# Patient Record
Sex: FEMALE | Race: WHITE | NOT HISPANIC OR LATINO | Employment: OTHER | URBAN - METROPOLITAN AREA
[De-identification: names, ages, dates, MRNs, and addresses within clinical notes are randomized per-mention and may not be internally consistent; named-entity substitution may affect disease eponyms.]

---

## 2017-04-06 ENCOUNTER — APPOINTMENT (OUTPATIENT)
Dept: LAB | Age: 72
End: 2017-04-06
Payer: MEDICARE

## 2017-04-06 ENCOUNTER — TRANSCRIBE ORDERS (OUTPATIENT)
Dept: ADMINISTRATIVE | Age: 72
End: 2017-04-06

## 2017-04-06 DIAGNOSIS — E78.5 HYPERLIPIDEMIA: ICD-10-CM

## 2017-04-06 DIAGNOSIS — M81.0 AGE-RELATED OSTEOPOROSIS WITHOUT CURRENT PATHOLOGICAL FRACTURE: ICD-10-CM

## 2017-04-06 LAB
25(OH)D3 SERPL-MCNC: 31.7 NG/ML (ref 30–100)
CHOLEST SERPL-MCNC: 249 MG/DL (ref 50–200)
HDLC SERPL-MCNC: 82 MG/DL (ref 40–60)
LDLC SERPL CALC-MCNC: 142 MG/DL (ref 0–100)
TRIGL SERPL-MCNC: 126 MG/DL

## 2017-04-06 PROCEDURE — 36415 COLL VENOUS BLD VENIPUNCTURE: CPT

## 2017-04-06 PROCEDURE — 80061 LIPID PANEL: CPT

## 2017-04-06 PROCEDURE — 82306 VITAMIN D 25 HYDROXY: CPT

## 2017-04-14 ENCOUNTER — ALLSCRIPTS OFFICE VISIT (OUTPATIENT)
Dept: OTHER | Facility: OTHER | Age: 72
End: 2017-04-14

## 2017-04-14 DIAGNOSIS — M81.0 AGE-RELATED OSTEOPOROSIS WITHOUT CURRENT PATHOLOGICAL FRACTURE: ICD-10-CM

## 2017-04-14 DIAGNOSIS — E78.5 HYPERLIPIDEMIA: ICD-10-CM

## 2017-04-14 DIAGNOSIS — Z00.00 ENCOUNTER FOR GENERAL ADULT MEDICAL EXAMINATION WITHOUT ABNORMAL FINDINGS: ICD-10-CM

## 2017-06-05 ENCOUNTER — ALLSCRIPTS OFFICE VISIT (OUTPATIENT)
Dept: OTHER | Facility: OTHER | Age: 72
End: 2017-06-05

## 2017-06-05 DIAGNOSIS — J44.9 CHRONIC OBSTRUCTIVE PULMONARY DISEASE (HCC): ICD-10-CM

## 2017-06-06 ENCOUNTER — HOSPITAL ENCOUNTER (OUTPATIENT)
Dept: RADIOLOGY | Age: 72
Discharge: HOME | End: 2017-06-06
Payer: MEDICARE

## 2017-06-06 ENCOUNTER — TRANSCRIBE ORDERS (OUTPATIENT)
Dept: ADMINISTRATIVE | Age: 72
End: 2017-06-06

## 2017-06-06 DIAGNOSIS — J44.9 CHRONIC OBSTRUCTIVE PULMONARY DISEASE (HCC): ICD-10-CM

## 2017-06-06 PROCEDURE — 71020 HB CHEST X-RAY 2VW FRONTAL&LATL: CPT

## 2017-06-12 ENCOUNTER — ALLSCRIPTS OFFICE VISIT (OUTPATIENT)
Dept: OTHER | Facility: OTHER | Age: 72
End: 2017-06-12

## 2017-06-30 ENCOUNTER — ALLSCRIPTS OFFICE VISIT (OUTPATIENT)
Dept: OTHER | Facility: OTHER | Age: 72
End: 2017-06-30

## 2017-07-18 ENCOUNTER — ALLSCRIPTS OFFICE VISIT (OUTPATIENT)
Dept: OTHER | Facility: OTHER | Age: 72
End: 2017-07-18

## 2017-09-11 ENCOUNTER — ALLSCRIPTS OFFICE VISIT (OUTPATIENT)
Dept: OTHER | Facility: OTHER | Age: 72
End: 2017-09-11

## 2017-09-11 ENCOUNTER — GENERIC CONVERSION - ENCOUNTER (OUTPATIENT)
Dept: OTHER | Facility: OTHER | Age: 72
End: 2017-09-11

## 2017-09-11 DIAGNOSIS — J44.9 CHRONIC OBSTRUCTIVE PULMONARY DISEASE (HCC): ICD-10-CM

## 2017-09-26 ENCOUNTER — APPOINTMENT (OUTPATIENT)
Dept: LAB | Age: 72
End: 2017-09-26
Payer: MEDICARE

## 2017-09-26 ENCOUNTER — TRANSCRIBE ORDERS (OUTPATIENT)
Dept: ADMINISTRATIVE | Age: 72
End: 2017-09-26

## 2017-09-26 ENCOUNTER — HOSPITAL ENCOUNTER (OUTPATIENT)
Dept: RADIOLOGY | Age: 72
Discharge: HOME | End: 2017-09-26
Payer: MEDICARE

## 2017-09-26 DIAGNOSIS — E78.5 HYPERLIPIDEMIA: ICD-10-CM

## 2017-09-26 DIAGNOSIS — J44.9 CHRONIC OBSTRUCTIVE PULMONARY DISEASE (HCC): ICD-10-CM

## 2017-09-26 DIAGNOSIS — M81.0 AGE-RELATED OSTEOPOROSIS WITHOUT CURRENT PATHOLOGICAL FRACTURE: ICD-10-CM

## 2017-09-26 LAB
25(OH)D3 SERPL-MCNC: 32.9 NG/ML (ref 30–100)
ALBUMIN SERPL BCP-MCNC: 3.7 G/DL (ref 3.5–5)
ALP SERPL-CCNC: 103 U/L (ref 46–116)
ALT SERPL W P-5'-P-CCNC: 16 U/L (ref 12–78)
ANION GAP SERPL CALCULATED.3IONS-SCNC: 6 MMOL/L (ref 4–13)
AST SERPL W P-5'-P-CCNC: 18 U/L (ref 5–45)
BACTERIA UR QL AUTO: NORMAL /HPF
BASOPHILS # BLD AUTO: 0.04 THOUSANDS/ΜL (ref 0–0.1)
BASOPHILS NFR BLD AUTO: 0 % (ref 0–1)
BILIRUB SERPL-MCNC: 0.57 MG/DL (ref 0.2–1)
BILIRUB UR QL STRIP: NEGATIVE
BUN SERPL-MCNC: 14 MG/DL (ref 5–25)
CALCIUM SERPL-MCNC: 9.2 MG/DL (ref 8.3–10.1)
CHLORIDE SERPL-SCNC: 97 MMOL/L (ref 100–108)
CHOLEST SERPL-MCNC: 231 MG/DL (ref 50–200)
CLARITY UR: CLEAR
CO2 SERPL-SCNC: 32 MMOL/L (ref 21–32)
COLOR UR: YELLOW
CREAT SERPL-MCNC: 0.65 MG/DL (ref 0.6–1.3)
EOSINOPHIL # BLD AUTO: 0.02 THOUSAND/ΜL (ref 0–0.61)
EOSINOPHIL NFR BLD AUTO: 0 % (ref 0–6)
ERYTHROCYTE [DISTWIDTH] IN BLOOD BY AUTOMATED COUNT: 13.8 % (ref 11.6–15.1)
GFR SERPL CREATININE-BSD FRML MDRD: 90 ML/MIN/1.73SQ M
GLUCOSE P FAST SERPL-MCNC: 86 MG/DL (ref 65–99)
GLUCOSE UR STRIP-MCNC: NEGATIVE MG/DL
HCT VFR BLD AUTO: 46.8 % (ref 34.8–46.1)
HDLC SERPL-MCNC: 77 MG/DL (ref 40–60)
HGB BLD-MCNC: 15.5 G/DL (ref 11.5–15.4)
HGB UR QL STRIP.AUTO: NEGATIVE
KETONES UR STRIP-MCNC: NEGATIVE MG/DL
LDLC SERPL CALC-MCNC: 135 MG/DL (ref 0–100)
LEUKOCYTE ESTERASE UR QL STRIP: NEGATIVE
LYMPHOCYTES # BLD AUTO: 2.24 THOUSANDS/ΜL (ref 0.6–4.47)
LYMPHOCYTES NFR BLD AUTO: 24 % (ref 14–44)
MCH RBC QN AUTO: 31.1 PG (ref 26.8–34.3)
MCHC RBC AUTO-ENTMCNC: 33.1 G/DL (ref 31.4–37.4)
MCV RBC AUTO: 94 FL (ref 82–98)
MONOCYTES # BLD AUTO: 0.87 THOUSAND/ΜL (ref 0.17–1.22)
MONOCYTES NFR BLD AUTO: 9 % (ref 4–12)
NEUTROPHILS # BLD AUTO: 6.09 THOUSANDS/ΜL (ref 1.85–7.62)
NEUTS SEG NFR BLD AUTO: 67 % (ref 43–75)
NITRITE UR QL STRIP: NEGATIVE
NON-SQ EPI CELLS URNS QL MICRO: NORMAL /HPF
NRBC BLD AUTO-RTO: 0 /100 WBCS
PH UR STRIP.AUTO: 7.5 [PH] (ref 4.5–8)
PLATELET # BLD AUTO: 354 THOUSANDS/UL (ref 149–390)
PMV BLD AUTO: 10.1 FL (ref 8.9–12.7)
POTASSIUM SERPL-SCNC: 4 MMOL/L (ref 3.5–5.3)
PROT SERPL-MCNC: 7.8 G/DL (ref 6.4–8.2)
PROT UR STRIP-MCNC: ABNORMAL MG/DL
RBC # BLD AUTO: 4.98 MILLION/UL (ref 3.81–5.12)
RBC #/AREA URNS AUTO: NORMAL /HPF
SODIUM SERPL-SCNC: 135 MMOL/L (ref 136–145)
SP GR UR STRIP.AUTO: 1.02 (ref 1–1.03)
TRIGL SERPL-MCNC: 97 MG/DL
TSH SERPL DL<=0.05 MIU/L-ACNC: 1.05 UIU/ML (ref 0.36–3.74)
UROBILINOGEN UR QL STRIP.AUTO: 0.2 E.U./DL
WBC # BLD AUTO: 9.28 THOUSAND/UL (ref 4.31–10.16)
WBC #/AREA URNS AUTO: NORMAL /HPF

## 2017-09-26 PROCEDURE — 84443 ASSAY THYROID STIM HORMONE: CPT

## 2017-09-26 PROCEDURE — 80053 COMPREHEN METABOLIC PANEL: CPT

## 2017-09-26 PROCEDURE — 82306 VITAMIN D 25 HYDROXY: CPT

## 2017-09-26 PROCEDURE — 81001 URINALYSIS AUTO W/SCOPE: CPT

## 2017-09-26 PROCEDURE — 36415 COLL VENOUS BLD VENIPUNCTURE: CPT

## 2017-09-26 PROCEDURE — 80061 LIPID PANEL: CPT

## 2017-09-26 PROCEDURE — 85025 COMPLETE CBC W/AUTO DIFF WBC: CPT

## 2017-10-05 ENCOUNTER — GENERIC CONVERSION - ENCOUNTER (OUTPATIENT)
Dept: OTHER | Facility: OTHER | Age: 72
End: 2017-10-05

## 2017-10-17 ENCOUNTER — HOSPITAL ENCOUNTER (OUTPATIENT)
Facility: HOSPITAL | Age: 72
Setting detail: OUTPATIENT SURGERY
Discharge: HOME | End: 2017-10-17
Attending: INTERNAL MEDICINE | Admitting: INTERNAL MEDICINE
Payer: MEDICARE

## 2017-10-17 VITALS
BODY MASS INDEX: 14.16 KG/M2 | DIASTOLIC BLOOD PRESSURE: 68 MMHG | HEART RATE: 98 BPM | SYSTOLIC BLOOD PRESSURE: 118 MMHG | HEIGHT: 65 IN | OXYGEN SATURATION: 98 % | RESPIRATION RATE: 20 BRPM | TEMPERATURE: 97.7 F | WEIGHT: 85 LBS

## 2017-10-17 DIAGNOSIS — C34.91 MALIGNANT NEOPLASM OF UNSPECIFIED PART OF RIGHT BRONCHUS OR LUNG (HCC): ICD-10-CM

## 2017-10-17 DIAGNOSIS — R91.8 LUNG MASS: ICD-10-CM

## 2017-10-17 DIAGNOSIS — R91.8 OTHER NONSPECIFIC ABNORMAL FINDING OF LUNG FIELD (CODE): ICD-10-CM

## 2017-10-17 PROCEDURE — 88341 IMHCHEM/IMCYTCHM EA ADD ANTB: CPT | Performed by: INTERNAL MEDICINE

## 2017-10-17 PROCEDURE — 88305 TISSUE EXAM BY PATHOLOGIST: CPT | Performed by: INTERNAL MEDICINE

## 2017-10-17 PROCEDURE — 94640 AIRWAY INHALATION TREATMENT: CPT

## 2017-10-17 PROCEDURE — 88112 CYTOPATH CELL ENHANCE TECH: CPT | Performed by: INTERNAL MEDICINE

## 2017-10-17 PROCEDURE — 88342 IMHCHEM/IMCYTCHM 1ST ANTB: CPT | Performed by: INTERNAL MEDICINE

## 2017-10-17 PROCEDURE — 94760 N-INVAS EAR/PLS OXIMETRY 1: CPT

## 2017-10-17 RX ORDER — MIDAZOLAM HYDROCHLORIDE 1 MG/ML
5 INJECTION INTRAMUSCULAR; INTRAVENOUS ONCE
Status: COMPLETED | OUTPATIENT
Start: 2017-10-17 | End: 2017-10-17

## 2017-10-17 RX ORDER — SODIUM CHLORIDE 9 MG/ML
50 INJECTION, SOLUTION INTRAVENOUS CONTINUOUS
Status: DISCONTINUED | OUTPATIENT
Start: 2017-10-17 | End: 2017-10-17 | Stop reason: HOSPADM

## 2017-10-17 RX ORDER — LEVALBUTEROL 1.25 MG/.5ML
1.25 SOLUTION, CONCENTRATE RESPIRATORY (INHALATION) EVERY 8 HOURS PRN
Status: DISCONTINUED | OUTPATIENT
Start: 2017-10-17 | End: 2017-10-17 | Stop reason: HOSPADM

## 2017-10-17 RX ORDER — BUDESONIDE 0.25 MG/2ML
0.25 INHALANT ORAL 2 TIMES DAILY
COMMUNITY
End: 2018-10-03 | Stop reason: SDUPTHER

## 2017-10-17 RX ORDER — FENTANYL CITRATE/PF 50 MCG/ML
50 SYRINGE (ML) INJECTION ONCE
Status: COMPLETED | OUTPATIENT
Start: 2017-10-17 | End: 2017-10-17

## 2017-10-17 RX ADMIN — SODIUM CHLORIDE 50 ML/HR: 0.9 INJECTION, SOLUTION INTRAVENOUS at 12:49

## 2017-10-17 RX ADMIN — SODIUM CHLORIDE 50 ML/HR: 0.9 INJECTION, SOLUTION INTRAVENOUS at 10:29

## 2017-10-17 RX ADMIN — MIDAZOLAM 3 MG: 1 INJECTION INTRAMUSCULAR; INTRAVENOUS at 11:24

## 2017-10-17 RX ADMIN — LEVALBUTEROL HYDROCHLORIDE 1.25 MG: 1.25 SOLUTION, CONCENTRATE RESPIRATORY (INHALATION) at 13:19

## 2017-10-17 RX ADMIN — FENTANYL CITRATE 50 MCG: 50 INJECTION INTRAMUSCULAR; INTRAVENOUS at 11:24

## 2017-10-17 NOTE — OP NOTE
**** PULMONARY REPORT ****     PATIENT NAME: Darlene Packer ------- VISIT ID :  Patient ID:   [id number] YOB: 1945     INTRODUCTION: Bronchoscopy - A 81 female patient presents for outpatient   Bronchoscopy at [facility] (formerly [facility])  INDICATIONS:  Lung mass  CONSENT: The benefits, risks, and alternatives to the procedure were   discussed and informed consent was obtained from the patient  PREPARATION:  ASA Classification: Class 2 - Patient has mild to moderate   systemic disturbance that may or may not be related to the disorder   requiring surgery  ASA Classification: Class 2 - Patient has mild to   moderate systemic disturbance that may or may not be related to the   disorder requiring surgery  MEDICATIONS: See anesthesiologist's report  Fentanyl 50 mcg IV during the   procedure  Versed 3 mg IV during the procedure  PROCEDURE:  The bronchoscope was passed via the right naris and advanced   into the tracheobronchial tree  The vocal cords are symmetric and move   normally with respiration  The trachea is of normal caliber  The main   debby is full in appearance  The right main stem bronchus is compramized   by an endobronchial tumor  The RUL bronchus and segmental bronchi are   open  The brochus intermedius is collapsed and impacted with mucus  Bronchial washing was done  I couldn't see beyond the bronchus   intermedius  The left bronchial tree was inspected  No endobronchial   lesion or secretions  FNA X5 was done from the RT main stem tumor  Also   cytology bruching and forcept biopsies  Bronchial washing also done  There   was no significant bleeding  The scope was removed without difficulty  The   patient has tolerated the procedure well  FINDINGS:     ESTIMATED BLOOD LOSS:  None  COMPLICATIONS:  There were no unplanned events       IMPRESSIONS: Rt main stem endobronchial lesion and collapse of the   bronchial intermedius consistent with lung cancer  RECOMMENDATIONS: Follow up on the biospy result  Referred to medical   oncology and ordered a PET/CT scan  PROCEDURE CODES:     ICD-9 Codes: 343  6 Swelling, mass, or lump in chest     ICD-10 Codes: V92 6 Neoplasm of uncertain behavior of trachea, bronchus   and lung     PERFORMED BY:KUNAL Chery  on 10/17/2017  Assisted By:  Version 1, electronically signed by KUNAL Singh  on 10/17/2017   at 14:09

## 2017-11-02 ENCOUNTER — HOSPITAL ENCOUNTER (OUTPATIENT)
Dept: RADIOLOGY | Age: 72
Discharge: HOME | End: 2017-11-02
Payer: MEDICARE

## 2017-11-02 VITALS — WEIGHT: 82.6 LBS | BODY MASS INDEX: 13.75 KG/M2

## 2017-11-02 DIAGNOSIS — C34.91 MALIGNANT NEOPLASM OF UNSPECIFIED PART OF RIGHT BRONCHUS OR LUNG (HCC): ICD-10-CM

## 2017-11-02 LAB — GLUCOSE SERPL-MCNC: 83 MG/DL (ref 65–140)

## 2017-11-02 PROCEDURE — 82948 REAGENT STRIP/BLOOD GLUCOSE: CPT

## 2017-11-02 PROCEDURE — A9552 F18 FDG: HCPCS

## 2017-11-02 PROCEDURE — 78815 PET IMAGE W/CT SKULL-THIGH: CPT

## 2017-11-02 RX ADMIN — IOHEXOL 5 ML: 240 INJECTION, SOLUTION INTRATHECAL; INTRAVASCULAR; INTRAVENOUS; ORAL at 09:45

## 2017-11-06 ENCOUNTER — ALLSCRIPTS OFFICE VISIT (OUTPATIENT)
Dept: OTHER | Facility: OTHER | Age: 72
End: 2017-11-06

## 2017-11-07 NOTE — CONSULTS
Assessment  1  Anorexia (783 0) (R63 0)   2  Breast CA (174 9) (C50 919)   3  Adenocarcinoma of right lung, stage 3 (162 9) (C34 91)    Plan  Abnormal weight loss    · Megestrol Acetate 400 MG/10ML Oral Suspension; 10ml daily   Rx By: Aminah Velasquez); Dispense: 0 Days ; #:240 ML; Refill: 6;For: Abnormal weight loss; JACOB = N; Verified Transmission to [id number] Last Updated By: System, SureScripts; 11/6/2017 4:39:55 PM  Adenocarcinoma of right lung, stage 3    · Prochlorperazine Maleate 10 MG Oral Tablet; TAKE 1 TABLET EVERY 6 HOURS AS  NEEDED   Rx By: Aminah Velasquez); Dispense: 15 Days ; #:60 Tablet; Refill: 2;For: Adenocarcinoma of right lung, stage 3; JACBO = N; Verified Transmission to [facility] #[id number] Last Updated By: System, SureScripts; 11/6/2017 4:01:38 PM   · (1) CBC/PLT/DIFF; Status:Active; Requested LVE:[id number];    Perform:[facility]; PXL:[id number]; Ordered; For:Adenocarcinoma of right lung, stage 3; Ordered By:Jose Luis Singh);   · (1) COMPREHENSIVE METABOLIC PANEL; Status:Active; Requested GGY:[id number];    Perform:[facility]; BMR:33BCS3268; Ordered; For:Adenocarcinoma of right lung, stage 3; Ordered By:Jose Luis Singh);   · 2 Maegan Arzola MD, [facility]  Radiation Oncology Co-Management  Stage IIIA I  adenocarcinoma of the lung need for radiation evaluation  Status: Active  Requested for:  68BNT4166   Ordered; For: Adenocarcinoma of right lung, stage 3; Ordered By: Aminah Velasquez) Performed:  Due: 76MMC5061  Care Summary provided  : Yes   · Follow-up visit in 3 weeks Evaluation and Treatment  Follow-up  Status: Hold For -  Scheduling  Requested for: 67QDV4493   Ordered; For: Adenocarcinoma of right lung, stage 3; Ordered By: Aminah Velasquez) Performed:  Due: 86YYT1358    Discussion/Summary    At least stage IIIA adenocarcinoma of the right upper lobe of the lung with right hilum, mediastinal involvement by CT scan/ PET scan, biopsy of the right main stem Cell bronchus showed adenocarcinoma, positive for TTF 1, negative for P 40, there was no evidence of distant metastasis involving the adrenals, liver, bony area, there might be an 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I had long discussion with the patient, her sister and her cousin about the staging and treatmentI believe the patient definitely will benefit from concurrent radiation therapy with weekly Taxol at 40 milligram/meter subcutaneously, carboplatin AUC 2 with CBC, BMP on weekly basisdepends on the response later on she might benefit from adjuvant Durvalumab if it approved by the FDAWe will make Radiation Oncology referralFollow-up every other week with CBC, CMP an office visitMegace 6 25 mg 1 tsp every day  History of Present Illness  81-year-old  female with chronic COPD secondary to active smoking, dyspnea on exertion, progressive, CT scan showed mediastinal enlargement, possible right hilum enlargement, she had right mainstem bronchus biopsy on October 2017 showed moderately differentiated adenocarcinoma of the lung positive for TTF 1 and napsin and negative for P 40PET scan showed confluent of hypermetabolic right hilar and mediastinal, precarinal, subcarinal lymphadenopathy compatible with malignancy, or regular right upper lung density along the fissure measuring 1 6 x 1 3 cm however SUV 1 1 might be inflammation, hypermetabolic nodule in the right parotid gland might represent a primary parotid gland neoplasm used to smoke 2 packs of cigarettes daily for many many years, she does not drink alcoholboth parents still livinghas 1 sister in [place]      Review of Systems    Constitutional: recent 8 lb weight loss, but-- no fever-- and-- no chills  Eyes: No complaints of eye pain, no red eyes, no eyesight problems, no discharge, no dry eyes, no itching of eyes     ENT: no complaints of earache, no loss of hearing, no nose bleeds, no nasal discharge, no sore throat, no hoarseness  Cardiovascular: No complaints of slow heart rate, no fast heart rate, no chest pain, no palpitations, no leg claudication, no lower extremity edema  Respiratory: shortness of breath,-- cough-- and-- shortness of breath during exertion, but-- no wheezing  Gastrointestinal: No complaints of abdominal pain, no constipation, no nausea or vomiting, no diarrhea, no bloody stools  Genitourinary: No complaints of dysuria, no incontinence, no pelvic pain, no dysmenorrhea, no vaginal discharge or bleeding  Musculoskeletal: No complaints of arthralgias, no myalgias, no joint swelling or stiffness, no limb pain or swelling  Integumentary: No complaints of skin rash or lesions, no itching, no skin wounds, no breast pain or lump  Neurological: No complaints of headache, no confusion, no convulsions, no numbness, no dizziness or fainting, no tingling, no limb weakness, no difficulty walking  Psychiatric: Not suicidal, no sleep disturbance, no anxiety or depression, no change in personality, no emotional problems  Endocrine: No complaints of proptosis, no hot flashes, no muscle weakness, no deepening of the voice, no feelings of weakness  Hematologic/Lymphatic: no swollen glands  ROS reviewed  Active Problems  1  Abnormal weight loss (783 21) (R63 4)   2  Anorexia (783 0) (R63 0)   3  Breast CA (174 9) (C50 919)   4  Bronchitis, chronic obstructive, with exacerbation (491 21) (J44 1)   5  Chronic obstructive pulmonary disease (496) (J44 9)   6  Encounter for preventive health examination (V70 0) (Z00 00)   7  Fatigue (780 79) (R53 83)   8  Hyperlipidemia (272 4) (E78 5)   9  Lung mass (786 6) (R91 8)   10  Need for pneumococcal vaccine (V03 82) (Z23)   11  Need for prophylactic vaccination and inoculation against influenza (V04 81) (Z23)   12  Need for Tdap vaccination (V06 1) (Z23)   13  Nicotine dependence (305 1) (F17 200)   14   Non compliance with medical treatment (V15 81) (Z91 19)   15  Osteoporosis (733 00) (M81 0)    Past Medical History  1  History of malignant neoplasm of female breast (V10 3) (Z85 3)   2  Need for prophylactic vaccination and inoculation against influenza (V04 81) (Z23)    The active problems and past medical history were reviewed and updated today  Surgical History  1  History of Breast Surgery Mastectomy    The surgical history was reviewed and updated today  Family History  Mother    1  Family history of cardiac disorder (V17 49) (Z82 49)    The family history was reviewed and updated today  Social History   · Caffeine use (V49 89) (F15 90)   · Current every day smoker (305 1) (F17 200)   · Quit smoking (V15 82) (T82 827)  The social history was reviewed and updated today  Current Meds   1  Brovana 15 MCG/2ML Inhalation Nebulization Solution; INHALE 1 VIAL Every twelve   hours; Therapy: 86Isl1315 to (Evaluate:10Mar2018)  Requested for: 16Kwn6668; Last   Rx:76Jwq8208 Ordered   2  Budesonide 0 5 MG/2ML Inhalation Suspension; USE 1 UNIT DOSE VIA NEBULIZER   TWO TIMES A DAY; Therapy: 81Kry5870 to (Evaluate:10Mar2018)  Requested for: 93Bjg0845; Last   Rx:56Eyj0184 Ordered   3  Calcium 1000 + D 1000-800 MG-UNIT Oral Tablet; Therapy: (Recorded:08Mar2016) to Recorded   4  [employer] Vitamin D 1000 UNIT CAPS; Therapy: (Recorded:08Mar2016) to Recorded   5  Ipratropium Bromide 0 02 % Inhalation Solution; USE 1 UNIT DOSE IN NEBULIZER 4   TIMES DAILY; Therapy: 75Xqf9646 to (Evaluate:10Mar2018)  Requested for: 14Xxj2764; Last   Rx:46Rvl7581 Ordered   6  Ventolin  (90 Base) MCG/ACT Inhalation Aerosol Solution; Inhale 2 puff every six   hours as needed; Therapy: 35Fkz8870 to (Evaluate:10Mar2018)  Requested for: 63Mil9777; Last   Rx:75Wpi5995 Ordered    The medication list was reviewed and updated today  Allergies  1   No Known Drug Allergies    Vitals  Vital Signs    Recorded: 97MGL6574 03:09PM   Temperature 99 F Heart Rate 114   Respiration 16   Systolic 510   Diastolic 80   Height 5 ft 4 5 in   Weight 83 lb 5 0 oz   BMI Calculated 14 08   BSA Calculated 1 36   O2 Saturation 93     Physical Exam    Constitutional   General appearance: No acute distress, well appearing and well nourished  Eyes   Conjunctiva and lids: No swelling, erythema or discharge  Pupils and irises: Equal, round and reactive to light  Ears, Nose, Mouth, and Throat   External inspection of ears and nose: Normal     Oropharynx: Normal with no erythema, edema, exudate or lesions  Pulmonary   Auscultation of lungs: Abnormal   Auscultation of the lungs revealed decreased breath sounds diffusely  Cardiovascular   Auscultation of heart: Abnormal   A grade 2 systolic murmur was heard at the RUSB  Examination of extremities for edema and/or varicosities: Normal     Carotid pulses: Normal     Abdomen   Abdomen: Non-tender, no masses  Liver and spleen: No hepatomegaly or splenomegaly  Lymphatic   Palpation of lymph nodes in neck: No lymphadenopathy  Musculoskeletal   Gait and station: Normal     Digits and nails: Normal without clubbing or cyanosis  Inspection/palpation of joints, bones, and muscles: Normal     Skin   Skin and subcutaneous tissue: Normal without rashes or lesions  Neurologic   Cranial nerves: Cranial nerves 2-12 intact  Psychiatric   Orientation to person, place, and time: Normal     Mood and affect: Normal         ECOG 0       Results/Data  * NM PET CT SKULL BASE TO MID THIGH [id number] 09:10AM Malachi OREILLY Order Number: [id number]    - Patient Instructions: To schedule this appointment, please contact Central Scheduling at [phone]  Test Name Result Flag Reference   NM PET CT SKULL BASE TO MID THIGH (Report)     PET/CT SCAN     INDICATION: C34 91: Malignant neoplasm of unspecified part of right bronchus or lung  History taken directly from the electronic ordering system   Newly diagnosed lung cancer, initial staging, history of right breast cancer, status post right mastectomy     MODIFIER: PI        COMPARISON: CT chest 9/26/2017     CELL TYPE: Adenocarcinoma, right mainstem bronchus biopsy 10/17/2017     TECHNIQUE:  8 4 mCi F-18-FDG administered IV  Multiplanar attenuation corrected and non attenuation corrected PET images are available for interpretation, and contiguous, low dose, axial CT sections were obtained from the skull base through the femurs    following the administration of oral contrast material (7 5 cc Omnipaque-240 in 300 cc water)  Intravenous contrast material was not utilized  This examination, like all CT scans performed in the [facility], was performed utilizing    techniques to minimize radiation dose exposure, including the use of iterative reconstruction and automated exposure control  Fasting serum glucose: 83 mg/dl     FINDINGS:      VISUALIZED BRAIN:     No acute abnormalities are seen  HEAD/NECK:     There is an ill-defined hypermetabolic nodule in the right parotid gland region, measuring approximately 2 2 x 1 7 cm on series 3 image 34  SUV measures 11 3  This may represent a metastasis versus primary parotid gland lesions such as a Warthin's    tumor or amorphic adenoma  There is otherwise a physiologic distribution of FDG  CT images: Nodular right thyroid with calcifications  CHEST:     There is confluent hypermetabolic right hilar and mediastinal, precarinal, subcarinal adenopathy, most compatible with malignancy  SUV measures 17 4  On series 3 image 91, confluent right hilar and precarinal adenopathy measures approximately 4 2 x 2 2    cm  Subcarinal adenopathy measures approximately 2 5 x 1 5 cm on image 92  Right hilar devan mass image 100 measures 2 7 x 1 47 m  Tumor extends into the right mainstem bronchus, bronchus intermedius, right middle lobe and right lower lobe bronchi, as   seen previously   There is also a small right thoracic paraspinal node series 3 image 128 with mild FDG activity, measuring 1 3 x 0 4 cm, SUV 2 5  On series 3 image 78, there is an irregular right upper lung density along the major fissure measuring 1 6 x 1 3 cm, with mild FDG activity, SUV 1 1  This may be inflammatory, versus malignancy  Biapical pleural parenchymal scarring without significant hypermetabolism  4 mm left lower lung nodular density series 3 image 119 is too small for accurate PET evaluation  Small foci of activity in the proximal left upper extremity may be due to left upper extremity injection of the radiotracer  CT images: Persistent atelectasis right middle lobe  Severe emphysema  Coronary artery calcifications  ABDOMEN:     Questionable subtle hypermetabolic focus anterior to the right diaphragmatic larisa, SUV 2 6  This may be reassessed on follow-up exam      Otherwise no FDG avid soft tissue lesions are seen  CT images: Unremarkable  PELVIS:    No FDG avid soft tissue lesions are seen  CT images: Unremarkable  OSSEOUS STRUCTURES:   No FDG avid lesions are seen  CT images: No significant findings  Minimal anterolisthesis L5 on S1, and slight retrolisthesis L1 on L2, likely degenerative  IMPRESSION:   1  There is confluent hypermetabolic right hilar and mediastinal, precarinal, subcarinal adenopathy, most compatible with malignancy  2  Irregular right upper lung density along the major fissure measuring 1 6 x 1 3 cm, with mild FDG activity, SUV 1 1  This may be inflammatory in etiology versus malignancy  3  Hypermetabolic nodule right parotid gland region may represent a metastatic node or primary parotid gland neoplasm such as a Warthin's tumor or pleomorphic adenoma  Contrast CT neck evaluation may be considered to better delineate the borders  4  Questionable subtle hypermetabolic focus anterior to the right diaphragmatic larisa, SUV 2 6   This may be reassessed on follow-up exam    5  Continued CT follow-up recommended for left lower lung 4 mm nodule  Workstation performed: [id number]     Signed by:   Solomon Pruitt MD   11/2/17     (1) TISSUE EXAM [id number] 12:08PM Idalia Sanabria     Test Name Result Flag Reference   LAB AP CASE REPORT (Report)     Surgical Pathology Report             Case: [id number]                   Authorizing Provider: Tj Morales MD     Collected:      10/17/2017 1208        Ordering Location:   [address] [place] [address]   Received:      10/17/2017 [facility] Endoscopy                               Pathologist:      Valerie Sin MD                             Specimen:  Lung, Right Mainstem, Lung, right mainstem, mass bx   LAB AP FINAL DIAGNOSIS (Report)     A  Lung, right mainstem bronchus, biopsy:  - Moderately differentiated adenocarcinoma of lung origin  - Immunohistochemical stains performed with appropriate controls show the   tumor cells to be positive for TTF-1 and Napsin and negative for p40 and   MAURY-3, supporting the diagnosis  Electronically signed by Valerie Sin MD on 10/20/2017 at 8:50 AM   LAB AP SURGICAL ADDITIONAL INFORMATION (Report)     All controls performed with the immunohistochemical stains reported above   reacted appropriately  These tests were developed and their performance   characteristics determined by [facility] or   [employer]  They may not be cleared or approved by the [place]  Food and Drug Administration  The FDA has determined that such clearance   or approval is not necessary  These tests are used for clinical purposes  They should not be regarded as investigational or for research  This   laboratory has been approved by CLIA 88, designated as a high-complexity   laboratory and is qualified to perform these tests  LAB AP GROSS DESCRIPTION (Report)     A   The specimen is received in formalin, labeled with the patient's name   and hospital number, and is designated Right lung mainstem mass biopsy  The specimen consists of multiple tan red soft tissue and hemorrhagic   fragments measuring in aggregate 0 5 x 0 5 x 0 1 cm  Entirely submitted  One cassette  Note: The estimated total formalin fixation time based upon information   provided by the submitting clinician and the standard processing schedule   is 16 25 hours  [facility] [place]     Future Appointments    Date/Time Provider Specialty Site   11/15/2017 03:20 PM Ross Smith MD Pulmonary Medicine [place] [address]     Signatures   Electronically signed by :  KUNAL Sarah ; Nov 6 2017  5:32PM EST                       (Author)

## 2017-11-14 ENCOUNTER — GENERIC CONVERSION - ENCOUNTER (OUTPATIENT)
Dept: OTHER | Facility: OTHER | Age: 72
End: 2017-11-14

## 2017-11-15 ENCOUNTER — ALLSCRIPTS OFFICE VISIT (OUTPATIENT)
Dept: OTHER | Facility: OTHER | Age: 72
End: 2017-11-15

## 2017-11-16 ENCOUNTER — APPOINTMENT (OUTPATIENT)
Dept: RADIATION ONCOLOGY | Facility: HOSPITAL | Age: 72
End: 2017-11-16
Payer: MEDICARE

## 2017-11-16 ENCOUNTER — TRANSCRIBE ORDERS (OUTPATIENT)
Dept: LAB | Facility: HOSPITAL | Age: 72
End: 2017-11-16

## 2017-11-16 ENCOUNTER — APPOINTMENT (OUTPATIENT)
Dept: LAB | Facility: HOSPITAL | Age: 72
End: 2017-11-16
Attending: INTERNAL MEDICINE
Payer: MEDICARE

## 2017-11-16 ENCOUNTER — GENERIC CONVERSION - ENCOUNTER (OUTPATIENT)
Dept: OTHER | Facility: OTHER | Age: 72
End: 2017-11-16

## 2017-11-16 DIAGNOSIS — C34.91 PRIMARY LUNG CANCER WITH METASTASIS FROM LUNG TO OTHER SITE, RIGHT (HCC): Primary | ICD-10-CM

## 2017-11-16 DIAGNOSIS — C34.91 MALIGNANT NEOPLASM OF UNSPECIFIED PART OF RIGHT BRONCHUS OR LUNG (HCC): ICD-10-CM

## 2017-11-16 LAB
ALBUMIN SERPL BCP-MCNC: 3.7 G/DL (ref 3.5–5)
ALP SERPL-CCNC: 86 U/L (ref 46–116)
ALT SERPL W P-5'-P-CCNC: 18 U/L (ref 12–78)
ANION GAP SERPL CALCULATED.3IONS-SCNC: 5 MMOL/L (ref 4–13)
AST SERPL W P-5'-P-CCNC: 16 U/L (ref 5–45)
BASOPHILS # BLD AUTO: 0.04 THOUSANDS/ΜL (ref 0–0.1)
BASOPHILS NFR BLD AUTO: 0 % (ref 0–1)
BILIRUB SERPL-MCNC: 0.39 MG/DL (ref 0.2–1)
BUN SERPL-MCNC: 19 MG/DL (ref 5–25)
CALCIUM SERPL-MCNC: 8.8 MG/DL (ref 8.3–10.1)
CHLORIDE SERPL-SCNC: 98 MMOL/L (ref 100–108)
CO2 SERPL-SCNC: 32 MMOL/L (ref 21–32)
CREAT SERPL-MCNC: 0.61 MG/DL (ref 0.6–1.3)
EOSINOPHIL # BLD AUTO: 0.03 THOUSAND/ΜL (ref 0–0.61)
EOSINOPHIL NFR BLD AUTO: 0 % (ref 0–6)
ERYTHROCYTE [DISTWIDTH] IN BLOOD BY AUTOMATED COUNT: 13.9 % (ref 11.6–15.1)
GFR SERPL CREATININE-BSD FRML MDRD: 92 ML/MIN/1.73SQ M
GLUCOSE SERPL-MCNC: 131 MG/DL (ref 65–140)
HCT VFR BLD AUTO: 42.3 % (ref 34.8–46.1)
HGB BLD-MCNC: 13.9 G/DL (ref 11.5–15.4)
LYMPHOCYTES # BLD AUTO: 1.89 THOUSANDS/ΜL (ref 0.6–4.47)
LYMPHOCYTES NFR BLD AUTO: 16 % (ref 14–44)
MCH RBC QN AUTO: 31 PG (ref 26.8–34.3)
MCHC RBC AUTO-ENTMCNC: 32.9 G/DL (ref 31.4–37.4)
MCV RBC AUTO: 94 FL (ref 82–98)
MONOCYTES # BLD AUTO: 0.91 THOUSAND/ΜL (ref 0.17–1.22)
MONOCYTES NFR BLD AUTO: 8 % (ref 4–12)
NEUTROPHILS # BLD AUTO: 8.61 THOUSANDS/ΜL (ref 1.85–7.62)
NEUTS SEG NFR BLD AUTO: 76 % (ref 43–75)
NRBC BLD AUTO-RTO: 0 /100 WBCS
PLATELET # BLD AUTO: 365 THOUSANDS/UL (ref 149–390)
PMV BLD AUTO: 9.2 FL (ref 8.9–12.7)
POTASSIUM SERPL-SCNC: 4.1 MMOL/L (ref 3.5–5.3)
PROT SERPL-MCNC: 7.5 G/DL (ref 6.4–8.2)
RBC # BLD AUTO: 4.49 MILLION/UL (ref 3.81–5.12)
SODIUM SERPL-SCNC: 135 MMOL/L (ref 136–145)
WBC # BLD AUTO: 11.52 THOUSAND/UL (ref 4.31–10.16)

## 2017-11-16 PROCEDURE — 85025 COMPLETE CBC W/AUTO DIFF WBC: CPT

## 2017-11-16 PROCEDURE — 80053 COMPREHEN METABOLIC PANEL: CPT

## 2017-11-16 PROCEDURE — 99215 OFFICE O/P EST HI 40 MIN: CPT | Performed by: RADIOLOGY

## 2017-11-16 PROCEDURE — 36415 COLL VENOUS BLD VENIPUNCTURE: CPT

## 2017-11-16 RX ORDER — SODIUM CHLORIDE 9 MG/ML
20 INJECTION, SOLUTION INTRAVENOUS CONTINUOUS
Status: DISCONTINUED | OUTPATIENT
Start: 2017-11-17 | End: 2017-11-20 | Stop reason: HOSPADM

## 2017-11-16 NOTE — PROGRESS NOTES
Assessment    1  Chronic obstructive pulmonary disease (496) (J44 9)   2  Chronic hypoxemic respiratory failure (518 83,799 02) (J96 11)   3  Adenocarcinoma of right lung, stage 3 (162 9) (C34 91)    Plan  Adenocarcinoma of right lung, stage 3    · Follow-up visit in 4 Months Evaluation and Treatment  Follow-up  Status: Hold For -Scheduling  Requested for: 56GZN8815   Ordered; Adenocarcinoma of right lung, stage 3; Ordered By: Bianca Snider Performed:  Due: 14FXT0718    Results/Data  Results Free Text Form Uyen Iba:   Results  Other Overnight pulse oximetry on room air showed that the patie  CT Scan CT scan of the chest Is reviewed and it showedshowed right hilar lung mass  PET Scan the PET-CT also reviewed and showed the right hilar mass is PET active  Discussion/Summary  Discussion Summary:   The patient's COPD is better  Given the degree of shortness of breath on exertion she would benefit from pulmonary rehab  However, this would be probably better done after she is done with the chemotherapy and radiation therapy for her lung cancer  I will call the [facility] pharmacy and find out why she was not given the [address] which was prescribed  She will continue with the [place] and budesonide nebulized twice a day and ipratropium 4 times a day  She will also use the oxygen 24 hours a day  I have encouraged her to start to become more active to maintain her stamina  She already received her influenza vaccine  I will see her in 4 months in a follow-up visit  Goals and Barriers: The patient has the current Goals: Get through the cancer treatment without complications  Improved breathing on exertion  The patent has the current Barriers: Lung cancer  Patient's Capacity to Self-Care: Patient is able to Self-Care  Patient agrees and allows to involve family/caregiver in development of care plan:   Patient Education: Educational resources provided:   Medication SE Review and Pt Understands Tx:  The treatment plan was reviewed with the patient/guardian  The patient/guardian understands and agrees with the treatment plan      Active Problems    1  Abnormal weight loss (783 21) (R63 4)   2  Adenocarcinoma of right lung, stage 3 (162 9) (C34 91)   3  Adjustment disorder with depressed mood (309 0) (F43 21)   4  Anorexia (783 0) (R63 0)   5  Breast CA (174 9) (C50 919)   6  Bronchitis, chronic obstructive, with exacerbation (491 21) (J44 1)   7  Chronic obstructive pulmonary disease (496) (J44 9)   8  Depressive disorder (311) (F32 9)   9  Encounter for preventive health examination (V70 0) (Z00 00)   10  Fatigue (780 79) (R53 83)   11  Hyperlipidemia (272 4) (E78 5)   12  Lung mass (786 6) (R91 8)   13  Need for pneumococcal vaccine (V03 82) (Z23)   14  Need for prophylactic vaccination and inoculation against influenza (V04 81) (Z23)   15  Need for Tdap vaccination (V06 1) (Z23)   16  Nicotine dependence (305 1) (F17 200)   17  Non compliance with medical treatment (V15 81) (Z91 19)   18  Osteoporosis (733 00) (M81 0)    History of Present Illness  HPI: The patient is here for a follow-up visit  She went to [place] to visit her Granddaughter and went back home  before that and after the last visit her low-dose CT scan of the chest showed a lung mass  I did a bronchoscopy and biopsy and that showed adenocarcinoma of the lung  She was scheduled for a PET-CT And was referred to Medical Oncology  Her breathing has improved with the use of oxygen, budesonide and ipratropium nebulized medications  She has no significant cough  Denies any sputum production  Denies any chest pain or palpitations  His appetite is better  She has no nocturnal symptoms  She is using oxygen 24 hours a day  Review of Systems  Complete-Female - Pulm:  Constitutional: No fever, no chills, feels well, no tiredness, no recent weight gain or weight loss  Eyes: no complaints of vision problems  ENT: no rhinitis, no PND, no epistaxis    Cardiovascular: no palpitations, no chest pain  Respiratory: as noted in HPI  Gastrointestinal: no complaints of esophageal reflux, no abdominal pain  Genitourinary: no dysuria  Musculoskeletal: no arthralgias, no joint swelling, no myalgias  Integumentary: no rash, no lesions  Neurological: no headache, no fainting, no weakness  Psychiatric: no anxiety, no depression  Hematologic/Lymphatic: â no complaints of swollen glands  Past Medical History  1  History of malignant neoplasm of female breast (V10 3) (Z85 3)   2  Need for prophylactic vaccination and inoculation against influenza (V04 81) (Z23)    Surgical History  1  History of Breast Surgery Mastectomy   2  History of Colonoscopy  Surgical History Reviewed: The surgical history was reviewed and updated today  Family History  Mother    1  Family history of cardiac disorder (V17 49) (Z82 49)  Family History Reviewed: The family history was reviewed and updated today  Social History     · Caffeine use (V49 89) (F15 90)   · 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   · Current every day smoker (305 1) (F17 200)   · Lives alone without help available (V60 4) (Z60 2)   · Quit smoking (V15 82) (Z87 891)   · 20 cigg a day for 55yrs and stop 2 mth ago  Social History Reviewed: The social history was reviewed and updated today  Current Meds   1  Brovana 15 MCG/2ML Inhalation Nebulization Solution; INHALE 1 VIAL Every twelve hours; Therapy: 11Sep2017 to (Evaluate:10Mar2018)  Requested for: 11Sep2017; Last Rx:11Sep2017 Ordered   2  Budesonide 0 5 MG/2ML Inhalation Suspension; USE 1 UNIT DOSE VIA NEBULIZER TWO TIMES A DAY; Therapy: 37Uhr9936 to (Evaluate:10Mar2018)  Requested for: 11Sep2017; Last Rx:11Sep2017 Ordered   3  Calcium 1000 + D 1000-800 MG-UNIT Oral Tablet; Therapy: (Recorded:08Mar2016) to Recorded   4  [employer] Vitamin D 1000 UNIT CAPS; Therapy: (Recorded:08Mar2016) to Recorded   5   Ipratropium Bromide 0 02 % Inhalation Solution; USE 1 UNIT DOSE IN NEBULIZER 4 TIMES DAILY; Therapy: 25Gpz4957 to (Evaluate:10Mar2018)  Requested for: 42Olx5927; Last Rx:12Dlw6921 Ordered   6  Megestrol Acetate 400 MG/10ML Oral Suspension; 10ml daily; Therapy: 55ZAU6089 to (Last BV:44NYA3612)  Requested for: 12ZKR5357 Ordered   7  PARoxetine HCl - 10 MG Oral Tablet; TAKE 1/2 TABLET DAILY FOR 10 DAYS THEN START ONE TABLET DAILY; Therapy: 30EHE2635 to (Last Rx:14Nov2017)  Requested for: 07PNM1785 Ordered   8  Prochlorperazine Maleate 10 MG Oral Tablet; TAKE 1 TABLET EVERY 6 HOURS AS NEEDED; Therapy: 65BSG6181 to (Evaluate:96Eyg0626)  Requested for: 82IKW3114; Last Rx:06Nov2017 Ordered   9  Ventolin  (90 Base) MCG/ACT Inhalation Aerosol Solution; Inhale 2 puff every six hours as needed; Therapy: 22Glb6191 to (Evaluate:10Mar2018)  Requested for: 37Pwk7853; Last Rx:02Osv8373 Ordered  Medication List Reviewed: The medication list was reviewed and updated today  Allergies  1  No Known Drug Allergies    Vitals  Vital Signs    Recorded: 35YHK8124 03:10PM   Temperature 99 2 F   Heart Rate 106   Respiration 16   Systolic 299   Diastolic 64   Height 5 ft 4 5 in   Weight 85 lb    BMI Calculated 14 36   BSA Calculated 1 37   O2 Saturation 94, Nasal Cannula   FiO2 2L/min, Nasal Cannula       Physical Exam   Constitutional  General appearance: No acute distress, well appearing and well nourished  Ears, Nose, Mouth, and Throat  Nasal mucosa, septum, and turbinates: Normal without edema or erythema  Lips, teeth, and gums: Normal, good dentition  Oropharynx: Normal with no erythema, edema, exudate or lesions  Neck  Neck: Supple, symmetric, trachea midline, no masses  Jugular veins: Normal    Pulmonary  Auscultation of lungs: Abnormal  -- decreased breath sounds on the left base  No wheezing or rhonchi  Cardiovascular  Auscultation of heart: Normal rate and rhythm, normal S1 and S2, no murmurs     Examination of extremities for edema and/or varicosities: Normal    Abdomen  Abdomen: Soft, non-tender  Lymphatic  Palpation of lymph nodes in neck: No lymphadenopathy  Musculoskeletal  Gait and station: Normal    Digits and nails: Normal without clubbing or cyanosis  Neurologic  Mental Status: Normal  Not confused, no evidence of dementia, good comprehension, good concentration  Skin  Skin and subcutaneous tissue: Limited exam shows no rash  Psychiatric  Orientation to person, place and time: Normal    Mood and affect: Normal        Thank you very much for allowing me to participate in the care of this patient  If you have any questions, please do not hesitate to contact me  Future Appointments    Date/Time Provider Specialty Site   12/06/2017 10:00 AM KUNAL Marc  Hematology Oncology CANCER CARE [facility] MEDICAL ONCOLOGY   01/03/2018 10:00 AM KUNAL Marc  Hematology Oncology CANCER CARE [facility] MEDICAL ONCOLOGY   11/21/2017 10:30 AM Shant Amaral [facility] Hematology Oncology CANCER CARE [facility] MEDICAL ONCOLOGY   12/20/2017 10:30 AM Shant Amaral [facility] Hematology Oncology CANCER CARE [facility] MEDICAL ONCOLOGY       Signatures   Electronically signed by :  Emerald Worrell MD; Nov 15 2017  3:40PM EST                       (Author)

## 2017-11-17 ENCOUNTER — HOSPITAL ENCOUNTER (OUTPATIENT)
Dept: INFUSION CENTER | Facility: CLINIC | Age: 72
Discharge: HOME | End: 2017-11-17
Payer: MEDICARE

## 2017-11-17 VITALS
WEIGHT: 85.76 LBS | HEART RATE: 95 BPM | TEMPERATURE: 98.1 F | HEIGHT: 65 IN | SYSTOLIC BLOOD PRESSURE: 121 MMHG | RESPIRATION RATE: 18 BRPM | DIASTOLIC BLOOD PRESSURE: 73 MMHG | BODY MASS INDEX: 14.29 KG/M2

## 2017-11-17 PROCEDURE — 96367 TX/PROPH/DG ADDL SEQ IV INF: CPT

## 2017-11-17 PROCEDURE — 96417 CHEMO IV INFUS EACH ADDL SEQ: CPT

## 2017-11-17 PROCEDURE — 96413 CHEMO IV INFUSION 1 HR: CPT

## 2017-11-17 RX ORDER — PAROXETINE 10 MG/1
TABLET, FILM COATED ORAL
COMMUNITY
Start: 2017-11-14 | End: 2018-06-10 | Stop reason: SDUPTHER

## 2017-11-17 RX ORDER — PROCHLORPERAZINE MALEATE 10 MG
TABLET ORAL
COMMUNITY
Start: 2017-11-06 | End: 2018-03-13 | Stop reason: ALTCHOICE

## 2017-11-17 RX ADMIN — FAMOTIDINE 20 MG: 10 INJECTION, SOLUTION INTRAVENOUS at 11:27

## 2017-11-17 RX ADMIN — PACLITAXEL 54 MG: 6 INJECTION, SOLUTION, CONCENTRATE INTRAVENOUS at 12:04

## 2017-11-17 RX ADMIN — DIPHENHYDRAMINE HYDROCHLORIDE 25 MG: 50 INJECTION, SOLUTION INTRAMUSCULAR; INTRAVENOUS at 10:49

## 2017-11-17 RX ADMIN — SODIUM CHLORIDE 20 ML/HR: 0.9 INJECTION, SOLUTION INTRAVENOUS at 10:40

## 2017-11-17 RX ADMIN — CARBOPLATIN 139 MG: 10 INJECTION, SOLUTION INTRAVENOUS at 13:18

## 2017-11-17 RX ADMIN — DEXAMETHASONE SODIUM PHOSPHATE: 10 INJECTION, SOLUTION INTRAMUSCULAR; INTRAVENOUS at 11:07

## 2017-11-17 NOTE — PLAN OF CARE
Problem: Potential for Falls  Goal: Patient will remain free of falls  INTERVENTIONS:  - Assess patient frequently for physical needs  -  Identify cognitive and physical deficits and behaviors that affect risk of falls    -  [place] fall precautions as indicated by assessment   - Educate patient/family on patient safety including physical limitations  - Instruct patient to call for assistance with activity based on assessment  - Modify environment to reduce risk of injury  - Consider OT/PT consult to assist with strengthening/mobility   Outcome: Progressing

## 2017-11-20 ENCOUNTER — APPOINTMENT (OUTPATIENT)
Dept: RADIATION ONCOLOGY | Facility: HOSPITAL | Age: 72
End: 2017-11-20
Attending: RADIOLOGY
Payer: MEDICARE

## 2017-11-20 ENCOUNTER — GENERIC CONVERSION - ENCOUNTER (OUTPATIENT)
Dept: OTHER | Facility: OTHER | Age: 72
End: 2017-11-20

## 2017-11-20 PROCEDURE — 77334 RADIATION TREATMENT AID(S): CPT | Performed by: RADIOLOGY

## 2017-11-20 PROCEDURE — 77470 SPECIAL RADIATION TREATMENT: CPT | Performed by: RADIOLOGY

## 2017-11-21 ENCOUNTER — APPOINTMENT (OUTPATIENT)
Dept: LAB | Facility: HOSPITAL | Age: 72
End: 2017-11-21
Attending: INTERNAL MEDICINE
Payer: MEDICARE

## 2017-11-21 ENCOUNTER — ALLSCRIPTS OFFICE VISIT (OUTPATIENT)
Dept: OTHER | Facility: OTHER | Age: 72
End: 2017-11-21

## 2017-11-21 DIAGNOSIS — C34.91 PRIMARY LUNG CANCER WITH METASTASIS FROM LUNG TO OTHER SITE, RIGHT (HCC): ICD-10-CM

## 2017-11-21 LAB
ALBUMIN SERPL BCP-MCNC: 3.7 G/DL (ref 3.5–5)
ALP SERPL-CCNC: 81 U/L (ref 46–116)
ALT SERPL W P-5'-P-CCNC: 16 U/L (ref 12–78)
ANION GAP SERPL CALCULATED.3IONS-SCNC: 5 MMOL/L (ref 4–13)
AST SERPL W P-5'-P-CCNC: 14 U/L (ref 5–45)
BASOPHILS # BLD AUTO: 0.04 THOUSANDS/ΜL (ref 0–0.1)
BASOPHILS NFR BLD AUTO: 0 % (ref 0–1)
BILIRUB SERPL-MCNC: 0.62 MG/DL (ref 0.2–1)
BUN SERPL-MCNC: 17 MG/DL (ref 5–25)
CALCIUM SERPL-MCNC: 9 MG/DL (ref 8.3–10.1)
CHLORIDE SERPL-SCNC: 99 MMOL/L (ref 100–108)
CO2 SERPL-SCNC: 32 MMOL/L (ref 21–32)
CREAT SERPL-MCNC: 0.59 MG/DL (ref 0.6–1.3)
EOSINOPHIL # BLD AUTO: 0.01 THOUSAND/ΜL (ref 0–0.61)
EOSINOPHIL NFR BLD AUTO: 0 % (ref 0–6)
ERYTHROCYTE [DISTWIDTH] IN BLOOD BY AUTOMATED COUNT: 13.5 % (ref 11.6–15.1)
GFR SERPL CREATININE-BSD FRML MDRD: 93 ML/MIN/1.73SQ M
GLUCOSE SERPL-MCNC: 80 MG/DL (ref 65–140)
HCT VFR BLD AUTO: 41.5 % (ref 34.8–46.1)
HGB BLD-MCNC: 13.9 G/DL (ref 11.5–15.4)
LYMPHOCYTES # BLD AUTO: 2.15 THOUSANDS/ΜL (ref 0.6–4.47)
LYMPHOCYTES NFR BLD AUTO: 18 % (ref 14–44)
MCH RBC QN AUTO: 31.2 PG (ref 26.8–34.3)
MCHC RBC AUTO-ENTMCNC: 33.5 G/DL (ref 31.4–37.4)
MCV RBC AUTO: 93 FL (ref 82–98)
MONOCYTES # BLD AUTO: 0.7 THOUSAND/ΜL (ref 0.17–1.22)
MONOCYTES NFR BLD AUTO: 6 % (ref 4–12)
NEUTROPHILS # BLD AUTO: 9.35 THOUSANDS/ΜL (ref 1.85–7.62)
NEUTS SEG NFR BLD AUTO: 76 % (ref 43–75)
NRBC BLD AUTO-RTO: 0 /100 WBCS
PLATELET # BLD AUTO: 360 THOUSANDS/UL (ref 149–390)
PMV BLD AUTO: 9.1 FL (ref 8.9–12.7)
POTASSIUM SERPL-SCNC: 4.3 MMOL/L (ref 3.5–5.3)
PROT SERPL-MCNC: 7.4 G/DL (ref 6.4–8.2)
RBC # BLD AUTO: 4.46 MILLION/UL (ref 3.81–5.12)
SODIUM SERPL-SCNC: 136 MMOL/L (ref 136–145)
WBC # BLD AUTO: 12.28 THOUSAND/UL (ref 4.31–10.16)

## 2017-11-21 PROCEDURE — 85025 COMPLETE CBC W/AUTO DIFF WBC: CPT

## 2017-11-21 PROCEDURE — 36415 COLL VENOUS BLD VENIPUNCTURE: CPT

## 2017-11-21 PROCEDURE — 80053 COMPREHEN METABOLIC PANEL: CPT

## 2017-11-22 RX ORDER — SODIUM CHLORIDE 9 MG/ML
20 INJECTION, SOLUTION INTRAVENOUS CONTINUOUS
Status: DISCONTINUED | OUTPATIENT
Start: 2017-11-24 | End: 2017-11-27 | Stop reason: HOSPADM

## 2017-11-23 NOTE — PROGRESS NOTES
Assessment    1  Adenocarcinoma of right lung, stage 3 (162 9) (C34 91)   2  Anorexia (783 0) (R63 0)    Plan  Anorexia    · Follow-up visit in 2 weeks Evaluation and Treatment  Follow-up  Status: Hold For -Scheduling  Requested for: 37MAJ5686 10:00AM   Ordered; Anorexia; Ordered By: Oanh Wade Performed:  Due: 20ZQS2763; Last Updated By: Naun Xie; 11/21/2017 11:37:32 AM    Discussion/Summary  Discussion Summary:   Cindy Navarrete is a 72-year-old female seen for initial consultation 11/6/2017 for at least stage IIIA adenocarcinoma of the right upper lobe of the lung with right hilum, mediastinal involvement by CT scan/ PET scan  There was no evidence of distant metastasis involving the adrenals, liver, bony area, there might be an 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 Biopsy of the right main stem bronchus 10/17/2017 showed adenocarcinoma, positive for TTF 1, negative for P 40  discussion with the patient, her sister, Summer Flores, who will be returning to [place] and her cousin, Tierra Hess, lives locally, regarding staging and treatment, concurrent radiation therapy with weekly Taxol at 40 milligram/meter subcutaneously, carboplatin AUC 2 with CBC, BMP on weekly basis recommended and patient agreed  cycle 1 day 1 November 17th, 2017  Durvalumab after Chemoradiotherapy in Stage III NonâSmall-Cell Lung Cancer is being investigated for patients who did not have disease progression after two or more cycles of platinum-based chemoradiotherapy  If this is approved by the FDA, she may be eligible  We re-reviewed her pet/ct  Copy given  In regards to her parotid tumor, she is she has been aware of increased activity over the right parotid region for many years  She would like to defer biopsy at this time  Follow-up every other week with CBC, CMP an office visitMegace 6 25 mg 1 tsp every day  she is finding benefit with this but dislikes the taste   If this were to be continued, tablets could be prescribed  due to the weekly use of steroids with chemotherapy and her use of nasal O2 contributing to mouth dryness, she is advised to monitor for any signs of thrush and to call our office if this occurs  Any issues or concerns prior to her next office visit, she is asked to call our office  History of Present Illness  HPI: 53-year-old  female seen initially 11/6/2017 regarding moderately differentiated adenocarcinoma of the lung positive for TTF 1 and napsin and negative for P 40 diagnosed via right mainstem bronchus biopsy on October 17, 2017 by Dr Joya De Luna   scan chest 9/26/2017 ordered by Dr Hinson Been performed as lung cancer screening study showed subcarinal adenopathy measuring 1 7 cm  Precarinal adenopathy measures 1 9 cm  The right lower lobe endobronchial soft tissue appears mass like  Lung-RADS 4A, suspicious  PET scan 11/2/2017 showed confluent of hypermetabolic right hilar and mediastinal, precarinal, subcarinal lymphadenopathy compatible with malignancy, SUV measures 17  4 right hilar and precarinal adenopathy measures approximately 4 2 x 2 2 cm  Subcarinal adenopathy measures approximately 2 5 x 1 5 cm  Right hilar devan mass image 100 measures 2 7 x 1 47 m  Tumor extends into the right mainstem bronchus, bronchus intermedius, right middle lobe and right lower lobe bronchi, as  seen previously  There is also a small right thoracic paraspinal node with mild FDG activity, measuring 1 3 x 0 4 cm, SUV 2 5 is an irregular right upper lung density along the major fissure measuring 1 6 x 1 3 cm, with mild FDG activity, SUV 1 1  This may be inflammatory, versus malignancy  is an ill-defined hypermetabolic nodule in the right parotid gland region, measuring approximately 2 2 x 1 7 cm SUV measures 11 3  This may represent a metastasis versus primary parotid gland lesions such as a Warthin's tumor or amorphic adenoma  mm left lower lung nodular density too small for PET characterization  subtle hypermetabolic focus anterior to the right diaphragmatic larisa, SUV 2 6   has chronic COPD secondary to active smoking, progressive dyspnea on exertion  She has had a chronic cough for many years  used to smoke 2 packs of cigarettes daily for many many years, she does not drink alcohol  both parents still livinghas 1 sister in [place]   Current Therapy: Taxol 40 milligrams/meter squared with carboplatin AUC of 2  week 1  11/17/2017RT anticipated to start 12/1/2017   Interval History: met with Dr Maryanne Rodríguez  Discussion between Dr Maryanne Rodríguez and Dr Olga Medrano regarding biopsy of the parotid mass and referral to IR for biopsy discussed  Ki Del Rosario states that she she has been aware of a right glandular prominence for many years, unchanged  she has no tenderness  No difficulty eating on this side  she would like to defer parotid mass biopsy  has been on supplemental O2 since September 11th, 2017  Clay Early was prescribed by Dr Kay Howard in September however patient was unaware that this was at the pharmacy  she started this 2 days ago and already has noticed improvement in her breathing  Helps care for her elderly parents that live adjacent  she started Megace liquid and this has helped with her appetite though she does not like the taste  she did not have any nausea after her 1st week of chemotherapy  Overall this was well tolerated  Review of Systems  Complete-Female:  Constitutional: recent 8 lb weight loss, but-- no fever-- and-- no chills  Eyes: No complaints of eye pain, no red eyes, no eyesight problems, no discharge, no dry eyes, no itching of eyes  ENT: no complaints of earache, no loss of hearing, no nose bleeds, no nasal discharge, no sore throat, no hoarseness  Cardiovascular: No complaints of slow heart rate, no fast heart rate, no chest pain, no palpitations, no leg claudication, no lower extremity edema    Respiratory: shortness of breath,-- cough-- and-- shortness of breath during exertion, but-- no wheezing  Gastrointestinal: No complaints of abdominal pain, no constipation, no nausea or vomiting, no diarrhea, no bloody stools  Genitourinary: No complaints of dysuria, no incontinence, no pelvic pain, no dysmenorrhea, no vaginal discharge or bleeding  Musculoskeletal: arthralgias,-- joint swelling-- and-- joint stiffness  Integumentary: No complaints of skin rash or lesions, no itching, no skin wounds, no breast pain or lump  Neurological: No complaints of headache, no confusion, no convulsions, no numbness, no dizziness or fainting, no tingling, no limb weakness, no difficulty walking  Psychiatric: Not suicidal, no sleep disturbance, no anxiety or depression, no change in personality, no emotional problems  Endocrine: No complaints of proptosis, no hot flashes, no muscle weakness, no deepening of the voice, no feelings of weakness  Hematologic/Lymphatic: no swollen glands  ROS Reviewed:   ROS reviewed  Active Problems  1  Abnormal weight loss (783 21) (R63 4)   2  Adenocarcinoma of right lung, stage 3 (162 9) (C34 91)   3  Adjustment disorder with depressed mood (309 0) (F43 21)   4  Anorexia (783 0) (R63 0)   5  Breast CA (174 9) (C50 919)   6  Bronchitis, chronic obstructive, with exacerbation (491 21) (J44 1)   7  Chronic hypoxemic respiratory failure (518 83,799 02) (J96 11)   8  Chronic obstructive pulmonary disease (496) (J44 9)   9  Depressive disorder (311) (F32 9)   10  Encounter for preventive health examination (V70 0) (Z00 00)   11  Fatigue (780 79) (R53 83)   12  Hyperlipidemia (272 4) (E78 5)   13  Lung mass (786 6) (R91 8)   14  Need for pneumococcal vaccine (V03 82) (Z23)   15  Need for prophylactic vaccination and inoculation against influenza (V04 81) (Z23)   16  Need for Tdap vaccination (V06 1) (Z23)   17  Nicotine dependence (305 1) (F17 200)   18  Non compliance with medical treatment (V15 81) (Z91 19)   19   Osteoporosis (733 00) (M81 0)    Past Medical History  1  History of malignant neoplasm of female breast (V10 3) (Z85 3)   2  Need for prophylactic vaccination and inoculation against influenza (V04 81) (Z23)  Active Problems And Past Medical History Reviewed: The active problems and past medical history were reviewed and updated today  Surgical History  1  History of Appendectomy   2  History of Breast Surgery Mastectomy   3  History of Colonoscopy   4  History of Tonsillectomy  Surgical History Reviewed: The surgical history was reviewed and updated today  Family History  Mother    1  Family history of cardiac disorder (V17 49) (Z82 49)  Family History Reviewed: The family history was reviewed and updated today  Social History     · Caffeine use (V49 89) (F15 90)   · Caregiver   ·    · Denied: History of Lives alone without help available   · Quit smoking (V15 82) (Z87 891)   · Retired  Social History Reviewed: The social history was reviewed and updated today  Current Meds   1  Brovana 15 MCG/2ML Inhalation Nebulization Solution; INHALE 1 VIAL Every twelve hours; Therapy: 21Tdg6912 to (Evaluate:55Qni4203)  Requested for: 20Nov2017; Last Rx:20Nov2017 Ordered   2  Budesonide 0 5 MG/2ML Inhalation Suspension; USE 1 UNIT DOSE VIA NEBULIZER TWO TIMES A DAY; Therapy: 52Ytr8040 to (Evaluate:10Mar2018)  Requested for: 50Ejf7410; Last Rx:11Sep2017 Ordered   3  Calcium 1000 + D 1000-800 MG-UNIT Oral Tablet; Therapy: (Recorded:08Mar2016) to Recorded   4  [employer] Vitamin D 1000 UNIT CAPS; Therapy: (Recorded:08Mar2016) to Recorded   5  Ipratropium Bromide 0 02 % Inhalation Solution; USE 1 UNIT DOSE IN NEBULIZER 4 TIMES DAILY; Therapy: 97Gnd3744 to (Evaluate:10Mar2018)  Requested for: 10Nfv6240; Last Rx:11Sep2017 Ordered   6  Megestrol Acetate 400 MG/10ML Oral Suspension; 10ml daily; Therapy: 57WXK6090 to (Last YR:12FOY7184)  Requested for: 75MOS0001 Ordered   7   PARoxetine HCl - 10 MG Oral Tablet; TAKE 1/2 TABLET DAILY FOR 10 DAYS THEN START ONE TABLET DAILY; Therapy: 68ZQT0062 to (Last Rx:14Nov2017)  Requested for: 88SMX2705 Ordered   8  Prochlorperazine Maleate 10 MG Oral Tablet; TAKE 1 TABLET EVERY 6 HOURS AS NEEDED; Therapy: 41VQR1661 to (Evaluate:02Ioe9486)  Requested for: 99JYO7503; Last Rx:06Nov2017 Ordered   9  Ventolin  (90 Base) MCG/ACT Inhalation Aerosol Solution; Inhale 2 puff every six hours as needed; Therapy: 38Bcc8164 to (Evaluate:10Mar2018)  Requested for: 78Uqt3552; Last Rx:53Yiv1702 Ordered  Medication List Reviewed: The medication list was reviewed and updated today  Allergies  1  No Known Drug Allergies    Vitals  Vital Signs    Recorded: 21Nov2017 10:33AM   Temperature 97 8 F   Heart Rate 103   Respiration 16   Systolic 868   Diastolic 70   Height 5 ft 4 in   Weight 84 lb    BMI Calculated 14 42   BSA Calculated 1 35   O2 Saturation 96   Pain Scale 0       Physical Exam   Constitutional  General appearance: Abnormal   chronically ill-- and-- underweight  Eyes  Conjunctiva and lids: No swelling, erythema or discharge  Pupils and irises: Equal, round and reactive to light  Ears, Nose, Mouth, and Throat  External inspection of ears and nose: Normal    Oropharynx: Normal with no erythema, edema, exudate or lesions  Pulmonary on nasal O2  Auscultation of lungs: Abnormal   Auscultation of the lungs revealed decreased breath sounds diffusely  Cardiovascular  Auscultation of heart: Abnormal   A grade 2 systolic murmur was heard at the RUSB  Examination of extremities for edema and/or varicosities: Normal    Abdomen  Abdomen: Non-tender, no masses  Lymphatic  Palpation of lymph nodes in neck: No lymphadenopathy  Musculoskeletal  Gait and station: Normal  -- arthritic changes  Inspection/palpation of joints, bones, and muscles: Normal    Skin  Skin and subcutaneous tissue: Normal without rashes or lesions  Neurologic  Cranial nerves: Cranial nerves 2-12 intact     Psychiatric  Orientation to person, place, and time: Normal    Mood and affect: Normal         ECOG 0       Future Appointments    Date/Time Provider Specialty Site   12/06/2017 10:00 AM KUNAL Gallagher  Hematology Oncology CANCER CARE [facility] MEDICAL ONCOLOGY   01/03/2018 10:00 AM KUNAL Gallagher  Hematology Oncology CANCER CARE [facility] MEDICAL ONCOLOGY   12/20/2017 10:30 AM [facility] Hematology Oncology CANC[facility]ARE [facility] MEDICAL ONCOLOGY   03/19/2018 10:00 AM Trena Blanco MD Pulmonary Medicine [address] PULMONARY [facility]       Signatures   Electronically signed by : [facility]; Nov 21 2017 12:32PM EST                       (Author)    Electronically signed by :  KUNAL Rolle ; Nov 22 2017  5:08PM EST                       (Author)

## 2017-11-24 ENCOUNTER — HOSPITAL ENCOUNTER (OUTPATIENT)
Dept: INFUSION CENTER | Facility: CLINIC | Age: 72
Discharge: HOME | End: 2017-11-24
Payer: MEDICARE

## 2017-11-24 VITALS
RESPIRATION RATE: 18 BRPM | DIASTOLIC BLOOD PRESSURE: 72 MMHG | HEART RATE: 96 BPM | SYSTOLIC BLOOD PRESSURE: 122 MMHG | BODY MASS INDEX: 14.51 KG/M2 | TEMPERATURE: 97.8 F | HEIGHT: 65 IN | WEIGHT: 87.08 LBS

## 2017-11-24 DIAGNOSIS — C34.91 MALIGNANT NEOPLASM OF UNSPECIFIED PART OF RIGHT BRONCHUS OR LUNG (HCC): ICD-10-CM

## 2017-11-24 PROCEDURE — 96417 CHEMO IV INFUS EACH ADDL SEQ: CPT

## 2017-11-24 PROCEDURE — 96375 TX/PRO/DX INJ NEW DRUG ADDON: CPT

## 2017-11-24 PROCEDURE — 96413 CHEMO IV INFUSION 1 HR: CPT

## 2017-11-24 RX ORDER — ARFORMOTEROL TARTRATE 15 UG/2ML
15 SOLUTION RESPIRATORY (INHALATION)
COMMUNITY
End: 2018-05-17 | Stop reason: SDUPTHER

## 2017-11-24 RX ADMIN — FAMOTIDINE 20 MG: 10 INJECTION, SOLUTION INTRAVENOUS at 10:46

## 2017-11-24 RX ADMIN — DEXAMETHASONE SODIUM PHOSPHATE: 10 INJECTION, SOLUTION INTRAMUSCULAR; INTRAVENOUS at 10:32

## 2017-11-24 RX ADMIN — SODIUM CHLORIDE 20 ML/HR: 0.9 INJECTION, SOLUTION INTRAVENOUS at 10:34

## 2017-11-24 RX ADMIN — PACLITAXEL 54 MG: 6 INJECTION, SOLUTION, CONCENTRATE INTRAVENOUS at 11:40

## 2017-11-24 RX ADMIN — CARBOPLATIN 128 MG: 10 INJECTION, SOLUTION INTRAVENOUS at 12:54

## 2017-11-24 RX ADMIN — DIPHENHYDRAMINE HYDROCHLORIDE 25 MG: 50 INJECTION, SOLUTION INTRAMUSCULAR; INTRAVENOUS at 11:05

## 2017-11-24 NOTE — PLAN OF CARE
Problem: Potential for Falls  Goal: Patient will remain free of falls  INTERVENTIONS:  - Assess patient frequently for physical needs  -  Identify cognitive and physical deficits and behaviors that affect risk of falls    -  [place] fall precautions as indicated by assessment   - Educate patient/family on patient safety including physical limitations  - Instruct patient to call for assistance with activity based on assessment  - Modify environment to reduce risk of injury  - Consider OT/PT consult to assist with strengthening/mobility   Outcome: Progressing

## 2017-11-29 ENCOUNTER — GENERIC CONVERSION - ENCOUNTER (OUTPATIENT)
Dept: OTHER | Facility: OTHER | Age: 72
End: 2017-11-29

## 2017-11-29 ENCOUNTER — APPOINTMENT (OUTPATIENT)
Dept: RADIATION ONCOLOGY | Facility: HOSPITAL | Age: 72
End: 2017-11-29
Payer: MEDICARE

## 2017-11-29 PROCEDURE — 77338 DESIGN MLC DEVICE FOR IMRT: CPT | Performed by: RADIOLOGY

## 2017-11-29 PROCEDURE — 77301 RADIOTHERAPY DOSE PLAN IMRT: CPT | Performed by: RADIOLOGY

## 2017-11-29 PROCEDURE — 77300 RADIATION THERAPY DOSE PLAN: CPT | Performed by: RADIOLOGY

## 2017-11-30 ENCOUNTER — APPOINTMENT (OUTPATIENT)
Dept: LAB | Facility: HOSPITAL | Age: 72
End: 2017-11-30
Attending: INTERNAL MEDICINE
Payer: MEDICARE

## 2017-11-30 DIAGNOSIS — C34.91 PRIMARY LUNG CANCER WITH METASTASIS FROM LUNG TO OTHER SITE, RIGHT (HCC): ICD-10-CM

## 2017-11-30 LAB
ALBUMIN SERPL BCP-MCNC: 3.6 G/DL (ref 3.5–5)
ALP SERPL-CCNC: 86 U/L (ref 46–116)
ALT SERPL W P-5'-P-CCNC: 18 U/L (ref 12–78)
ANION GAP SERPL CALCULATED.3IONS-SCNC: 6 MMOL/L (ref 4–13)
AST SERPL W P-5'-P-CCNC: 17 U/L (ref 5–45)
BASOPHILS # BLD AUTO: 0.04 THOUSANDS/ΜL (ref 0–0.1)
BASOPHILS NFR BLD AUTO: 1 % (ref 0–1)
BILIRUB SERPL-MCNC: 0.59 MG/DL (ref 0.2–1)
BUN SERPL-MCNC: 16 MG/DL (ref 5–25)
CALCIUM SERPL-MCNC: 9 MG/DL (ref 8.3–10.1)
CHLORIDE SERPL-SCNC: 99 MMOL/L (ref 100–108)
CO2 SERPL-SCNC: 32 MMOL/L (ref 21–32)
CREAT SERPL-MCNC: 0.53 MG/DL (ref 0.6–1.3)
EOSINOPHIL # BLD AUTO: 0.01 THOUSAND/ΜL (ref 0–0.61)
EOSINOPHIL NFR BLD AUTO: 0 % (ref 0–6)
ERYTHROCYTE [DISTWIDTH] IN BLOOD BY AUTOMATED COUNT: 13.7 % (ref 11.6–15.1)
GFR SERPL CREATININE-BSD FRML MDRD: 97 ML/MIN/1.73SQ M
GLUCOSE SERPL-MCNC: 98 MG/DL (ref 65–140)
HCT VFR BLD AUTO: 38.6 % (ref 34.8–46.1)
HGB BLD-MCNC: 12.8 G/DL (ref 11.5–15.4)
LYMPHOCYTES # BLD AUTO: 1.71 THOUSANDS/ΜL (ref 0.6–4.47)
LYMPHOCYTES NFR BLD AUTO: 20 % (ref 14–44)
MCH RBC QN AUTO: 31.3 PG (ref 26.8–34.3)
MCHC RBC AUTO-ENTMCNC: 33.2 G/DL (ref 31.4–37.4)
MCV RBC AUTO: 94 FL (ref 82–98)
MONOCYTES # BLD AUTO: 0.61 THOUSAND/ΜL (ref 0.17–1.22)
MONOCYTES NFR BLD AUTO: 7 % (ref 4–12)
NEUTROPHILS # BLD AUTO: 5.97 THOUSANDS/ΜL (ref 1.85–7.62)
NEUTS SEG NFR BLD AUTO: 72 % (ref 43–75)
NRBC BLD AUTO-RTO: 0 /100 WBCS
PLATELET # BLD AUTO: 369 THOUSANDS/UL (ref 149–390)
PMV BLD AUTO: 9.5 FL (ref 8.9–12.7)
POTASSIUM SERPL-SCNC: 4.1 MMOL/L (ref 3.5–5.3)
PROT SERPL-MCNC: 7.6 G/DL (ref 6.4–8.2)
RBC # BLD AUTO: 4.09 MILLION/UL (ref 3.81–5.12)
SODIUM SERPL-SCNC: 137 MMOL/L (ref 136–145)
WBC # BLD AUTO: 8.4 THOUSAND/UL (ref 4.31–10.16)

## 2017-11-30 PROCEDURE — 77417 THER RADIOLOGY PORT IMAGE(S): CPT | Performed by: RADIOLOGY

## 2017-11-30 PROCEDURE — 85025 COMPLETE CBC W/AUTO DIFF WBC: CPT

## 2017-11-30 PROCEDURE — 36415 COLL VENOUS BLD VENIPUNCTURE: CPT

## 2017-11-30 PROCEDURE — 80053 COMPREHEN METABOLIC PANEL: CPT

## 2017-11-30 RX ORDER — SODIUM CHLORIDE 9 MG/ML
20 INJECTION, SOLUTION INTRAVENOUS ONCE
Status: COMPLETED | OUTPATIENT
Start: 2017-12-01 | End: 2017-12-01

## 2017-12-01 ENCOUNTER — HOSPITAL ENCOUNTER (OUTPATIENT)
Dept: INFUSION CENTER | Facility: HOSPITAL | Age: 72
Discharge: HOME | End: 2017-12-01
Payer: MEDICARE

## 2017-12-01 ENCOUNTER — APPOINTMENT (OUTPATIENT)
Dept: RADIATION ONCOLOGY | Facility: HOSPITAL | Age: 72
End: 2017-12-01
Attending: RADIOLOGY
Payer: MEDICARE

## 2017-12-01 VITALS
HEART RATE: 92 BPM | DIASTOLIC BLOOD PRESSURE: 67 MMHG | BODY MASS INDEX: 15.1 KG/M2 | SYSTOLIC BLOOD PRESSURE: 143 MMHG | WEIGHT: 90.61 LBS | HEIGHT: 65 IN | RESPIRATION RATE: 18 BRPM | TEMPERATURE: 97.9 F

## 2017-12-01 PROCEDURE — 96367 TX/PROPH/DG ADDL SEQ IV INF: CPT

## 2017-12-01 PROCEDURE — 96413 CHEMO IV INFUSION 1 HR: CPT

## 2017-12-01 PROCEDURE — 77386 HB NTSTY MODUL RAD TX DLVR CPLX: CPT | Performed by: RADIOLOGY

## 2017-12-01 PROCEDURE — 96417 CHEMO IV INFUS EACH ADDL SEQ: CPT

## 2017-12-01 RX ADMIN — PACLITAXEL 54 MG: 6 INJECTION, SOLUTION, CONCENTRATE INTRAVENOUS at 11:49

## 2017-12-01 RX ADMIN — FAMOTIDINE: 10 INJECTION INTRAVENOUS at 11:23

## 2017-12-01 RX ADMIN — CARBOPLATIN 139 MG: 10 INJECTION, SOLUTION INTRAVENOUS at 12:57

## 2017-12-01 RX ADMIN — DEXAMETHASONE SODIUM PHOSPHATE: 10 INJECTION, SOLUTION INTRAMUSCULAR; INTRAVENOUS at 11:03

## 2017-12-01 RX ADMIN — SODIUM CHLORIDE 20 ML/HR: 900 INJECTION, SOLUTION INTRAVENOUS at 11:02

## 2017-12-01 NOTE — PLAN OF CARE
Problem: Potential for Falls  Goal: Patient will remain free of falls  INTERVENTIONS:  - Assess patient frequently for physical needs  -  Identify cognitive and physical deficits and behaviors that affect risk of falls    -  [place] fall precautions as indicated by assessment   - Educate patient/family on patient safety including physical limitations  - Instruct patient to call for assistance with activity based on assessment  - Modify environment to reduce risk of injury  - Consider OT/PT consult to assist with strengthening/mobility   Outcome: Progressing

## 2017-12-04 PROCEDURE — 77386 HB NTSTY MODUL RAD TX DLVR CPLX: CPT | Performed by: RADIOLOGY

## 2017-12-05 PROCEDURE — 77386 HB NTSTY MODUL RAD TX DLVR CPLX: CPT | Performed by: RADIOLOGY

## 2017-12-06 ENCOUNTER — GENERIC CONVERSION - ENCOUNTER (OUTPATIENT)
Dept: OTHER | Facility: OTHER | Age: 72
End: 2017-12-06

## 2017-12-06 ENCOUNTER — APPOINTMENT (OUTPATIENT)
Dept: LAB | Facility: HOSPITAL | Age: 72
End: 2017-12-06
Attending: INTERNAL MEDICINE
Payer: MEDICARE

## 2017-12-06 DIAGNOSIS — C34.91 PRIMARY LUNG CANCER WITH METASTASIS FROM LUNG TO OTHER SITE, RIGHT (HCC): ICD-10-CM

## 2017-12-06 LAB
ALBUMIN SERPL BCP-MCNC: 3.5 G/DL (ref 3.5–5)
ALP SERPL-CCNC: 78 U/L (ref 46–116)
ALT SERPL W P-5'-P-CCNC: 19 U/L (ref 12–78)
ANION GAP SERPL CALCULATED.3IONS-SCNC: 7 MMOL/L (ref 4–13)
AST SERPL W P-5'-P-CCNC: 17 U/L (ref 5–45)
BASOPHILS # BLD AUTO: 0.02 THOUSANDS/ΜL (ref 0–0.1)
BASOPHILS NFR BLD AUTO: 0 % (ref 0–1)
BILIRUB SERPL-MCNC: 0.65 MG/DL (ref 0.2–1)
BUN SERPL-MCNC: 13 MG/DL (ref 5–25)
CALCIUM SERPL-MCNC: 9 MG/DL (ref 8.3–10.1)
CHLORIDE SERPL-SCNC: 99 MMOL/L (ref 100–108)
CO2 SERPL-SCNC: 31 MMOL/L (ref 21–32)
CREAT SERPL-MCNC: 0.59 MG/DL (ref 0.6–1.3)
EOSINOPHIL # BLD AUTO: 0.01 THOUSAND/ΜL (ref 0–0.61)
EOSINOPHIL NFR BLD AUTO: 0 % (ref 0–6)
ERYTHROCYTE [DISTWIDTH] IN BLOOD BY AUTOMATED COUNT: 13.7 % (ref 11.6–15.1)
GFR SERPL CREATININE-BSD FRML MDRD: 93 ML/MIN/1.73SQ M
GLUCOSE SERPL-MCNC: 104 MG/DL (ref 65–140)
HCT VFR BLD AUTO: 35.6 % (ref 34.8–46.1)
HGB BLD-MCNC: 11.9 G/DL (ref 11.5–15.4)
LYMPHOCYTES # BLD AUTO: 1.29 THOUSANDS/ΜL (ref 0.6–4.47)
LYMPHOCYTES NFR BLD AUTO: 21 % (ref 14–44)
MCH RBC QN AUTO: 31.4 PG (ref 26.8–34.3)
MCHC RBC AUTO-ENTMCNC: 33.4 G/DL (ref 31.4–37.4)
MCV RBC AUTO: 94 FL (ref 82–98)
MONOCYTES # BLD AUTO: 0.5 THOUSAND/ΜL (ref 0.17–1.22)
MONOCYTES NFR BLD AUTO: 8 % (ref 4–12)
NEUTROPHILS # BLD AUTO: 4.26 THOUSANDS/ΜL (ref 1.85–7.62)
NEUTS SEG NFR BLD AUTO: 71 % (ref 43–75)
NRBC BLD AUTO-RTO: 0 /100 WBCS
PLATELET # BLD AUTO: 353 THOUSANDS/UL (ref 149–390)
PMV BLD AUTO: 9.2 FL (ref 8.9–12.7)
POTASSIUM SERPL-SCNC: 4.1 MMOL/L (ref 3.5–5.3)
PROT SERPL-MCNC: 7.1 G/DL (ref 6.4–8.2)
RBC # BLD AUTO: 3.79 MILLION/UL (ref 3.81–5.12)
SODIUM SERPL-SCNC: 137 MMOL/L (ref 136–145)
WBC # BLD AUTO: 6.11 THOUSAND/UL (ref 4.31–10.16)

## 2017-12-06 PROCEDURE — 80053 COMPREHEN METABOLIC PANEL: CPT

## 2017-12-06 PROCEDURE — 36415 COLL VENOUS BLD VENIPUNCTURE: CPT

## 2017-12-06 PROCEDURE — 85025 COMPLETE CBC W/AUTO DIFF WBC: CPT

## 2017-12-06 PROCEDURE — 77386 HB NTSTY MODUL RAD TX DLVR CPLX: CPT | Performed by: RADIOLOGY

## 2017-12-07 ENCOUNTER — APPOINTMENT (OUTPATIENT)
Dept: RADIOLOGY | Facility: HOSPITAL | Age: 72
End: 2017-12-07
Attending: RADIOLOGY
Payer: MEDICARE

## 2017-12-07 ENCOUNTER — HOSPITAL ENCOUNTER (OUTPATIENT)
Dept: RADIOLOGY | Facility: HOSPITAL | Age: 72
Discharge: HOME | End: 2017-12-07
Attending: RADIOLOGY | Admitting: RADIOLOGY
Payer: MEDICARE

## 2017-12-07 VITALS
DIASTOLIC BLOOD PRESSURE: 64 MMHG | BODY MASS INDEX: 13.99 KG/M2 | RESPIRATION RATE: 18 BRPM | HEIGHT: 65 IN | WEIGHT: 84 LBS | TEMPERATURE: 97.9 F | SYSTOLIC BLOOD PRESSURE: 134 MMHG | HEART RATE: 97 BPM | OXYGEN SATURATION: 100 %

## 2017-12-07 DIAGNOSIS — C34.90 MALIGNANT NEOPLASM OF UNSPECIFIED PART OF UNSPECIFIED BRONCHUS OR LUNG (HCC): ICD-10-CM

## 2017-12-07 DIAGNOSIS — C34.91 MALIGNANT NEOPLASM OF UNSPECIFIED PART OF RIGHT BRONCHUS OR LUNG (HCC): ICD-10-CM

## 2017-12-07 LAB
INR PPP: 1 (ref 0.86–1.16)
PROTHROMBIN TIME: 13.2 SECONDS (ref 12.1–14.4)

## 2017-12-07 PROCEDURE — 77336 RADIATION PHYSICS CONSULT: CPT | Performed by: RADIOLOGY

## 2017-12-07 PROCEDURE — 85610 PROTHROMBIN TIME: CPT | Performed by: RADIOLOGY

## 2017-12-07 PROCEDURE — 88341 IMHCHEM/IMCYTCHM EA ADD ANTB: CPT | Performed by: RADIOLOGY

## 2017-12-07 PROCEDURE — 88173 CYTOPATH EVAL FNA REPORT: CPT | Performed by: RADIOLOGY

## 2017-12-07 PROCEDURE — 88172 CYTP DX EVAL FNA 1ST EA SITE: CPT | Performed by: RADIOLOGY

## 2017-12-07 PROCEDURE — 76942 ECHO GUIDE FOR BIOPSY: CPT

## 2017-12-07 PROCEDURE — 88305 TISSUE EXAM BY PATHOLOGIST: CPT | Performed by: RADIOLOGY

## 2017-12-07 PROCEDURE — 88342 IMHCHEM/IMCYTCHM 1ST ANTB: CPT | Performed by: RADIOLOGY

## 2017-12-07 PROCEDURE — 10022 HB FNA W/IMAGE: CPT

## 2017-12-07 PROCEDURE — 77386 HB NTSTY MODUL RAD TX DLVR CPLX: CPT | Performed by: RADIOLOGY

## 2017-12-07 RX ORDER — MEGESTROL ACETATE 20 MG/1
20 TABLET ORAL DAILY
COMMUNITY
End: 2018-03-13 | Stop reason: ALTCHOICE

## 2017-12-07 RX ORDER — SODIUM CHLORIDE 9 MG/ML
20 INJECTION, SOLUTION INTRAVENOUS ONCE
Status: COMPLETED | OUTPATIENT
Start: 2017-12-08 | End: 2017-12-08

## 2017-12-07 RX ORDER — SODIUM CHLORIDE 9 MG/ML
75 INJECTION, SOLUTION INTRAVENOUS CONTINUOUS
Status: DISCONTINUED | OUTPATIENT
Start: 2017-12-07 | End: 2017-12-07 | Stop reason: HOSPADM

## 2017-12-07 RX ORDER — FENTANYL CITRATE 50 UG/ML
INJECTION, SOLUTION INTRAMUSCULAR; INTRAVENOUS CODE/TRAUMA/SEDATION MEDICATION
Status: COMPLETED | OUTPATIENT
Start: 2017-12-07 | End: 2017-12-07

## 2017-12-07 RX ADMIN — SODIUM CHLORIDE 75 ML/HR: 0.9 INJECTION, SOLUTION INTRAVENOUS at 07:19

## 2017-12-07 RX ADMIN — FENTANYL CITRATE 50 MCG: 50 INJECTION, SOLUTION INTRAMUSCULAR; INTRAVENOUS at 10:05

## 2017-12-07 NOTE — SEDATION DOCUMENTATION
Ultra sound guided fine needle aspiration of right neck, specimens given to pathology, no complications, VSS, [place] [facility]

## 2017-12-07 NOTE — BRIEF OP NOTE (RAD/CATH)
IR IMG GUID BX SED  Procedure Note    PATIENT NAME: Hu Keith  : 1945  MRN: [id number]     Pre-op Diagnosis:   1  Malignant neoplasm of unspecified part of right bronchus or lung (HCC)      Post-op Diagnosis:   1  Malignant neoplasm of unspecified part of right bronchus or lung [facility])        Surgeon:   Tye Haynes MD  Assistants:     No qualified resident was available, Resident is only observing    Estimated Blood Loss:  1 mL  Findings:  Hypoechoic mixed echogenicity lobulated lesion in the region of interest of the right upper neck  Multiple fine-needle biopsy passes performed with 25 gauge and 20 gauge needles under ultrasound  Preliminary pathology positive for non-small cell carcinoma  Await final report      Specimens:    25 gauge x 2 fine-needle biopsy  20 gauge x 4 fine-needle biopsy    Complications:  Nothing immediate    Anesthesia: Marly Boateng MD     Date: 2017  Time: 10:19 AM

## 2017-12-07 NOTE — DISCHARGE INSTRUCTIONS
POST BIOPSY    Care after your procedure:    1  Limit your activities for 24 hours after your biopsy  2  No driving day of biopsy  3  Return to your normal diet  Small sips of flat soda will help with mild nausea  4  Remove band-aid or dressing 24 hours after procedure  Contact Interventional Radiology at [phone] [place] PATIENTS: Contact Interventional Radiology at [phone]) Gustavo Lucero PATIENTS: Contact Interventional Radiology at [phone]) if:    1  Difficulty breathing, nausea or vomiting  2  Chills or fever above 101 degrees F      3  Pain at biopsy site not relieved by medication  4  Develop any redness, swelling, heat, unusual drainage, heavy bruising or bleeding from biopsy site

## 2017-12-07 NOTE — PROGRESS NOTES
Vascular and Interventional Radiology Pre Procedure Note    Diagnosis:  Right neck lesion    Procedure:  Image guided biopsy    HPI:  79-year-old female with a recent diagnosis of lung cancer, noted to have increased FDG activity within right neck, thought to be related to the right parotid gland  Patient reports long history of lump within the right gland, on the order of years  Patient reports the gland on that side has been troublesome her entire life, often getting her out of school when she was a child  Patient denies any associated pain  Denies chest pain, shortness of breath, fever, chills, nausea and vomiting  Given the findings on the PET scan as well as the recent diagnosis of lung cancer, patient has elected to have the lesion biopsied  Exam:  General:  Awake, alert, oriented x3, no acute distress  Neck:  Soft, supple, possible nodular lesion along the right neck, below the mandibular angle, nontender  Chest:  No deformity, nontender  Abdomen:  Soft, nontender, nondistended, no guarding, no rebound  Cardiac:  S1-S2, regular rate and rhythm  Lungs:  Clear to auscultation, mild wheezes      Labs:  Hematology:  WBC 6 11, hemoglobin 11 9, hematocrit 35 6, platelets 760  Chemistry:  Sodium 137, potassium 4 1, chloride 99, carbon dioxide 31, BUN 13, creatinine 0 59, glucose 104  Coagulation:  INR 1, prothrombin time 13 2    Imaging:  PET-CT demonstrates a soft tissue density with markedly FDG activity within the right neck, near the mandible    Plan:  Proceed with image guided fine-needle biopsy with possible core    I discussed the procedure, its details, risks, benefits and alternatives with the patient  Included in the discussion of risks is facial paralysis or facial nerve injury leading to temporary or long-term paralysis or temporary or long-term sensory loss  Included in the discussion of alternatives was excisional biopsy or not proceeding with the biopsy at all    All questions were answered  Patient elects to proceed with the procedure  H&P was reviewed

## 2017-12-08 ENCOUNTER — HOSPITAL ENCOUNTER (OUTPATIENT)
Dept: INFUSION CENTER | Facility: HOSPITAL | Age: 72
Discharge: HOME | End: 2017-12-08
Payer: MEDICARE

## 2017-12-08 VITALS
WEIGHT: 85 LBS | RESPIRATION RATE: 16 BRPM | DIASTOLIC BLOOD PRESSURE: 64 MMHG | HEIGHT: 65 IN | TEMPERATURE: 98.6 F | BODY MASS INDEX: 14.16 KG/M2 | SYSTOLIC BLOOD PRESSURE: 102 MMHG | HEART RATE: 72 BPM

## 2017-12-08 PROCEDURE — 96417 CHEMO IV INFUS EACH ADDL SEQ: CPT

## 2017-12-08 PROCEDURE — 77386 HB NTSTY MODUL RAD TX DLVR CPLX: CPT | Performed by: RADIOLOGY

## 2017-12-08 PROCEDURE — 96367 TX/PROPH/DG ADDL SEQ IV INF: CPT

## 2017-12-08 PROCEDURE — 96413 CHEMO IV INFUSION 1 HR: CPT

## 2017-12-08 RX ADMIN — CARBOPLATIN 139 MG: 10 INJECTION, SOLUTION INTRAVENOUS at 12:36

## 2017-12-08 RX ADMIN — PACLITAXEL 54 MG: 6 INJECTION, SOLUTION, CONCENTRATE INTRAVENOUS at 11:29

## 2017-12-08 RX ADMIN — DEXAMETHASONE SODIUM PHOSPHATE: 10 INJECTION, SOLUTION INTRAMUSCULAR; INTRAVENOUS at 10:41

## 2017-12-08 RX ADMIN — FAMOTIDINE: 10 INJECTION INTRAVENOUS at 11:05

## 2017-12-08 RX ADMIN — SODIUM CHLORIDE 20 ML/HR: 0.9 INJECTION, SOLUTION INTRAVENOUS at 10:32

## 2017-12-08 NOTE — PLAN OF CARE
Problem: Potential for Falls  Goal: Patient will remain free of falls  INTERVENTIONS:  - Assess patient frequently for physical needs  -  Identify cognitive and physical deficits and behaviors that affect risk of falls  -  [place] fall precautions as indicated by assessment   - Educate patient/family on patient safety including physical limitations  - Instruct patient to call for assistance with activity based on assessment  - Modify environment to reduce risk of injury  - Consider OT/PT consult to assist with strengthening/mobility   Outcome: Progressing      Problem: SAFETY ADULT  Goal: Patient will remain free of falls  INTERVENTIONS:  - Assess patient frequently for physical needs  -  Identify cognitive and physical deficits and behaviors that affect risk of falls  -  [place] fall precautions as indicated by assessment   - Educate patient/family on patient safety including physical limitations  - Instruct patient to call for assistance with activity based on assessment  - Modify environment to reduce risk of injury  - Consider OT/PT consult to assist with strengthening/mobility   Outcome: Progressing      Problem: Knowledge Deficit  Goal: Patient/family/caregiver demonstrates understanding of disease process, treatment plan, medications, and discharge instructions  Complete learning assessment and assess knowledge base    Interventions:  - Provide teaching at level of understanding  - Provide teaching via preferred learning methods  Outcome: Progressing

## 2017-12-08 NOTE — PROGRESS NOTES
Patient tolerated treatment today without complications  Aware of upcoming appointments   Declined AVS

## 2017-12-11 PROCEDURE — 77386 HB NTSTY MODUL RAD TX DLVR CPLX: CPT | Performed by: RADIOLOGY

## 2017-12-12 PROCEDURE — 77386 HB NTSTY MODUL RAD TX DLVR CPLX: CPT | Performed by: RADIOLOGY

## 2017-12-13 PROCEDURE — 77386 HB NTSTY MODUL RAD TX DLVR CPLX: CPT | Performed by: RADIOLOGY

## 2017-12-14 ENCOUNTER — APPOINTMENT (OUTPATIENT)
Dept: LAB | Facility: HOSPITAL | Age: 72
End: 2017-12-14
Attending: INTERNAL MEDICINE
Payer: MEDICARE

## 2017-12-14 ENCOUNTER — TRANSCRIBE ORDERS (OUTPATIENT)
Dept: RADIATION ONCOLOGY | Facility: HOSPITAL | Age: 72
End: 2017-12-14

## 2017-12-14 DIAGNOSIS — C34.91 PRIMARY LUNG CANCER WITH METASTASIS FROM LUNG TO OTHER SITE, RIGHT (HCC): Primary | ICD-10-CM

## 2017-12-14 DIAGNOSIS — C34.91 MALIGNANT NEOPLASM OF UNSPECIFIED PART OF RIGHT BRONCHUS OR LUNG (HCC): ICD-10-CM

## 2017-12-14 LAB
ALBUMIN SERPL BCP-MCNC: 3.7 G/DL (ref 3.5–5)
ALP SERPL-CCNC: 86 U/L (ref 46–116)
ALT SERPL W P-5'-P-CCNC: 18 U/L (ref 12–78)
ANION GAP SERPL CALCULATED.3IONS-SCNC: 7 MMOL/L (ref 4–13)
AST SERPL W P-5'-P-CCNC: 19 U/L (ref 5–45)
BASOPHILS # BLD AUTO: 0.03 THOUSANDS/ΜL (ref 0–0.1)
BASOPHILS NFR BLD AUTO: 1 % (ref 0–1)
BILIRUB SERPL-MCNC: 0.51 MG/DL (ref 0.2–1)
BUN SERPL-MCNC: 14 MG/DL (ref 5–25)
CALCIUM SERPL-MCNC: 9 MG/DL (ref 8.3–10.1)
CHLORIDE SERPL-SCNC: 101 MMOL/L (ref 100–108)
CO2 SERPL-SCNC: 27 MMOL/L (ref 21–32)
CREAT SERPL-MCNC: 0.55 MG/DL (ref 0.6–1.3)
EOSINOPHIL # BLD AUTO: 0.01 THOUSAND/ΜL (ref 0–0.61)
EOSINOPHIL NFR BLD AUTO: 0 % (ref 0–6)
ERYTHROCYTE [DISTWIDTH] IN BLOOD BY AUTOMATED COUNT: 14.1 % (ref 11.6–15.1)
GFR SERPL CREATININE-BSD FRML MDRD: 95 ML/MIN/1.73SQ M
GLUCOSE SERPL-MCNC: 87 MG/DL (ref 65–140)
HCT VFR BLD AUTO: 34.4 % (ref 34.8–46.1)
HGB BLD-MCNC: 11.5 G/DL (ref 11.5–15.4)
LYMPHOCYTES # BLD AUTO: 1.24 THOUSANDS/ΜL (ref 0.6–4.47)
LYMPHOCYTES NFR BLD AUTO: 23 % (ref 14–44)
MCH RBC QN AUTO: 31.7 PG (ref 26.8–34.3)
MCHC RBC AUTO-ENTMCNC: 33.4 G/DL (ref 31.4–37.4)
MCV RBC AUTO: 95 FL (ref 82–98)
MONOCYTES # BLD AUTO: 0.57 THOUSAND/ΜL (ref 0.17–1.22)
MONOCYTES NFR BLD AUTO: 10 % (ref 4–12)
NEUTROPHILS # BLD AUTO: 3.61 THOUSANDS/ΜL (ref 1.85–7.62)
NEUTS SEG NFR BLD AUTO: 66 % (ref 43–75)
NRBC BLD AUTO-RTO: 0 /100 WBCS
PLATELET # BLD AUTO: 307 THOUSANDS/UL (ref 149–390)
PMV BLD AUTO: 9.6 FL (ref 8.9–12.7)
POTASSIUM SERPL-SCNC: 4.4 MMOL/L (ref 3.5–5.3)
PROT SERPL-MCNC: 7.7 G/DL (ref 6.4–8.2)
RBC # BLD AUTO: 3.63 MILLION/UL (ref 3.81–5.12)
SODIUM SERPL-SCNC: 135 MMOL/L (ref 136–145)
WBC # BLD AUTO: 5.49 THOUSAND/UL (ref 4.31–10.16)

## 2017-12-14 PROCEDURE — 77386 HB NTSTY MODUL RAD TX DLVR CPLX: CPT | Performed by: RADIOLOGY

## 2017-12-14 PROCEDURE — 77336 RADIATION PHYSICS CONSULT: CPT | Performed by: RADIOLOGY

## 2017-12-14 PROCEDURE — 36415 COLL VENOUS BLD VENIPUNCTURE: CPT

## 2017-12-14 PROCEDURE — 85025 COMPLETE CBC W/AUTO DIFF WBC: CPT

## 2017-12-14 PROCEDURE — 80053 COMPREHEN METABOLIC PANEL: CPT

## 2017-12-14 RX ORDER — SODIUM CHLORIDE 9 MG/ML
20 INJECTION, SOLUTION INTRAVENOUS ONCE
Status: COMPLETED | OUTPATIENT
Start: 2017-12-15 | End: 2017-12-15

## 2017-12-15 ENCOUNTER — HOSPITAL ENCOUNTER (OUTPATIENT)
Dept: INFUSION CENTER | Facility: HOSPITAL | Age: 72
Discharge: HOME | End: 2017-12-15
Payer: MEDICARE

## 2017-12-15 VITALS
HEART RATE: 100 BPM | SYSTOLIC BLOOD PRESSURE: 133 MMHG | DIASTOLIC BLOOD PRESSURE: 68 MMHG | RESPIRATION RATE: 18 BRPM | HEIGHT: 65 IN | WEIGHT: 84.8 LBS | TEMPERATURE: 97.9 F | BODY MASS INDEX: 14.13 KG/M2

## 2017-12-15 PROCEDURE — 77386 HB NTSTY MODUL RAD TX DLVR CPLX: CPT | Performed by: RADIOLOGY

## 2017-12-15 PROCEDURE — 96417 CHEMO IV INFUS EACH ADDL SEQ: CPT

## 2017-12-15 PROCEDURE — 96413 CHEMO IV INFUSION 1 HR: CPT

## 2017-12-15 PROCEDURE — 96367 TX/PROPH/DG ADDL SEQ IV INF: CPT

## 2017-12-15 RX ADMIN — FAMOTIDINE: 10 INJECTION INTRAVENOUS at 10:55

## 2017-12-15 RX ADMIN — SODIUM CHLORIDE 20 ML/HR: 0.9 INJECTION, SOLUTION INTRAVENOUS at 10:15

## 2017-12-15 RX ADMIN — DEXAMETHASONE SODIUM PHOSPHATE: 10 INJECTION, SOLUTION INTRAMUSCULAR; INTRAVENOUS at 10:34

## 2017-12-15 RX ADMIN — CARBOPLATIN 139 MG: 10 INJECTION, SOLUTION INTRAVENOUS at 12:47

## 2017-12-15 RX ADMIN — PACLITAXEL 54 MG: 6 INJECTION, SOLUTION, CONCENTRATE INTRAVENOUS at 11:36

## 2017-12-18 ENCOUNTER — TRANSCRIBE ORDERS (OUTPATIENT)
Dept: RADIATION ONCOLOGY | Facility: HOSPITAL | Age: 72
End: 2017-12-18

## 2017-12-18 DIAGNOSIS — C34.91: Primary | ICD-10-CM

## 2017-12-18 PROCEDURE — 77386 HB NTSTY MODUL RAD TX DLVR CPLX: CPT | Performed by: RADIOLOGY

## 2017-12-19 ENCOUNTER — HOSPITAL ENCOUNTER (OUTPATIENT)
Dept: RADIOLOGY | Facility: HOSPITAL | Age: 72
Discharge: HOME | End: 2017-12-19
Attending: RADIOLOGY
Payer: MEDICARE

## 2017-12-19 ENCOUNTER — GENERIC CONVERSION - ENCOUNTER (OUTPATIENT)
Dept: OTHER | Facility: OTHER | Age: 72
End: 2017-12-19

## 2017-12-19 DIAGNOSIS — C34.91: ICD-10-CM

## 2017-12-19 PROCEDURE — 77386 HB NTSTY MODUL RAD TX DLVR CPLX: CPT | Performed by: RADIOLOGY

## 2017-12-19 PROCEDURE — 77470 SPECIAL RADIATION TREATMENT: CPT | Performed by: RADIOLOGY

## 2017-12-19 PROCEDURE — 77014 HB CT SCAN FOR THERAPY GUIDE: CPT

## 2017-12-19 PROCEDURE — 77334 RADIATION TREATMENT AID(S): CPT | Performed by: RADIOLOGY

## 2017-12-20 ENCOUNTER — GENERIC CONVERSION - ENCOUNTER (OUTPATIENT)
Dept: OTHER | Facility: OTHER | Age: 72
End: 2017-12-20

## 2017-12-20 ENCOUNTER — TRANSCRIBE ORDERS (OUTPATIENT)
Dept: LAB | Facility: HOSPITAL | Age: 72
End: 2017-12-20

## 2017-12-20 ENCOUNTER — APPOINTMENT (OUTPATIENT)
Dept: LAB | Facility: HOSPITAL | Age: 72
End: 2017-12-20
Attending: INTERNAL MEDICINE
Payer: MEDICARE

## 2017-12-20 DIAGNOSIS — C34.91 PRIMARY LUNG CANCER WITH METASTASIS FROM LUNG TO OTHER SITE, RIGHT (HCC): ICD-10-CM

## 2017-12-20 DIAGNOSIS — C34.91 PRIMARY LUNG CANCER WITH METASTASIS FROM LUNG TO OTHER SITE, RIGHT (HCC): Primary | ICD-10-CM

## 2017-12-20 LAB
ALBUMIN SERPL BCP-MCNC: 3.5 G/DL (ref 3.5–5)
ALP SERPL-CCNC: 89 U/L (ref 46–116)
ALT SERPL W P-5'-P-CCNC: 18 U/L (ref 12–78)
ANION GAP SERPL CALCULATED.3IONS-SCNC: 7 MMOL/L (ref 4–13)
AST SERPL W P-5'-P-CCNC: 15 U/L (ref 5–45)
BASOPHILS # BLD AUTO: 0.03 THOUSANDS/ΜL (ref 0–0.1)
BASOPHILS NFR BLD AUTO: 1 % (ref 0–1)
BILIRUB SERPL-MCNC: 0.53 MG/DL (ref 0.2–1)
BUN SERPL-MCNC: 16 MG/DL (ref 5–25)
CALCIUM SERPL-MCNC: 9.1 MG/DL (ref 8.3–10.1)
CHLORIDE SERPL-SCNC: 97 MMOL/L (ref 100–108)
CO2 SERPL-SCNC: 32 MMOL/L (ref 21–32)
CREAT SERPL-MCNC: 0.53 MG/DL (ref 0.6–1.3)
EOSINOPHIL # BLD AUTO: 0 THOUSAND/ΜL (ref 0–0.61)
EOSINOPHIL NFR BLD AUTO: 0 % (ref 0–6)
ERYTHROCYTE [DISTWIDTH] IN BLOOD BY AUTOMATED COUNT: 14.7 % (ref 11.6–15.1)
GFR SERPL CREATININE-BSD FRML MDRD: 97 ML/MIN/1.73SQ M
GLUCOSE SERPL-MCNC: 66 MG/DL (ref 65–140)
HCT VFR BLD AUTO: 34.8 % (ref 34.8–46.1)
HGB BLD-MCNC: 11.7 G/DL (ref 11.5–15.4)
LYMPHOCYTES # BLD AUTO: 0.64 THOUSANDS/ΜL (ref 0.6–4.47)
LYMPHOCYTES NFR BLD AUTO: 15 % (ref 14–44)
MCH RBC QN AUTO: 32.2 PG (ref 26.8–34.3)
MCHC RBC AUTO-ENTMCNC: 33.6 G/DL (ref 31.4–37.4)
MCV RBC AUTO: 96 FL (ref 82–98)
MONOCYTES # BLD AUTO: 0.3 THOUSAND/ΜL (ref 0.17–1.22)
MONOCYTES NFR BLD AUTO: 7 % (ref 4–12)
NEUTROPHILS # BLD AUTO: 3.36 THOUSANDS/ΜL (ref 1.85–7.62)
NEUTS SEG NFR BLD AUTO: 77 % (ref 43–75)
NRBC BLD AUTO-RTO: 0 /100 WBCS
PLATELET # BLD AUTO: 264 THOUSANDS/UL (ref 149–390)
PMV BLD AUTO: 9.6 FL (ref 8.9–12.7)
POTASSIUM SERPL-SCNC: 4.3 MMOL/L (ref 3.5–5.3)
PROT SERPL-MCNC: 7.4 G/DL (ref 6.4–8.2)
RBC # BLD AUTO: 3.63 MILLION/UL (ref 3.81–5.12)
SODIUM SERPL-SCNC: 136 MMOL/L (ref 136–145)
WBC # BLD AUTO: 4.37 THOUSAND/UL (ref 4.31–10.16)

## 2017-12-20 PROCEDURE — 80053 COMPREHEN METABOLIC PANEL: CPT

## 2017-12-20 PROCEDURE — 77386 HB NTSTY MODUL RAD TX DLVR CPLX: CPT | Performed by: RADIOLOGY

## 2017-12-20 PROCEDURE — 36415 COLL VENOUS BLD VENIPUNCTURE: CPT

## 2017-12-20 PROCEDURE — 85025 COMPLETE CBC W/AUTO DIFF WBC: CPT

## 2017-12-21 PROCEDURE — 77336 RADIATION PHYSICS CONSULT: CPT | Performed by: RADIOLOGY

## 2017-12-21 PROCEDURE — 77386 HB NTSTY MODUL RAD TX DLVR CPLX: CPT | Performed by: RADIOLOGY

## 2017-12-21 RX ORDER — SODIUM CHLORIDE 9 MG/ML
20 INJECTION, SOLUTION INTRAVENOUS ONCE
Status: COMPLETED | OUTPATIENT
Start: 2017-12-22 | End: 2017-12-22

## 2017-12-22 ENCOUNTER — HOSPITAL ENCOUNTER (OUTPATIENT)
Dept: INFUSION CENTER | Facility: HOSPITAL | Age: 72
Discharge: HOME | End: 2017-12-24
Payer: MEDICARE

## 2017-12-22 VITALS
SYSTOLIC BLOOD PRESSURE: 102 MMHG | HEIGHT: 65 IN | HEART RATE: 96 BPM | RESPIRATION RATE: 20 BRPM | DIASTOLIC BLOOD PRESSURE: 72 MMHG | BODY MASS INDEX: 14.1 KG/M2 | WEIGHT: 84.66 LBS | TEMPERATURE: 98.3 F

## 2017-12-22 PROCEDURE — 77300 RADIATION THERAPY DOSE PLAN: CPT | Performed by: RADIOLOGY

## 2017-12-22 PROCEDURE — 77386 HB NTSTY MODUL RAD TX DLVR CPLX: CPT | Performed by: RADIOLOGY

## 2017-12-22 PROCEDURE — 77338 DESIGN MLC DEVICE FOR IMRT: CPT | Performed by: RADIOLOGY

## 2017-12-22 PROCEDURE — 96413 CHEMO IV INFUSION 1 HR: CPT

## 2017-12-22 PROCEDURE — 96367 TX/PROPH/DG ADDL SEQ IV INF: CPT

## 2017-12-22 PROCEDURE — 77301 RADIOTHERAPY DOSE PLAN IMRT: CPT | Performed by: RADIOLOGY

## 2017-12-22 PROCEDURE — 96417 CHEMO IV INFUS EACH ADDL SEQ: CPT

## 2017-12-22 RX ADMIN — PACLITAXEL 54 MG: 6 INJECTION, SOLUTION, CONCENTRATE INTRAVENOUS at 11:29

## 2017-12-22 RX ADMIN — DEXAMETHASONE SODIUM PHOSPHATE: 10 INJECTION, SOLUTION INTRAMUSCULAR; INTRAVENOUS at 11:10

## 2017-12-22 RX ADMIN — CARBOPLATIN 139 MG: 10 INJECTION, SOLUTION INTRAVENOUS at 12:39

## 2017-12-22 RX ADMIN — FAMOTIDINE: 10 INJECTION INTRAVENOUS at 10:49

## 2017-12-22 RX ADMIN — SODIUM CHLORIDE 20 ML/HR: 0.9 INJECTION, SOLUTION INTRAVENOUS at 10:50

## 2017-12-22 NOTE — PLAN OF CARE
Problem: Potential for Falls  Goal: Patient will remain free of falls  INTERVENTIONS:  - Assess patient frequently for physical needs  -  Identify cognitive and physical deficits and behaviors that affect risk of falls    -  [place] fall precautions as indicated by assessment   - Educate patient/family on patient safety including physical limitations  - Instruct patient to call for assistance with activity based on assessment  - Modify environment to reduce risk of injury  - Consider OT/PT consult to assist with strengthening/mobility   Outcome: Progressing

## 2017-12-26 PROCEDURE — 77386 HB NTSTY MODUL RAD TX DLVR CPLX: CPT | Performed by: RADIOLOGY

## 2017-12-27 PROCEDURE — 77386 HB NTSTY MODUL RAD TX DLVR CPLX: CPT | Performed by: RADIOLOGY

## 2017-12-28 ENCOUNTER — APPOINTMENT (OUTPATIENT)
Dept: LAB | Facility: HOSPITAL | Age: 72
End: 2017-12-28
Attending: INTERNAL MEDICINE
Payer: MEDICARE

## 2017-12-28 ENCOUNTER — TRANSCRIBE ORDERS (OUTPATIENT)
Dept: LAB | Facility: HOSPITAL | Age: 72
End: 2017-12-28

## 2017-12-28 DIAGNOSIS — C34.91 PRIMARY LUNG CANCER WITH METASTASIS FROM LUNG TO OTHER SITE, RIGHT (HCC): ICD-10-CM

## 2017-12-28 DIAGNOSIS — C34.91 PRIMARY LUNG CANCER WITH METASTASIS FROM LUNG TO OTHER SITE, RIGHT (HCC): Primary | ICD-10-CM

## 2017-12-28 LAB
ALBUMIN SERPL BCP-MCNC: 3.8 G/DL (ref 3.5–5)
ALP SERPL-CCNC: 83 U/L (ref 46–116)
ALT SERPL W P-5'-P-CCNC: 18 U/L (ref 12–78)
ANION GAP SERPL CALCULATED.3IONS-SCNC: 7 MMOL/L (ref 4–13)
AST SERPL W P-5'-P-CCNC: 19 U/L (ref 5–45)
BASOPHILS # BLD AUTO: 0.02 THOUSANDS/ΜL (ref 0–0.1)
BASOPHILS NFR BLD AUTO: 1 % (ref 0–1)
BILIRUB SERPL-MCNC: 0.43 MG/DL (ref 0.2–1)
BUN SERPL-MCNC: 14 MG/DL (ref 5–25)
CALCIUM SERPL-MCNC: 8.8 MG/DL (ref 8.3–10.1)
CHLORIDE SERPL-SCNC: 100 MMOL/L (ref 100–108)
CO2 SERPL-SCNC: 29 MMOL/L (ref 21–32)
CREAT SERPL-MCNC: 0.52 MG/DL (ref 0.6–1.3)
EOSINOPHIL # BLD AUTO: 0.01 THOUSAND/ΜL (ref 0–0.61)
EOSINOPHIL NFR BLD AUTO: 0 % (ref 0–6)
ERYTHROCYTE [DISTWIDTH] IN BLOOD BY AUTOMATED COUNT: 15.6 % (ref 11.6–15.1)
GFR SERPL CREATININE-BSD FRML MDRD: 97 ML/MIN/1.73SQ M
GLUCOSE P FAST SERPL-MCNC: 86 MG/DL (ref 65–99)
HCT VFR BLD AUTO: 33.1 % (ref 34.8–46.1)
HGB BLD-MCNC: 11 G/DL (ref 11.5–15.4)
LYMPHOCYTES # BLD AUTO: 0.57 THOUSANDS/ΜL (ref 0.6–4.47)
LYMPHOCYTES NFR BLD AUTO: 14 % (ref 14–44)
MCH RBC QN AUTO: 32.1 PG (ref 26.8–34.3)
MCHC RBC AUTO-ENTMCNC: 33.2 G/DL (ref 31.4–37.4)
MCV RBC AUTO: 97 FL (ref 82–98)
MONOCYTES # BLD AUTO: 0.47 THOUSAND/ΜL (ref 0.17–1.22)
MONOCYTES NFR BLD AUTO: 12 % (ref 4–12)
NEUTROPHILS # BLD AUTO: 2.9 THOUSANDS/ΜL (ref 1.85–7.62)
NEUTS SEG NFR BLD AUTO: 73 % (ref 43–75)
NRBC BLD AUTO-RTO: 0 /100 WBCS
PLATELET # BLD AUTO: 251 THOUSANDS/UL (ref 149–390)
PMV BLD AUTO: 9.7 FL (ref 8.9–12.7)
POTASSIUM SERPL-SCNC: 3.8 MMOL/L (ref 3.5–5.3)
PROT SERPL-MCNC: 7.4 G/DL (ref 6.4–8.2)
RBC # BLD AUTO: 3.43 MILLION/UL (ref 3.81–5.12)
SODIUM SERPL-SCNC: 136 MMOL/L (ref 136–145)
WBC # BLD AUTO: 3.98 THOUSAND/UL (ref 4.31–10.16)

## 2017-12-28 PROCEDURE — 80053 COMPREHEN METABOLIC PANEL: CPT

## 2017-12-28 PROCEDURE — 36415 COLL VENOUS BLD VENIPUNCTURE: CPT

## 2017-12-28 PROCEDURE — 77386 HB NTSTY MODUL RAD TX DLVR CPLX: CPT | Performed by: RADIOLOGY

## 2017-12-28 PROCEDURE — 85025 COMPLETE CBC W/AUTO DIFF WBC: CPT

## 2017-12-28 RX ORDER — SODIUM CHLORIDE 9 MG/ML
20 INJECTION, SOLUTION INTRAVENOUS ONCE
Status: COMPLETED | OUTPATIENT
Start: 2017-12-29 | End: 2017-12-29

## 2017-12-29 ENCOUNTER — HOSPITAL ENCOUNTER (OUTPATIENT)
Dept: INFUSION CENTER | Facility: HOSPITAL | Age: 72
Discharge: HOME | End: 2017-12-31
Payer: MEDICARE

## 2017-12-29 VITALS
TEMPERATURE: 97.4 F | BODY MASS INDEX: 14.64 KG/M2 | SYSTOLIC BLOOD PRESSURE: 136 MMHG | DIASTOLIC BLOOD PRESSURE: 72 MMHG | RESPIRATION RATE: 18 BRPM | HEIGHT: 64 IN | WEIGHT: 85.76 LBS | HEART RATE: 82 BPM

## 2017-12-29 PROCEDURE — 96413 CHEMO IV INFUSION 1 HR: CPT

## 2017-12-29 PROCEDURE — 77336 RADIATION PHYSICS CONSULT: CPT | Performed by: RADIOLOGY

## 2017-12-29 PROCEDURE — 96417 CHEMO IV INFUS EACH ADDL SEQ: CPT

## 2017-12-29 PROCEDURE — 96367 TX/PROPH/DG ADDL SEQ IV INF: CPT

## 2017-12-29 PROCEDURE — 77386 HB NTSTY MODUL RAD TX DLVR CPLX: CPT | Performed by: RADIOLOGY

## 2017-12-29 RX ADMIN — PACLITAXEL 54 MG: 6 INJECTION, SOLUTION, CONCENTRATE INTRAVENOUS at 12:46

## 2017-12-29 RX ADMIN — SODIUM CHLORIDE 20 ML/HR: 0.9 INJECTION, SOLUTION INTRAVENOUS at 11:45

## 2017-12-29 RX ADMIN — DEXAMETHASONE SODIUM PHOSPHATE: 10 INJECTION, SOLUTION INTRAMUSCULAR; INTRAVENOUS at 12:18

## 2017-12-29 RX ADMIN — FAMOTIDINE: 10 INJECTION INTRAVENOUS at 11:51

## 2017-12-29 RX ADMIN — CARBOPLATIN 139 MG: 10 INJECTION, SOLUTION INTRAVENOUS at 13:59

## 2018-01-02 ENCOUNTER — APPOINTMENT (OUTPATIENT)
Dept: RADIATION ONCOLOGY | Facility: HOSPITAL | Age: 73
End: 2018-01-02
Payer: MEDICARE

## 2018-01-02 PROCEDURE — 77417 THER RADIOLOGY PORT IMAGE(S): CPT | Performed by: RADIOLOGY

## 2018-01-02 PROCEDURE — 77386 HB NTSTY MODUL RAD TX DLVR CPLX: CPT | Performed by: RADIOLOGY

## 2018-01-03 ENCOUNTER — GENERIC CONVERSION - ENCOUNTER (OUTPATIENT)
Dept: OTHER | Facility: OTHER | Age: 73
End: 2018-01-03

## 2018-01-03 PROCEDURE — 77386 HB NTSTY MODUL RAD TX DLVR CPLX: CPT | Performed by: RADIOLOGY

## 2018-01-04 ENCOUNTER — APPOINTMENT (OUTPATIENT)
Dept: LAB | Facility: HOSPITAL | Age: 73
End: 2018-01-04
Attending: INTERNAL MEDICINE
Payer: MEDICARE

## 2018-01-04 DIAGNOSIS — C34.90 MALIGNANT NEOPLASM OF UNSPECIFIED PART OF UNSPECIFIED BRONCHUS OR LUNG (HCC): ICD-10-CM

## 2018-01-04 LAB
ALBUMIN SERPL BCP-MCNC: 3.7 G/DL (ref 3.5–5)
ALP SERPL-CCNC: 87 U/L (ref 46–116)
ALT SERPL W P-5'-P-CCNC: 20 U/L (ref 12–78)
ANION GAP SERPL CALCULATED.3IONS-SCNC: 5 MMOL/L (ref 4–13)
AST SERPL W P-5'-P-CCNC: 20 U/L (ref 5–45)
BASOPHILS # BLD AUTO: 0.01 THOUSANDS/ΜL (ref 0–0.1)
BASOPHILS NFR BLD AUTO: 0 % (ref 0–1)
BILIRUB SERPL-MCNC: 0.53 MG/DL (ref 0.2–1)
BUN SERPL-MCNC: 14 MG/DL (ref 5–25)
CALCIUM SERPL-MCNC: 8.9 MG/DL (ref 8.3–10.1)
CHLORIDE SERPL-SCNC: 100 MMOL/L (ref 100–108)
CO2 SERPL-SCNC: 30 MMOL/L (ref 21–32)
CREAT SERPL-MCNC: 0.57 MG/DL (ref 0.6–1.3)
EOSINOPHIL # BLD AUTO: 0.01 THOUSAND/ΜL (ref 0–0.61)
EOSINOPHIL NFR BLD AUTO: 0 % (ref 0–6)
ERYTHROCYTE [DISTWIDTH] IN BLOOD BY AUTOMATED COUNT: 16.5 % (ref 11.6–15.1)
GFR SERPL CREATININE-BSD FRML MDRD: 94 ML/MIN/1.73SQ M
GLUCOSE P FAST SERPL-MCNC: 82 MG/DL (ref 65–99)
HCT VFR BLD AUTO: 33 % (ref 34.8–46.1)
HGB BLD-MCNC: 11 G/DL (ref 11.5–15.4)
LYMPHOCYTES # BLD AUTO: 0.48 THOUSANDS/ΜL (ref 0.6–4.47)
LYMPHOCYTES NFR BLD AUTO: 14 % (ref 14–44)
MCH RBC QN AUTO: 32.6 PG (ref 26.8–34.3)
MCHC RBC AUTO-ENTMCNC: 33.3 G/DL (ref 31.4–37.4)
MCV RBC AUTO: 98 FL (ref 82–98)
MONOCYTES # BLD AUTO: 0.49 THOUSAND/ΜL (ref 0.17–1.22)
MONOCYTES NFR BLD AUTO: 15 % (ref 4–12)
NEUTROPHILS # BLD AUTO: 2.37 THOUSANDS/ΜL (ref 1.85–7.62)
NEUTS SEG NFR BLD AUTO: 71 % (ref 43–75)
NRBC BLD AUTO-RTO: 0 /100 WBCS
PLATELET # BLD AUTO: 260 THOUSANDS/UL (ref 149–390)
PMV BLD AUTO: 9.8 FL (ref 8.9–12.7)
POTASSIUM SERPL-SCNC: 4 MMOL/L (ref 3.5–5.3)
PROT SERPL-MCNC: 7.4 G/DL (ref 6.4–8.2)
RBC # BLD AUTO: 3.37 MILLION/UL (ref 3.81–5.12)
SODIUM SERPL-SCNC: 135 MMOL/L (ref 136–145)
WBC # BLD AUTO: 3.37 THOUSAND/UL (ref 4.31–10.16)

## 2018-01-04 PROCEDURE — 77386 HB NTSTY MODUL RAD TX DLVR CPLX: CPT | Performed by: RADIOLOGY

## 2018-01-04 PROCEDURE — 80053 COMPREHEN METABOLIC PANEL: CPT

## 2018-01-04 PROCEDURE — 36415 COLL VENOUS BLD VENIPUNCTURE: CPT

## 2018-01-04 PROCEDURE — 85025 COMPLETE CBC W/AUTO DIFF WBC: CPT

## 2018-01-04 RX ORDER — SODIUM CHLORIDE 9 MG/ML
20 INJECTION, SOLUTION INTRAVENOUS ONCE
Status: COMPLETED | OUTPATIENT
Start: 2018-01-05 | End: 2018-01-05

## 2018-01-05 ENCOUNTER — HOSPITAL ENCOUNTER (OUTPATIENT)
Dept: INFUSION CENTER | Facility: HOSPITAL | Age: 73
Discharge: HOME | End: 2018-01-07
Payer: MEDICARE

## 2018-01-05 VITALS
HEART RATE: 100 BPM | DIASTOLIC BLOOD PRESSURE: 68 MMHG | BODY MASS INDEX: 14.33 KG/M2 | RESPIRATION RATE: 20 BRPM | WEIGHT: 85.98 LBS | TEMPERATURE: 96.7 F | SYSTOLIC BLOOD PRESSURE: 114 MMHG | HEIGHT: 65 IN

## 2018-01-05 PROCEDURE — 96413 CHEMO IV INFUSION 1 HR: CPT

## 2018-01-05 PROCEDURE — 96367 TX/PROPH/DG ADDL SEQ IV INF: CPT

## 2018-01-05 PROCEDURE — 77386 HB NTSTY MODUL RAD TX DLVR CPLX: CPT | Performed by: RADIOLOGY

## 2018-01-05 PROCEDURE — 96417 CHEMO IV INFUS EACH ADDL SEQ: CPT

## 2018-01-05 RX ADMIN — DEXAMETHASONE SODIUM PHOSPHATE: 10 INJECTION, SOLUTION INTRAMUSCULAR; INTRAVENOUS at 10:58

## 2018-01-05 RX ADMIN — PACLITAXEL 54 MG: 6 INJECTION, SOLUTION, CONCENTRATE INTRAVENOUS at 11:29

## 2018-01-05 RX ADMIN — CARBOPLATIN 139 MG: 10 INJECTION, SOLUTION INTRAVENOUS at 12:40

## 2018-01-05 RX ADMIN — FAMOTIDINE: 10 INJECTION INTRAVENOUS at 10:34

## 2018-01-05 RX ADMIN — SODIUM CHLORIDE 20 ML/HR: 9 INJECTION, SOLUTION INTRAVENOUS at 10:34

## 2018-01-05 NOTE — PLAN OF CARE
Problem: Potential for Falls  Goal: Patient will remain free of falls  INTERVENTIONS:  - Assess patient frequently for physical needs  -  Identify cognitive and physical deficits and behaviors that affect risk of falls    -  [place] fall precautions as indicated by assessment   - Educate patient/family on patient safety including physical limitations  - Instruct patient to call for assistance with activity based on assessment  - Modify environment to reduce risk of injury  - Consider OT/PT consult to assist with strengthening/mobility   Outcome: Progressing

## 2018-01-08 LAB — SCAN RESULT: NORMAL

## 2018-01-08 PROCEDURE — 77386 HB NTSTY MODUL RAD TX DLVR CPLX: CPT | Performed by: RADIOLOGY

## 2018-01-08 PROCEDURE — 77336 RADIATION PHYSICS CONSULT: CPT | Performed by: RADIOLOGY

## 2018-01-09 PROCEDURE — 77417 THER RADIOLOGY PORT IMAGE(S): CPT | Performed by: RADIOLOGY

## 2018-01-09 PROCEDURE — 77386 HB NTSTY MODUL RAD TX DLVR CPLX: CPT | Performed by: RADIOLOGY

## 2018-01-10 PROCEDURE — 77387 GUIDANCE FOR RADJ TX DLVR: CPT | Performed by: RADIOLOGY

## 2018-01-10 PROCEDURE — 77412 RADIATION TX DELIVERY LVL 3: CPT | Performed by: RADIOLOGY

## 2018-01-10 PROCEDURE — 77386 HB NTSTY MODUL RAD TX DLVR CPLX: CPT | Performed by: RADIOLOGY

## 2018-01-10 NOTE — PROGRESS NOTES
Assessment    1  Encounter for preventive health examination (V70 0) (Z00 00)   2  Fatigue (780 79) (R53 83)   3  Nicotine dependence (305 1) (F17 200)   4  Osteoporosis (733 00) (M81 0)   5  Abnormal weight loss (783 21) (R63 4)    Plan  Abnormal weight loss    · Follow-up visit in 1 month Evaluation and Treatment  Follow-up  Status: Hold For -  Scheduling  Requested for: [id number]  Encounter for preventive health examination    · COLONOSCOPY; Status:Active; Requested for:08Mar2016;   Fatigue    · (1) CBC/PLT/DIFF; Status:Active; Requested for:08Mar2016;    · (1) LIPID PANEL, FASTING; Status:Active; Requested for:08Mar2016;    · (1) T4, FREE; Status:Active; Requested for:08Mar2016;    · (1) TSH; Status:Active; Requested for:08Mar2016;    · (Q) FECAL GLOBIN BY IMMUNOCHEMISTRY; Status:Active; Requested for:08Mar2016;    · (Q) URINALYSIS, SCREEN; Status:Active; Requested for:08Mar2016;   Need for prophylactic vaccination and inoculation against influenza    · Fluzone High-Dose Intramuscular Suspension  Osteoporosis    · (1) VITAMIN D 25-HYDROXY; Status:Active; Requested for:08Mar2016;     Discussion/Summary    #1  Routine health maintenance  Patient was seen approximately one year ago and since that time is lost 2 more pounds  Patient will be up-to-date with her gynecologic and mammogram exams and this will be arranged in the near future  Patient needs a routine colonoscopy and this again will be arranged this visit  She was given a slip for complete labs to have done prior to next visit  She willreceive a routine influenza vaccine today and a pneumococcal vaccine with her next visit  #2  Fatigue  Patient will have routine labs obtained including thyroid studies to see if there is any metabolic etiology for her being tired  #3  Nicotinic dependence  Patient continues to smoke and states that she's tried many methods in order to quit   I did suggest trying she can text to see if this might be helpful but she is refusing to try this saying that she gets a lot of side effects from medication  We will discuss this again with her next visit but she is at increased risk for multiple types of cancer and increasing problems with osteoporosis because of her tobacco abuse  #4  Osteopenia  Patient is to schedule a repeat DEXA scan this year  We will check a vitamin D level with her next visit  Patient was told increased risk of osteoporosis getting worse with her continuing to smoke  #5  Abnormal weight loss  Patient will be sent for routine labs to evaluate for her causes of continued weight loss  Patient has had a history of abnormal and below normal body weight since her first visit in our office  Impression: Subsequent Annual Wellness Visit  Cardiovascular screening and counseling: the risks and benefits of screening were discussed  Diabetes screening and counseling: the risks and benefits of screening were discussed and screening is current  Colorectal cancer screening and counseling: the risks and benefits of screening were discussed and due for a colonoscopy (low risk)  Breast cancer screening and counseling: the risks and benefits of screening were discussed and screening is current  Cervical cancer screening and counseling: the risks and benefits of screening were discussed and screening is current  Osteoporosis screening and counseling: the risks and benefits of screening were discussed and screening is current  Abdominal aortic aneurysm screening and counseling: screening not indicated  Glaucoma screening and counseling: the risks and benefits of screening were discussed and screening is current  HIV screening and counseling: screening not indicated   Immunizations: influenza vaccine is up to date this year, pneumococcal vaccine due today, hepatitis B vaccination series is not indicated at this time due to the patient's low risk of johnson the disease, the risks and benefits of the Zostavax vaccine were discussed with the patient, the patient declines the Zostavax vaccine, the risks and benefits of the Td vaccine were discussed with the patient and the patient declines the Td vaccine  Advance Directive Planning: complete and up to date  Chief Complaint  Patient is here today for her Medicare Wellness exam      History of Present Illness  HPI: Patient is a 73-year-old female with a history of low body weight, osteoporosis, chronic tobacco abuse, strong family history of coronary artery disease  Patient is here for Medicare wellness exam  Patient has no labs to discuss today but these will rearrange  She states she's feeling about the same with no new complaints  Notably patient has lost 2 pounds since last seen and she continues to smoke at least one pack of cigarettes a day  She denies any chest pain or pressure and no increasing shortness of breath  She is not up-to-date with routine colonoscopies and we will be making an appointment in the near future to follow-up with her gynecologist  Patient states last year was a very difficult year with illness in the family and having to take care of her mother around the clock  She states her appetite is good and she's having no bowel or bladder problems but clearly she is underweight   Welcome to [address] and Wellness Visits: The patient is being seen for the subsequent annual wellness visit  Medicare Screening and Risk Factors   Once per lifetime medicare screening tests: ECG has not been done  Medicare Screening Tests Risk Questions   Abdominal aortic aneurysm risk assessment: tobacco use  Osteoporosis risk assessment: , female gender, over 48years of age, the patient's weight is too low (<127 lbs) and tobacco use  HIV risk assessment: none indicated  Drug and Alcohol Use: The patient is a current cigarette smoker  She has smoked for Approximately 50 pack years year(s)   She is not ready to quit using tobacco  The patient reports rare alcohol use  Alcohol concern:   The patient has no concerns about alcohol abuse  She has never used illicit drugs  Diet and Physical Activity: Current diet includes unhealthy food choices  The patient does not exercise  Mood Disorder and Cognitive Impairment Screening: Geriatric Depression Scale   Depression screening score was Total score of 0     negative for symptoms  She denies feeling down, depressed, or hopeless over the past two weeks  She denies feeling little interest or pleasure in doing things over the past two weeks  Cognitive impairment screening: denies difficulty learning/retaining new information, denies difficulty handling complex tasks, denies difficulty with reasoning, denies difficulty with spatial ability and orientation, denies difficulty with language, denies difficulty with behavior and Total score of 30 on the Mini-Mental Status exam    Functional Ability/Level of Safety: Hearing is normal bilaterally  The patient is currently able to do activities of daily living without limitations, able to do instrumental activities of daily living without limitations, able to participate in social activities without limitations and able to drive without limitations  Activities of daily living details: does not need help using the phone, no transportation help needed, does not need help shopping, no meal preparation help needed, does not need help doing housework, does not need help doing laundry, does not need help managing medications and does not need help managing money  Fall risk factors: The patient fell 0 times in the past 12 months , no polypharmacy, no alcohol use, no mobility impairment, no antidepressant use, no deconditioning, no postural hypotension, no sedative use, no visual impairment, no urinary incontinence, no antihypertensive use, no cognitive impairment, up and go test was normal and no previous fall   Home safety risk factors:  no unfamiliar surroundings, no loose rugs, no poor household lighting, no uneven floors, no household clutter, grab bars in the bathroom and handrails on the stairs  Advance Directives: Advance directives: living will and advance directives, but no durable power of  for health care directives  end of life decisions were reviewed with the patient and I agree with the patient's decisions  Co-Managers and Medical Equipment/Suppliers: See Patient Care Team      Review of Systems    Constitutional: negative  Head and Face: negative  Eyes: negative  ENT: negative  Cardiovascular: negative  Respiratory: shortness of breath and Patient does admit to some shortness of breath with pursed exertion but no increase, but no wheezing, not sleeping upright or with extra pillows, no cough, no dry cough, no productive cough, no clear sputum, no colored sputum and not vomiting blood  Gastrointestinal: negative  Genitourinary: negative  Musculoskeletal: negative  Integumentary and Breasts: negative  Neurological: negative  Psychiatric: negative  Endocrine: negative  Hematologic and Lymphatic: negative  Active Problems    1  Abnormal weight loss (783 21) (R63 4)   2  Anorexia (783 0) (R63 0)   3  Bronchitis, chronic obstructive, with exacerbation (491 21) (J44 1)   4  Fatigue (780 79) (R53 83)   5  Need for prophylactic vaccination and inoculation against influenza (V04 81) (Z23)   6  Nicotine dependence (305 1) (F17 200)   7  Non compliance with medical treatment (V15 81) (Z91 19)   8  Osteoporosis (733 00) (M81 0)    Past Medical History    · Need for prophylactic vaccination and inoculation against influenza (V04 81) (Z23)    Current Meds   1  Calcium 1000 + D 1000-800 MG-UNIT Oral Tablet; Therapy: (Recorded:08Mar2016) to Recorded   2  [employer] Vitamin D 1000 UNIT CAPS; Therapy: (Recorded:08Mar2016) to Recorded    Allergies    1   No Known Drug Allergies    Immunizations   1    Influenza  05Oct2012     Vitals  Signs [Data Includes: Current Encounter]    Temperature: 97 2 F  Heart Rate: 92  Respiration: 17  Systolic: 500  Diastolic: 84  Weight: 87 lb 4 00 oz  O2 Saturation: 91    Physical Exam    Constitutional Thin, cachectic appearing 53-year-old female is awake alert  Head and Face   Head and face: Normal     Palpation of the face and sinuses: No sinus tenderness  Eyes   Conjunctiva and lids: No swelling, erythema or discharge  Pupils and irises: Abnormal   Bilaterally dense cataracts  Ophthalmoscopic examination: Abnormal   Unable to see fundi secondary to cataracts  Ears, Nose, Mouth, and Throat   External inspection of ears and nose: Normal     Otoscopic examination: Tympanic membranes translucent with normal light reflex  Canals patent without erythema  Hearing: Normal     Nasal mucosa, septum, and turbinates: Normal without edema or erythema  Lips, teeth, and gums: Normal, good dentition  Oropharynx: Abnormal   Some diffuse erythema to oral mucosa with no masses  Neck   Neck: Supple, symmetric, trachea midline, no masses  Thyroid: Normal, no thyromegaly  Pulmonary   Respiratory effort: No increased work of breathing or signs of respiratory distress  Percussion of chest: Normal     Palpation of chest: Normal     Auscultation of lungs: Abnormal   Decreased breath sounds bilaterally but no rales rhonchi or wheezes at this time  Cardiovascular   Palpation of heart: Normal PMI, no thrills  Auscultation of heart: Normal rate and rhythm, normal S1 and S2, no murmurs  Abdominal aorta: Abnormal   Abdominal aorta is pulsatile with no bruits  They can be felt secondary to patient's very thin waist    Femoral pulses: 2+ bilaterally  Pedal pulses: 2+ bilaterally      Peripheral vascular exam: Normal     Examination of extremities for edema and/or varicosities: Normal     Chest Patient defers examination but is willing to go for a mammogram and to see her gynecologist    Abdomen   Abdomen: Abnormal   Scaphoid soft nontender with positive bowel sounds x4 quadrants  Liver and spleen: No hepatomegaly or splenomegaly  Examination for hernias: No hernia appreciated  Defers examination but is willing to go for colonoscopy  Genitourinary Patient defers examination and states in the future she will be making an appointment for followup with her gynecologist    Lymphatic   Palpation of lymph nodes in neck: No lymphadenopathy  Palpation of lymph nodes in axillae: No lymphadenopathy  Palpation of lymph nodes in groin: No lymphadenopathy  Musculoskeletal   Gait and station: Normal     Digits and nails: Abnormal   Diffuse degenerative changes of the hands and digits but no acute findings  Joints, bones, and muscles: Abnormal   Diffuse osteoarthritic changes but no acute problems  Range of motion: Normal     Stability: Normal     Muscle strength/tone: Abnormal   Generalized muscle wasting secondary to her being very thin  Skin   Skin and subcutaneous tissue: Normal without rashes or lesions  Palpation of skin and subcutaneous tissue: Normal turgor  Neurologic   Cranial nerves: Cranial nerves II-XII intact  Cortical function: Normal mental status  Reflexes: 2+ and symmetric  Sensation: No sensory loss  Coordination: Normal finger to nose and heel to shin  Psychiatric   Judgment and insight: Normal     Orientation to person, place, and time: Normal     Recent and remote memory: Intact      Mood and affect: Normal        Signatures   Electronically signed by : Dorinda Taveras DO; Mar  8 2016 10:40AM EST                       (Author)

## 2018-01-11 ENCOUNTER — TRANSCRIBE ORDERS (OUTPATIENT)
Dept: LAB | Facility: HOSPITAL | Age: 73
End: 2018-01-11

## 2018-01-11 ENCOUNTER — APPOINTMENT (OUTPATIENT)
Dept: LAB | Facility: HOSPITAL | Age: 73
End: 2018-01-11
Attending: RADIOLOGY
Payer: MEDICARE

## 2018-01-11 DIAGNOSIS — C34.91 PRIMARY LUNG CANCER WITH METASTASIS FROM LUNG TO OTHER SITE, RIGHT (HCC): ICD-10-CM

## 2018-01-11 DIAGNOSIS — C34.91 PRIMARY LUNG CANCER WITH METASTASIS FROM LUNG TO OTHER SITE, RIGHT (HCC): Primary | ICD-10-CM

## 2018-01-11 LAB
ALBUMIN SERPL BCP-MCNC: 3.8 G/DL (ref 3.5–5)
ALP SERPL-CCNC: 88 U/L (ref 46–116)
ALT SERPL W P-5'-P-CCNC: 19 U/L (ref 12–78)
ANION GAP SERPL CALCULATED.3IONS-SCNC: 7 MMOL/L (ref 4–13)
AST SERPL W P-5'-P-CCNC: 15 U/L (ref 5–45)
BASOPHILS # BLD AUTO: 0.02 THOUSANDS/ΜL (ref 0–0.1)
BASOPHILS NFR BLD AUTO: 1 % (ref 0–1)
BILIRUB SERPL-MCNC: 0.52 MG/DL (ref 0.2–1)
BUN SERPL-MCNC: 14 MG/DL (ref 5–25)
CALCIUM SERPL-MCNC: 8.9 MG/DL (ref 8.3–10.1)
CHLORIDE SERPL-SCNC: 101 MMOL/L (ref 100–108)
CO2 SERPL-SCNC: 30 MMOL/L (ref 21–32)
CREAT SERPL-MCNC: 0.6 MG/DL (ref 0.6–1.3)
EOSINOPHIL # BLD AUTO: 0.02 THOUSAND/ΜL (ref 0–0.61)
EOSINOPHIL NFR BLD AUTO: 1 % (ref 0–6)
ERYTHROCYTE [DISTWIDTH] IN BLOOD BY AUTOMATED COUNT: 17.3 % (ref 11.6–15.1)
GFR SERPL CREATININE-BSD FRML MDRD: 93 ML/MIN/1.73SQ M
GLUCOSE P FAST SERPL-MCNC: 85 MG/DL (ref 65–99)
HCT VFR BLD AUTO: 33.4 % (ref 34.8–46.1)
HGB BLD-MCNC: 11.2 G/DL (ref 11.5–15.4)
LYMPHOCYTES # BLD AUTO: 0.55 THOUSANDS/ΜL (ref 0.6–4.47)
LYMPHOCYTES NFR BLD AUTO: 14 % (ref 14–44)
MCH RBC QN AUTO: 33.3 PG (ref 26.8–34.3)
MCHC RBC AUTO-ENTMCNC: 33.5 G/DL (ref 31.4–37.4)
MCV RBC AUTO: 99 FL (ref 82–98)
MONOCYTES # BLD AUTO: 0.59 THOUSAND/ΜL (ref 0.17–1.22)
MONOCYTES NFR BLD AUTO: 15 % (ref 4–12)
NEUTROPHILS # BLD AUTO: 2.63 THOUSANDS/ΜL (ref 1.85–7.62)
NEUTS SEG NFR BLD AUTO: 69 % (ref 43–75)
NRBC BLD AUTO-RTO: 0 /100 WBCS
PLATELET # BLD AUTO: 288 THOUSANDS/UL (ref 149–390)
PMV BLD AUTO: 9.8 FL (ref 8.9–12.7)
POTASSIUM SERPL-SCNC: 3.7 MMOL/L (ref 3.5–5.3)
PROT SERPL-MCNC: 7.7 G/DL (ref 6.4–8.2)
RBC # BLD AUTO: 3.36 MILLION/UL (ref 3.81–5.12)
SODIUM SERPL-SCNC: 138 MMOL/L (ref 136–145)
WBC # BLD AUTO: 3.82 THOUSAND/UL (ref 4.31–10.16)

## 2018-01-11 PROCEDURE — 77386 HB NTSTY MODUL RAD TX DLVR CPLX: CPT | Performed by: RADIOLOGY

## 2018-01-11 PROCEDURE — 36415 COLL VENOUS BLD VENIPUNCTURE: CPT

## 2018-01-11 PROCEDURE — 80053 COMPREHEN METABOLIC PANEL: CPT

## 2018-01-11 PROCEDURE — 77387 GUIDANCE FOR RADJ TX DLVR: CPT | Performed by: RADIOLOGY

## 2018-01-11 PROCEDURE — 77412 RADIATION TX DELIVERY LVL 3: CPT | Performed by: RADIOLOGY

## 2018-01-11 PROCEDURE — 85025 COMPLETE CBC W/AUTO DIFF WBC: CPT

## 2018-01-11 RX ORDER — SODIUM CHLORIDE 9 MG/ML
20 INJECTION, SOLUTION INTRAVENOUS ONCE
Status: COMPLETED | OUTPATIENT
Start: 2018-01-12 | End: 2018-01-12

## 2018-01-11 NOTE — MISCELLANEOUS
Message   Recorded as Task   Date: 11/07/2017 09:49 AM, Created By: Mel Taveras   Task Name: Care Coordination   Assigned To: Guido Jarvis   Regarding Patient: Forrest Madison, Status: In Progress   Comment:    JoselitoMitzy - 07 Nov 2017 9:49 AM     TASK CREATED  new chemo   Shellie Pham - 08 Nov 2017 11:58 AM     TASK EDITED  message left for patient re: class   Tct patient to offer invite to attend New cancer trx classes  Pt is not interested at this time  Active Problems    1  Abnormal weight loss (783 21) (R63 4)   2  Adenocarcinoma of right lung, stage 3 (162 9) (C34 91)   3  Adjustment disorder with depressed mood (309 0) (F43 21)   4  Anorexia (783 0) (R63 0)   5  Breast CA (174 9) (C50 919)   6  Bronchitis, chronic obstructive, with exacerbation (491 21) (J44 1)   7  Chronic hypoxemic respiratory failure (518 83,799 02) (J96 11)   8  Chronic obstructive pulmonary disease (496) (J44 9)   9  Depressive disorder (311) (F32 9)   10  Encounter for preventive health examination (V70 0) (Z00 00)   11  Fatigue (780 79) (R53 83)   12  Hyperlipidemia (272 4) (E78 5)   13  Lung mass (786 6) (R91 8)   14  Need for pneumococcal vaccine (V03 82) (Z23)   15  Need for prophylactic vaccination and inoculation against influenza (V04 81) (Z23)   16  Need for Tdap vaccination (V06 1) (Z23)   17  Nicotine dependence (305 1) (F17 200)   18  Non compliance with medical treatment (V15 81) (Z91 19)   19  Osteoporosis (733 00) (M81 0)    Current Meds   1  Brovana 15 MCG/2ML Inhalation Nebulization Solution; INHALE 1 VIAL Every twelve   hours; Therapy: 06Dci0303 to (Evaluate:91Sah4748)  Requested for: 20Nov2017; Last   Rx:20Nov2017 Ordered   2  Budesonide 0 5 MG/2ML Inhalation Suspension; USE 1 UNIT DOSE VIA NEBULIZER   TWO TIMES A DAY; Therapy: 10Uwq7516 to (Evaluate:10Mar2018)  Requested for: 07Nbv7125; Last   Rx:62Nsw4261 Ordered   3  Calcium 1000 + D 1000-800 MG-UNIT Oral Tablet;    Therapy: (Recorded:08Mar2016) to Recorded   4  [employer] Vitamin D 1000 UNIT CAPS; Therapy: (Recorded:08Mar2016) to Recorded   5  Ipratropium Bromide 0 02 % Inhalation Solution; USE 1 UNIT DOSE IN NEBULIZER 4   TIMES DAILY; Therapy: 34Sod3771 to (Evaluate:10Mar2018)  Requested for: 34Uya4699; Last   Rx:[id number] Ordered   6  Megestrol Acetate 400 MG/10ML Oral Suspension; 10ml daily; Therapy: 40HGG3295 to (Last ZQ:75BDV8394)  Requested for: 25HSR0142 Ordered   7  PARoxetine HCl - 10 MG Oral Tablet; TAKE 1/2 TABLET DAILY FOR 10 DAYS THEN   START ONE TABLET DAILY; Therapy: 35ORN2483 to (Last Rx:14Nov2017)  Requested for: 98GNJ4962 Ordered   8  Prochlorperazine Maleate 10 MG Oral Tablet; TAKE 1 TABLET EVERY 6 HOURS AS   NEEDED; Therapy: 44YPN3672 to (Evaluate:21Dec2017)  Requested for: 86ZYS8465; Last   Rx:06Nov2017 Ordered   9  Ventolin  (90 Base) MCG/ACT Inhalation Aerosol Solution; Inhale 2 puff every   six hours as needed; Therapy: 38Xqj6568 to (Evaluate:10Mar2018)  Requested for: 46Ysb2287; Last   Rx:[id number] Ordered    Allergies    1   No Known Drug Allergies    Signatures   Electronically signed by : Kyler Castaneda RN; Nov 29 2017 12:14PM EST                       (Author)

## 2018-01-12 ENCOUNTER — HOSPITAL ENCOUNTER (OUTPATIENT)
Dept: INFUSION CENTER | Facility: HOSPITAL | Age: 73
Discharge: HOME | End: 2018-01-14
Payer: MEDICARE

## 2018-01-12 VITALS
HEIGHT: 65 IN | SYSTOLIC BLOOD PRESSURE: 112 MMHG | RESPIRATION RATE: 18 BRPM | BODY MASS INDEX: 14.14 KG/M2 | DIASTOLIC BLOOD PRESSURE: 55 MMHG | WEIGHT: 84.88 LBS | HEART RATE: 110 BPM | TEMPERATURE: 98.4 F

## 2018-01-12 VITALS
SYSTOLIC BLOOD PRESSURE: 130 MMHG | HEART RATE: 114 BPM | TEMPERATURE: 99 F | BODY MASS INDEX: 13.88 KG/M2 | RESPIRATION RATE: 16 BRPM | OXYGEN SATURATION: 93 % | WEIGHT: 83.31 LBS | HEIGHT: 65 IN | DIASTOLIC BLOOD PRESSURE: 80 MMHG

## 2018-01-12 PROCEDURE — 96413 CHEMO IV INFUSION 1 HR: CPT

## 2018-01-12 PROCEDURE — 77386 HB NTSTY MODUL RAD TX DLVR CPLX: CPT | Performed by: RADIOLOGY

## 2018-01-12 PROCEDURE — 96367 TX/PROPH/DG ADDL SEQ IV INF: CPT

## 2018-01-12 PROCEDURE — 96417 CHEMO IV INFUS EACH ADDL SEQ: CPT

## 2018-01-12 RX ADMIN — DEXAMETHASONE SODIUM PHOSPHATE: 10 INJECTION, SOLUTION INTRAMUSCULAR; INTRAVENOUS at 10:41

## 2018-01-12 RX ADMIN — PACLITAXEL 54 MG: 6 INJECTION, SOLUTION, CONCENTRATE INTRAVENOUS at 11:15

## 2018-01-12 RX ADMIN — FAMOTIDINE: 10 INJECTION INTRAVENOUS at 10:24

## 2018-01-12 RX ADMIN — SODIUM CHLORIDE 20 ML/HR: 0.9 INJECTION, SOLUTION INTRAVENOUS at 10:16

## 2018-01-12 RX ADMIN — CARBOPLATIN 139 MG: 10 INJECTION, SOLUTION INTRAVENOUS at 12:21

## 2018-01-12 NOTE — PLAN OF CARE
Problem: Potential for Falls  Goal: Patient will remain free of falls  INTERVENTIONS:  - Assess patient frequently for physical needs  -  Identify cognitive and physical deficits and behaviors that affect risk of falls    -  [place] fall precautions as indicated by assessment   - Educate patient/family on patient safety including physical limitations  - Instruct patient to call for assistance with activity based on assessment  - Modify environment to reduce risk of injury  - Consider OT/PT consult to assist with strengthening/mobility   Outcome: Progressing

## 2018-01-12 NOTE — CONSULTS
History of Present Illness  53-year-old  female with chronic COPD secondary to active smoking, dyspnea on exertion, progressive, CT scan showed mediastinal enlargement, possible right hilum enlargement, she had right mainstem bronchus biopsy on October 2017 showed moderately differentiated adenocarcinoma of the lung positive for TTF 1 and napsin and negative for P 40  CT/ PET scan showed confluent of hypermetabolic right hilar and mediastinal, precarinal, subcarinal lymphadenopathy compatible with malignancy, or regular right upper lung density along the fissure measuring 1 6 x 1 3 cm however SUV 1 1 might be inflammation, hypermetabolic nodule in the right parotid gland might represent a primary parotid gland neoplasm   She used to smoke 2 packs of cigarettes daily for many many years, she does not drink alcohol  Her both parents still living  she has 1 sister in [place]      Review of Systems    Constitutional: recent 8 lb weight loss, but no fever and no chills  Eyes: No complaints of eye pain, no red eyes, no eyesight problems, no discharge, no dry eyes, no itching of eyes  ENT: no complaints of earache, no loss of hearing, no nose bleeds, no nasal discharge, no sore throat, no hoarseness  Cardiovascular: No complaints of slow heart rate, no fast heart rate, no chest pain, no palpitations, no leg claudication, no lower extremity edema  Respiratory: shortness of breath, cough and shortness of breath during exertion, but no wheezing  Gastrointestinal: No complaints of abdominal pain, no constipation, no nausea or vomiting, no diarrhea, no bloody stools  Genitourinary: No complaints of dysuria, no incontinence, no pelvic pain, no dysmenorrhea, no vaginal discharge or bleeding  Musculoskeletal: No complaints of arthralgias, no myalgias, no joint swelling or stiffness, no limb pain or swelling     Integumentary: No complaints of skin rash or lesions, no itching, no skin wounds, no breast pain or lump  Neurological: No complaints of headache, no confusion, no convulsions, no numbness, no dizziness or fainting, no tingling, no limb weakness, no difficulty walking  Psychiatric: Not suicidal, no sleep disturbance, no anxiety or depression, no change in personality, no emotional problems  Endocrine: No complaints of proptosis, no hot flashes, no muscle weakness, no deepening of the voice, no feelings of weakness  Hematologic/Lymphatic: no swollen glands  ROS reviewed  Active Problems    1  Abnormal weight loss (783 21) (R63 4)   2  Anorexia (783 0) (R63 0)   3  Breast CA (174 9) (C50 919)   4  Bronchitis, chronic obstructive, with exacerbation (491 21) (J44 1)   5  Chronic obstructive pulmonary disease (496) (J44 9)   6  Encounter for preventive health examination (V70 0) (Z00 00)   7  Fatigue (780 79) (R53 83)   8  Hyperlipidemia (272 4) (E78 5)   9  Lung mass (786 6) (R91 8)   10  Need for pneumococcal vaccine (V03 82) (Z23)   11  Need for prophylactic vaccination and inoculation against influenza (V04 81) (Z23)   12  Need for Tdap vaccination (V06 1) (Z23)   13  Nicotine dependence (305 1) (F17 200)   14  Non compliance with medical treatment (V15 81) (Z91 19)   15  Osteoporosis (733 00) (M81 0)    Past Medical History    · History of malignant neoplasm of female breast (V10 3) (Z85 3)   · Need for prophylactic vaccination and inoculation against influenza (V04 81) (Z23)    The active problems and past medical history were reviewed and updated today  Surgical History    · History of Breast Surgery Mastectomy    The surgical history was reviewed and updated today  Family History    · Family history of cardiac disorder (V17 49) (Z82 49)    The family history was reviewed and updated today         Social History    · Caffeine use (V49 89) (F15 90)   · Current every day smoker (305 1) (F17 200)   · Quit smoking (V15 82) (Z87 891)   · 20 cigg a day for 55yrs and stop 2 mth ago  The social history was reviewed and updated today  Current Meds   1  Brovana 15 MCG/2ML Inhalation Nebulization Solution; INHALE 1 VIAL Every twelve   hours; Therapy: 32Zyp3679 to (Evaluate:10Mar2018)  Requested for: 11Sep2017; Last   Rx:11Sep2017 Ordered   2  Budesonide 0 5 MG/2ML Inhalation Suspension; USE 1 UNIT DOSE VIA NEBULIZER   TWO TIMES A DAY; Therapy: 11Sep2017 to (Evaluate:10Mar2018)  Requested for: 11Sep2017; Last   Rx:11Sep2017 Ordered   3  Calcium 1000 + D 1000-800 MG-UNIT Oral Tablet; Therapy: (Recorded:08Mar2016) to Recorded   4  [facility] Vitamin D 1000 UNIT CAPS; Therapy: (Recorded:08Mar2016) to Recorded   5  Ipratropium Bromide 0 02 % Inhalation Solution; USE 1 UNIT DOSE IN NEBULIZER 4   TIMES DAILY; Therapy: 23Ygs8247 to (Evaluate:10Mar2018)  Requested for: 11Sep2017; Last   Rx:11Sep2017 Ordered   6  Ventolin  (90 Base) MCG/ACT Inhalation Aerosol Solution; Inhale 2 puff every six   hours as needed; Therapy: 11Sep2017 to (Evaluate:10Mar2018)  Requested for: 11Sep2017; Last   Rx:11Sep2017 Ordered    The medication list was reviewed and updated today  Allergies    1  No Known Drug Allergies    Vitals   Recorded: 56ZBP4968 03:09PM   Temperature 99 F   Heart Rate 114   Respiration 16   Systolic 037   Diastolic 80   Height 5 ft 4 5 in   Weight 83 lb 5 0 oz   BMI Calculated 14 08   BSA Calculated 1 36   O2 Saturation 93     Physical Exam    Constitutional   General appearance: No acute distress, well appearing and well nourished  Eyes   Conjunctiva and lids: No swelling, erythema or discharge  Pupils and irises: Equal, round and reactive to light  Ears, Nose, Mouth, and Throat   External inspection of ears and nose: Normal     Oropharynx: Normal with no erythema, edema, exudate or lesions  Pulmonary   Auscultation of lungs: Abnormal   Auscultation of the lungs revealed decreased breath sounds diffusely     Cardiovascular   Auscultation of heart: Abnormal   A grade 2 systolic murmur was heard at the RUSB  Examination of extremities for edema and/or varicosities: Normal     Carotid pulses: Normal     Abdomen   Abdomen: Non-tender, no masses  Liver and spleen: No hepatomegaly or splenomegaly  Lymphatic   Palpation of lymph nodes in neck: No lymphadenopathy  Musculoskeletal   Gait and station: Normal     Digits and nails: Normal without clubbing or cyanosis  Inspection/palpation of joints, bones, and muscles: Normal     Skin   Skin and subcutaneous tissue: Normal without rashes or lesions  Neurologic   Cranial nerves: Cranial nerves 2-12 intact  Psychiatric   Orientation to person, place, and time: Normal     Mood and affect: Normal         ECOG 0       Results/Data  * NM PET CT SKULL BASE TO MID THIGH [id number] 09:10AM Evelia Pope    Order Number: [id number]    - Patient Instructions: To schedule this appointment, please contact Central Scheduling at [phone]  Test Name Result Flag Reference   NM PET CT SKULL BASE TO MID THIGH (Report)     PET/CT SCAN     INDICATION: C34 91: Malignant neoplasm of unspecified part of right bronchus or lung  History taken directly from the electronic ordering system  Newly diagnosed lung cancer, initial staging, history of right breast cancer, status post right mastectomy     MODIFIER: PI        COMPARISON: CT chest 9/26/2017     CELL TYPE: Adenocarcinoma, right mainstem bronchus biopsy 10/17/2017     TECHNIQUE:  8 4 mCi F-18-FDG administered IV  Multiplanar attenuation corrected and non attenuation corrected PET images are available for interpretation, and contiguous, low dose, axial CT sections were obtained from the skull base through the femurs    following the administration of oral contrast material (7 5 cc Omnipaque-240 in 300 cc water)  Intravenous contrast material was not utilized   This examination, like all CT scans performed in the [facility], was performed utilizing    techniques to minimize radiation dose exposure, including the use of iterative reconstruction and automated exposure control  Fasting serum glucose: 83 mg/dl     FINDINGS:      VISUALIZED BRAIN:     No acute abnormalities are seen  HEAD/NECK:     There is an ill-defined hypermetabolic nodule in the right parotid gland region, measuring approximately 2 2 x 1 7 cm on series 3 image 34  SUV measures 11 3  This may represent a metastasis versus primary parotid gland lesions such as a Warthin's    tumor or amorphic adenoma  There is otherwise a physiologic distribution of FDG  CT images: Nodular right thyroid with calcifications  CHEST:     There is confluent hypermetabolic right hilar and mediastinal, precarinal, subcarinal adenopathy, most compatible with malignancy  SUV measures 17 4  On series 3 image 91, confluent right hilar and precarinal adenopathy measures approximately 4 2 x 2 2    cm  Subcarinal adenopathy measures approximately 2 5 x 1 5 cm on image 92  Right hilar devan mass image 100 measures 2 7 x 1 47 m  Tumor extends into the right mainstem bronchus, bronchus intermedius, right middle lobe and right lower lobe bronchi, as   seen previously  There is also a small right thoracic paraspinal node series 3 image 128 with mild FDG activity, measuring 1 3 x 0 4 cm, SUV 2 5  On series 3 image 78, there is an irregular right upper lung density along the major fissure measuring 1 6 x 1 3 cm, with mild FDG activity, SUV 1 1  This may be inflammatory, versus malignancy  Biapical pleural parenchymal scarring without significant hypermetabolism  4 mm left lower lung nodular density series 3 image 119 is too small for accurate PET evaluation  Small foci of activity in the proximal left upper extremity may be due to left upper extremity injection of the radiotracer  CT images: Persistent atelectasis right middle lobe  Severe emphysema  Coronary artery calcifications       ABDOMEN:     Questionable subtle hypermetabolic focus anterior to the right diaphragmatic larisa, SUV 2 6  This may be reassessed on follow-up exam      Otherwise no FDG avid soft tissue lesions are seen  CT images: Unremarkable  PELVIS:    No FDG avid soft tissue lesions are seen  CT images: Unremarkable  OSSEOUS STRUCTURES:   No FDG avid lesions are seen  CT images: No significant findings  Minimal anterolisthesis L5 on S1, and slight retrolisthesis L1 on L2, likely degenerative  IMPRESSION:   1  There is confluent hypermetabolic right hilar and mediastinal, precarinal, subcarinal adenopathy, most compatible with malignancy  2  Irregular right upper lung density along the major fissure measuring 1 6 x 1 3 cm, with mild FDG activity, SUV 1 1  This may be inflammatory in etiology versus malignancy  3  Hypermetabolic nodule right parotid gland region may represent a metastatic node or primary parotid gland neoplasm such as a Warthin's tumor or pleomorphic adenoma  Contrast CT neck evaluation may be considered to better delineate the borders  4  Questionable subtle hypermetabolic focus anterior to the right diaphragmatic larisa, SUV 2 6  This may be reassessed on follow-up exam    5  Continued CT follow-up recommended for left lower lung 4 mm nodule  Workstation performed: [id number]     Signed by:   Enma Brannon MD   11/2/17     (1) TISSUE EXAM [id number] 12:08PM Eloy Boothe     Test Name Result Flag Reference   LAB AP CASE REPORT (Report)     Surgical Pathology Report             Case: [id number]                   Authorizing Provider: Molinda Litten, MD     Collected:      10/17/2017 1208        Ordering Location:   [address] [place] [address]   Received:      10/17/2017 [facility]                               Pathologist:      Jeffry Claudio MD                             Specimen:  Lung, Right Mainstem, Lung, right mainstem, mass bx   LAB AP FINAL DIAGNOSIS (Report)     A  Lung, right mainstem bronchus, biopsy:  - Moderately differentiated adenocarcinoma of lung origin  - Immunohistochemical stains performed with appropriate controls show the   tumor cells to be positive for TTF-1 and Napsin and negative for p40 and   MAURY-3, supporting the diagnosis  Electronically signed by Waqar Anderson MD on 10/20/2017 at 8:50 AM   LAB AP SURGICAL ADDITIONAL INFORMATION (Report)     All controls performed with the immunohistochemical stains reported above   reacted appropriately  These tests were developed and their performance   characteristics determined by [facility] or   [facility]  They may not be cleared or approved by the [place]  Food and Drug Administration  The FDA has determined that such clearance   or approval is not necessary  These tests are used for clinical purposes  They should not be regarded as investigational or for research  This   laboratory has been approved by [facility], designated as a high-complexity   laboratory and is qualified to perform these tests  LAB AP GROSS DESCRIPTION (Report)     A  The specimen is received in formalin, labeled with the patient's name   and hospital number, and is designated Right lung mainstem mass biopsy  The specimen consists of multiple tan red soft tissue and hemorrhagic   fragments measuring in aggregate 0 5 x 0 5 x 0 1 cm  Entirely submitted  One cassette  Note: The estimated total formalin fixation time based upon information   provided by the submitting clinician and the standard processing schedule   is 16 25 hours  MAC     Assessment    1  Anorexia (783 0) (R63 0)   2  Breast CA (174 9) (C50 919)   3  Adenocarcinoma of right lung, stage 3 (162 9) (C34 91)    Plan  Abnormal weight loss    · Megestrol Acetate 400 MG/10ML Oral Suspension; 10ml daily   Rx By: Ann Montoya); Dispense: 0 Days ; #:240 ML;  Refill: 6; For: Abnormal weight loss; JACOB = N; [facility] [id number]; Last Updated By: System, [employer]; 11/6/2017 4:39:55 PM  Adenocarcinoma of right lung, stage 3    · Prochlorperazine Maleate 10 MG Oral Tablet; TAKE 1 TABLET EVERY 6 HOURS AS  NEEDED   Rx By: Katherin Clemons); Dispense: 15 Days ; #:60 Tablet; Refill: 2; For: Adenocarcinoma of right lung, stage 3; JACOB = N; Verified Transmission to [id number] Last Updated By: System, [employer]; 11/6/2017 4:01:38 PM   · (1) CBC/PLT/DIFF; Status:Active; Requested NDQ:22SQH3555;    Perform:[facility]; UPD:60FLL2023; Ordered; For:Adenocarcinoma of right lung, stage 3; Ordered By:Paulina Singh);   · (1) COMPREHENSIVE METABOLIC PANEL; Status:Active; Requested FWV:69HSD2017;    Perform:[facility]; NANCY:06VAB9688; Ordered; For:Adenocarcinoma of right lung, stage 3; Ordered By:Paulina Singh);   · 2 Mercedez Montano MD, [facility]  Radiation Oncology Co-Management  Stage IIIA I  adenocarcinoma of the lung need for radiation evaluation  Status: Active  Requested for:  99DHD0209   Ordered; For: Adenocarcinoma of right lung, stage 3; Ordered By: Katherin Clemons) Performed:  Due: 35GBL6653  Care Summary provided  : Yes   · Follow-up visit in 3 weeks Evaluation and Treatment  Follow-up  Status: Hold For -  Scheduling  Requested for: 68LDS7595   Ordered; For: Adenocarcinoma of right lung, stage 3; Ordered By: Katherin Clemons) Performed:  Due: 37QYN0368    Discussion/Summary    At least stage IIIA adenocarcinoma of the right upper lobe of the lung with right hilum, mediastinal involvement by CT scan/ PET scan, biopsy of the right main stem Cell bronchus showed adenocarcinoma, positive for TTF 1, negative for P 40, there was no evidence of distant metastasis involving the adrenals, liver, bony area, there might be an 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  1   I had long discussion with the patient, her sister and her cousin about the staging and treatment  2  I believe the patient definitely will benefit from concurrent radiation therapy with weekly Taxol at 40 milligram/meter subcutaneously, carboplatin AUC 2 with CBC, BMP on weekly basis  And depends on the response later on she might benefit from adjuvant Durvalumab if it approved by the FDA  3  We will make Radiation Oncology referral  4  Follow-up every other week with CBC, CMP an office visit  5  Megace 6 25 mg 1 tsp every day  Signatures   Electronically signed by :  KUNAL Marie ; Nov 6 2017  5:32PM EST                       (Author)

## 2018-01-13 VITALS
SYSTOLIC BLOOD PRESSURE: 118 MMHG | HEART RATE: 106 BPM | RESPIRATION RATE: 16 BRPM | OXYGEN SATURATION: 94 % | HEIGHT: 65 IN | BODY MASS INDEX: 14.16 KG/M2 | TEMPERATURE: 99.2 F | DIASTOLIC BLOOD PRESSURE: 64 MMHG | WEIGHT: 85 LBS

## 2018-01-13 VITALS
HEART RATE: 104 BPM | BODY MASS INDEX: 14.35 KG/M2 | HEIGHT: 65 IN | SYSTOLIC BLOOD PRESSURE: 134 MMHG | WEIGHT: 86.13 LBS | TEMPERATURE: 98.5 F | DIASTOLIC BLOOD PRESSURE: 82 MMHG | OXYGEN SATURATION: 88 %

## 2018-01-13 VITALS
WEIGHT: 92.25 LBS | DIASTOLIC BLOOD PRESSURE: 80 MMHG | HEIGHT: 65 IN | SYSTOLIC BLOOD PRESSURE: 142 MMHG | OXYGEN SATURATION: 96 % | TEMPERATURE: 97.8 F | HEART RATE: 118 BPM | BODY MASS INDEX: 15.37 KG/M2

## 2018-01-14 VITALS
SYSTOLIC BLOOD PRESSURE: 132 MMHG | TEMPERATURE: 99.1 F | BODY MASS INDEX: 13.99 KG/M2 | HEIGHT: 65 IN | HEART RATE: 107 BPM | OXYGEN SATURATION: 97 % | WEIGHT: 84 LBS | DIASTOLIC BLOOD PRESSURE: 80 MMHG

## 2018-01-14 VITALS
TEMPERATURE: 97.7 F | WEIGHT: 87 LBS | DIASTOLIC BLOOD PRESSURE: 86 MMHG | BODY MASS INDEX: 14.49 KG/M2 | OXYGEN SATURATION: 87 % | HEIGHT: 65 IN | SYSTOLIC BLOOD PRESSURE: 138 MMHG | HEART RATE: 108 BPM

## 2018-01-14 VITALS
HEART RATE: 91 BPM | TEMPERATURE: 99.1 F | BODY MASS INDEX: 14.35 KG/M2 | HEIGHT: 65 IN | DIASTOLIC BLOOD PRESSURE: 76 MMHG | WEIGHT: 86.13 LBS | SYSTOLIC BLOOD PRESSURE: 120 MMHG | OXYGEN SATURATION: 90 %

## 2018-01-14 VITALS
OXYGEN SATURATION: 96 % | DIASTOLIC BLOOD PRESSURE: 70 MMHG | BODY MASS INDEX: 14.34 KG/M2 | TEMPERATURE: 97.8 F | WEIGHT: 84 LBS | HEIGHT: 64 IN | RESPIRATION RATE: 16 BRPM | SYSTOLIC BLOOD PRESSURE: 110 MMHG | HEART RATE: 103 BPM

## 2018-01-14 NOTE — PROGRESS NOTES
Discussion/Summary  Social Work-Discussion Summary [facility]: Patient is being seen for a distress screenning assessment  LSW reviewed pt's distress thermometer completed by pt on 11/20/2017 in rad onc  Pt rated their distress as a 2/10 and identified "slight" fears, nervousness and worry  Due to pt's low distress score and limited psychosocial problems named a social work follow-up would not be indicated  If pt expresses psychosocial needs, LSW is available to provide cancer care counseling        Signatures   Electronically signed by : GRACE Rodriguez; Nov 20 2017  1:58PM EST                       (Author)

## 2018-01-15 PROCEDURE — 77386 HB NTSTY MODUL RAD TX DLVR CPLX: CPT | Performed by: RADIOLOGY

## 2018-01-15 PROCEDURE — 77336 RADIATION PHYSICS CONSULT: CPT | Performed by: RADIOLOGY

## 2018-01-15 NOTE — MISCELLANEOUS
Message  I scheduled August Perdue for the Bronchoscopy on October 17th @ 11am in [facility]  I called and left a message on her voice mail of this and mailed the instructions to her  Dr Bao Moreno was paged with this info also  Active Problems    1  Abnormal weight loss (783 21) (R63 4)   2  Anorexia (783 0) (R63 0)   3  Breast CA (174 9) (C50 919)   4  Bronchitis, chronic obstructive, with exacerbation (491 21) (J44 1)   5  Chronic obstructive pulmonary disease (496) (J44 9)   6  Encounter for preventive health examination (V70 0) (Z00 00)   7  Fatigue (780 79) (R53 83)   8  Hyperlipidemia (272 4) (E78 5)   9  Need for pneumococcal vaccine (V03 82) (Z23)   10  Need for prophylactic vaccination and inoculation against influenza (V04 81) (Z23)   11  Nicotine dependence (305 1) (F17 200)   12  Non compliance with medical treatment (V15 81) (Z91 19)   13  Osteoporosis (733 00) (M81 0)    Current Meds   1  Brovana 15 MCG/2ML Inhalation Nebulization Solution; INHALE 1 VIAL Every twelve   hours; Therapy: 09Ccp1364 to (Evaluate:10Mar2018)  Requested for: 79Obq6520; Last   Rx:11Sep2017 Ordered   2  Budesonide 0 5 MG/2ML Inhalation Suspension; USE 1 UNIT DOSE VIA NEBULIZER   TWO TIMES A DAY; Therapy: 48Jkm8268 to (Evaluate:10Mar2018)  Requested for: 53Lij0537; Last   Rx:31Efh4951 Ordered   3  Calcium 1000 + D 1000-800 MG-UNIT Oral Tablet; Therapy: (Recorded:08Mar2016) to Recorded   4  [facility] Vitamin D 1000 UNIT CAPS; Therapy: (Recorded:08Mar2016) to Recorded   5  Fosamax 70 MG Oral Tablet; TAKE 1 TABLET ONCE WEEKLY; Therapy: 23XSV3857 to (Last Rx:14Oct2016) Ordered   6  Ipratropium Bromide 0 02 % Inhalation Solution; USE 1 UNIT DOSE IN NEBULIZER 4   TIMES DAILY; Therapy: 76Hgn4307 to (Evaluate:10Mar2018)  Requested for: 22Sqp0068; Last   Rx:23Exz8079 Ordered   7  Ventolin  (90 Base) MCG/ACT Inhalation Aerosol Solution; Inhale 2 puff every   six hours as needed;    Therapy: 65VMJ9967 to (Evaluate:10Mar2018) Requested for: 23Xdi4533; Last   Rx:21Gxx6975 Ordered    Allergies    1   No Known Drug Allergies    Signatures   Electronically signed by : Bebeto Arthur, ; Oct  5 2017  1:51PM EST                       (Author)

## 2018-01-16 PROCEDURE — 77417 THER RADIOLOGY PORT IMAGE(S): CPT | Performed by: RADIOLOGY

## 2018-01-16 PROCEDURE — 77386 HB NTSTY MODUL RAD TX DLVR CPLX: CPT | Performed by: RADIOLOGY

## 2018-01-17 ENCOUNTER — GENERIC CONVERSION - ENCOUNTER (OUTPATIENT)
Dept: OTHER | Facility: OTHER | Age: 73
End: 2018-01-17

## 2018-01-17 PROCEDURE — 77386 HB NTSTY MODUL RAD TX DLVR CPLX: CPT | Performed by: RADIOLOGY

## 2018-01-18 ENCOUNTER — APPOINTMENT (OUTPATIENT)
Dept: LAB | Facility: HOSPITAL | Age: 73
End: 2018-01-18
Attending: INTERNAL MEDICINE
Payer: MEDICARE

## 2018-01-18 DIAGNOSIS — C34.91 PRIMARY LUNG CANCER WITH METASTASIS FROM LUNG TO OTHER SITE, RIGHT (HCC): ICD-10-CM

## 2018-01-18 LAB
ALBUMIN SERPL BCP-MCNC: 4 G/DL (ref 3.5–5)
ALP SERPL-CCNC: 89 U/L (ref 46–116)
ALT SERPL W P-5'-P-CCNC: 18 U/L (ref 12–78)
ANION GAP SERPL CALCULATED.3IONS-SCNC: 7 MMOL/L (ref 4–13)
AST SERPL W P-5'-P-CCNC: 18 U/L (ref 5–45)
BASOPHILS # BLD AUTO: 0.02 THOUSANDS/ΜL (ref 0–0.1)
BASOPHILS NFR BLD AUTO: 1 % (ref 0–1)
BILIRUB SERPL-MCNC: 0.53 MG/DL (ref 0.2–1)
BUN SERPL-MCNC: 17 MG/DL (ref 5–25)
CALCIUM SERPL-MCNC: 9.2 MG/DL (ref 8.3–10.1)
CHLORIDE SERPL-SCNC: 101 MMOL/L (ref 100–108)
CO2 SERPL-SCNC: 29 MMOL/L (ref 21–32)
CREAT SERPL-MCNC: 0.65 MG/DL (ref 0.6–1.3)
EOSINOPHIL # BLD AUTO: 0.02 THOUSAND/ΜL (ref 0–0.61)
EOSINOPHIL NFR BLD AUTO: 1 % (ref 0–6)
ERYTHROCYTE [DISTWIDTH] IN BLOOD BY AUTOMATED COUNT: 17.9 % (ref 11.6–15.1)
GFR SERPL CREATININE-BSD FRML MDRD: 90 ML/MIN/1.73SQ M
GLUCOSE SERPL-MCNC: 92 MG/DL (ref 65–140)
HCT VFR BLD AUTO: 34.6 % (ref 34.8–46.1)
HGB BLD-MCNC: 11.5 G/DL (ref 11.5–15.4)
LYMPHOCYTES # BLD AUTO: 0.64 THOUSANDS/ΜL (ref 0.6–4.47)
LYMPHOCYTES NFR BLD AUTO: 18 % (ref 14–44)
MCH RBC QN AUTO: 33.6 PG (ref 26.8–34.3)
MCHC RBC AUTO-ENTMCNC: 33.2 G/DL (ref 31.4–37.4)
MCV RBC AUTO: 101 FL (ref 82–98)
MONOCYTES # BLD AUTO: 0.47 THOUSAND/ΜL (ref 0.17–1.22)
MONOCYTES NFR BLD AUTO: 13 % (ref 4–12)
NEUTROPHILS # BLD AUTO: 2.34 THOUSANDS/ΜL (ref 1.85–7.62)
NEUTS SEG NFR BLD AUTO: 67 % (ref 43–75)
NRBC BLD AUTO-RTO: 0 /100 WBCS
PLATELET # BLD AUTO: 274 THOUSANDS/UL (ref 149–390)
PMV BLD AUTO: 9.6 FL (ref 8.9–12.7)
POTASSIUM SERPL-SCNC: 4.6 MMOL/L (ref 3.5–5.3)
PROT SERPL-MCNC: 7.9 G/DL (ref 6.4–8.2)
RBC # BLD AUTO: 3.42 MILLION/UL (ref 3.81–5.12)
SODIUM SERPL-SCNC: 137 MMOL/L (ref 136–145)
WBC # BLD AUTO: 3.51 THOUSAND/UL (ref 4.31–10.16)

## 2018-01-18 PROCEDURE — 85025 COMPLETE CBC W/AUTO DIFF WBC: CPT

## 2018-01-18 PROCEDURE — 77386 HB NTSTY MODUL RAD TX DLVR CPLX: CPT | Performed by: RADIOLOGY

## 2018-01-18 PROCEDURE — 36415 COLL VENOUS BLD VENIPUNCTURE: CPT

## 2018-01-18 PROCEDURE — 77336 RADIATION PHYSICS CONSULT: CPT | Performed by: RADIOLOGY

## 2018-01-18 PROCEDURE — 77417 THER RADIOLOGY PORT IMAGE(S): CPT | Performed by: RADIOLOGY

## 2018-01-18 PROCEDURE — 80053 COMPREHEN METABOLIC PANEL: CPT

## 2018-01-18 RX ORDER — SODIUM CHLORIDE 9 MG/ML
20 INJECTION, SOLUTION INTRAVENOUS ONCE
Status: COMPLETED | OUTPATIENT
Start: 2018-01-19 | End: 2018-01-19

## 2018-01-19 ENCOUNTER — HOSPITAL ENCOUNTER (OUTPATIENT)
Dept: INFUSION CENTER | Facility: HOSPITAL | Age: 73
Discharge: HOME | End: 2018-01-19
Payer: MEDICARE

## 2018-01-19 VITALS
BODY MASS INDEX: 13.92 KG/M2 | TEMPERATURE: 97.4 F | SYSTOLIC BLOOD PRESSURE: 113 MMHG | HEIGHT: 65 IN | WEIGHT: 83.55 LBS | RESPIRATION RATE: 18 BRPM | DIASTOLIC BLOOD PRESSURE: 62 MMHG | HEART RATE: 119 BPM

## 2018-01-19 PROCEDURE — 96413 CHEMO IV INFUSION 1 HR: CPT

## 2018-01-19 PROCEDURE — 96417 CHEMO IV INFUS EACH ADDL SEQ: CPT

## 2018-01-19 PROCEDURE — 96411 CHEMO IV PUSH ADDL DRUG: CPT

## 2018-01-19 PROCEDURE — 77386 HB NTSTY MODUL RAD TX DLVR CPLX: CPT | Performed by: RADIOLOGY

## 2018-01-19 PROCEDURE — 96367 TX/PROPH/DG ADDL SEQ IV INF: CPT

## 2018-01-19 RX ADMIN — FAMOTIDINE: 10 INJECTION INTRAVENOUS at 10:10

## 2018-01-19 RX ADMIN — PACLITAXEL 54 MG: 6 INJECTION, SOLUTION, CONCENTRATE INTRAVENOUS at 11:06

## 2018-01-19 RX ADMIN — DEXAMETHASONE SODIUM PHOSPHATE: 10 INJECTION, SOLUTION INTRAMUSCULAR; INTRAVENOUS at 10:33

## 2018-01-19 RX ADMIN — SODIUM CHLORIDE 20 ML/HR: 0.9 INJECTION, SOLUTION INTRAVENOUS at 10:00

## 2018-01-19 RX ADMIN — CARBOPLATIN 139 MG: 10 INJECTION, SOLUTION INTRAVENOUS at 12:12

## 2018-01-19 NOTE — PLAN OF CARE
Problem: Potential for Falls  Goal: Patient will remain free of falls  INTERVENTIONS:  - Assess patient frequently for physical needs  -  Identify cognitive and physical deficits and behaviors that affect risk of falls    -  [place] fall precautions as indicated by assessment   - Educate patient/family on patient safety including physical limitations  - Instruct patient to call for assistance with activity based on assessment  - Modify environment to reduce risk of injury  - Consider OT/PT consult to assist with strengthening/mobility   Outcome: Progressing

## 2018-01-19 NOTE — PROGRESS NOTES
Patient offered no complaint on arrival   Patient tolerated treatment well without any complaint    Confirmed next appt and declined AVS

## 2018-01-19 NOTE — PROGRESS NOTES
Patient complained of IV burning after Carboplatin started  Heel infant warmer brought no relief  Good blood return and flushed well  No signs of infiltration  IV site removed and changed  Patient tolerated IV change and no burning when Carboplatin started

## 2018-01-22 VITALS
DIASTOLIC BLOOD PRESSURE: 70 MMHG | TEMPERATURE: 99.3 F | HEART RATE: 102 BPM | BODY MASS INDEX: 14.16 KG/M2 | OXYGEN SATURATION: 99 % | HEIGHT: 65 IN | SYSTOLIC BLOOD PRESSURE: 110 MMHG | WEIGHT: 85 LBS | RESPIRATION RATE: 16 BRPM

## 2018-01-22 VITALS
BODY MASS INDEX: 13.66 KG/M2 | HEART RATE: 102 BPM | OXYGEN SATURATION: 92 % | HEIGHT: 65 IN | TEMPERATURE: 98.1 F | RESPIRATION RATE: 18 BRPM | SYSTOLIC BLOOD PRESSURE: 122 MMHG | WEIGHT: 82 LBS | DIASTOLIC BLOOD PRESSURE: 60 MMHG

## 2018-01-22 VITALS
OXYGEN SATURATION: 92 % | BODY MASS INDEX: 14.38 KG/M2 | DIASTOLIC BLOOD PRESSURE: 62 MMHG | WEIGHT: 86.31 LBS | TEMPERATURE: 98.6 F | HEART RATE: 112 BPM | SYSTOLIC BLOOD PRESSURE: 122 MMHG | HEIGHT: 65 IN

## 2018-01-22 PROCEDURE — 77386 HB NTSTY MODUL RAD TX DLVR CPLX: CPT | Performed by: RADIOLOGY

## 2018-01-23 PROCEDURE — 77386 HB NTSTY MODUL RAD TX DLVR CPLX: CPT | Performed by: RADIOLOGY

## 2018-01-24 VITALS
RESPIRATION RATE: 16 BRPM | DIASTOLIC BLOOD PRESSURE: 62 MMHG | HEART RATE: 114 BPM | TEMPERATURE: 97.8 F | OXYGEN SATURATION: 99 % | WEIGHT: 83.13 LBS | SYSTOLIC BLOOD PRESSURE: 98 MMHG | HEIGHT: 64 IN | BODY MASS INDEX: 14.19 KG/M2

## 2018-01-24 VITALS
RESPIRATION RATE: 16 BRPM | DIASTOLIC BLOOD PRESSURE: 70 MMHG | HEIGHT: 64 IN | BODY MASS INDEX: 14.55 KG/M2 | HEART RATE: 79 BPM | OXYGEN SATURATION: 99 % | WEIGHT: 85.25 LBS | SYSTOLIC BLOOD PRESSURE: 118 MMHG | TEMPERATURE: 96.4 F

## 2018-01-24 VITALS
BODY MASS INDEX: 14.39 KG/M2 | OXYGEN SATURATION: 99 % | TEMPERATURE: 97.7 F | HEART RATE: 119 BPM | SYSTOLIC BLOOD PRESSURE: 110 MMHG | HEIGHT: 64 IN | DIASTOLIC BLOOD PRESSURE: 68 MMHG | WEIGHT: 84.25 LBS

## 2018-01-24 VITALS
BODY MASS INDEX: 14.43 KG/M2 | WEIGHT: 84.5 LBS | HEART RATE: 110 BPM | TEMPERATURE: 97.8 F | HEIGHT: 64 IN | SYSTOLIC BLOOD PRESSURE: 112 MMHG | DIASTOLIC BLOOD PRESSURE: 64 MMHG | RESPIRATION RATE: 16 BRPM | OXYGEN SATURATION: 89 %

## 2018-01-24 PROCEDURE — 77386 HB NTSTY MODUL RAD TX DLVR CPLX: CPT | Performed by: RADIOLOGY

## 2018-01-25 ENCOUNTER — APPOINTMENT (OUTPATIENT)
Dept: LAB | Facility: HOSPITAL | Age: 73
End: 2018-01-25
Attending: RADIOLOGY
Payer: MEDICARE

## 2018-01-25 DIAGNOSIS — C34.91 PRIMARY LUNG CANCER WITH METASTASIS FROM LUNG TO OTHER SITE, RIGHT (HCC): ICD-10-CM

## 2018-01-25 LAB
BASOPHILS # BLD AUTO: 0.04 THOUSANDS/ΜL (ref 0–0.1)
BASOPHILS NFR BLD AUTO: 1 % (ref 0–1)
EOSINOPHIL # BLD AUTO: 0.02 THOUSAND/ΜL (ref 0–0.61)
EOSINOPHIL NFR BLD AUTO: 1 % (ref 0–6)
ERYTHROCYTE [DISTWIDTH] IN BLOOD BY AUTOMATED COUNT: 17.9 % (ref 11.6–15.1)
HCT VFR BLD AUTO: 36.8 % (ref 34.8–46.1)
HGB BLD-MCNC: 12.2 G/DL (ref 11.5–15.4)
LYMPHOCYTES # BLD AUTO: 0.51 THOUSANDS/ΜL (ref 0.6–4.47)
LYMPHOCYTES NFR BLD AUTO: 13 % (ref 14–44)
MCH RBC QN AUTO: 34 PG (ref 26.8–34.3)
MCHC RBC AUTO-ENTMCNC: 33.2 G/DL (ref 31.4–37.4)
MCV RBC AUTO: 103 FL (ref 82–98)
MONOCYTES # BLD AUTO: 0.53 THOUSAND/ΜL (ref 0.17–1.22)
MONOCYTES NFR BLD AUTO: 14 % (ref 4–12)
NEUTROPHILS # BLD AUTO: 2.69 THOUSANDS/ΜL (ref 1.85–7.62)
NEUTS SEG NFR BLD AUTO: 71 % (ref 43–75)
NRBC BLD AUTO-RTO: 0 /100 WBCS
PLATELET # BLD AUTO: 220 THOUSANDS/UL (ref 149–390)
PMV BLD AUTO: 10.7 FL (ref 8.9–12.7)
RBC # BLD AUTO: 3.59 MILLION/UL (ref 3.81–5.12)
WBC # BLD AUTO: 3.82 THOUSAND/UL (ref 4.31–10.16)

## 2018-01-25 PROCEDURE — 77386 HB NTSTY MODUL RAD TX DLVR CPLX: CPT | Performed by: RADIOLOGY

## 2018-01-25 PROCEDURE — 85025 COMPLETE CBC W/AUTO DIFF WBC: CPT

## 2018-01-25 PROCEDURE — 77336 RADIATION PHYSICS CONSULT: CPT | Performed by: RADIOLOGY

## 2018-01-25 PROCEDURE — 36415 COLL VENOUS BLD VENIPUNCTURE: CPT

## 2018-01-25 PROCEDURE — 77417 THER RADIOLOGY PORT IMAGE(S): CPT | Performed by: RADIOLOGY

## 2018-01-26 ENCOUNTER — HOSPITAL ENCOUNTER (OUTPATIENT)
Dept: INFUSION CENTER | Facility: HOSPITAL | Age: 73
Discharge: HOME | End: 2018-01-26
Payer: MEDICARE

## 2018-01-26 VITALS
HEIGHT: 65 IN | RESPIRATION RATE: 18 BRPM | SYSTOLIC BLOOD PRESSURE: 114 MMHG | WEIGHT: 82 LBS | TEMPERATURE: 97.9 F | BODY MASS INDEX: 13.66 KG/M2 | HEART RATE: 64 BPM | DIASTOLIC BLOOD PRESSURE: 58 MMHG

## 2018-01-26 PROCEDURE — 96417 CHEMO IV INFUS EACH ADDL SEQ: CPT

## 2018-01-26 PROCEDURE — 96413 CHEMO IV INFUSION 1 HR: CPT

## 2018-01-26 PROCEDURE — 96367 TX/PROPH/DG ADDL SEQ IV INF: CPT

## 2018-01-26 PROCEDURE — 77386 HB NTSTY MODUL RAD TX DLVR CPLX: CPT | Performed by: RADIOLOGY

## 2018-01-26 RX ORDER — SODIUM CHLORIDE 9 MG/ML
20 INJECTION, SOLUTION INTRAVENOUS ONCE
Status: COMPLETED | OUTPATIENT
Start: 2018-01-26 | End: 2018-01-26

## 2018-01-26 RX ADMIN — SODIUM CHLORIDE 20 ML/HR: 0.9 INJECTION, SOLUTION INTRAVENOUS at 11:52

## 2018-01-26 RX ADMIN — FAMOTIDINE: 10 INJECTION INTRAVENOUS at 11:56

## 2018-01-26 RX ADMIN — PACLITAXEL 54 MG: 6 INJECTION, SOLUTION, CONCENTRATE INTRAVENOUS at 12:39

## 2018-01-26 RX ADMIN — DEXAMETHASONE SODIUM PHOSPHATE: 10 INJECTION, SOLUTION INTRAMUSCULAR; INTRAVENOUS at 12:17

## 2018-01-26 RX ADMIN — CARBOPLATIN 139 MG: 10 INJECTION, SOLUTION INTRAVENOUS at 13:40

## 2018-01-26 NOTE — PLAN OF CARE
Problem: Potential for Falls  Goal: Patient will remain free of falls  INTERVENTIONS:  - Assess patient frequently for physical needs  -  Identify cognitive and physical deficits and behaviors that affect risk of falls    -  [place] fall precautions as indicated by assessment   - Educate patient/family on patient safety including physical limitations  - Instruct patient to call for assistance with activity based on assessment  - Modify environment to reduce risk of injury  - Consider OT/PT consult to assist with strengthening/mobility   Outcome: Progressing

## 2018-01-26 NOTE — PLAN OF CARE
Problem: Potential for Falls  Goal: Patient will remain free of falls  INTERVENTIONS:  - Assess patient frequently for physical needs  -  Identify cognitive and physical deficits and behaviors that affect risk of falls    -  [place] fall precautions as indicated by assessment   - Educate patient/family on patient safety including physical limitations  - Instruct patient to call for assistance with activity based on assessment  - Modify environment to reduce risk of injury  - Consider OT/PT consult to assist with strengthening/mobility   Outcome: Progressing

## 2018-01-26 NOTE — PROGRESS NOTES
Called HUGO Yee RN about pt's l;abs  Only had the cbc done and not the CMP  Order being sent to use her Cr  Of  65 from 1/18/18 for today's carboplatin dose

## 2018-01-29 ENCOUNTER — DOCUMENTATION (OUTPATIENT)
Dept: RADIATION ONCOLOGY | Facility: HOSPITAL | Age: 73
End: 2018-01-29

## 2018-01-29 PROCEDURE — 77386 HB NTSTY MODUL RAD TX DLVR CPLX: CPT | Performed by: RADIOLOGY

## 2018-01-29 NOTE — PROGRESS NOTES
Hematology/Oncology Outpatient Follow- up Note  Anthony Dean 81 y o  female MRN: @ Encounter: [id number]        Date:  1/31/2018      Assessment / Plan:    Stage IV lung adenocarcinoma involving right hilar, mediastinal, precarinal, subcarinal lymph nodes with tumor extending into the right mainstem bronchus, bronchus intermedius, right middle lobe and right lower lobe bronchi with biopsy proven metastases to right neck  Currently receiving Taxol 40mg/m2 with Carboplatin AUC 2 weekly concurrent with RT   RT to mediastinum completed 1/18/2018  Last RT treatment to the neck region anticipated for 2/8/2018  Last Chemo this week  Will arrange for CT neck and chest 6 weeks post completion of chemo/RT  HPI:    68-year-old  female seen initially 11/6/2017 regarding moderately differentiated adenocarcinoma of the lung positive for TTF 1 and napsin and negative for P 40 diagnosed via right mainstem bronchus biopsy on October 17, 2017 by Dr Black Taveras  scan chest 9/26/2017 ordered by Dr Black Taveras performed as lung cancer screening study showed subcarinal adenopathy measuring 1 7 cm  Precarinal adenopathy measures 1 9 cm  The right lower lobe endobronchial soft tissue appears mass like  Lung-RADS 4A, suspicious  PET scan 11/2/2017 showed confluent of hypermetabolic right hilar and mediastinal, precarinal, subcarinal lymphadenopathy compatible with malignancy, SUV measures 17 4  right hilar and precarinal adenopathy measures approximately 4 2 x 2 2 cm  Subcarinal adenopathy measures approximately 2 5 x 1 5 cm  Right hilar devan mass image 100 measures 2 7 x 1 47 m  Tumor extends into the right mainstem bronchus, bronchus intermedius, right middle lobe and right lower lobe bronchi, as seen previously   There is also a small right thoracic paraspinal node with mild FDG activity, measuring 1 3 x 0 4 cm, SUV 2 5  is an irregular right upper lung density along the major fissure measuring 1 6 x 1 3 cm, with mild FDG activity, SUV 1 1  This may be inflammatory, versus malignancy  There is an ill-defined hypermetabolic nodule in the right parotid gland region, measuring approximately 2 2 x 1 7 cm SUV measures 11 3  This may represent a metastasis versus primary parotid gland lesions such as a Warthin's tumor or amorphic adenoma  mm left lower lung nodular density too small for PET characterization  Subtle hypermetabolic focus anterior to the right diaphragmatic larisa, SUV 2 6  She has chronic COPD secondary to active smoking, progressive dyspnea on exertion  She has had a chronic cough for many years  used to smoke 2 packs of cigarettes daily for many many years, she does not drink alcohol  both parents still living has 1 sister in [place]  She elected to proceed with FNA right glucose avid neck mass  Pathology: Conclusive evidence of malignancy  small cell carcinoma consistent with metastasis from the patient's know adenocarcinoma of the lung  On immunostaining, the tumor cells are positive for TTF-1 and Napsin A but negative for p40 helping support the above diagnosis  RT to this area with anticipation to start 12/26/2017  Interval History:  She is tolerating treatment well  Cancer Staging: IV      Molecular Testing: Carapril on [id number] 10/17/2017  ALK negative; Ros1 negative; PDL-1 0%; EGFR:  No mutation Detected; BRAF:  No mutation detected  Current Hematologic/ Oncologic Treatment:  Taxol 40 milligrams/meter squared with carboplatin AUC of 2 week 1 11/17/2017 concurrent with RT to mediastinum completed 1/18/2018  RT to cervical/ parotid region 12/26/2017  Last RT treatment to the neck region anticipated for 2/8/2018  Previous Hematologic/ Oncologic History:  Biopsy      Test Results:    Imaging: No results found            Labs:   Lab Results   Component Value Date    WBC 3 82 (L) 01/25/2018    HGB 12 2 01/25/2018    HCT 36 8 01/25/2018     (H) 01/25/2018     01/25/2018     Lab Results   Component Value Date     01/18/2018    K 4 6 01/18/2018     01/18/2018    CO2 29 01/18/2018    ANIONGAP 7 01/18/2018    BUN 17 01/18/2018    CREATININE 0 65 01/18/2018    GLUCOSE 92 01/18/2018    GLUF 85 01/11/2018    CALCIUM 9 2 01/18/2018    AST 18 01/18/2018    ALT 18 01/18/2018    ALKPHOS 89 01/18/2018    PROT 7 9 01/18/2018    BILITOT 0 53 01/18/2018    EGFR 90 01/18/2018         ROS: Review of Systems   Constitutional: Negative for activity change, appetite change, chills, diaphoresis, fatigue, fever and unexpected weight change  HENT: Negative for congestion, dental problem, ear pain, facial swelling, hearing loss, mouth sores, nosebleeds, rhinorrhea, trouble swallowing and voice change  Dry mouth   Eyes: Negative for photophobia, pain, discharge, redness, itching and visual disturbance  Respiratory: Positive for shortness of breath  Negative for cough, chest tightness, wheezing and stridor  On nasal 02   Cardiovascular: Negative for chest pain and palpitations  Gastrointestinal: Negative for abdominal distention, abdominal pain, anal bleeding, blood in stool, constipation, diarrhea, nausea, rectal pain and vomiting  Endocrine: Negative for cold intolerance and heat intolerance  Genitourinary: Negative for decreased urine volume, difficulty urinating, dysuria, flank pain, frequency, hematuria and urgency  Musculoskeletal: Negative for arthralgias, back pain, gait problem, joint swelling, myalgias, neck pain and neck stiffness  Skin: Positive for color change  Negative for pallor, rash and wound  Erythema at right neck at Radiation site  Neurological: Negative for dizziness, tremors, seizures, syncope, weakness, light-headedness, numbness and headaches  Hematological: Negative for adenopathy  Does not bruise/bleed easily  Psychiatric/Behavioral: Negative for agitation, confusion, decreased concentration and sleep disturbance  The patient is not nervous/anxious  Current Medications: Reviewed  Allergies: Reviewed  PMH/FH/SH:  Reviewed      Physical Exam:    There is no height or weight on file to calculate BSA  Wt Readings from Last 3 Encounters:   01/26/18 37 2 kg (82 lb)   01/19/18 37 9 kg (83 lb 8 9 oz)   01/17/18 38 2 kg (84 lb 4 oz)        Temp Readings from Last 3 Encounters:   01/26/18 97 9 °F (36 6 °C) (Tympanic)   01/19/18 (!) 97 4 °F (36 3 °C) (Temporal)   01/17/18 97 7 °F (36 5 °C)        BP Readings from Last 3 Encounters:   01/26/18 114/58   01/19/18 113/62   01/17/18 110/68         Pulse Readings from Last 3 Encounters:   01/26/18 64   01/19/18 (!) 119   01/17/18 (!) 119         Physical Exam   Constitutional: She is oriented to person, place, and time  She appears well-developed and well-nourished  Non-toxic appearance  She does not have a sickly appearance  She does not appear ill  No distress  Underweight   HENT:   Head: Normocephalic and atraumatic  Nose: Nose normal    Mouth/Throat: Oropharynx is clear and moist    Eyes: Conjunctivae and EOM are normal  Pupils are equal, round, and reactive to light  No scleral icterus  Neck: Normal range of motion  Neck supple  No tracheal deviation present  No thyromegaly present  Cardiovascular: Normal rate, regular rhythm, normal heart sounds and intact distal pulses  Exam reveals no gallop and no friction rub  No murmur heard  Pulmonary/Chest: Effort normal  No stridor  No respiratory distress  She has decreased breath sounds  She has no wheezes  She has no rhonchi  She has no rales  She exhibits no tenderness  On nasal 02   Abdominal: Soft  Bowel sounds are normal  She exhibits no distension and no mass  There is no tenderness  There is no rebound and no guarding  Musculoskeletal: Normal range of motion  She exhibits no edema, tenderness or deformity  Lymphadenopathy:     She has no cervical adenopathy     Neurological: She is alert and oriented to person, place, and time  No cranial nerve deficit  Coordination normal    Skin: Skin is warm and dry  No rash noted  She is not diaphoretic  No erythema  No pallor  Erythema right neck at site of RT   Psychiatric: She has a normal mood and affect  Her behavior is normal  Judgment and thought content normal          Goals and Barriers:  Current Goal:  Prolong Survival from Cancer  Barriers: None  Patient's Capacity to Self Care:  Patient is able to self care      Emergency Contacts:    [facility], [phone],

## 2018-01-30 PROCEDURE — 77386 HB NTSTY MODUL RAD TX DLVR CPLX: CPT | Performed by: RADIOLOGY

## 2018-01-31 ENCOUNTER — OFFICE VISIT (OUTPATIENT)
Dept: HEMATOLOGY ONCOLOGY | Facility: CLINIC | Age: 73
End: 2018-01-31
Payer: MEDICARE

## 2018-01-31 VITALS
TEMPERATURE: 97.2 F | WEIGHT: 81.6 LBS | DIASTOLIC BLOOD PRESSURE: 70 MMHG | HEIGHT: 64 IN | SYSTOLIC BLOOD PRESSURE: 110 MMHG | RESPIRATION RATE: 16 BRPM | HEART RATE: 86 BPM | BODY MASS INDEX: 13.93 KG/M2

## 2018-01-31 DIAGNOSIS — C34.90 LUNG CANCER (HCC): Primary | ICD-10-CM

## 2018-01-31 PROCEDURE — 77386 HB NTSTY MODUL RAD TX DLVR CPLX: CPT | Performed by: RADIOLOGY

## 2018-01-31 PROCEDURE — 99215 OFFICE O/P EST HI 40 MIN: CPT | Performed by: PHYSICIAN ASSISTANT

## 2018-02-01 ENCOUNTER — TRANSCRIBE ORDERS (OUTPATIENT)
Dept: LAB | Facility: HOSPITAL | Age: 73
End: 2018-02-01

## 2018-02-01 ENCOUNTER — APPOINTMENT (OUTPATIENT)
Dept: RADIATION ONCOLOGY | Facility: HOSPITAL | Age: 73
End: 2018-02-01
Payer: MEDICARE

## 2018-02-01 ENCOUNTER — APPOINTMENT (OUTPATIENT)
Dept: LAB | Facility: HOSPITAL | Age: 73
End: 2018-02-01
Attending: INTERNAL MEDICINE
Payer: MEDICARE

## 2018-02-01 DIAGNOSIS — C34.91 PRIMARY LUNG CANCER WITH METASTASIS FROM LUNG TO OTHER SITE, RIGHT (HCC): Primary | ICD-10-CM

## 2018-02-01 DIAGNOSIS — C34.91 PRIMARY LUNG CANCER WITH METASTASIS FROM LUNG TO OTHER SITE, RIGHT (HCC): ICD-10-CM

## 2018-02-01 LAB
ALBUMIN SERPL BCP-MCNC: 3.9 G/DL (ref 3.5–5)
ALP SERPL-CCNC: 80 U/L (ref 46–116)
ALT SERPL W P-5'-P-CCNC: 21 U/L (ref 12–78)
ANION GAP SERPL CALCULATED.3IONS-SCNC: 8 MMOL/L (ref 4–13)
AST SERPL W P-5'-P-CCNC: 28 U/L (ref 5–45)
BASOPHILS # BLD AUTO: 0.02 THOUSANDS/ΜL (ref 0–0.1)
BASOPHILS NFR BLD AUTO: 1 % (ref 0–1)
BILIRUB SERPL-MCNC: 0.55 MG/DL (ref 0.2–1)
BUN SERPL-MCNC: 16 MG/DL (ref 5–25)
CALCIUM SERPL-MCNC: 8.9 MG/DL (ref 8.3–10.1)
CHLORIDE SERPL-SCNC: 101 MMOL/L (ref 100–108)
CO2 SERPL-SCNC: 28 MMOL/L (ref 21–32)
CREAT SERPL-MCNC: 0.67 MG/DL (ref 0.6–1.3)
EOSINOPHIL # BLD AUTO: 0.02 THOUSAND/ΜL (ref 0–0.61)
EOSINOPHIL NFR BLD AUTO: 1 % (ref 0–6)
ERYTHROCYTE [DISTWIDTH] IN BLOOD BY AUTOMATED COUNT: 17.9 % (ref 11.6–15.1)
GFR SERPL CREATININE-BSD FRML MDRD: 89 ML/MIN/1.73SQ M
GLUCOSE SERPL-MCNC: 77 MG/DL (ref 65–140)
HCT VFR BLD AUTO: 32.5 % (ref 34.8–46.1)
HGB BLD-MCNC: 10.8 G/DL (ref 11.5–15.4)
LYMPHOCYTES # BLD AUTO: 0.46 THOUSANDS/ΜL (ref 0.6–4.47)
LYMPHOCYTES NFR BLD AUTO: 13 % (ref 14–44)
MCH RBC QN AUTO: 34.1 PG (ref 26.8–34.3)
MCHC RBC AUTO-ENTMCNC: 33.2 G/DL (ref 31.4–37.4)
MCV RBC AUTO: 103 FL (ref 82–98)
MONOCYTES # BLD AUTO: 0.45 THOUSAND/ΜL (ref 0.17–1.22)
MONOCYTES NFR BLD AUTO: 13 % (ref 4–12)
NEUTROPHILS # BLD AUTO: 2.65 THOUSANDS/ΜL (ref 1.85–7.62)
NEUTS SEG NFR BLD AUTO: 72 % (ref 43–75)
NRBC BLD AUTO-RTO: 0 /100 WBCS
PLATELET # BLD AUTO: 162 THOUSANDS/UL (ref 149–390)
PMV BLD AUTO: 10 FL (ref 8.9–12.7)
POTASSIUM SERPL-SCNC: 4.6 MMOL/L (ref 3.5–5.3)
PROT SERPL-MCNC: 7.5 G/DL (ref 6.4–8.2)
RBC # BLD AUTO: 3.17 MILLION/UL (ref 3.81–5.12)
SODIUM SERPL-SCNC: 137 MMOL/L (ref 136–145)
WBC # BLD AUTO: 3.61 THOUSAND/UL (ref 4.31–10.16)

## 2018-02-01 PROCEDURE — 85025 COMPLETE CBC W/AUTO DIFF WBC: CPT

## 2018-02-01 PROCEDURE — 80053 COMPREHEN METABOLIC PANEL: CPT

## 2018-02-01 PROCEDURE — 77336 RADIATION PHYSICS CONSULT: CPT | Performed by: RADIOLOGY

## 2018-02-01 PROCEDURE — 36415 COLL VENOUS BLD VENIPUNCTURE: CPT

## 2018-02-01 PROCEDURE — 77386 HB NTSTY MODUL RAD TX DLVR CPLX: CPT | Performed by: RADIOLOGY

## 2018-02-01 RX ORDER — SODIUM CHLORIDE 9 MG/ML
20 INJECTION, SOLUTION INTRAVENOUS ONCE
Status: COMPLETED | OUTPATIENT
Start: 2018-02-02 | End: 2018-02-02

## 2018-02-02 ENCOUNTER — HOSPITAL ENCOUNTER (OUTPATIENT)
Dept: INFUSION CENTER | Facility: HOSPITAL | Age: 73
Discharge: HOME | End: 2018-02-02
Payer: MEDICARE

## 2018-02-02 VITALS
RESPIRATION RATE: 20 BRPM | SYSTOLIC BLOOD PRESSURE: 131 MMHG | TEMPERATURE: 97.6 F | DIASTOLIC BLOOD PRESSURE: 61 MMHG | HEART RATE: 107 BPM | BODY MASS INDEX: 13.74 KG/M2 | HEIGHT: 65 IN | WEIGHT: 82.45 LBS

## 2018-02-02 PROCEDURE — 96413 CHEMO IV INFUSION 1 HR: CPT

## 2018-02-02 PROCEDURE — 77386 HB NTSTY MODUL RAD TX DLVR CPLX: CPT | Performed by: RADIOLOGY

## 2018-02-02 PROCEDURE — 77417 THER RADIOLOGY PORT IMAGE(S): CPT | Performed by: RADIOLOGY

## 2018-02-02 PROCEDURE — 96367 TX/PROPH/DG ADDL SEQ IV INF: CPT

## 2018-02-02 PROCEDURE — 96417 CHEMO IV INFUS EACH ADDL SEQ: CPT

## 2018-02-02 RX ADMIN — PACLITAXEL 54 MG: 6 INJECTION, SOLUTION, CONCENTRATE INTRAVENOUS at 12:02

## 2018-02-02 RX ADMIN — DEXAMETHASONE SODIUM PHOSPHATE: 10 INJECTION, SOLUTION INTRAMUSCULAR; INTRAVENOUS at 11:30

## 2018-02-02 RX ADMIN — SODIUM CHLORIDE 20 ML/HR: 0.9 INJECTION, SOLUTION INTRAVENOUS at 11:07

## 2018-02-02 RX ADMIN — CARBOPLATIN 139 MG: 10 INJECTION, SOLUTION INTRAVENOUS at 13:34

## 2018-02-02 RX ADMIN — FAMOTIDINE: 10 INJECTION INTRAVENOUS at 11:08

## 2018-02-02 NOTE — PLAN OF CARE
Problem: Potential for Falls  Goal: Patient will remain free of falls  INTERVENTIONS:  - Assess patient frequently for physical needs  -  Identify cognitive and physical deficits and behaviors that affect risk of falls    -  [place] fall precautions as indicated by assessment   - Educate patient/family on patient safety including physical limitations  - Instruct patient to call for assistance with activity based on assessment  - Modify environment to reduce risk of injury  - Consider OT/PT consult to assist with strengthening/mobility   Outcome: Progressing

## 2018-02-02 NOTE — PROGRESS NOTES
Pt called for me to assess line as she was noticing several drops of liquid on armrest of chair  Upon further examination, Taxol filter was leaking  I tried to tighten but line was still dripping  Pharmacy called and made aware  Told to send back entire bad of medication along with tubing to replace filter

## 2018-02-05 PROCEDURE — 77386 HB NTSTY MODUL RAD TX DLVR CPLX: CPT | Performed by: RADIOLOGY

## 2018-02-06 PROCEDURE — 77386 HB NTSTY MODUL RAD TX DLVR CPLX: CPT | Performed by: RADIOLOGY

## 2018-02-08 PROCEDURE — 77386 HB NTSTY MODUL RAD TX DLVR CPLX: CPT | Performed by: RADIOLOGY

## 2018-02-09 PROCEDURE — 77336 RADIATION PHYSICS CONSULT: CPT | Performed by: RADIOLOGY

## 2018-02-09 PROCEDURE — 77290 THER RAD SIMULAJ FIELD CPLX: CPT | Performed by: RADIOLOGY

## 2018-02-19 ENCOUNTER — TELEPHONE (OUTPATIENT)
Dept: PULMONOLOGY | Facility: CLINIC | Age: 73
End: 2018-02-19

## 2018-02-19 DIAGNOSIS — J44.9 CHRONIC OBSTRUCTIVE PULMONARY DISEASE, UNSPECIFIED COPD TYPE (HCC): Primary | ICD-10-CM

## 2018-02-19 NOTE — TELEPHONE ENCOUNTER
Gabriel Truong called requesting a order be set over  The order would need to say POC at a setting of 2 and patient wants to switch to Normal, Fax number [phone]  Any questions please call gabriel at [phone] also fax over recent office note

## 2018-03-09 PROBLEM — C34.91 MALIGNANT NEOPLASM OF RIGHT LUNG (HCC): Status: ACTIVE | Noted: 2018-03-09

## 2018-03-13 ENCOUNTER — APPOINTMENT (OUTPATIENT)
Dept: RADIATION ONCOLOGY | Facility: HOSPITAL | Age: 73
End: 2018-03-13
Attending: RADIOLOGY
Payer: MEDICARE

## 2018-03-13 ENCOUNTER — RADIATION ONCOLOGY FOLLOW-UP (OUTPATIENT)
Dept: RADIATION ONCOLOGY | Facility: HOSPITAL | Age: 73
End: 2018-03-13

## 2018-03-13 VITALS
OXYGEN SATURATION: 100 % | BODY MASS INDEX: 14.33 KG/M2 | DIASTOLIC BLOOD PRESSURE: 62 MMHG | SYSTOLIC BLOOD PRESSURE: 120 MMHG | TEMPERATURE: 98.2 F | HEART RATE: 108 BPM | RESPIRATION RATE: 18 BRPM | HEIGHT: 65 IN | WEIGHT: 86 LBS

## 2018-03-13 DIAGNOSIS — C34.91 PRIMARY LUNG CANCER WITH METASTASIS FROM LUNG TO OTHER SITE, RIGHT (HCC): Primary | ICD-10-CM

## 2018-03-13 PROCEDURE — 99213 OFFICE O/P EST LOW 20 MIN: CPT | Performed by: RADIOLOGY

## 2018-03-13 NOTE — PROGRESS NOTES
Drew Batres    No diagnosis found  No matching staging information was found for the patient  Malignant neoplasm of right lung ([facility] [address] )    9/26/2017 Initial Diagnosis     Malignant neoplasm of right lung ([facility] [address] )         10/17/2017 Biopsy     Bronchoscopy was performed  Right main stem endobronchial lesion and collapse of the bronchial intermedius consistent with lung cancer  11/17/2017 - 2/2/2018 Chemotherapy     Concurrent chemo and radiation: Taxol/ Carboplatin          12/1/2017 - 2/9/2018 Radiation     Treatment:  Course: C1    Plan ID Energy Fractions Dose per Fraction (cGy) Total Dose Delivered (cGy) Elapsed Days   R LUNG_MED 6X 33 / 33 180 5,940 48   R NECK 6X 30 / 30 200 6,000 43      Treatment dates:  C1: 12/1/2017 - 2/9/2018              Interval History:***    GYN History:***    Patient Active Problem List   Diagnosis    Malignant neoplasm of right lung [facility])     Past Medical History:   Diagnosis Date    Breast cancer ([facility] ) 1996    COPD (chronic obstructive pulmonary disease) ([facility] )     Requires supplemental oxygen     2 Liters at bedtime and as needed     Past Surgical History:   Procedure Laterality Date    APPENDECTOMY      COLONOSCOPY N/A 4/18/2016    Procedure: COLONOSCOPY;  Surgeon: Marni Valdez MD;  [facility]; Service:     MASTECTOMY, RADICAL Right 1996    [place] [address] N/A 10/17/2017    Procedure: Magalis Aguillon;  Surgeon: Negrita Hill MD;  [facility]; Service: Pulmonary    TONSILLECTOMY       History reviewed  No pertinent family history  Social History     Social History    Marital status:      Spouse name: N/A    Number of children: N/A    Years of education: N/A     Occupational History    Not on file       Social History Main Topics    Smoking status: Former Smoker     Packs/day: 0 50     Quit date: 6/5/2017    Smokeless tobacco: Never Used    Alcohol use No    Drug use: No    Sexual activity: Not on file Other Topics Concern    Not on file     Social History Narrative    No narrative on file       Current Outpatient Prescriptions:     arformoterol (BROVANA) 15 mcg/2 mL, Take 15 mcg by nebulization 2 (two) times a day, Disp: , Rfl:     budesonide (PULMICORT) 0 25 mg/2 mL nebulizer solution, Take 0 25 mg by nebulization 2 (two) times a day, Disp: , Rfl:     Calcium-Magnesium-Vitamin D (CALCIUM 500 PO), Take 1 tablet by mouth daily  , Disp: , Rfl:     Cholecalciferol (VITAMIN D) 2000 UNITS tablet, Take 2,000 Units by mouth daily  , Disp: , Rfl:     ipratropium (ATROVENT) 0 02 % nebulizer solution, Take 0 5 mg by nebulization 4 (four) times a day, Disp: , Rfl:     Multiple Vitamins-Minerals (MULTIVITAMIN WITH MINERALS) tablet, Take 1 tablet by mouth daily  , Disp: , Rfl:     nystatin (MYCOSTATIN) 100,000 units/mL suspension, Apply 500,000 Units to the mouth or throat 4 (four) times a day, Disp: , Rfl:     PARoxetine (PAXIL) 10 mg tablet, Take by mouth, Disp: , Rfl:   No Known Allergies    Review of Systems:  Review of Systems    Vitals:    03/13/18 1239   BP: 120/62   BP Location: Left arm   Pulse: (!) 108   Resp: 18   Temp: 98 2 °F (36 8 °C)   TempSrc: Temporal   SpO2: 100%   Weight: 39 kg (86 lb)   Height: 5' 5" (1 651 m)       Imaging:No results found      Teaching:***

## 2018-03-13 NOTE — PROGRESS NOTES
Nico Efrain  1945   Ms Nga Gipson is a 81 y o  female       Chief Complaint   Patient presents with    Follow-up     Assessment:  Stage IIIB non-small cell carcinoma right lung status post chemo radiation therapy which she completed 4 weeks ago  Plan:  PET-CT scan in mid April and follow-up  Discussion and summary:  Patient is due for a CT scan of the neck and chest early next week  The PET-CT scan showed the disease distribution in the lung slightly better than the regular CT scan therefore is scheduled at the recommended time interval time following her last dose of radiation therapy  Physical examination:  Patient looks well and skin reaction in the neck has completely resolved  The right upper neck lymph node is no longer palpable  She is also not complaining of any difficulty or pain swallowing  No other adenopathies are detected  Lungs have diminished breath sounds  Heart tones are normal with regular rate and rhythm  No abdominal masses  Patient presents for first follow-up post RT to the right lung, mediastinum and right neck  RT was completed on 2/9/18  Stage IV lung adenocarcinoma involving right hilar, mediastinal, precarinal, subcarinal lymph nodes with tumor extending into the right mainstem bronchus, bronchus intermedius, right middle lobe and right lower lobe bronchi with biopsy proven metastases to right neck  Patient completed concurrent chemo and RT  Completed RT  to the right lung, mediastinum and right neck on 2/9/18  CT neck and CT of the chest scheduled for 3/15/18     3/19/18- Pulmonary f/u scheduled  3/21/18- Med onc f/u scheduled  Malignant neoplasm of right lung ([address] )    9/26/2017 Initial Diagnosis     Malignant neoplasm of right lung ([address] )         10/17/2017 Biopsy     Bronchoscopy was performed  Right main stem endobronchial lesion and collapse of the bronchial intermedius consistent with lung cancer            11/17/2017 - 2/2/2018 Chemotherapy     Concurrent chemo and radiation: Taxol/ Carboplatin          12/1/2017 - 2/9/2018 Radiation     Treatment:  Course: C1    Plan ID Energy Fractions Dose per Fraction (cGy) Total Dose Delivered (cGy) Elapsed Days   R LUNG_MED 6X 33 / 33 180 5,940 48   R NECK 6X 30 / 30 200 6,000 43      Treatment dates:  C1: 12/1/2017 - 2/9/2018              Health Maintenance   Topic Date Due    Hepatitis C Screening  1945    Depression Screening PHQ-9  06/03/1959    Fall Risk  06/03/2012    Urinary Incontinence Screening  06/03/2012    PNEUMOCOCCAL POLYSACCHARIDE VACCINE AGE 72 AND OVER  06/03/2012    GLAUCOMA SCREENING 67+ YR  06/03/2014    COLONOSCOPY  04/18/2026    DTaP,Tdap,and Td Vaccines (2 - Td) 10/07/2027    INFLUENZA VACCINE  Completed       Patient Active Problem List   Diagnosis    Malignant neoplasm of right lung ([facility] [address] )     Past Medical History:   Diagnosis Date    Breast cancer ([facility] [address] ) 1996    COPD (chronic obstructive pulmonary disease) ([facility] [address] )     Requires supplemental oxygen     2 Liters at bedtime and as needed     Past Surgical History:   Procedure Laterality Date    APPENDECTOMY      COLONOSCOPY N/A 4/18/2016    Procedure: COLONOSCOPY;  Surgeon: Yudy Ramirez MD;  [facility]; Service:     MASTECTOMY, RADICAL Right 1996    [place] [address] N/A 10/17/2017    Procedure: Palafox Rodgers;  Surgeon: Scarlett Munoz MD;  [facility]; Service: Pulmonary    TONSILLECTOMY       History reviewed  No pertinent family history  Social History     Social History    Marital status:      Spouse name: N/A    Number of children: N/A    Years of education: N/A     Occupational History    Not on file       Social History Main Topics    Smoking status: Former Smoker     Packs/day: 0 50     Quit date: 6/5/2017    Smokeless tobacco: Never Used    Alcohol use No    Drug use: No    Sexual activity: Not on file     Other Topics Concern    Not on file Social History Narrative    No narrative on file       Current Outpatient Prescriptions:     arformoterol (BROVANA) 15 mcg/2 mL, Take 15 mcg by nebulization 2 (two) times a day, Disp: , Rfl:     budesonide (PULMICORT) 0 25 mg/2 mL nebulizer solution, Take 0 25 mg by nebulization 2 (two) times a day, Disp: , Rfl:     Calcium-Magnesium-Vitamin D (CALCIUM 500 PO), Take 1 tablet by mouth daily  , Disp: , Rfl:     Cholecalciferol (VITAMIN D) 2000 UNITS tablet, Take 2,000 Units by mouth daily  , Disp: , Rfl:     ipratropium (ATROVENT) 0 02 % nebulizer solution, Take 0 5 mg by nebulization 4 (four) times a day, Disp: , Rfl:     Multiple Vitamins-Minerals (MULTIVITAMIN WITH MINERALS) tablet, Take 1 tablet by mouth daily  , Disp: , Rfl:     nystatin (MYCOSTATIN) 100,000 units/mL suspension, Apply 500,000 Units to the mouth or throat 4 (four) times a day, Disp: , Rfl:     PARoxetine (PAXIL) 10 mg tablet, Take by mouth, Disp: , Rfl:   No Known Allergies    Review of Systems:  Review of Systems   Constitutional: Negative  HENT: Negative  Eyes: Negative  Respiratory: Positive for shortness of breath (with extertion)  Cardiovascular: Negative  Gastrointestinal: Negative  Endocrine: Negative  Genitourinary: Negative  Musculoskeletal: Negative  Skin: Negative  Allergic/Immunologic: Negative  Neurological: Negative  Hematological: Negative  Psychiatric/Behavioral: Negative          Vitals:    03/13/18 1239   BP: 120/62   BP Location: Left arm   Pulse: (!) 108   Resp: 18   Temp: 98 2 °F (36 8 °C)   TempSrc: Temporal   SpO2: 100%   Weight: 39 kg (86 lb)   Height: 5' 5" (1 651 m)

## 2018-03-14 ENCOUNTER — TELEPHONE (OUTPATIENT)
Dept: HEMATOLOGY ONCOLOGY | Facility: CLINIC | Age: 73
End: 2018-03-14

## 2018-03-15 ENCOUNTER — HOSPITAL ENCOUNTER (OUTPATIENT)
Dept: RADIOLOGY | Age: 73
Discharge: HOME | End: 2018-03-15
Payer: MEDICARE

## 2018-03-15 DIAGNOSIS — C34.90 LUNG CANCER (HCC): ICD-10-CM

## 2018-03-15 PROCEDURE — 71260 CT THORAX DX C+: CPT

## 2018-03-15 PROCEDURE — 70491 CT SOFT TISSUE NECK W/DYE: CPT

## 2018-03-15 RX ADMIN — IOHEXOL 85 ML: 350 INJECTION, SOLUTION INTRAVENOUS at 10:04

## 2018-03-19 ENCOUNTER — OFFICE VISIT (OUTPATIENT)
Dept: PULMONOLOGY | Facility: CLINIC | Age: 73
End: 2018-03-19
Payer: MEDICARE

## 2018-03-19 VITALS
OXYGEN SATURATION: 98 % | DIASTOLIC BLOOD PRESSURE: 82 MMHG | SYSTOLIC BLOOD PRESSURE: 134 MMHG | HEIGHT: 64 IN | WEIGHT: 85 LBS | RESPIRATION RATE: 18 BRPM | TEMPERATURE: 97.8 F | BODY MASS INDEX: 14.51 KG/M2 | HEART RATE: 111 BPM

## 2018-03-19 DIAGNOSIS — J44.9 CHRONIC OBSTRUCTIVE PULMONARY DISEASE, UNSPECIFIED COPD TYPE (HCC): Primary | ICD-10-CM

## 2018-03-19 PROCEDURE — 99214 OFFICE O/P EST MOD 30 MIN: CPT | Performed by: INTERNAL MEDICINE

## 2018-03-19 RX ORDER — MEGESTROL ACETATE 40 MG/ML
SUSPENSION ORAL
COMMUNITY
Start: 2017-11-06 | End: 2018-03-19 | Stop reason: CLARIF

## 2018-03-19 RX ORDER — PROCHLORPERAZINE MALEATE 10 MG
1 TABLET ORAL EVERY 6 HOURS PRN
COMMUNITY
Start: 2017-11-06 | End: 2019-05-14 | Stop reason: ALTCHOICE

## 2018-03-19 RX ORDER — ALBUTEROL SULFATE 90 UG/1
AEROSOL, METERED RESPIRATORY (INHALATION)
Status: ON HOLD | COMMUNITY
Start: 2017-09-11 | End: 2019-03-14 | Stop reason: SDUPTHER

## 2018-03-19 NOTE — PROGRESS NOTES
Office Progress Note - Pulmonary    Mary Alice Forth 81 y o  female MRN: [id number]    Encounter: [id number]      Assessment:  · Chronic obstructive pulmonary disease  · Chronic hypoxemic respiratory failure  · Stage IV adenocarcinoma of the lung  · Ongoing tobacco use  Plan:  · Brovana and budesonide nebulized twice a day  · Ipratropium nebulized 4 times a day  · Albuterol nebulized as needed  · Oxygen supplement 24 hours a day  · Regular exercise to improve stamina  · Smoking cessation  · Follow-up in 4 months  Discussion:   The patient has completed her lung cancer treatment  She had a repeat CT scan of the chest as well as the neck on March 15th  From my reviewed seems that right middle lobe has opened up  There is no new masses  I will wait for the official radiology report  Her COPD is in remission  I have maintained her on the Brovana and budesonide nebulized twice a day and ipratropium nebulized 4 times a day  She will continue the oxygen 24 hours a day  I had a long discussion with her regarding her ongoing smoking habit  I do not think the patient is motivated to quit smoking  I have advised her to take regular walks as the weather improves to improve her stamina  I will see her in 4 months in a follow-up visit  Subjective: The patient is here for a follow-up visit  Her breathing has improved  She denies any significant cough or sputum production  Denies any wheezing or chest tightness  No chest pain  She has completed her lung cancer treatment both the chemo and radiation  She had a repeat CT scan of the chest on March 15  She has no nocturnal symptoms  She is using Brovana and budesonide nebulized twice a day and ipratropium 4 times a day  She has not needed to use her rescue inhaler  She is on the oxygen 24 hours a day  Unfortunately she is still smoking  She admits only to 2 or 3 cigarettes a day    However she smelled tobacco smoke when she was in the office today  Review of systems:  A 12 system review is done and aside from what is stated above the rest of the review of systems is negative  Vitals: Blood pressure 134/82, pulse (!) 111, temperature 97 8 °F (36 6 °C), resp  rate 18, height 5' 4" (1 626 m), weight 38 6 kg (85 lb), SpO2 98 %  ,     Physical Exam  Gen: Awake, alert, oriented x 3, no acute distress  HEENT: Mucous membranes moist, no oral lesions, no thrush  NECK: No accessory muscle use, JVP not elevated  Cardiac: Regular, single S1, single S2, no murmurs, no rubs, no gallops  Lungs:  Decreased breath sounds  No wheezing or rhonchi  Abdomen: normoactive bowel sounds, soft nontender, nondistended, no rebound or rigidity, no guarding  Extremities: no cyanosis, no clubbing, no edema  Neuro:  Grossly nonfocal   Skin:  No rash

## 2018-03-20 ENCOUNTER — TELEPHONE (OUTPATIENT)
Dept: HEMATOLOGY ONCOLOGY | Facility: CLINIC | Age: 73
End: 2018-03-20

## 2018-03-20 NOTE — TELEPHONE ENCOUNTER
PLEASE SCHEDULE PATIENT IN THE AFTERNOON WITH DR Osiris Maguire NEXT WEEK TO REVIEW SCAN RESULTS  PATIENT EXPECTING YOUR CALL AT THE END OF THE WEEK  THANKS!

## 2018-03-22 ENCOUNTER — TELEPHONE (OUTPATIENT)
Dept: HEMATOLOGY ONCOLOGY | Facility: CLINIC | Age: 73
End: 2018-03-22

## 2018-03-23 ENCOUNTER — OFFICE VISIT (OUTPATIENT)
Dept: HEMATOLOGY ONCOLOGY | Facility: CLINIC | Age: 73
End: 2018-03-23
Payer: MEDICARE

## 2018-03-23 VITALS
WEIGHT: 86 LBS | DIASTOLIC BLOOD PRESSURE: 72 MMHG | RESPIRATION RATE: 16 BRPM | OXYGEN SATURATION: 96 % | TEMPERATURE: 98.1 F | HEART RATE: 111 BPM | BODY MASS INDEX: 14.68 KG/M2 | SYSTOLIC BLOOD PRESSURE: 102 MMHG | HEIGHT: 64 IN

## 2018-03-23 DIAGNOSIS — C34.11 MALIGNANT NEOPLASM OF UPPER LOBE OF RIGHT LUNG (HCC): Primary | ICD-10-CM

## 2018-03-23 PROCEDURE — 99214 OFFICE O/P EST MOD 30 MIN: CPT | Performed by: INTERNAL MEDICINE

## 2018-03-23 NOTE — LETTER
March 23, 2018     Una Kaur DO  [address]  [address] [place] [id number]    Patient: Lexi Dukes   YOB: 1945   Date of Visit: 3/23/2018       Dear Dr Eliz Campbell: Thank you for referring Martine Esquivel to me for evaluation  Below are my notes for this consultation  If you have questions, please do not hesitate to call me  I look forward to following your patient along with you  Sincerely,        Marie Pearson MD        CC: MD Hammad Ocasio MD Oley Callander, MD  3/23/2018  2:17 PM  Sign at close encounter  Hematology Outpatient Follow - Up Note  Lexi Dukes 81 y o  female MRN: @ Encounter: [id number]        Date:  3/23/2018        Assessment/ Plan:    Stage IV adenocarcinoma of the lung, moderately differentiated involving the right hilar, mediastinal, precarinal, subcarinal lymphadenopathy with tumor extension into the right mainstem bronchus, bronchus intermedius, right middle lobe and right neck area proven by biopsy, negative molecular test for EGFR mutation, ALK gene rearrangement, Ross 1 mutation, PDA expression of 0, she finished concurrent radiation therapy with weekly Taxol /carboplatin to the chest area as well as to the right neck area, most recent CT scan of the neck and the chest showed no evidence of recurrence or new lesions and complete resolution of the previous mass is   We had a long discussion and we decided to continue watchful observation and repeat CT/PET scan in 3 months       History of present illness:81year-old  female seen initially 11/6/2017 regarding moderately differentiated adenocarcinoma of the lung positive for TTF 1 and napsin and negative for P 40 diagnosed via right mainstem bronchus biopsy on October 17, 2017 by Dr Ranjan Gan  scan chest 9/26/2017 ordered by Dr Ranjan Gan performed as lung cancer screening study showed subcarinal adenopathy measuring 1 7 cm  Precarinal adenopathy measures 1 9 cm  The right lower lobe endobronchial soft tissue appears mass like  Lung-RADS 4A, suspicious  PET scan 11/2/2017 showed confluent of hypermetabolic right hilar and mediastinal, precarinal, subcarinal lymphadenopathy compatible with malignancy, SUV measures 17 4  right hilar and precarinal adenopathy measures approximately 4 2 x 2 2 cm  Subcarinal adenopathy measures approximately 2 5 x 1 5 cm  Right hilar devan mass image 100 measures 2 7 x 1 47 m  Tumor extends into the right mainstem bronchus, bronchus intermedius, right middle lobe and right lower lobe bronchi, as seen previously  There is also a small right thoracic paraspinal node with mild FDG activity, measuring 1 3 x 0 4 cm, SUV 2 5  is an irregular right upper lung density along the major fissure measuring 1 6 x 1 3 cm, with mild FDG activity, SUV 1 1  This may be inflammatory, versus malignancy  There is an ill-defined hypermetabolic nodule in the right parotid gland region, measuring approximately 2 2 x 1 7 cm SUV measures 11 3  This may represent a metastasis versus primary parotid gland lesions such as a Warthin's tumor or amorphic adenoma  mm left lower lung nodular density too small for PET characterization   Subtle hypermetabolic focus anterior to the right diaphragmatic larisa, SUV 2 6   She has chronic COPD secondary to active smoking, progressive dyspnea on exertion  She has had a chronic cough for many years   used to smoke 2 packs of cigarettes daily for many many years, she does not drink alcohol   both parents still living has 1 sister in [place]  She elected to proceed with FNA right glucose avid neck mass  Pathology: Conclusive evidence of malignancy  small cell carcinoma consistent with metastasis from the patient's know adenocarcinoma of the lung  On immunostaining, the tumor cells are positive for TTF-1 and Napsin A but negative for p40 helping support the above diagnosis    RT to this area with anticipation to start 12/26/2017      Interval History:  She is tolerating treatment well              Cancer Staging: IV        Molecular Testing: Frankie on [id number] 10/17/2017  ALK negative; Ros1 negative; PDL-1 0%; EGFR:  No mutation Detected; BRAF:  No mutation detected           Current Hematologic/ Oncologic Treatment:  Taxol 40 milligrams/meter squared with carboplatin AUC of 2 week 1 11/17/2017 concurrent with RT to mediastinum completed 1/18/2018  RT to cervical/ parotid region 12/26/2017  Last RT treatment to the neck region was 2/8/2018  HPI:      Interval History:        Previous Treatment:         Test Results:    Imaging: Ct Soft Tissue Neck W Contrast    Result Date: 3/16/2018  Narrative: CT NECK WITH CONTRAST INDICATION:   C34 90: Malignant neoplasm of unspecified part of unspecified bronchus or lung  Adenocarcinoma, right mainstem COMPARISON:  PET CT 11/2/2017 TECHNIQUE:  Contiguous 2 5 mm images were obtained through the neck after administration of intravenous contrast  Radiation dose length product (DLP) for this visit:  204 mGy-cm   This examination, like all CT scans performed in the [facility], was performed utilizing techniques to minimize radiation dose exposure, including the use of iterative reconstruction and automated exposure control  IV Contrast:  85 mL of iohexol (OMNIPAQUE) IMAGE QUALITY:  Diagnostic  FINDINGS: VISUALIZED BRAIN PARENCHYMA:  No acute intracranial pathology of the visualized brain parenchyma  VISUALIZED ORBITS AND PARANASAL SINUSES:  Normal  NASAL CAVITY AND NASOPHARYNX:  Normal  SUPRAHYOID NECK:  Normal oral cavity, tongue base, tonsillar fossa and epiglottis  Parapharyngeal, , sublingual and submandibular spaces are normal   There is an ill-defined distortion of soft tissues at the C2 2 level, to the right, juxtaposed between the vertebral column and the tail of the parotid and medial aspect of the sternocleidomastoid muscle    A 16 mm focus is present which appears smaller than the hypermetabolic node noted on CT 11/3/2017  This presumably represents residual of treated the devan metastasis  INFRAHYOID NECK:  Aryepiglottic folds and piriform sinuses are normal   Normal glottis and subglottic airway  THYROID GLAND:  Numerous small stable hypodense nodules in both lobes of the thyroid  PAROTID AND SUBMANDIBULAR GLANDS:  Right parotid gland enhances more than its counterpart likely, related to radiation  LYMPH NODES:  Save for ill-defined soft tissue fullness at the C2 level on the right, no indication of pathologic adenopathy  VASCULAR STRUCTURES:  Normal enhancement of the cervical vasculature  THORACIC INLET:  Lung apices and upper mediastinum are unremarkable  BONY STRUCTURES: No acute fracture or destructive osseous lesion  Impression: Ill-defined 16 mm soft tissue focus to the right at the C2 level, corresponding to hypermetabolic node present on PET/CT 11/2/2017 is slightly smaller  Treatment-related changes, no additional devan disease is evident  Bilateral pleural-parenchymal disease at the apices  Workstation performed: [id number]     Ct Chest W Contrast    Result Date: 3/21/2018  Narrative: CT CHEST WITH IV CONTRAST INDICATION:   C34 90: Malignant neoplasm of unspecified part of unspecified bronchus or lung  79-year-old woman with non-small cell carcinoma of the right lung with mediastinal and hilar lymphadenopathy and metastases to the neck, status post chemoradiation therapy, completed 5 weeks ago  COMPARISON: CT the chest from September 26, 2017  PET/CT from November 2, 2017  TECHNIQUE: CT examination of the chest was performed  Axial, sagittal, and coronal 2D reformatted images were created from the source data and submitted for interpretation  Radiation dose length product (DLP) for this visit:  155 mGy-cm     This examination, like all CT scans performed in the [facility], was performed utilizing techniques to minimize radiation dose exposure, including the use of iterative reconstruction and automated exposure control  IV Contrast:  85 mL of iohexol (OMNIPAQUE) FINDINGS: LUNGS:  Emphysema  Right hilar mass no longer visible  Right middle lobe collapse largely resolved with mild residual volume loss and patchy opacification in the lateral segment, much of which may be chronic  Irregular thickening of the minor fissure  Stable chronic scarring in the apices of both upper lobes  No new pulmonary nodule, mass or consolidation  PLEURA:  Mild right posterior pleural thickening  No pleural effusions  HEART/GREAT VESSELS:  Unremarkable for patient's age  MEDIASTINUM AND JENNIFER: Resolution of mediastinal and right hilar lymphadenopathy since the earlier exams  No new lymphadenopathy or mass  Mild mural thickening in much of the esophagus, suggesting esophagitis  Mild residual narrowing of the right mainstem bronchus without evidence of associated mass  Trachea and mainstem bronchi otherwise normal  CHEST WALL AND LOWER NECK:   Post right mastectomy and lymph node dissection  No lymphadenopathy or mass  Several hypoattenuating small nodules in the right thyroid lobe, the largest measuring 6 mm  VISUALIZED STRUCTURES IN THE UPPER ABDOMEN:  Unremarkable  OSSEOUS STRUCTURES:  No acute fracture or destructive osseous lesion  Impression: 1  Resolution of right hilar mass and mediastinal and right hilar lymphadenopathy since a CT from 9/26/2017  2   Partial reaeration of the right middle lobe  3   Mild residual narrowing of the right mainstem bronchus at its junction with the trachea, without evidence of mass  4   Emphysema   5   No new abnormality in the chest  Workstation performed: [id number]       Labs:   Lab Results   Component Value Date    WBC 3 61 (L) 02/01/2018    HGB 10 8 (L) 02/01/2018    HCT 32 5 (L) 02/01/2018     (H) 02/01/2018     02/01/2018     Lab Results   Component Value Date     02/01/2018    K 4 6 02/01/2018     02/01/2018    CO2 28 02/01/2018    ANIONGAP 8 02/01/2018    BUN 16 02/01/2018    CREATININE 0 67 02/01/2018    GLUCOSE 77 02/01/2018    GLUF 85 01/11/2018    CALCIUM 8 9 02/01/2018    AST 28 02/01/2018    ALT 21 02/01/2018    ALKPHOS 80 02/01/2018    PROT 7 5 02/01/2018    BILITOT 0 55 02/01/2018    EGFR 89 02/01/2018       No results found for: IRON, TIBC, FERRITIN    No results found for: [id number]      ROS:   Review of Systems   Constitutional: Negative  HENT: Negative  Eyes: Negative  Respiratory: Positive for cough and shortness of breath  Cardiovascular: Negative  Gastrointestinal: Negative  Endocrine: Negative  Genitourinary: Negative  Dyspareunia:  dry cough in the morning  Musculoskeletal: Negative  Skin: Negative  Neurological: Negative  Hematological: Negative  All other systems reviewed and are negative  Current Medications: Reviewed  Allergies: Reviewed  PMH/FH/SH:  Reviewed      Physical Exam:    Body surface area is 1 37 meters squared  Wt Readings from Last 3 Encounters:   03/23/18 39 kg (86 lb)   03/19/18 38 6 kg (85 lb)   03/13/18 39 kg (86 lb)        Temp Readings from Last 3 Encounters:   03/23/18 98 1 °F (36 7 °C) (Tympanic)   03/19/18 97 8 °F (36 6 °C)   03/13/18 98 2 °F (36 8 °C) (Temporal)        BP Readings from Last 3 Encounters:   03/23/18 102/72   03/19/18 134/82   03/13/18 120/62         Pulse Readings from Last 3 Encounters:   03/23/18 (!) 111   03/19/18 (!) 111   03/13/18 (!) 108        Physical Exam   Constitutional: She is oriented to person, place, and time  She appears well-developed and well-nourished  No distress  HENT:   Head: Normocephalic and atraumatic  Mouth/Throat: Oropharynx is clear and moist  No oropharyngeal exudate  Eyes: Conjunctivae and EOM are normal  Pupils are equal, round, and reactive to light  Neck: Normal range of motion  Neck supple  No tracheal deviation present   No thyromegaly present  Cardiovascular: Normal rate and regular rhythm  Exam reveals no gallop and no friction rub  No murmur heard  Pulmonary/Chest: Effort normal  She has no wheezes  She has no rales  She exhibits no tenderness  Distant breath sounds bilaterally   Abdominal: Soft  Bowel sounds are normal  She exhibits no distension and no mass  There is no tenderness  There is no rebound and no guarding  Musculoskeletal: Normal range of motion  Lymphadenopathy:     She has no cervical adenopathy  Neurological: She is alert and oriented to person, place, and time  Skin: Skin is warm and dry  No rash noted  She is not diaphoretic  No erythema  No pallor  Psychiatric: She has a normal mood and affect  Her behavior is normal  Judgment and thought content normal    Vitals reviewed  Goals and Barriers:  Current Goal: Minimize effects of disease  Barriers: None  Patient's Capacity to Self Care:  Patient is able to self care      Code Status: [unfilled]

## 2018-03-23 NOTE — PROGRESS NOTES
Hematology Outpatient Follow - Up Note  Gume Vargas 81 y o  female MRN: @ Encounter: [id number]        Date:  3/23/2018        Assessment/ Plan:    Stage IV adenocarcinoma of the lung, moderately differentiated involving the right hilar, mediastinal, precarinal, subcarinal lymphadenopathy with tumor extension into the right mainstem bronchus, bronchus intermedius, right middle lobe and right neck area proven by biopsy, negative molecular test for EGFR mutation, ALK gene rearrangement, Ross 1 mutation, PDA expression of 0, she finished concurrent radiation therapy with weekly Taxol /carboplatin to the chest area as well as to the right neck area, most recent CT scan of the neck and the chest showed no evidence of recurrence or new lesions and complete resolution of the previous mass is   We had a long discussion and we decided to continue watchful observation and repeat CT/PET scan in 3 months       History of present illness:81year-old  female seen initially 11/6/2017 regarding moderately differentiated adenocarcinoma of the lung positive for TTF 1 and napsin and negative for P 40 diagnosed via right mainstem bronchus biopsy on October 17, 2017 by Dr Joselyn Sanches  scan chest 9/26/2017 ordered by Dr Joselyn Sanches performed as lung cancer screening study showed subcarinal adenopathy measuring 1 7 cm  Precarinal adenopathy measures 1 9 cm  The right lower lobe endobronchial soft tissue appears mass like  Lung-RADS 4A, suspicious  PET scan 11/2/2017 showed confluent of hypermetabolic right hilar and mediastinal, precarinal, subcarinal lymphadenopathy compatible with malignancy, SUV measures 17 4  right hilar and precarinal adenopathy measures approximately 4 2 x 2 2 cm  Subcarinal adenopathy measures approximately 2 5 x 1 5 cm  Right hilar devan mass image 100 measures 2 7 x 1 47 m   Tumor extends into the right mainstem bronchus, bronchus intermedius, right middle lobe and right lower lobe bronchi, as seen previously  There is also a small right thoracic paraspinal node with mild FDG activity, measuring 1 3 x 0 4 cm, SUV 2 5  is an irregular right upper lung density along the major fissure measuring 1 6 x 1 3 cm, with mild FDG activity, SUV 1 1  This may be inflammatory, versus malignancy  There is an ill-defined hypermetabolic nodule in the right parotid gland region, measuring approximately 2 2 x 1 7 cm SUV measures 11 3  This may represent a metastasis versus primary parotid gland lesions such as a Warthin's tumor or amorphic adenoma  mm left lower lung nodular density too small for PET characterization   Subtle hypermetabolic focus anterior to the right diaphragmatic larisa, SUV 2 6   She has chronic COPD secondary to active smoking, progressive dyspnea on exertion  She has had a chronic cough for many years   used to smoke 2 packs of cigarettes daily for many many years, she does not drink alcohol   both parents still living has 1 sister in [place]  She elected to proceed with FNA right glucose avid neck mass  Pathology: Conclusive evidence of malignancy  small cell carcinoma consistent with metastasis from the patient's know adenocarcinoma of the lung  On immunostaining, the tumor cells are positive for TTF-1 and Napsin A but negative for p40 helping support the above diagnosis  RT to this area with anticipation to start 12/26/2017      Interval History:  She is tolerating treatment well              Cancer Staging: IV        Molecular Testing: Frankie on [id number] 10/17/2017  ALK negative; Ros1 negative; PDL-1 0%; EGFR:  No mutation Detected; BRAF:  No mutation detected           Current Hematologic/ Oncologic Treatment:  Taxol 40 milligrams/meter squared with carboplatin AUC of 2 week 1 11/17/2017 concurrent with RT to mediastinum completed 1/18/2018  RT to cervical/ parotid region 12/26/2017  Last RT treatment to the neck region was 2/8/2018    HPI:      Interval History:        Previous Treatment: Test Results:    Imaging: Ct Soft Tissue Neck W Contrast    Result Date: 3/16/2018  Narrative: CT NECK WITH CONTRAST INDICATION:   C34 90: Malignant neoplasm of unspecified part of unspecified bronchus or lung  Adenocarcinoma, right mainstem COMPARISON:  PET CT 11/2/2017 TECHNIQUE:  Contiguous 2 5 mm images were obtained through the neck after administration of intravenous contrast  Radiation dose length product (DLP) for this visit:  204 mGy-cm   This examination, like all CT scans performed in the [facility], was performed utilizing techniques to minimize radiation dose exposure, including the use of iterative reconstruction and automated exposure control  IV Contrast:  85 mL of iohexol (OMNIPAQUE) IMAGE QUALITY:  Diagnostic  FINDINGS: VISUALIZED BRAIN PARENCHYMA:  No acute intracranial pathology of the visualized brain parenchyma  VISUALIZED ORBITS AND PARANASAL SINUSES:  Normal  NASAL CAVITY AND NASOPHARYNX:  Normal  SUPRAHYOID NECK:  Normal oral cavity, tongue base, tonsillar fossa and epiglottis  Parapharyngeal, , sublingual and submandibular spaces are normal   There is an ill-defined distortion of soft tissues at the C2 2 level, to the right, juxtaposed between the vertebral column and the tail of the parotid and medial aspect of the sternocleidomastoid muscle  A 16 mm focus is present which appears smaller than the hypermetabolic node noted on CT 11/3/2017  This presumably represents residual of treated the devan metastasis  INFRAHYOID NECK:  Aryepiglottic folds and piriform sinuses are normal   Normal glottis and subglottic airway  THYROID GLAND:  Numerous small stable hypodense nodules in both lobes of the thyroid  PAROTID AND SUBMANDIBULAR GLANDS:  Right parotid gland enhances more than its counterpart likely, related to radiation  LYMPH NODES:  Save for ill-defined soft tissue fullness at the C2 level on the right, no indication of pathologic adenopathy   VASCULAR STRUCTURES:  Normal enhancement of the cervical vasculature  THORACIC INLET:  Lung apices and upper mediastinum are unremarkable  BONY STRUCTURES: No acute fracture or destructive osseous lesion  Impression: Ill-defined 16 mm soft tissue focus to the right at the C2 level, corresponding to hypermetabolic node present on PET/CT 11/2/2017 is slightly smaller  Treatment-related changes, no additional devan disease is evident  Bilateral pleural-parenchymal disease at the apices  Workstation performed: [id number]     Ct Chest W Contrast    Result Date: 3/21/2018  Narrative: CT CHEST WITH IV CONTRAST INDICATION:   C34 90: Malignant neoplasm of unspecified part of unspecified bronchus or lung  53-year-old woman with non-small cell carcinoma of the right lung with mediastinal and hilar lymphadenopathy and metastases to the neck, status post chemoradiation therapy, completed 5 weeks ago  COMPARISON: CT the chest from September 26, 2017  PET/CT from November 2, 2017  TECHNIQUE: CT examination of the chest was performed  Axial, sagittal, and coronal 2D reformatted images were created from the source data and submitted for interpretation  Radiation dose length product (DLP) for this visit:  155 mGy-cm   This examination, like all CT scans performed in the [facility], was performed utilizing techniques to minimize radiation dose exposure, including the use of iterative reconstruction and automated exposure control  IV Contrast:  85 mL of iohexol (OMNIPAQUE) FINDINGS: LUNGS:  Emphysema  Right hilar mass no longer visible  Right middle lobe collapse largely resolved with mild residual volume loss and patchy opacification in the lateral segment, much of which may be chronic  Irregular thickening of the minor fissure  Stable chronic scarring in the apices of both upper lobes  No new pulmonary nodule, mass or consolidation  PLEURA:  Mild right posterior pleural thickening  No pleural effusions  HEART/GREAT VESSELS:  Unremarkable for patient's age  MEDIASTINUM AND JENNIFER: Resolution of mediastinal and right hilar lymphadenopathy since the earlier exams  No new lymphadenopathy or mass  Mild mural thickening in much of the esophagus, suggesting esophagitis  Mild residual narrowing of the right mainstem bronchus without evidence of associated mass  Trachea and mainstem bronchi otherwise normal  CHEST WALL AND LOWER NECK:   Post right mastectomy and lymph node dissection  No lymphadenopathy or mass  Several hypoattenuating small nodules in the right thyroid lobe, the largest measuring 6 mm  VISUALIZED STRUCTURES IN THE UPPER ABDOMEN:  Unremarkable  OSSEOUS STRUCTURES:  No acute fracture or destructive osseous lesion  Impression: 1  Resolution of right hilar mass and mediastinal and right hilar lymphadenopathy since a CT from 9/26/2017  2   Partial reaeration of the right middle lobe  3   Mild residual narrowing of the right mainstem bronchus at its junction with the trachea, without evidence of mass  4   Emphysema  5   No new abnormality in the chest  Workstation performed: [id number]       Labs:   Lab Results   Component Value Date    WBC 3 61 (L) 02/01/2018    HGB 10 8 (L) 02/01/2018    HCT 32 5 (L) 02/01/2018     (H) 02/01/2018     02/01/2018     Lab Results   Component Value Date     02/01/2018    K 4 6 02/01/2018     02/01/2018    CO2 28 02/01/2018    ANIONGAP 8 02/01/2018    BUN 16 02/01/2018    CREATININE 0 67 02/01/2018    GLUCOSE 77 02/01/2018    GLUF 85 01/11/2018    CALCIUM 8 9 02/01/2018    AST 28 02/01/2018    ALT 21 02/01/2018    ALKPHOS 80 02/01/2018    PROT 7 5 02/01/2018    BILITOT 0 55 02/01/2018    EGFR 89 02/01/2018       No results found for: IRON, TIBC, FERRITIN    No results found for: [id number]      ROS:   Review of Systems   Constitutional: Negative  HENT: Negative  Eyes: Negative  Respiratory: Positive for cough and shortness of breath  Cardiovascular: Negative  Gastrointestinal: Negative  Endocrine: Negative  Genitourinary: Negative  Dyspareunia:  dry cough in the morning  Musculoskeletal: Negative  Skin: Negative  Neurological: Negative  Hematological: Negative  All other systems reviewed and are negative  Current Medications: Reviewed  Allergies: Reviewed  PMH/FH/SH:  Reviewed      Physical Exam:    Body surface area is 1 37 meters squared  Wt Readings from Last 3 Encounters:   03/23/18 39 kg (86 lb)   03/19/18 38 6 kg (85 lb)   03/13/18 39 kg (86 lb)        Temp Readings from Last 3 Encounters:   03/23/18 98 1 °F (36 7 °C) (Tympanic)   03/19/18 97 8 °F (36 6 °C)   03/13/18 98 2 °F (36 8 °C) (Temporal)        BP Readings from Last 3 Encounters:   03/23/18 102/72   03/19/18 134/82   03/13/18 120/62         Pulse Readings from Last 3 Encounters:   03/23/18 (!) 111   03/19/18 (!) 111   03/13/18 (!) 108        Physical Exam   Constitutional: She is oriented to person, place, and time  She appears well-developed and well-nourished  No distress  HENT:   Head: Normocephalic and atraumatic  Mouth/Throat: Oropharynx is clear and moist  No oropharyngeal exudate  Eyes: Conjunctivae and EOM are normal  Pupils are equal, round, and reactive to light  Neck: Normal range of motion  Neck supple  No tracheal deviation present  No thyromegaly present  Cardiovascular: Normal rate and regular rhythm  Exam reveals no gallop and no friction rub  No murmur heard  Pulmonary/Chest: Effort normal  She has no wheezes  She has no rales  She exhibits no tenderness  Distant breath sounds bilaterally   Abdominal: Soft  Bowel sounds are normal  She exhibits no distension and no mass  There is no tenderness  There is no rebound and no guarding  Musculoskeletal: Normal range of motion  Lymphadenopathy:     She has no cervical adenopathy  Neurological: She is alert and oriented to person, place, and time     Skin: Skin is warm and dry  No rash noted  She is not diaphoretic  No erythema  No pallor  Psychiatric: She has a normal mood and affect  Her behavior is normal  Judgment and thought content normal    Vitals reviewed  Goals and Barriers:  Current Goal: Minimize effects of disease  Barriers: None  Patient's Capacity to Self Care:  Patient is able to self care      Code Status: @[facility]@

## 2018-04-18 ENCOUNTER — DOCUMENTATION (OUTPATIENT)
Dept: PULMONOLOGY | Facility: CLINIC | Age: 73
End: 2018-04-18

## 2018-04-18 NOTE — PROGRESS NOTES
Per Ruthy Shabazz all documentation has been done and sent to [facility]

## 2018-05-17 DIAGNOSIS — J44.9 COPD (CHRONIC OBSTRUCTIVE PULMONARY DISEASE) (HCC): Primary | ICD-10-CM

## 2018-05-17 RX ORDER — ARFORMOTEROL TARTRATE 15 UG/2ML
15 SOLUTION RESPIRATORY (INHALATION)
Qty: 120 ML | Refills: 5 | Status: SHIPPED | OUTPATIENT
Start: 2018-05-17 | End: 2018-06-16

## 2018-06-10 DIAGNOSIS — F41.9 ANXIETY: Primary | ICD-10-CM

## 2018-06-11 RX ORDER — PAROXETINE 10 MG/1
TABLET, FILM COATED ORAL
Qty: 30 TABLET | Refills: 5 | Status: SHIPPED | OUTPATIENT
Start: 2018-06-11 | End: 2019-01-18 | Stop reason: ALTCHOICE

## 2018-06-18 ENCOUNTER — APPOINTMENT (OUTPATIENT)
Dept: LAB | Age: 73
End: 2018-06-18
Payer: MEDICARE

## 2018-06-18 ENCOUNTER — HOSPITAL ENCOUNTER (OUTPATIENT)
Dept: RADIOLOGY | Age: 73
Discharge: HOME | End: 2018-06-18
Payer: MEDICARE

## 2018-06-18 DIAGNOSIS — C34.11 MALIGNANT NEOPLASM OF UPPER LOBE OF RIGHT LUNG (HCC): ICD-10-CM

## 2018-06-18 LAB
ALBUMIN SERPL BCP-MCNC: 3.9 G/DL (ref 3.5–5)
ALP SERPL-CCNC: 97 U/L (ref 46–116)
ALT SERPL W P-5'-P-CCNC: 25 U/L (ref 12–78)
ANION GAP SERPL CALCULATED.3IONS-SCNC: 4 MMOL/L (ref 4–13)
AST SERPL W P-5'-P-CCNC: 41 U/L (ref 5–45)
BASOPHILS # BLD AUTO: 0.04 THOUSANDS/ΜL (ref 0–0.1)
BASOPHILS NFR BLD AUTO: 1 % (ref 0–1)
BILIRUB SERPL-MCNC: 0.64 MG/DL (ref 0.2–1)
BUN SERPL-MCNC: 16 MG/DL (ref 5–25)
CALCIUM SERPL-MCNC: 8.9 MG/DL (ref 8.3–10.1)
CHLORIDE SERPL-SCNC: 100 MMOL/L (ref 100–108)
CO2 SERPL-SCNC: 31 MMOL/L (ref 21–32)
CREAT SERPL-MCNC: 0.71 MG/DL (ref 0.6–1.3)
EOSINOPHIL # BLD AUTO: 0.06 THOUSAND/ΜL (ref 0–0.61)
EOSINOPHIL NFR BLD AUTO: 1 % (ref 0–6)
ERYTHROCYTE [DISTWIDTH] IN BLOOD BY AUTOMATED COUNT: 13.2 % (ref 11.6–15.1)
GFR SERPL CREATININE-BSD FRML MDRD: 86 ML/MIN/1.73SQ M
GLUCOSE P FAST SERPL-MCNC: 90 MG/DL (ref 65–99)
GLUCOSE SERPL-MCNC: 92 MG/DL (ref 65–140)
HCT VFR BLD AUTO: 40.7 % (ref 34.8–46.1)
HGB BLD-MCNC: 13 G/DL (ref 11.5–15.4)
IMM GRANULOCYTES # BLD AUTO: 0.01 THOUSAND/UL (ref 0–0.2)
IMM GRANULOCYTES NFR BLD AUTO: 0 % (ref 0–2)
LYMPHOCYTES # BLD AUTO: 0.88 THOUSANDS/ΜL (ref 0.6–4.47)
LYMPHOCYTES NFR BLD AUTO: 16 % (ref 14–44)
MCH RBC QN AUTO: 32.6 PG (ref 26.8–34.3)
MCHC RBC AUTO-ENTMCNC: 31.9 G/DL (ref 31.4–37.4)
MCV RBC AUTO: 102 FL (ref 82–98)
MONOCYTES # BLD AUTO: 0.6 THOUSAND/ΜL (ref 0.17–1.22)
MONOCYTES NFR BLD AUTO: 11 % (ref 4–12)
NEUTROPHILS # BLD AUTO: 4.06 THOUSANDS/ΜL (ref 1.85–7.62)
NEUTS SEG NFR BLD AUTO: 71 % (ref 43–75)
NRBC BLD AUTO-RTO: 0 /100 WBCS
PLATELET # BLD AUTO: 230 THOUSANDS/UL (ref 149–390)
PMV BLD AUTO: 10 FL (ref 8.9–12.7)
POTASSIUM SERPL-SCNC: 5.7 MMOL/L (ref 3.5–5.3)
PROT SERPL-MCNC: 7.7 G/DL (ref 6.4–8.2)
RBC # BLD AUTO: 3.99 MILLION/UL (ref 3.81–5.12)
SODIUM SERPL-SCNC: 135 MMOL/L (ref 136–145)
WBC # BLD AUTO: 5.65 THOUSAND/UL (ref 4.31–10.16)

## 2018-06-18 PROCEDURE — 78815 PET IMAGE W/CT SKULL-THIGH: CPT

## 2018-06-18 PROCEDURE — 36415 COLL VENOUS BLD VENIPUNCTURE: CPT

## 2018-06-18 PROCEDURE — 85025 COMPLETE CBC W/AUTO DIFF WBC: CPT

## 2018-06-18 PROCEDURE — A9552 F18 FDG: HCPCS

## 2018-06-18 PROCEDURE — 80053 COMPREHEN METABOLIC PANEL: CPT

## 2018-06-18 PROCEDURE — 82948 REAGENT STRIP/BLOOD GLUCOSE: CPT

## 2018-06-18 RX ADMIN — IOHEXOL 5 ML: 240 INJECTION, SOLUTION INTRATHECAL; INTRAVASCULAR; INTRAVENOUS; ORAL at 08:30

## 2018-06-26 ENCOUNTER — OFFICE VISIT (OUTPATIENT)
Dept: HEMATOLOGY ONCOLOGY | Facility: CLINIC | Age: 73
End: 2018-06-26
Payer: MEDICARE

## 2018-06-26 VITALS
BODY MASS INDEX: 14.31 KG/M2 | TEMPERATURE: 95.6 F | WEIGHT: 83.8 LBS | HEIGHT: 64 IN | SYSTOLIC BLOOD PRESSURE: 100 MMHG | DIASTOLIC BLOOD PRESSURE: 70 MMHG

## 2018-06-26 DIAGNOSIS — C34.90 MALIGNANT NEOPLASM OF LUNG, UNSPECIFIED LATERALITY, UNSPECIFIED PART OF LUNG (HCC): Primary | ICD-10-CM

## 2018-06-26 DIAGNOSIS — C34.11 MALIGNANT NEOPLASM OF UPPER LOBE OF RIGHT LUNG (HCC): ICD-10-CM

## 2018-06-26 PROCEDURE — 99215 OFFICE O/P EST HI 40 MIN: CPT | Performed by: PHYSICIAN ASSISTANT

## 2018-06-26 NOTE — PROGRESS NOTES
Hematology Outpatient Follow - Up Note  Lukasz Brooks 72 y o  female MRN: @ Encounter: [id number]        Date:  6/26/2018    Assessment/ Plan:    1  Stage IV adenocarcinoma of the lung dx 10/2017, moderately differentiated involving the right hilar, mediastinal, precarinal, subcarinal lymphadenopathy with tumor extension into the right mainstem bronchus, bronchus intermedius, right middle lobe and right neck area proven by biopsy, negative molecular test for EGFR mutation, ALK gene rearrangement, Ros 1 mutation, PDL expression of 0  Concurrent chemo/RT completed 2/2/2018  On observation currently  Pet/CT 6/18/2018: Findings compatible with response to therapy  Reviewed with patient  Copy given  F/U in 3 months with CT neck and chest along with lab work  HPI:  82-year-old  female seen initially 11/6/2017 regarding moderately differentiated adenocarcinoma of the lung positive for TTF 1 and napsin and negative for P 40 diagnosed via right mainstem bronchus biopsy on October 17, 2017 by Dr Angelica Mai  scan chest 9/26/2017 ordered by Dr Angelica Mai performed as lung cancer screening study showed subcarinal adenopathy measuring 1 7 cm  Precarinal adenopathy measures 1 9 cm  The right lower lobe endobronchial soft tissue appears mass like  Lung-RADS 4A, suspicious  PET scan 11/2/2017 showed confluent of hypermetabolic right hilar and mediastinal, precarinal, subcarinal lymphadenopathy compatible with malignancy, SUV measures 17 4  right hilar and precarinal adenopathy measures approximately 4 2 x 2 2 cm  Subcarinal adenopathy measures approximately 2 5 x 1 5 cm  Right hilar devan mass image 100 measures 2 7 x 1 47 m  Tumor extends into the right mainstem bronchus, bronchus intermedius, right middle lobe and right lower lobe bronchi, as seen previously   There is also a small right thoracic paraspinal node with mild FDG activity, measuring 1 3 x 0 4 cm, SUV 2 5  is an irregular right upper lung density along the major fissure measuring 1 6 x 1 3 cm, with mild FDG activity, SUV 1 1  This may be inflammatory, versus malignancy  There is an ill-defined hypermetabolic nodule in the right parotid gland region, measuring approximately 2 2 x 1 7 cm SUV measures 11 3  This may represent a metastasis versus primary parotid gland lesions such as a Warthin's tumor or amorphic adenoma  mm left lower lung nodular density too small for PET characterization   Subtle hypermetabolic focus anterior to the right diaphragmatic larisa, SUV 2 6   She has chronic COPD secondary to active smoking, progressive dyspnea on exertion  She has had a chronic cough for many years   Used to smoke 2 packs of cigarettes daily for many many years, she does not drink alcohol   both parents still living has 1 sister in [place]  She elected to proceed with FNA right glucose avid neck mass  Pathology: Conclusive evidence of malignancy  small cell carcinoma consistent with metastasis from the patient's know adenocarcinoma of the lung  On immunostaining, the tumor cells are positive for TTF-1 and Napsin A but negative for p40 helping support the above diagnosis   RT to this area  She finished concurrent radiation therapy with weekly Taxol /carboplatin to the chest area as well as to the right neck area  CT scan of the neck and the chest 3/15/2018 showed no evidence of recurrence or new lesions and complete resolution of the previous mass is   We had a long discussion and we decided to continue watchful observation and repeat CT/PET scan in 3 months      Interval History:    Pet/CT 6/18/2018: Findings compatible with response to therapy  No residual FDG uptake in the mediastinal or right perihilar regions suspicious for hypermetabolic malignancy compatible with response to therapy  3   Previously noted FDG avid nodule in the right upper neck/periparotid lesion is no longer seen compatible with response to therapy    4   Essentially stable mild focus of FDG uptake at the level of the right diaphragmatic larisa of uncertain significance  5   A few scattered subcentimeter nodular densities bilaterally, not FDG avid but may be too small to characterize  Recommend continued follow-up  Still has dry mouth  Makes eating difficult at times as things can 'taste like cardboard '  Trying to put on weight  Breathing stable  No pain  No cardiac, GI or  complaints  Previous Treatment:      Malignant neoplasm of right lung ([facility] [address] )    9/26/2017 Initial Diagnosis     Malignant neoplasm of right lung ([facility] [address] )         10/17/2017 Biopsy     Bronchoscopy was performed  Right main stem endobronchial lesion and collapse of the bronchial intermedius consistent with lung cancer  11/17/2017 - 2/2/2018 Chemotherapy     Concurrent chemo and radiation: Taxol/ Carboplatin          12/1/2017 - 2/9/2018 Radiation     Treatment:  Course: C1    Plan ID Energy Fractions Dose per Fraction (cGy) Total Dose Delivered (cGy) Elapsed Days   R LUNG_MED 6X 33 / 33 180 5,940 48   R NECK 6X 30 / 30 200 6,000 43      Treatment dates:  C1: 12/1/2017 - 2/9/2018 12/7/2017 Biopsy     Biopsy of right neck mass  Conclusive evidence of malignancy  Non-small cell carcinoma consistent with metastasis from the patient's know adenocarcinoma of the lung                 Test Results:      Labs:   Lab Results   Component Value Date    HGB 13 0 06/18/2018    HCT 40 7 06/18/2018     (H) 06/18/2018     06/18/2018    WBC 5 65 06/18/2018    NRBC 0 06/18/2018     Lab Results   Component Value Date     (L) 06/18/2018    K 5 7 (H) 06/18/2018     06/18/2018    CO2 31 06/18/2018    ANIONGAP 4 06/18/2018    BUN 16 06/18/2018    CREATININE 0 71 06/18/2018    GLUCOSE 77 02/01/2018    GLUF 90 06/18/2018    CALCIUM 8 9 06/18/2018    AST 41 06/18/2018    ALT 25 06/18/2018    ALKPHOS 97 06/18/2018    PROT 7 7 06/18/2018    BILITOT 0 64 06/18/2018    EGFR 86 06/18/2018           No results found for: IRON, TIBC, FERRITIN    No results found for: [id number]      Imaging: Nm Pet Ct Skull Base To Mid Thigh    Result Date: 6/18/2018  Narrative: PET/CT SCAN INDICATION: History of lung cancer  Restaging post concurrent chemoradiation  Additional history of right breast cancer diagnosed in 1996  C34 11: Malignant neoplasm of upper lobe, right bronchus or lung MODIFIER:  PS COMPARISON: CT neck and chest of 3/15/2018, PET/CT of 11/2/2017 CELL TYPE:  moderately differentiated adenocarcinoma of lung origin (bx R mainstem bronchus 10/17/17) TECHNIQUE:   8 3 mCi F-18-FDG administered IV  Multiplanar attenuation corrected and non attenuation corrected PET images are available for interpretation, and contiguous, low dose, axial CT sections were obtained from the skull base through the femurs   Oral contrast material (7 5 cc Omnipaque-240 in 300 cc water) was administered  Intravenous contrast material was not utilized  This examination, like all CT scans performed in the [facility], was performed utilizing techniques to minimize radiation dose exposure, including the use of iterative reconstruction and automated exposure control  Fasting serum glucose: 92 mg/dl FINDINGS: VISUALIZED BRAIN:   No acute abnormalities are seen  HEAD/NECK:   The previously noted FDG avid nodule in the right upper neck/periparotid region is no longer seen  There is now asymmetric linear FDG uptake in the right sternocleidomastoid muscle, question related to post treatment changes or physiologic activity  No FDG avid cervical chain lymph nodes  CT images: Coarse calcifications noted in both lobes of the thyroid gland  CHEST:   No residual FDG avid mediastinal or right hilar lymphadenopathy  Minimal FDG uptake associated with an irregular right upper lobe density along the right major fissure measuring 1 3 x 1 2 cm, previously 1 6 x 1 3 cm, now SUV max 2 7, previously 1 1   Scattered pleural parenchymal changes bilaterally with mild FDG uptake, stable  CT images: Multiple surgical clips noted in the right axilla  Extensive emphysematous changes noted of the lung fields  Stable 3 mm subpleural nodular density in the left lower lobe, not FDG avid  Right middle lobe atelectasis has largely cleared  Mild residual scattered atelectasis  There is a 4 mm nodule within the previously atelectatic right middle lobe, image 111 series 3, ABDOMEN:   Persistent mild focus of FDG uptake close to the right diaphragmatic larisa of SUV max of 2 3, previously 2 6  There is no obvious lymphadenopathy here on limited CT images  CT images: Abdominal aorta is mildly ectatic  PELVIS: No FDG avid soft tissue lesions are seen  CT images: Unremarkable  OSSEOUS STRUCTURES: No FDG avid lesions are seen  CT images: Minimal anterolisthesis is again noted of L5 on S1 with slight retrolisthesis of L1 on L2, stable  Impression: 1  Findings compatible with response to therapy  2  No residual FDG uptake in the mediastinal or right perihilar regions suspicious for hypermetabolic malignancy compatible with response to therapy  3   Previously noted FDG avid nodule in the right upper neck/periparotid lesion is no longer seen compatible with response to therapy  4   Essentially stable mild focus of FDG uptake at the level of the right diaphragmatic larisa of uncertain significance  5   A few scattered subcentimeter nodular densities bilaterally, not FDG avid but may be too small to characterize  Recommend continued follow-up  Workstation performed: [id number]         ROS:   As mentioned in HPI & Interval History otherwise 14 point ROS negative  Allergies: No Known Allergies  Current Medications: Reviewed  PMH/FH/SH:  Reviewed      Physical Exam:    There is no height or weight on file to calculate BSA      Wt Readings from Last 3 Encounters:   03/23/18 39 kg (86 lb)   03/19/18 38 6 kg (85 lb)   03/13/18 39 kg (86 lb) Temp Readings from Last 3 Encounters:   03/23/18 98 1 °F (36 7 °C) (Tympanic)   03/19/18 97 8 °F (36 6 °C)   03/13/18 98 2 °F (36 8 °C) (Temporal)        BP Readings from Last 3 Encounters:   03/23/18 102/72   03/19/18 134/82   03/13/18 120/62           Constitutional   General appearance: No acute distress, well appearing and well nourished   Thin  Eyes   Conjunctiva and lids: No swelling, erythema or discharge     Pupils and irises: Equal, round     Ears, Nose, Mouth, and Throat   External inspection of ears and nose: Normal     Nasal mucosa, septum, and turbinates: Normal without edema or erythema     Oropharynx: Normal with no erythema, edema, exudate or lesions     Pulmonary   Respiratory effort: No increased work of breathing or signs of respiratory distress     Auscultation of lungs: Clear to auscultation  Diffusely decreased  On O2 continuously    Cardiovascular   Palpation of heart: Normal PMI, no thrills     Auscultation of heart: Normal rate and rhythm, normal S1 and S2, without murmurs      Abdomen   Abdomen: Non-tender, no masses     Liver and spleen: No hepatomegaly or splenomegaly     Lymphatic   Palpation of lymph nodes in neck: No lymphadenopathy     Musculoskeletal   Gait and station: Normal     Digits and nails: Normal without clubbing or cyanosis     Inspection/palpation of joints, bones, and muscles: Normal     Skin   Skin and subcutaneous tissue: Normal without rashes or lesions     Neurologic   Cranial nerves: Cranial nerves 2-12 grossly intact     Sensation: No sensory loss     Psychiatric   Orientation to person, place, and time: Normal     Mood and affect: Normal         Goals and Barriers:  Current Goal:  Remain free from falls  Barriers: None  Patient's Capacity to Self Care:  Patient is able to self care

## 2018-07-06 ENCOUNTER — TELEPHONE (OUTPATIENT)
Dept: PULMONOLOGY | Facility: CLINIC | Age: 73
End: 2018-07-06

## 2018-07-06 NOTE — TELEPHONE ENCOUNTER
Called left a message for Veronica DIAZ at [phone] ext [phone] to inform her that that last of the documentation has been faxed N

## 2018-08-07 ENCOUNTER — DOCUMENTATION (OUTPATIENT)
Dept: PULMONOLOGY | Facility: CLINIC | Age: 73
End: 2018-08-07

## 2018-08-07 NOTE — PROGRESS NOTES
Received re-certification notification from [facility]   Patient has an up coming appointment  And will get all that is needed then

## 2018-08-10 ENCOUNTER — TELEPHONE (OUTPATIENT)
Dept: INTERNAL MEDICINE CLINIC | Facility: CLINIC | Age: 73
End: 2018-08-10

## 2018-08-10 NOTE — TELEPHONE ENCOUNTER
Pt will be coming in for AWV on 8/29, she had some labs done recently, but for other physicians  Please add any other required BW and I will let her know at [phone]

## 2018-08-13 ENCOUNTER — TELEPHONE (OUTPATIENT)
Dept: PULMONOLOGY | Facility: CLINIC | Age: 73
End: 2018-08-13

## 2018-08-13 ENCOUNTER — OFFICE VISIT (OUTPATIENT)
Dept: PULMONOLOGY | Facility: CLINIC | Age: 73
End: 2018-08-13
Payer: MEDICARE

## 2018-08-13 VITALS
BODY MASS INDEX: 13.66 KG/M2 | WEIGHT: 82 LBS | HEART RATE: 107 BPM | OXYGEN SATURATION: 98 % | SYSTOLIC BLOOD PRESSURE: 108 MMHG | HEIGHT: 65 IN | DIASTOLIC BLOOD PRESSURE: 72 MMHG | TEMPERATURE: 98.1 F

## 2018-08-13 DIAGNOSIS — J44.9 COPD (CHRONIC OBSTRUCTIVE PULMONARY DISEASE) (HCC): Primary | ICD-10-CM

## 2018-08-13 DIAGNOSIS — R63.4 ABNORMAL WEIGHT LOSS: ICD-10-CM

## 2018-08-13 DIAGNOSIS — J96.11 CHRONIC HYPOXEMIC RESPIRATORY FAILURE (HCC): ICD-10-CM

## 2018-08-13 DIAGNOSIS — C34.90 LUNG CANCER (HCC): ICD-10-CM

## 2018-08-13 DIAGNOSIS — R30.0 DYSURIA: ICD-10-CM

## 2018-08-13 DIAGNOSIS — E78.01 FAMILIAL HYPERCHOLESTEROLEMIA: Primary | ICD-10-CM

## 2018-08-13 PROCEDURE — 99214 OFFICE O/P EST MOD 30 MIN: CPT | Performed by: INTERNAL MEDICINE

## 2018-08-13 RX ORDER — ARFORMOTEROL TARTRATE 15 UG/2ML
15 SOLUTION RESPIRATORY (INHALATION) 2 TIMES DAILY
COMMUNITY
End: 2018-10-02 | Stop reason: SDUPTHER

## 2018-08-13 NOTE — TELEPHONE ENCOUNTER
Faxed dme to [address] for patient's re certification for her oxygen  Received a another re certification form request from [address]   Faxed again to [phone] polo Stein

## 2018-08-27 ENCOUNTER — APPOINTMENT (OUTPATIENT)
Dept: LAB | Age: 73
End: 2018-08-27
Payer: MEDICARE

## 2018-08-27 DIAGNOSIS — R63.4 ABNORMAL WEIGHT LOSS: ICD-10-CM

## 2018-08-27 DIAGNOSIS — R30.0 DYSURIA: ICD-10-CM

## 2018-08-27 DIAGNOSIS — E78.01 FAMILIAL HYPERCHOLESTEROLEMIA: ICD-10-CM

## 2018-08-27 LAB
BACTERIA UR QL AUTO: ABNORMAL /HPF
BILIRUB UR QL STRIP: NEGATIVE
CHOLEST SERPL-MCNC: 227 MG/DL (ref 50–200)
CLARITY UR: CLEAR
COLOR UR: ABNORMAL
GLUCOSE UR STRIP-MCNC: NEGATIVE MG/DL
HDLC SERPL-MCNC: 81 MG/DL (ref 40–60)
HGB UR QL STRIP.AUTO: NEGATIVE
KETONES UR STRIP-MCNC: NEGATIVE MG/DL
LDLC SERPL CALC-MCNC: 129 MG/DL (ref 0–100)
LEUKOCYTE ESTERASE UR QL STRIP: NEGATIVE
MUCOUS THREADS UR QL AUTO: ABNORMAL
NITRITE UR QL STRIP: NEGATIVE
NON-SQ EPI CELLS URNS QL MICRO: ABNORMAL /HPF
NONHDLC SERPL-MCNC: 146 MG/DL
PH UR STRIP.AUTO: 6.5 [PH] (ref 4.5–8)
PROT UR STRIP-MCNC: ABNORMAL MG/DL
RBC #/AREA URNS AUTO: ABNORMAL /HPF
SP GR UR STRIP.AUTO: 1.02 (ref 1–1.03)
TRIGL SERPL-MCNC: 84 MG/DL
TSH SERPL DL<=0.05 MIU/L-ACNC: 1.12 UIU/ML (ref 0.36–3.74)
UROBILINOGEN UR QL STRIP.AUTO: 1 E.U./DL
WBC #/AREA URNS AUTO: ABNORMAL /HPF

## 2018-08-27 PROCEDURE — 36415 COLL VENOUS BLD VENIPUNCTURE: CPT

## 2018-08-27 PROCEDURE — 81001 URINALYSIS AUTO W/SCOPE: CPT

## 2018-08-27 PROCEDURE — 84443 ASSAY THYROID STIM HORMONE: CPT

## 2018-08-27 PROCEDURE — 80061 LIPID PANEL: CPT

## 2018-08-29 ENCOUNTER — OFFICE VISIT (OUTPATIENT)
Dept: INTERNAL MEDICINE CLINIC | Facility: CLINIC | Age: 73
End: 2018-08-29
Payer: MEDICARE

## 2018-08-29 VITALS
HEIGHT: 65 IN | OXYGEN SATURATION: 94 % | HEART RATE: 116 BPM | BODY MASS INDEX: 13.56 KG/M2 | WEIGHT: 81.4 LBS | DIASTOLIC BLOOD PRESSURE: 56 MMHG | TEMPERATURE: 98.6 F | SYSTOLIC BLOOD PRESSURE: 106 MMHG

## 2018-08-29 DIAGNOSIS — Z00.00 HEALTHCARE MAINTENANCE: Primary | ICD-10-CM

## 2018-08-29 DIAGNOSIS — E78.01 FAMILIAL HYPERCHOLESTEROLEMIA: ICD-10-CM

## 2018-08-29 PROCEDURE — G0439 PPPS, SUBSEQ VISIT: HCPCS | Performed by: INTERNAL MEDICINE

## 2018-08-29 NOTE — PROGRESS NOTES
Assessment and Plan:  Problem List Items Addressed This Visit     None        Health Maintenance Due   Topic Date Due    Depression Screening PHQ-9  1945    Fall Risk  06/03/2012    Urinary Incontinence Screening  06/03/2012    Pneumococcal PPSV23/PCV13 65+ Years / High and Highest Risk (2 of 2 - PPSV23) 12/09/2016         HPI:  Sofy Castellanos is a 72 y o  female here for her Subsequent Wellness Visit  Patient Active Problem List   Diagnosis    Malignant neoplasm of right lung [facility])     Past Medical History:   Diagnosis Date    Breast cancer ([facility] [address] ) 1996    COPD (chronic obstructive pulmonary disease) ([facility] [address] )     Requires supplemental oxygen     2 Liters at bedtime and as needed     Past Surgical History:   Procedure Laterality Date    APPENDECTOMY      COLONOSCOPY N/A 4/18/2016    Procedure: COLONOSCOPY;  Surgeon: Colby Hughes MD;  [facility]; Service:     MASTECTOMY, RADICAL Right 1996    [address] N/A 10/17/2017    Procedure: Keely Da Silva;  Surgeon: Garrett Wilks MD;  [facility]; Service: Pulmonary    TONSILLECTOMY       Family History   Problem Relation Age of Onset    Heart disease Mother         cardiac disorder     History   Smoking Status    Former Smoker    Packs/day: 0 50    Years: 55 00    Start date: 1962    Quit date: 6/5/2017   Smokeless Tobacco    Never Used     Comment: 21 cigs a day, stop 2 mth ago     History   Alcohol Use No      History   Drug Use No         Current Outpatient Prescriptions   Medication Sig Dispense Refill    albuterol (VENTOLIN HFA) 90 mcg/act inhaler Inhale      arformoterol (BROVANA) 15 mcg/2 mL nebulizer solution Take 15 mcg by nebulization 2 (two) times a day      budesonide (PULMICORT) 0 25 mg/2 mL nebulizer solution Take 0 25 mg by nebulization 2 (two) times a day      Cholecalciferol (VITAMIN D) 2000 UNITS tablet Take 2,000 Units by mouth daily        ipratropium (ATROVENT) 0 02 % nebulizer solution Take 0 5 mg by nebulization 4 (four) times a day      Multiple Vitamins-Minerals (MULTIVITAMIN WITH MINERALS) tablet Take 1 tablet by mouth daily   PARoxetine (PAXIL) 10 mg tablet TAKE 1/2 TABLET DAILY FOR 10 DAYS THEN START ONE TABLET DAILY 30 tablet 5    prochlorperazine (COMPAZINE) 10 mg tablet Take 1 tablet by mouth every 6 (six) hours as needed       No current facility-administered medications for this visit  No Known Allergies  Immunization History   Administered Date(s) Administered    Influenza Split High Dose Preservative Free IM 03/08/2016, 09/19/2016    Influenza TIV (IM) 10/05/2012, 10/07/2017    Pneumococcal Conjugate 13-Valent 10/14/2016    Tdap 10/07/2017       Patient Care Team:  Mimi Almanza DO as PCP - Zay Alfredo MD Berline Blas, MD Davy Roca, MD    Medicare Screening Tests and Risk Assessments:  [unfilled]   [unfilled]  No exam data present    Physical Exam :  Review of Systems   Constitutional: Positive for unexpected weight change (Patient still has a very low body weight and is trying to supplement as best she can especially with protein  She is drinking a can of boost every day and was told to increase this to 3 a day possible)  Negative for activity change, appetite change, chills, diaphoresis, fatigue and fever  HENT: Negative  Patient's complaint is loss of taste since she has had chemotherapy and radiation which decreases her appetite   Eyes: Negative  Respiratory: Positive for shortness of breath ( chronic shortness of breath with any brisk exertion  Patient is on supplemental O2 constantly)  Negative for apnea, cough, choking, chest tightness, wheezing and stridor  Cardiovascular: Negative  Patient does have a history of persistent tachycardia   Gastrointestinal: Negative  Endocrine: Negative  Genitourinary: Negative  Musculoskeletal: Negative  Skin: Negative  Allergic/Immunologic: Negative  Neurological: Negative  Patient states that she does have some perceived changes in her taste again from physical therapy and radiation treatment, chronic dry mouth   Hematological: Negative  Psychiatric/Behavioral: Negative  Physical Exam   Constitutional: She is oriented to person, place, and time  No distress  Extremely thin, cachectic-appearing 31-year-old female who is awake alert in no acute distress on supplemental O2   HENT:   Head: Normocephalic  Right Ear: External ear normal    Left Ear: External ear normal    Nose: Nose normal    Mouth/Throat: No oropharyngeal exudate  Dry but pink oral mucous membranes   Eyes: Conjunctivae and EOM are normal  Pupils are equal, round, and reactive to light  Right eye exhibits no discharge  Left eye exhibits no discharge  No scleral icterus  Neck: Normal range of motion  Neck supple  No JVD present  No tracheal deviation present  No thyromegaly present  Cardiovascular: Regular rhythm, normal heart sounds and intact distal pulses  Exam reveals no gallop and no friction rub  No murmur heard  Patient has a sinus tachycardia, regular rhythm   Pulmonary/Chest: No stridor  No respiratory distress  She has no wheezes  She has no rales  She exhibits no tenderness  Decreased breath sounds anteriorly and posteriorly but no rales rhonchi or wheezes could be appreciated on exam   Patient does have notable shortness of breath with any brisk exertion   Abdominal: Soft  Bowel sounds are normal  She exhibits no distension and no mass  There is no tenderness  There is no rebound and no guarding  No hernia  Musculoskeletal: Normal range of motion  She exhibits deformity  She exhibits no edema or tenderness  Patient has significant wasting of the musculature throughout upper lower extremities, some minor arthritic deformities but no acute arthritic changes   Lymphadenopathy:     She has no cervical adenopathy  Neurological: She is alert and oriented to person, place, and time  She displays normal reflexes  A sensory deficit (Patient complains of some numbness and tingling to the fingers, toes bilaterally) is present  No cranial nerve deficit  She exhibits normal muscle tone  Coordination normal    Skin: Skin is warm and dry  Capillary refill takes less than 2 seconds  No rash noted  She is not diaphoretic  No erythema  No pallor  Psychiatric: She has a normal mood and affect  Her behavior is normal  Judgment and thought content normal    Nursing note and vitals reviewed  Assessment and Plan:  Problem List Items Addressed This Visit     None        Health Maintenance Due   Topic Date Due    Depression Screening PHQ-9  1945    Fall Risk  06/03/2012    Urinary Incontinence Screening  06/03/2012    Pneumococcal PPSV23/PCV13 65+ Years / High and Highest Risk (2 of 2 - PPSV23) 12/09/2016         HPI:  Clovis Edwards is a 72 y o  female here for her Subsequent Wellness Visit  Patient Active Problem List   Diagnosis    Malignant neoplasm of right lung [facility])     Past Medical History:   Diagnosis Date    Breast cancer ([facility] [address] ) 1996    COPD (chronic obstructive pulmonary disease) ([facility] [address] )     Requires supplemental oxygen     2 Liters at bedtime and as needed     Past Surgical History:   Procedure Laterality Date    APPENDECTOMY      COLONOSCOPY N/A 4/18/2016    Procedure: COLONOSCOPY;  Surgeon: Kayla Orosco MD;  [facility]; Service:     MASTECTOMY, RADICAL Right 1996    [address] N/A 10/17/2017    Procedure: Brandon Lunger;  Surgeon: Chidi Marie MD;  [facility];   Service: Pulmonary    TONSILLECTOMY       Family History   Problem Relation Age of Onset    Heart disease Mother         cardiac disorder     History   Smoking Status    Former Smoker    Packs/day: 0 50    Years: 55 00    Start date: 1962    Quit date: 6/5/2017   Smokeless Tobacco    Never Used     Comment: 20 cigs a day, stop 2 mth ago     History   Alcohol Use No      History   Drug Use No         Current Outpatient Prescriptions   Medication Sig Dispense Refill    albuterol (VENTOLIN HFA) 90 mcg/act inhaler Inhale      arformoterol (BROVANA) 15 mcg/2 mL nebulizer solution Take 15 mcg by nebulization 2 (two) times a day      budesonide (PULMICORT) 0 25 mg/2 mL nebulizer solution Take 0 25 mg by nebulization 2 (two) times a day      Cholecalciferol (VITAMIN D) 2000 UNITS tablet Take 2,000 Units by mouth daily   ipratropium (ATROVENT) 0 02 % nebulizer solution Take 0 5 mg by nebulization 4 (four) times a day      Multiple Vitamins-Minerals (MULTIVITAMIN WITH MINERALS) tablet Take 1 tablet by mouth daily   PARoxetine (PAXIL) 10 mg tablet TAKE 1/2 TABLET DAILY FOR 10 DAYS THEN START ONE TABLET DAILY 30 tablet 5    prochlorperazine (COMPAZINE) 10 mg tablet Take 1 tablet by mouth every 6 (six) hours as needed       No current facility-administered medications for this visit        No Known Allergies  Immunization History   Administered Date(s) Administered    Influenza Split High Dose Preservative Free IM 03/08/2016, 09/19/2016    Influenza TIV (IM) 10/05/2012, 10/07/2017    Pneumococcal Conjugate 13-Valent 10/14/2016    Tdap 10/07/2017       Patient Care Team:  Bryan Slater DO as PCP - Will Osorio MD Altamease [place], MD Karsten Martins, MD    Medicare Screening Tests and Risk Assessments:  Medicare Annual Wellness Visit  No exam data present  Physical Exam :  Negative except   History obtained from the patient  General ROS: negative  Psychological ROS: negative  Ophthalmic ROS: negative  ENT ROS: negative  Allergy and Immunology ROS: negative  Hematological and Lymphatic ROS: negative  Endocrine ROS: negative  Breast ROS: negative for breast lumps  Respiratory ROS: positive for - cough and shortness of breath  Cardiovascular ROS: negative  Gastrointestinal ROS: no abdominal pain, change in bowel habits, or black or bloody stools  Genito-Urinary ROS: no dysuria, trouble voiding, or hematuria  Musculoskeletal ROS: negative  Neurological ROS: no TIA or stroke symptoms  Dermatological ROS: negative  Physical Exam

## 2018-08-29 NOTE — ASSESSMENT & PLAN NOTE
Patient is here today for a Medicare wellness visit  Patient has a history of adenocarcinoma of the lung and over the last year has gone through intensive treatment including radiation, chemotherapy  Patient continues to follow-up with her pulmonologist, oncologist, radiation therapist   Patient states in general she is feeling well but she is still having difficulties regaining any weight  She states the factor is that she has lost her sense of taste and most foods are not appealing to her  She has had a long-term problem with low body weight   With her treatment for her cancer she is not a candidate for any colon rectal screening at this point time other than a Hemoccult testing  She has no pronounced anemia  We will continue to make sure the patient is up-to-date with routine mammograms    She does not receive any gynecologic care at this time

## 2018-08-29 NOTE — ASSESSMENT & PLAN NOTE
Hyperlipidemia  Patient's cholesterol is elevated but she does have a very high HDL which is protective  There is a family history of heart disease and advanced age   We do encourage the patient to increase her protein intake but to avoid fats and cholesterol as much as possible

## 2018-08-29 NOTE — PROGRESS NOTES
Assessment and Plan:  Problem List Items Addressed This Visit     None        Health Maintenance Due   Topic Date Due    Depression Screening PHQ-9  1945    Fall Risk  06/03/2012    Urinary Incontinence Screening  06/03/2012    Pneumococcal PPSV23/PCV13 65+ Years / High and Highest Risk (2 of 2 - PPSV23) 12/09/2016         HPI:  Tiara Casetllanos is a 72 y o  female here for her Subsequent Wellness Visit  Patient Active Problem List   Diagnosis    Malignant neoplasm of right lung [facility])     Past Medical History:   Diagnosis Date    Breast cancer ([facility] [address] ) 1996    COPD (chronic obstructive pulmonary disease) ([facility] [address] )     Requires supplemental oxygen     2 Liters at bedtime and as needed     Past Surgical History:   Procedure Laterality Date    APPENDECTOMY      COLONOSCOPY N/A 4/18/2016    Procedure: COLONOSCOPY;  Surgeon: Anand Piña MD;  [facility]; Service:     MASTECTOMY, RADICAL Right 1996    [address] [address] N/A 10/17/2017    Procedure: Gareth Montaño;  Surgeon: Tj Morales MD;  [facility]; Service: Pulmonary    TONSILLECTOMY       Family History   Problem Relation Age of Onset    Heart disease Mother         cardiac disorder     History   Smoking Status    Former Smoker    Packs/day: 0 50    Years: 55 00    Start date: 1962    Quit date: 6/5/2017   Smokeless Tobacco    Never Used     Comment: 21 cigs a day, stop 2 mth ago     History   Alcohol Use No      History   Drug Use No         Current Outpatient Prescriptions   Medication Sig Dispense Refill    albuterol (VENTOLIN HFA) 90 mcg/act inhaler Inhale      arformoterol (BROVANA) 15 mcg/2 mL nebulizer solution Take 15 mcg by nebulization 2 (two) times a day      budesonide (PULMICORT) 0 25 mg/2 mL nebulizer solution Take 0 25 mg by nebulization 2 (two) times a day      Cholecalciferol (VITAMIN D) 2000 UNITS tablet Take 2,000 Units by mouth daily        ipratropium (ATROVENT) 0 02 % nebulizer solution Take 0 5 mg by nebulization 4 (four) times a day      Multiple Vitamins-Minerals (MULTIVITAMIN WITH MINERALS) tablet Take 1 tablet by mouth daily   PARoxetine (PAXIL) 10 mg tablet TAKE 1/2 TABLET DAILY FOR 10 DAYS THEN START ONE TABLET DAILY 30 tablet 5    prochlorperazine (COMPAZINE) 10 mg tablet Take 1 tablet by mouth every 6 (six) hours as needed       No current facility-administered medications for this visit  No Known Allergies  Immunization History   Administered Date(s) Administered    Influenza Split High Dose Preservative Free IM 03/08/2016, 09/19/2016    Influenza TIV (IM) 10/05/2012, 10/07/2017    Pneumococcal Conjugate 13-Valent 10/14/2016    Tdap 10/07/2017       Patient Care Team:  Frank Barraza DO as PCP - MD Yohan Hamm MD Darlen Pomfret, MD    Medicare Screening Tests and Risk Assessments:  Chris Narvaez is here for her Subsequent Wellness visit  Health Risk Assessment:  Patient rates overall health as good  Patient feels that their physical health rating is Much better  Eyesight was rated as Slightly worse  Hearing was rated as Same  Patient feels that their emotional and mental health rating is Much better  Pain experienced by patient in the last 7 days has been None  Patient states that she has experienced no weight loss or gain in last 6 months  Emotional/Mental Health:  Patient has been feeling nervous/anxious  PHQ-9 Depression Screening:    Frequency of the following problems over the past two weeks:      1  Little interest or pleasure in doing things: 0 - not at all      2  Feeling down, depressed, or hopeless: 0 - not at all  PHQ-2 Score: 0          Broken Bones/Falls: Fall Risk Assessment:    In the past year, patient has experienced: No history of falling in past year          Bladder/Bowel:  Patient has not leaked urine accidently in the last six months    Patient reports no loss of bowel control  Immunizations:  Patient has had a flu vaccination within the last year  Patient has received a pneumonia shot  Patient has not received a shingles shot  Home Safety:  Patient does not have trouble with stairs inside or outside of their home  Patient currently reports that there are no safety hazards present in home, working smoke alarms, working carbon monoxide detectors  Preventative Screenings:   Breast cancer screening performed, no colon cancer screen completed, cholesterol screen completed, glaucoma eye exam completed,     Nutrition:  Current diet: Regular with servings of the following:    Medications:  Patient is currently taking over-the-counter supplements  Patient is able to manage medications  Lifestyle Choices:  Patient reports current tobacco use  Patient reports no alcohol use  Patient drives a vehicle  Patient wears seat belt  Activities of Daily Living:  Can get out of bed by his or her self, able to dress self, able to make own meals, able to do own shopping, able to bathe self, can do own laundry/housekeeping, can manage own money, pay bills and track expenses    Previous Hospitalizations:  No hospitalization or ED visit in past 12 months        Advanced Directives:  Patient has decided on a power of   Patient has spoken to designated power of   Patient has completed advanced directive          Preventative Screening/Counseling:      Cardiovascular:      General: Risks and Benefits Discussed and Screening Current          Diabetes:      General: Risks and Benefits Discussed and Screening Current      Counseling: Healthy Diet and Healthy Weight      Comments: Patient's daughter will obese scheduling the patient for a diabetic eye exam        Colorectal Cancer:      General: Screening Not Indicated          Breast Cancer:      General: Screening Not Indicated          Cervical Cancer:      General: Screening Not Indicated          Osteoporosis: General: Risks and Benefits Discussed      Counseling: Calcium and Vitamin D Intake and Regular Weightbearing Exercise          AAA:      General: Screening Not Indicated          Glaucoma:      General: Risks and Benefits Discussed      Referrals: Ophthalmology          HIV:      General: Screening Not Indicated          Hepatitis C:      General: Screening Not Indicated        Advanced Directives:   Patient has living will for healthcare, has durable POA for healthcare, patient has an advanced directive       Immunizations:  Patient reviewed and up to date      Influenza: Influenza Recommended Annually      Pneumococcal: Risks & Benefits Discussed and Lifetime Vaccine Completed      Hepatitis B (Low risk patients): Series Not Indicated      Zostavax: Patient Declines      TD: Td Vaccine UTD      Other Preventative Counseling (Non-Medicare):  Alcohol Use, Fall Prevention, Nutrition Counseling, Car/seat belt/driving safety reviewed, Skin self-exam and Sunscreen use        No exam data present  Physical Exam :  Negative except   History obtained from the patient  General ROS: negative  Psychological ROS: negative  Ophthalmic ROS: negative  ENT ROS: negative  Allergy and Immunology ROS: negative  Hematological and Lymphatic ROS: negative  Endocrine ROS: negative  Breast ROS: negative for breast lumps  Respiratory ROS: no cough, shortness of breath, or wheezing  Cardiovascular ROS: positive for - dyspnea on exertion and rapid heart rate  Gastrointestinal ROS: negative  Genito-Urinary ROS: negative  Musculoskeletal ROS: negative  Neurological ROS: no TIA or stroke symptoms  Dermatological ROS: negative  Physical Exam

## 2018-08-29 NOTE — ASSESSMENT & PLAN NOTE
Patient continues to follow-up with Oncology, Radiation Oncology and her pulmonologist regarding her lung cancer  Patient has completed radiation treatment and is scheduled for repeat PET scan in the near future to re-evaluate weight the status of her disease  Patient denies any increasing shortness of breath or cough  She is O2 dependent

## 2018-09-26 ENCOUNTER — TRANSCRIBE ORDERS (OUTPATIENT)
Dept: ADMINISTRATIVE | Age: 73
End: 2018-09-26

## 2018-09-26 ENCOUNTER — APPOINTMENT (OUTPATIENT)
Dept: LAB | Age: 73
End: 2018-09-26
Payer: MEDICARE

## 2018-09-26 DIAGNOSIS — C34.90 MALIGNANT NEOPLASM OF LUNG, UNSPECIFIED LATERALITY, UNSPECIFIED PART OF LUNG (HCC): ICD-10-CM

## 2018-09-26 LAB
ALBUMIN SERPL BCP-MCNC: 3.6 G/DL (ref 3.5–5)
ALP SERPL-CCNC: 91 U/L (ref 46–116)
ALT SERPL W P-5'-P-CCNC: 20 U/L (ref 12–78)
ANION GAP SERPL CALCULATED.3IONS-SCNC: 5 MMOL/L (ref 4–13)
AST SERPL W P-5'-P-CCNC: 21 U/L (ref 5–45)
BASOPHILS # BLD AUTO: 0.04 THOUSANDS/ΜL (ref 0–0.1)
BASOPHILS NFR BLD AUTO: 1 % (ref 0–1)
BILIRUB SERPL-MCNC: 0.45 MG/DL (ref 0.2–1)
BUN SERPL-MCNC: 14 MG/DL (ref 5–25)
CALCIUM SERPL-MCNC: 8.7 MG/DL (ref 8.3–10.1)
CHLORIDE SERPL-SCNC: 98 MMOL/L (ref 100–108)
CO2 SERPL-SCNC: 33 MMOL/L (ref 21–32)
CREAT SERPL-MCNC: 0.54 MG/DL (ref 0.6–1.3)
EOSINOPHIL # BLD AUTO: 0.08 THOUSAND/ΜL (ref 0–0.61)
EOSINOPHIL NFR BLD AUTO: 1 % (ref 0–6)
ERYTHROCYTE [DISTWIDTH] IN BLOOD BY AUTOMATED COUNT: 13.9 % (ref 11.6–15.1)
GFR SERPL CREATININE-BSD FRML MDRD: 95 ML/MIN/1.73SQ M
GLUCOSE P FAST SERPL-MCNC: 86 MG/DL (ref 65–99)
HCT VFR BLD AUTO: 38.9 % (ref 34.8–46.1)
HGB BLD-MCNC: 12.3 G/DL (ref 11.5–15.4)
IMM GRANULOCYTES # BLD AUTO: 0.02 THOUSAND/UL (ref 0–0.2)
IMM GRANULOCYTES NFR BLD AUTO: 0 % (ref 0–2)
LYMPHOCYTES # BLD AUTO: 0.8 THOUSANDS/ΜL (ref 0.6–4.47)
LYMPHOCYTES NFR BLD AUTO: 14 % (ref 14–44)
MCH RBC QN AUTO: 32.1 PG (ref 26.8–34.3)
MCHC RBC AUTO-ENTMCNC: 31.6 G/DL (ref 31.4–37.4)
MCV RBC AUTO: 102 FL (ref 82–98)
MONOCYTES # BLD AUTO: 0.49 THOUSAND/ΜL (ref 0.17–1.22)
MONOCYTES NFR BLD AUTO: 9 % (ref 4–12)
NEUTROPHILS # BLD AUTO: 4.26 THOUSANDS/ΜL (ref 1.85–7.62)
NEUTS SEG NFR BLD AUTO: 75 % (ref 43–75)
NRBC BLD AUTO-RTO: 0 /100 WBCS
PLATELET # BLD AUTO: 264 THOUSANDS/UL (ref 149–390)
PMV BLD AUTO: 10.4 FL (ref 8.9–12.7)
POTASSIUM SERPL-SCNC: 4.7 MMOL/L (ref 3.5–5.3)
PROT SERPL-MCNC: 7.1 G/DL (ref 6.4–8.2)
RBC # BLD AUTO: 3.83 MILLION/UL (ref 3.81–5.12)
SODIUM SERPL-SCNC: 136 MMOL/L (ref 136–145)
WBC # BLD AUTO: 5.69 THOUSAND/UL (ref 4.31–10.16)

## 2018-09-26 PROCEDURE — 85025 COMPLETE CBC W/AUTO DIFF WBC: CPT

## 2018-09-26 PROCEDURE — 80053 COMPREHEN METABOLIC PANEL: CPT

## 2018-09-26 PROCEDURE — 36415 COLL VENOUS BLD VENIPUNCTURE: CPT

## 2018-10-02 DIAGNOSIS — J44.9 CHRONIC OBSTRUCTIVE PULMONARY DISEASE, UNSPECIFIED COPD TYPE (HCC): Primary | ICD-10-CM

## 2018-10-02 RX ORDER — ARFORMOTEROL TARTRATE 15 UG/2ML
15 SOLUTION RESPIRATORY (INHALATION) 2 TIMES DAILY
Qty: 180 VIAL | Refills: 3 | Status: SHIPPED | OUTPATIENT
Start: 2018-10-02 | End: 2019-04-03 | Stop reason: SDUPTHER

## 2018-10-03 ENCOUNTER — HOSPITAL ENCOUNTER (OUTPATIENT)
Dept: RADIOLOGY | Age: 73
Discharge: HOME | End: 2018-10-03
Payer: MEDICARE

## 2018-10-03 ENCOUNTER — HOSPITAL ENCOUNTER (OUTPATIENT)
Dept: RADIOLOGY | Age: 73
End: 2018-10-03
Payer: MEDICARE

## 2018-10-03 DIAGNOSIS — J44.9 CHRONIC OBSTRUCTIVE PULMONARY DISEASE, UNSPECIFIED COPD TYPE (HCC): Primary | ICD-10-CM

## 2018-10-03 DIAGNOSIS — C34.90 MALIGNANT NEOPLASM OF LUNG, UNSPECIFIED LATERALITY, UNSPECIFIED PART OF LUNG (HCC): ICD-10-CM

## 2018-10-03 PROCEDURE — 70491 CT SOFT TISSUE NECK W/DYE: CPT

## 2018-10-03 PROCEDURE — 71260 CT THORAX DX C+: CPT

## 2018-10-03 RX ADMIN — IOHEXOL 85 ML: 350 INJECTION, SOLUTION INTRAVENOUS at 09:58

## 2018-10-04 ENCOUNTER — TELEPHONE (OUTPATIENT)
Dept: HEMATOLOGY ONCOLOGY | Facility: CLINIC | Age: 73
End: 2018-10-04

## 2018-10-04 RX ORDER — BUDESONIDE 0.25 MG/2ML
0.25 INHALANT ORAL 2 TIMES DAILY
Qty: 60 VIAL | Refills: 5 | Status: SHIPPED | OUTPATIENT
Start: 2018-10-04 | End: 2019-03-14 | Stop reason: HOSPADM

## 2018-10-09 NOTE — PROGRESS NOTES
Hematology Outpatient Follow - Up Note  Debra Degroot 72 y o  female MRN: @ Encounter: [id number]        Date:  10/10/2018    Assessment/ Plan:    1  Stage IV adenocarcinoma of the lung dx 10/2017, moderately differentiated involving the right hilar, mediastinal, precarinal, subcarinal lymphadenopathy with tumor extension into the right mainstem bronchus, bronchus intermedius, right middle lobe and right neck area proven by biopsy, negative molecular test for EGFR mutation, ALK gene rearrangement, Ros 1 mutation, PDL expression of 0  Concurrent chemo/RT completed 2/2/2018  On observation currently  CT neck and chest 10/2018:  New subpleural right middle lobe nodule measuring 5 mm  Cervical soft tissue mass continues to show response to therapy  Reviewed with patient  Will continue with observation  F/U in 2-  3 months with CT neck and chest along with lab work  HPI:  72-year-old  female seen initially 11/6/2017 regarding moderately differentiated adenocarcinoma of the lung positive for TTF 1 and napsin and negative for P 40 diagnosed via right mainstem bronchus biopsy on October 17, 2017 by Dr Vidal Palomares  scan chest 9/26/2017 ordered by Dr Vidal Palomares performed as lung cancer screening study showed subcarinal adenopathy measuring 1 7 cm  Precarinal adenopathy measures 1 9 cm  The right lower lobe endobronchial soft tissue appears mass like  Lung-RADS 4A, suspicious  PET scan 11/2/2017 showed confluent of hypermetabolic right hilar and mediastinal, precarinal, subcarinal lymphadenopathy compatible with malignancy, SUV measures 17 4  right hilar and precarinal adenopathy measures approximately 4 2 x 2 2 cm  Subcarinal adenopathy measures approximately 2 5 x 1 5 cm  Right hilar devan mass image 100 measures 2 7 x 1 47 m  Tumor extends into the right mainstem bronchus, bronchus intermedius, right middle lobe and right lower lobe bronchi, as seen previously   There is also a small right thoracic paraspinal node with mild FDG activity, measuring 1 3 x 0 4 cm, SUV 2 5  is an irregular right upper lung density along the major fissure measuring 1 6 x 1 3 cm, with mild FDG activity, SUV 1 1  This may be inflammatory, versus malignancy  There is an ill-defined hypermetabolic nodule in the right parotid gland region, measuring approximately 2 2 x 1 7 cm SUV measures 11 3  This may represent a metastasis versus primary parotid gland lesions such as a Warthin's tumor or amorphic adenoma  mm left lower lung nodular density too small for PET characterization   Subtle hypermetabolic focus anterior to the right diaphragmatic larisa, SUV 2 6   She has chronic COPD secondary to active smoking, progressive dyspnea on exertion  She has had a chronic cough for many years   Used to smoke 2 packs of cigarettes daily for many many years, she does not drink alcohol   both parents still living has 1 sister in [place]  She elected to proceed with FNA right glucose avid neck mass  Pathology: Conclusive evidence of malignancy  small cell carcinoma consistent with metastasis from the patient's know adenocarcinoma of the lung  On immunostaining, the tumor cells are positive for TTF-1 and Napsin A but negative for p40 helping support the above diagnosis   RT to this area  She finished concurrent radiation therapy with weekly Taxol /carboplatin to the chest area as well as to the right neck area  CT scan of the neck and the chest 3/15/2018 showed no evidence of recurrence or new lesions and complete resolution of the previous mass is  F/U imaging in June 2018 also showed stability  Interval History:    She feels very well  Visited her only son and only grandchild in [place] for her 1st Birthday  CT neck and Chest 10/3/2018:  New subpleural right middle lobe nodule measuring 5 mm  Slightly more prominent subpleural nodular density of the posterior right lower lobe    Persistent ill-defined soft tissue asymmetry at the C3 2 level to the right  Minor distortion of fascial planes with contiguity with the vascular sheath and extending between the tail of the parotid and the lateral posterior elements of C2  Soft tissue appears slightly smaller, and is consistent with treated devan disease      Previous Treatment:      Malignant neoplasm of right lung ([facility] [address] )    9/26/2017 Initial Diagnosis     Malignant neoplasm of right lung ([address] )         10/17/2017 Biopsy     Bronchoscopy was performed  Right main stem endobronchial lesion and collapse of the bronchial intermedius consistent with lung cancer  11/17/2017 - 2/2/2018 Chemotherapy     Concurrent chemo and radiation: Taxol/ Carboplatin          12/1/2017 - 2/9/2018 Radiation     Treatment:  Course: C1    Plan ID Energy Fractions Dose per Fraction (cGy) Total Dose Delivered (cGy) Elapsed Days   R LUNG_MED 6X 33 / 33 180 5,940 48   R NECK 6X 30 / 30 200 6,000 43      Treatment dates:  C1: 12/1/2017 - 2/9/2018 12/7/2017 Biopsy     Biopsy of right neck mass  Conclusive evidence of malignancy  Non-small cell carcinoma consistent with metastasis from the patient's know adenocarcinoma of the lung  Test Results:      Labs:   Lab Results   Component Value Date    HGB 12 3 09/26/2018    HCT 38 9 09/26/2018     (H) 09/26/2018     09/26/2018    WBC 5 69 09/26/2018    NRBC 0 09/26/2018     Lab Results   Component Value Date     09/26/2018    K 4 7 09/26/2018    CL 98 (L) 09/26/2018    CO2 33 (H) 09/26/2018    ANIONGAP 10 02/27/2015    BUN 14 09/26/2018    CREATININE 0 54 (L) 09/26/2018    GLUCOSE 94 02/27/2015    GLUF 86 09/26/2018    CALCIUM 8 7 09/26/2018    AST 21 09/26/2018    ALT 20 09/26/2018    ALKPHOS 91 09/26/2018    PROT 7 5 02/27/2015    BILITOT 0 53 02/27/2015    EGFR 95 09/26/2018         Imaging: Nm Pet Ct Skull Base To Mid Thigh    Result Date: 6/18/2018  Narrative: PET/CT SCAN INDICATION: History of lung cancer  Restaging post concurrent chemoradiation  Additional history of right breast cancer diagnosed in 1996  C34 11: Malignant neoplasm of upper lobe, right bronchus or lung MODIFIER:  PS COMPARISON: CT neck and chest of 3/15/2018, PET/CT of 11/2/2017 CELL TYPE:  moderately differentiated adenocarcinoma of lung origin (bx R mainstem bronchus 10/17/17) TECHNIQUE:   8 3 mCi F-18-FDG administered IV  Multiplanar attenuation corrected and non attenuation corrected PET images are available for interpretation, and contiguous, low dose, axial CT sections were obtained from the skull base through the femurs   Oral contrast material (7 5 cc Omnipaque-240 in 300 cc water) was administered  Intravenous contrast material was not utilized  This examination, like all CT scans performed in the [facility], was performed utilizing techniques to minimize radiation dose exposure, including the use of iterative reconstruction and automated exposure control  Fasting serum glucose: 92 mg/dl FINDINGS: VISUALIZED BRAIN:   No acute abnormalities are seen  HEAD/NECK:   The previously noted FDG avid nodule in the right upper neck/periparotid region is no longer seen  There is now asymmetric linear FDG uptake in the right sternocleidomastoid muscle, question related to post treatment changes or physiologic activity  No FDG avid cervical chain lymph nodes  CT images: Coarse calcifications noted in both lobes of the thyroid gland  CHEST:   No residual FDG avid mediastinal or right hilar lymphadenopathy  Minimal FDG uptake associated with an irregular right upper lobe density along the right major fissure measuring 1 3 x 1 2 cm, previously 1 6 x 1 3 cm, now SUV max 2 7, previously 1 1  Scattered pleural parenchymal changes bilaterally with mild FDG uptake, stable  CT images: Multiple surgical clips noted in the right axilla  Extensive emphysematous changes noted of the lung fields    Stable 3 mm subpleural nodular density in the left lower lobe, not FDG avid  Right middle lobe atelectasis has largely cleared  Mild residual scattered atelectasis  There is a 4 mm nodule within the previously atelectatic right middle lobe, image 111 series 3, ABDOMEN:   Persistent mild focus of FDG uptake close to the right diaphragmatic larisa of SUV max of 2 3, previously 2 6  There is no obvious lymphadenopathy here on limited CT images  CT images: Abdominal aorta is mildly ectatic  PELVIS: No FDG avid soft tissue lesions are seen  CT images: Unremarkable  OSSEOUS STRUCTURES: No FDG avid lesions are seen  CT images: Minimal anterolisthesis is again noted of L5 on S1 with slight retrolisthesis of L1 on L2, stable  Impression: 1  Findings compatible with response to therapy  2  No residual FDG uptake in the mediastinal or right perihilar regions suspicious for hypermetabolic malignancy compatible with response to therapy  3   Previously noted FDG avid nodule in the right upper neck/periparotid lesion is no longer seen compatible with response to therapy  4   Essentially stable mild focus of FDG uptake at the level of the right diaphragmatic larisa of uncertain significance  5   A few scattered subcentimeter nodular densities bilaterally, not FDG avid but may be too small to characterize  Recommend continued follow-up  Workstation performed: [id number]         ROS:   As mentioned in HPI & Interval History otherwise 14 point ROS negative  Allergies: No Known Allergies  Current Medications: Reviewed  PMH/FH/SH:  Reviewed      Physical Exam:    There is no height or weight on file to calculate BSA      Wt Readings from Last 3 Encounters:   08/29/18 36 9 kg (81 lb 6 4 oz)   08/13/18 37 2 kg (82 lb)   06/26/18 38 kg (83 lb 12 8 oz)        Temp Readings from Last 3 Encounters:   08/29/18 98 6 °F (37 °C)   08/13/18 98 1 °F (36 7 °C) (Tympanic)   06/26/18 (!) 95 6 °F (35 3 °C) (Tympanic)        BP Readings from Last 3 Encounters:   08/29/18 106/56 08/13/18 108/72   06/26/18 100/70           Constitutional   General appearance: No acute distress, well appearing and well nourished   Thin  Eyes   Conjunctiva and lids: No swelling, erythema or discharge     Pupils and irises: Equal, round     Ears, Nose, Mouth, and Throat   External inspection of ears and nose: Normal     Nasal mucosa, septum, and turbinates: Normal without edema or erythema     Oropharynx: Normal with no erythema, edema, exudate or lesions     Pulmonary   Respiratory effort: No increased work of breathing or signs of respiratory distress     Auscultation of lungs: Clear to auscultation  Diffusely decreased  On O2 continuously    Cardiovascular   Palpation of heart: Normal PMI, no thrills     Auscultation of heart: Normal rate and rhythm, normal S1 and S2, without murmurs      Abdomen   Abdomen: Non-tender, no masses     Liver and spleen: No hepatomegaly or splenomegaly     Lymphatic   Palpation of lymph nodes in neck: No lymphadenopathy     Musculoskeletal   Gait and station: Normal     Digits and nails: Normal without clubbing or cyanosis     Inspection/palpation of joints, bones, and muscles: Normal     Skin   Skin and subcutaneous tissue: Normal without rashes or lesions     Neurologic   Cranial nerves: Cranial nerves 2-12 grossly intact     Sensation: No sensory loss     Psychiatric   Orientation to person, place, and time: Normal     Mood and affect: Normal         Goals and Barriers:  Current Goal:  Remain free from falls  Barriers: None  Patient's Capacity to Self Care:  Patient is able to self care

## 2018-10-10 ENCOUNTER — OFFICE VISIT (OUTPATIENT)
Dept: HEMATOLOGY ONCOLOGY | Facility: CLINIC | Age: 73
End: 2018-10-10
Payer: MEDICARE

## 2018-10-10 VITALS
HEART RATE: 88 BPM | OXYGEN SATURATION: 99 % | DIASTOLIC BLOOD PRESSURE: 70 MMHG | SYSTOLIC BLOOD PRESSURE: 112 MMHG | RESPIRATION RATE: 16 BRPM | BODY MASS INDEX: 13.99 KG/M2 | HEIGHT: 65 IN | TEMPERATURE: 97.8 F | WEIGHT: 84 LBS

## 2018-10-10 DIAGNOSIS — C34.11 MALIGNANT NEOPLASM OF UPPER LOBE OF RIGHT LUNG (HCC): Primary | ICD-10-CM

## 2018-10-10 PROCEDURE — 99214 OFFICE O/P EST MOD 30 MIN: CPT | Performed by: PHYSICIAN ASSISTANT

## 2018-10-13 ENCOUNTER — IMMUNIZATION (OUTPATIENT)
Dept: INTERNAL MEDICINE CLINIC | Facility: CLINIC | Age: 73
End: 2018-10-13
Payer: MEDICARE

## 2018-10-13 DIAGNOSIS — Z23 ENCOUNTER FOR IMMUNIZATION: ICD-10-CM

## 2018-10-13 PROCEDURE — G0008 ADMIN INFLUENZA VIRUS VAC: HCPCS | Performed by: INTERNAL MEDICINE

## 2018-10-13 PROCEDURE — 90662 IIV NO PRSV INCREASED AG IM: CPT | Performed by: INTERNAL MEDICINE

## 2018-10-27 DIAGNOSIS — J44.9 CHRONIC OBSTRUCTIVE PULMONARY DISEASE, UNSPECIFIED COPD TYPE (HCC): Primary | ICD-10-CM

## 2018-10-29 RX ORDER — BUDESONIDE 0.5 MG/2ML
INHALANT ORAL
Qty: 120 ML | Refills: 5 | Status: SHIPPED | OUTPATIENT
Start: 2018-10-29 | End: 2019-06-18 | Stop reason: SDUPTHER

## 2018-11-13 ENCOUNTER — OFFICE VISIT (OUTPATIENT)
Dept: PULMONOLOGY | Facility: CLINIC | Age: 73
End: 2018-11-13
Payer: MEDICARE

## 2018-11-13 VITALS
HEIGHT: 65 IN | TEMPERATURE: 97.7 F | WEIGHT: 85 LBS | BODY MASS INDEX: 14.16 KG/M2 | OXYGEN SATURATION: 100 % | DIASTOLIC BLOOD PRESSURE: 66 MMHG | SYSTOLIC BLOOD PRESSURE: 110 MMHG | HEART RATE: 112 BPM

## 2018-11-13 DIAGNOSIS — C34.90 LUNG CANCER (HCC): ICD-10-CM

## 2018-11-13 DIAGNOSIS — J44.9 COPD (CHRONIC OBSTRUCTIVE PULMONARY DISEASE) (HCC): Primary | ICD-10-CM

## 2018-11-13 DIAGNOSIS — J96.11 CHRONIC HYPOXEMIC RESPIRATORY FAILURE (HCC): ICD-10-CM

## 2018-11-13 PROCEDURE — 99214 OFFICE O/P EST MOD 30 MIN: CPT | Performed by: INTERNAL MEDICINE

## 2018-11-13 NOTE — PROGRESS NOTES
Office Progress Note - Pulmonary    Jet Chicas 72 y o  female MRN: [id number]    Encounter: [id number]      Assessment:  · Chronic obstructive pulmonary disease  · Chronic hypoxemic respiratory failure  · Lung cancer  Plan:   · Brovana 15 mcg nebulized twice a day  · Budesonide 0 5 mg nebulized twice a day  · Ipratropium nebulized 4 times a day  · Oxygen supplement  · Follow-up in 6 months  Discussion:   The patient's COPD is in remission  I have maintained her on Brovana and budesonide twice a day and ipratropium nebulized 4 times a day  She will continue with the oxygen supplement 24 hr a day  Her lung cancer is in remission  She has received influenza vaccine for this season  I will see her in 6 months in a follow-up visit  Subjective: The patient is here for a follow-up visit  She denies cough, wheezing or sputum production  She denies chest pain or palpitations  She has no nocturnal symptoms  She is using Brovana and budesonide twice a day and ipratropium nebulized 3 times a day  She is on the oxygen 24 hr a day  She was in [place] and she had no breathing issues over there  Review of systems:  A 12 point system review is done and aside from what is stated above the rest of the review of systems is negative  Family history and social history are reviewed  Medications list is reviewed  Vitals: Blood pressure 110/66, pulse (!) 112, temperature 97 7 °F (36 5 °C), temperature source Tympanic, height 5' 5" (1 651 m), weight 38 6 kg (85 lb), SpO2 100 %  ,     Physical Exam  Gen: Awake, alert, oriented x 3, no acute distress  HEENT: Mucous membranes moist, no oral lesions, no thrush  NECK: No accessory muscle use, JVP not elevated  Cardiac: Regular, single S1, single S2, no murmurs, no rubs, no gallops  Lungs:   Decreased breath sounds  No wheezing or rhonchi    Abdomen: normoactive bowel sounds, soft nontender, nondistended, no rebound or rigidity, no guarding  Extremities: no cyanosis, no clubbing, no edema  Neuro:  Grossly nonfocal   Skin:  No rash

## 2019-01-03 ENCOUNTER — APPOINTMENT (OUTPATIENT)
Dept: LAB | Age: 74
End: 2019-01-03
Payer: MEDICARE

## 2019-01-03 DIAGNOSIS — C34.11 MALIGNANT NEOPLASM OF UPPER LOBE OF RIGHT LUNG (HCC): ICD-10-CM

## 2019-01-03 LAB
ALBUMIN SERPL BCP-MCNC: 3.6 G/DL (ref 3.5–5)
ALP SERPL-CCNC: 84 U/L (ref 46–116)
ALT SERPL W P-5'-P-CCNC: 18 U/L (ref 12–78)
ANION GAP SERPL CALCULATED.3IONS-SCNC: 3 MMOL/L (ref 4–13)
AST SERPL W P-5'-P-CCNC: 20 U/L (ref 5–45)
BASOPHILS # BLD AUTO: 0.04 THOUSANDS/ΜL (ref 0–0.1)
BASOPHILS NFR BLD AUTO: 1 % (ref 0–1)
BILIRUB SERPL-MCNC: 0.39 MG/DL (ref 0.2–1)
BUN SERPL-MCNC: 17 MG/DL (ref 5–25)
CALCIUM SERPL-MCNC: 8.7 MG/DL (ref 8.3–10.1)
CHLORIDE SERPL-SCNC: 101 MMOL/L (ref 100–108)
CO2 SERPL-SCNC: 31 MMOL/L (ref 21–32)
CREAT SERPL-MCNC: 0.64 MG/DL (ref 0.6–1.3)
EOSINOPHIL # BLD AUTO: 0.1 THOUSAND/ΜL (ref 0–0.61)
EOSINOPHIL NFR BLD AUTO: 2 % (ref 0–6)
ERYTHROCYTE [DISTWIDTH] IN BLOOD BY AUTOMATED COUNT: 13.9 % (ref 11.6–15.1)
GFR SERPL CREATININE-BSD FRML MDRD: 90 ML/MIN/1.73SQ M
GLUCOSE P FAST SERPL-MCNC: 85 MG/DL (ref 65–99)
HCT VFR BLD AUTO: 38.5 % (ref 34.8–46.1)
HGB BLD-MCNC: 12 G/DL (ref 11.5–15.4)
IMM GRANULOCYTES # BLD AUTO: 0.02 THOUSAND/UL (ref 0–0.2)
IMM GRANULOCYTES NFR BLD AUTO: 0 % (ref 0–2)
LYMPHOCYTES # BLD AUTO: 1.08 THOUSANDS/ΜL (ref 0.6–4.47)
LYMPHOCYTES NFR BLD AUTO: 19 % (ref 14–44)
MCH RBC QN AUTO: 31.8 PG (ref 26.8–34.3)
MCHC RBC AUTO-ENTMCNC: 31.2 G/DL (ref 31.4–37.4)
MCV RBC AUTO: 102 FL (ref 82–98)
MONOCYTES # BLD AUTO: 0.6 THOUSAND/ΜL (ref 0.17–1.22)
MONOCYTES NFR BLD AUTO: 10 % (ref 4–12)
NEUTROPHILS # BLD AUTO: 3.94 THOUSANDS/ΜL (ref 1.85–7.62)
NEUTS SEG NFR BLD AUTO: 68 % (ref 43–75)
NRBC BLD AUTO-RTO: 0 /100 WBCS
PLATELET # BLD AUTO: 262 THOUSANDS/UL (ref 149–390)
PMV BLD AUTO: 10.3 FL (ref 8.9–12.7)
POTASSIUM SERPL-SCNC: 4.5 MMOL/L (ref 3.5–5.3)
PROT SERPL-MCNC: 7.4 G/DL (ref 6.4–8.2)
RBC # BLD AUTO: 3.77 MILLION/UL (ref 3.81–5.12)
SODIUM SERPL-SCNC: 135 MMOL/L (ref 136–145)
WBC # BLD AUTO: 5.78 THOUSAND/UL (ref 4.31–10.16)

## 2019-01-03 PROCEDURE — 85025 COMPLETE CBC W/AUTO DIFF WBC: CPT

## 2019-01-03 PROCEDURE — 80053 COMPREHEN METABOLIC PANEL: CPT

## 2019-01-03 PROCEDURE — 36415 COLL VENOUS BLD VENIPUNCTURE: CPT

## 2019-01-08 ENCOUNTER — HOSPITAL ENCOUNTER (OUTPATIENT)
Dept: RADIOLOGY | Age: 74
Discharge: HOME | End: 2019-01-08
Payer: MEDICARE

## 2019-01-08 DIAGNOSIS — C34.11 MALIGNANT NEOPLASM OF UPPER LOBE OF RIGHT LUNG (HCC): ICD-10-CM

## 2019-01-08 PROCEDURE — 71260 CT THORAX DX C+: CPT

## 2019-01-08 PROCEDURE — 70491 CT SOFT TISSUE NECK W/DYE: CPT

## 2019-01-08 RX ADMIN — IOHEXOL 85 ML: 350 INJECTION, SOLUTION INTRAVENOUS at 09:27

## 2019-01-18 ENCOUNTER — OFFICE VISIT (OUTPATIENT)
Dept: HEMATOLOGY ONCOLOGY | Facility: CLINIC | Age: 74
End: 2019-01-18
Payer: MEDICARE

## 2019-01-18 VITALS
RESPIRATION RATE: 18 BRPM | SYSTOLIC BLOOD PRESSURE: 114 MMHG | BODY MASS INDEX: 14.85 KG/M2 | WEIGHT: 87 LBS | DIASTOLIC BLOOD PRESSURE: 68 MMHG | HEIGHT: 64 IN | TEMPERATURE: 97.9 F | OXYGEN SATURATION: 99 % | HEART RATE: 111 BPM

## 2019-01-18 DIAGNOSIS — C34.11 MALIGNANT NEOPLASM OF RIGHT UPPER LOBE OF LUNG (HCC): Primary | ICD-10-CM

## 2019-01-18 DIAGNOSIS — C34.11 MALIGNANT NEOPLASM OF UPPER LOBE OF RIGHT LUNG (HCC): Primary | ICD-10-CM

## 2019-01-18 PROCEDURE — 99214 OFFICE O/P EST MOD 30 MIN: CPT | Performed by: INTERNAL MEDICINE

## 2019-01-18 NOTE — PROGRESS NOTES
Hematology Outpatient Follow - Up Note  Jennifer Remyman 72 y o  female MRN: @ Encounter: [id number]        Date:  1/18/2019        Assessment/ Plan:     Stage IV adenocarcinoma of the lung diagnosed initially on October 2017, moderately differentiated involving right hilum, mediastinum, precarinal, subcarinal lymph nodes with tumor extension into the right mainstem bronchus, bronchus intermedius, right middle lobe and right cervical area proven by biopsy, molecular tests were negative for EGFR mutation, ALK gene rearrangement, Ross 1 mutation, PDL-1 expression was 0    She was treated with concurrent chemotherapy utilizing weekly Taxol/carboplatin and radiation therapy completed in 02/02/2018    Most recent CT scan of the chest in December 2018 showed no evidence of disease    Follow-up in 6 months with CT scan of the chest without IV contrast, CBC, CMP    Eczema from radiation therapy of the right cervical area, I advised the patient to apply hydrocortisone 0 1% cream intermittently if this is persistent she will follow-up with PCP           HPI:  81-year-old  female seen initially 11/6/2017 regarding moderately differentiated adenocarcinoma of the lung positive for TTF 1 and napsin and negative for P 40 diagnosed via right mainstem bronchus biopsy on October 17, 2017 by Dr Sherri Villanueva  scan chest 9/26/2017 ordered by Dr Sherri Villanueva performed as lung cancer screening study showed subcarinal adenopathy measuring 1 7 cm  Precarinal adenopathy measures 1 9 cm  The right lower lobe endobronchial soft tissue appears mass like  Lung-RADS 4A, suspicious  PET scan 11/2/2017 showed confluent of hypermetabolic right hilar and mediastinal, precarinal, subcarinal lymphadenopathy compatible with malignancy, SUV measures 17 4  right hilar and precarinal adenopathy measures approximately 4 2 x 2 2 cm  Subcarinal adenopathy measures approximately 2 5 x 1 5 cm  Right hilar devan mass image 100 measures 2 7 x 1 47 m   Tumor extends into the right mainstem bronchus, bronchus intermedius, right middle lobe and right lower lobe bronchi, as seen previously  There is also a small right thoracic paraspinal node with mild FDG activity, measuring 1 3 x 0 4 cm, SUV 2 5  is an irregular right upper lung density along the major fissure measuring 1 6 x 1 3 cm, with mild FDG activity, SUV 1 1  This may be inflammatory, versus malignancy  There is an ill-defined hypermetabolic nodule in the right parotid gland region, measuring approximately 2 2 x 1 7 cm SUV measures 11 3  This may represent a metastasis versus primary parotid gland lesions such as a Warthin's tumor or amorphic adenoma  mm left lower lung nodular density too small for PET characterization   Subtle hypermetabolic focus anterior to the right diaphragmatic larisa, SUV 2 6   She has chronic COPD secondary to active smoking, progressive dyspnea on exertion  She has had a chronic cough for many years   Used to smoke 2 packs of cigarettes daily for many many years, she does not drink alcohol   both parents still living has 1 sister in [place]  She elected to proceed with FNA right glucose avid neck mass  Pathology: Conclusive evidence of malignancy  small cell carcinoma consistent with metastasis from the patient's know adenocarcinoma of the lung  On immunostaining, the tumor cells are positive for TTF-1 and Napsin A but negative for p40 helping support the above diagnosis   RT to this area      She finished concurrent radiation therapy with weekly Taxol /carboplatin to the chest area as well as to the right neck area  Subsequent CT scan showed no evidence of disease the last CT scan was done in December 2018    Interval History:  I feel very good        Previous Treatment:         Test Results:    Imaging: Ct Soft Tissue Neck W Contrast    Result Date: 1/8/2019  Narrative: CT NECK WITH CONTRAST INDICATION:   C34 11: Malignant neoplasm of upper lobe, right bronchus or lung  COMPARISON:  CT neck study of October 3, 2018 TECHNIQUE:  Axial, sagittal, and coronal 2D reformatted images were created from the axial source data and submitted for interpretation  Radiation dose length product (DLP) for this visit:  248 mGy-cm   This examination, like all CT scans performed in the [facility], was performed utilizing techniques to minimize radiation dose exposure, including the use of iterative reconstruction and automated exposure control  IV Contrast:  85 mL of iohexol (OMNIPAQUE) IMAGE QUALITY:  Diagnostic  FINDINGS: VISUALIZED BRAIN PARENCHYMA:  No acute intracranial pathology of the visualized brain parenchyma  VISUALIZED ORBITS AND PARANASAL SINUSES:  Normal  NASAL CAVITY AND NASOPHARYNX:  Normal  SUPRAHYOID NECK:  Normal oral cavity, tongue base, tonsillar fossa and epiglottis  The previously described ill-defined soft tissue asymmetry at the C3 3 level, dorsal to the right parotid and medial to the anterior border of the sternocleidomastoid muscle and lateral to the right internal carotid artery is again noted  This was previously reported as treated devan disease and it appears to be stable  No new nodular soft tissue abnormality is identified when comparing to the prior studies  INFRAHYOID NECK:  Aryepiglottic folds and piriform sinuses are normal   Normal glottis and subglottic airway  THYROID GLAND:  Redemonstrated bilateral largely hypodense thyroid nodules with stable calcification noted on the right  PAROTID AND SUBMANDIBULAR GLANDS:  The right parotid gland is slightly smaller than the left and demonstrates slight variation in enhancement which may be secondary to radiation therapy  LYMPH NODES:  No pathologic or enlarged adenopathy  VASCULAR STRUCTURES:  Normal enhancement of the cervical vasculature  THORACIC INLET:  Extensive bilateral upper lobe emphysema and pleural scarring  BONY STRUCTURES: Cervical spondylosis primarily at C4-C5 and C5-C6  Impression: 1  Stable CT of the neck when compared to the prior study of October 3, 2018 with no interval change in the previously reported treated devan disease in the high right neck  No new devan disease  2   Extensive bilateral upper lobe emphysema and biapical scarring  Workstation performed: [id number]     Ct Chest W Contrast    Result Date: 1/10/2019  Narrative: CT CHEST WITH IV CONTRAST INDICATION:   C34 11: Malignant neoplasm of upper lobe, right bronchus or lung  COMPARISON:  10/3/2018 TECHNIQUE: CT examination of the chest was performed  Axial, sagittal, and coronal 2D reformatted images were created from the source data and submitted for interpretation  Radiation dose length product (DLP) for this visit:  155 mGy-cm   This examination, like all CT scans performed in the [facility], was performed utilizing techniques to minimize radiation dose exposure, including the use of iterative reconstruction and automated exposure control  IV Contrast:  85 mL of iohexol (OMNIPAQUE) FINDINGS: LUNGS:  There are severe emphysematous changes throughout the lung fields  4 mm subpleural nodule in the right middle lobe is unchanged  Subpleural density in the posterior right lower lobe is also stable  m there is a stable 4 mm nodule in the right lower lobe on series 5, image 51  There is stable scarring peripherally in the right upper lobe  There is stable biapical pleural parenchymal scarring  There is no tracheal or endobronchial lesion  PLEURA:  Unremarkable  HEART/GREAT VESSELS:  Unremarkable for patient's age  MEDIASTINUM AND JENNIFER:  Unremarkable  CHEST WALL AND LOWER NECK:   There is a stable subcentimeter calcified right thyroid nodule  VISUALIZED STRUCTURES IN THE UPPER ABDOMEN:  Unremarkable  OSSEOUS STRUCTURES:  No acute fracture or destructive osseous lesion       Impression: Stable chest with severe biapical pleural parenchymal scarring, peripheral right upper lobe scarring, and severe centrilobular emphysema  Nodules on the prior study in the right middle lobe and posterior right lower lobe are unchanged  No evidence of recurrent or metastatic disease  Workstation performed: [id number]       Labs:   Lab Results   Component Value Date    WBC 5 78 01/03/2019    HGB 12 0 01/03/2019    HCT 38 5 01/03/2019     (H) 01/03/2019     01/03/2019     Lab Results   Component Value Date     02/27/2015    K 4 5 01/03/2019     01/03/2019    CO2 31 01/03/2019    ANIONGAP 10 02/27/2015    BUN 17 01/03/2019    CREATININE 0 64 01/03/2019    GLUCOSE 94 02/27/2015    GLUF 85 01/03/2019    CALCIUM 8 7 01/03/2019    AST 20 01/03/2019    ALT 18 01/03/2019    ALKPHOS 84 01/03/2019    PROT 7 5 02/27/2015    BILITOT 0 53 02/27/2015    EGFR 90 01/03/2019       No results found for: IRON, TIBC, FERRITIN    No results found for: [id number]      ROS:   Review of Systems   Constitutional: Negative for activity change, appetite change, diaphoresis, fatigue, fever and unexpected weight change  HENT: Negative for facial swelling, hearing loss, rhinorrhea, sinus pain, sinus pressure, sneezing, sore throat and tinnitus  Eyes: Negative for photophobia, pain, discharge, redness, itching and visual disturbance  Respiratory: Positive for shortness of breath (Relieved by oxygen)  Negative for apnea and chest tightness  Cardiovascular: Negative for chest pain, palpitations and leg swelling  Gastrointestinal: Negative for abdominal distention, abdominal pain, blood in stool, constipation, diarrhea, nausea, rectal pain and vomiting  Endocrine: Negative for cold intolerance, heat intolerance, polydipsia and polyphagia  Genitourinary: Negative for difficulty urinating, dyspareunia, frequency, hematuria, pelvic pain and urgency  Musculoskeletal: Negative for arthralgias, back pain, gait problem, joint swelling and myalgias  Skin: Negative for color change, pallor and rash     Allergic/Immunologic: Negative for environmental allergies and food allergies  Neurological: Negative for dizziness, tremors, seizures, syncope, speech difficulty, numbness and headaches  Hematological: Negative for adenopathy  Does not bruise/bleed easily  Psychiatric/Behavioral: Negative for agitation, confusion, dysphoric mood, hallucinations and suicidal ideas  Current Medications: Reviewed  Allergies: Reviewed  PMH/FH/SH:  Reviewed      Physical Exam:    Body surface area is 1 37 meters squared  Wt Readings from Last 3 Encounters:   01/18/19 39 5 kg (87 lb)   11/13/18 38 6 kg (85 lb)   10/10/18 38 1 kg (84 lb)        Temp Readings from Last 3 Encounters:   01/18/19 97 9 °F (36 6 °C) (Tympanic)   11/13/18 97 7 °F (36 5 °C) (Tympanic)   10/10/18 97 8 °F (36 6 °C) (Tympanic)        BP Readings from Last 3 Encounters:   01/18/19 114/68   11/13/18 110/66   10/10/18 112/70         Pulse Readings from Last 3 Encounters:   01/18/19 (!) 111   11/13/18 (!) 112   10/10/18 88        Physical Exam   Constitutional: She is oriented to person, place, and time  She appears well-developed and well-nourished  No distress  HENT:   Head: Normocephalic and atraumatic  Mouth/Throat: Oropharynx is clear and moist  No oropharyngeal exudate  Eyes: Pupils are equal, round, and reactive to light  Conjunctivae and EOM are normal    Neck: Normal range of motion  Neck supple  No tracheal deviation present  No thyromegaly present  Cardiovascular: Normal rate and regular rhythm  Exam reveals no gallop and no friction rub  No murmur heard  Pulmonary/Chest: She is in respiratory distress (Distant breath sounds bilaterally)  She has no wheezes  She has no rales  She exhibits no tenderness  Abdominal: Soft  Bowel sounds are normal  She exhibits no distension and no mass  There is no tenderness  There is no rebound and no guarding  Musculoskeletal: Normal range of motion  Lymphadenopathy:     She has no cervical adenopathy  Neurological: She is alert and oriented to person, place, and time  Skin: Skin is warm and dry  No rash noted  She is not diaphoretic  No erythema  No pallor  Psychiatric: She has a normal mood and affect  Her behavior is normal  Judgment and thought content normal    Vitals reviewed  Goals and Barriers:  Current Goal: Minimize effects of disease  Barriers: None  Patient's Capacity to Self Care:  Patient is able to self care      Code Status: @[facility]@

## 2019-01-18 NOTE — LETTER
January 18, 2019     Christopherdell SheriDO don  [address]  [address] [place] [address] [place] [address]    Patient: Mariana Miranda   YOB: 1945   Date of Visit: 1/18/2019       Dear Dr Perrin Prader: Thank you for referring Liss Eller to me for evaluation  Below are my notes for this consultation  If you have questions, please do not hesitate to call me  I look forward to following your patient along with you           Sincerely,        Sana Ceja MD        CC: No Recipients  Sana Ceja MD  1/18/2019  9:57 AM  Sign at close encounter  Hematology Outpatient Follow - Up Note  Mariana Miranda 72 y o  female MRN: @ Encounter: [id number]        Date:  1/18/2019        Assessment/ Plan:     Stage IV adenocarcinoma of the lung diagnosed initially on October 2017, moderately differentiated involving right hilum, mediastinum, precarinal, subcarinal lymph nodes with tumor extension into the right mainstem bronchus, bronchus intermedius, right middle lobe and right cervical area proven by biopsy, molecular tests were negative for EGFR mutation, ALK gene rearrangement, Ross 1 mutation, PDL-1 expression was 0    She was treated with concurrent chemotherapy utilizing weekly Taxol/carboplatin and radiation therapy completed in 02/02/2018    Most recent CT scan of the chest in December 2018 showed no evidence of disease    Follow-up in 6 months with CT scan of the chest without IV contrast, CBC, CMP    Eczema from radiation therapy of the right cervical area, I advised the patient to apply hydrocortisone 0 1% cream intermittently if this is persistent she will follow-up with PCP           HPI:  32-year-old  female seen initially 11/6/2017 regarding moderately differentiated adenocarcinoma of the lung positive for TTF 1 and napsin and negative for P 40 diagnosed via right mainstem bronchus biopsy on October 17, 2017 by Dr Diana Scott  scan chest 9/26/2017 ordered by Dr Diana Scott performed as lung cancer screening study showed subcarinal adenopathy measuring 1 7 cm  Precarinal adenopathy measures 1 9 cm  The right lower lobe endobronchial soft tissue appears mass like  Lung-RADS 4A, suspicious  PET scan 11/2/2017 showed confluent of hypermetabolic right hilar and mediastinal, precarinal, subcarinal lymphadenopathy compatible with malignancy, SUV measures 17 4  right hilar and precarinal adenopathy measures approximately 4 2 x 2 2 cm  Subcarinal adenopathy measures approximately 2 5 x 1 5 cm  Right hilar devan mass image 100 measures 2 7 x 1 47 m  Tumor extends into the right mainstem bronchus, bronchus intermedius, right middle lobe and right lower lobe bronchi, as seen previously  There is also a small right thoracic paraspinal node with mild FDG activity, measuring 1 3 x 0 4 cm, SUV 2 5  is an irregular right upper lung density along the major fissure measuring 1 6 x 1 3 cm, with mild FDG activity, SUV 1 1  This may be inflammatory, versus malignancy  There is an ill-defined hypermetabolic nodule in the right parotid gland region, measuring approximately 2 2 x 1 7 cm SUV measures 11 3  This may represent a metastasis versus primary parotid gland lesions such as a Warthin's tumor or amorphic adenoma  mm left lower lung nodular density too small for PET characterization   Subtle hypermetabolic focus anterior to the right diaphragmatic larisa, SUV 2 6   She has chronic COPD secondary to active smoking, progressive dyspnea on exertion  She has had a chronic cough for many years   Used to smoke 2 packs of cigarettes daily for many many years, she does not drink alcohol   both parents still living has 1 sister in [place]  She elected to proceed with FNA right glucose avid neck mass  Pathology: Conclusive evidence of malignancy  small cell carcinoma consistent with metastasis from the patient's know adenocarcinoma of the lung   On immunostaining, the tumor cells are positive for TTF-1 and Napsin A but negative for p40 helping support the above diagnosis   RT to this area      She finished concurrent radiation therapy with weekly Taxol /carboplatin to the chest area as well as to the right neck area  Subsequent CT scan showed no evidence of disease the last CT scan was done in December 2018    Interval History:  I feel very good        Previous Treatment:         Test Results:    Imaging: Ct Soft Tissue Neck W Contrast    Result Date: 1/8/2019  Narrative: CT NECK WITH CONTRAST INDICATION:   C34 11: Malignant neoplasm of upper lobe, right bronchus or lung  COMPARISON:  CT neck study of October 3, 2018 TECHNIQUE:  Axial, sagittal, and coronal 2D reformatted images were created from the axial source data and submitted for interpretation  Radiation dose length product (DLP) for this visit:  248 mGy-cm   This examination, like all CT scans performed in the [facility], was performed utilizing techniques to minimize radiation dose exposure, including the use of iterative reconstruction and automated exposure control  IV Contrast:  85 mL of iohexol (OMNIPAQUE) IMAGE QUALITY:  Diagnostic  FINDINGS: VISUALIZED BRAIN PARENCHYMA:  No acute intracranial pathology of the visualized brain parenchyma  VISUALIZED ORBITS AND PARANASAL SINUSES:  Normal  NASAL CAVITY AND NASOPHARYNX:  Normal  SUPRAHYOID NECK:  Normal oral cavity, tongue base, tonsillar fossa and epiglottis  The previously described ill-defined soft tissue asymmetry at the C3 3 level, dorsal to the right parotid and medial to the anterior border of the sternocleidomastoid muscle and lateral to the right internal carotid artery is again noted  This was previously reported as treated devan disease and it appears to be stable  No new nodular soft tissue abnormality is identified when comparing to the prior studies  INFRAHYOID NECK:  Aryepiglottic folds and piriform sinuses are normal   Normal glottis and subglottic airway   THYROID GLAND:  Redemonstrated bilateral largely hypodense thyroid nodules with stable calcification noted on the right  PAROTID AND SUBMANDIBULAR GLANDS:  The right parotid gland is slightly smaller than the left and demonstrates slight variation in enhancement which may be secondary to radiation therapy  LYMPH NODES:  No pathologic or enlarged adenopathy  VASCULAR STRUCTURES:  Normal enhancement of the cervical vasculature  THORACIC INLET:  Extensive bilateral upper lobe emphysema and pleural scarring  BONY STRUCTURES: Cervical spondylosis primarily at C4-C5 and C5-C6  Impression: 1  Stable CT of the neck when compared to the prior study of October 3, 2018 with no interval change in the previously reported treated devan disease in the high right neck  No new devan disease  2   Extensive bilateral upper lobe emphysema and biapical scarring  Workstation performed: [id number]     Ct Chest W Contrast    Result Date: 1/10/2019  Narrative: CT CHEST WITH IV CONTRAST INDICATION:   C34 11: Malignant neoplasm of upper lobe, right bronchus or lung  COMPARISON:  10/3/2018 TECHNIQUE: CT examination of the chest was performed  Axial, sagittal, and coronal 2D reformatted images were created from the source data and submitted for interpretation  Radiation dose length product (DLP) for this visit:  155 mGy-cm   This examination, like all CT scans performed in the [facility], was performed utilizing techniques to minimize radiation dose exposure, including the use of iterative reconstruction and automated exposure control  IV Contrast:  85 mL of iohexol (OMNIPAQUE) FINDINGS: LUNGS:  There are severe emphysematous changes throughout the lung fields  4 mm subpleural nodule in the right middle lobe is unchanged  Subpleural density in the posterior right lower lobe is also stable  m there is a stable 4 mm nodule in the right lower lobe on series 5, image 51  There is stable scarring peripherally in the right upper lobe   There is stable biapical pleural parenchymal scarring  There is no tracheal or endobronchial lesion  PLEURA:  Unremarkable  HEART/GREAT VESSELS:  Unremarkable for patient's age  MEDIASTINUM AND JENNIFER:  Unremarkable  CHEST WALL AND LOWER NECK:   There is a stable subcentimeter calcified right thyroid nodule  VISUALIZED STRUCTURES IN THE UPPER ABDOMEN:  Unremarkable  OSSEOUS STRUCTURES:  No acute fracture or destructive osseous lesion  Impression: Stable chest with severe biapical pleural parenchymal scarring, peripheral right upper lobe scarring, and severe centrilobular emphysema  Nodules on the prior study in the right middle lobe and posterior right lower lobe are unchanged  No evidence of recurrent or metastatic disease  Workstation performed: [id number]       Labs:   Lab Results   Component Value Date    WBC 5 78 01/03/2019    HGB 12 0 01/03/2019    HCT 38 5 01/03/2019     (H) 01/03/2019     01/03/2019     Lab Results   Component Value Date     02/27/2015    K 4 5 01/03/2019     01/03/2019    CO2 31 01/03/2019    ANIONGAP 10 02/27/2015    BUN 17 01/03/2019    CREATININE 0 64 01/03/2019    GLUCOSE 94 02/27/2015    GLUF 85 01/03/2019    CALCIUM 8 7 01/03/2019    AST 20 01/03/2019    ALT 18 01/03/2019    ALKPHOS 84 01/03/2019    PROT 7 5 02/27/2015    BILITOT 0 53 02/27/2015    EGFR 90 01/03/2019       No results found for: IRON, TIBC, FERRITIN    No results found for: [id number]      ROS:   Review of Systems   Constitutional: Negative for activity change, appetite change, diaphoresis, fatigue, fever and unexpected weight change  HENT: Negative for facial swelling, hearing loss, rhinorrhea, sinus pain, sinus pressure, sneezing, sore throat and tinnitus  Eyes: Negative for photophobia, pain, discharge, redness, itching and visual disturbance  Respiratory: Positive for shortness of breath (Relieved by oxygen)  Negative for apnea and chest tightness      Cardiovascular: Negative for chest pain, palpitations and leg swelling  Gastrointestinal: Negative for abdominal distention, abdominal pain, blood in stool, constipation, diarrhea, nausea, rectal pain and vomiting  Endocrine: Negative for cold intolerance, heat intolerance, polydipsia and polyphagia  Genitourinary: Negative for difficulty urinating, dyspareunia, frequency, hematuria, pelvic pain and urgency  Musculoskeletal: Negative for arthralgias, back pain, gait problem, joint swelling and myalgias  Skin: Negative for color change, pallor and rash  Allergic/Immunologic: Negative for environmental allergies and food allergies  Neurological: Negative for dizziness, tremors, seizures, syncope, speech difficulty, numbness and headaches  Hematological: Negative for adenopathy  Does not bruise/bleed easily  Psychiatric/Behavioral: Negative for agitation, confusion, dysphoric mood, hallucinations and suicidal ideas  Current Medications: Reviewed  Allergies: Reviewed  PMH/FH/SH:  Reviewed      Physical Exam:    Body surface area is 1 37 meters squared  Wt Readings from Last 3 Encounters:   01/18/19 39 5 kg (87 lb)   11/13/18 38 6 kg (85 lb)   10/10/18 38 1 kg (84 lb)        Temp Readings from Last 3 Encounters:   01/18/19 97 9 °F (36 6 °C) (Tympanic)   11/13/18 97 7 °F (36 5 °C) (Tympanic)   10/10/18 97 8 °F (36 6 °C) (Tympanic)        BP Readings from Last 3 Encounters:   01/18/19 114/68   11/13/18 110/66   10/10/18 112/70         Pulse Readings from Last 3 Encounters:   01/18/19 (!) 111   11/13/18 (!) 112   10/10/18 88        Physical Exam   Constitutional: She is oriented to person, place, and time  She appears well-developed and well-nourished  No distress  HENT:   Head: Normocephalic and atraumatic  Mouth/Throat: Oropharynx is clear and moist  No oropharyngeal exudate  Eyes: Pupils are equal, round, and reactive to light  Conjunctivae and EOM are normal    Neck: Normal range of motion  Neck supple   No tracheal deviation present  No thyromegaly present  Cardiovascular: Normal rate and regular rhythm  Exam reveals no gallop and no friction rub  No murmur heard  Pulmonary/Chest: She is in respiratory distress (Distant breath sounds bilaterally)  She has no wheezes  She has no rales  She exhibits no tenderness  Abdominal: Soft  Bowel sounds are normal  She exhibits no distension and no mass  There is no tenderness  There is no rebound and no guarding  Musculoskeletal: Normal range of motion  Lymphadenopathy:     She has no cervical adenopathy  Neurological: She is alert and oriented to person, place, and time  Skin: Skin is warm and dry  No rash noted  She is not diaphoretic  No erythema  No pallor  Psychiatric: She has a normal mood and affect  Her behavior is normal  Judgment and thought content normal    Vitals reviewed  Goals and Barriers:  Current Goal: Minimize effects of disease  Barriers: None  Patient's Capacity to Self Care:  Patient is able to self care      Code Status: [unfilled]

## 2019-02-26 ENCOUNTER — OFFICE VISIT (OUTPATIENT)
Dept: INTERNAL MEDICINE CLINIC | Facility: CLINIC | Age: 74
End: 2019-02-26
Payer: MEDICARE

## 2019-02-26 VITALS
WEIGHT: 88 LBS | DIASTOLIC BLOOD PRESSURE: 70 MMHG | SYSTOLIC BLOOD PRESSURE: 110 MMHG | BODY MASS INDEX: 15.03 KG/M2 | OXYGEN SATURATION: 90 % | HEART RATE: 107 BPM | TEMPERATURE: 97.6 F | HEIGHT: 64 IN

## 2019-02-26 DIAGNOSIS — E78.01 FAMILIAL HYPERCHOLESTEROLEMIA: ICD-10-CM

## 2019-02-26 DIAGNOSIS — Z00.00 HEALTHCARE MAINTENANCE: ICD-10-CM

## 2019-02-26 DIAGNOSIS — C34.11 MALIGNANT NEOPLASM OF UPPER LOBE OF RIGHT LUNG (HCC): ICD-10-CM

## 2019-02-26 DIAGNOSIS — J41.0 SIMPLE CHRONIC BRONCHITIS (HCC): ICD-10-CM

## 2019-02-26 DIAGNOSIS — J96.11 CHRONIC HYPOXEMIC RESPIRATORY FAILURE (HCC): ICD-10-CM

## 2019-02-26 DIAGNOSIS — R63.0 LOSS OF APPETITE: ICD-10-CM

## 2019-02-26 DIAGNOSIS — Z23 ENCOUNTER FOR IMMUNIZATION: Primary | ICD-10-CM

## 2019-02-26 DIAGNOSIS — R63.4 ABNORMAL WEIGHT LOSS: ICD-10-CM

## 2019-02-26 DIAGNOSIS — R63.6 UNDERWEIGHT: ICD-10-CM

## 2019-02-26 PROCEDURE — 90732 PPSV23 VACC 2 YRS+ SUBQ/IM: CPT | Performed by: INTERNAL MEDICINE

## 2019-02-26 PROCEDURE — G0009 ADMIN PNEUMOCOCCAL VACCINE: HCPCS | Performed by: INTERNAL MEDICINE

## 2019-02-26 PROCEDURE — 99214 OFFICE O/P EST MOD 30 MIN: CPT | Performed by: INTERNAL MEDICINE

## 2019-02-26 NOTE — ASSESSMENT & PLAN NOTE
Patient has a strong family history of hyperlipidemia, coronary artery disease she does have a history in the past of elevated cholesterol levels    We will recheck a lipid profile with patient and depending on the results possibly place the patient on a statin drug in order to reduce her cholesterol rather than

## 2019-02-26 NOTE — PROGRESS NOTES
Assessment/Plan:    Malignant neoplasm of right lung [facility])  Patient continues to follow-up with her oncologist, pulmonologist   Recent studies show no evidence of recurrence of disease  Patient is still handicaps by our underlying lung disease  Despite the fact she states she is not smoking she does smell of tobacco smoke and she was told the importance of abstaining from cigarettes  Her O2 sat was slightly low initially with presentation but did go up to 96 percent when patient was at rest   Patient is using her supplemental O2  Patient will continue to follow up with Oncology and pulmonology    Chronic hypoxemic respiratory failure ([facility] [address] )  Again patient is handicaps with her underlying lung disease, chronic hypoxia and is on chronic O2  Again we insisted the patient refrain from smoking cigarettes which can compromise her pulmonary status even more  Patient will continue to follow up with her pulmonologist    Abnormal weight loss  The patient is slowly gaining weight  She still felt to be underweight  She is supplementing her diet with Ensure and boost   She states her appetite is good and she is having no GI problems    Hyperlipidemia  Patient has a strong family history of hyperlipidemia, coronary artery disease she does have a history in the past of elevated cholesterol levels  We will recheck a lipid profile with patient and depending on the results possibly place the patient on a statin drug in order to reduce her cholesterol rather than       Diagnoses and all orders for this visit:    Encounter for immunization  -     PNEUMOCOCCAL POLYSACCHARIDE VACCINE 23-VALENT =>1YO SQ IM    Familial hypercholesterolemia  -     Lipid panel;  Future    Simple chronic bronchitis (HCC)    Chronic hypoxemic respiratory failure (HCC)    Malignant neoplasm of upper lobe of right lung (HCC)    Abnormal weight loss    Healthcare maintenance    Loss of appetite    Underweight          Subjective:      Patient ID: Al Chan Lavelle Clayton is a 72 y o  female  Patient is a 62-year-old female with a history of multiple medical problems as outlined previously  She does have a history of malignant tumor to the lungs status post radiation and chemotherapy and now is consider disease-free  Patient states in general she is doing well and has no new complaints or problems  She remains O2 dependent  The following portions of the patient's history were reviewed and updated as appropriate:   She  has a past medical history of Breast cancer ([facility] [address] ) (1996), COPD (chronic obstructive pulmonary disease) ([facility] ), and Requires supplemental oxygen  She   Patient Active Problem List    Diagnosis Date Noted    Simple chronic bronchitis ([facility] ) 02/26/2019    Chronic hypoxemic respiratory failure ([facility] ) 02/26/2019    Healthcare maintenance 08/29/2018    Malignant neoplasm of right lung ([facility] ) 03/09/2018    Hyperlipidemia 04/08/2016    Loss of appetite 01/23/2015    Abnormal weight loss 10/05/2012     She  has a past surgical history that includes Appendectomy; Tonsillectomy; Mastectomy, radical (Right, 1996); Colonoscopy (N/A, 4/18/2016); and pr bronchoscopy,diagnostic (N/A, 10/17/2017)  Her family history includes Heart disease in her mother  She  reports that she quit smoking about 20 months ago  She started smoking about 57 years ago  She has a 27 50 pack-year smoking history  She has never used smokeless tobacco  She reports that she does not drink alcohol or use drugs    Current Outpatient Medications   Medication Sig Dispense Refill    albuterol (VENTOLIN HFA) 90 mcg/act inhaler Inhale      arformoterol (BROVANA) 15 mcg/2 mL nebulizer solution Take 1 vial (15 mcg total) by nebulization 2 (two) times a day 180 vial 3    budesonide (PULMICORT) 0 25 mg/2 mL nebulizer solution Take 1 vial (0 25 mg total) by nebulization 2 (two) times a day 60 vial 5    budesonide (PULMICORT) 0 5 mg/2 mL nebulizer solution USE 1 UNIT DOSE VIA NEBULIZER TWO TIMES A  mL 5    Cholecalciferol (VITAMIN D) 2000 UNITS tablet Take 2,000 Units by mouth daily   ipratropium (ATROVENT) 0 02 % nebulizer solution Take 0 5 mg by nebulization 3 (three) times a day        Multiple Vitamins-Minerals (MULTIVITAMIN WITH MINERALS) tablet Take 1 tablet by mouth daily   prochlorperazine (COMPAZINE) 10 mg tablet Take 1 tablet by mouth every 6 (six) hours as needed       No current facility-administered medications for this visit  Current Outpatient Medications on File Prior to Visit   Medication Sig    albuterol (VENTOLIN HFA) 90 mcg/act inhaler Inhale    arformoterol (BROVANA) 15 mcg/2 mL nebulizer solution Take 1 vial (15 mcg total) by nebulization 2 (two) times a day    budesonide (PULMICORT) 0 25 mg/2 mL nebulizer solution Take 1 vial (0 25 mg total) by nebulization 2 (two) times a day    budesonide (PULMICORT) 0 5 mg/2 mL nebulizer solution USE 1 UNIT DOSE VIA NEBULIZER TWO TIMES A DAY    Cholecalciferol (VITAMIN D) 2000 UNITS tablet Take 2,000 Units by mouth daily   ipratropium (ATROVENT) 0 02 % nebulizer solution Take 0 5 mg by nebulization 3 (three) times a day      Multiple Vitamins-Minerals (MULTIVITAMIN WITH MINERALS) tablet Take 1 tablet by mouth daily   prochlorperazine (COMPAZINE) 10 mg tablet Take 1 tablet by mouth every 6 (six) hours as needed     No current facility-administered medications on file prior to visit  She has No Known Allergies       Review of Systems   Constitutional: Negative  HENT: Negative  Eyes: Positive for visual disturbance ( we some decreased visual acuity and she plans to be seen by an ophthalmologist in the near future)  Negative for photophobia, pain, discharge, redness and itching  Respiratory: Positive for shortness of breath (Chronic shortness of breath with exertion)  Negative for apnea, cough, choking, chest tightness, wheezing and stridor  Cardiovascular: Negative      Gastrointestinal: Negative  Endocrine: Negative  Genitourinary: Negative  Musculoskeletal: Negative  Skin: Positive for color change ( a new growth to on the outer portion of her left ear)  Negative for pallor, rash and wound  Neurological: Negative  Hematological: Negative  Psychiatric/Behavioral: Negative  Objective:      /70   Pulse (!) 107   Temp 97 6 °F (36 4 °C)   Ht 5' 4" (1 626 m)   Wt 39 9 kg (88 lb)   SpO2 90%   BMI 15 11 kg/m²          Physical Exam   Constitutional: She is oriented to person, place, and time  She appears well-developed and well-nourished  No distress  Pleasant, cheerful, extremely thin 72-year-old female who is awake alert, wearing her O2 the   HENT:   Head: Normocephalic and atraumatic  Right Ear: External ear normal    Left Ear: External ear normal    Nose: Nose normal    Mouth/Throat: Oropharynx is clear and moist  No oropharyngeal exudate  Eyes: Pupils are equal, round, and reactive to light  Conjunctivae and EOM are normal  Right eye exhibits no discharge  Left eye exhibits no discharge  No scleral icterus  Neck: Normal range of motion  Neck supple  No JVD present  No tracheal deviation present  No thyromegaly present  Cardiovascular: Regular rhythm, normal heart sounds and intact distal pulses  Exam reveals no gallop and no friction rub  No murmur heard  Sinus tachycardia with regular rhythm   Pulmonary/Chest: No stridor  No respiratory distress  She has no wheezes  She has no rales  She exhibits no tenderness  The profoundly decreased breath sounds anteriorly and posteriorly, some tachypnea with effort, movement   Abdominal: Soft  Bowel sounds are normal  She exhibits no distension and no mass  There is no tenderness  There is no rebound and no guarding  No hernia  Musculoskeletal: Normal range of motion  She exhibits no edema or tenderness  Lymphadenopathy:     She has no cervical adenopathy     Neurological: She is alert and oriented to person, place, and time  She displays normal reflexes  No cranial nerve deficit or sensory deficit  She exhibits normal muscle tone  Coordination normal    Skin: Skin is warm and dry  She is not diaphoretic  A papular lesion to the top of her left external ear which is possible new malignancy  Patient will make an appointment to be seen by Dermatology in the near future   Psychiatric: She has a normal mood and affect  Her behavior is normal  Judgment and thought content normal    Nursing note and vitals reviewed  BMI Counseling: Body mass index is 15 11 kg/m²  Discussed the patient's BMI with her  The BMI is below average  BMI counseling and education was provided to the patient  Patient was advised to gain weight

## 2019-02-26 NOTE — ASSESSMENT & PLAN NOTE
The patient is slowly gaining weight  She still felt to be underweight    She is supplementing her diet with Ensure and boost   She states her appetite is good and she is having no GI problems 13-Nov-2018 14:19

## 2019-02-26 NOTE — ASSESSMENT & PLAN NOTE
Patient continues to follow-up with her oncologist, pulmonologist   Recent studies show no evidence of recurrence of disease  Patient is still handicaps by our underlying lung disease  Despite the fact she states she is not smoking she does smell of tobacco smoke and she was told the importance of abstaining from cigarettes  Her O2 sat was slightly low initially with presentation but did go up to 96 percent when patient was at rest   Patient is using her supplemental O2    Patient will continue to follow up with Oncology and pulmonology

## 2019-02-26 NOTE — ASSESSMENT & PLAN NOTE
Again patient is handicaps with her underlying lung disease, chronic hypoxia and is on chronic O2  Again we insisted the patient refrain from smoking cigarettes which can compromise her pulmonary status even more    Patient will continue to follow up with her pulmonologist

## 2019-03-07 ENCOUNTER — OFFICE VISIT (OUTPATIENT)
Dept: INTERNAL MEDICINE CLINIC | Facility: CLINIC | Age: 74
End: 2019-03-07
Payer: MEDICARE

## 2019-03-07 ENCOUNTER — APPOINTMENT (EMERGENCY)
Dept: RADIOLOGY | Facility: HOSPITAL | Age: 74
DRG: 871 | End: 2019-03-07
Payer: MEDICARE

## 2019-03-07 ENCOUNTER — HOSPITAL ENCOUNTER (INPATIENT)
Facility: HOSPITAL | Age: 74
LOS: 7 days | Discharge: HOME HEALTH | DRG: 871 | End: 2019-03-14
Attending: EMERGENCY MEDICINE | Admitting: INTERNAL MEDICINE
Payer: MEDICARE

## 2019-03-07 VITALS
HEIGHT: 64 IN | DIASTOLIC BLOOD PRESSURE: 72 MMHG | BODY MASS INDEX: 14.95 KG/M2 | OXYGEN SATURATION: 84 % | SYSTOLIC BLOOD PRESSURE: 122 MMHG | WEIGHT: 87.6 LBS | HEART RATE: 131 BPM | TEMPERATURE: 100.8 F

## 2019-03-07 DIAGNOSIS — J96.11 CHRONIC HYPOXEMIC RESPIRATORY FAILURE (HCC): ICD-10-CM

## 2019-03-07 DIAGNOSIS — R06.03 RESPIRATORY DISTRESS: Primary | ICD-10-CM

## 2019-03-07 DIAGNOSIS — R06.02 SHORTNESS OF BREATH: ICD-10-CM

## 2019-03-07 DIAGNOSIS — R63.4 ABNORMAL WEIGHT LOSS: ICD-10-CM

## 2019-03-07 DIAGNOSIS — J44.1 COPD EXACERBATION (HCC): Primary | ICD-10-CM

## 2019-03-07 DIAGNOSIS — J20.9 ACUTE BRONCHITIS: ICD-10-CM

## 2019-03-07 DIAGNOSIS — C34.11 MALIGNANT NEOPLASM OF UPPER LOBE OF RIGHT LUNG (HCC): ICD-10-CM

## 2019-03-07 PROBLEM — F32.A DEPRESSIVE DISORDER: Status: ACTIVE | Noted: 2017-11-14

## 2019-03-07 LAB
ALBUMIN SERPL BCP-MCNC: 3.1 G/DL (ref 3.5–5)
ALP SERPL-CCNC: 124 U/L (ref 46–116)
ALT SERPL W P-5'-P-CCNC: 16 U/L (ref 12–78)
ANION GAP SERPL CALCULATED.3IONS-SCNC: 7 MMOL/L (ref 4–13)
APTT PPP: 30 SECONDS (ref 26–38)
AST SERPL W P-5'-P-CCNC: 23 U/L (ref 5–45)
BASOPHILS # BLD AUTO: 0.03 THOUSANDS/ΜL (ref 0–0.1)
BASOPHILS NFR BLD AUTO: 0 % (ref 0–1)
BILIRUB SERPL-MCNC: 0.49 MG/DL (ref 0.2–1)
BUN SERPL-MCNC: 16 MG/DL (ref 5–25)
CALCIUM SERPL-MCNC: 9 MG/DL (ref 8.3–10.1)
CHLORIDE SERPL-SCNC: 102 MMOL/L (ref 100–108)
CO2 SERPL-SCNC: 22 MMOL/L (ref 21–32)
CREAT SERPL-MCNC: 0.7 MG/DL (ref 0.6–1.3)
EOSINOPHIL # BLD AUTO: 0.02 THOUSAND/ΜL (ref 0–0.61)
EOSINOPHIL NFR BLD AUTO: 0 % (ref 0–6)
ERYTHROCYTE [DISTWIDTH] IN BLOOD BY AUTOMATED COUNT: 13.2 % (ref 11.6–15.1)
GFR SERPL CREATININE-BSD FRML MDRD: 87 ML/MIN/1.73SQ M
GLUCOSE SERPL-MCNC: 120 MG/DL (ref 65–140)
HCT VFR BLD AUTO: 40.3 % (ref 34.8–46.1)
HGB BLD-MCNC: 12.7 G/DL (ref 11.5–15.4)
HOLD SPECIMEN: NORMAL
IMM GRANULOCYTES # BLD AUTO: 0.05 THOUSAND/UL (ref 0–0.2)
IMM GRANULOCYTES NFR BLD AUTO: 1 % (ref 0–2)
INR PPP: 1.03 (ref 0.86–1.17)
LACTATE SERPL-SCNC: 1.3 MMOL/L (ref 0.5–2)
LYMPHOCYTES # BLD AUTO: 0.59 THOUSANDS/ΜL (ref 0.6–4.47)
LYMPHOCYTES NFR BLD AUTO: 6 % (ref 14–44)
MCH RBC QN AUTO: 31.6 PG (ref 26.8–34.3)
MCHC RBC AUTO-ENTMCNC: 31.5 G/DL (ref 31.4–37.4)
MCV RBC AUTO: 100 FL (ref 82–98)
MONOCYTES # BLD AUTO: 0.96 THOUSAND/ΜL (ref 0.17–1.22)
MONOCYTES NFR BLD AUTO: 9 % (ref 4–12)
NEUTROPHILS # BLD AUTO: 9.01 THOUSANDS/ΜL (ref 1.85–7.62)
NEUTS SEG NFR BLD AUTO: 84 % (ref 43–75)
NRBC BLD AUTO-RTO: 0 /100 WBCS
NT-PROBNP SERPL-MCNC: 936 PG/ML
PLATELET # BLD AUTO: 294 THOUSANDS/UL (ref 149–390)
PMV BLD AUTO: 9.4 FL (ref 8.9–12.7)
POTASSIUM SERPL-SCNC: 4.7 MMOL/L (ref 3.5–5.3)
PROT SERPL-MCNC: 7.8 G/DL (ref 6.4–8.2)
PROTHROMBIN TIME: 13.6 SECONDS (ref 11.8–14.2)
RBC # BLD AUTO: 4.02 MILLION/UL (ref 3.81–5.12)
SODIUM SERPL-SCNC: 131 MMOL/L (ref 136–145)
TROPONIN I SERPL-MCNC: <0.02 NG/ML
WBC # BLD AUTO: 10.66 THOUSAND/UL (ref 4.31–10.16)

## 2019-03-07 PROCEDURE — 71275 CT ANGIOGRAPHY CHEST: CPT

## 2019-03-07 PROCEDURE — 99223 1ST HOSP IP/OBS HIGH 75: CPT | Performed by: INTERNAL MEDICINE

## 2019-03-07 PROCEDURE — 80053 COMPREHEN METABOLIC PANEL: CPT | Performed by: EMERGENCY MEDICINE

## 2019-03-07 PROCEDURE — 85025 COMPLETE CBC W/AUTO DIFF WBC: CPT | Performed by: EMERGENCY MEDICINE

## 2019-03-07 PROCEDURE — 84484 ASSAY OF TROPONIN QUANT: CPT | Performed by: EMERGENCY MEDICINE

## 2019-03-07 PROCEDURE — 94640 AIRWAY INHALATION TREATMENT: CPT

## 2019-03-07 PROCEDURE — 83880 ASSAY OF NATRIURETIC PEPTIDE: CPT | Performed by: EMERGENCY MEDICINE

## 2019-03-07 PROCEDURE — 85610 PROTHROMBIN TIME: CPT | Performed by: EMERGENCY MEDICINE

## 2019-03-07 PROCEDURE — 99214 OFFICE O/P EST MOD 30 MIN: CPT | Performed by: INTERNAL MEDICINE

## 2019-03-07 PROCEDURE — 87040 BLOOD CULTURE FOR BACTERIA: CPT | Performed by: EMERGENCY MEDICINE

## 2019-03-07 PROCEDURE — 87631 RESP VIRUS 3-5 TARGETS: CPT | Performed by: EMERGENCY MEDICINE

## 2019-03-07 PROCEDURE — 96360 HYDRATION IV INFUSION INIT: CPT

## 2019-03-07 PROCEDURE — 85730 THROMBOPLASTIN TIME PARTIAL: CPT | Performed by: EMERGENCY MEDICINE

## 2019-03-07 PROCEDURE — 36415 COLL VENOUS BLD VENIPUNCTURE: CPT

## 2019-03-07 PROCEDURE — 1123F ACP DISCUSS/DSCN MKR DOCD: CPT | Performed by: FAMILY MEDICINE

## 2019-03-07 PROCEDURE — 71046 X-RAY EXAM CHEST 2 VIEWS: CPT

## 2019-03-07 PROCEDURE — 99285 EMERGENCY DEPT VISIT HI MDM: CPT

## 2019-03-07 PROCEDURE — 83605 ASSAY OF LACTIC ACID: CPT | Performed by: EMERGENCY MEDICINE

## 2019-03-07 PROCEDURE — 93005 ELECTROCARDIOGRAM TRACING: CPT

## 2019-03-07 PROCEDURE — 96361 HYDRATE IV INFUSION ADD-ON: CPT

## 2019-03-07 RX ORDER — SODIUM CHLORIDE FOR INHALATION 0.9 %
3 VIAL, NEBULIZER (ML) INHALATION ONCE
Status: COMPLETED | OUTPATIENT
Start: 2019-03-07 | End: 2019-03-07

## 2019-03-07 RX ORDER — METHYLPREDNISOLONE SODIUM SUCCINATE 125 MG/2ML
125 INJECTION, POWDER, LYOPHILIZED, FOR SOLUTION INTRAMUSCULAR; INTRAVENOUS ONCE
Status: COMPLETED | OUTPATIENT
Start: 2019-03-07 | End: 2019-03-07

## 2019-03-07 RX ORDER — IPRATROPIUM BROMIDE AND ALBUTEROL SULFATE 2.5; .5 MG/3ML; MG/3ML
3 SOLUTION RESPIRATORY (INHALATION) ONCE
Status: COMPLETED | OUTPATIENT
Start: 2019-03-07 | End: 2019-03-07

## 2019-03-07 RX ORDER — ACETAMINOPHEN 325 MG/1
650 TABLET ORAL ONCE
Status: COMPLETED | OUTPATIENT
Start: 2019-03-07 | End: 2019-03-07

## 2019-03-07 RX ADMIN — AZITHROMYCIN MONOHYDRATE 500 MG: 500 INJECTION, POWDER, LYOPHILIZED, FOR SOLUTION INTRAVENOUS at 21:52

## 2019-03-07 RX ADMIN — IPRATROPIUM BROMIDE AND ALBUTEROL SULFATE 3 ML: 2.5; .5 SOLUTION RESPIRATORY (INHALATION) at 18:56

## 2019-03-07 RX ADMIN — ACETAMINOPHEN 650 MG: 325 TABLET ORAL at 18:18

## 2019-03-07 RX ADMIN — SODIUM CHLORIDE 1000 ML: 0.9 INJECTION, SOLUTION INTRAVENOUS at 18:20

## 2019-03-07 RX ADMIN — IOHEXOL 85 ML: 350 INJECTION, SOLUTION INTRAVENOUS at 20:17

## 2019-03-07 RX ADMIN — ISODIUM CHLORIDE 3 ML: 0.03 SOLUTION RESPIRATORY (INHALATION) at 18:57

## 2019-03-07 RX ADMIN — METHYLPREDNISOLONE SODIUM SUCCINATE 125 MG: 125 INJECTION, POWDER, FOR SOLUTION INTRAMUSCULAR; INTRAVENOUS at 21:52

## 2019-03-07 NOTE — ED NOTES
Stuck pt x2 in the left arm for blood work  Veins blew both times, only able to obtain 1 set of cultures, light blue tube, and a small amount in the green yellow tube  Unable to use right arm per pt due to mastectomy  Pt refuses further sticks and EKG at this time, states she will wait until she gets a room  Charge RN aware        Cindy Garnett  02/79/34 [id number]

## 2019-03-07 NOTE — ED ATTENDING ATTESTATION
Ari Charles MD, saw and evaluated the patient  I have discussed the patient with the resident/non-physician practitioner and agree with the resident's/non-physician practitioner's findings, Plan of Care, and MDM as documented in the resident's/non-physician practitioner's note, except where noted  All available labs and Radiology studies were reviewed  I was present for key portions of any procedure(s) performed by the resident/non-physician practitioner and I was immediately available to provide assistance  At this point I agree with the current assessment done in the Emergency Department  I have conducted an independent evaluation of this patient a history and physical is as follows:      Critical Care Time  Procedures     73 yo female with lung ca, breast ca, copd, having increased sob for last few days with congestion and cough  Pt flew back from [place] yesterday  Pt seen by pcp today and sent in for fever and not looking well  Pt with sputum production  Pt on home oxygen, no cp  Pt taking nebs at home with some relief  No current steroids  Vss, tachy, febrile, lungs with mild wheezing, no crackles, rrr, abdomen soft nontender, no calf tenderness, no pedal edema  Septic workup, trop, bnp, cxr, flu pcr, duoneb, ivf

## 2019-03-07 NOTE — PROGRESS NOTES
Assessment/Plan:    Respiratory distress  Patient is here today for evaluation  She has a history of chronic hypoxemic respiratory failure, history of malignant neoplasm of the lung status post extensive chemotherapy treatment and now is in remission  Patient was recently on a trip to see her family in [place]  She states upon return she began to have problems with shortness of breath, cough, low-grade temperatures  She states the cough is nonproductive  Patient is showing a low-grade temperature tachycardia, increased respiratory rate of 25, O2 sat originally was down to his 84%  Patient was sent directly to the emergency room for evaluation and treatment will on pneumonia  Lung sounds were decreased with rhonchi heard and felt anteriorly and posteriorly  Diagnoses and all orders for this visit:    Respiratory distress  -     Transfer to other facility          Subjective:      Patient ID: Mary Alice Reyes is a 72 y o  female  Patient is a 82-year-old female with a history of chronic obstructive pulmonary disease, severe with chronic hypoxia, history of carcinoma of the lung status post extensive chemotherapy now considered in remission  She is presenting today with severe shortness of breath, cough, low-grade temperature and hypoxia      The following portions of the patient's history were reviewed and updated as appropriate:   She  has a past medical history of Breast cancer ([facility] [address] ) (1996), COPD (chronic obstructive pulmonary disease) ([facility] [address] ), and Requires supplemental oxygen    She   Patient Active Problem List    Diagnosis Date Noted    Respiratory distress 03/07/2019    Simple chronic bronchitis ([facility] [address] ) 02/26/2019    Chronic hypoxemic respiratory failure ([facility] [address] ) 02/26/2019    Healthcare maintenance 08/29/2018    Malignant neoplasm of right lung ([facility] [address] ) 03/09/2018    Hyperlipidemia 04/08/2016    Loss of appetite 01/23/2015    Abnormal weight loss 10/05/2012     She  has a past surgical history that includes Appendectomy; Tonsillectomy; Mastectomy, radical (Right, 1996); Colonoscopy (N/A, 4/18/2016); pr bronchoscopy,diagnostic (N/A, 10/17/2017); and Lung cancer surgery  Her family history includes Heart disease in her mother  She  reports that she quit smoking about 21 months ago  She started smoking about 57 years ago  She has a 27 50 pack-year smoking history  She has never used smokeless tobacco  She reports that she does not drink alcohol or use drugs  Current Outpatient Medications   Medication Sig Dispense Refill    albuterol (VENTOLIN HFA) 90 mcg/act inhaler Inhale      arformoterol (BROVANA) 15 mcg/2 mL nebulizer solution Take 1 vial (15 mcg total) by nebulization 2 (two) times a day 180 vial 3    budesonide (PULMICORT) 0 25 mg/2 mL nebulizer solution Take 1 vial (0 25 mg total) by nebulization 2 (two) times a day 60 vial 5    budesonide (PULMICORT) 0 5 mg/2 mL nebulizer solution USE 1 UNIT DOSE VIA NEBULIZER TWO TIMES A  mL 5    Cholecalciferol (VITAMIN D) 2000 UNITS tablet Take 2,000 Units by mouth daily   ipratropium (ATROVENT) 0 02 % nebulizer solution Take 0 5 mg by nebulization 3 (three) times a day        Multiple Vitamins-Minerals (MULTIVITAMIN WITH MINERALS) tablet Take 1 tablet by mouth daily   prochlorperazine (COMPAZINE) 10 mg tablet Take 1 tablet by mouth every 6 (six) hours as needed       No current facility-administered medications for this visit        Current Outpatient Medications on File Prior to Visit   Medication Sig    albuterol (VENTOLIN HFA) 90 mcg/act inhaler Inhale    arformoterol (BROVANA) 15 mcg/2 mL nebulizer solution Take 1 vial (15 mcg total) by nebulization 2 (two) times a day    budesonide (PULMICORT) 0 25 mg/2 mL nebulizer solution Take 1 vial (0 25 mg total) by nebulization 2 (two) times a day    budesonide (PULMICORT) 0 5 mg/2 mL nebulizer solution USE 1 UNIT DOSE VIA NEBULIZER TWO TIMES A DAY    Cholecalciferol (VITAMIN D) 2000 UNITS tablet Take 2,000 Units by mouth daily   ipratropium (ATROVENT) 0 02 % nebulizer solution Take 0 5 mg by nebulization 3 (three) times a day      Multiple Vitamins-Minerals (MULTIVITAMIN WITH MINERALS) tablet Take 1 tablet by mouth daily   prochlorperazine (COMPAZINE) 10 mg tablet Take 1 tablet by mouth every 6 (six) hours as needed     No current facility-administered medications on file prior to visit  She has No Known Allergies       Review of Systems   Constitutional: Positive for activity change (Severe decrease in her activity level secondary to shortness of breath and cough), chills, fatigue and fever  Negative for appetite change, diaphoresis and unexpected weight change  HENT: Positive for congestion, postnasal drip and sinus pressure ( complaining of some frontal sinus pressure)  Negative for dental problem, drooling, ear discharge, ear pain, facial swelling, hearing loss, mouth sores, nosebleeds, rhinorrhea, sinus pain, sneezing, sore throat, tinnitus, trouble swallowing and voice change  Eyes: Negative  Respiratory: Positive for cough, shortness of breath and wheezing  Negative for apnea, choking, chest tightness and stridor  Cardiovascular: Negative for chest pain, palpitations and leg swelling  Gastrointestinal: Negative  Endocrine: Negative  Genitourinary: Negative  Musculoskeletal: Negative  Allergic/Immunologic: Negative  Neurological: Positive for headaches ( frontal headache, pounding sensation in her head)  Negative for dizziness, tremors, seizures, syncope, facial asymmetry, speech difficulty, light-headedness and numbness  Hematological: Negative  Psychiatric/Behavioral: Negative  Objective:      /72   Pulse (!) 131   Temp (!) 100 8 °F (38 2 °C)   Ht 5' 4" (1 626 m)   Wt 39 7 kg (87 lb 9 6 oz)   SpO2 (!) 84%   BMI 15 04 kg/m²          Physical Exam   Constitutional: She is oriented to person, place, and time   She appears distressed  Thin frail-appearing 62-year-old female who is awake alert notable respiratory distress   HENT:   Head: Normocephalic and atraumatic  Right Ear: External ear normal    Left Ear: External ear normal    Mouth/Throat: Oropharyngeal exudate present  Pink but dry mucous membranes, some slight erythema to nares and posterior airway with a clear postnasal drip   Eyes: Pupils are equal, round, and reactive to light  Conjunctivae and EOM are normal  Right eye exhibits no discharge  Left eye exhibits no discharge  No scleral icterus  Neck: Normal range of motion  Neck supple  No JVD present  No tracheal deviation present  No thyromegaly present  Cardiovascular: Regular rhythm and intact distal pulses  Exam reveals no gallop and no friction rub  No murmur heard  Increased heart rates, sinus tachycardia   Pulmonary/Chest: No stridor  She is in respiratory distress  She has no wheezes  She has no rales  She exhibits no tenderness  Profoundly decreased breath sounds anteriorly and posteriorly with rhonchi heard through both lung fields both anteriorly and posteriorly, and notably short of breath, increased effort with breathing using diaphragmatic muscles to help with inhalation   Abdominal: Soft  Bowel sounds are normal    Musculoskeletal: Normal range of motion  Lymphadenopathy:     She has no cervical adenopathy  Neurological: She is alert and oriented to person, place, and time  Skin: Skin is warm and dry  She is not diaphoretic  Psychiatric: She has a normal mood and affect  Her behavior is normal  Judgment and thought content normal    Nursing note and vitals reviewed

## 2019-03-07 NOTE — ASSESSMENT & PLAN NOTE
Patient is here today for evaluation  She has a history of chronic hypoxemic respiratory failure, history of malignant neoplasm of the lung status post extensive chemotherapy treatment and now is in remission  Patient was recently on a trip to see her family in [place]  She states upon return she began to have problems with shortness of breath, cough, low-grade temperatures  She states the cough is nonproductive  Patient is showing a low-grade temperature tachycardia, increased respiratory rate of 25, O2 sat originally was down to his 84%  Patient was sent directly to the emergency room for evaluation and treatment will on pneumonia  Lung sounds were decreased with rhonchi heard and felt anteriorly and posteriorly

## 2019-03-08 ENCOUNTER — APPOINTMENT (INPATIENT)
Dept: RADIOLOGY | Facility: HOSPITAL | Age: 74
DRG: 871 | End: 2019-03-08
Attending: INTERNAL MEDICINE
Payer: MEDICARE

## 2019-03-08 LAB
ANION GAP SERPL CALCULATED.3IONS-SCNC: 5 MMOL/L (ref 4–13)
ANISOCYTOSIS BLD QL SMEAR: PRESENT
ATRIAL RATE: 123 BPM
BASOPHILS # BLD MANUAL: 0 THOUSAND/UL (ref 0–0.1)
BASOPHILS NFR MAR MANUAL: 0 % (ref 0–1)
BUN SERPL-MCNC: 13 MG/DL (ref 5–25)
CALCIUM SERPL-MCNC: 8.4 MG/DL (ref 8.3–10.1)
CHLORIDE SERPL-SCNC: 103 MMOL/L (ref 100–108)
CO2 SERPL-SCNC: 28 MMOL/L (ref 21–32)
CREAT SERPL-MCNC: 0.53 MG/DL (ref 0.6–1.3)
EOSINOPHIL # BLD MANUAL: 0 THOUSAND/UL (ref 0–0.4)
EOSINOPHIL NFR BLD MANUAL: 0 % (ref 0–6)
ERYTHROCYTE [DISTWIDTH] IN BLOOD BY AUTOMATED COUNT: 13.2 % (ref 11.6–15.1)
FLUAV AG SPEC QL: NOT DETECTED
FLUBV AG SPEC QL: NOT DETECTED
GFR SERPL CREATININE-BSD FRML MDRD: 96 ML/MIN/1.73SQ M
GLUCOSE SERPL-MCNC: 233 MG/DL (ref 65–140)
HCT VFR BLD AUTO: 35.3 % (ref 34.8–46.1)
HGB BLD-MCNC: 11.2 G/DL (ref 11.5–15.4)
LYMPHOCYTES # BLD AUTO: 0.46 THOUSAND/UL (ref 0.6–4.47)
LYMPHOCYTES # BLD AUTO: 4 % (ref 14–44)
MCH RBC QN AUTO: 32 PG (ref 26.8–34.3)
MCHC RBC AUTO-ENTMCNC: 31.7 G/DL (ref 31.4–37.4)
MCV RBC AUTO: 101 FL (ref 82–98)
MONOCYTES # BLD AUTO: 0.12 THOUSAND/UL (ref 0–1.22)
MONOCYTES NFR BLD: 1 % (ref 4–12)
NEUTROPHILS # BLD MANUAL: 10.95 THOUSAND/UL (ref 1.85–7.62)
NEUTS SEG NFR BLD AUTO: 95 % (ref 43–75)
NRBC BLD AUTO-RTO: 0 /100 WBCS
P AXIS: 83 DEGREES
PLATELET # BLD AUTO: 271 THOUSANDS/UL (ref 149–390)
PLATELET BLD QL SMEAR: ADEQUATE
PMV BLD AUTO: 9.2 FL (ref 8.9–12.7)
POTASSIUM SERPL-SCNC: 4.2 MMOL/L (ref 3.5–5.3)
PR INTERVAL: 146 MS
PROCALCITONIN SERPL-MCNC: 0.31 NG/ML
QRS AXIS: 85 DEGREES
QRSD INTERVAL: 66 MS
QT INTERVAL: 284 MS
QTC INTERVAL: 406 MS
RBC # BLD AUTO: 3.5 MILLION/UL (ref 3.81–5.12)
RBC MORPH BLD: PRESENT
RSV B RNA SPEC QL NAA+PROBE: NOT DETECTED
SODIUM SERPL-SCNC: 136 MMOL/L (ref 136–145)
T WAVE AXIS: 75 DEGREES
VENTRICULAR RATE: 123 BPM
WBC # BLD AUTO: 11.53 THOUSAND/UL (ref 4.31–10.16)

## 2019-03-08 PROCEDURE — 85007 BL SMEAR W/DIFF WBC COUNT: CPT | Performed by: INTERNAL MEDICINE

## 2019-03-08 PROCEDURE — 85027 COMPLETE CBC AUTOMATED: CPT | Performed by: INTERNAL MEDICINE

## 2019-03-08 PROCEDURE — 93010 ELECTROCARDIOGRAM REPORT: CPT | Performed by: INTERNAL MEDICINE

## 2019-03-08 PROCEDURE — 94668 MNPJ CHEST WALL SBSQ: CPT

## 2019-03-08 PROCEDURE — 84145 PROCALCITONIN (PCT): CPT | Performed by: NURSE PRACTITIONER

## 2019-03-08 PROCEDURE — 94762 N-INVAS EAR/PLS OXIMTRY CONT: CPT

## 2019-03-08 PROCEDURE — 71046 X-RAY EXAM CHEST 2 VIEWS: CPT

## 2019-03-08 PROCEDURE — 80048 BASIC METABOLIC PNL TOTAL CA: CPT | Performed by: INTERNAL MEDICINE

## 2019-03-08 PROCEDURE — 99223 1ST HOSP IP/OBS HIGH 75: CPT | Performed by: INTERNAL MEDICINE

## 2019-03-08 PROCEDURE — 99232 SBSQ HOSP IP/OBS MODERATE 35: CPT | Performed by: FAMILY MEDICINE

## 2019-03-08 PROCEDURE — 94664 DEMO&/EVAL PT USE INHALER: CPT

## 2019-03-08 PROCEDURE — 94660 CPAP INITIATION&MGMT: CPT

## 2019-03-08 PROCEDURE — 94760 N-INVAS EAR/PLS OXIMETRY 1: CPT

## 2019-03-08 PROCEDURE — 94640 AIRWAY INHALATION TREATMENT: CPT

## 2019-03-08 RX ORDER — LEVALBUTEROL 1.25 MG/.5ML
SOLUTION, CONCENTRATE RESPIRATORY (INHALATION)
Status: DISCONTINUED
Start: 2019-03-08 | End: 2019-03-08 | Stop reason: WASHOUT

## 2019-03-08 RX ORDER — FORMOTEROL FUMARATE 20 UG/2ML
20 SOLUTION RESPIRATORY (INHALATION)
Status: DISCONTINUED | OUTPATIENT
Start: 2019-03-08 | End: 2019-03-14 | Stop reason: HOSPADM

## 2019-03-08 RX ORDER — ALBUTEROL SULFATE 90 UG/1
2 AEROSOL, METERED RESPIRATORY (INHALATION) EVERY 4 HOURS PRN
Status: DISCONTINUED | OUTPATIENT
Start: 2019-03-08 | End: 2019-03-14 | Stop reason: HOSPADM

## 2019-03-08 RX ORDER — ALBUTEROL SULFATE 2.5 MG/3ML
2.5 SOLUTION RESPIRATORY (INHALATION)
Status: DISCONTINUED | OUTPATIENT
Start: 2019-03-08 | End: 2019-03-08

## 2019-03-08 RX ORDER — METHYLPREDNISOLONE SODIUM SUCCINATE 40 MG/ML
40 INJECTION, POWDER, LYOPHILIZED, FOR SOLUTION INTRAMUSCULAR; INTRAVENOUS EVERY 12 HOURS SCHEDULED
Status: DISCONTINUED | OUTPATIENT
Start: 2019-03-08 | End: 2019-03-09

## 2019-03-08 RX ORDER — MELATONIN
2000 DAILY
Status: DISCONTINUED | OUTPATIENT
Start: 2019-03-08 | End: 2019-03-14 | Stop reason: HOSPADM

## 2019-03-08 RX ORDER — LEVALBUTEROL 1.25 MG/.5ML
1.25 SOLUTION, CONCENTRATE RESPIRATORY (INHALATION)
Status: DISCONTINUED | OUTPATIENT
Start: 2019-03-08 | End: 2019-03-14 | Stop reason: HOSPADM

## 2019-03-08 RX ORDER — BUDESONIDE 0.5 MG/2ML
0.5 INHALANT ORAL
Status: DISCONTINUED | OUTPATIENT
Start: 2019-03-08 | End: 2019-03-14 | Stop reason: HOSPADM

## 2019-03-08 RX ORDER — SODIUM CHLORIDE FOR INHALATION 0.9 %
VIAL, NEBULIZER (ML) INHALATION
Status: DISCONTINUED
Start: 2019-03-08 | End: 2019-03-08 | Stop reason: WASHOUT

## 2019-03-08 RX ADMIN — Medication 1 TABLET: at 09:05

## 2019-03-08 RX ADMIN — IPRATROPIUM BROMIDE 0.5 MG: 0.5 SOLUTION RESPIRATORY (INHALATION) at 13:07

## 2019-03-08 RX ADMIN — BUDESONIDE 0.5 MG: 0.5 INHALANT RESPIRATORY (INHALATION) at 19:24

## 2019-03-08 RX ADMIN — BUDESONIDE 0.5 MG: 0.5 INHALANT RESPIRATORY (INHALATION) at 07:09

## 2019-03-08 RX ADMIN — VITAMIN D, TAB 1000IU (100/BT) 2000 UNITS: 25 TAB at 09:05

## 2019-03-08 RX ADMIN — CEFTRIAXONE 1000 MG: 1 INJECTION, POWDER, FOR SOLUTION INTRAMUSCULAR; INTRAVENOUS at 10:50

## 2019-03-08 RX ADMIN — LEVALBUTEROL HYDROCHLORIDE 1.25 MG: 1.25 SOLUTION, CONCENTRATE RESPIRATORY (INHALATION) at 07:09

## 2019-03-08 RX ADMIN — AZITHROMYCIN MONOHYDRATE 500 MG: 500 INJECTION, POWDER, LYOPHILIZED, FOR SOLUTION INTRAVENOUS at 22:36

## 2019-03-08 RX ADMIN — METHYLPREDNISOLONE SODIUM SUCCINATE 40 MG: 40 INJECTION, POWDER, FOR SOLUTION INTRAMUSCULAR; INTRAVENOUS at 09:05

## 2019-03-08 RX ADMIN — METHYLPREDNISOLONE SODIUM SUCCINATE 40 MG: 40 INJECTION, POWDER, FOR SOLUTION INTRAMUSCULAR; INTRAVENOUS at 22:36

## 2019-03-08 RX ADMIN — FORMOTEROL FUMARATE DIHYDRATE 20 MCG: 20 SOLUTION RESPIRATORY (INHALATION) at 13:07

## 2019-03-08 RX ADMIN — LEVALBUTEROL HYDROCHLORIDE 1.25 MG: 1.25 SOLUTION, CONCENTRATE RESPIRATORY (INHALATION) at 19:18

## 2019-03-08 RX ADMIN — LEVALBUTEROL HYDROCHLORIDE 1.25 MG: 1.25 SOLUTION, CONCENTRATE RESPIRATORY (INHALATION) at 13:07

## 2019-03-08 RX ADMIN — ENOXAPARIN SODIUM 40 MG: 40 INJECTION SUBCUTANEOUS at 09:05

## 2019-03-08 RX ADMIN — FORMOTEROL FUMARATE DIHYDRATE 20 MCG: 20 SOLUTION RESPIRATORY (INHALATION) at 19:18

## 2019-03-08 RX ADMIN — IPRATROPIUM BROMIDE 0.5 MG: 0.5 SOLUTION RESPIRATORY (INHALATION) at 19:18

## 2019-03-08 RX ADMIN — IPRATROPIUM BROMIDE 0.5 MG: 0.5 SOLUTION RESPIRATORY (INHALATION) at 07:09

## 2019-03-08 NOTE — SOCIAL WORK
Met with patient and family and explained Cm program/CM role  Resides with parents in an apartment, 6 NEIL, bed/bathroom 2nd floor  Independent prior to admission for ADL's and ambulation  PCP Dr Aleksandra Purdy  Has prescription plan, uses [facility]  She drives  DME:  [employer]  HHC/IP Rehab- denies utilization  Denies mental health illness, IP or OP psyche care, drug and/or alcohol abuse  Primary contact is cousin Hamida Cunningham  Has a POA- son Rae [place], [phone] and sister Glenn Hyde, [phone]  Requested copy of documents  Family will drive home on discharge    CM reviewed d/c planning process including the following: identifying help at home, patient preference for d/c planning needs, Discharge Lounge, [facility] to [facility] program, availability of treatment team to discuss questions or concerns patient and/or family may have regarding understanding medications and recognizing signs and symptoms once discharged  CM also encouraged patient to follow up with all recommended appointments after discharge  Patient advised of importance for patient and family to participate in managing patients medical well being  Patient/caregiver received discharge checklist  Content reviewed  Patient/caregiver encouraged to participate in discharge plan of care prior to discharge home

## 2019-03-08 NOTE — ED NOTES
Jodie Babinski (sister) [phone] cell   Please call either Armida Santor with updates       Connie Abebe RN  03/07/19 1433

## 2019-03-08 NOTE — ED PROVIDER NOTES
History  Chief Complaint   Patient presents with    Shortness of Breath     pt is having a hard time breathing hx of copd and lung cancer was seen at pcp office and told to come to er was on flight home from [facility] yesterday  pt also reports head cold symptoms     HPI  72 y o  Female with PMH Breast Cancer s/p mastectomy in 1996, Lung Cancer s/p chemo and radiation in 2018, and COPD presents to the ED sent by her PCP for shortness of breath and fever  Pt reports she has had increasing shortness of breath over the last few days, with associated congestion, rhinorrhea, and productive cough  She also recently flew home from [place], [place] yesterday  Notes some mild relief with her nebulizer treatments at home this morning  She does not take regular steroids  Saw her PCP today and was found to have a fever, so was sent into the ED for evaluation  Pt has noticed some chills recently  She denies nausea, vomiting, abdominal pain, urinary sxs, chest pain, dizziness, lightheadedness  No hx blood clots  She did get a flu and a pneumonia vaccine this year  Prior to Admission Medications   Prescriptions Last Dose Informant Patient Reported? Taking? Cholecalciferol (VITAMIN D) 2000 UNITS tablet  Self Yes No   Sig: Take 2,000 Units by mouth daily  Multiple Vitamins-Minerals (MULTIVITAMIN WITH MINERALS) tablet  Self Yes No   Sig: Take 1 tablet by mouth daily     albuterol (VENTOLIN HFA) 90 mcg/act inhaler  Self Yes No   Sig: Inhale   arformoterol (BROVANA) 15 mcg/2 mL nebulizer solution  Self No No   Sig: Take 1 vial (15 mcg total) by nebulization 2 (two) times a day   budesonide (PULMICORT) 0 25 mg/2 mL nebulizer solution  Self No No   Sig: Take 1 vial (0 25 mg total) by nebulization 2 (two) times a day   budesonide (PULMICORT) 0 5 mg/2 mL nebulizer solution  Self No No   Sig: USE 1 UNIT DOSE VIA NEBULIZER TWO TIMES A DAY   ipratropium (ATROVENT) 0 02 % nebulizer solution  Self Yes No   Sig: Take 0 5 mg by nebulization 3 (three) times a day     prochlorperazine (COMPAZINE) 10 mg tablet  Self Yes No   Sig: Take 1 tablet by mouth every 6 (six) hours as needed      Facility-Administered Medications: None       Past Medical History:   Diagnosis Date    [facility] cancer ([facility] [address] )     [facility]     Requires supplemental oxygen     2 Liters at bedtime and as needed       Past Surgical History:   Procedure Laterality Date    APPENDECTOMY      COLONOSCOPY N/A 2016    Procedure: COLONOSCOPY;  Surgeon: Alyssa Byers MD;  Location: [facility]; Service:     LUNG CANCER SURGERY      MASTECTOMY, RADICAL Right     [address] N/A 10/17/2017    Procedure: Omar Baugh;  Surgeon: Gume Perez MD;  Location: [facility]; Service: Pulmonary    TONSILLECTOMY         Family History   Problem Relation Age of Onset    Heart disease Mother         cardiac disorder     I have reviewed and agree with the history as documented  Social History     Tobacco Use    Smoking status: Former Smoker     Packs/day: 0 50     Years: 55 00     Pack years: 27 50     Start date:      Last attempt to quit: 2017     Years since quittin 7    Smokeless tobacco: Never Used    Tobacco comment: 20 cigs a day, stop 2 mth ago   Substance Use Topics    Alcohol use: No    Drug use: No        Review of Systems   Constitutional: Positive for chills and fever  Negative for appetite change  HENT: Positive for congestion and rhinorrhea  Negative for sore throat  Respiratory: Positive for cough and shortness of breath  Negative for chest tightness  Cardiovascular: Negative for chest pain  Gastrointestinal: Negative for abdominal pain, diarrhea, nausea and vomiting  Genitourinary: Negative for dysuria and hematuria  Musculoskeletal: Negative for arthralgias and myalgias  Skin: Negative for rash     Neurological: Negative for dizziness, syncope, weakness, light-headedness, numbness and headaches  Hematological: Does not bruise/bleed easily  All other systems reviewed and are negative  Physical Exam  ED Triage Vitals   Temperature Pulse Respirations Blood Pressure SpO2   03/07/19 1642 03/07/19 1642 03/07/19 1642 03/07/19 1642 03/07/19 1642   (!) 101 2 °F (38 4 °C) (!) 129 (!) 25 150/70 96 %      Temp Source Heart Rate Source Patient Position - Orthostatic VS BP Location FiO2 (%)   03/07/19 1642 03/07/19 1642 03/07/19 1642 03/07/19 1642 --   Tympanic Monitor Sitting Left arm       Pain Score       03/07/19 1858       No Pain           Orthostatic Vital Signs  Vitals:    03/07/19 1642 03/07/19 1900   BP: 150/70 137/62   Pulse: (!) 129 (!) 110   Patient Position - Orthostatic VS: Sitting        Physical Exam   Constitutional: She is oriented to person, place, and time  She appears well-developed and well-nourished  No distress  HENT:   Head: Normocephalic and atraumatic  Right Ear: External ear normal    Left Ear: External ear normal    Nose: Nose normal    Mouth/Throat: Oropharynx is clear and moist  No oropharyngeal exudate  Eyes: Pupils are equal, round, and reactive to light  Conjunctivae and EOM are normal    Neck: Normal range of motion  Neck supple  Cardiovascular: Normal rate, regular rhythm, normal heart sounds and intact distal pulses  Exam reveals no gallop and no friction rub  No murmur heard  Pulmonary/Chest: Effort normal  No accessory muscle usage  No tachypnea  No respiratory distress  She has no decreased breath sounds  She has wheezes (diffuse end expiratory)  She has no rhonchi  She has no rales  Abdominal: Soft  Bowel sounds are normal  She exhibits no distension and no mass  There is no tenderness  There is no rebound and no guarding  Musculoskeletal: Normal range of motion  Lymphadenopathy:     She has no cervical adenopathy  Neurological: She is alert and oriented to person, place, and time  She has normal strength   No cranial nerve deficit or sensory deficit  Skin: Skin is warm and dry  She is not diaphoretic  Psychiatric: She has a normal mood and affect  Nursing note and vitals reviewed  ED Medications  Medications   methylPREDNISolone sodium succinate (Solu-MEDROL) injection 125 mg (has no administration in time range)   azithromycin (ZITHROMAX) 500 mg in sodium chloride 0 9% 250mL IVPB 500 mg (has no administration in time range)   sodium chloride 0 9 % bolus 1,000 mL (1,000 mL Intravenous New Bag 3/7/19 1820)   ipratropium-albuterol (DUO-NEB) 0 5-2 5 mg/3 mL inhalation solution 3 mL (3 mL Nebulization Given 3/7/19 1856)   sodium chloride 0 9 % inhalation solution 3 mL (3 mL Nebulization Given 3/7/19 1857)   acetaminophen (TYLENOL) tablet 650 mg (650 mg Oral Given 3/7/19 1818)   iohexol (OMNIPAQUE) 350 MG/ML injection (MULTI-DOSE) 85 mL (85 mL Intravenous Given 3/7/19 2017)       Diagnostic Studies  Results Reviewed     Procedure Component Value Units Date/Time    Center draw [[id number]] Collected:  03/07/19 1824    Lab Status:  Final result Specimen:  Blood from Arm, Left Updated:  03/07/19 2001    Narrative: The following orders were created for panel order Center draw  Procedure                               Abnormality         Status                     ---------                               -----------         ------                     Dellie Paint Top on hold[[id number]]                           Final result               Gold top on hold[[id number]]                                 Final result               Green / Yellow tube on hold[[id number]]                      Final result               Lavender Top 7ml on hold[[id number]]                         Final result                 Please view results for these tests on the individual orders      NT-BNP PRO (BNP for [facility], [facility], [facility], [place], [place], [facility], [facility], [place] campuses) [[id number]]  (Abnormal) Collected:  03/07/19 1824    Lab Status:  Final result Specimen:  Blood from Arm, Left Updated:  03/07/19 1928     NT-proBNP 936 pg/mL     Lactic Acid x2 [[id number]]  (Normal) Collected:  03/07/19 1814    Lab Status:  Final result Specimen:  Blood from Arm, Left Updated:  03/07/19 1926     LACTIC ACID 1 3 mmol/L     Narrative:       Result may be elevated if tourniquet was used during collection  Influenza A/B and RSV by PCR [[id number]] Collected:  03/07/19 1856    Lab Status: In process Specimen:  Nasopharyngeal Swab Updated:  03/07/19 1924    Troponin I [[id number]]  (Normal) Collected:  03/07/19 1816    Lab Status:  Final result Specimen:  Blood from Arm, Left Updated:  03/07/19 1901     Troponin I <0 02 ng/mL     CBC and differential [[id number]]  (Abnormal) Collected:  03/07/19 1814    Lab Status:  Final result Specimen:  Blood from Arm, Left Updated:  03/07/19 1834     WBC 10 66 Thousand/uL      RBC 4 02 Million/uL      Hemoglobin 12 7 g/dL      Hematocrit 40 3 %       fL      MCH 31 6 pg      MCHC 31 5 g/dL      RDW 13 2 %      MPV 9 4 fL      Platelets 479 Thousands/uL      nRBC 0 /100 WBCs      Neutrophils Relative 84 %      Immat GRANS % 1 %      Lymphocytes Relative 6 %      Monocytes Relative 9 %      Eosinophils Relative 0 %      Basophils Relative 0 %      Neutrophils Absolute 9 01 Thousands/µL      Immature Grans Absolute 0 05 Thousand/uL      Lymphocytes Absolute 0 59 Thousands/µL      Monocytes Absolute 0 96 Thousand/µL      Eosinophils Absolute 0 02 Thousand/µL      Basophils Absolute 0 03 Thousands/µL     Blood culture #1 [[id number]] Collected:  03/07/19 1814    Lab Status:   In process Specimen:  Blood from Arm, Left Updated:  03/07/19 1829    POCT urinalysis dipstick [[id number]]     Lab Status:  No result     Comprehensive metabolic panel [[id number]]  (Abnormal) Collected:  03/07/19 1701    Lab Status:  Final result Specimen:  Blood from Arm, Left Updated:  03/07/19 1749     Sodium 131 mmol/L      Potassium 4 7 mmol/L      Chloride 102 mmol/L      CO2 22 mmol/L      ANION GAP 7 mmol/L      BUN 16 mg/dL      Creatinine 0 70 mg/dL      Glucose 120 mg/dL      Calcium 9 0 mg/dL      AST 23 U/L      ALT 16 U/L      Alkaline Phosphatase 124 U/L      Total Protein 7 8 g/dL      Albumin 3 1 g/dL      Total Bilirubin 0 49 mg/dL      eGFR 87 ml/min/1 73sq m     Narrative:       National Kidney Disease Education Program recommendations are as follows:  GFR calculation is accurate only with a steady state creatinine  Chronic Kidney disease less than 60 ml/min/1 73 sq  meters  Kidney failure less than 15 ml/min/1 73 sq  meters  APTT [[id number]]  (Normal) Collected:  03/07/19 1701    Lab Status:  Final result Specimen:  Blood from Arm, Left Updated:  03/07/19 1726     PTT 30 seconds     Protime-INR [[id number]]  (Normal) Collected:  03/07/19 1701    Lab Status:  Final result Specimen:  Blood from Arm, Left Updated:  03/07/19 1726     Protime 13 6 seconds      INR 1 03    Blood culture #2 [[id number]] Collected:  03/07/19 1701    Lab Status: In process Specimen:  Blood from Arm, Left Updated:  03/07/19 1706    Lactic Acid x2 [[id number]]     Lab Status:  No result Specimen:  Blood                  CTA ED chest PE study   Final Result by Aniket Wheeler MD (03/07 2049)      Mild central and bilateral lower lobe bronchial wall thickening in keeping with a nonspecific bronchitis  Stable severe emphysematous changes  Slightly more prominent 10 x 7 mm posterior right lower lobe opacity #4/103 which measured 8 x 5 mm on the prior study  Based on current Fleischner Society 2017 Guidelines on incidental pulmonary nodule, either PET/CT scan evaluation, tissue sampling or    short term interval followup non-contrast CT followup (initailly in 3 months) may be considered appropriate  The study was marked in EPIC for significant notification                             Workstation performed: [id number]         XR chest 2 views   ED Interpretation by Anju Monterroso MD (03/07 1907)   No new consolidation, effusion, or ptx appreciated  Scarring right upper lobe unchanged from previous            Procedures  ECG 12 Lead Documentation  Date/Time: 3/7/2019 9:09 PM  Performed by: Tigist Vale MD  Authorized by: Tigist Vale MD     ECG reviewed by me, the ED Provider: yes    Patient location:  ED  Previous ECG:     Previous ECG:  Compared to current    Similarity:  No change  Interpretation:     Interpretation: non-specific    Rate:     ECG rate:  123    ECG rate assessment: tachycardic    Rhythm:     Rhythm: sinus rhythm    Ectopy:     Ectopy: none    QRS:     QRS axis:  Normal    QRS intervals:  Normal  Conduction:     Conduction: normal    ST segments:     ST segments:  Normal  T waves:     T waves: normal            Phone Consults  ED Phone Contact    ED Course  ED Course as of Mar 07 2110   Thu Mar 07, 2019   1929 NT-proBNP(!): 936                               MDM  Number of Diagnoses or Management Options  Acute bronchitis:   COPD exacerbation [facility]):   Diagnosis management comments: 72 y o  Female with PMH Breast Cancer s/p mastectomy in 1996, Lung Cancer s/p chemo and radiation in 2018, and COPD presents to the ED sent by her PCP for shortness of breath and fever  Will get septic work up with CXR to evaluate for pneumonia and EKG and Troponin to evaluate for acute cardiac abnormality  Influenza PCR  If no pneumonia seen on CXR will likely CTA chest to evaluate for PE  Pt will likely be admitted        Disposition  Final diagnoses:   COPD exacerbation ([facility] [address] )   Acute bronchitis     Time reflects when diagnosis was documented in both MDM as applicable and the Disposition within this note     Time User Action Codes Description Comment    3/7/2019  8:54 PM Antonio Castaneda Add [J44 1] COPD exacerbation ([facility] [address] )     3/7/2019  8:54 PM Antonio Castaneda Add [J20 9] Acute bronchitis       ED Disposition     ED Disposition Condition Date/Time Comment    Admit Stable Thu Mar 7, 2019  9:00 PM Case was discussed with Dr Elo Garcia and the patient's admission status was agreed to be Admission Status: inpatient status to the service of Dr Elo Garcia   Follow-up Information    None         Patient's Medications   Discharge Prescriptions    No medications on file     No discharge procedures on file  ED Provider  Attending physically available and evaluated Mary Alice Forth  I managed the patient along with the ED Attending      Electronically Signed by         Vik Moore MD  03/07/19 2814       Vik Moore MD  03/07/19 7059

## 2019-03-08 NOTE — PROGRESS NOTES
Progress Note - Lexi Dukes 1945, 72 y o  female MRN: [id number]    Unit/Bed#: [facility] 916-01 Encounter: [id number]    Primary Care Provider: Una Kaur DO   Date and time admitted to hospital: 3/7/2019  5:52 PM        Chronic obstructive pulmonary disease ([facility] [address] )  Assessment & Plan  · With acute exacerbation  · Continue with the respiratory protocol  · Pulmonology evaluation appreciated  · Continue with the supplemental oxygen    Nicotine dependence  Assessment & Plan  Offered but refused nicotine replacement    Chronic hypoxemic respiratory failure ([facility] [address] )  Assessment & Plan  · On 2 L nasal cannula chronically    Malignant neoplasm of right lung [facility])  Assessment & Plan  · Status post chemotherapy and radiation 2018  In remission, follows with Dr Emilie Britton  · Outpatient follow-up as indicated    * Respiratory distress  Assessment & Plan  · Present on admission, appears in setting of acute bronchitis/other viral URI with potential exacerbation of COPD  · Improved with nebulizers, Solu-Medrol, azithromycin in ED but not at baseline per patient  · Will continue nebulizer therapy, Respiratory protocol, IV Solu-Medrol 40 mg b i d   · Pulmonology consultation appreciated  · Follow-up results of influenza/RSV PCR and blood cultures  · Incentive spirometry, titrate O2 to SaO2 > 90%  · Patient was briefly on high-flow oxygen a earlier today      VTE Pharmacologic Prophylaxis:   Pharmacologic: Enoxaparin (Lovenox)  Mechanical VTE Prophylaxis in Place: Yes    Patient Centered Rounds: I have performed bedside rounds with nursing staff today  Discussions with Specialists or Other Care Team Provider:     Education and Discussions with Family / Patient:     Time Spent for Care: 30 minutes  More than 50% of total time spent on counseling and coordination of care as described above      Current Length of Stay: 1 day(s)    Current Patient Status: Inpatient   Certification Statement: The patient will continue to require additional inpatient hospital stay due to Respiratory distress requiring IV steroids    Discharge Plan:     Code Status: Level 3 - DNAR and DNI      Subjective:   Patient seen and examined  No specific complaints offered  Reported that she is feeling better  Currently patient is off of the high-flow on nasal cannula  Objective:     Vitals:   Temp (24hrs), Av 3 °F (36 8 °C), Min:97 9 °F (36 6 °C), Max:98 6 °F (37 °C)    Temp:  [97 9 °F (36 6 °C)-98 6 °F (37 °C)] 97 9 °F (36 6 °C)  HR:  [102-117] 102  Resp:  [18-24] 18  BP: (118-142)/(62-68) 142/65  SpO2:  [84 %-98 %] 90 %  Body mass index is 15 04 kg/m²  Input and Output Summary (last 24 hours): Intake/Output Summary (Last 24 hours) at 3/8/2019 1722  Last data filed at 3/8/2019 1308  Gross per 24 hour   Intake 120 ml   Output 175 ml   Net -55 ml       Physical Exam:     Physical Exam   Constitutional: She is oriented to person, place, and time  She appears well-developed and well-nourished  HENT:   Head: Normocephalic and atraumatic  Eyes: Pupils are equal, round, and reactive to light  EOM are normal    Neck: Normal range of motion  Neck supple  No thyromegaly present  Cardiovascular: Normal rate and regular rhythm  Exam reveals no friction rub  No murmur heard  Pulmonary/Chest: No stridor  She has wheezes  Tachypnea  Decreased breath sounds bilateral   Abdominal: Soft  Bowel sounds are normal  She exhibits no distension  Neurological: She is alert and oriented to person, place, and time  Skin: Skin is warm         Additional Data:     Labs:    Results from last 7 days   Lab Units 19  0521 19  1814   WBC Thousand/uL 11 53* 10 66*   HEMOGLOBIN g/dL 11 2* 12 7   HEMATOCRIT % 35 3 40 3   PLATELETS Thousands/uL 271 294   NEUTROS PCT %  --  84*   LYMPHS PCT %  --  6*   LYMPHO PCT % 4*  --    MONOS PCT %  --  9   MONO PCT % 1*  --    EOS PCT % 0 0     Results from last 7 days   Lab Units 19  0521 19  1701 POTASSIUM mmol/L 4 2 4 7   CHLORIDE mmol/L 103 102   CO2 mmol/L 28 22   BUN mg/dL 13 16   CREATININE mg/dL 0 53* 0 70   CALCIUM mg/dL 8 4 9 0   ALK PHOS U/L  --  124*   ALT U/L  --  16   AST U/L  --  23     Results from last 7 days   Lab Units 03/07/19  1701   INR  1 03       * I Have Reviewed All Lab Data Listed Above  * Additional Pertinent Lab Tests Reviewed: [address] Admission Reviewed    Imaging:    Imaging Reports Reviewed Today Include:   Imaging Personally Reviewed by Myself Includes:      Recent Cultures (last 7 days):     Results from last 7 days   Lab Units 03/07/19  1856   INFLUENZA B PCR  Not Detected   RSV PCR  Not Detected       Last 24 Hours Medication List:     Current Facility-Administered Medications:  albuterol 2 puff Inhalation Q4H PRN Joselito Sebastian MD    azithromycin 500 mg Intravenous Q24H ONI Delgadillo    budesonide 0 5 mg Nebulization Q12H Joselito Sebastian MD    cefTRIAXone 1,000 mg Intravenous Q24H ONI Delgadillo Last Rate: Stopped (03/08/19 1200)   cholecalciferol 2,000 Units Oral Daily Joselito Sebastian MD    enoxaparin 40 mg Subcutaneous Daily Joselito Sebastian MD    formoterol 20 mcg Nebulization Q12H Chavez Cuevas DO    ipratropium 0 5 mg Nebulization Q6H Joselito Sebastian MD    levalbuterol 1 25 mg Nebulization Q6H Joselito Sebastian MD    methylPREDNISolone sodium succinate 40 mg Intravenous Q12H 3401 Vito Balbuena MD    multivitamin-minerals 1 tablet Oral Daily Joselito Sebastian MD         Today, Patient Was Seen By: Donald Healy MD    ** Please Note: Dictation voice to text software may have been used in the creation of this document   **

## 2019-03-08 NOTE — H&P
H&P- Tori Singh 1945, 72 y o  female MRN: [id number]    Unit/Bed#: ED 28 Encounter: [id number]    Primary Care Provider: Gaston Ojeda DO   Date and time admitted to hospital: 3/7/2019  5:52 PM        * Respiratory distress  Assessment & Plan  · Present on admission, appears in setting of acute bronchitis/other viral URI with potential exacerbation of COPD  · Improved with nebulizers, Solu-Medrol, azithromycin in ED but not at baseline per patient  · Will continue nebulizer therapy, Respiratory protocol, IV Solu-Medrol 40 mg b i d   · Pulmonology consultation in this setting; patient is known to Dr Marilyn Iniguez  · Follow-up results of influenza/RSV PCR and blood cultures  · Incentive spirometry, titrate O2 to SaO2 > 90%    Chronic obstructive pulmonary disease ([facility] [address] )  Assessment & Plan  · With potential acute exacerbation  · Inhaler plan as above, continue budesonide    Chronic hypoxemic respiratory failure (HCC)  Assessment & Plan  · On 2 L nasal cannula chronically    Nicotine dependence  Assessment & Plan  Offered but refused nicotine replacement    Malignant neoplasm of right lung [facility])  Assessment & Plan  · Status post chemotherapy and radiation 2018  In remission, follows with Dr Erika Ribeiro  · Outpatient follow-up as indicated        VTE Prophylaxis: Enoxaparin (Lovenox)  / sequential compression device   Code Status: Level 3 - DNAR and DNI as discussed with patient at bedside  POLST: There is no POLST form on file for this patient (pre-hospital)    Anticipated Length of Stay:  Patient will be admitted on an Inpatient basis with an anticipated length of stay of  Greater than 2 midnights  Justification for Hospital Stay: Please see detailed plans noted above      Chief Complaint:     Shortness of breath  History of Present Illness:  Tori Singh is a 72 y o  female who has past medical history significant for COPD, chronic hypoxic respiratory failure on 2 L and C, history of lung cancer status post chemotherapy and radiation 2018  She presents with a 1 day history of shortness of breath associated with fevers/chills, cough productive of a small amount of whitish sputum, wheezing, and nasal congestion  She reports some temporary relief with home nebulizers, however shortness of breath recurred  Of note, patient returned home yesterday from a trip in [place], [place], where patient was visiting family  Denies sick contacts in family, but was frequently in airports  States she did receive the influenza vaccine this season  Continues to intermittently smoke cigarettes  Denies chest pain/pressure, abdominal pain/nausea/vomiting, dizziness/lightheadedness, or leg swelling  Currently, patient endorses some improvement with shortness of breath after nebulizers, steroids, and azithromycin in ED, but does not feel she is yet at baseline  Review of Systems:    Constitutional:  Endorsed fever and chills  Eyes:  Denies change in visual acuity   HENT:  Endorsed rhinorrhea, nasal congestion, postnasal drip  Respiratory:  Endorses cough and shortness of breath  Cardiovascular:  Denies chest pain or edema   GI:  Denies abdominal pain, nausea, vomiting, bloody stools or diarrhea   :  Denies dysuria   Musculoskeletal:  Denies back pain or joint pain   Integument:  Denies rash   Neurologic:  Denies focal weakness or sensory changes but endorsed headaches  Endocrine:  Denies polyuria or polydipsia   Lymphatic:  Denies swollen glands   Psychiatric:  Denies depression or anxiety     Past Medical and Surgical History:   Past Medical History:   Diagnosis Date    Breast cancer ([facility] [address] ) 1996    COPD (chronic obstructive pulmonary disease) ([facility] [address] )     Requires supplemental oxygen     2 Liters at bedtime and as needed     Past Surgical History:   Procedure Laterality Date    APPENDECTOMY      COLONOSCOPY N/A 4/18/2016    Procedure: COLONOSCOPY;  Surgeon: Shyla Espnioza MD;  Location: [facility];   Service:    Yvette Mckeon LUNG CANCER SURGERY      MASTECTOMY, RADICAL Right     IN [address] N/A 10/17/2017    Procedure: Ariel Barbie;  Surgeon: Gloria Lebron MD;  Location: [facility]; Service: Pulmonary    TONSILLECTOMY         Meds/Allergies:    (Not in a hospital admission)    Allergies: No Known Allergies  History:  Marital Status:      Substance Use History:   Social History     Substance and Sexual Activity   Alcohol Use No     Social History     Tobacco Use   Smoking Status Former Smoker    Packs/day: 0 50    Years: 55 00    Pack years: 27 50    Start date:     Last attempt to quit: 2017    Years since quittin 7   Smokeless Tobacco Never Used   Tobacco Comment    20 cigs a day, stop 2 mth ago     Social History     Substance and Sexual Activity   Drug Use No       Family History:  Family History   Problem Relation Age of Onset    Heart disease Mother         cardiac disorder       Physical Exam:     Vitals:   Blood Pressure: 124/64 (19)  Pulse: (!) 106 (19)  Temperature: (!) 101 2 °F (38 4 °C) (19)  Temp Source: Tympanic (19)  Respirations: 20 (19)  Height: 5' 4" (162 6 cm) (19)  Weight - Scale: 39 7 kg (87 lb 9 6 oz) (19)  SpO2: 94 % (19)    Constitutional:  Cachetic, no acute distress, non-toxic appearance   Eyes:  PERRL, conjunctiva normal   HENT:  Atraumatic, external ears normal, nose normal, oropharynx moist, no pharyngeal exudates  Neck- normal range of motion, no tenderness, supple   Respiratory:  No respiratory distress, decreased bilateral breath sounds with faint bilateral wheezing   Cardiovascular:  Tachycardic, normal rhythm, no murmurs, no gallops, no rubs   GI:  Soft, nondistended, normal bowel sounds, nontender, no organomegaly, no mass, no rebound, no guarding   :  No costovertebral angle tenderness   Musculoskeletal:  No edema, no tenderness, no deformities   Back- no tenderness  Integument:  Well hydrated, no rash   Lymphatic:  No lymphadenopathy noted   Neurologic:  Alert &awake, communicative, CN 2-12 normal, normal motor function, normal sensory function, no focal deficits noted   Psychiatric:  Speech and behavior appropriate       Lab Results: I have personally reviewed pertinent reports  Results from last 7 days   Lab Units 03/07/19  1814   WBC Thousand/uL 10 66*   HEMOGLOBIN g/dL 12 7   HEMATOCRIT % 40 3   PLATELETS Thousands/uL 294   NEUTROS PCT % 84*   LYMPHS PCT % 6*   MONOS PCT % 9   EOS PCT % 0     Results from last 7 days   Lab Units 03/07/19  1701   POTASSIUM mmol/L 4 7   CHLORIDE mmol/L 102   CO2 mmol/L 22   BUN mg/dL 16   CREATININE mg/dL 0 70   CALCIUM mg/dL 9 0   ALK PHOS U/L 124*   ALT U/L 16   AST U/L 23     Results from last 7 days   Lab Units 03/07/19  1701   INR  1 03       EKG: Sinus tachycardia     Imaging: I have personally reviewed pertinent reports  Cta Ed Chest Pe Study    Result Date: 3/7/2019  Narrative: CTA - CHEST WITH IV CONTRAST - PULMONARY ANGIOGRAM INDICATION:   Shortness of breath   "71 yo female with lung ca, breast ca, copd, having increased sob for last few days with congestion and cough" COMPARISON: CT chest 1/8/2019 TECHNIQUE: CTA examination of the chest was performed using angiographic technique according to a protocol specifically tailored to evaluate for pulmonary embolism  Axial, sagittal, and coronal 2D reformatted images were created from the source data and  submitted for interpretation  In addition, coronal 3D MIP postprocessing was performed on the acquisition scanner  Radiation dose length product (DLP) for this visit:  264 42 mGy-cm   This examination, like all CT scans performed in the [facility], was performed utilizing techniques to minimize radiation dose exposure, including the use of iterative  reconstruction and automated exposure control   IV Contrast:  85 mL of iohexol (OMNIPAQUE) FINDINGS: PULMONARY ARTERIAL TREE:  No pulmonary embolus is seen  LUNGS:  Stable severe emphysematous changes  Biapical pleural-parenchymal scarring more prominent on the right  Stable 4 mm right middle lobe nodule #4/63  Stable 4 mm right lower lobe nodule #4/49  More prominent subpleural density in the posterior right lower lobe measuring 10 x 7 mm previously measured 8 x 5 mm  No endotracheal or endobronchial lesion  Interval development of mild bronchial wall thickening centrally and throughout the lower lobes in keeping with a nonspecific bronchitis  PLEURA:  Unremarkable  HEART/GREAT VESSELS:  Unremarkable for patient's age  MEDIASTINUM AND JENNIFER:  Unremarkable  CHEST WALL AND LOWER NECK:   Unremarkable  VISUALIZED STRUCTURES IN THE UPPER ABDOMEN:  Unremarkable  OSSEOUS STRUCTURES:  No acute fracture or destructive osseous lesion  Impression: Mild central and bilateral lower lobe bronchial wall thickening in keeping with a nonspecific bronchitis  Stable severe emphysematous changes  Slightly more prominent 10 x 7 mm posterior right lower lobe opacity #4/103 which measured 8 x 5 mm on the prior study  Based on current Fleischner Society 2017 Guidelines on incidental pulmonary nodule, either PET/CT scan evaluation, tissue sampling or short term interval followup non-contrast CT followup (initailly in 3 months) may be considered appropriate  The study was marked in EPIC for significant notification  Workstation performed: [id number]         ** Please Note: Dragon 360 Dictation voice to text software was used in the creation of this document   **

## 2019-03-08 NOTE — ASSESSMENT & PLAN NOTE
· Status post chemotherapy and radiation 2018    In remission, follows with Dr Gerald Sharpe  · Outpatient follow-up as indicated

## 2019-03-08 NOTE — ASSESSMENT & PLAN NOTE
· With acute exacerbation  · Continue with the respiratory protocol  · Pulmonology evaluation appreciated  · Continue with the supplemental oxygen

## 2019-03-08 NOTE — ASSESSMENT & PLAN NOTE
· Present on admission, appears in setting of acute bronchitis/other viral URI with potential exacerbation of COPD  · Improved with nebulizers, Solu-Medrol, azithromycin in ED but not at baseline per patient    · Will continue nebulizer therapy, Respiratory protocol, IV Solu-Medrol 40 mg b i d   · Pulmonology consultation appreciated  · Follow-up results of influenza/RSV PCR and blood cultures  · Incentive spirometry, titrate O2 to SaO2 > 90%  · Patient was briefly on high-flow oxygen a earlier today

## 2019-03-08 NOTE — ASSESSMENT & PLAN NOTE
· Status post chemotherapy and radiation 2018    In remission, follows with Dr Niranjan Ramos  · Outpatient follow-up as indicated

## 2019-03-08 NOTE — RESPIRATORY THERAPY NOTE
RT Protocol Note  Harjinder Ragsdale 72 y o  female MRN: [id number]  [facility] Encounter: [id number]    Assessment    Principal Problem:    Respiratory distress  Active Problems:    Malignant neoplasm of right lung (HCC)    Chronic hypoxemic respiratory failure (HCC)    Nicotine dependence    Chronic obstructive pulmonary disease (HCC)      Home Pulmonary Medications:    Home Devices/Therapy: Home O2(2L NC ,pulmicort/brovana BID, atrovent TID, albuterol prn)    Past Medical History:   Diagnosis Date    Breast cancer ([facility] [address] )     COPD (chronic obstructive pulmonary disease) (HCC)     Requires supplemental oxygen     2 Liters at bedtime and as needed     Social History     Socioeconomic History    Marital status:      Spouse name: Not on file    Number of children: Not on file    Years of education: Not on file    Highest education level: Not on file   Occupational History    Occupation: retired   Social Needs    Financial resource strain: Not on file    Food insecurity:     Worry: Not on file     Inability: Not on file   [employer] needs:     Medical: Not on file     Non-medical: Not on file   Tobacco Use    Smoking status: Former Smoker     Packs/day: 0 50     Years: 55 00     Pack years: 27 50     Start date:      Last attempt to quit: 2017     Years since quittin 7    Smokeless tobacco: Never Used    Tobacco comment: 20 cigs a day, stop 2 mth ago   Substance and Sexual Activity    Alcohol use: No    Drug use: No    Sexual activity: Not on file   Lifestyle    Physical activity:     Days per week: Not on file     Minutes per session: Not on file    Stress: Not on file   Relationships    Social connections:     Talks on phone: Not on file     Gets together: Not on file     Attends [employer] witness service: Not on file     Active member of club or organization: Not on file     Attends meetings of clubs or organizations: Not on file     Relationship status: Not on file    Intimate partner violence:     Fear of current or ex partner: Not on file     Emotionally abused: Not on file     Physically abused: Not on file     Forced sexual activity: Not on file   Other Topics Concern    Not on file   Social History Narrative    Caffeine use    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    Denied: history of lives alone without help available           Subjective         Objective    Physical Exam:   Assessment Type: Assess only  General Appearance: Alert, Awake  Chest Assessment: Chest expansion symmetrical  Bilateral Breath Sounds: Coarse(faint exp wheeze)  Cough: Moist, Non-productive, Strong  O2 Device: 2l NC    Vitals:  Blood pressure 124/64, pulse (!) 106, temperature (!) 101 2 °F (38 4 °C), temperature source Tympanic, resp  rate 20, height 5' 4" (1 626 m), weight 39 7 kg (87 lb 9 6 oz), SpO2 94 %  Imaging and other studies: I have personally reviewed pertinent reports  O2 Device: 2l NC     Plan    Respiratory Plan: Moderate/Severe Distress pathway, Home Bronchodilator Patient pathway        Resp Comments: (P) Pt admit with sob/cough  She has a history of COPD and lung CA  Pt states she takes pulmicort/brovana BID, atrovent TID, and albuterol prn at home  CT shows stable severe emphysema  Pt has strong moist non productive cough  Instructed on flutter valve use at this time  Will continue to encourage flutter valve and start atrovent/xopenex Q6 and albuterol prn per protocol

## 2019-03-08 NOTE — MALNUTRITION/BMI
This medical record reflects one or more clinical indicators suggestive of malnutrition and underweight  Malnutrition Findings:   Malnutrition type: Chronic illness(underlying COPD; increased energy demands, lack of wt gain)  Degree of Malnutrition: Other severe protein calorie malnutrition  Malnutrition Characteristics: Muscle loss, Fat loss(evidenced by severe fat and muscle loss observed in clavicles and orbitals; BMI 15 04; tx with oral diet and nutrition supplements)    BMI Findings:  BMI Classifications: Underweight < 18 5     Body mass index is 15 04 kg/m²  See Nutrition note dated 3/8/19 for additional details  Completed nutrition assessment is viewable in the nutrition documentation

## 2019-03-08 NOTE — ASSESSMENT & PLAN NOTE
· Present on admission, appears in setting of acute bronchitis/other viral URI with potential exacerbation of COPD  · Improved with nebulizers, Solu-Medrol, azithromycin in ED but not at baseline per patient    · Will continue nebulizer therapy, Respiratory protocol, IV Solu-Medrol 40 mg b i d   · Pulmonology consultation in this setting; patient is known to Dr Luis Carson  · Follow-up results of influenza/RSV PCR and blood cultures  · Incentive spirometry, titrate O2 to SaO2 > 90%

## 2019-03-08 NOTE — UTILIZATION REVIEW
Initial Clinical Review    Admission: Date/Time/Statement: 3/7/19 @ 2100 INPATIENT  Orders Placed This Encounter   Procedures    Inpatient Admission (expected length of stay for this patient Order details is greater than two midnights)     Standing Status:   Standing     Number of Occurrences:   1     Order Specific Question:   Admitting Physician     Answer:   Swati Corral [[id number]]     Order Specific Question:   Level of Care     Answer:   Med Surg [[id number]]     Order Specific Question:   Estimated length of stay     Answer:   More than 2 Midnights     Order Specific Question:   Certification     Answer:   I certify that inpatient services are medically necessary for this patient for a duration of greater than two midnights  See H&P and MD Progress Notes for additional information about the patient's course of treatment  ED: Date/Time/Mode of Arrival:   ED Arrival Information     Expected Arrival Acuity Means of Arrival Escorted By Service Admission Type    3/7/2019  3/7/2019 16:33 Urgent Walk-In Self Hospitalist Urgent    Arrival Complaint    respitory distress        Chief Complaint:   Chief Complaint   Patient presents with    Shortness of Breath     pt is having a hard time breathing hx of copd and lung cancer was seen at pcp office and told to come to er was on flight home from [address] [facility] yesterday  pt also reports head cold symptoms     Assessment/Plan:     Francine Yeboah is a 72 y o  female who has past medical history significant for COPD, chronic hypoxic respiratory failure on 2 L and C, history of lung cancer status post chemotherapy and radiation 2018  She presents with a 1 day history of shortness of breath associated with fevers/chills, cough productive of a small amount of whitish sputum, wheezing, and nasal congestion  She reports some temporary relief with home nebulizers, however shortness of breath recurred    Of note, patient returned home yesterday from a trip in [place], [place], where patient was visiting family  Denies sick contacts in family, but was frequently in airports  States she did receive the influenza vaccine this season  Continues to intermittently smoke cigarettes  Denies chest pain/pressure, abdominal pain/nausea/vomiting, dizziness/lightheadedness, or leg swelling      Currently, patient endorses some improvement with shortness of breath after nebulizers, steroids, and azithromycin in ED, but does not feel she is yet at baseline  * Respiratory distress  Assessment & Plan  · Present on admission, appears in setting of acute bronchitis/other viral URI with potential exacerbation of COPD  · Improved with nebulizers, Solu-Medrol, azithromycin in ED but not at baseline per patient  · Will continue nebulizer therapy, Respiratory protocol, IV Solu-Medrol 40 mg b i d   · Pulmonology consultation in this setting; patient is known to Dr Gilma Jerez  · Follow-up results of influenza/RSV PCR and blood cultures  · Incentive spirometry, titrate O2 to SaO2 > 90%     Chronic obstructive pulmonary disease ([facility] [address] )  Assessment & Plan  · With potential acute exacerbation  · Inhaler plan as above, continue budesonide     Chronic hypoxemic respiratory failure (HCC)  Assessment & Plan  · On 2 L nasal cannula chronically     Nicotine dependence  Assessment & Plan  Offered but refused nicotine replacement     Malignant neoplasm of right lung [facility])  Assessment & Plan  · Status post chemotherapy and radiation 2018  In remission, follows with Dr Abbie Waller  · Outpatient follow-up as indicated           VTE Prophylaxis: Enoxaparin (Lovenox)  / sequential compression device   Code Status: Level 3 - DNAR and DNI as discussed with patient at bedside  POLST: There is no POLST form on file for this patient (pre-hospital)     Anticipated Length of Stay:  Patient will be admitted on an Inpatient basis with an anticipated length of stay of  Greater than 2 midnights     Justification for Hospital Stay: Please see detailed plans noted above        ED Vital Signs:   ED Triage Vitals   Temperature Pulse Respirations Blood Pressure SpO2   03/07/19 1642 03/07/19 1642 03/07/19 1642 03/07/19 1642 03/07/19 1642   (!) 101 2 °F (38 4 °C) (!) 129 (!) 25 150/70 96 %      Temp Source Heart Rate Source Patient Position - Orthostatic VS BP Location FiO2 (%)   03/07/19 1642 03/07/19 1642 03/07/19 1642 03/07/19 1642 --   Tympanic Monitor Sitting Left arm       Pain Score       03/07/19 1858       No Pain        Wt Readings from Last 1 Encounters:   03/07/19 39 7 kg (87 lb 9 6 oz)     Vital Signs (abnormal):       Patient Position - Orthostatic VS          03/08/19 1441 97 9 °F (36 6 °C) 102 18 142/65 90 % Nasal cannula Sitting   03/08/19 1416     92 % Nasal cannula 6L NC   03/08/19 1308     92 %     03/08/19 0917     97 % Other (comment)     O2 Device: HFNC at 03/08/19 0917   03/08/19 0906     98 %     03/08/19 0712     91 % Nasal cannula    03/08/19 0700 98 6 °F (37 °C) 117Abnormal   18 118/68 84 %Abnormal   Nasal cannula Sitting   Pulse: Reported to RN Layman Poser at 03/08/19 0700   SpO2: Reported to RN Layman Poser at 03/08/19 0700   03/08/19 0223     94 % Nasal cannula    03/07/19 2200  106Abnormal  20 124/64 94 % Nasal cannula    03/07/19 2153  104 24Abnormal  124/64 92 % Nasal cannula    03/07/19 1900  110Abnormal  24Abnormal  137/62 94 % Nasal cannula    03/07/19 1642 101 2 °F (38 4 °C)Abnormal  129Abnormal  25Abnormal  150/70 96 % Nasal cannula Sitting          Pertinent Labs/Diagnostic Test Results:      03/07/19 1824    NT-proBNP <125 pg/mL 936High     Resulting Agency  [facility]        03/07/19 1701     Sodium 136 - 145 mmol/L 131Low     Potassium 3 5 - 5 3 mmol/L 4 7    Chloride 100 - 108 mmol/L 102    CO2 21 - 32 mmol/L 22    ANION GAP 4 - 13 mmol/L 7    BUN 5 - 25 mg/dL 16    Creatinine 0 60 - 1 30 mg/dL 0 70    Glucose 65 - 140 mg/dL 120    Calcium 8 3 - 10 1 mg/dL 9 0    AST 5 - 45 U/L 23    ALT 12 - 78 U/L 16    Alkaline Phosphatase 46 - 116 U/L 124High     Total Protein 6 4 - 8 2 g/dL 7 8    Albumin 3 5 - 5 0 g/dL 3 1Low     Total Bilirubin 0 20 - 1 00 mg/dL 0 49    eGFR ml/min/1 73sq m 87      03/07/19 1814     WBC 4 31 - 10 16 Thousand/uL 10  66High     RBC 3 81 - 5 12 Million/uL 4 02    Hemoglobin 11 5 - 15 4 g/dL 12 7    Hematocrit 34 8 - 46 1 % 40 3    MCV 82 - 98 fL 100High     MCH 26 8 - 34 3 pg 31 6    MCHC 31 4 - 37 4 g/dL 31 5    RDW 11 6 - 15 1 % 13 2    MPV 8 9 - 12 7 fL 9 4    Platelets 478 - 038 Thousands/uL 294    nRBC /100 WBCs 0    Neutrophils Relative 43 - 75 % 84High     Immat GRANS % 0 - 2 % 1    Lymphocytes Relative 14 - 44 % 6Low     Monocytes Relative 4 - 12 % 9    Eosinophils Relative 0 - 6 % 0    Basophils Relative 0 - 1 % 0    Neutrophils Absolute 1 85 - 7 62 Thousands/µL 9  01High     Immature Grans Absolute 0 00 - 0 20 Thousand/uL 0 05    Lymphocytes Absolute 0 60 - 4 47 Thousands/µL 0 59Low     Monocytes Absolute 0 17 - 1 22 Thousand/µL 0 96    Eosinophils Absolute 0 00 - 0 61 Thousand/µL 0 02    Basophils Absolute 0 00 - 0 10 Thousands/µL 0 03      Chest x-ray: Emphysema  No acute pulmonary abnormality  CTA chest: Mild central and bilateral lower lobe bronchial wall thickening in keeping with a nonspecific bronchitis  Stable severe emphysematous changes  Slightly more prominent 10 x 7 mm posterior right lower lobe opacity #4/103 which measured 8 x 5 mm on the prior study  Based on current Fleischner Society 2017 Guidelines on incidental pulmonary nodule, either PET/CT scan evaluation, tissue sampling or short term interval followup non-contrast CT followup (initailly in 3 months) may be considered appropriate      ED Treatment:   Medication Administration from 03/07/2019 1622 to 03/08/2019 0120       Date/Time Order Dose Route Action     03/07/2019 2116 sodium chloride 0 9 % bolus 1,000 mL 0 mL Intravenous Stopped     03/07/2019 1820 sodium chloride 0 9 % bolus 1,000 mL 1,000 mL Intravenous New Bag     03/07/2019 1856 ipratropium-albuterol (DUO-NEB) 0 5-2 5 mg/3 mL inhalation solution 3 mL 3 mL Nebulization Given     03/07/2019 1857 sodium chloride 0 9 % inhalation solution 3 mL 3 mL Nebulization Given     03/07/2019 1818 acetaminophen (TYLENOL) tablet 650 mg 650 mg Oral Given     03/07/2019 2017 iohexol (OMNIPAQUE) 350 MG/ML injection (MULTI-DOSE) 85 mL 85 mL Intravenous Given     03/07/2019 2152 methylPREDNISolone sodium succinate (Solu-MEDROL) injection 125 mg 125 mg Intravenous Given     03/07/2019 2255 azithromycin (ZITHROMAX) 500 mg in sodium chloride 0 9% 250mL IVPB 500 mg 0 mg Intravenous Stopped     03/07/2019 2152 azithromycin (ZITHROMAX) 500 mg in sodium chloride 0 9% 250mL IVPB 500 mg 500 mg Intravenous New Bag        Past Medical/Surgical History: Active Ambulatory Problems     Diagnosis Date Noted    Malignant neoplasm of right lung ([place] [address] ) 03/09/2018    Abnormal weight loss 10/05/2012    Loss of appetite 01/23/2015    Hyperlipidemia 04/08/2016    Healthcare maintenance 08/29/2018    Simple chronic bronchitis ([place] [address] ) 02/26/2019    Chronic hypoxemic respiratory failure (HCC) 02/26/2019    Respiratory distress 03/07/2019    Depressive disorder 11/14/2017     Resolved Ambulatory Problems     Diagnosis Date Noted    No Resolved Ambulatory Problems     Past Medical History:   Diagnosis Date    Breast cancer ([place] [address] ) 1996    [facility]     Requires supplemental oxygen      Admitting Diagnosis: Acute bronchitis [J20 9]  Shortness of breath [R06 02]  COPD exacerbation ([place] [address] ) [J44 1]  Age/Sex: 72 y o  female     Admission Orders: CBC, BMP in a m , sequential compression device, up with assistance, peak flow, high flow NC       Scheduled Meds:   Current Facility-Administered Medications:  albuterol 2 puff Inhalation Q4H PRN   azithromycin 500 mg Intravenous Q24H   budesonide 0 5 mg Nebulization Q12H   cefTRIAXone 1,000 mg Intravenous Q24H cholecalciferol 2,000 Units Oral Daily   enoxaparin 40 mg Subcutaneous Daily   formoterol 20 mcg Nebulization Q12H   ipratropium 0 5 mg Nebulization Q6H   levalbuterol 1 25 mg Nebulization Q6H   methylPREDNISolone sodium succinate 40 mg Intravenous Q12H [address]   multivitamin-minerals 1 tablet Oral Daily     ==============================================================  3/8 Pulmonology Consult:    1  Acute on chronic hypoxic respiratory failure  2  Acute exacerbation of Severe COPD (FEV1 - 33%)  3  Acute tracheobronchtis - possible CAP vs  Viral infection  4  Stage IV adenocarcinoma of the lung s/p chemotherapy completed 2/18, mild enlargement of lesion and pleural involvement on CT     Plan  1  Check CXR to assess cause for hypoxia  2  Check procalcitonin, sputum and blood culture  3  Start Rocephin and Zithromax for possible CAP  4  Continue Solumedrol 40mg IV q12  5   Continue Xopenex and Atrovent every 6 hrs  6  Would add on Perforomist in place of Brovana and continue Budesonide  7  Will need repeat CT chest as outpatient in a few months with hematology follow up shortly after hospitalization  8  Will need outpatient follow up with Dr Marylin Torres  9  Wean down oxygen as tolerated  10  Would switch to stepdown level 2 now that on high flow oxygen

## 2019-03-08 NOTE — RESPIRATORY THERAPY NOTE
resp care      03/08/19 0906   Respiratory Protocol   Protocol Initiated? Yes   Protocol Selection Respiratory   Language Barrier? No   Medical & Social History Reviewed? Yes   Diagnostic Studies Reviewed? Yes   Physical Assessment Performed? Yes   Respiratory Plan Vent/NIV/HFNC   Respiratory Assessment   Assessment Type Assess only   General Appearance Awake; Alert   Respiratory Pattern Labored; Accessory muscle use   Chest Assessment Chest expansion symmetrical   Bilateral Breath Sounds Expiratory wheezes   Resp Comments MD called, pt saturating 74%  Pt placed on HFNC at this time 50%/45LPM  Pt currently sating 98%, will wean as tolerable      Additional Assessments   SpO2 98 %

## 2019-03-08 NOTE — ED NOTES
Placed droplet precautions outside the door due to rule out flu       Derek Phelps RN  03/07/19 5111

## 2019-03-08 NOTE — CONSULTS
Consultation - Pulmonary Medicine   Jose Alejandro Casiano 72 y o  female MRN: [id number]  Unit/Bed#: [facility] 916-01 Encounter: [id number]      Assessment/Plan:    1  Acute on chronic hypoxic respiratory failure likely secondary COPD exacerbation in the setting of viral vs bacterial bronchitis      *   upon entering room patient was 74% on 2 L, patient chronically wears 2 L at home      *  placed on high-flow- 45% 40 L- will titrate keeping O2 sats greater than 88%      *  will need to monitor tenuous respiratory status upon ambulation- please place bedside commode, encourage flutter, deep breathing cough    2  Severe COPD with exacerbation likely secondary to acute infection      *  home regimenBrovana 15 mcg nebulizer b i d , budesonide 0 5 mg nebulizer b i d , ipratropium nebulizer q i d        *  will continue IV steroids to 40 mg t i d     3  Abnormal CTA of chest      *  bilateral lower lobe bronchial wall thickening      *  due to respiratory status, imaging, and febrile- 101 6 ° F will initiate azithromycin/Rocephin day #1 until further microbiology is resulted in clinical state improves       *  Procalcitonin, BC, and sputum pending       *  Negative; flu/RSV    4  History of stage IV adenocarcinoma ()      *  more prominent right lower lobe opacity      *  patient follows with Dr Shauna Horne      *  completed chemotherapy and radiation 2018      *  will need repeat chest CT in 3 months        History of Present Illness   Physician Requesting Consult: Benjamin Elkins MD  Reason for Consult / Principal Problem:  Acute bronchitis  Hx and PE limited by:  Nothing  Chief Complaint:  "I have been feeling short of breath since I returned from [place]"  HPI: Jose Alejandro Casiano is a 72 y o   female who presented to [facility]  with complaints of increasing shortness of breath  Patient has history of stage IV adenocarcinoma, breast cancer with mastectomy (), COPD, and chronic hypoxia    Patient presented to her primary care's office in which she was severely short of breath with an O2 sat of 84%, tachypneic with a rate of 25, tachycardic and reported low-grade fever  Patient reports her symptoms began after she recently returned from a trip from [place], [place] with her family  Patient was transferred to [place] [address]  In the ED patient received Solu-Medrol, azithromycin, and DuoNeb  Upon walking in to patient's room, patient was on 2 L at 74%  Patient's oxygen was increased to 10 L and was only at 92%, respiratory was paged and patient was placed on high-flow  From a pulmonary standpoint, patient follows with Dr Marilyn Iniguez  Her initial stage IV adenocarcinoma was diagnosed via bronchoscopy performed by Dr Marilyn Iniguez on 10/17/2017  Patient received both chemotherapy and radiation in which treatment was completed in February 2018  Most recent CT scan of the chest in December 2018 showed no evidence of malignant disease  Patient was recently seen in the office by Dr Marilyn Iniguez on 11/13/2018 for an office follow-up  An office spirometry was performed on 09/11/2017 with an FEV1 of 33%  Patient maintains on chronic 2L oxygen Brovana 15 mcg nebulizer b i d , budesonide 0 5 mg nebulizer b i d , ipratropium nebulizer q i d  Patient reports and 1-2 pack 57 year smoking history in which she quit approximately 21 months ago  Patient received flu vaccine on 10/13/2018 and pneumococcal polysaccharide PPV 23 02/26/2019  Patient is scheduled for hospital follow-up with Dr Marilyn Iniguez on 5/14/19  Inpatient consult to Pulmonology  Consult performed by: ONI Trevino  Consult ordered by: Alpesh Ovalle MD          Review of Systems   Constitutional: Negative for activity change and appetite change  HENT: Negative for congestion and postnasal drip  Respiratory: Positive for cough, chest tightness, shortness of breath and wheezing  Negative for apnea, choking and stridor      Cardiovascular: Negative for chest pain, palpitations and leg swelling  Gastrointestinal: Negative for abdominal distention and abdominal pain  Genitourinary: Negative for difficulty urinating and dysuria  Musculoskeletal: Negative for arthralgias and back pain  Skin: Negative for color change and pallor  Neurological: Negative for dizziness and facial asymmetry  Psychiatric/Behavioral: Negative for agitation and behavioral problems  Historical Information   Past Medical History:   Diagnosis Date    [facility] [address] )     COPD [facility]chronic obstructive pulmonary disease) ([facility] )     Requires supplemental oxygen     2 Liters at bedtime and as needed     Past Surgical History:   Procedure Laterality Date    APPENDECTOMY      COLONOSCOPY N/A 2016    Procedure: COLONOSCOPY;  Surgeon: Cooper Singh MD;  Location: [facility]; Service:     LUNG CANCER SURGERY      MASTECTOMY, RADICAL Right     [address] N/A 10/17/2017    Procedure: [address];  Surgeon: Ayan Myrick MD;  Location: [facility]; Service: Pulmonary    TONSILLECTOMY       Social History   Social History     Substance and Sexual Activity   Alcohol Use No    Alcohol/week: 0 0 oz    Frequency: Never    Drinks per session: Patient refused    Binge frequency: Never     Social History     Substance and Sexual Activity   Drug Use No     Social History     Tobacco Use   Smoking Status Former Smoker    Packs/day: 0 50    Years: 55 00    Pack years: 27 50    Start date:     Last attempt to quit: 2017    Years since quittin 7   Smokeless Tobacco Never Used   Tobacco Comment    20 cigs a day, stop 2 mth ago     Occupational History:  Noncontributory    Family History: non-contributory    Meds/Allergies   pertinent pulmonary meds have been reviewed    No Known Allergies    Objective   Vitals: Blood pressure 124/64, pulse (!) 106, temperature (!) 101 2 °F (38 4 °C), temperature source Tympanic, resp   rate 20, height 5' 4" (1 626 m), weight 39 7 kg (87 lb 9 6 oz), SpO2 91 % high flow ,Body mass index is 15 04 kg/m²  No intake or output data in the 24 hours ending 03/08/19 0808  Invasive Devices     Peripheral Intravenous Line            Peripheral IV 03/07/19 Left Arm less than 1 day                Physical Exam   Constitutional: She is oriented to person, place, and time  She appears well-developed and well-nourished  HENT:   Head: Normocephalic and atraumatic  Neck: Normal range of motion  Neck supple  No JVD present  No tracheal deviation present  Cardiovascular: Regular rhythm  Exam reveals no friction rub  No murmur heard  Patient is tachycardic   Pulmonary/Chest: No accessory muscle usage or stridor  Tachypnea noted  No apnea and no bradypnea  She is in respiratory distress  She has no decreased breath sounds  She has wheezes in the right upper field, the right middle field, the right lower field, the left upper field, the left middle field and the left lower field  She has rhonchi in the right lower field and the left lower field  She has no rales  She exhibits no tenderness  RR- 23, patient appears labored with dyspnea upon speaking   Abdominal: Soft  Bowel sounds are normal    Musculoskeletal: Normal range of motion  She exhibits no edema or deformity  Neurological: She is alert and oriented to person, place, and time  Skin: Skin is warm and dry  Psychiatric: She has a normal mood and affect  Her behavior is normal        Lab Results:   I have personally reviewed pertinent lab results  , ABG: No results found for: PHART, ORB5HQP, PO2ART, YKD1OFS, U2OUSAJE, BEART, SOURCE, BNP: No results found for: BNP, CBC:   Lab Results   Component Value Date    WBC 11 53 (H) 03/08/2019    HGB 11 2 (L) 03/08/2019    HCT 35 3 03/08/2019     (H) 03/08/2019     03/08/2019    MCH 32 0 03/08/2019    MCHC 31 7 03/08/2019    RDW 13 2 03/08/2019    MPV 9 2 03/08/2019    NRBC 0 03/08/2019   , CMP:   Lab Results   Component Value Date    SODIUM 136 03/08/2019    K 4 2 03/08/2019     03/08/2019    CO2 28 03/08/2019    BUN 13 03/08/2019    CREATININE 0 53 (L) 03/08/2019    CALCIUM 8 4 03/08/2019    AST 23 03/07/2019    ALT 16 03/07/2019    ALKPHOS 124 (H) 03/07/2019    EGFR 96 03/08/2019   , PT/INR:   Lab Results   Component Value Date    INR 1 03 03/07/2019   , Troponin:   Lab Results   Component Value Date    TROPONINI <0 02 03/07/2019       Imaging Studies: I have personally reviewed pertinent films in PACS     CTA chest 03/07/2019- bilateral lower lobe bronchial wall thickening with stable emphysematous changes, more prominent right lower lobe opacity    EKG, Pathology, and Other Studies: I have personally reviewed pertinent films in PACS    EKG 03/07/2019- sinus tachycardia    Pulmonary Results (PFTs, PSG): I have personally reviewed pertinent films in PACS     Spirometry:  FEV1/FVC Ratio is 62%  FEV1 is 0 75 L which is 33 % predicted  FVC is 1 59 L which is 53 % predicted  IMPRESSION:  severe obstruction, with low vital capacity    VTE Prophylaxis: Sequential compression device Ida Álvarez)     Code Status: Level 3 - DNAR and DNI    None    Portions of the record may have been created with voice recognition software  Occasional wrong word or "sound a like" substitutions may have occurred due to the inherent limitations of voice recognition software  Read the chart carefully and recognize, using context, where substitutions have occurred

## 2019-03-08 NOTE — PHYSICIAN ADVISOR
Current patient class: Inpatient  The patient is currently on Hospital Day: 2 at [facility]       The patient is  documented to require at least a 2nd midnight in the hospital  As such the patient is  expected to satisfy the 2 midnight benchmark and is therefore eligible for inpatient admission  After review of the relevant documentation, labs, vital signs and test results, the patient is appropriate for INPATIENT ADMISSION  Admission to the hospital as an inpatient is a complex decision making process which requires the practitioner to consider the patients presenting complaint, history and physical examination and all relevant testing  With this in mind, in this case, the patient was deemed appropriate for INPATIENT ADMISSION  After review of the documentation and testing available at the time of the admission I concur with this clinical determination of medical necessity  Rationale is as follows: The patient is a 72 yrs Female who presented to the ED at 3/7/2019  5:52 PM with a chief complaint of Shortness of Breath (pt is having a hard time breathing hx of copd and lung cancer was seen at pcp office and told to come to er was on flight home from [address] [facility] yesterday   pt also reports head cold symptoms)   patient has history of COPD, ch hypoxic respiratory failure , history of lung cancer s/p chemo and RT - who presented with fever , tachycardia and tachypnea - started on nebs , IV steroids - after admission she developed worsening hypoxia ( sat 74%) and respiratory distress - she was placed on high flow NC and seen in consultation by pulmonary service - plan of care included CXR , check procalcitonin, sputum and blood culture and continued IV steroids       The patients vitals on arrival were ED Triage Vitals   Temperature Pulse Respirations Blood Pressure SpO2   03/07/19 1642 03/07/19 1642 03/07/19 1642 03/07/19 1642 03/07/19 1642   (!) 101 2 °F (38 4 °C) (!) 129 (!) 25 150/70 96 %      Temp Source Heart Rate Source Patient Position - Orthostatic VS BP Location FiO2 (%)   03/07/19 1642 03/07/19 1642 03/07/19 1642 03/07/19 1642 --   Tympanic Monitor Sitting Left arm       Pain Score       03/07/19 1858       No Pain           Past Medical History:   Diagnosis Date    Breast cancer ([facility] ) 1996    COPD (chronic obstructive pulmonary disease) (HCC)     Requires supplemental oxygen     2 Liters at bedtime and as needed     Past Surgical History:   Procedure Laterality Date    APPENDECTOMY      COLONOSCOPY N/A 4/18/2016    Procedure: COLONOSCOPY;  Surgeon: Elijah Torres MD;  Location: [facility]; Service:     LUNG CANCER SURGERY      MASTECTOMY, RADICAL Right 1996    [place] [address] N/A 10/17/2017    Procedure: [address];  Surgeon: Jeremias Ramirez MD;  Location: [facility]; Service: Pulmonary    TONSILLECTOMY         The patient was admitted to the hospital at 2100 on 3/7/19 for the following diagnosis:  Acute bronchitis [J20 9]  Shortness of breath [R06 02]  COPD exacerbation ([facility] ) [J44 1]    Consults have been placed to:   IP CONSULT TO PULMONOLOGY  IP CONSULT TO CASE MANAGEMENT    Vitals:    03/08/19 0917 03/08/19 1308 03/08/19 1416 03/08/19 1441   BP:    142/65   BP Location:    Left arm   Pulse:    102   Resp:    18   Temp:    97 9 °F (36 6 °C)   TempSrc:    Oral   SpO2: 97% 92% 92% 90%   Weight:       Height:           Most recent labs:    Recent Labs     03/07/19  1701  03/07/19  1816 03/08/19  0521   WBC  --    < >  --  11 53*   HGB  --    < >  --  11 2*   HCT  --    < >  --  35 3   PLT  --    < >  --  271   K 4 7  --   --  4 2   CALCIUM 9 0  --   --  8 4   BUN 16  --   --  13   CREATININE 0 70  --   --  0 53*   INR 1 03  --   --   --    TROPONINI  --   --  <0 02  --    AST 23  --   --   --    ALT 16  --   --   --    ALKPHOS 124*  --   --   --     < > = values in this interval not displayed         Scheduled Meds:  Current Facility-Administered Medications:  albuterol 2 puff Inhalation Q4H PRN Chelsea Godoy MD    azithromycin 500 mg Intravenous Q24H ONI Caicedo    budesonide 0 5 mg Nebulization Q12H Chelsea Godoy MD    cefTRIAXone 1,000 mg Intravenous Q24H ONI Caicedo Last Rate: Stopped (03/08/19 1200)   cholecalciferol 2,000 Units Oral Daily Chelsea Godoy MD    enoxaparin 40 mg Subcutaneous Daily Chelsea Godoy MD    formoterol 20 mcg Nebulization Q12H Brittany Torres DO    ipratropium 0 5 mg Nebulization Q6H Chelsea Godoy MD    levalbuterol 1 25 mg Nebulization Q6H Chelsea Godoy MD    methylPREDNISolone sodium succinate 40 mg Intravenous Q12H [facility] Chelsea Godoy MD    multivitamin-minerals 1 tablet Oral Daily Chelsea Godoy MD      Continuous Infusions:   PRN Meds:   albuterol    Surgical procedures (if appropriate):

## 2019-03-09 PROCEDURE — 94640 AIRWAY INHALATION TREATMENT: CPT

## 2019-03-09 PROCEDURE — 99232 SBSQ HOSP IP/OBS MODERATE 35: CPT | Performed by: NURSE PRACTITIONER

## 2019-03-09 PROCEDURE — 94760 N-INVAS EAR/PLS OXIMETRY 1: CPT

## 2019-03-09 PROCEDURE — 94660 CPAP INITIATION&MGMT: CPT

## 2019-03-09 PROCEDURE — 94668 MNPJ CHEST WALL SBSQ: CPT

## 2019-03-09 PROCEDURE — 99232 SBSQ HOSP IP/OBS MODERATE 35: CPT | Performed by: INTERNAL MEDICINE

## 2019-03-09 RX ORDER — AZITHROMYCIN 250 MG/1
500 TABLET, FILM COATED ORAL EVERY 24 HOURS
Status: DISCONTINUED | OUTPATIENT
Start: 2019-03-09 | End: 2019-03-13

## 2019-03-09 RX ORDER — GUAIFENESIN 600 MG
600 TABLET, EXTENDED RELEASE 12 HR ORAL EVERY 12 HOURS SCHEDULED
Status: DISCONTINUED | OUTPATIENT
Start: 2019-03-09 | End: 2019-03-14 | Stop reason: HOSPADM

## 2019-03-09 RX ORDER — METHYLPREDNISOLONE SODIUM SUCCINATE 40 MG/ML
40 INJECTION, POWDER, LYOPHILIZED, FOR SOLUTION INTRAMUSCULAR; INTRAVENOUS EVERY 8 HOURS SCHEDULED
Status: DISCONTINUED | OUTPATIENT
Start: 2019-03-09 | End: 2019-03-12

## 2019-03-09 RX ADMIN — METHYLPREDNISOLONE SODIUM SUCCINATE 40 MG: 40 INJECTION, POWDER, FOR SOLUTION INTRAMUSCULAR; INTRAVENOUS at 23:22

## 2019-03-09 RX ADMIN — BUDESONIDE 0.5 MG: 0.5 INHALANT RESPIRATORY (INHALATION) at 07:23

## 2019-03-09 RX ADMIN — VITAMIN D, TAB 1000IU (100/BT) 2000 UNITS: 25 TAB at 09:11

## 2019-03-09 RX ADMIN — IPRATROPIUM BROMIDE 0.5 MG: 0.5 SOLUTION RESPIRATORY (INHALATION) at 01:34

## 2019-03-09 RX ADMIN — LEVALBUTEROL HYDROCHLORIDE 1.25 MG: 1.25 SOLUTION, CONCENTRATE RESPIRATORY (INHALATION) at 01:34

## 2019-03-09 RX ADMIN — ENOXAPARIN SODIUM 40 MG: 40 INJECTION SUBCUTANEOUS at 09:11

## 2019-03-09 RX ADMIN — IPRATROPIUM BROMIDE 0.5 MG: 0.5 SOLUTION RESPIRATORY (INHALATION) at 13:27

## 2019-03-09 RX ADMIN — FORMOTEROL FUMARATE DIHYDRATE 20 MCG: 20 SOLUTION RESPIRATORY (INHALATION) at 07:25

## 2019-03-09 RX ADMIN — GUAIFENESIN 600 MG: 600 TABLET, EXTENDED RELEASE ORAL at 23:22

## 2019-03-09 RX ADMIN — GUAIFENESIN 600 MG: 600 TABLET, EXTENDED RELEASE ORAL at 13:33

## 2019-03-09 RX ADMIN — FORMOTEROL FUMARATE DIHYDRATE 20 MCG: 20 SOLUTION RESPIRATORY (INHALATION) at 19:28

## 2019-03-09 RX ADMIN — IPRATROPIUM BROMIDE 0.5 MG: 0.5 SOLUTION RESPIRATORY (INHALATION) at 19:28

## 2019-03-09 RX ADMIN — LEVALBUTEROL HYDROCHLORIDE 1.25 MG: 1.25 SOLUTION, CONCENTRATE RESPIRATORY (INHALATION) at 13:27

## 2019-03-09 RX ADMIN — LEVALBUTEROL HYDROCHLORIDE 1.25 MG: 1.25 SOLUTION, CONCENTRATE RESPIRATORY (INHALATION) at 19:28

## 2019-03-09 RX ADMIN — LEVALBUTEROL HYDROCHLORIDE 1.25 MG: 1.25 SOLUTION, CONCENTRATE RESPIRATORY (INHALATION) at 07:23

## 2019-03-09 RX ADMIN — AZITHROMYCIN MONOHYDRATE 500 MG: 250 TABLET ORAL at 23:21

## 2019-03-09 RX ADMIN — METHYLPREDNISOLONE SODIUM SUCCINATE 40 MG: 40 INJECTION, POWDER, FOR SOLUTION INTRAMUSCULAR; INTRAVENOUS at 09:11

## 2019-03-09 RX ADMIN — METHYLPREDNISOLONE SODIUM SUCCINATE 40 MG: 40 INJECTION, POWDER, FOR SOLUTION INTRAMUSCULAR; INTRAVENOUS at 13:33

## 2019-03-09 RX ADMIN — BUDESONIDE 0.5 MG: 0.5 INHALANT RESPIRATORY (INHALATION) at 19:28

## 2019-03-09 RX ADMIN — IPRATROPIUM BROMIDE 0.5 MG: 0.5 SOLUTION RESPIRATORY (INHALATION) at 07:23

## 2019-03-09 RX ADMIN — CEFTRIAXONE 1000 MG: 1 INJECTION, POWDER, FOR SOLUTION INTRAMUSCULAR; INTRAVENOUS at 10:04

## 2019-03-09 RX ADMIN — Medication 1 TABLET: at 09:11

## 2019-03-09 NOTE — ASSESSMENT & PLAN NOTE
· Status post chemotherapy and radiation 2018     ·  In remission, follows with Dr Eddie Maxwell  · Outpatient follow-up as indicated  · Palliative suggested

## 2019-03-09 NOTE — ASSESSMENT & PLAN NOTE
· With acute exacerbation  · Continue with the respiratory protocol  · Pt now back on high flow they are considering viral panel as pt was just in [facility]   · Continue with the supplemental oxygen

## 2019-03-09 NOTE — ASSESSMENT & PLAN NOTE
· Present on admission, appears in setting of acute bronchitis/other viral URI with potential exacerbation of COPD  · Improved with nebulizers, Solu-Medrol, azithromycin in ED but not at baseline per patient    · Will continue nebulizer therapy, Respiratory protocol, IV Solu-Medrol 40 mg b i d  I increased to q 8 hrs as pt still wheezing and added mucinex bid with moist cough but unable to expectorate   · Follow-up results of influenza/RSV PCR negative   · and blood cultures in process  · Incentive spirometry, titrate O2 to SaO2 > 90%  · Patient was briefly on high-flow oxygen last night then stopped and now again is back on high flow   · Discussed with pulmonary and attending has suggested palliative med

## 2019-03-09 NOTE — PROGRESS NOTES
Progress Note - Pulmonary   Jayce Sa 72 y o  female MRN: [id number]  [facility] Encounter: [id number]    Assessment/Plan:    1  Acute on chronic hypoxic respiratory failure likely secondary COPD exacerbation in the setting of viral vs bacterial bronchitis vs CAP      *  patient was able to maintain on nasal cannula 6 L throughout the night however was placed back on high-flow this morning 40 L 40%      *  continue titrate O2 keeping sats greater than 88%, pulmonary toilet, IS, OOB as tolerated- consider placing patient on oxymizer    2  Severe COPD with exacerbation likely secondary to acute infection       *  home regimenBrovana 15 mcg nebulizer b i d , budesonide 0 5 mg nebulizer b i d , ipratropium nebulizer q i d        *  inpatient will continue:  , budesonide b i d, perforomist b i d , Xopenex/Atrovent t i d        *  increase Solu-Medrol 40 mg q 8h    3  Abnormal CT a chest- likely CAP      *  bilateral lower lobe bronchial wall thickening      *  T-max 101 2° F, procalcitonin 0 31- continue to trend procalcitonin WBC      *  to be collected- sputum/  Negative- flu/RSV/BC- 24hrs       *  continue azithromycin/Rocephin day # 2/7    4  History of stage IV adenocarcinoma (2017)      *  recent CTA shows more prominent right lower lobe opacity      *  patient follows with   [facility]      *  with the chemotherapy and radiation February 2018       *  will need repeat chest CT in 3 months      Chief Complaint:    "I am really having a difficult time breathing"    Subjective:    Russell Gee was comfortably sitting in her bed  She reports that she having a significantly difficult time breathing  She states that this is never happened to her before  She is concerned on why his occurring after she returned from [place]  She reports she was able to maintain on nasal cannula throughout the night however this morning around 11:00 a m  She was placed back on high-flow    She currently denies fever, chills, pleuritic chest pain, hemoptysis, night sweats, or headache  Objective:    Vitals: Blood pressure 118/77, pulse 105, temperature 98 1 °F (36 7 °C), temperature source Oral, resp  rate 18, height 5' 4" (1 626 m), weight 39 7 kg (87 lb 9 6 oz), SpO2 97 %  High-flow 40 L and 40%,Body mass index is 15 04 kg/m²  Intake/Output Summary (Last 24 hours) at 3/9/2019 1333  Last data filed at 3/9/2019 0825  Gross per 24 hour   Intake 480 ml   Output 280 ml   Net 200 ml       Invasive Devices     Peripheral Intravenous Line            Peripheral IV 03/08/19 Left;Upper Arm 1 day                Physical Exam:   Physical Exam   Constitutional: She is oriented to person, place, and time  She appears cachectic  No distress  HENT:   Head: Normocephalic and atraumatic  Neck: Normal range of motion  Neck supple  No tracheal deviation present  No thyromegaly present  Cardiovascular: Normal rate, regular rhythm and normal heart sounds  Exam reveals no gallop and no friction rub  No murmur heard  Pulmonary/Chest: No accessory muscle usage or stridor  No tachypnea and no bradypnea  No respiratory distress  She has no decreased breath sounds  She has wheezes in the right upper field, the right middle field, the right lower field, the left upper field, the left middle field and the left lower field  She has no rhonchi  She has no rales  She exhibits no tenderness  Abdominal: Soft  Bowel sounds are normal  She exhibits no distension  There is no tenderness  Musculoskeletal: Normal range of motion  She exhibits no edema or deformity  Neurological: She is alert and oriented to person, place, and time  Skin: Skin is warm and dry  She is not diaphoretic  Psychiatric: She has a normal mood and affect  Her behavior is normal        Labs:  I have personally reviewed pertinent lab results 03/08/2019    Imaging and other studies: I have personally reviewed pertinent films in PACS     CTA chest 03/07/2019- bilateral lower lobe bronchial wall thickening with stable emphysematous changes, more prominent right lower lobe opacity

## 2019-03-09 NOTE — PROGRESS NOTES
The azithromycin has / have been converted to Oral per [facility] IV-to-PO Auto-Conversion Protocol for Adults as approved by the Pharmacy and Therapeutics Committee  The patient met all eligible criteria:  3 Age = 25years old   2) Received at least one dose of the IV form   3) Receiving at least one other scheduled oral/enteral medication   4) Tolerating an oral/enteral diet   and did not have any exclusions:   1) Critical care patient   2) Active GI bleed (IF assessing H2RAs or PPIs)   3) Continuous tube feeding (IF assessing cipro, doxycycline, levofloxacin, minocycline, rifampin, or voriconazole)   4) Receiving PO vancomycin (IF assessing metronidazole)   5) Persistent nausea and/or vomiting   6) Ileus or gastrointestinal obstruction   7) Stan/nasogastric tube set for continuous suction   8) Specific order not to automatically convert to PO (in the order's comments or if discussed in the most recent Infectious Disease or primary team's progress notes)          Mark Mcginnis, Pharmacist

## 2019-03-09 NOTE — PROGRESS NOTES
Progress Note - Rodriguez Pham 1945, 72 y o  female MRN: [id number]    Unit/Bed#: [facility] 916-01 Encounter: [id number]    Primary Care Provider: Venkatesh Hernandez DO   Date and time admitted to hospital: 3/7/2019  5:52 PM        * Respiratory distress  Assessment & Plan  · Present on admission, appears in setting of acute bronchitis/other viral URI with potential exacerbation of COPD  · Improved with nebulizers, Solu-Medrol, azithromycin in ED but not at baseline per patient  · Will continue nebulizer therapy, Respiratory protocol, IV Solu-Medrol 40 mg b i d  I increased to q 8 hrs as pt still wheezing and added mucinex bid with moist cough but unable to expectorate   · Follow-up results of influenza/RSV PCR negative   · and blood cultures in process  · Incentive spirometry, titrate O2 to SaO2 > 90%  · Patient was briefly on high-flow oxygen last night then stopped and now again is back on high flow   · Discussed with pulmonary and attending has suggested palliative med     Chronic hypoxemic respiratory failure (HCC)  Assessment & Plan  · On 2 L nasal cannula chronically    Malignant neoplasm of right lung [facility])  Assessment & Plan  · Status post chemotherapy and radiation 2018  ·  In remission, follows with Dr Daisha Galan  · Outpatient follow-up as indicated  · Palliative suggested     Chronic obstructive pulmonary disease ([address] )  Assessment & Plan  · With acute exacerbation  · Continue with the respiratory protocol  · Pt now back on high flow they are considering viral panel as pt was just in [facility]   · Continue with the supplemental oxygen    Nicotine dependence  Assessment & Plan  Offered but refused nicotine replacement      VTE Pharmacologic Prophylaxis:   Pharmacologic: Enoxaparin (Lovenox)  Mechanical VTE Prophylaxis in Place: Yes    Patient Centered Rounds: I have performed bedside rounds with nursing staff today      Discussions with Specialists or Other Care Team Provider: nursing     Education and Discussions with Family / Patient: patient     Time Spent for Care: 30 minutes  More than 50% of total time spent on counseling and coordination of care as described above  Current Length of Stay: 2 day(s)    Current Patient Status: Inpatient   Certification Statement: The patient will continue to require additional inpatient hospital stay due to ongoing iv steroids     Discharge Plan: home when medically stable     Code Status: Level 3 - DNAR and DNI      Subjective:   Patient states she has never felt like this before  She has never had respiratory distress to this degree  She does came back from [place] and states that it was very cold day  She has moist cough but unable to expectorate  Wheezing is evident  Patient on high flow at this time  Discussed that we would increase Solu-Medrol to t i d  And will discuss with Pulmonary as well as add Mucinex to patient's regimen  Objective:     Vitals:   Temp (24hrs), Av 3 °F (36 8 °C), Min:97 9 °F (36 6 °C), Max:98 9 °F (37 2 °C)    Temp:  [97 9 °F (36 6 °C)-98 9 °F (37 2 °C)] 98 1 °F (36 7 °C)  HR:  [102-105] 105  Resp:  [18] 18  BP: (118-142)/(63-77) 118/77  SpO2:  [90 %-97 %] 97 %  Body mass index is 15 04 kg/m²  Input and Output Summary (last 24 hours): Intake/Output Summary (Last 24 hours) at 3/9/2019 1433  Last data filed at 3/9/2019 1348  Gross per 24 hour   Intake 360 ml   Output 530 ml   Net -170 ml       Physical Exam:     Physical Exam   Constitutional: She is oriented to person, place, and time  She appears well-developed and well-nourished  No distress  HENT:   Head: Normocephalic  Eyes: Pupils are equal, round, and reactive to light  Right eye exhibits no discharge  Left eye exhibits no discharge  No scleral icterus  Neck: No JVD present  No tracheal deviation present  No thyromegaly present  Cardiovascular:   No murmur heard  Slightly tachy   Pulmonary/Chest: No stridor  No respiratory distress  She has wheezes   She has rales  She exhibits no tenderness  Diminished bases    Abdominal: Soft  She exhibits no distension and no mass  There is no tenderness  There is no rebound and no guarding  No hernia  Musculoskeletal: She exhibits no edema, tenderness or deformity  Lymphadenopathy:     She has no cervical adenopathy  Neurological: She is oriented to person, place, and time  Skin: No rash noted  She is not diaphoretic  No erythema  No pallor  Psychiatric: She has a normal mood and affect  Additional Data:     Labs:    Results from last 7 days   Lab Units 03/08/19  0521 03/07/19  1814   WBC Thousand/uL 11 53* 10 66*   HEMOGLOBIN g/dL 11 2* 12 7   HEMATOCRIT % 35 3 40 3   PLATELETS Thousands/uL 271 294   NEUTROS PCT %  --  84*   LYMPHS PCT %  --  6*   LYMPHO PCT % 4*  --    MONOS PCT %  --  9   MONO PCT % 1*  --    EOS PCT % 0 0     Results from last 7 days   Lab Units 03/08/19  0521 03/07/19  1701   SODIUM mmol/L 136 131*   POTASSIUM mmol/L 4 2 4 7   CHLORIDE mmol/L 103 102   CO2 mmol/L 28 22   BUN mg/dL 13 16   CREATININE mg/dL 0 53* 0 70   ANION GAP mmol/L 5 7   CALCIUM mg/dL 8 4 9 0   ALBUMIN g/dL  --  3 1*   TOTAL BILIRUBIN mg/dL  --  0 49   ALK PHOS U/L  --  124*   ALT U/L  --  16   AST U/L  --  23   GLUCOSE RANDOM mg/dL 233* 120     Results from last 7 days   Lab Units 03/07/19  1701   INR  1 03             Results from last 7 days   Lab Units 03/08/19  1011 03/07/19  1814   LACTIC ACID mmol/L  --  1 3   PROCALCITONIN ng/ml 0 31*  --            * I Have Reviewed All Lab Data Listed Above  * Additional Pertinent Lab Tests Reviewed: All Labs Within Last 24 Hours Reviewed    Imaging:    Imaging Reports Reviewed Today Include: reviewed     Recent Cultures (last 7 days):     Results from last 7 days   Lab Units 03/07/19  1856 03/07/19  1814 03/07/19  1701   BLOOD CULTURE   --  No Growth at 24 hrs  No Growth at 24 hrs     INFLUENZA B PCR  Not Detected  --   --    RSV PCR  Not Detected  --   --        Last 24 Hours Medication List:     Current Facility-Administered Medications:  albuterol 2 puff Inhalation Q4H PRN Suleman Taylor MD    azithromycin 500 mg Oral Q24H ONI Rajan    budesonide 0 5 mg Nebulization Q12H Suleman Taylor MD    cefTRIAXone 1,000 mg Intravenous Q24H ONI Rajan Last Rate: 1,000 mg (03/09/19 1004)   cholecalciferol 2,000 Units Oral Daily Suleman Taylor MD    enoxaparin 40 mg Subcutaneous Daily Suleman Taylor MD    formoterol 20 mcg Nebulization Q12H Latrice Monique DO    guaiFENesin 600 mg Oral Q12H [facility] ONI Vazquez    ipratropium 0 5 mg Nebulization Q6H Suleman Taylor MD    levalbuterol 1 25 mg Nebulization Q6H Suleman Taylor MD    methylPREDNISolone sodium succinate 40 mg Intravenous [facility] ONI Arias    multivitamin-minerals 1 tablet Oral Daily Suleman Taylor MD         Today, Patient Was Seen By: ONI Arias    ** Please Note: Dictation voice to text software may have been used in the creation of this document   **

## 2019-03-10 PROBLEM — J96.21 ACUTE ON CHRONIC RESPIRATORY FAILURE WITH HYPOXIA (HCC): Status: ACTIVE | Noted: 2019-03-10

## 2019-03-10 PROBLEM — Z71.89 GOALS OF CARE, COUNSELING/DISCUSSION: Status: ACTIVE | Noted: 2019-03-10

## 2019-03-10 LAB
BASOPHILS # BLD AUTO: 0.02 THOUSANDS/ΜL (ref 0–0.1)
BASOPHILS NFR BLD AUTO: 0 % (ref 0–1)
EOSINOPHIL # BLD AUTO: 0 THOUSAND/ΜL (ref 0–0.61)
EOSINOPHIL NFR BLD AUTO: 0 % (ref 0–6)
ERYTHROCYTE [DISTWIDTH] IN BLOOD BY AUTOMATED COUNT: 13.5 % (ref 11.6–15.1)
HCT VFR BLD AUTO: 34.3 % (ref 34.8–46.1)
HGB BLD-MCNC: 10.5 G/DL (ref 11.5–15.4)
IMM GRANULOCYTES # BLD AUTO: 0.16 THOUSAND/UL (ref 0–0.2)
IMM GRANULOCYTES NFR BLD AUTO: 1 % (ref 0–2)
LYMPHOCYTES # BLD AUTO: 0.31 THOUSANDS/ΜL (ref 0.6–4.47)
LYMPHOCYTES NFR BLD AUTO: 2 % (ref 14–44)
MCH RBC QN AUTO: 31.9 PG (ref 26.8–34.3)
MCHC RBC AUTO-ENTMCNC: 30.6 G/DL (ref 31.4–37.4)
MCV RBC AUTO: 104 FL (ref 82–98)
MONOCYTES # BLD AUTO: 0.64 THOUSAND/ΜL (ref 0.17–1.22)
MONOCYTES NFR BLD AUTO: 4 % (ref 4–12)
NEUTROPHILS # BLD AUTO: 14.11 THOUSANDS/ΜL (ref 1.85–7.62)
NEUTS SEG NFR BLD AUTO: 93 % (ref 43–75)
NRBC BLD AUTO-RTO: 0 /100 WBCS
PLATELET # BLD AUTO: 284 THOUSANDS/UL (ref 149–390)
PMV BLD AUTO: 9.3 FL (ref 8.9–12.7)
PROCALCITONIN SERPL-MCNC: 0.18 NG/ML
RBC # BLD AUTO: 3.29 MILLION/UL (ref 3.81–5.12)
WBC # BLD AUTO: 15.24 THOUSAND/UL (ref 4.31–10.16)

## 2019-03-10 PROCEDURE — 94640 AIRWAY INHALATION TREATMENT: CPT

## 2019-03-10 PROCEDURE — 84145 PROCALCITONIN (PCT): CPT | Performed by: NURSE PRACTITIONER

## 2019-03-10 PROCEDURE — 94762 N-INVAS EAR/PLS OXIMTRY CONT: CPT

## 2019-03-10 PROCEDURE — 94760 N-INVAS EAR/PLS OXIMETRY 1: CPT

## 2019-03-10 PROCEDURE — 99233 SBSQ HOSP IP/OBS HIGH 50: CPT | Performed by: INTERNAL MEDICINE

## 2019-03-10 PROCEDURE — 85025 COMPLETE CBC W/AUTO DIFF WBC: CPT | Performed by: NURSE PRACTITIONER

## 2019-03-10 PROCEDURE — 99222 1ST HOSP IP/OBS MODERATE 55: CPT | Performed by: FAMILY MEDICINE

## 2019-03-10 PROCEDURE — 94668 MNPJ CHEST WALL SBSQ: CPT

## 2019-03-10 PROCEDURE — 99232 SBSQ HOSP IP/OBS MODERATE 35: CPT | Performed by: INTERNAL MEDICINE

## 2019-03-10 RX ORDER — OXYCODONE HYDROCHLORIDE 5 MG/1
2.5 TABLET ORAL EVERY 4 HOURS PRN
Status: DISCONTINUED | OUTPATIENT
Start: 2019-03-10 | End: 2019-03-14 | Stop reason: HOSPADM

## 2019-03-10 RX ADMIN — FORMOTEROL FUMARATE DIHYDRATE 20 MCG: 20 SOLUTION RESPIRATORY (INHALATION) at 19:08

## 2019-03-10 RX ADMIN — FORMOTEROL FUMARATE DIHYDRATE 20 MCG: 20 SOLUTION RESPIRATORY (INHALATION) at 07:21

## 2019-03-10 RX ADMIN — IPRATROPIUM BROMIDE 0.5 MG: 0.5 SOLUTION RESPIRATORY (INHALATION) at 00:22

## 2019-03-10 RX ADMIN — LEVALBUTEROL HYDROCHLORIDE 1.25 MG: 1.25 SOLUTION, CONCENTRATE RESPIRATORY (INHALATION) at 13:22

## 2019-03-10 RX ADMIN — LEVALBUTEROL HYDROCHLORIDE 1.25 MG: 1.25 SOLUTION, CONCENTRATE RESPIRATORY (INHALATION) at 19:08

## 2019-03-10 RX ADMIN — BUDESONIDE 0.5 MG: 0.5 INHALANT RESPIRATORY (INHALATION) at 19:08

## 2019-03-10 RX ADMIN — IPRATROPIUM BROMIDE 0.5 MG: 0.5 SOLUTION RESPIRATORY (INHALATION) at 13:22

## 2019-03-10 RX ADMIN — METHYLPREDNISOLONE SODIUM SUCCINATE 40 MG: 40 INJECTION, POWDER, FOR SOLUTION INTRAMUSCULAR; INTRAVENOUS at 05:46

## 2019-03-10 RX ADMIN — METHYLPREDNISOLONE SODIUM SUCCINATE 40 MG: 40 INJECTION, POWDER, FOR SOLUTION INTRAMUSCULAR; INTRAVENOUS at 21:13

## 2019-03-10 RX ADMIN — Medication 1 TABLET: at 08:59

## 2019-03-10 RX ADMIN — ENOXAPARIN SODIUM 40 MG: 40 INJECTION SUBCUTANEOUS at 09:00

## 2019-03-10 RX ADMIN — METHYLPREDNISOLONE SODIUM SUCCINATE 40 MG: 40 INJECTION, POWDER, FOR SOLUTION INTRAMUSCULAR; INTRAVENOUS at 14:41

## 2019-03-10 RX ADMIN — GUAIFENESIN 600 MG: 600 TABLET, EXTENDED RELEASE ORAL at 08:59

## 2019-03-10 RX ADMIN — LEVALBUTEROL HYDROCHLORIDE 1.25 MG: 1.25 SOLUTION, CONCENTRATE RESPIRATORY (INHALATION) at 07:21

## 2019-03-10 RX ADMIN — IPRATROPIUM BROMIDE 0.5 MG: 0.5 SOLUTION RESPIRATORY (INHALATION) at 07:21

## 2019-03-10 RX ADMIN — IPRATROPIUM BROMIDE 0.5 MG: 0.5 SOLUTION RESPIRATORY (INHALATION) at 19:08

## 2019-03-10 RX ADMIN — LEVALBUTEROL HYDROCHLORIDE 1.25 MG: 1.25 SOLUTION, CONCENTRATE RESPIRATORY (INHALATION) at 00:22

## 2019-03-10 RX ADMIN — GUAIFENESIN 600 MG: 600 TABLET, EXTENDED RELEASE ORAL at 21:13

## 2019-03-10 RX ADMIN — BUDESONIDE 0.5 MG: 0.5 INHALANT RESPIRATORY (INHALATION) at 07:21

## 2019-03-10 RX ADMIN — OXYCODONE HYDROCHLORIDE 2.5 MG: 5 TABLET ORAL at 17:14

## 2019-03-10 RX ADMIN — CEFTRIAXONE 1000 MG: 1 INJECTION, POWDER, FOR SOLUTION INTRAMUSCULAR; INTRAVENOUS at 10:34

## 2019-03-10 RX ADMIN — VITAMIN D, TAB 1000IU (100/BT) 2000 UNITS: 25 TAB at 08:59

## 2019-03-10 RX ADMIN — AZITHROMYCIN MONOHYDRATE 500 MG: 250 TABLET ORAL at 21:13

## 2019-03-10 NOTE — ASSESSMENT & PLAN NOTE
Overall guarded prognosis  Patient is agreeable with palliative Care input  Discussed with palliative care

## 2019-03-10 NOTE — PROGRESS NOTES
Progress Note - Pulmonary   Mariana Miranda 72 y o  female MRN: [id number]  [facility]: [id number]    Assessment/Plan:    1  Acute on chronic hypoxic respiratory failure likely secondary COPD exacerbation in the setting of viral vs bacterial bronchitis vs CAP       *  patient was able to switch from high-flow O2 nasal cannula 6 L previous evening       *  will continue to maintain saturations greater than 88%       *  home O2 requirement 2 L throughout the day    2  Severe COPD with exacerbation likely secondary to acute infection        *  will continue Solu-Medrol 40 mg q 8h and taper tomorrow        *  inpatient continue budesonide b i d , Perforomist b i d , Xopenex/Atrovent t i d         *   home regimenBrovana 15 mcg nebulizer b i d , budesonide 0 5 mg nebulizer b i d , ipratropium nebulizer q i d     3  Abnormal CT of the chest- likely CAP      *  bilateral lower lobe bronchial wall thickening      *  procalcitonin has resolved, CBC trending up- 15 24      *  patient appears to be clinically improving will continue azithromycin day # 3/5  Rocephin day #3 3/7    4  History of stage IV adenocarcinoma (2017) (chemo therapy and radiation completed in February 2018)      *  recent CTA shows more prominent RLL opacities      *  patient follows with Dr Ubaldo Taylor      *  palliative consult      *  will need repeat chest CT in 3 months      Chief Complaint:    "I am feeling a lot better today"    Subjective:    Tete Olson is comfortably sitting in her bed  She reports she feels significantly better today  She reports that overnight her respiratory status improved immensely  She reports no significant overnight events  She currently denies any fever, chills, pleuritic chest pain, night sweats, hemoptysis, or headache  Objective:    Vitals: Blood pressure 119/76, pulse (!) 108, temperature 98 1 °F (36 7 °C), temperature source Oral, resp   rate 18, height 5' 4" (1 626 m), weight 39 7 kg (87 lb 9 6 oz), SpO2 97 %  6L,Body mass index is 15 04 kg/m²  Intake/Output Summary (Last 24 hours) at 3/10/2019 1249  Last data filed at 3/10/2019 0500  Gross per 24 hour   Intake 360 ml   Output 650 ml   Net -290 ml       Invasive Devices     Peripheral Intravenous Line            Peripheral IV 03/08/19 Left;Upper Arm 2 days                Physical Exam:   Physical Exam   Constitutional: She is oriented to person, place, and time  She appears well-developed and well-nourished  HENT:   Head: Normocephalic and atraumatic  Neck: Normal range of motion  Neck supple  No JVD present  No tracheal deviation present  Cardiovascular: Normal rate, regular rhythm and normal heart sounds  Exam reveals no friction rub  No murmur heard  Pulmonary/Chest: No accessory muscle usage or stridor  No tachypnea and no bradypnea  No respiratory distress  She has no decreased breath sounds  She has wheezes in the right upper field, the right middle field, the right lower field, the left upper field, the left middle field and the left lower field  She has rhonchi in the right upper field, the right middle field, the right lower field, the left upper field, the left middle field and the left lower field  She has no rales  Abdominal: Soft  Bowel sounds are normal  She exhibits no distension  Musculoskeletal: Normal range of motion  She exhibits no edema or deformity  Neurological: She is alert and oriented to person, place, and time  Skin: Skin is warm and dry  Psychiatric: She has a normal mood and affect  Her behavior is normal        Labs:    I have personally reviewed pertinent lab results CBC:   Lab Results   Component Value Date    WBC 15 24 (H) 03/10/2019    HGB 10 5 (L) 03/10/2019    HCT 34 3 (L) 03/10/2019     (H) 03/10/2019     03/10/2019    MCH 31 9 03/10/2019    MCHC 30 6 (L) 03/10/2019    RDW 13 5 03/10/2019    MPV 9 3 03/10/2019    NRBC 0 03/10/2019     Imaging and other studies: I have personally reviewed pertinent films in PACS     CTA chest 03/07/2019- bilateral lower lobe bronchial wall thickening with stable emphysematous changes, more prominent right lower lobe opacity

## 2019-03-10 NOTE — ASSESSMENT & PLAN NOTE
With acute exacerbation due to acute tracheobronchitis  On IV Solu-Medrol, respiratory treatment  On IV antibiotics  Pulmonary following

## 2019-03-10 NOTE — PROGRESS NOTES
Progress Note - Lexi Dukes 1945, 72 y o  female MRN: [id number]    Unit/Bed#: [facility] 916-01 Encounter: [id number]    Primary Care Provider: Una Kaur DO   Date and time admitted to hospital: 3/7/2019  5:52 PM        Acute on chronic respiratory failure with hypoxia (HCC)  Assessment & Plan  Continue supplemental oxygen  Presently off the high-flow oxygen  Wean supplemental oxygen as tolerated to keep O2 sats more than 88%      Goals of care, counseling/discussion  Assessment & Plan  Overall guarded prognosis  Patient is agreeable with palliative Care input  Discussed with palliative care    Chronic obstructive pulmonary disease ([facility] [address] )  Assessment & Plan  With acute exacerbation due to acute tracheobronchitis  On IV Solu-Medrol, respiratory treatment  On IV antibiotics  Pulmonary following    Nicotine dependence  Assessment & Plan  Counseled    Malignant neoplasm of right lung [facility])  Assessment & Plan  · Status post chemotherapy and radiation 2018  ·  In remission, follows with Dr Emilie Britton        VTE Pharmacologic Prophylaxis:   Pharmacologic: Enoxaparin (Lovenox)  Mechanical VTE Prophylaxis in Place: Yes    Patient Centered Rounds: I have performed bedside rounds with nursing staff today  Discussions with Specialists or Other Care Team Provider:  Palliative care    Education and Discussions with Family / Patient:  Discussed the patient, family at bedside discussed in detail    Time Spent for Care: 45 minutes  More than 50% of total time spent on counseling and coordination of care as described above      Current Length of Stay: 3 day(s)    Current Patient Status: Inpatient   Certification Statement: The patient will continue to require additional inpatient hospital stay due to As mentioned    Discharge Plan:  Awaiting clinical symptomatic improvement    Code Status: Level 3 - DNAR and DNI      Subjective:     Comfortably lying in bed  Dyspnea improving able to complete sentences  Presently off the high-flow oxygen  Reports feeling slightly better today    Objective:     Vitals:   Temp (24hrs), Av °F (36 7 °C), Min:97 9 °F (36 6 °C), Max:98 1 °F (36 7 °C)    Temp:  [97 9 °F (36 6 °C)-98 1 °F (36 7 °C)] 98 1 °F (36 7 °C)  HR:  [106-108] 108  Resp:  [18] 18  BP: (119-122)/(58-76) 119/76  SpO2:  [94 %-99 %] 95 %  Body mass index is 15 04 kg/m²  Input and Output Summary (last 24 hours):        Intake/Output Summary (Last 24 hours) at 3/10/2019 1516  Last data filed at 3/10/2019 0915  Gross per 24 hour   Intake 360 ml   Output 300 ml   Net 60 ml       Physical Exam:     Physical Exam    Dyspnea improved since yesterday able to complete sentences  Neck supple  Features of severe protein calorie malnutrition in the form of muscle wasting subcutaneous fat loss noted  Lungs emphysematous diminished breath sounds bilaterally  Heart sounds S1 and S2 noted  Abdomen soft nontender no organs palpable  Awake alert obeys simple commands  Pulses noted  No pedal edema  No rash    Additional Data:     Labs:    Results from last 7 days   Lab Units 03/10/19  0502   WBC Thousand/uL 15 24*   HEMOGLOBIN g/dL 10 5*   HEMATOCRIT % 34 3*   PLATELETS Thousands/uL 284   NEUTROS PCT % 93*   LYMPHS PCT % 2*   MONOS PCT % 4   EOS PCT % 0     Results from last 7 days   Lab Units 19  0521 19  1701   SODIUM mmol/L 136 131*   POTASSIUM mmol/L 4 2 4 7   CHLORIDE mmol/L 103 102   CO2 mmol/L 28 22   BUN mg/dL 13 16   CREATININE mg/dL 0 53* 0 70   ANION GAP mmol/L 5 7   CALCIUM mg/dL 8 4 9 0   ALBUMIN g/dL  --  3 1*   TOTAL BILIRUBIN mg/dL  --  0 49   ALK PHOS U/L  --  124*   ALT U/L  --  16   AST U/L  --  23   GLUCOSE RANDOM mg/dL 233* 120     Results from last 7 days   Lab Units 19  1701   INR  1 03             Results from last 7 days   Lab Units 03/10/19  0502 19  1011 19  1814   LACTIC ACID mmol/L  --   --  1 3   PROCALCITONIN ng/ml 0 18 0 31*  --            * I Have Reviewed All Lab Data Listed Above   * Additional Pertinent Lab Tests Reviewed: All Labs Within Last 24 Hours Reviewed    Imaging:    Imaging Reports Reviewed Today Include:  Imaging studies reviewed  Imaging Personally Reviewed by Myself Includes:      Recent Cultures (last 7 days):     Results from last 7 days   Lab Units 03/07/19  1856 03/07/19  1814 03/07/19  1701   BLOOD CULTURE   --  No Growth at 48 hrs  No Growth at 48 hrs  INFLUENZA B PCR  Not Detected  --   --    RSV PCR  Not Detected  --   --        Last 24 Hours Medication List:     Current Facility-Administered Medications:  albuterol 2 puff Inhalation Q4H PRN Demi Garcia MD    azithromycin 500 mg Oral Q24H Gilman Duverney, CRNP    budesonide 0 5 mg Nebulization Q12H Demi Garcia MD    cefTRIAXone 1,000 mg Intravenous Q24H Gilman Duverney, CRNP Last Rate: 1,000 mg (03/10/19 1034)   cholecalciferol 2,000 Units Oral Daily Demi Garcia MD    enoxaparin 40 mg Subcutaneous Daily Demi Garcia MD    formoterol 20 mcg Nebulization Q12H Brooke Davis DO    guaiFENesin 600 mg Oral Q12H [address] ONI Vazquez    ipratropium 0 5 mg Nebulization Q6H Demi Garcia MD    levalbuterol 1 25 mg Nebulization Q6H Demi Garcia MD    methylPREDNISolone sodium succinate 40 mg Intravenous [facility] ONI Moore    multivitamin-minerals 1 tablet Oral Daily Demi Garcia MD         Today, Patient Was Seen By: Lashawn Ruiz MD    ** Please Note: Dictation voice to text software may have been used in the creation of this document   **

## 2019-03-10 NOTE — CONSULTS
Consultation - 0420 [facility] Medardo 72 y o  female MRN: [id number]  Unit/Bed#: [facility] 916-01 Encounter: [id number]      Assessment/Plan     Assessment:  A/C hypoxic respiratory failure, currently on HF  Dependent on 2L of supplemental oxygen at baseline  Severe COPD with acute exacerbation  Acute tracheobronchitis  Stage IV lung cancer  Severe protein calorie malnutrition    Plan:  Symptom management:  Shortness of breath - multifactorial given her baseline co-morbidities and acute issues  Continue disease directed care with abx, steroids, and oxygen and primary team and pulm team see fit  Should these measures not be enough to control an uncomfortable level of dyspnea PRN oxycodone will be available  Low BMI - Pt is currently on steroids for acute pulmonary issues  When this course of steroids is complete, she may benefit from use of an appetite stimulant along with rehab to increase her muscle mass, stamina, and physiologic reserve  Goals of Care  Level 3 code status  PT/OT consult written for tomorrow if she remains off high flow for 24 hours  Continued follow up outpt with med/onc - Dr Susanna Adams and Rose Milligan - Dr Ever Sharif  She would benefit from outpt support from palliative care programs - PALS vs office follow up    History of Present Illness   Physician Requesting Consult: Malinda Henao MD  Reason for Consult / Principal Problem: goals of care  HPI: Kait Kimbrough is a 72y o  year old female with stage IV lung cancer  Her medical oncologist is Dr Susanna Adams  She was initially diagnosed in October 2017  Her cancer wass moderately differentiated involving right hilum, mediastinum, precarinal, subcarinal lymph nodes with tumor extension into the right mainstem bronchus, bronchus intermedius, right middle lobe and right cervical area proven by biopsy, molecular tests were negative for EGFR mutation, ALK gene rearrangement, Ross 1 mutation, PDL-1 expression was 0    She was treated with concurrent chemotherapy utilizing weekly Taxol/carboplatin and radiation therapy completed in 02/02/2018  Her most recent CT scan of the chest in December 2018 showed no evidence of disease  At baseline she has chronic hypoxic respiratory failure and is dependent on 2L of oxygen via NF with a diagnosis of severe COPD  She presented to the hospital with shortness of breath associated with fevers/chills, cough productive of a small amount of whitish sputum, wheezing, and nasal congestion  She has been seen by the pulmonology service and is being treated for acute exacerbation of her COPD and acute tracheobronchitis  She is currently on IV antibiotics and IV steroids  She states that she feels much better than yesterday but still feels short of breath  Her weight is low but it has been low, she does not believe she is actively losing weight  She denies nausea, anxiety, or difficulty sleeping  She stays busy all day in her own condo and helps her 80and 80year old parents who live in an adjacent apt  Inpatient consult to Palliative Care  Consult performed by: Roe Zamora MD  Consult ordered by: Kleber Oliveros MD        Review of Systems   Constitutional: Positive for activity change, appetite change and fatigue  Respiratory: Positive for cough and shortness of breath  Neurological: Positive for weakness  All other systems reviewed and are negative  Historical Information   Past Medical History:   Diagnosis Date    Breast cancer ([facility] [address] ) 1996    COPD, severe ([facility] [address] )     Requires supplemental oxygen     Stage 4 lung cancer ([facility] [address] )      Past Surgical History:   Procedure Laterality Date    APPENDECTOMY      COLONOSCOPY N/A 4/18/2016    Procedure: COLONOSCOPY;  Surgeon: Oliverio Coffey MD;  [facility];   Service:    Jaime Thomason LUNG CANCER SURGERY      MASTECTOMY, RADICAL Right 1996    [place] [address] N/A 10/17/2017    Procedure: Herlinda Sleek;  Surgeon: Linda Nolan Ranjan Gan MD;  Location:  GI LAB; Service: Pulmonary    TONSILLECTOMY       Social History     Socioeconomic History    Marital status:      Spouse name: None    Number of children: None    Years of education: None    Highest education level: None   Occupational History    Occupation: retired   Social Needs    Financial resource strain: None    Food insecurity:     Worry: None     Inability: None    Transportation needs:     Medical: None     Non-medical: None   Tobacco Use    Smoking status: Former Smoker     Packs/day: 0 50     Years: 55 00     Pack years: 27 50     Start date: 1962    Smokeless tobacco: Never Used   Substance and Sexual Activity    Alcohol use: Not Currently     Alcohol/week: 0 0 oz     Frequency: Never     Drinks per session: Patient refused     Binge frequency: Never    Drug use: No    Sexual activity: Not Currently   Lifestyle    Physical activity:     Days per week: None     Minutes per session: None    Stress: None   Relationships    Social connections:     Talks on phone: None     Gets together: None     Attends Mandaen service: None     Active member of club or organization: None     Attends meetings of clubs or organizations: None     Relationship status: None    Intimate partner violence:     Fear of current or ex partner: None     Emotionally abused: None     Physically abused: None     Forced sexual activity: None   Other Topics Concern    None   Social History Narrative    Lives in a town home by herself  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           Family History   Problem Relation Age of Onset    Heart disease Mother         cardiac disorder     Meds/Allergies   all current active meds have been reviewed, current meds:   Current Facility-Administered Medications   Medication Dose Route Frequency    albuterol (PROVENTIL HFA,VENTOLIN HFA) inhaler 2 puff  2 puff Inhalation Q4H PRN    azithromycin (ZITHROMAX) tablet 500 mg  500 mg Oral Q24H    budesonide (PULMICORT) inhalation solution 0 5 mg  0 5 mg Nebulization Q12H    cefTRIAXone (ROCEPHIN) 1,000 mg in dextrose 5 % 50 mL IVPB  1,000 mg Intravenous Q24H    cholecalciferol (VITAMIN D3) tablet 2,000 Units  2,000 Units Oral Daily    enoxaparin (LOVENOX) subcutaneous injection 40 mg  40 mg Subcutaneous Daily    formoterol (PERFOROMIST) nebulizer solution 20 mcg  20 mcg Nebulization Q12H    guaiFENesin (MUCINEX) 12 hr tablet 600 mg  600 mg Oral Q12H CHANDLER    ipratropium (ATROVENT) 0 02 % inhalation solution 0 5 mg  0 5 mg Nebulization Q6H    levalbuterol (XOPENEX) inhalation solution 1 25 mg  1 25 mg Nebulization Q6H    methylPREDNISolone sodium succinate (Solu-MEDROL) injection 40 mg  40 mg Intravenous Q8H [address]    multivitamin-minerals (CENTRUM) tablet 1 tablet  1 tablet Oral Daily    and PTA meds:   Prior to Admission Medications   Prescriptions Last Dose Informant Patient Reported? Taking? Cholecalciferol (VITAMIN D) 2000 UNITS tablet  Self Yes No   Sig: Take 2,000 Units by mouth daily  Multiple Vitamins-Minerals (MULTIVITAMIN WITH MINERALS) tablet  Self Yes No   Sig: Take 1 tablet by mouth daily     albuterol (VENTOLIN HFA) 90 mcg/act inhaler  Self Yes No   Sig: Inhale   arformoterol (BROVANA) 15 mcg/2 mL nebulizer solution  Self No No   Sig: Take 1 vial (15 mcg total) by nebulization 2 (two) times a day   budesonide (PULMICORT) 0 25 mg/2 mL nebulizer solution  Self No No   Sig: Take 1 vial (0 25 mg total) by nebulization 2 (two) times a day   budesonide (PULMICORT) 0 5 mg/2 mL nebulizer solution  Self No No   Sig: USE 1 UNIT DOSE VIA NEBULIZER TWO TIMES A DAY   ipratropium (ATROVENT) 0 02 % nebulizer solution  Self Yes No   Sig: Take 0 5 mg by nebulization 3 (three) times a day     prochlorperazine (COMPAZINE) 10 mg tablet  Self Yes No   Sig: Take 1 tablet by mouth every 6 (six) hours as needed      Facility-Administered Medications: None     No Known Allergies    Objective   Vitals:    03/10/19 0022 03/10/19 0715 03/10/19 0723 03/10/19 1322   BP:  119/76     BP Location:  Left arm     Pulse:  (!) 108     Resp:  18     Temp:  98 1 °F (36 7 °C)     TempSrc:  Oral     SpO2: 97% 99% 97% 95%   Weight:       Height:         Physical Exam  Constitutional: Thin  In no acute distress  Head: Normocephalic and atraumatic  Eyes: EOM are normal  No ocular discharge  No scleral icterus  Neck: no visible adenopathy or masses  Respiratory: short of breath with normal conversation, bilateral diffuse exp wheeze  No rales or crackles  Gastrointestinal: No abdominal distension  Musculoskeletal: No edema  Neurological: Alert, oriented and appropriately conversant  Skin: Dry, no diaphoresis  Psychiatric: Displays a normal mood and affect  Behavior, judgement and thought content appear normal      Lab Results:   I have personally reviewed pertinent labs  , CBC:   Lab Results   Component Value Date    WBC 15 24 (H) 03/10/2019    HGB 10 5 (L) 03/10/2019    HCT 34 3 (L) 03/10/2019     (H) 03/10/2019     03/10/2019    MCH 31 9 03/10/2019    MCHC 30 6 (L) 03/10/2019    RDW 13 5 03/10/2019    MPV 9 3 03/10/2019    NRBC 0 03/10/2019   , CMP: No results found for: SODIUM, K, CL, CO2, ANIONGAP, BUN, CREATININE, GLUCOSE, CALCIUM, AST, ALT, ALKPHOS, PROT, BILITOT, EGFR     Imaging Studies: I have personally reviewed pertinent reports  Code Status: Level 3 - DNAR and DNI  Advance Directive and Living Will: Yes    Power of : Yes  POLST:      Counseling / Coordination of Care  Total floor / unit time spent today 60 minutes  Her sister and her cousin were at bedside during my visit

## 2019-03-10 NOTE — ASSESSMENT & PLAN NOTE
Continue supplemental oxygen  Presently off the high-flow oxygen  Wean supplemental oxygen as tolerated to keep O2 sats more than 88%

## 2019-03-11 PROCEDURE — 94640 AIRWAY INHALATION TREATMENT: CPT

## 2019-03-11 PROCEDURE — 94760 N-INVAS EAR/PLS OXIMETRY 1: CPT

## 2019-03-11 PROCEDURE — 99232 SBSQ HOSP IP/OBS MODERATE 35: CPT | Performed by: INTERNAL MEDICINE

## 2019-03-11 PROCEDURE — G8979 MOBILITY GOAL STATUS: HCPCS

## 2019-03-11 PROCEDURE — 97163 PT EVAL HIGH COMPLEX 45 MIN: CPT

## 2019-03-11 PROCEDURE — 99232 SBSQ HOSP IP/OBS MODERATE 35: CPT | Performed by: FAMILY MEDICINE

## 2019-03-11 PROCEDURE — G8987 SELF CARE CURRENT STATUS: HCPCS

## 2019-03-11 PROCEDURE — G8988 SELF CARE GOAL STATUS: HCPCS

## 2019-03-11 PROCEDURE — 97166 OT EVAL MOD COMPLEX 45 MIN: CPT

## 2019-03-11 PROCEDURE — 94668 MNPJ CHEST WALL SBSQ: CPT

## 2019-03-11 PROCEDURE — G8978 MOBILITY CURRENT STATUS: HCPCS

## 2019-03-11 RX ADMIN — METHYLPREDNISOLONE SODIUM SUCCINATE 40 MG: 40 INJECTION, POWDER, FOR SOLUTION INTRAMUSCULAR; INTRAVENOUS at 13:37

## 2019-03-11 RX ADMIN — IPRATROPIUM BROMIDE 0.5 MG: 0.5 SOLUTION RESPIRATORY (INHALATION) at 00:25

## 2019-03-11 RX ADMIN — IPRATROPIUM BROMIDE 0.5 MG: 0.5 SOLUTION RESPIRATORY (INHALATION) at 07:43

## 2019-03-11 RX ADMIN — LEVALBUTEROL HYDROCHLORIDE 1.25 MG: 1.25 SOLUTION, CONCENTRATE RESPIRATORY (INHALATION) at 00:25

## 2019-03-11 RX ADMIN — LEVALBUTEROL HYDROCHLORIDE 1.25 MG: 1.25 SOLUTION, CONCENTRATE RESPIRATORY (INHALATION) at 19:31

## 2019-03-11 RX ADMIN — BUDESONIDE 0.5 MG: 0.5 INHALANT RESPIRATORY (INHALATION) at 07:43

## 2019-03-11 RX ADMIN — LEVALBUTEROL HYDROCHLORIDE 1.25 MG: 1.25 SOLUTION, CONCENTRATE RESPIRATORY (INHALATION) at 07:43

## 2019-03-11 RX ADMIN — CEFTRIAXONE 1000 MG: 1 INJECTION, POWDER, FOR SOLUTION INTRAMUSCULAR; INTRAVENOUS at 10:35

## 2019-03-11 RX ADMIN — ENOXAPARIN SODIUM 40 MG: 40 INJECTION SUBCUTANEOUS at 08:41

## 2019-03-11 RX ADMIN — FORMOTEROL FUMARATE DIHYDRATE 20 MCG: 20 SOLUTION RESPIRATORY (INHALATION) at 07:57

## 2019-03-11 RX ADMIN — METHYLPREDNISOLONE SODIUM SUCCINATE 40 MG: 40 INJECTION, POWDER, FOR SOLUTION INTRAMUSCULAR; INTRAVENOUS at 07:08

## 2019-03-11 RX ADMIN — METHYLPREDNISOLONE SODIUM SUCCINATE 40 MG: 40 INJECTION, POWDER, FOR SOLUTION INTRAMUSCULAR; INTRAVENOUS at 21:13

## 2019-03-11 RX ADMIN — IPRATROPIUM BROMIDE 0.5 MG: 0.5 SOLUTION RESPIRATORY (INHALATION) at 13:47

## 2019-03-11 RX ADMIN — GUAIFENESIN 600 MG: 600 TABLET, EXTENDED RELEASE ORAL at 08:40

## 2019-03-11 RX ADMIN — BUDESONIDE 0.5 MG: 0.5 INHALANT RESPIRATORY (INHALATION) at 19:31

## 2019-03-11 RX ADMIN — GUAIFENESIN 600 MG: 600 TABLET, EXTENDED RELEASE ORAL at 21:13

## 2019-03-11 RX ADMIN — FORMOTEROL FUMARATE DIHYDRATE 20 MCG: 20 SOLUTION RESPIRATORY (INHALATION) at 19:31

## 2019-03-11 RX ADMIN — LEVALBUTEROL HYDROCHLORIDE 1.25 MG: 1.25 SOLUTION, CONCENTRATE RESPIRATORY (INHALATION) at 13:47

## 2019-03-11 RX ADMIN — IPRATROPIUM BROMIDE 0.5 MG: 0.5 SOLUTION RESPIRATORY (INHALATION) at 19:31

## 2019-03-11 RX ADMIN — AZITHROMYCIN MONOHYDRATE 500 MG: 250 TABLET ORAL at 21:13

## 2019-03-11 NOTE — PHYSICAL THERAPY NOTE
Physical Therapy Evaluation     Patient's Name: Shadi Soriano    Admitting Diagnosis  Acute bronchitis [J20 9]  Shortness of breath [R06 02]  COPD exacerbation ([facility] ) [J44 1]    Problem List  Patient Active Problem List   Diagnosis    Malignant neoplasm of right lung ([facility] )    Abnormal weight loss    Loss of appetite    Hyperlipidemia    Healthcare maintenance    Simple chronic bronchitis (HCC)    Chronic hypoxemic respiratory failure (HCC)    Respiratory distress    Nicotine dependence    Chronic obstructive pulmonary disease (HCC)    Depressive disorder    Acute on chronic respiratory failure with hypoxia (HCC)    Goals of care, counseling/discussion       Past Medical History  Past Medical History:   Diagnosis Date    Breast cancer ([place] [address] ) 1996    COPD, severe ([place] [address] )     Requires supplemental oxygen     Stage 4 lung cancer ([place] [address] )        Past Surgical History  Past Surgical History:   Procedure Laterality Date    APPENDECTOMY      COLONOSCOPY N/A 4/18/2016    Procedure: COLONOSCOPY;  Surgeon: Cherelle Duran MD;  [facility]; Service:     LUNG CANCER SURGERY      MASTECTOMY, RADICAL Right 1996    [place] [address] N/A 10/17/2017    Procedure: Melissa Nascimento;  Surgeon: Ron Paulino MD;  [facility]; Service: Pulmonary    TONSILLECTOMY            03/11/19 0836   Note Type   Note type Eval only   Pain Assessment   Pain Assessment No/denies pain   Pain Score No Pain   Home Living   Type of Home Apartment  (6 NEIL)   Home Layout Two level;Bed/bath upstairs   Home Equipment   (Pt reports mother has RW, SPC)   Additional Comments Pt reports independence wihout AD PTA   Pt reports she resides with her mother who has a RW and SPC   Prior Function   Level of [place] Independent with ADLs and functional mobility   Lives With Family  (Parents)   Receives Help From Family   ADL Assistance Independent   IADLs Independent   Falls in the last 6 months 0   Comments Pt reports she assists her parents with household tasks   Restrictions/Precautions   Weight Bearing Precautions Per Order No   Other Precautions Multiple lines;Telemetry;O2;Fall Risk   General   Family/Caregiver Present No   Cognition   Overall Cognitive Status WFL   Arousal/Participation Alert   Attention Within functional limits   Orientation Level Oriented X4   Memory Within functional limits   Following Commands Follows all commands and directions without difficulty   RLE Assessment   RLE Assessment WFL   LLE Assessment   LLE Assessment WFL   Light Touch   RLE Light Touch Grossly intact   LLE Light Touch Grossly intact   Bed Mobility   Supine to Sit 5  Supervision   Additional items Increased time required   Transfers   Sit to Stand 5  Supervision   Additional items Increased time required   Stand to Sit 5  Supervision   Additional items Increased time required   Ambulation/Elevation   Gait pattern Improper Weight shift; Short stride; Step to;Excessively slow  (Decreased endurance)   Gait Assistance 4  Minimal assist   Additional items Assist x 1;Verbal cues; Tactile cues   Assistive Device Rolling walker   Distance 15 ft   Balance   Static Sitting Good   Dynamic Sitting Good   Static Standing Fair +   Dynamic Standing Fair   Ambulatory Fair   Endurance Deficit   Endurance Deficit Yes   Endurance Deficit Description SpO2 85-88% on 5 L O2   Activity Tolerance   Activity Tolerance Patient limited by fatigue   Medical Staff Made Aware OT, CM   Nurse Made Aware Yes   Assessment   Prognosis Good   Problem List Decreased strength; Impaired balance;Decreased endurance   Assessment Pt is a 71 yo female presenting to [facility] with x1 day history of SOB with fever/chills  Pt found to have acute on chronic respiratory failure with hypoxia  PMH significant for: COPD, chronic hypoxic respiratory failure on 2 L and C, history of lung cancer status post chemotherapy and radiation 2018   Pt is supine at start of session and agreeable to therapy  Pt transferred supine <> sit with supervision  Pt transferred sit <> stand with supervision and RW  Pt ambulated 15 ft within room with Lin and RW, overall slowed francesco primarily limited by respiratory status  O2 sat 85-88% while on 5L O2  Pt remained seated in bedside chair with all needs within reach at end of session  PT to recommend home with home PT services pending stair trial and improved ambulation endurance  PT to follow   Barriers to Discharge Decreased caregiver support   Goals   Patient Goals To get stronger   STG Expiration Date 03/21/19   Short Term Goal #1 In 10 sessions pt will: 1  Transfer supine <> sit independently  2  Transfer sit <> stand with modified independence and LRAD  3  Ambulate >50 ft with modified independence and LRAD  4  Perform LE exercise program  5  Navigate 1 full flight of stairs with modified independence   Treatment Day 0   Plan   Treatment/Interventions Functional transfer training;LE strengthening/ROM; Therapeutic exercise; Endurance training;Bed mobility;Gait training;Spoke to nursing;OT;Spoke to case management   PT Frequency   (3-5x/wk)   Recommendation   Recommendation Home PT   Equipment Recommended Walker   PT - OK to Discharge No  (Home with home PT pending stair trial)   Modified [place] Scale   Modified Yecenia Scale 4   Barthel Index   Feeding 10   Bathing 0   Grooming Score 5   Dressing Score 5   Bladder Score 10   Bowels Score 10   Toilet Use Score 5   Transfers (Bed/Chair) Score 10   Mobility (Level Surface) Score 0   Stairs Score 0   Barthel Index Score 55         Julia Flores, PT, DPT

## 2019-03-11 NOTE — PROGRESS NOTES
Progress Note - Shadi Soriano 1945, 72 y o  female MRN: [id number]    Unit/Bed#: [facility] 916-01 Encounter: [id number]    Primary Care Provider: Casimiro Turner DO   Date and time admitted to hospital: 3/7/2019  5:52 PM        Acute on chronic respiratory failure with hypoxia [facility])  Assessment & Plan  Improving  Continue supplemental oxygen  Presently off the high-flow oxygen  Wean supplemental oxygen as tolerated to keep O2 sats more than 88%      Goals of care, counseling/discussion  Assessment & Plan  Oxycodone p r n  Palliative Care input noted    Chronic obstructive pulmonary disease ([facility] [address] )  Assessment & Plan  With acute exacerbation due to acute tracheobronchitis  On IV Solu-Medrol  continue respiratory treatment  On IV antibiotics per Pulmonary  Discussed with Pulmonary    Nicotine dependence  Assessment & Plan  Counseled    Malignant neoplasm of right lung [facility])  Assessment & Plan  · Status post chemotherapy and radiation 2018  ·  In remission, follows with Dr Nathan Schwarz        VTE Pharmacologic Prophylaxis:   Pharmacologic: Enoxaparin (Lovenox)  Mechanical VTE Prophylaxis in Place: Yes    Patient Centered Rounds: I have performed bedside rounds with nursing staff today  Discussions with Specialists or Other Care Team Provider:  Pulmonary    Education and Discussions with Family / Patient:  Discussed with the patient    Time Spent for Care: 30 minutes  More than 50% of total time spent on counseling and coordination of care as described above      Current Length of Stay: 4 day(s)    Current Patient Status: Inpatient   Certification Statement: The patient will continue to require additional inpatient hospital stay due to As outlined    Discharge Plan:  Awaiting clinical symptomatic improvement    Code Status: Level 3 - DNAR and DNI      Subjective:     Reports improving dyspnea  Cough scanty mucoid sputum  Encouraged out of bed into a chair    Objective:     Vitals:   Temp (24hrs), Av 5 °F (36 9 °C), Min:98 1 °F (36 7 °C), Max:99 °F (37 2 °C)    Temp:  [98 1 °F (36 7 °C)-99 °F (37 2 °C)] 99 °F (37 2 °C)  HR:  [103-114] 111  Resp:  [18-20] 20  BP: (123-134)/(77-79) 134/79  SpO2:  [90 %-97 %] 93 %  Body mass index is 15 04 kg/m²  Input and Output Summary (last 24 hours): Intake/Output Summary (Last 24 hours) at 3/11/2019 1538  Last data filed at 3/11/2019 1303  Gross per 24 hour   Intake 1010 ml   Output 875 ml   Net 135 ml       Physical Exam:     Physical Exam    Comfortably sitting up in bed  Dyspnea improving able to complete sentences  Pursed lip breathing noted  Neck supple  Lungs emphysematous diminished breath sounds bilaterally  Scattered rhonchi and crackles  Heart sounds S1 and S2 noted  Abdomen soft, nontender no organs palpable  Awake alert obeys simple commands  Pulses noted  No pedal edema  No rash    Additional Data:     Labs:    Results from last 7 days   Lab Units 03/10/19  0502   WBC Thousand/uL 15 24*   HEMOGLOBIN g/dL 10 5*   HEMATOCRIT % 34 3*   PLATELETS Thousands/uL 284   NEUTROS PCT % 93*   LYMPHS PCT % 2*   MONOS PCT % 4   EOS PCT % 0     Results from last 7 days   Lab Units 03/08/19  0521 03/07/19  1701   SODIUM mmol/L 136 131*   POTASSIUM mmol/L 4 2 4 7   CHLORIDE mmol/L 103 102   CO2 mmol/L 28 22   BUN mg/dL 13 16   CREATININE mg/dL 0 53* 0 70   ANION GAP mmol/L 5 7   CALCIUM mg/dL 8 4 9 0   ALBUMIN g/dL  --  3 1*   TOTAL BILIRUBIN mg/dL  --  0 49   ALK PHOS U/L  --  124*   ALT U/L  --  16   AST U/L  --  23   GLUCOSE RANDOM mg/dL 233* 120     Results from last 7 days   Lab Units 03/07/19  1701   INR  1 03             Results from last 7 days   Lab Units 03/10/19  0502 03/08/19  1011 03/07/19  1814   LACTIC ACID mmol/L  --   --  1 3   PROCALCITONIN ng/ml 0 18 0 31*  --            * I Have Reviewed All Lab Data Listed Above  * Additional Pertinent Lab Tests Reviewed:  All Labs Within Last 24 Hours Reviewed    Imaging:    Imaging Reports Reviewed Today Include: Imaging Personally Reviewed by Myself Includes:     Recent Cultures (last 7 days):     Results from last 7 days   Lab Units 03/07/19  1856 03/07/19  1814 03/07/19  1701   BLOOD CULTURE   --  No Growth at 72 hrs  No Growth at 72 hrs  INFLUENZA B PCR  Not Detected  --   --    RSV PCR  Not Detected  --   --        Last 24 Hours Medication List:     Current Facility-Administered Medications:  albuterol 2 puff Inhalation Q4H PRN Sharon Hernandez MD    azithromycin 500 mg Oral Q24H ONI Pozo    budesonide 0 5 mg Nebulization Q12H Sharon Hernandez MD    cefTRIAXone 1,000 mg Intravenous Q24H ONI Pozo Last Rate: Stopped (03/11/19 1105)   cholecalciferol 2,000 Units Oral Daily Sharon Hernandez MD    enoxaparin 40 mg Subcutaneous Daily Sharon Hernandez MD    formoterol 20 mcg Nebulization Q12H Ailyn Purple, DO    guaiFENesin 600 mg Oral Q12H [address] ONI Vazquez    ipratropium 0 5 mg Nebulization Q6H Sharon Hernandez MD    levalbuterol 1 25 mg Nebulization Q6H Sharon Hernandez MD    methylPREDNISolone sodium succinate 40 mg Intravenous [facility] ONI Trimble    multivitamin-minerals 1 tablet Oral Daily Sharon Hernandez MD    oxyCODONE 2 5 mg Oral Q4H PRN Gerardo Gagnon MD         Today, Patient Was Seen By: Geoffrey Montgomery MD    ** Please Note: Dictation voice to text software may have been used in the creation of this document   **

## 2019-03-11 NOTE — PLAN OF CARE
Problem: OCCUPATIONAL THERAPY ADULT  Goal: Performs self-care activities at highest level of function for planned discharge setting  See evaluation for individualized goals  Description  Treatment Interventions: ADL retraining, Functional transfer training, Endurance training, Patient/family training, Equipment evaluation/education, Energy conservation, Activityengagement  Equipment Recommended: Bedside commode       See flowsheet documentation for full assessment, interventions and recommendations  Note:   Limitation: Decreased ADL status, Decreased endurance, Decreased self-care trans, Decreased high-level ADLs  Prognosis: Good  Assessment: Pt is a 72 y o  female who was admitted to [facility] on 3/7/2019 with Respiratory distress; COPD exacerbation in setting of acute bronchitis/other viral URI   Pt's problem list also includes PMH of malignant neoplasm of R lung s/p chemotherapy and radiation, HLD, depression, chronic bronchitis, breast cancer s/p mastectomy  At baseline pt was completing all ADLs/IADLs IND, +driving  Pt also assists her parents w/ IADLs  Pt ambulates IND w/o an AD at baseline  Pt lives with her parents in an apartment w/ 6 NEIL, bedroom/bathroom on 2nd floor  Currently pt requires SUP-MIN A for overall ADLS and for functional mobility/transfers  Pt SpO2 desat to 85-89% on 6 L NC during activity, returned to 90-93% at rest  Pt currently presents with impairments in the following categories -steps to enter environment, limited home support, difficulty performing ADLS and difficulty performing IADLS  activity tolerance, endurance and standing balance/tolerance   These impairments, as well as pt's fatigue, decreased caregiver support, risk for falls and home environment  limit pt's ability to safely engage in all baseline areas of occupation, includingbathing, dressing, toileting, functional mobility/transfers, laundry  and meal prep From OT standpoint, recommend HOME OT & home w/ family support upon D/C  OT will continue to follow to address the below stated goals        OT Discharge Recommendation: Home OT(& home w/ family support)  OT - OK to Discharge: No(pt requiring further ADL/mobility training)

## 2019-03-11 NOTE — PLAN OF CARE
Problem: PAIN - ADULT  Goal: Verbalizes/displays adequate comfort level or baseline comfort level  Description  Interventions:  - Encourage patient to monitor pain and request assistance  - Assess pain using appropriate pain scale  - Administer analgesics based on type and severity of pain and evaluate response  - Implement non-pharmacological measures as appropriate and evaluate response  - Consider cultural and social influences on pain and pain management  - Notify physician/advanced practitioner if interventions unsuccessful or patient reports new pain  Outcome: Progressing     Problem: INFECTION - ADULT  Goal: Absence or prevention of progression during hospitalization  Description  INTERVENTIONS:  - Assess and monitor for signs and symptoms of infection  - Monitor lab/diagnostic results  - Monitor all insertion sites, i e  indwelling lines, tubes, and drains  - Monitor endotracheal (as able) and nasal secretions for changes in amount and color  - [place] appropriate cooling/warming therapies per order  - Administer medications as ordered  - Instruct and encourage patient and family to use good hand hygiene technique  - Identify and instruct in appropriate isolation precautions for identified infection/condition  Outcome: Progressing  Goal: Absence of fever/infection during neutropenic period  Description  INTERVENTIONS:  - Monitor WBC  - Implement neutropenic guidelines  Outcome: Progressing     Problem: SAFETY ADULT  Goal: Patient will remain free of falls  Description  INTERVENTIONS:  - Assess patient frequently for physical needs  -  Identify cognitive and physical deficits and behaviors that affect risk of falls    -  [place] fall precautions as indicated by assessment   - Educate patient/family on patient safety including physical limitations  - Instruct patient to call for assistance with activity based on assessment  - Modify environment to reduce risk of injury  - Consider OT/PT consult to assist with strengthening/mobility  Outcome: Progressing  Goal: Maintain or return to baseline ADL function  Description  INTERVENTIONS:  -  Assess patient's ability to carry out ADLs; assess patient's baseline for ADL function and identify physical deficits which impact ability to perform ADLs (bathing, care of mouth/teeth, toileting, grooming, dressing, etc )  - Assess/evaluate cause of self-care deficits   - Assess range of motion  - Assess patient's mobility; develop plan if impaired  - Assess patient's need for assistive devices and provide as appropriate  - Encourage maximum independence but intervene and supervise when necessary  ¯ Involve family in performance of ADLs  ¯ Assess for home care needs following discharge   ¯ Request OT consult to assist with ADL evaluation and planning for discharge  ¯ Provide patient education as appropriate  Outcome: Progressing  Goal: Maintain or return mobility status to optimal level  Description  INTERVENTIONS:  - Assess patient's baseline mobility status (ambulation, transfers, stairs, etc )    - Identify cognitive and physical deficits and behaviors that affect mobility  - Identify mobility aids required to assist with transfers and/or ambulation (gait belt, sit-to-stand, lift, walker, cane, etc )  - [place] fall precautions as indicated by assessment  - Record patient progress and toleration of activity level on Mobility SBAR; progress patient to next Phase/Stage  - Instruct patient to call for assistance with activity based on assessment  - Request Rehabilitation consult to assist with strengthening/weightbearing, etc   Outcome: Progressing     Problem: DISCHARGE PLANNING  Goal: Discharge to home or other facility with appropriate resources  Description  INTERVENTIONS:  - Identify barriers to discharge w/patient and caregiver  - Arrange for needed discharge resources and transportation as appropriate  - Identify discharge learning needs (meds, wound care, etc )  - Arrange for interpretive services to assist at discharge as needed  - Refer to Case Management Department for coordinating discharge planning if the patient needs post-hospital services based on physician/advanced practitioner order or complex needs related to functional status, cognitive ability, or social support system  Outcome: Progressing     Problem: Knowledge Deficit  Goal: Patient/family/caregiver demonstrates understanding of disease process, treatment plan, medications, and discharge instructions  Description  Complete learning assessment and assess knowledge base    Interventions:  - Provide teaching at level of understanding  - Provide teaching via preferred learning methods  Outcome: Progressing     Problem: DISCHARGE PLANNING - CARE MANAGEMENT  Goal: Discharge to post-acute care or home with appropriate resources  Description  INTERVENTIONS:  - Conduct assessment to determine patient/family and health care team treatment goals, and need for post-acute services based on payer coverage, community resources, and patient preferences, and barriers to discharge  - Address psychosocial, clinical, and financial barriers to discharge as identified in assessment in conjunction with the patient/family and health care team  - Arrange appropriate level of post-acute services according to patient's   needs and preference and payer coverage in collaboration with the physician and health care team  - Communicate with and update the patient/family, physician, and health care team regarding progress on the discharge plan  - Arrange appropriate transportation to post-acute venues  - will follow for medically necessary resources   Outcome: Progressing     Problem: Nutrition/Hydration-ADULT  Goal: Nutrient/Hydration intake appropriate for improving, restoring or maintaining nutritional needs  Description  Monitor and assess patient's nutrition/hydration status for malnutrition (ex- brittle hair, bruises, dry skin, pale skin and conjunctiva, muscle wasting, smooth red tongue, and disorientation)  Collaborate with interdisciplinary team and initiate plan and interventions as ordered  Monitor patient's weight and dietary intake as ordered or per policy  Utilize nutrition screening tool and intervene per policy  Determine patient's food preferences and provide high-protein, high-caloric foods as appropriate  INTERVENTIONS:  - Monitor oral intake, urinary output, labs, and treatment plans  - Assess nutrition and hydration status and recommend course of action  - Evaluate amount of meals eaten  - Assist patient with eating if necessary   - Allow adequate time for meals  - Recommend/ encourage appropriate diets, oral nutritional supplements, and vitamin/mineral supplements  - Order, calculate, and assess calorie counts as needed  - Recommend, monitor, and adjust tube feedings and TPN/PPN based on assessed needs  - Assess need for intravenous fluids  - Provide specific nutrition/hydration education as appropriate  - Include patient/family/caregiver in decisions related to nutrition  Outcome: Progressing     Problem: Potential for Falls  Goal: Patient will remain free of falls  Description  INTERVENTIONS:  - Assess patient frequently for physical needs  -  Identify cognitive and physical deficits and behaviors that affect risk of falls    -  [place] fall precautions as indicated by assessment   - Educate patient/family on patient safety including physical limitations  - Instruct patient to call for assistance with activity based on assessment  - Modify environment to reduce risk of injury  - Consider OT/PT consult to assist with strengthening/mobility  Outcome: Progressing     Problem: Prexisting or High Potential for Compromised Skin Integrity  Goal: Skin integrity is maintained or improved  Description  INTERVENTIONS:  - Identify patients at risk for skin breakdown  - Assess and monitor skin integrity  - Assess and monitor nutrition and hydration status  - Monitor labs (i e  albumin)  - Assess for incontinence   - Turn and reposition patient  - Assist with mobility/ambulation  - Relieve pressure over bony prominences  - Avoid friction and shearing  - Provide appropriate hygiene as needed including keeping skin clean and dry  - Evaluate need for skin moisturizer/barrier cream  - Collaborate with interdisciplinary team (i e  Nutrition, Rehabilitation, etc )   - Patient/family teaching  Outcome: Progressing

## 2019-03-11 NOTE — ASSESSMENT & PLAN NOTE
With acute exacerbation due to acute tracheobronchitis  On IV Solu-Medrol  continue respiratory treatment  On IV antibiotics per Pulmonary  Discussed with Pulmonary

## 2019-03-11 NOTE — SOCIAL WORK
Pt and cousin Gilberto Busch is requesting SNF referrals be made to [address] [place] [address] and [facility], if rehab is recommended  CM will continue to follow

## 2019-03-11 NOTE — SOCIAL WORK
GRACE and Liana Dandy met with patient to introduce Palliative support  Patient is the primary caregiver for her two parents (mom has slight dementia)  Patient has a sister from the [place] that is currently visiting and providing care for their parents while patient is admitted  Sister is able to continue to provide care to parents if STR is recommended  Patient states that her parents are in need of only a couple of hours of support each day  Patient feels that she is also in need of support  Patient feels that private pay aides might be able to provide them with the required amount of supports  LSW will give a list of options tomorrow

## 2019-03-11 NOTE — OCCUPATIONAL THERAPY NOTE
[address]  Evaluation     Patient Name: Anthony Dean  UYIET'U Date: 3/11/2019  Problem List  Patient Active Problem List   Diagnosis    Malignant neoplasm of right lung (HCC)    Abnormal weight loss    Loss of appetite    Hyperlipidemia    Healthcare maintenance    Simple chronic bronchitis (HCC)    Chronic hypoxemic respiratory failure (HCC)    Respiratory distress    Nicotine dependence    Chronic obstructive pulmonary disease (HCC)    Depressive disorder    Acute on chronic respiratory failure with hypoxia (HCC)    Goals of care, counseling/discussion     Past Medical History  Past Medical History:   Diagnosis Date    Breast cancer ([facility] ) 1996    [facility]     Requires supplemental oxygen     Stage 4 lung cancer ([facility] )      Past Surgical History  Past Surgical History:   Procedure Laterality Date    APPENDECTOMY      COLONOSCOPY N/A 4/18/2016    Procedure: COLONOSCOPY;  Surgeon: Jessica Cruz MD;  [facility]: [facility]; Service:     LUNG CANCER SURGERY      MASTECTOMY, RADICAL Right 1996    [address] N/A 10/17/2017    Procedure: Lisa Duet;  Surgeon: Kesha Stanford MD;  [facility]: [facility]; Service: Pulmonary    TONSILLECTOMY             03/11/19 0835   Note Type   Note type Eval/Treat   Restrictions/Precautions   Weight Bearing Precautions Per Order No   Other Precautions O2;Fall Risk;Multiple lines   Pain Assessment   Pain Assessment 0-10   Pain Score No Pain   Home Living   Type of Home Apartment   Home Layout Bed/bath upstairs  (6 NEIL, flight of stairs to bedroom/bathroom)   Bathroom Shower/Tub Tub/shower unit   Bathroom Toilet Standard   Bathroom Equipment Grab bars in shower; Shower chair;Commode  (Pt denies use PTA - used by pt's mother)   Bathroom Accessibility Accessible   Additional Comments Pt denies use of DME/AD PTA   Prior Function   Level of [place] Independent with ADLs and functional mobility   Lives With Family  (parents)   Receives Help From Family   ADL Assistance Independent   IADLs Independent   Falls in the last 6 months 0   Vocational Retired   Lifestyle   Autonomy At baseline pt was completing all ADLs/IADLs IND, +driving  Pt also assists her parents w/ IADLs  Pt ambulates IND w/o an AD at baseline  Reciprocal Relationships supportive family   Service to Others retired   Intrinsic Gratification pt enjoys cooking   Psychosocial   Psychosocial (WDL) [address]  Independent   Grooming Assistance 5  [address] [facility] 5  Supervision/Setup   [address] 4  Minimal Assistance   [address] [facility] 5  Supervision/Setup    [facility] [address] 4  Minimal Assistance   LB Dressing Deficit Setup;Steadying;Supervision/safety   [address]  4  Minimal Assistance   Bed Mobility   Supine to Sit 5  Supervision   Additional items Increased time required   Sit to Supine 5  Supervision   Additional items Increased time required   Transfers   Sit to Stand 5  Supervision   Additional items Increased time required   Stand to Sit 5  Supervision   Additional items Increased time required   Additional Comments RW   Functional Mobility   Functional Mobility 4  Minimal assistance   Additional items SPC;Rolling walker   Balance   Static Sitting Good   Dynamic Sitting Fair +   Static Standing Fair   Dynamic Standing Fair   Activity Tolerance   Activity Tolerance Patient limited by fatigue   Medical Staff Made Aware PT Clementine Kauffman CM   Nurse Made Aware Spoke to RN - pt appropriate to be seen   RUE Assessment   RUE Assessment WNL   LUE Assessment   LUE Assessment WNL   Hand Function   Gross Motor Coordination Functional   Fine Motor Coordination Functional   Sensation   Light Touch No apparent deficits   Vision-Basic Assessment   Current Vision Wears glasses all the time   Cognition   Overall Cognitive Status [facility]   Arousal/Participation Alert; Cooperative   Attention Within functional limits   Orientation Level Oriented X4   Memory Within functional limits   Following Commands Follows one step commands with increased time or repetition   Assessment   Limitation Decreased ADL status; Decreased endurance;Decreased self-care trans;Decreased high-level ADLs   Prognosis Good   Assessment Pt is a 72 y o  female who was admitted to [facility] on 3/7/2019 with Respiratory distress; COPD exacerbation in setting of acute bronchitis/other viral URI   Pt's problem list also includes PMH of malignant neoplasm of R lung s/p chemotherapy and radiation, HLD, depression, chronic bronchitis, breast cancer s/p mastectomy  At baseline pt was completing all ADLs/IADLs IND, +driving  Pt also assists her parents w/ IADLs  Pt ambulates IND w/o an AD at baseline  Pt lives with her parents in an apartment w/ 6 NEIL, bedroom/bathroom on 2nd floor  Currently pt requires SUP-MIN A for overall ADLS and for functional mobility/transfers  Pt SpO2 desat to 85-89% on 6 L NC during activity, returned to 90-93% at rest  Pt currently presents with impairments in the following categories -steps to enter environment, limited home support, difficulty performing ADLS and difficulty performing IADLS  activity tolerance, endurance and standing balance/tolerance  These impairments, as well as pt's fatigue, decreased caregiver support, risk for falls and home environment  limit pt's ability to safely engage in all baseline areas of occupation, including bathing, dressing, toileting, functional mobility/transfers, laundry  and meal prep  From OT standpoint, recommend HOME OT & home w/ family support upon D/C  OT will continue to follow to address the below stated goals  Goals   Patient Goals to get home   LTG Time Frame 10-14   Long Term Goal #1 see goals below   Plan   Treatment Interventions ADL retraining;Functional transfer training; Endurance training;Patient/family training;Equipment evaluation/education; Energy conservation; Activityengagement   Goal Expiration Date 03/25/19   OT Frequency 3-5x/wk   Recommendation   OT Discharge Recommendation Home OT  (& home w/ family support)   Equipment Recommended Bedside commode   OT - OK to Discharge No  (pt requiring further ADL/mobility training)   Barthel Index   Feeding 10   Bathing 0   Grooming Score 5   Dressing Score 5   Bladder Score 10   Bowels Score 10   Toilet Use Score 5   Transfers (Bed/Chair) Score 10   Mobility (Level Surface) Score 0   Stairs Score 0   Barthel Index Score 55       Goals:    MOD IND toileting and clothing management with use of AD as needed  MOD IND ADLs after set up w/ use of AD as needed  MOD IND functional mobility/transfers to all surfaces w/ Fair+ to Good balance/safety to participate in dynamic ADLs  Demo good carryover w/ safe use of RW during functional tasks  Increase activity tolerance to 30-35 minutes for participation in ADLs/IADLs  Demo % carryover of energy conservation strategies during functional tasks  Assess DME needs  Participate in IADL simulation w/ good endurance/safety to assist w/ safe D/C planning          Michael Santos OT

## 2019-03-11 NOTE — ASSESSMENT & PLAN NOTE
Improving  Continue supplemental oxygen  Presently off the high-flow oxygen  Wean supplemental oxygen as tolerated to keep O2 sats more than 88%

## 2019-03-11 NOTE — SOCIAL WORK
Family requesting SNF placement on discharge  Discussed OT recommendation, will await PT recommendation    Choices:  [facility], [address]    Met with pt and explained PT/OT recommendation for home therapy  Stated do they Kary Chavira take care of my 80year old parents"  Explained therapy is doing an assessment on her functional status and ability to do ADL's and ambulate  They do not consider her caretaker role in an assessment of her functional capability  Advised her to discuss with her family  Advised I can put the referrals into the SNF's but they may not accept based on PT/OT recommendation of home    TCF from PT's cousin, states patient called and advised of PT/OT home recommendation  They are in the process of finding caretakers for parents as patient needs to focus on herself  Requesting hospital bed, BSC and overhead table  Explained overhead table may not be covered by insurance    Hospital bed will need to be ordered by MD   Requesting [facility] for COPD PALS program   Referral entered in [facility]

## 2019-03-11 NOTE — PLAN OF CARE
Problem: PHYSICAL THERAPY ADULT  Goal: Performs mobility at highest level of function for planned discharge setting  See evaluation for individualized goals  Description  Treatment/Interventions: Functional transfer training, LE strengthening/ROM, Therapeutic exercise, Endurance training, Bed mobility, Gait training, Spoke to nursing, OT, Spoke to case management  Equipment Recommended: Ruth Greene       See flowsheet documentation for full assessment, interventions and recommendations  Note:   Prognosis: Good  Problem List: Decreased strength, Impaired balance, Decreased endurance  Assessment: Pt is a 73 yo female presenting to [facility] with x1 day history of SOB with fever/chills  Pt found to have acute on chronic respiratory failure with hypoxia  PMH significant for: COPD, chronic hypoxic respiratory failure on 2 L and C, history of lung cancer status post chemotherapy and radiation 2018  Pt is supine at start of session and agreeable to therapy  Pt transferred supine <> sit with supervision  Pt transferred sit <> stand with supervision and RW  Pt ambulated 15 ft within room with Lin and RW, overall slowed francesco primarily limited by respiratory status  O2 sat 85-88% while on 5L O2  Pt remained seated in bedside chair with all needs within reach at end of session  PT to recommend home with home PT services pending stair trial and improved ambulation endurance  PT to follow  Barriers to Discharge: Decreased caregiver support     Recommendation: (S) Home PT     PT - OK to Discharge: (S) No(Home with home PT pending stair trial)    See flowsheet documentation for full assessment        Nancy Shen, PT, DPT

## 2019-03-12 LAB
BACTERIA BLD CULT: NORMAL
BACTERIA BLD CULT: NORMAL

## 2019-03-12 PROCEDURE — 87205 SMEAR GRAM STAIN: CPT | Performed by: NURSE PRACTITIONER

## 2019-03-12 PROCEDURE — 94640 AIRWAY INHALATION TREATMENT: CPT

## 2019-03-12 PROCEDURE — 99232 SBSQ HOSP IP/OBS MODERATE 35: CPT | Performed by: FAMILY MEDICINE

## 2019-03-12 PROCEDURE — 94762 N-INVAS EAR/PLS OXIMTRY CONT: CPT

## 2019-03-12 PROCEDURE — 97530 THERAPEUTIC ACTIVITIES: CPT

## 2019-03-12 PROCEDURE — 87070 CULTURE OTHR SPECIMN AEROBIC: CPT | Performed by: NURSE PRACTITIONER

## 2019-03-12 PROCEDURE — 94760 N-INVAS EAR/PLS OXIMETRY 1: CPT

## 2019-03-12 PROCEDURE — 97116 GAIT TRAINING THERAPY: CPT

## 2019-03-12 PROCEDURE — 99232 SBSQ HOSP IP/OBS MODERATE 35: CPT | Performed by: INTERNAL MEDICINE

## 2019-03-12 PROCEDURE — 94668 MNPJ CHEST WALL SBSQ: CPT

## 2019-03-12 RX ORDER — METHYLPREDNISOLONE SODIUM SUCCINATE 40 MG/ML
40 INJECTION, POWDER, LYOPHILIZED, FOR SOLUTION INTRAMUSCULAR; INTRAVENOUS EVERY 12 HOURS SCHEDULED
Status: DISCONTINUED | OUTPATIENT
Start: 2019-03-12 | End: 2019-03-13

## 2019-03-12 RX ADMIN — IPRATROPIUM BROMIDE 0.5 MG: 0.5 SOLUTION RESPIRATORY (INHALATION) at 00:43

## 2019-03-12 RX ADMIN — CEFTRIAXONE 1000 MG: 1 INJECTION, POWDER, FOR SOLUTION INTRAMUSCULAR; INTRAVENOUS at 11:30

## 2019-03-12 RX ADMIN — LEVALBUTEROL HYDROCHLORIDE 1.25 MG: 1.25 SOLUTION, CONCENTRATE RESPIRATORY (INHALATION) at 13:06

## 2019-03-12 RX ADMIN — AZITHROMYCIN MONOHYDRATE 500 MG: 250 TABLET ORAL at 21:44

## 2019-03-12 RX ADMIN — METHYLPREDNISOLONE SODIUM SUCCINATE 40 MG: 40 INJECTION, POWDER, FOR SOLUTION INTRAMUSCULAR; INTRAVENOUS at 05:09

## 2019-03-12 RX ADMIN — ENOXAPARIN SODIUM 40 MG: 40 INJECTION SUBCUTANEOUS at 09:11

## 2019-03-12 RX ADMIN — BUDESONIDE 0.5 MG: 0.5 INHALANT RESPIRATORY (INHALATION) at 20:38

## 2019-03-12 RX ADMIN — IPRATROPIUM BROMIDE 0.5 MG: 0.5 SOLUTION RESPIRATORY (INHALATION) at 20:38

## 2019-03-12 RX ADMIN — GUAIFENESIN 600 MG: 600 TABLET, EXTENDED RELEASE ORAL at 21:43

## 2019-03-12 RX ADMIN — IPRATROPIUM BROMIDE 0.5 MG: 0.5 SOLUTION RESPIRATORY (INHALATION) at 13:06

## 2019-03-12 RX ADMIN — LEVALBUTEROL HYDROCHLORIDE 1.25 MG: 1.25 SOLUTION, CONCENTRATE RESPIRATORY (INHALATION) at 08:38

## 2019-03-12 RX ADMIN — LEVALBUTEROL HYDROCHLORIDE 1.25 MG: 1.25 SOLUTION, CONCENTRATE RESPIRATORY (INHALATION) at 20:38

## 2019-03-12 RX ADMIN — IPRATROPIUM BROMIDE 0.5 MG: 0.5 SOLUTION RESPIRATORY (INHALATION) at 08:38

## 2019-03-12 RX ADMIN — GUAIFENESIN 600 MG: 600 TABLET, EXTENDED RELEASE ORAL at 09:11

## 2019-03-12 RX ADMIN — FORMOTEROL FUMARATE DIHYDRATE 20 MCG: 20 SOLUTION RESPIRATORY (INHALATION) at 08:38

## 2019-03-12 RX ADMIN — BUDESONIDE 0.5 MG: 0.5 INHALANT RESPIRATORY (INHALATION) at 08:38

## 2019-03-12 RX ADMIN — FORMOTEROL FUMARATE DIHYDRATE 20 MCG: 20 SOLUTION RESPIRATORY (INHALATION) at 20:38

## 2019-03-12 RX ADMIN — LEVALBUTEROL HYDROCHLORIDE 1.25 MG: 1.25 SOLUTION, CONCENTRATE RESPIRATORY (INHALATION) at 00:44

## 2019-03-12 RX ADMIN — METHYLPREDNISOLONE SODIUM SUCCINATE 40 MG: 40 INJECTION, POWDER, FOR SOLUTION INTRAMUSCULAR; INTRAVENOUS at 21:43

## 2019-03-12 NOTE — PROGRESS NOTES
Progress Note - Palliative & Supportive Care  Gume Vargas  72 y o   female  PPHP 916/PPHP 916-01   MRN: [id number]  Encounter: [id number]     Assessment  A/C hypoxic respiratory failure  Dependent on 2L supplemental O2 at baseline  Severe COPD w/ acute exacerbation  Acute tracheobronchitis  Stage IV lung cancer  Severe protein calorie malnutrition      Plan:  Symptom Management:    - SOB: Continue management with disease directed care using steroids, antibiotics and oxygen as ordered by pulmonary and primary team   Patient understands that should she have increased SOB, PRN oxycodone is available  Goals of Care:    - PT recommended STR following discharge  Patient is agreeable to this and case management has placed referrals  - Patient is the primary caregiver for her parents and acknowledges the need for additional assistance upon her return home  Her sister is currently visiting from [place] and will remain to care for their parents until she returns home  Level 3 code status    Interval History  Patient seen with DILLON Araiza and chart reviewed  Patient has used one dose of prn oxycodone in the past 24 hours  Patient understands that she should ask for the oxycodone as she notices increased work of breathing  Discussed with patient the PT recommendation for STR and encouraged her to participate  Patient expressed the need for additional help in her home for herself as well as her parents upon her return  She understands that this will likely be paid caregivers and states she will only need them for a few hours a day  Review of Systems: Positive for weight loss, shortness of breath exacerbated by exertion, generalized fatigue and weakness        Medications    Current Facility-Administered Medications:     albuterol (PROVENTIL HFA,VENTOLIN HFA) inhaler 2 puff, 2 puff, Inhalation, Q4H PRN, Lester Mclean MD    azithromycin [facility]) tablet 500 mg, 500 mg, Oral, Q24H, ONI Hickey, 500 mg at 03/11/19 2113    budesonide (PULMICORT) inhalation solution 0 5 mg, 0 5 mg, Nebulization, Q12H, Bhupendra Brunner MD, 0 5 mg at 03/11/19 1931    cefTRIAXone (ROCEPHIN) 1,000 mg in dextrose 5 % 50 mL IVPB, 1,000 mg, Intravenous, Q24H, ONI Hickey, Stopped at 03/11/19 1105    cholecalciferol (VITAMIN D3) tablet 2,000 Units, 2,000 Units, Oral, Daily, Bhupendra Brunner MD, 2,000 Units at 03/10/19 0859    enoxaparin (LOVENOX) subcutaneous injection 40 mg, 40 mg, Subcutaneous, Daily, Bhupendra Brunner MD, 40 mg at 03/11/19 0841    formoterol (PERFOROMIST) nebulizer solution 20 mcg, 20 mcg, Nebulization, Q12H, Johnnie Moise, DO, 20 mcg at 03/11/19 1931    guaiFENesin (MUCINEX) 12 hr tablet 600 mg, 600 mg, Oral, Q12H [address], Kali Recinos, CRNP, 600 mg at 03/11/19 2113    ipratropium (ATROVENT) 0 02 % inhalation solution 0 5 mg, 0 5 mg, Nebulization, Q6H, Bhupendra Brunner MD, 0 5 mg at 03/11/19 1931    levalbuterol (Mari Tamiko) inhalation solution 1 25 mg, 1 25 mg, Nebulization, Q6H, Bhupendra Brunner MD, 1 25 mg at 03/11/19 1931    methylPREDNISolone sodium succinate (Solu-MEDROL) injection 40 mg, 40 mg, Intravenous, Q8H [address], Kali Recinos, CRNP, 40 mg at 03/11/19 2113    multivitamin-minerals (CENTRUM) tablet 1 tablet, 1 tablet, Oral, Daily, Bhupendra Brunner MD, 1 tablet at 03/10/19 0859    oxyCODONE (ROXICODONE) IR tablet 2 5 mg, 2 5 mg, Oral, Q4H PRN, Joelle Moore MD, 2 5 mg at 03/10/19 1714    Objective  /79   Pulse (!) 111   Temp 99 °F (37 2 °C) (Oral)   Resp 20   Ht 5' 4" (1 626 m)   Wt 39 7 kg (87 lb 9 6 oz)   SpO2 91%   BMI 15 04 kg/m²   Physical Exam:   Constitutional: Sitting up comfortably in bed  In no acute distress  Head: Normocephalic and atraumatic  Eyes: EOM are normal  No ocular discharge  Neck: no visible adenopathy or masses  Respiratory: Pursed lip breathing noted  Diminished breath sounds bilaterally     Gastrointestinal: No abdominal distension  Musculoskeletal: No edema  Neurological: Alert, oriented and appropriately conversant  Skin: Dry, no diaphoresis  Psychiatric: Displays a normal mood and affect  Behavior, judgement and thought content appear normal      Lab Results: None to review    Counseling / Coordination of Care  Total floor / unit time spent today 30  minutes  Greater than 50% of total time was spent with the patient and / or family counseling and / or coordinating of care  A description of the counseling / coordination of care: review of chart, time spent providing support at bedside, assessing symptoms        Zenon Nunez, [address] [facility]

## 2019-03-12 NOTE — PROGRESS NOTES
Progress Note - Pulmonary   Karla Brooke 72 y o  female MRN: [id number]  Unit/Bed#: [facility] 916-01 Encounter: [id number]    Assessment/Plan:    1  Acute on chronic hypoxic respiratory failure (improving) likely secondary COPD exacerbation in the setting of viral vs bacterial bronchitis vs CAP      *  patient remains on 6 L- 96%, patient can likely be titrated down- home O2 requirements were 2 L- patient will likely need increased oxygen therapy upon discharge       *  continue to ambulate, deep breathing cough, flutter       *  please obtain ambulatory sat    2  Severe COPD with acute exacerbation (resolving) likely secondary to acute infection       *  patient still has bilateral wheezing however air movement has significantly improved throughout all lung fields       *  will decrease steroids to 40 mg b i d , with likely PO transitioned tomorrow       *  Continue budesonide b i d , Perforomist b i d , Xopenex/Atrovent t i d         *  palliative consult-p r n  Oxycodone        *   home regimenBrovana 15 mcg nebulizer b i d , budesonide 0 5 mg nebulizer b i d , ipratropium nebulizer q i d     3  Abnormal CT of the chest- suspected CAP vs URI      *  bilateral lower lobe bronchial wall thickening      *  continue azithromycin day # 5/5, Rocephin day # 5/7    4  History of stage IV adenocarcinoma (2017) (chemo therapy and radiation completed in February 2018)      *  recent CTA shows more prominent RLL opacity      *  patient follows with Dr Singh      *  Will need repeat CT in 3 months         Chief Complaint:    "I am able to walk around a little bit more today"    Subjective:    Dionicio Clancy was comfortably sitting on the side her bed  She reports she continues to make mild improvements and she feels she is ready to be discharged  She reports no significant overnight events  She continues to deny fever, chills, hemoptysis, night sweats, pleuritic chest pain, or headache      Objective:    Vitals: Blood pressure 115/73, pulse (!) 108, temperature 99 °F (37 2 °C), temperature source Oral, resp  rate 18, height 5' 4" (1 626 m), weight 39 7 kg (87 lb 9 6 oz), SpO2 96 %  6L,Body mass index is 15 04 kg/m²  Intake/Output Summary (Last 24 hours) at 3/12/2019 0739  Last data filed at 3/12/2019 0529  Gross per 24 hour   Intake 890 ml   Output 775 ml   Net 115 ml       Invasive Devices     Peripheral Intravenous Line            Peripheral IV 03/08/19 Left;Upper Arm 3 days                Physical Exam:   Physical Exam   Constitutional: She is oriented to person, place, and time  She appears well-developed and well-nourished  HENT:   Head: Normocephalic and atraumatic  Neck: Normal range of motion  Neck supple  No JVD present  No tracheal deviation present  Cardiovascular: Normal rate, regular rhythm and normal heart sounds  Exam reveals no gallop and no friction rub  No murmur heard  Pulmonary/Chest: No accessory muscle usage or stridor  No tachypnea and no bradypnea  No respiratory distress  She has no decreased breath sounds  She has wheezes in the right upper field, the right middle field, the right lower field, the left upper field, the left middle field and the left lower field  She has no rhonchi  She has no rales  She exhibits no tenderness  Dyspnea upon exertion, patient has diffuse wheezing throughout lung fields however, respiratory status is improving and air movement throughout lung fields continues to improve   Abdominal: Soft  Bowel sounds are normal  She exhibits no distension  There is no tenderness  Musculoskeletal: Normal range of motion  She exhibits no edema or deformity  Neurological: She is alert and oriented to person, place, and time  Skin: Skin is warm and dry  Psychiatric: She has a normal mood and affect  Her behavior is normal        Labs:  I have personally reviewed pertinent lab results 3/11/19    Imaging and other studies: I have personally reviewed pertinent films in PACS     CTA chest 03/07/2019- bilateral lower lobe bronchial wall thickening with stable emphysematous changes, more prominent right lower lobe opacity

## 2019-03-12 NOTE — PHYSICAL THERAPY NOTE
PHYSICAL THERAPY NOTE          Patient Name: Sangeeta Mcintyre  HCELO'O Date: 3/12/2019     03/12/19 1135   Pain Assessment   Pain Assessment No/denies pain   Pain Score No Pain   Restrictions/Precautions   Weight Bearing Precautions Per Order No   Other Precautions Multiple lines;Telemetry;O2;Fall Risk   General   Chart Reviewed Yes   Family/Caregiver Present Yes  (Sister)   Cognition   Overall Cognitive Status WFL   Arousal/Participation Alert   Attention Within functional limits   Orientation Level Oriented X4   Memory Within functional limits   Following Commands Follows all commands and directions without difficulty   Subjective   Subjective "I just want to get strong"   Bed Mobility   Supine to Sit 5  Supervision   Additional items Increased time required   Sit to Supine 5  Supervision   Additional items Increased time required   Transfers   Sit to Stand 5  Supervision   Additional items Increased time required   Stand to Sit 5  Supervision   Additional items Increased time required   Toilet transfer 5  Supervision   Additional items Increased time required;Verbal cues   Ambulation/Elevation   Gait pattern Improper Weight shift;Narrow SCAR; Excessively slow   Gait Assistance 5  Supervision  (stand by assist)   Additional items Assist x 1;Verbal cues; Tactile cues   Assistive Device Rolling walker   Distance 200 ft   Balance   Static Sitting Good   Dynamic Sitting Good   Static Standing Fair +   Dynamic Standing Fair +   Ambulatory Fair +   Endurance Deficit   Endurance Deficit Yes   Endurance Deficit Description Pt on 6 L O2 for ambulation, de-saturated to 91% after ambulation trial, quickly re-saturated to 94% with seated rest break  Activity Tolerance   Activity Tolerance Patient limited by fatigue   Medical Staff Made Aware Cm   Nurse Made Aware Yes   Assessment   Prognosis Good   Problem List Decreased endurance; Impaired balance;Decreased mobility   Assessment Pt seen for physical therapy session focused on therapeutic activity and gait training  Pt and sister with questions and concerns regarding D/C planning  Pt and sister educated at length regarding current D/C plan and indications for inpt rehab vs D/C home with home services  Pt transferred supine <> sit with supervision  Pt transferred sit <> stand with supervision and RW  Pt ambulated 200 ft with SBA and RW, overall slowed francesco with narrow SCAR  Pt on 6 L O2 for ambulation, de-saturated to 91% after ambulation trial, quickly re-saturated to 94% with seated rest break  Pt ambulated 10 ft to/from bathroom with supervision and RW, transferred to toilet with supervision and grab bars  Pt remained seated in bedside chair with all needs within reach  Pt today presents with improved ambulation endurance and dynamic balance  PT to recommend D/C home with home PT pending stair trial  PT to follow    Barriers to Discharge Decreased caregiver support   Goals   Patient Goals To get strong   Treatment Day 1   Plan   Treatment/Interventions LE strengthening/ROM; Functional transfer training; Therapeutic exercise; Endurance training;Bed mobility;Gait training;Spoke to nursing;Spoke to case management; Family   PT Frequency   (3-5x/wk)   Recommendation   Recommendation Home PT  (Pending stair trial)   Equipment Recommended Walker   PT - OK to Discharge No  (Home with home PT pending stair trial)     Patrick Staples, PT, DPT

## 2019-03-12 NOTE — PLAN OF CARE
Problem: PAIN - ADULT  Goal: Verbalizes/displays adequate comfort level or baseline comfort level  Description  Interventions:  - Encourage patient to monitor pain and request assistance  - Assess pain using appropriate pain scale  - Administer analgesics based on type and severity of pain and evaluate response  - Implement non-pharmacological measures as appropriate and evaluate response  - Consider cultural and social influences on pain and pain management  - Notify physician/advanced practitioner if interventions unsuccessful or patient reports new pain  Outcome: Progressing     Problem: INFECTION - ADULT  Goal: Absence or prevention of progression during hospitalization  Description  INTERVENTIONS:  - Assess and monitor for signs and symptoms of infection  - Monitor lab/diagnostic results  - Monitor all insertion sites, i e  indwelling lines, tubes, and drains  - Monitor endotracheal (as able) and nasal secretions for changes in amount and color  - [place] appropriate cooling/warming therapies per order  - Administer medications as ordered  - Instruct and encourage patient and family to use good hand hygiene technique  - Identify and instruct in appropriate isolation precautions for identified infection/condition  Outcome: Progressing  Goal: Absence of fever/infection during neutropenic period  Description  INTERVENTIONS:  - Monitor WBC  - Implement neutropenic guidelines  Outcome: Progressing     Problem: SAFETY ADULT  Goal: Patient will remain free of falls  Description  INTERVENTIONS:  - Assess patient frequently for physical needs  -  Identify cognitive and physical deficits and behaviors that affect risk of falls    -  [place] fall precautions as indicated by assessment   - Educate patient/family on patient safety including physical limitations  - Instruct patient to call for assistance with activity based on assessment  - Modify environment to reduce risk of injury  - Consider OT/PT consult to assist with strengthening/mobility  Outcome: Progressing  Goal: Maintain or return to baseline ADL function  Description  INTERVENTIONS:  -  Assess patient's ability to carry out ADLs; assess patient's baseline for ADL function and identify physical deficits which impact ability to perform ADLs (bathing, care of mouth/teeth, toileting, grooming, dressing, etc )  - Assess/evaluate cause of self-care deficits   - Assess range of motion  - Assess patient's mobility; develop plan if impaired  - Assess patient's need for assistive devices and provide as appropriate  - Encourage maximum independence but intervene and supervise when necessary  ¯ Involve family in performance of ADLs  ¯ Assess for home care needs following discharge   ¯ Request OT consult to assist with ADL evaluation and planning for discharge  ¯ Provide patient education as appropriate  Outcome: Progressing  Goal: Maintain or return mobility status to optimal level  Description  INTERVENTIONS:  - Assess patient's baseline mobility status (ambulation, transfers, stairs, etc )    - Identify cognitive and physical deficits and behaviors that affect mobility  - Identify mobility aids required to assist with transfers and/or ambulation (gait belt, sit-to-stand, lift, walker, cane, etc )  - [place] fall precautions as indicated by assessment  - Record patient progress and toleration of activity level on Mobility SBAR; progress patient to next Phase/Stage  - Instruct patient to call for assistance with activity based on assessment  - Request Rehabilitation consult to assist with strengthening/weightbearing, etc   Outcome: Progressing     Problem: DISCHARGE PLANNING  Goal: Discharge to home or other facility with appropriate resources  Description  INTERVENTIONS:  - Identify barriers to discharge w/patient and caregiver  - Arrange for needed discharge resources and transportation as appropriate  - Identify discharge learning needs (meds, wound care, etc )  - Arrange for interpretive services to assist at discharge as needed  - Refer to Case Management Department for coordinating discharge planning if the patient needs post-hospital services based on physician/advanced practitioner order or complex needs related to functional status, cognitive ability, or social support system  Outcome: Progressing     Problem: Knowledge Deficit  Goal: Patient/family/caregiver demonstrates understanding of disease process, treatment plan, medications, and discharge instructions  Description  Complete learning assessment and assess knowledge base    Interventions:  - Provide teaching at level of understanding  - Provide teaching via preferred learning methods  Outcome: Progressing     Problem: DISCHARGE PLANNING - CARE MANAGEMENT  Goal: Discharge to post-acute care or home with appropriate resources  Description  INTERVENTIONS:  - Conduct assessment to determine patient/family and health care team treatment goals, and need for post-acute services based on payer coverage, community resources, and patient preferences, and barriers to discharge  - Address psychosocial, clinical, and financial barriers to discharge as identified in assessment in conjunction with the patient/family and health care team  - Arrange appropriate level of post-acute services according to patient's   needs and preference and payer coverage in collaboration with the physician and health care team  - Communicate with and update the patient/family, physician, and health care team regarding progress on the discharge plan  - Arrange appropriate transportation to post-acute venues  - will follow for medically necessary resources   Outcome: Progressing     Problem: Nutrition/Hydration-ADULT  Goal: Nutrient/Hydration intake appropriate for improving, restoring or maintaining nutritional needs  Description  Monitor and assess patient's nutrition/hydration status for malnutrition (ex- brittle hair, bruises, dry skin, pale skin and conjunctiva, muscle wasting, smooth red tongue, and disorientation)  Collaborate with interdisciplinary team and initiate plan and interventions as ordered  Monitor patient's weight and dietary intake as ordered or per policy  Utilize nutrition screening tool and intervene per policy  Determine patient's food preferences and provide high-protein, high-caloric foods as appropriate  INTERVENTIONS:  - Monitor oral intake, urinary output, labs, and treatment plans  - Assess nutrition and hydration status and recommend course of action  - Evaluate amount of meals eaten  - Assist patient with eating if necessary   - Allow adequate time for meals  - Recommend/ encourage appropriate diets, oral nutritional supplements, and vitamin/mineral supplements  - Order, calculate, and assess calorie counts as needed  - Recommend, monitor, and adjust tube feedings and TPN/PPN based on assessed needs  - Assess need for intravenous fluids  - Provide specific nutrition/hydration education as appropriate  - Include patient/family/caregiver in decisions related to nutrition  Outcome: Progressing     Problem: Potential for Falls  Goal: Patient will remain free of falls  Description  INTERVENTIONS:  - Assess patient frequently for physical needs  -  Identify cognitive and physical deficits and behaviors that affect risk of falls    -  [place] fall precautions as indicated by assessment   - Educate patient/family on patient safety including physical limitations  - Instruct patient to call for assistance with activity based on assessment  - Modify environment to reduce risk of injury  - Consider OT/PT consult to assist with strengthening/mobility  Outcome: Progressing     Problem: Prexisting or High Potential for Compromised Skin Integrity  Goal: Skin integrity is maintained or improved  Description  INTERVENTIONS:  - Identify patients at risk for skin breakdown  - Assess and monitor skin integrity  - Assess and monitor nutrition and hydration status  - Monitor labs (i e  albumin)  - Assess for incontinence   - Turn and reposition patient  - Assist with mobility/ambulation  - Relieve pressure over bony prominences  - Avoid friction and shearing  - Provide appropriate hygiene as needed including keeping skin clean and dry  - Evaluate need for skin moisturizer/barrier cream  - Collaborate with interdisciplinary team (i e  Nutrition, Rehabilitation, etc )   - Patient/family teaching  Outcome: Progressing

## 2019-03-12 NOTE — PLAN OF CARE
Problem: PHYSICAL THERAPY ADULT  Goal: Performs mobility at highest level of function for planned discharge setting  See evaluation for individualized goals  Description  Treatment/Interventions: Functional transfer training, LE strengthening/ROM, Therapeutic exercise, Endurance training, Bed mobility, Gait training, Spoke to nursing, OT, Spoke to case management  Equipment Recommended: Waldo Cole       See flowsheet documentation for full assessment, interventions and recommendations  Outcome: Progressing  Note:   Prognosis: Good  Problem List: Decreased endurance, Impaired balance, Decreased mobility  Assessment: Pt seen for physical therapy session focused on therapeutic activity and gait training  Pt and sister with questions and concerns regarding D/C planning  Pt and sister educated at length regarding current D/C plan and indications for inpt rehab vs D/C home with home services  Pt transferred supine <> sit with supervision  Pt transferred sit <> stand with supervision and RW  Pt ambulated 200 ft with SBA and RW, overall slowed francesco with narrow SCAR  Pt on 6 L O2 for ambulation, de-saturated to 91% after ambulation trial, quickly re-saturated to 94% with seated rest break  Pt ambulated 10 ft to/from bathroom with supervision and RW, transferred to toilet with supervision and grab bars  Pt remained seated in bedside chair with all needs within reach  Pt today presents with improved ambulation endurance and dynamic balance  PT to recommend D/C home with home PT pending stair trial  PT to follow   Barriers to Discharge: Decreased caregiver support     Recommendation: (S) Home PT(Pending stair trial)     PT - OK to Discharge: (S) No(Home with home PT pending stair trial)    See flowsheet documentation for full assessment        Ana Contreras, PT, DPT

## 2019-03-12 NOTE — ASSESSMENT & PLAN NOTE
· On 2 L nasal cannula chronically  · Currently on nasal cannula 5 L-wean off of the oxygen as tolerated

## 2019-03-12 NOTE — ASSESSMENT & PLAN NOTE
With acute exacerbation due to acute tracheobronchitis  On IV Solu-Medrol-wean down to 40 mg b i d   continue respiratory treatment  On IV antibiotics per Pulmonary  Discussed with Pulmonary  Sepsis present at the time of admission as evidenced by fever tachycardia tachypnea with elevated procalcitonin likely secondary to tracheobronchitis

## 2019-03-12 NOTE — ASSESSMENT & PLAN NOTE
· Status post chemotherapy and radiation 2018     ·  In remission, follows with Dr Miguel Angel Lassiter

## 2019-03-12 NOTE — PROGRESS NOTES
Progress Note - Valentin  1945, 72 y o  female MRN: [id number]    Unit/Bed#: [facility] 916-01 Encounter: [id number]    Primary Care Provider: Vonzell Landau, DO   Date and time admitted to hospital: 3/7/2019  5:52 PM        Goals of care, counseling/discussion  Assessment & Plan  Oxycodone p r n  Palliative Care input noted    Acute on chronic respiratory failure with hypoxia [facility])  Assessment & Plan  Improving  Continue supplemental oxygen  Presently off the high-flow oxygen  Wean supplemental oxygen as tolerated to keep O2 sats more than 88%      COPD exacerbation (HCC)  Assessment & Plan  With acute exacerbation due to acute tracheobronchitis  On IV Solu-Medrol-wean down to 40 mg b i d   continue respiratory treatment  On IV antibiotics per Pulmonary  Discussed with Pulmonary  Sepsis present at the time of admission as evidenced by fever tachycardia tachypnea with elevated procalcitonin likely secondary to tracheobronchitis    Nicotine dependence  Assessment & Plan  Cessation advised    Chronic hypoxemic respiratory failure (HCC)  Assessment & Plan  · On 2 L nasal cannula chronically  · Currently on nasal cannula 5 L-wean off of the oxygen as tolerated    Malignant neoplasm of right lung [facility])  Assessment & Plan  · Status post chemotherapy and radiation 2018  ·  In remission, follows with Dr Richard Bingham        VTE Pharmacologic Prophylaxis:   Pharmacologic: Enoxaparin (Lovenox)  Mechanical VTE Prophylaxis in Place: Yes    Patient Centered Rounds: I have performed bedside rounds with nursing staff today  Discussions with Specialists or Other Care Team Provider:     Education and Discussions with Family / Patient:  Patient and family    Time Spent for Care: 30 minutes  More than 50% of total time spent on counseling and coordination of care as described above      Current Length of Stay: 5 day(s)    Current Patient Status: Inpatient   Certification Statement: The patient will continue to require additional inpatient hospital stay due to IV steroids    Discharge Plan:     Code Status: Level 3 - DNAR and DNI      Subjective:   Patient seen and examined  Reported that her breathing is improving  No specific complaints  Objective:     Vitals:   Temp (24hrs), Av 7 °F (36 5 °C), Min:97 16 °F (36 2 °C), Max:98 24 °F (36 8 °C)    Temp:  [97 16 °F (36 2 °C)-98 24 °F (36 8 °C)] 97 16 °F (36 2 °C)  HR:  [105-115] 115  Resp:  [18-19] 19  BP: (107-163)/(55-98) 107/55  SpO2:  [91 %-100 %] 93 %  Body mass index is 15 04 kg/m²  Input and Output Summary (last 24 hours): Intake/Output Summary (Last 24 hours) at 3/12/2019 1825  Last data filed at 3/12/2019 1814  Gross per 24 hour   Intake 960 ml   Output 100 ml   Net 860 ml       Physical Exam:     Physical Exam   Constitutional: She is oriented to person, place, and time  She appears well-developed  HENT:   Head: Normocephalic and atraumatic  Eyes: Pupils are equal, round, and reactive to light  EOM are normal    Neck: Normal range of motion  Cardiovascular: Normal rate and regular rhythm  Pulmonary/Chest: She has no wheezes  Decreased breath sounds bilateral  Tachypnea   Abdominal: Soft  Bowel sounds are normal    Musculoskeletal: Normal range of motion  She exhibits no edema  Neurological: She is alert and oriented to person, place, and time  Skin: Skin is warm         Additional Data:     Labs:    Results from last 7 days   Lab Units 03/10/19  0502   WBC Thousand/uL 15 24*   HEMOGLOBIN g/dL 10 5*   HEMATOCRIT % 34 3*   PLATELETS Thousands/uL 284   NEUTROS PCT % 93*   LYMPHS PCT % 2*   MONOS PCT % 4   EOS PCT % 0     Results from last 7 days   Lab Units 19  0521 19  1701   POTASSIUM mmol/L 4 2 4 7   CHLORIDE mmol/L 103 102   CO2 mmol/L 28 22   BUN mg/dL 13 16   CREATININE mg/dL 0 53* 0 70   CALCIUM mg/dL 8 4 9 0   ALK PHOS U/L  --  124*   ALT U/L  --  16   AST U/L  --  23     Results from last 7 days   Lab Units 19  1701 INR  1 03       * I Have Reviewed All Lab Data Listed Above  * Additional Pertinent Lab Tests Reviewed: [facility] 66 Admission Reviewed    Imaging:    Imaging Reports Reviewed Today Include:   Imaging Personally Reviewed by Myself Includes:     Recent Cultures (last 7 days):     Results from last 7 days   Lab Units 03/07/19  1856 03/07/19  1814 03/07/19  1701   BLOOD CULTURE   --  No Growth After 4 Days  No Growth After 4 Days  INFLUENZA B PCR  Not Detected  --   --    RSV PCR  Not Detected  --   --        Last 24 Hours Medication List:     Current Facility-Administered Medications:  albuterol 2 puff Inhalation Q4H PRN Harry Brar MD    azithromycin 500 mg Oral Q24H ONI Johnson    budesonide 0 5 mg Nebulization Q12H Harry Brar MD    cefTRIAXone 1,000 mg Intravenous Q24H ONI Johnson Last Rate: 1,000 mg (03/12/19 1130)   cholecalciferol 2,000 Units Oral Daily Harry Brar MD    enoxaparin 40 mg Subcutaneous Daily Harry Brar MD    formoterol 20 mcg Nebulization Q12H Mio Sweeney DO    guaiFENesin 600 mg Oral Q12H [address] ONI Vazquze    ipratropium 0 5 mg Nebulization Q6H Harry Brar MD    levalbuterol 1 25 mg Nebulization Q6H Harry Brar MD    methylPREDNISolone sodium succinate 40 mg Intravenous Q12H [address] ONI Johnson    multivitamin-minerals 1 tablet Oral Daily Harry Brar MD    oxyCODONE 2 5 mg Oral Q4H PRN Kelly Pate MD         Today, Patient Was Seen By: Anny Jeffrey MD    ** Please Note: Dictation voice to text software may have been used in the creation of this document   **

## 2019-03-12 NOTE — PROGRESS NOTES
Progress note - Palliative and Supportive Care   Lexi Dukes 72 y o  female [id number]    Assessment:   - A/C hypoxic respiratory failure   - Oxygen dependent at baseline   - Severe COPD w/acute exacerbation   - Stage IV lung cancer   - Severe protein calorie malnutrition    Plan:  1  Symptom management - Patient continues on IV steroids and antibiotics  Management per pulmonary and primary team   Continue with prn oxycodone for increased work of breathing  2  Goals - Patient's goal is to be discharged to rehab to help her regain her strength and then return home  Code Status: DNR/DNI - Level 3   Advance Directive / Living Will / POLST:  On file    Interval history:       Nursing staff reports no events overnight  Patient stated she was able to walk with PT today and had been OOB to the chair  She reported no episodes of increased work of breathing and has not required her PRN oxycodone  Patient's sister and cousin visited today to plan how to provide assistance for patient and her parents until she has regained her strength      MEDICATIONS / ALLERGIES:     all current active meds have been reviewed, current meds:   Current Facility-Administered Medications   Medication Dose Route Frequency    albuterol (PROVENTIL HFA,VENTOLIN HFA) inhaler 2 puff  2 puff Inhalation Q4H PRN    azithromycin (ZITHROMAX) tablet 500 mg  500 mg Oral Q24H    budesonide (PULMICORT) inhalation solution 0 5 mg  0 5 mg Nebulization Q12H    cefTRIAXone (ROCEPHIN) 1,000 mg in dextrose 5 % 50 mL IVPB  1,000 mg Intravenous Q24H    cholecalciferol (VITAMIN D3) tablet 2,000 Units  2,000 Units Oral Daily    enoxaparin (LOVENOX) subcutaneous injection 40 mg  40 mg Subcutaneous Daily    formoterol (PERFOROMIST) nebulizer solution 20 mcg  20 mcg Nebulization Q12H    guaiFENesin (MUCINEX) 12 hr tablet 600 mg  600 mg Oral Q12H CHANDLER    ipratropium (ATROVENT) 0 02 % inhalation solution 0 5 mg  0 5 mg Nebulization Q6H    levalbuterol (XOPENEX) inhalation solution 1 25 mg  1 25 mg Nebulization Q6H    methylPREDNISolone sodium succinate (Solu-MEDROL) injection 40 mg  40 mg Intravenous Q12H [facility]    multivitamin-minerals (CENTRUM) tablet 1 tablet  1 tablet Oral Daily    oxyCODONE (ROXICODONE) IR tablet 2 5 mg  2 5 mg Oral Q4H PRN    and PTA meds:   Prior to Admission Medications   Prescriptions Last Dose Informant Patient Reported? Taking? Cholecalciferol (VITAMIN D) 2000 UNITS tablet  Self Yes No   Sig: Take 2,000 Units by mouth daily  Multiple Vitamins-Minerals (MULTIVITAMIN WITH MINERALS) tablet  Self Yes No   Sig: Take 1 tablet by mouth daily  albuterol (VENTOLIN HFA) 90 mcg/act inhaler  Self Yes No   Sig: Inhale   arformoterol (BROVANA) 15 mcg/2 mL nebulizer solution  Self No No   Sig: Take 1 vial (15 mcg total) by nebulization 2 (two) times a day   budesonide (PULMICORT) 0 25 mg/2 mL nebulizer solution  Self No No   Sig: Take 1 vial (0 25 mg total) by nebulization 2 (two) times a day   budesonide (PULMICORT) 0 5 mg/2 mL nebulizer solution  Self No No   Sig: USE 1 UNIT DOSE VIA NEBULIZER TWO TIMES A DAY   ipratropium (ATROVENT) 0 02 % nebulizer solution  Self Yes No   Sig: Take 0 5 mg by nebulization 3 (three) times a day     prochlorperazine (COMPAZINE) 10 mg tablet  Self Yes No   Sig: Take 1 tablet by mouth every 6 (six) hours as needed      Facility-Administered Medications: None       No Known Allergies    OBJECTIVE:    Physical Exam  Physical Exam   Constitutional: She is oriented to person, place, and time  No distress  Evidence of poor nutrition   HENT:   Head: Normocephalic and atraumatic  Eyes: Pupils are equal, round, and reactive to light  EOM are normal    Neck: Normal range of motion  No tracheal deviation present  Cardiovascular: Normal rate and regular rhythm  Pulmonary/Chest: No respiratory distress  Patient on 6L O2  Abdominal: Soft   Bowel sounds are normal    Musculoskeletal: Normal range of motion  She exhibits no edema  Neurological: She is alert and oriented to person, place, and time  Skin: Skin is warm and dry  No rash noted  Psychiatric: She has a normal mood and affect  Her behavior is normal    Nursing note and vitals reviewed  Lab Results: no new lab results to review  Imaging Studies: no new imaging studies to review  EKG, Pathology, and Other Studies: none    Counseling / Coordination of Care  Total floor / unit time spent today 30 minutes  Greater than 50% of total time was spent with the patient and / or family counseling and / or coordination of care  A description of the counseling / coordination of care: symptom management, chart review, review of discharge needs and how to meet them

## 2019-03-13 PROBLEM — R06.02 SHORTNESS OF BREATH: Status: ACTIVE | Noted: 2019-03-13

## 2019-03-13 PROCEDURE — 94760 N-INVAS EAR/PLS OXIMETRY 1: CPT | Performed by: SOCIAL WORKER

## 2019-03-13 PROCEDURE — 97116 GAIT TRAINING THERAPY: CPT

## 2019-03-13 PROCEDURE — 94760 N-INVAS EAR/PLS OXIMETRY 1: CPT

## 2019-03-13 PROCEDURE — 99232 SBSQ HOSP IP/OBS MODERATE 35: CPT | Performed by: INTERNAL MEDICINE

## 2019-03-13 PROCEDURE — 97110 THERAPEUTIC EXERCISES: CPT

## 2019-03-13 PROCEDURE — 94668 MNPJ CHEST WALL SBSQ: CPT | Performed by: SOCIAL WORKER

## 2019-03-13 PROCEDURE — 94640 AIRWAY INHALATION TREATMENT: CPT

## 2019-03-13 PROCEDURE — 94762 N-INVAS EAR/PLS OXIMTRY CONT: CPT

## 2019-03-13 PROCEDURE — 99232 SBSQ HOSP IP/OBS MODERATE 35: CPT | Performed by: FAMILY MEDICINE

## 2019-03-13 PROCEDURE — 94664 DEMO&/EVAL PT USE INHALER: CPT

## 2019-03-13 PROCEDURE — 94640 AIRWAY INHALATION TREATMENT: CPT | Performed by: SOCIAL WORKER

## 2019-03-13 PROCEDURE — 94668 MNPJ CHEST WALL SBSQ: CPT

## 2019-03-13 RX ORDER — PREDNISONE 20 MG/1
40 TABLET ORAL DAILY
Status: DISCONTINUED | OUTPATIENT
Start: 2019-03-14 | End: 2019-03-14 | Stop reason: HOSPADM

## 2019-03-13 RX ORDER — CEFUROXIME AXETIL 250 MG/1
250 TABLET ORAL EVERY 12 HOURS SCHEDULED
Status: DISCONTINUED | OUTPATIENT
Start: 2019-03-13 | End: 2019-03-14 | Stop reason: HOSPADM

## 2019-03-13 RX ORDER — OXYCODONE HYDROCHLORIDE 5 MG/1
2.5 TABLET ORAL EVERY 4 HOURS PRN
Qty: 15 TABLET | Refills: 0 | Status: SHIPPED | OUTPATIENT
Start: 2019-03-13 | End: 2019-05-14 | Stop reason: ALTCHOICE

## 2019-03-13 RX ORDER — PREDNISONE 20 MG/1
20 TABLET ORAL DAILY
Status: DISCONTINUED | OUTPATIENT
Start: 2019-03-22 | End: 2019-03-14 | Stop reason: HOSPADM

## 2019-03-13 RX ORDER — MUSCLE RUB CREAM 100; 150 MG/G; MG/G
CREAM TOPICAL 2 TIMES DAILY
Status: DISCONTINUED | OUTPATIENT
Start: 2019-03-13 | End: 2019-03-13

## 2019-03-13 RX ORDER — PREDNISONE 10 MG/1
10 TABLET ORAL DAILY
Status: DISCONTINUED | OUTPATIENT
Start: 2019-03-26 | End: 2019-03-14 | Stop reason: HOSPADM

## 2019-03-13 RX ADMIN — GUAIFENESIN 600 MG: 600 TABLET, EXTENDED RELEASE ORAL at 08:37

## 2019-03-13 RX ADMIN — LEVALBUTEROL HYDROCHLORIDE 1.25 MG: 1.25 SOLUTION, CONCENTRATE RESPIRATORY (INHALATION) at 19:59

## 2019-03-13 RX ADMIN — IPRATROPIUM BROMIDE 0.5 MG: 0.5 SOLUTION RESPIRATORY (INHALATION) at 14:35

## 2019-03-13 RX ADMIN — ENOXAPARIN SODIUM 40 MG: 40 INJECTION SUBCUTANEOUS at 08:38

## 2019-03-13 RX ADMIN — BUDESONIDE 0.5 MG: 0.5 INHALANT RESPIRATORY (INHALATION) at 19:59

## 2019-03-13 RX ADMIN — CEFTRIAXONE 1000 MG: 1 INJECTION, POWDER, FOR SOLUTION INTRAMUSCULAR; INTRAVENOUS at 10:24

## 2019-03-13 RX ADMIN — FORMOTEROL FUMARATE DIHYDRATE 20 MCG: 20 SOLUTION RESPIRATORY (INHALATION) at 07:23

## 2019-03-13 RX ADMIN — LEVALBUTEROL HYDROCHLORIDE 1.25 MG: 1.25 SOLUTION, CONCENTRATE RESPIRATORY (INHALATION) at 00:52

## 2019-03-13 RX ADMIN — FORMOTEROL FUMARATE DIHYDRATE 20 MCG: 20 SOLUTION RESPIRATORY (INHALATION) at 19:59

## 2019-03-13 RX ADMIN — LEVALBUTEROL HYDROCHLORIDE 1.25 MG: 1.25 SOLUTION, CONCENTRATE RESPIRATORY (INHALATION) at 14:35

## 2019-03-13 RX ADMIN — IPRATROPIUM BROMIDE 0.5 MG: 0.5 SOLUTION RESPIRATORY (INHALATION) at 00:52

## 2019-03-13 RX ADMIN — GUAIFENESIN 600 MG: 600 TABLET, EXTENDED RELEASE ORAL at 21:49

## 2019-03-13 RX ADMIN — BUDESONIDE 0.5 MG: 0.5 INHALANT RESPIRATORY (INHALATION) at 07:28

## 2019-03-13 RX ADMIN — LEVALBUTEROL HYDROCHLORIDE 1.25 MG: 1.25 SOLUTION, CONCENTRATE RESPIRATORY (INHALATION) at 07:23

## 2019-03-13 RX ADMIN — CEFUROXIME AXETIL 250 MG: 250 TABLET ORAL at 16:40

## 2019-03-13 RX ADMIN — METHYLPREDNISOLONE SODIUM SUCCINATE 40 MG: 40 INJECTION, POWDER, FOR SOLUTION INTRAMUSCULAR; INTRAVENOUS at 08:38

## 2019-03-13 RX ADMIN — IPRATROPIUM BROMIDE 0.5 MG: 0.5 SOLUTION RESPIRATORY (INHALATION) at 19:59

## 2019-03-13 RX ADMIN — IPRATROPIUM BROMIDE 0.5 MG: 0.5 SOLUTION RESPIRATORY (INHALATION) at 07:23

## 2019-03-13 NOTE — PHYSICAL THERAPY NOTE
Physical Therapy Tx Session:       03/13/19 1100   Pain Assessment   Pain Assessment No/denies pain   Pain Score No Pain   Restrictions/Precautions   Other Precautions Telemetry;Multiple lines;O2  (3 L NC O2)   General   Chart Reviewed Yes   Family/Caregiver Present No   Cognition   Overall Cognitive Status WFL   Arousal/Participation Cooperative;Responsive; Alert   Attention Within functional limits   Orientation Level Oriented X4   Following Commands Follows one step commands without difficulty   Subjective   Subjective pt supine in bed resting comfortably;pt willing and agreeable to work with PT and to participate in therapy intervention;"I can walk today  I haven't walked since last Friday"   Bed Mobility   Supine to Sit 6  Modified independent   Transfers   Sit to Stand 5  Supervision   Additional items Assist x 1;Bedrails;Verbal cues   Stand to Sit 5  Supervision   Additional items Assist x 1; Armrests; Verbal cues   Toilet transfer 6  Modified independent   Additional items Standard toilet   Additional Comments verbal instruction to pt concerning use and placement of chair in house following NEIL for seated rest break->pt agreed   Ambulation/Elevation   Gait pattern Narrow SCAR; Forward Flexion; Short stride   Gait Assistance 5  Supervision   Additional items Verbal cues; Assist x 1   Assistive Device Rolling walker   Distance 15 feet plus 250 feet with use of 3 L NC O2 and use of RW;no LOB noted and/or observed during upright mobility and no reports of SOB during and following mobility;SpO2:88-91% following ambulation with use of 3 L NC O2->MD aware and present during SpO2 reading and monitoring   Stair Management Assistance 5  Supervision   Additional items Assist x 1;Verbal cues   Stair Management Technique One rail R;Alternating pattern; Foreward;Reciprocal   Number of Stairs 7  (one seated rest break on step following ascending)   Balance   Static Sitting Good   Dynamic Sitting Fair +   Static Standing Fair Dynamic Standing Fair   Ambulatory Fair   Endurance Deficit   Endurance Deficit Yes   Endurance Deficit Description use of 3 L NC O2,minimal MARCOS following stair training needing seated rest break   Activity Tolerance   Activity Tolerance Patient limited by fatigue  (good)   Nurse Made Aware yes Nestor Suarez)   Exercises   Hip Flexion Sitting;15 reps;AROM; Bilateral   Hip Abduction Sitting;15 reps;AROM; Bilateral   Hip Adduction Sitting;15 reps;AROM; Bilateral   Knee AROM Long Arc Quad Sitting;15 reps;AROM; Bilateral   Ankle Pumps Sitting;20 reps;AROM; Bilateral   Assessment   Prognosis Good   Problem List Impaired balance;Decreased mobility; Decreased skin integrity   Assessment Pt able to ambulate 15 feet plus 250 feet with use of RW and 3 L NC O2 on tile and hardwood lynn needing S level of A  No LOB noted and/or observed during mobility and pt reports no SOB during and following gait training  Following ascending of steps, pt needed one seated rest break lasting 2-3 mins 2* SOB and fatigue  Pt able to go up and down 7 steps with use of single rail S level of A  Verbal instruction given to pt concerning use of chair at home following NEIL for rest, pt agreed  Pt able to perform and complete BLE ther ex HEP in sitting posture following mobility AROM  Pt able to perform and complete BM mod (I),sit to stand transfers to and from low toilet seat mod (I)  Pt would cont to benefit from skilled inpt PT services for energy conservation,to improve and inc activity tolerance and endurance and for balance  Barriers to Discharge Inaccessible home environment  (pt reports sister will be staying with pt upon D/C,[place])   Barriers to Discharge Comments pt reports first foor setup available with bed   Goals   Patient Goals to walk more   STG Expiration Date 03/21/19   Treatment Day 2   Plan   Treatment/Interventions Functional transfer training;LE strengthening/ROM; Elevations; Therapeutic exercise; Endurance training;Patient/family training;Equipment eval/education; Bed mobility;Gait training;Spoke to nursing   Progress Progressing toward goals   PT Frequency Other (Comment)  (3-5x/week;restorative therapy aide for mobility)   Recommendation   Recommendation Home PT; Home with family support  (needs RW for mobility upon D/C)   Equipment Recommended Walker  (cont use of RW for mobility)

## 2019-03-13 NOTE — ASSESSMENT & PLAN NOTE
· Status post chemotherapy and radiation 2018     ·  In remission, follows with Dr Tamara Chen  · Follow-up as an outpatient

## 2019-03-13 NOTE — PROGRESS NOTES
Progress Note - Anushka Bravo 1945, 72 y o  female MRN: [id number]    Unit/Bed#: [facility] 916-01 Encounter: [id number]    Primary Care Provider: Shamar Lopez DO   Date and time admitted to hospital: 3/7/2019  5:52 PM        * Respiratory distress  Assessment & Plan  · Present on admission, appears in setting of acute bronchitis/other viral URI with potential exacerbation of COPD  · Improved with nebulizers, Solu-Medrol, azithromycin in ED but not at baseline per patient  · Will continue nebulizer therapy, Respiratory protocol, currently on p o  Prednisone  · Follow-up results of influenza/RSV PCR negative   · Blood culture remained negative  · Incentive spirometry, titrate O2 to SaO2 > 90%  · Patient is currently on 3 L nasal cannula  ·     Goals of care, counseling/discussion  Assessment & Plan  Oxycodone p r n  Palliative Care input noted    Acute on chronic respiratory failure with hypoxia [facility])  Assessment & Plan  Improving  Continue supplemental oxygen  Currently on 3 L nasal cannula  Wean supplemental oxygen as tolerated to keep O2 sats more than 88%      COPD exacerbation (HCC)  Assessment & Plan  With acute exacerbation due to acute tracheobronchitis  Starting on po prednisone tomorrow  Crespiratory treatment  Discontinue azithromycin  Sepsis present at the time of admission as evidenced by fever tachycardia tachypnea with elevated procalcitonin likely secondary to tracheobronchitis    Nicotine dependence  Assessment & Plan  Cessation advised    Chronic hypoxemic respiratory failure (HCC)  Assessment & Plan  · On 2 L nasal cannula chronically  · Currently on nasal cannula 3L-wean off of the oxygen as tolerated    Malignant neoplasm of right lung ([address] )  Assessment & Plan  · Status post chemotherapy and radiation 2018     ·  In remission, follows with Dr Lauren June  · Follow-up as an outpatient      VTE Pharmacologic Prophylaxis:   Pharmacologic: Enoxaparin (Lovenox)  Mechanical VTE Prophylaxis in Place: Yes    Patient Centered Rounds: I have performed bedside rounds with nursing staff today  Discussions with Specialists or Other Care Team Provider:     Education and Discussions with Family / Patient:  Patient and sister    Time Spent for Care: 30 minutes  More than 50% of total time spent on counseling and coordination of care as described above  Current Length of Stay: 6 day(s)    Current Patient Status: Inpatient   Certification Statement:  Patient need continued inpatient stay due to COPD exacerbation with transition to p o  Prednisone    Discharge Plan:     Code Status: Level 3 - DNAR and DNI      Subjective:   Patient seen and examined  Feeling better  On 3 L nasal cannula  Patient did well with the physical therapy it appears that the patient can be discharged home    Objective:     Vitals:   Temp (24hrs), Av 2 °F (36 2 °C), Min:96 4 °F (35 8 °C), Max:97 9 °F (36 6 °C)    Temp:  [96 4 °F (35 8 °C)-97 9 °F (36 6 °C)] 97 9 °F (36 6 °C)  HR:  [100-102] 100  Resp:  [18-20] 18  BP: (110-125)/(51-62) 110/51  SpO2:  [91 %-99 %] 99 %  Body mass index is 15 04 kg/m²  Input and Output Summary (last 24 hours): Intake/Output Summary (Last 24 hours) at 3/13/2019 194  Last data filed at 3/13/2019 1820  Gross per 24 hour   Intake 810 ml   Output 1400 ml   Net -590 ml       Physical Exam:     Physical Exam   Constitutional: No distress  HENT:   Head: Normocephalic and atraumatic  Eyes: Pupils are equal, round, and reactive to light  Cardiovascular: Regular rhythm  No murmur heard  Pulmonary/Chest: Effort normal  No respiratory distress  Decreased breath sounds bilateral   Abdominal: Soft  Bowel sounds are normal  She exhibits no distension  Musculoskeletal: She exhibits no edema  Neurological: No cranial nerve deficit   Coordination normal        Additional Data:     Labs:    Results from last 7 days   Lab Units 03/10/19  0502   WBC Thousand/uL 15 24*   HEMOGLOBIN g/dL 10 5* HEMATOCRIT % 34 3*   PLATELETS Thousands/uL 284   NEUTROS PCT % 93*   LYMPHS PCT % 2*   MONOS PCT % 4   EOS PCT % 0     Results from last 7 days   Lab Units 03/08/19  0521 03/07/19  1701   POTASSIUM mmol/L 4 2 4 7   CHLORIDE mmol/L 103 102   CO2 mmol/L 28 22   BUN mg/dL 13 16   CREATININE mg/dL 0 53* 0 70   CALCIUM mg/dL 8 4 9 0   ALK PHOS U/L  --  124*   ALT U/L  --  16   AST U/L  --  23     Results from last 7 days   Lab Units 03/07/19  1701   INR  1 03       * I Have Reviewed All Lab Data Listed Above  * Additional Pertinent Lab Tests Reviewed: [address] Admission Reviewed    Imaging:    Imaging Reports Reviewed Today Include:   Imaging Personally Reviewed by Myself Includes:     Recent Cultures (last 7 days):     Results from last 7 days   Lab Units 03/12/19  1216 03/07/19  1856 03/07/19  1814 03/07/19  1701   BLOOD CULTURE   --   --  No Growth After 5 Days  No Growth After 5 Days     SPUTUM CULTURE  Culture too young- will reincubate  --   --   --    GRAM STAIN RESULT  1+ Polys*  1+ Epithelial Cells*  1+ Gram positive cocci in clusters*  1+ Gram negative rods*  Rare Budding Yeast with Pseudomycelia*  --   --   --    INFLUENZA B PCR   --  Not Detected  --   --    RSV PCR   --  Not Detected  --   --        Last 24 Hours Medication List:     Current Facility-Administered Medications:  albuterol 2 puff Inhalation Q4H PRN Peañ Mc MD   budesonide 0 5 mg Nebulization Q12H Peña Mc MD   cefuroxime 250 mg Oral Q12H 401 Dina Juarez MD   cholecalciferol 2,000 Units Oral Daily Peña Mc MD   enoxaparin 40 mg Subcutaneous Daily Peña Mc MD   formoterol 20 mcg Nebulization Q12H Antonio Montana DO   guaiFENesin 600 mg Oral Q12H [address] ONI Vazquez   ipratropium 0 5 mg Nebulization Q6H Peña Mc MD   levalbuterol 1 25 mg Nebulization Q6H Peña Mc MD   multivitamin-minerals 1 tablet Oral Daily Peña Mc MD   oxyCODONE 2 5 mg Oral Q4H ROMERO Min MD   [START ON 3/14/2019] predniSONE 40 mg Oral Daily Gilman Duverney, CRNP   Followed by       Rabia Velasco ON 3/18/2019] predniSONE 30 mg Oral Daily Gilman Duverney, CRNP   Followed by       Rabia Velasco ON 3/22/2019] predniSONE 20 mg Oral Daily Gilman Duverney, CRNP   Followed by       Rabia Velasco ON 3/26/2019] predniSONE 10 mg Oral Daily Gilman Duverney, CRNP        Today, Patient Was Seen By: Vidhi Olvera MD    ** Please Note: Dictation voice to text software may have been used in the creation of this document   **

## 2019-03-13 NOTE — ASSESSMENT & PLAN NOTE
· Present on admission, appears in setting of acute bronchitis/other viral URI with potential exacerbation of COPD  · Improved with nebulizers, Solu-Medrol, azithromycin in ED but not at baseline per patient  · Will continue nebulizer therapy, Respiratory protocol, currently on p o   Prednisone  · Follow-up results of influenza/RSV PCR negative   · Blood culture remained negative  · Incentive spirometry, titrate O2 to SaO2 > 90%  · Patient is currently on 3 L nasal cannula  ·

## 2019-03-13 NOTE — ASSESSMENT & PLAN NOTE
· On 2 L nasal cannula chronically  · Currently on nasal cannula 3L-wean off of the oxygen as tolerated

## 2019-03-13 NOTE — PLAN OF CARE
Problem: PHYSICAL THERAPY ADULT  Goal: Performs mobility at highest level of function for planned discharge setting  See evaluation for individualized goals  Description  Treatment/Interventions: Functional transfer training, LE strengthening/ROM, Therapeutic exercise, Endurance training, Bed mobility, Gait training, Spoke to nursing, OT, Spoke to case management  Equipment Recommended: Rachael Cerda       See flowsheet documentation for full assessment, interventions and recommendations  Outcome: Progressing  Note:   Prognosis: Good  Problem List: Impaired balance, Decreased mobility, Decreased skin integrity  Assessment: Pt able to ambulate 15 feet plus 250 feet with use of RW and 3 L NC O2 on tile and hardwood lynn needing S level of A  No LOB noted and/or observed during mobility and pt reports no SOB during and following gait training  Following ascending of steps, pt needed one seated rest break lasting 2-3 mins 2* SOB and fatigue  Pt able to go up and down 7 steps with use of single rail S level of A  Verbal instruction given to pt concerning use of chair at home following NEIL for rest, pt agreed  Pt able to perform and complete BLE ther ex HEP in sitting posture following mobility AROM  Pt able to perform and complete BM mod (I),sit to stand transfers to and from low toilet seat mod (I)  Pt would cont to benefit from skilled inpt PT services for energy conservation,to improve and inc activity tolerance and endurance and for balance  Barriers to Discharge: Inaccessible home environment(pt reports sister will be staying with pt upon D/C,NEIL,[address] [place] [address])  Barriers to Discharge Comments: pt reports first foor setup available with bed  Recommendation: Home PT, Home with family support(needs RW for mobility upon D/C)     PT - OK to Discharge: (S) No(Home with home PT pending stair trial)    See flowsheet documentation for full assessment

## 2019-03-13 NOTE — PROGRESS NOTES
Progress note - Palliative and Supportive Care   Jennifer Nolan 72 y o  female [id number]    Assessment:   - A/C hypoxic respiratory failure   - Oxygen dependent at baseline   - Severe COPD w/acute exacerbation   - Stage IV lung cancer   - Severe protein calorie malnutrition    Plan:  1  Symptom management - Per pulmonary, patient is being transitioned to oral steroids and antibiotics  - PRN oxycodone is available to patient for increased work of breathing    - Discussed nutritional status with patient  She is receiving supplemental nutrition through [facility]   Encouraged patient to drink two a day to improve her nutrition  2  Goals - Patient seen by PT today, plan is to be discharged to home w/ home PT      - Patient has list of potential outpatient caregiving agencies  - Patient's sister and cousin will assist with providing and arranging help for patient and her parents after discharge  - Tentative plan for discharge tomorrow Thursday 3/14  Code Status: DNR/DNI - Level 3   Advance Directive / Living Will / POLST:  On file    Interval history:       Nursing reports no events overnight  Patient sitting comfortably in chair during visit  She stated she walked and did stairs with PT today  Patient understands plan is now to return home with home PT  Patient is down to 3L O2  Patient denied any episodes of increased work of breathing          MEDICATIONS / ALLERGIES:     all current active meds have been reviewed, current meds:   Current Facility-Administered Medications   Medication Dose Route Frequency    albuterol (PROVENTIL HFA,VENTOLIN HFA) inhaler 2 puff  2 puff Inhalation Q4H PRN    budesonide (PULMICORT) inhalation solution 0 5 mg  0 5 mg Nebulization Q12H    cefuroxime (CEFTIN) tablet 250 mg  250 mg Oral Q12H [address]    cholecalciferol (VITAMIN D3) tablet 2,000 Units  2,000 Units Oral Daily    enoxaparin (LOVENOX) subcutaneous injection 40 mg  40 mg Subcutaneous Daily    formoterol (PERFOROMIST) nebulizer solution 20 mcg  20 mcg Nebulization Q12H    guaiFENesin (MUCINEX) 12 hr tablet 600 mg  600 mg Oral Q12H CHANDLER    ipratropium (ATROVENT) 0 02 % inhalation solution 0 5 mg  0 5 mg Nebulization Q6H    levalbuterol (XOPENEX) inhalation solution 1 25 mg  1 25 mg Nebulization Q6H    multivitamin-minerals (CENTRUM) tablet 1 tablet  1 tablet Oral Daily    oxyCODONE (ROXICODONE) IR tablet 2 5 mg  2 5 mg Oral Q4H PRN    [START ON 3/14/2019] predniSONE tablet 40 mg  40 mg Oral Daily    Followed by   Elin Hallman ON 3/18/2019] predniSONE tablet 30 mg  30 mg Oral Daily    Followed by   Elin Hallman ON 3/22/2019] predniSONE tablet 20 mg  20 mg Oral Daily    Followed by   Elin Hallman ON 3/26/2019] predniSONE tablet 10 mg  10 mg Oral Daily    and PTA meds:   Prior to Admission Medications   Prescriptions Last Dose Informant Patient Reported? Taking? Cholecalciferol (VITAMIN D) 2000 UNITS tablet  Self Yes No   Sig: Take 2,000 Units by mouth daily  Multiple Vitamins-Minerals (MULTIVITAMIN WITH MINERALS) tablet  Self Yes No   Sig: Take 1 tablet by mouth daily     albuterol (VENTOLIN HFA) 90 mcg/act inhaler  Self Yes No   Sig: Inhale   arformoterol (BROVANA) 15 mcg/2 mL nebulizer solution  Self No No   Sig: Take 1 vial (15 mcg total) by nebulization 2 (two) times a day   budesonide (PULMICORT) 0 25 mg/2 mL nebulizer solution  Self No No   Sig: Take 1 vial (0 25 mg total) by nebulization 2 (two) times a day   budesonide (PULMICORT) 0 5 mg/2 mL nebulizer solution  Self No No   Sig: USE 1 UNIT DOSE VIA NEBULIZER TWO TIMES A DAY   ipratropium (ATROVENT) 0 02 % nebulizer solution  Self Yes No   Sig: Take 0 5 mg by nebulization 3 (three) times a day     prochlorperazine (COMPAZINE) 10 mg tablet  Self Yes No   Sig: Take 1 tablet by mouth every 6 (six) hours as needed      Facility-Administered Medications: None       No Known Allergies    OBJECTIVE:    Physical Exam  Physical Exam   Constitutional: She is oriented to person, place, and time  She appears cachectic  No distress  HENT:   Head: Normocephalic and atraumatic  Eyes: Pupils are equal, round, and reactive to light  Conjunctivae and EOM are normal  Right eye exhibits no discharge  Left eye exhibits no discharge  Neck: Normal range of motion  No JVD present  No tracheal deviation present  Cardiovascular: Regular rhythm  Tachycardia present  No murmur heard  Pulmonary/Chest: No respiratory distress  Patient on 3L O2  Breath sounds diminished bilaterally  Abdominal: Soft  Bowel sounds are normal    Musculoskeletal: Normal range of motion  She exhibits no edema  Neurological: She is alert and oriented to person, place, and time  Skin: Skin is warm and dry  Psychiatric: She has a normal mood and affect  Her speech is normal and behavior is normal  Cognition and memory are normal        Lab Results: Preliminary sputum culture results personally reviewed  Imaging Studies: No new imaging to review  EKG, Pathology, and Other Studies: None to review    Counseling / Coordination of Care  Total floor / unit time spent today 30 minutes  Greater than 50% of total time was spent with the patient and / or family counseling and / or coordination of care  A description of the counseling / coordination of care: chart review, symptom management and discussion of goals

## 2019-03-13 NOTE — RESTORATIVE TECHNICIAN NOTE
Restorative Specialist Mobility Note       Activity: Ambulate in [place], Ambulate in room, Bathroom privileges, Chair, Dangle, Stand at bedside(Educated/encouraged pt to ambulate with assistance 3-4 x's/day  Bed alarm on   Pt callbell, phone/tray within reach )     Assistive Device: Front wheel walker(NC O2 on 3L)          Corina MARTIN, Restorative Technician, [employer]

## 2019-03-13 NOTE — ASSESSMENT & PLAN NOTE
Improving  Continue supplemental oxygen  Currently on 3 L nasal cannula  Wean supplemental oxygen as tolerated to keep O2 sats more than 88%

## 2019-03-13 NOTE — SOCIAL WORK
Cm reviewed patient during care coordination rounds  Patient is anticiapted for d/c tomorrow to return home  Patient's family is asking about hospital bed, and per discussion with physical therapy, patient is not being recommended for hospital bed  Cm will confirm with patient about need for bedside commode  Per patient she does not want a hospital bed, but in agreement with a bedside commode and RW  Cm placed referral  Patient in agreemeent with CM contacting patient's relativeRolan to discuss discharge recommendation  Cm confirmed the need for DME, and that [facility] in on board for [place] [id number] services  Patient is anticipated for d/c tomorrow  Family to provide transport around 1pm  Cm awaiting finale medical clearance

## 2019-03-13 NOTE — PROGRESS NOTES
Progress Note - Pulmonary   Nico Efrain 72 y o  female MRN: [id number]  [facility] Encounter: [id number]    Assessment/Plan:      1  Acute on chronic hypoxic respiratory failure (improving) likely secondary COPD exacerbation in the setting of viral vs bacterial bronchitis vs CAP       *  patient is down to 3 L at 92%-  will continue titrate accordingly keep sats greater than 88%        *  please obtain ambulatory pulse ox        *  continue to ambulate, and cough, IS, flutter    2  Severe COPD with acute exacerbation (resolving) likely secondary to acute infection       *  patient's wheezing has significantly improved, patient continues to have bilateral decreased breath sounds       *  will transition to p o  Prednisone 40 mg Q 4 days, 30 mg Q 4 days, 20 mg Q 4 days, 10 mg Q 40       *    continue home regimen on discharge  Brovana 15 mcg nebulizer b i d , budesonide 0 5 mg nebulizer b i d , ipratropium nebulizer q i d         *  palliative consult-p r n  Oxycodone    3  Abnormal CT of the chest- suspected CAP vs URI      *  bilateral lower lobe bronchial wall thickening       * azithromycin completed, Rocephin day # 6/7    4  History of stage IV adenocarcinoma (2017) (chemo therapy and radiation completed in February 2018)      *  will need repeat CT months due to more prominent right lower lobe opacity       *  patient follows with Dr Jair Christensen    Chief Complaint:    "I am feeling much better"    Subjective:    Amrita Lua was comfortably sitting in her bed  She reports her respiratory status has significantly improved  She states that she said no significant overnight events and she feels she is able to ambulate more easily  She currently reports right ear discomfort  She denies any fever, chills, hemoptysis, wheezing, headache, or pleuritic chest pain  Objective:    Vitals: Blood pressure 110/51, pulse 100, temperature 97 9 °F (36 6 °C), resp   rate 18, height 5' 4" (1 626 m), weight 39 7 kg (87 lb 9 6 oz), SpO2 91 %  3L,Body mass index is 15 04 kg/m²  Intake/Output Summary (Last 24 hours) at 3/13/2019 0906  Last data filed at 3/13/2019 0725  Gross per 24 hour   Intake 360 ml   Output 950 ml   Net -590 ml       Invasive Devices     Peripheral Intravenous Line            Peripheral IV 03/12/19 Left Forearm less than 1 day                Physical Exam:   Physical Exam   Constitutional: She is oriented to person, place, and time  She appears cachectic  No distress  She is not intubated  HENT:   Head: Normocephalic and atraumatic  Neck: Normal range of motion  Neck supple  No JVD present  No tracheal deviation present  Cardiovascular: Normal rate, regular rhythm and normal heart sounds  Exam reveals no gallop and no friction rub  No murmur heard  Pulmonary/Chest: No stridor  No tachypnea and no bradypnea  She is not intubated  No respiratory distress  She has decreased breath sounds in the right upper field, the right middle field, the right lower field, the left upper field, the left middle field and the left lower field  She has no wheezes  She has no rhonchi  She has no rales  She exhibits no tenderness  Mild dyspnea upon exertion   Abdominal: Soft  Bowel sounds are normal  She exhibits no distension  There is no tenderness  Musculoskeletal: Normal range of motion  She exhibits no edema or deformity  Neurological: She is alert and oriented to person, place, and time  Skin: Skin is warm and dry  She is not diaphoretic  Psychiatric: She has a normal mood and affect  Her behavior is normal        Labs:  I have personally reviewed pertinent lab results 3/12/19    Imaging and other studies: I have personally reviewed pertinent films in PACS     CTA chest 03/07/2019- bilateral lower lobe bronchial wall thickening with stable emphysematous changes, more prominent right lower lobe opacity

## 2019-03-13 NOTE — ASSESSMENT & PLAN NOTE
With acute exacerbation due to acute tracheobronchitis  Starting on po prednisone tomorrow  Crespiratory treatment  Discontinue azithromycin  Sepsis present at the time of admission as evidenced by fever tachycardia tachypnea with elevated procalcitonin likely secondary to tracheobronchitis

## 2019-03-14 VITALS
HEART RATE: 112 BPM | RESPIRATION RATE: 19 BRPM | TEMPERATURE: 98.6 F | DIASTOLIC BLOOD PRESSURE: 64 MMHG | HEIGHT: 64 IN | BODY MASS INDEX: 14.95 KG/M2 | SYSTOLIC BLOOD PRESSURE: 122 MMHG | WEIGHT: 87.6 LBS | OXYGEN SATURATION: 94 %

## 2019-03-14 LAB
BACTERIA SPT RESP CULT: ABNORMAL
BACTERIA SPT RESP CULT: ABNORMAL
GRAM STN SPEC: ABNORMAL

## 2019-03-14 PROCEDURE — 94640 AIRWAY INHALATION TREATMENT: CPT

## 2019-03-14 PROCEDURE — 94668 MNPJ CHEST WALL SBSQ: CPT

## 2019-03-14 PROCEDURE — 99239 HOSP IP/OBS DSCHRG MGMT >30: CPT | Performed by: FAMILY MEDICINE

## 2019-03-14 PROCEDURE — 94760 N-INVAS EAR/PLS OXIMETRY 1: CPT

## 2019-03-14 RX ORDER — ALBUTEROL SULFATE 90 UG/1
2 AEROSOL, METERED RESPIRATORY (INHALATION) EVERY 4 HOURS PRN
Qty: 1 INHALER | Refills: 0 | Status: SHIPPED | OUTPATIENT
Start: 2019-03-14

## 2019-03-14 RX ORDER — PREDNISONE 10 MG/1
TABLET ORAL
Qty: 36 TABLET | Refills: 0 | Status: SHIPPED | OUTPATIENT
Start: 2019-03-14 | End: 2019-05-14 | Stop reason: ALTCHOICE

## 2019-03-14 RX ADMIN — LEVALBUTEROL HYDROCHLORIDE 1.25 MG: 1.25 SOLUTION, CONCENTRATE RESPIRATORY (INHALATION) at 00:19

## 2019-03-14 RX ADMIN — IPRATROPIUM BROMIDE 0.5 MG: 0.5 SOLUTION RESPIRATORY (INHALATION) at 00:19

## 2019-03-14 RX ADMIN — FORMOTEROL FUMARATE DIHYDRATE 20 MCG: 20 SOLUTION RESPIRATORY (INHALATION) at 07:06

## 2019-03-14 RX ADMIN — PREDNISONE 40 MG: 20 TABLET ORAL at 08:57

## 2019-03-14 RX ADMIN — LEVALBUTEROL HYDROCHLORIDE 1.25 MG: 1.25 SOLUTION, CONCENTRATE RESPIRATORY (INHALATION) at 12:57

## 2019-03-14 RX ADMIN — BUDESONIDE 0.5 MG: 0.5 INHALANT RESPIRATORY (INHALATION) at 07:06

## 2019-03-14 RX ADMIN — ENOXAPARIN SODIUM 40 MG: 40 INJECTION SUBCUTANEOUS at 08:56

## 2019-03-14 RX ADMIN — LEVALBUTEROL HYDROCHLORIDE 1.25 MG: 1.25 SOLUTION, CONCENTRATE RESPIRATORY (INHALATION) at 07:06

## 2019-03-14 RX ADMIN — GUAIFENESIN 600 MG: 600 TABLET, EXTENDED RELEASE ORAL at 08:57

## 2019-03-14 RX ADMIN — IPRATROPIUM BROMIDE 0.5 MG: 0.5 SOLUTION RESPIRATORY (INHALATION) at 07:06

## 2019-03-14 RX ADMIN — CEFUROXIME AXETIL 250 MG: 250 TABLET ORAL at 05:06

## 2019-03-14 RX ADMIN — IPRATROPIUM BROMIDE 0.5 MG: 0.5 SOLUTION RESPIRATORY (INHALATION) at 12:57

## 2019-03-14 NOTE — PLAN OF CARE
Problem: PAIN - ADULT  Goal: Verbalizes/displays adequate comfort level or baseline comfort level  Description  Interventions:  - Encourage patient to monitor pain and request assistance  - Assess pain using appropriate pain scale  - Administer analgesics based on type and severity of pain and evaluate response  - Implement non-pharmacological measures as appropriate and evaluate response  - Consider cultural and social influences on pain and pain management  - Notify physician/advanced practitioner if interventions unsuccessful or patient reports new pain  Outcome: Progressing     Problem: INFECTION - ADULT  Goal: Absence or prevention of progression during hospitalization  Description  INTERVENTIONS:  - Assess and monitor for signs and symptoms of infection  - Monitor lab/diagnostic results  - Monitor all insertion sites, i e  indwelling lines, tubes, and drains  - Monitor endotracheal (as able) and nasal secretions for changes in amount and color  - [place] appropriate cooling/warming therapies per order  - Administer medications as ordered  - Instruct and encourage patient and family to use good hand hygiene technique  - Identify and instruct in appropriate isolation precautions for identified infection/condition  Outcome: Progressing     Problem: SAFETY ADULT  Goal: Patient will remain free of falls  Description  INTERVENTIONS:  - Assess patient frequently for physical needs  -  Identify cognitive and physical deficits and behaviors that affect risk of falls    -  [place] fall precautions as indicated by assessment   - Educate patient/family on patient safety including physical limitations  - Instruct patient to call for assistance with activity based on assessment  - Modify environment to reduce risk of injury  - Consider OT/PT consult to assist with strengthening/mobility  Outcome: Progressing  Goal: Maintain or return to baseline ADL function  Description  INTERVENTIONS:  -  Assess patient's ability to carry out ADLs; assess patient's baseline for ADL function and identify physical deficits which impact ability to perform ADLs (bathing, care of mouth/teeth, toileting, grooming, dressing, etc )  - Assess/evaluate cause of self-care deficits   - Assess range of motion  - Assess patient's mobility; develop plan if impaired  - Assess patient's need for assistive devices and provide as appropriate  - Encourage maximum independence but intervene and supervise when necessary  ¯ Involve family in performance of ADLs  ¯ Assess for home care needs following discharge   ¯ Request OT consult to assist with ADL evaluation and planning for discharge  ¯ Provide patient education as appropriate  Outcome: Progressing  Goal: Maintain or return mobility status to optimal level  Description  INTERVENTIONS:  - Assess patient's baseline mobility status (ambulation, transfers, stairs, etc )    - Identify cognitive and physical deficits and behaviors that affect mobility  - Identify mobility aids required to assist with transfers and/or ambulation (gait belt, sit-to-stand, lift, walker, cane, etc )  - [place] fall precautions as indicated by assessment  - Record patient progress and toleration of activity level on Mobility SBAR; progress patient to next Phase/Stage  - Instruct patient to call for assistance with activity based on assessment  - Request Rehabilitation consult to assist with strengthening/weightbearing, etc   Outcome: Progressing     Problem: Knowledge Deficit  Goal: Patient/family/caregiver demonstrates understanding of disease process, treatment plan, medications, and discharge instructions  Description  Complete learning assessment and assess knowledge base    Interventions:  - Provide teaching at level of understanding  - Provide teaching via preferred learning methods  Outcome: Progressing     Problem: Nutrition/Hydration-ADULT  Goal: Nutrient/Hydration intake appropriate for improving, restoring or maintaining nutritional needs  Description  Monitor and assess patient's nutrition/hydration status for malnutrition (ex- brittle hair, bruises, dry skin, pale skin and conjunctiva, muscle wasting, smooth red tongue, and disorientation)  Collaborate with interdisciplinary team and initiate plan and interventions as ordered  Monitor patient's weight and dietary intake as ordered or per policy  Utilize nutrition screening tool and intervene per policy  Determine patient's food preferences and provide high-protein, high-caloric foods as appropriate  INTERVENTIONS:  - Monitor oral intake, urinary output, labs, and treatment plans  - Assess nutrition and hydration status and recommend course of action  - Evaluate amount of meals eaten  - Assist patient with eating if necessary   - Allow adequate time for meals  - Recommend/ encourage appropriate diets, oral nutritional supplements, and vitamin/mineral supplements  - Order, calculate, and assess calorie counts as needed  - Recommend, monitor, and adjust tube feedings and TPN/PPN based on assessed needs  - Assess need for intravenous fluids  - Provide specific nutrition/hydration education as appropriate  - Include patient/family/caregiver in decisions related to nutrition  Outcome: Progressing     Problem: Potential for Falls  Goal: Patient will remain free of falls  Description  INTERVENTIONS:  - Assess patient frequently for physical needs  -  Identify cognitive and physical deficits and behaviors that affect risk of falls    -  [place] fall precautions as indicated by assessment   - Educate patient/family on patient safety including physical limitations  - Instruct patient to call for assistance with activity based on assessment  - Modify environment to reduce risk of injury  - Consider OT/PT consult to assist with strengthening/mobility  Outcome: Progressing     Problem: Prexisting or High Potential for Compromised Skin Integrity  Goal: Skin integrity is maintained or improved  Description  INTERVENTIONS:  - Identify patients at risk for skin breakdown  - Assess and monitor skin integrity  - Assess and monitor nutrition and hydration status  - Monitor labs (i e  albumin)  - Assess for incontinence   - Turn and reposition patient  - Assist with mobility/ambulation  - Relieve pressure over bony prominences  - Avoid friction and shearing  - Provide appropriate hygiene as needed including keeping skin clean and dry  - Evaluate need for skin moisturizer/barrier cream  - Collaborate with interdisciplinary team (i e  Nutrition, Rehabilitation, etc )   - Patient/family teaching  Outcome: Progressing

## 2019-03-15 ENCOUNTER — TRANSITIONAL CARE MANAGEMENT (OUTPATIENT)
Dept: INTERNAL MEDICINE CLINIC | Facility: CLINIC | Age: 74
End: 2019-03-15

## 2019-03-17 NOTE — DISCHARGE SUMMARY
Discharge Summary - [facility] Internal Medicine    Patient Information: Elmer Gonzales 72 y o  female MRN: [id number]  Unit/Bed#: [facility] 916-01 Encounter: [id number]    Discharging Physician / Practitioner: Rice Jeans, MD  PCP: Robel Ching DO  Admission Date: 3/7/2019  Discharge Date:  3/14/19  Disposition:     Home    Reason for Admission:  COPD exacerbation    Discharge Diagnoses:     Principal Problem:    Respiratory distress  Active Problems:    Malignant neoplasm of right lung (HCC)    Chronic hypoxemic respiratory failure (HCC)    Nicotine dependence    COPD exacerbation ([address] )    Acute on chronic respiratory failure with hypoxia ([address] )    Goals of care, counseling/discussion    Shortness of breath  Resolved Problems:    * No resolved hospital problems  *      Consultations During Hospital Stay:  · Pulmonology  · Palliative care    Procedures Performed:     · Chest x-ray-emphysema no acute pulmonary abnormality  · Chest CT-mild central and bilateral lower lobe bronchial wall thickening in keeping with nonspecific bronchitis  Stable severe emphysematous changes slightly more prominent posterior right lower lobe opacity which needed short-term follow-up with CT or tissue sampling    Significant Findings / Test Results: Incidental Findings:   ·     Test Results Pending at Discharge (will require follow up):   ·      Outpatient Tests Requested:  · Repeat CT in 3 months    Complications:  none    Hospital Course:     Elmer Gonzales is a 72 y o  female patient who originally presented to the hospital on 3/7/2019 due to worsening of shortness of breath  Patient with history of his COPD severe emphysema is followed as an outpatient by pulmonology  Patient reported that recently she was in [place] visiting her family  Patient was diagnosed with COPD a exacerbation at the time of admission and started on IV steroids    Patient was in acute hypoxic respiratory failure requiring high-flow oxygen at the time of admission  But her oxygen requirement improved and she was able to be weaned down to nasal cannula  At home patient uses 2 L nasal cannula and at the time of discharge the patient is on 3 L nasal cannula  This can be weaned down as tolerated as an outpatient by visiting nurses or by pulmonology  Patient had a CT scan which was negative for any PE  Patient with known history of lung cancer and followed by Hematology Oncology as an outpatient  Patient respiratory status improved  Patient was also started on antibiotics for possible bronchitis which she finished while in the hospital   Her sputum culture was growing dot respiratory hunter  Patient was evaluated by PT OT and recommended home with  VNA  Patient was discharged in a stable condition with tapering dose of steroids on 3/7/2018 19  Patient need outpatient appointment with pulmonology  Patient me need a repeat CT scan in about 3 months as an outpatient to evaluate for the opacity  Seen on the CT scan  Condition at Discharge: good     Discharge Day Visit / Exam:     Subjective:  Patient seen and examined  Patient reported that she is feeling better  Oxygen requirement is down to 3 L nasal cannula  Able to use the portable concentrator   Vitals: Blood Pressure: 122/64 (03/14/19 0655)  Pulse: (!) 112 (03/14/19 0655)  Temperature: 98 6 °F (37 °C) (03/14/19 0655)  Temp Source: Oral (03/14/19 0655)  Respirations: 19 (03/14/19 0655)  Height: 5' 4" (162 6 cm) (03/07/19 1642)  Weight - Scale: 39 7 kg (87 lb 9 6 oz) (03/07/19 1642)  SpO2: 94 % (03/14/19 0725)  Exam:   Physical Exam   Constitutional:   [place] appearing female   HENT:   Head: Normocephalic and atraumatic  Eyes: Pupils are equal, round, and reactive to light  EOM are normal    Neck: Normal range of motion  Neck supple  Cardiovascular: Normal rate and regular rhythm  No murmur heard  Pulmonary/Chest: No stridor  No respiratory distress  She has no wheezes     Decreased breath sounds bilateral   Abdominal: Soft  Bowel sounds are normal  She exhibits no distension  There is no tenderness  Musculoskeletal: Normal range of motion  She exhibits no edema  Neurological: She is alert  Skin: Skin is warm  Discussion with Family:  Family updated in the room    Discharge instructions/Information to patient and family:   See after visit summary for information provided to patient and family  Provisions for Follow-Up Care:  See after visit summary for information related to follow-up care and any pertinent home health orders  Planned Readmission: none     Discharge Statement:  I spent 45 minutes discharging the patient  This time was spent on the day of discharge  I had direct contact with the patient on the day of discharge  Greater than 50% of the total time was spent examining patient, answering all patient questions, arranging and discussing plan of care with patient as well as directly providing post-discharge instructions  Additional time then spent on discharge activities  Discharge Medications:  See after visit summary for reconciled discharge medications provided to patient and family        ** Please Note: This note has been constructed using a voice recognition system **

## 2019-03-19 ENCOUNTER — TELEPHONE (OUTPATIENT)
Dept: HEMATOLOGY ONCOLOGY | Facility: CLINIC | Age: 74
End: 2019-03-19

## 2019-03-19 DIAGNOSIS — D50.0 IRON DEFICIENCY ANEMIA SECONDARY TO BLOOD LOSS (CHRONIC): ICD-10-CM

## 2019-03-19 DIAGNOSIS — N18.30 CHRONIC KIDNEY DISEASE, STAGE III (MODERATE) (HCC): Primary | ICD-10-CM

## 2019-03-19 NOTE — TELEPHONE ENCOUNTER
Monique Galeano RN with [facility] [phone] called   Patient and family had question regarding up coming CT scan in August  She was recently in hospital and had a CTA chest which showed changes,   In hospital was asking should she prehaps have a PET scan instead May call Emergency contact per back Albertina John [phone]

## 2019-03-20 NOTE — TELEPHONE ENCOUNTER
Pt was called and informed that Dr Miguel Angel Lassiter is ok with ordering a PET instead of CTA to be done in July  Pt states she will let Pema Jaquez know as well

## 2019-03-25 ENCOUNTER — OFFICE VISIT (OUTPATIENT)
Dept: INTERNAL MEDICINE CLINIC | Facility: CLINIC | Age: 74
End: 2019-03-25
Payer: MEDICARE

## 2019-03-25 VITALS
WEIGHT: 86.4 LBS | BODY MASS INDEX: 14.75 KG/M2 | OXYGEN SATURATION: 98 % | HEIGHT: 64 IN | HEART RATE: 106 BPM | TEMPERATURE: 97.6 F | SYSTOLIC BLOOD PRESSURE: 108 MMHG | DIASTOLIC BLOOD PRESSURE: 66 MMHG

## 2019-03-25 DIAGNOSIS — R63.0 LOSS OF APPETITE: ICD-10-CM

## 2019-03-25 DIAGNOSIS — F32.A DEPRESSIVE DISORDER: ICD-10-CM

## 2019-03-25 DIAGNOSIS — F17.219 CIGARETTE NICOTINE DEPENDENCE WITH NICOTINE-INDUCED DISORDER: Primary | ICD-10-CM

## 2019-03-25 DIAGNOSIS — J96.21 ACUTE ON CHRONIC RESPIRATORY FAILURE WITH HYPOXIA (HCC): ICD-10-CM

## 2019-03-25 PROCEDURE — 99495 TRANSJ CARE MGMT MOD F2F 14D: CPT | Performed by: INTERNAL MEDICINE

## 2019-03-25 PROCEDURE — 1111F DSCHRG MED/CURRENT MED MERGE: CPT | Performed by: INTERNAL MEDICINE

## 2019-03-25 NOTE — PROGRESS NOTES
Assessment/Plan:  TCM Call (since 2/22/2019)     Date and time call was made  3/15/2019 11:56 AM    Hospital care reviewed  Records reviewed    Patient was hospitialized at  [facility]    Date of Admission  03/07/19    Date of discharge  03/14/19    Diagnosis  Respiratory Distress      TCM Call (since 2/22/2019)     I have advised the patient to call PCP with any new or worsening symptoms  Jody Carroll    Counseling  Patient    Comments  Patient appointment is scheduled for 3/25/19          Acute on chronic respiratory failure with hypoxia [facility])  Patient is here today for transition of care visit  Patient was admitted to the hospital after being seen in the office with acute respiratory distress, hypoxic respiratory failure  Patient was not found to have a pneumonia any new lesions seen on chest x-ray and evaluation in the hospital   She did respond well to treatment and is now home  She states that she has some improvement is for shortness of breath is concerned, importantly patient is stop smoking  Patient denies any fever or chills, very slight cough which is nonproductive    Loss of appetite  Patient states that while on the steroids and after discharge from the hospital she has had increase in her appetite  Family relates that she seems to be eating better and also has been gaining some weight since discharged to home    Nicotine dependence  Patient is commended on the fact that she has quit smoking  The hopes are that she continued to be off of cigarettes completely which is an exacerbating factor to her chronic lung disease  Depressive disorder  Patient does have a longstanding history of depression, anxiety  Patient relates that much of this relates to her home situation taking care of her elderly mother and father and her underlying medical problems   Patient does not wish to be placed on any medication at this point time but a consideration would be placing the patient on SSRI such as Paxil which may also help with weight gain       Diagnoses and all orders for this visit:    Cigarette nicotine dependence with nicotine-induced disorder    Acute on chronic respiratory failure with hypoxia (HCC)    Loss of appetite    Depressive disorder          Subjective:      Patient ID: Lexi Dukes is a 72 y o  female  Patient is a 68-year-old female with a history of multiple medical problems including history of stage IV lung cancer, chronic obstructive pulmonary disease  She has seen in the office today for transition of care visit after recently being hospitalized for acute respiratory failure, hypoxia  The following portions of the patient's history were reviewed and updated as appropriate:   She  has a past medical history of Breast cancer ([facility] ) (1996), COPD, severe ([facility] ), Requires supplemental oxygen, and Stage 4 lung cancer ([facility] )  She   Patient Active Problem List    Diagnosis Date Noted    Shortness of breath 03/13/2019    Acute on chronic respiratory failure with hypoxia [facility] ) 03/10/2019    Goals of care, counseling/discussion 03/10/2019    Respiratory distress 03/07/2019    Simple chronic bronchitis ([facility] ) 02/26/2019    Chronic hypoxemic respiratory failure ([facility] ) 02/26/2019    Healthcare maintenance 08/29/2018    Malignant neoplasm of right lung (HCC) 03/09/2018    Depressive disorder 11/14/2017    COPD exacerbation ([facility] ) 10/14/2016    Hyperlipidemia 04/08/2016    Loss of appetite 01/23/2015    Abnormal weight loss 10/05/2012    Nicotine dependence 10/05/2012     She  has a past surgical history that includes Appendectomy; Tonsillectomy; Mastectomy, radical (Right, 1996); Colonoscopy (N/A, 4/18/2016); pr bronchoscopy,diagnostic (N/A, 10/17/2017); and Lung cancer surgery  Her family history includes Heart disease in her mother  She  reports that she has quit smoking  She started smoking about 57 years ago  She has a 27 50 pack-year smoking history   She has never used smokeless tobacco  She reports that she drank alcohol  She reports that she does not use drugs  Current Outpatient Medications   Medication Sig Dispense Refill    albuterol (VENTOLIN HFA) 90 mcg/act inhaler Inhale 2 puffs every 4 (four) hours as needed for wheezing 1 Inhaler 0    arformoterol (BROVANA) 15 mcg/2 mL nebulizer solution Take 1 vial (15 mcg total) by nebulization 2 (two) times a day 180 vial 3    budesonide (PULMICORT) 0 5 mg/2 mL nebulizer solution USE 1 UNIT DOSE VIA NEBULIZER TWO TIMES A  mL 5    Cholecalciferol (VITAMIN D) 2000 UNITS tablet Take 2,000 Units by mouth daily   ipratropium (ATROVENT) 0 02 % nebulizer solution Take 0 5 mg by nebulization 3 (three) times a day        Multiple Vitamins-Minerals (MULTIVITAMIN WITH MINERALS) tablet Take 1 tablet by mouth daily   oxyCODONE (ROXICODONE) 5 mg immediate release tablet Take 0 5 tablets (2 5 mg total) by mouth every 4 (four) hours as needed for severe pain (or shortness of breath)Max Daily Amount: 15 mg 15 tablet 0    predniSONE 10 mg tablet 4 tablets daily for 3 days, 3 tablets daily for 4 days, 2 tablets daily for 4 days and 1 tablet daily for 4 days and stop 36 tablet 0    prochlorperazine (COMPAZINE) 10 mg tablet Take 1 tablet by mouth every 6 (six) hours as needed       No current facility-administered medications for this visit  Current Outpatient Medications on File Prior to Visit   Medication Sig    albuterol (VENTOLIN HFA) 90 mcg/act inhaler Inhale 2 puffs every 4 (four) hours as needed for wheezing    arformoterol (BROVANA) 15 mcg/2 mL nebulizer solution Take 1 vial (15 mcg total) by nebulization 2 (two) times a day    budesonide (PULMICORT) 0 5 mg/2 mL nebulizer solution USE 1 UNIT DOSE VIA NEBULIZER TWO TIMES A DAY    Cholecalciferol (VITAMIN D) 2000 UNITS tablet Take 2,000 Units by mouth daily      ipratropium (ATROVENT) 0 02 % nebulizer solution Take 0 5 mg by nebulization 3 (three) times a day  Multiple Vitamins-Minerals (MULTIVITAMIN WITH MINERALS) tablet Take 1 tablet by mouth daily   oxyCODONE (ROXICODONE) 5 mg immediate release tablet Take 0 5 tablets (2 5 mg total) by mouth every 4 (four) hours as needed for severe pain (or shortness of breath)Max Daily Amount: 15 mg    predniSONE 10 mg tablet 4 tablets daily for 3 days, 3 tablets daily for 4 days, 2 tablets daily for 4 days and 1 tablet daily for 4 days and stop    prochlorperazine (COMPAZINE) 10 mg tablet Take 1 tablet by mouth every 6 (six) hours as needed     No current facility-administered medications on file prior to visit  She has No Known Allergies       Review of Systems   Constitutional: Positive for activity change (Patient is slowly beginning to increase her activity level), appetite change ( patient does have some increase in appetite since discharge) and fatigue ( somewhat fatigued but states is getting stronger daily)  Negative for chills, diaphoresis, fever and unexpected weight change  HENT: Negative  Respiratory: Positive for cough ( some slight residual cough but not severe) and shortness of breath ( chronic shortness of breath with exertion, O2 dependent)  Negative for apnea, choking, chest tightness, wheezing and stridor  Cardiovascular: Negative  Gastrointestinal: Negative  Endocrine: Negative  Genitourinary: Negative  Musculoskeletal: Positive for arthralgias ( some generalized mild arthritic aches and pains but nothing new or disabling at this time)  Negative for back pain, gait problem, joint swelling, myalgias, neck pain and neck stiffness  Skin: Negative  Allergic/Immunologic: Negative  Neurological: Positive for weakness ( a general feeling of weakness which she states is overall improving)  Negative for dizziness, tremors, seizures, syncope, facial asymmetry, speech difficulty, light-headedness, numbness and headaches  Hematological: Negative      Psychiatric/Behavioral: Negative for agitation, behavioral problems, confusion, decreased concentration, dysphoric mood, hallucinations, self-injury, sleep disturbance and suicidal ideas  The patient is nervous/anxious ( longstanding history of anxiety disorder, depression which is mild)  The patient is not hyperactive  Objective:      /66   Pulse (!) 106   Temp 97 6 °F (36 4 °C)   Ht 5' 4" (1 626 m)   Wt 39 2 kg (86 lb 6 4 oz)   SpO2 98%   BMI 14 83 kg/m²          Physical Exam   Constitutional: No distress  Extremely  thin, frail 26-year-old female who is awake alert, wearing her oxygen   HENT:   Head: Normocephalic and atraumatic  Right Ear: External ear normal    Left Ear: External ear normal    Nose: Nose normal    Mouth/Throat: No oropharyngeal exudate  Dry but pink oral mucous membranes   Eyes: Pupils are equal, round, and reactive to light  Conjunctivae and EOM are normal  Right eye exhibits no discharge  Left eye exhibits no discharge  No scleral icterus  Neck: Normal range of motion  Neck supple  No JVD present  No tracheal deviation present  No thyromegaly present  Cardiovascular: Regular rhythm, normal heart sounds and intact distal pulses  Exam reveals no gallop and no friction rub  No murmur heard  Sinus tachycardia   Pulmonary/Chest: Effort normal  No stridor  No respiratory distress  She has no wheezes  She has no rales  She exhibits no tenderness  On evaluation today patient does have some decreased breath sounds anteriorly and posteriorly but lungs are clear to auscultation with no rales rhonchi or wheezes heard today on exam   Abdominal: Soft  Bowel sounds are normal  She exhibits no distension and no mass  There is no tenderness  There is no rebound and no guarding  No hernia  Musculoskeletal: She exhibits deformity ( some diffuse arthritic changes to the hands and digits, bony protrusion the chest wall secondary to abnormal weight)  She exhibits no edema or tenderness     Lymphadenopathy: She has no cervical adenopathy  Neurological: She displays normal reflexes  No cranial nerve deficit or sensory deficit  She exhibits abnormal muscle tone (Generalized muscle wasting to upper lower extremities, decreased tone and strength but no changes from previously)  Coordination normal    Skin: Skin is warm and dry  No rash noted  She is not diaphoretic  No erythema  No pallor  Psychiatric: She has a normal mood and affect  Her behavior is normal  Judgment and thought content normal    Nursing note and vitals reviewed

## 2019-03-25 NOTE — ASSESSMENT & PLAN NOTE
Patient does have a longstanding history of depression, anxiety  Patient relates that much of this relates to her home situation taking care of her elderly mother and father and her underlying medical problems   Patient does not wish to be placed on any medication at this point time but a consideration would be placing the patient on SSRI such as Paxil which may also help with weight gain

## 2019-03-25 NOTE — ASSESSMENT & PLAN NOTE
Patient states that while on the steroids and after discharge from the hospital she has had increase in her appetite    Family relates that she seems to be eating better and also has been gaining some weight since discharged to home

## 2019-03-25 NOTE — ASSESSMENT & PLAN NOTE
Patient is here today for transition of care visit  Patient was admitted to the hospital after being seen in the office with acute respiratory distress, hypoxic respiratory failure  Patient was not found to have a pneumonia any new lesions seen on chest x-ray and evaluation in the hospital   She did respond well to treatment and is now home  She states that she has some improvement is for shortness of breath is concerned, importantly patient is stop smoking    Patient denies any fever or chills, very slight cough which is nonproductive

## 2019-03-25 NOTE — ASSESSMENT & PLAN NOTE
Patient is commended on the fact that she has quit smoking  The hopes are that she continued to be off of cigarettes completely which is an exacerbating factor to her chronic lung disease

## 2019-04-03 DIAGNOSIS — J44.9 CHRONIC OBSTRUCTIVE PULMONARY DISEASE, UNSPECIFIED COPD TYPE (HCC): ICD-10-CM

## 2019-04-05 RX ORDER — ARFORMOTEROL TARTRATE 15 UG/2ML
SOLUTION RESPIRATORY (INHALATION)
Qty: 180 ML | Refills: 3 | Status: SHIPPED | OUTPATIENT
Start: 2019-04-05 | End: 2019-08-22 | Stop reason: SDUPTHER

## 2019-04-16 ENCOUNTER — TELEPHONE (OUTPATIENT)
Dept: INTERNAL MEDICINE CLINIC | Facility: CLINIC | Age: 74
End: 2019-04-16

## 2019-04-16 NOTE — PROGRESS NOTES
Patient continues to be full of rhonci and wheezes. Audibly coarse. Writer did notice some coughing while in room (patient drinking ensure). Patient does state she was supposed to see Dr. Nicholas for some \"swallowing problems\". Will talk to hospitalist about possible speech eval   Pt received treatment without complications  Aware of future appointments

## 2019-05-14 ENCOUNTER — OFFICE VISIT (OUTPATIENT)
Dept: PULMONOLOGY | Facility: CLINIC | Age: 74
End: 2019-05-14
Payer: MEDICARE

## 2019-05-14 VITALS
RESPIRATION RATE: 16 BRPM | HEIGHT: 65 IN | SYSTOLIC BLOOD PRESSURE: 118 MMHG | DIASTOLIC BLOOD PRESSURE: 78 MMHG | BODY MASS INDEX: 14.66 KG/M2 | TEMPERATURE: 98 F | OXYGEN SATURATION: 99 % | HEART RATE: 124 BPM | WEIGHT: 88 LBS

## 2019-05-14 DIAGNOSIS — J96.11 CHRONIC HYPOXEMIC RESPIRATORY FAILURE (HCC): ICD-10-CM

## 2019-05-14 DIAGNOSIS — C34.90 LUNG CANCER (HCC): ICD-10-CM

## 2019-05-14 DIAGNOSIS — R06.00 DOE (DYSPNEA ON EXERTION): ICD-10-CM

## 2019-05-14 DIAGNOSIS — J30.9 ALLERGIC RHINITIS: ICD-10-CM

## 2019-05-14 DIAGNOSIS — J44.9 COPD (CHRONIC OBSTRUCTIVE PULMONARY DISEASE) (HCC): Primary | ICD-10-CM

## 2019-05-14 PROCEDURE — 99214 OFFICE O/P EST MOD 30 MIN: CPT | Performed by: INTERNAL MEDICINE

## 2019-05-14 RX ORDER — FLUTICASONE PROPIONATE 50 MCG
1 SPRAY, SUSPENSION (ML) NASAL DAILY
Qty: 1 BOTTLE | Refills: 5 | Status: SHIPPED | OUTPATIENT
Start: 2019-05-14 | End: 2019-10-09

## 2019-06-18 DIAGNOSIS — J44.9 CHRONIC OBSTRUCTIVE PULMONARY DISEASE, UNSPECIFIED COPD TYPE (HCC): ICD-10-CM

## 2019-06-18 RX ORDER — BUDESONIDE 0.5 MG/2ML
0.5 INHALANT ORAL 2 TIMES DAILY
Qty: 120 ML | Refills: 5 | Status: SHIPPED | OUTPATIENT
Start: 2019-06-18 | End: 2020-01-02 | Stop reason: SDUPTHER

## 2019-06-19 DIAGNOSIS — J44.9 CHRONIC OBSTRUCTIVE PULMONARY DISEASE, UNSPECIFIED COPD TYPE (HCC): ICD-10-CM

## 2019-06-19 RX ORDER — BUDESONIDE 0.25 MG/2ML
0.25 INHALANT ORAL 2 TIMES DAILY
Qty: 360 ML | Refills: 1 | Status: SHIPPED | OUTPATIENT
Start: 2019-06-19 | End: 2019-09-20

## 2019-07-05 ENCOUNTER — APPOINTMENT (OUTPATIENT)
Dept: LAB | Age: 74
End: 2019-07-05
Payer: MEDICARE

## 2019-07-05 DIAGNOSIS — E78.01 FAMILIAL HYPERCHOLESTEROLEMIA: ICD-10-CM

## 2019-07-05 DIAGNOSIS — C34.11 MALIGNANT NEOPLASM OF UPPER LOBE OF RIGHT LUNG (HCC): ICD-10-CM

## 2019-07-05 LAB
ALBUMIN SERPL BCP-MCNC: 4 G/DL (ref 3.5–5)
ALP SERPL-CCNC: 82 U/L (ref 46–116)
ALT SERPL W P-5'-P-CCNC: 20 U/L (ref 12–78)
ANION GAP SERPL CALCULATED.3IONS-SCNC: 5 MMOL/L (ref 4–13)
AST SERPL W P-5'-P-CCNC: 19 U/L (ref 5–45)
BASOPHILS # BLD AUTO: 0.05 THOUSANDS/ΜL (ref 0–0.1)
BASOPHILS NFR BLD AUTO: 1 % (ref 0–1)
BILIRUB SERPL-MCNC: 0.39 MG/DL (ref 0.2–1)
BUN SERPL-MCNC: 16 MG/DL (ref 5–25)
CALCIUM SERPL-MCNC: 9 MG/DL (ref 8.3–10.1)
CHLORIDE SERPL-SCNC: 101 MMOL/L (ref 100–108)
CHOLEST SERPL-MCNC: 227 MG/DL (ref 50–200)
CO2 SERPL-SCNC: 30 MMOL/L (ref 21–32)
CREAT SERPL-MCNC: 0.67 MG/DL (ref 0.6–1.3)
EOSINOPHIL # BLD AUTO: 0.07 THOUSAND/ΜL (ref 0–0.61)
EOSINOPHIL NFR BLD AUTO: 1 % (ref 0–6)
ERYTHROCYTE [DISTWIDTH] IN BLOOD BY AUTOMATED COUNT: 13.2 % (ref 11.6–15.1)
GFR SERPL CREATININE-BSD FRML MDRD: 88 ML/MIN/1.73SQ M
GLUCOSE P FAST SERPL-MCNC: 88 MG/DL (ref 65–99)
HCT VFR BLD AUTO: 43 % (ref 34.8–46.1)
HDLC SERPL-MCNC: 69 MG/DL (ref 40–60)
HGB BLD-MCNC: 13.6 G/DL (ref 11.5–15.4)
IMM GRANULOCYTES # BLD AUTO: 0.01 THOUSAND/UL (ref 0–0.2)
IMM GRANULOCYTES NFR BLD AUTO: 0 % (ref 0–2)
LDLC SERPL CALC-MCNC: 143 MG/DL (ref 0–100)
LYMPHOCYTES # BLD AUTO: 1.35 THOUSANDS/ΜL (ref 0.6–4.47)
LYMPHOCYTES NFR BLD AUTO: 25 % (ref 14–44)
MCH RBC QN AUTO: 31.3 PG (ref 26.8–34.3)
MCHC RBC AUTO-ENTMCNC: 31.6 G/DL (ref 31.4–37.4)
MCV RBC AUTO: 99 FL (ref 82–98)
MONOCYTES # BLD AUTO: 0.52 THOUSAND/ΜL (ref 0.17–1.22)
MONOCYTES NFR BLD AUTO: 10 % (ref 4–12)
NEUTROPHILS # BLD AUTO: 3.32 THOUSANDS/ΜL (ref 1.85–7.62)
NEUTS SEG NFR BLD AUTO: 63 % (ref 43–75)
NONHDLC SERPL-MCNC: 158 MG/DL
NRBC BLD AUTO-RTO: 0 /100 WBCS
PLATELET # BLD AUTO: 227 THOUSANDS/UL (ref 149–390)
PMV BLD AUTO: 10.5 FL (ref 8.9–12.7)
POTASSIUM SERPL-SCNC: 4.1 MMOL/L (ref 3.5–5.3)
PROT SERPL-MCNC: 7.3 G/DL (ref 6.4–8.2)
RBC # BLD AUTO: 4.34 MILLION/UL (ref 3.81–5.12)
SODIUM SERPL-SCNC: 136 MMOL/L (ref 136–145)
TRIGL SERPL-MCNC: 77 MG/DL
WBC # BLD AUTO: 5.32 THOUSAND/UL (ref 4.31–10.16)

## 2019-07-05 PROCEDURE — 85025 COMPLETE CBC W/AUTO DIFF WBC: CPT

## 2019-07-05 PROCEDURE — 80061 LIPID PANEL: CPT

## 2019-07-05 PROCEDURE — 36415 COLL VENOUS BLD VENIPUNCTURE: CPT

## 2019-07-05 PROCEDURE — 80053 COMPREHEN METABOLIC PANEL: CPT

## 2019-07-10 ENCOUNTER — HOSPITAL ENCOUNTER (OUTPATIENT)
Dept: RADIOLOGY | Age: 74
Discharge: HOME | End: 2019-07-10
Payer: MEDICARE

## 2019-07-10 DIAGNOSIS — C34.11 MALIGNANT NEOPLASM OF RIGHT UPPER LOBE OF LUNG (HCC): ICD-10-CM

## 2019-07-10 DIAGNOSIS — J06.9 UPPER RESPIRATORY TRACT INFECTION, UNSPECIFIED TYPE: Primary | ICD-10-CM

## 2019-07-10 PROCEDURE — 70491 CT SOFT TISSUE NECK W/DYE: CPT

## 2019-07-10 PROCEDURE — 71260 CT THORAX DX C+: CPT

## 2019-07-10 RX ORDER — AZITHROMYCIN 250 MG/1
TABLET, FILM COATED ORAL
Qty: 6 TABLET | Refills: 0 | Status: SHIPPED | OUTPATIENT
Start: 2019-07-10 | End: 2019-07-15

## 2019-07-10 RX ADMIN — IOHEXOL 85 ML: 350 INJECTION, SOLUTION INTRAVENOUS at 09:28

## 2019-07-18 NOTE — PROGRESS NOTES
Hematology/Oncology Outpatient Follow- up Note  Dominique Morejon 69 y o  female MRN: @ Encounter: [id number]        Date:  7/19/2019      Assessment / Plan:    Stage IV adenocarcinoma of the lung diagnosed initially on October 2017, moderately differentiated involving right hilum, mediastinum, precarinal, subcarinal lymph nodes with tumor extension into the right mainstem bronchus, bronchus intermedius, right middle lobe and right cervical area proven by biopsy, molecular tests were negative for EGFR mutation, ALK gene rearrangement, Ross 1 mutation, PDL-1 expression was 0     She was treated with concurrent chemotherapy utilizing weekly Taxol/carboplatin and radiation therapy completed in 02/02/2018  CT chest 7/2019:  [facility]              HPI:  19-year-old  female seen initially 11/6/2017 regarding moderately differentiated adenocarcinoma of the lung positive for TTF 1 and napsin and negative for P 40 diagnosed via right mainstem bronchus biopsy on October 17, 2017 by Dr Kay Howard  scan chest 9/26/2017 ordered by Dr Kay Howard performed as lung cancer screening study showed subcarinal adenopathy measuring 1 7 cm  Precarinal adenopathy measures 1 9 cm  The right lower lobe endobronchial soft tissue appears mass like  Lung-RADS 4A, suspicious  PET scan 11/2/2017 showed confluent of hypermetabolic right hilar and mediastinal, precarinal, subcarinal lymphadenopathy compatible with malignancy, SUV measures 17 4  right hilar and precarinal adenopathy measures approximately 4 2 x 2 2 cm  Subcarinal adenopathy measures approximately 2 5 x 1 5 cm  Right hilar devan mass image 100 measures 2 7 x 1 47 m  Tumor extends into the right mainstem bronchus, bronchus intermedius, right middle lobe and right lower lobe bronchi, as seen previously   There is also a small right thoracic paraspinal node with mild FDG activity, measuring 1 3 x 0 4 cm, SUV 2 5  is an irregular right upper lung density along the major fissure measuring 1 6 x 1 3 cm, with mild FDG activity, SUV 1 1  This may be inflammatory, versus malignancy  There is an ill-defined hypermetabolic nodule in the right parotid gland region, measuring approximately 2 2 x 1 7 cm SUV measures 11 3  This may represent a metastasis versus primary parotid gland lesions such as a Warthin's tumor or amorphic adenoma  mm left lower lung nodular density too small for PET characterization   Subtle hypermetabolic focus anterior to the right diaphragmatic larisa, SUV 2 6   She has chronic COPD secondary to active smoking, progressive dyspnea on exertion  She has had a chronic cough for many years   Used to smoke 2 packs of cigarettes daily for many many years, she does not drink alcohol   both parents still living has 1 sister in [facility]  She elected to proceed with FNA right glucose avid neck mass  Pathology: Conclusive evidence of malignancy  small cell carcinoma consistent with metastasis from the patient's know adenocarcinoma of the lung  On immunostaining, the tumor cells are positive for TTF-1 and Napsin A but negative for p40 helping support the above diagnosis   RT to this area      She finished concurrent radiation therapy with weekly Taxol /carboplatin to the chest area as well as to the right neck area  Subsequent CT scans showed no evidence of disease     Interval History:  COPD exacerbation requiring admission 3/2018  7/10/2019:  Ct chest:  Peripheral posterior right lower lobe opacity is less prominent, almost certainly infectious/inflammatory  No further follow-up is required    Stable severe emphysematous changes and improved central bronchial wall thickening likely reflecting improved bronchitis      Reports she is feeling well            Test Results:        Labs:   Lab Results   Component Value Date    HGB 13 6 07/05/2019    HCT 43 0 07/05/2019    MCV 99 (H) 07/05/2019     07/05/2019    WBC 5 32 07/05/2019    NRBC 0 07/05/2019     Lab Results Component Value Date     02/27/2015    K 4 1 07/05/2019     07/05/2019    CO2 30 07/05/2019    ANIONGAP 10 02/27/2015    BUN 16 07/05/2019    CREATININE 0 67 07/05/2019    GLUCOSE 94 02/27/2015    GLUF 88 07/05/2019    CALCIUM 9 0 07/05/2019    AST 19 07/05/2019    ALT 20 07/05/2019    ALKPHOS 82 07/05/2019    PROT 7 5 02/27/2015    BILITOT 0 53 02/27/2015    EGFR 88 07/05/2019       Imaging: Ct Soft Tissue Neck W Contrast    Result Date: 7/10/2019  Narrative: CT NECK WITH CONTRAST INDICATION:   C34 11: Malignant neoplasm of upper lobe, right bronchus or lung  COMPARISON:  CT neck study from January 8, 2019  TECHNIQUE:  Axial, sagittal, and coronal 2D reformatted images were created from the axial source data and submitted for interpretation  Radiation dose length product (DLP) for this visit:  258 mGy-cm   This examination, like all CT scans performed in the [facility], was performed utilizing techniques to minimize radiation dose exposure, including the use of iterative reconstruction and automated exposure control  IV Contrast:  85 mL of iohexol (OMNIPAQUE) IMAGE QUALITY:  Diagnostic  FINDINGS: VISUALIZED BRAIN PARENCHYMA:  No acute intracranial pathology of the visualized brain parenchyma  VISUALIZED ORBITS AND PARANASAL SINUSES:  Normal  NASAL CAVITY AND NASOPHARYNX:  Normal  SUPRAHYOID NECK: Dental amalgam artifact obscures optimal assessment of the oral cavity and floor of mouth region  Normal oral cavity, tongue base, tonsillar fossa and epiglottis  Previously noted soft tissue thickening deep to the right sternocleidomastoid muscle and lateral to the carotid sheath beginning in the infraparotid region and extending to the level of the submandibular gland  This is thought to represent treated devan disease with residual scarring  INFRAHYOID NECK:  Aryepiglottic folds and piriform sinuses are normal   Normal glottis and subglottic airway   THYROID GLAND:  Stable subcentimeter nodules in the right thyroid lobe, one of which again demonstrates macrocalcification  PAROTID AND SUBMANDIBULAR GLANDS:  Normal  LYMPH NODES:  No pathologic or enlarged adenopathy  VASCULAR STRUCTURES:  Normal enhancement of the cervical vasculature  THORACIC INLET:  Biapical fibrosis and extensive centrilobular emphysema in the upper lobes  Partially visualized right axillary surgical clips with adjacent right pleural thickening and fibrosis within the periphery of the right upper lobe  Miscellaneous: Cachexia  BONY STRUCTURES: No acute fracture or destructive osseous lesion  Cervical spondylosis  Impression: Cachexia No new mass lesion or adenopathy  Stable scarring in the right infraparotid soft tissues deep to the right sternocleidomastoid muscle at the site of previously treated devan disease    Workstation performed: [id number]     Ct Chest W Contrast    Result Date: 7/12/2019  Narrative: CT CHEST WITH IV CONTRAST INDICATION:   C34 11: Malignant neoplasm of upper lobe, right bronchus or lung  COMPARISON:  3/7/2019  TECHNIQUE: CT examination of the chest was performed  Axial, sagittal, and coronal 2D reformatted images were created from the source data and submitted for interpretation  Radiation dose length product (DLP) for this visit:  131 mGy-cm   This examination, like all CT scans performed in the [facility], was performed utilizing techniques to minimize radiation dose exposure, including the use of iterative reconstruction and automated exposure control  IV Contrast:  85 mL of iohexol (OMNIPAQUE) FINDINGS: LUNGS:  Stable severe diffuse emphysematous changes with biapical and right lateral pleural-parenchymal scarring  Previously reported peripheral posterior right lower lobe opacity is less prominent (5/93)  No new nodule  Previously reported central bilateral lower lobe bronchial wall thickening has improved  There is no tracheal or endobronchial lesion  PLEURA:  Unremarkable  HEART/GREAT VESSELS:  Atherosclerotic changes are noted in thoracic aorta and coronary arteries  MEDIASTINUM AND JENNIFER:  Unremarkable  CHEST WALL AND LOWER NECK:   7 mm hypodense right thyroid lobe nodule with adjacent coarse calcifications; no further follow-up is required  Clips in the right axilla  VISUALIZED STRUCTURES IN THE UPPER ABDOMEN:  Unremarkable  OSSEOUS STRUCTURES:  No acute fracture or destructive osseous lesion  Impression: 1  Peripheral posterior right lower lobe opacity is less prominent, almost certainly infectious/inflammatory  No further follow-up is required  2   Stable severe emphysematous changes and improved central bronchial wall thickening likely reflecting improved bronchitis  Workstation performed: [id number]           ROS:  As mentioned in HPI & Interval History otherwise 14 point ROS negative  Allergies: No Known Allergies  Current Medications: Reviewed  PMH/FH/SH:  Reviewed      Physical Exam:    There is no height or weight on file to calculate BSA  Ht Readings from Last 3 Encounters:   05/14/19 5' 5" (1 651 m)   03/25/19 5' 4" (1 626 m)   03/07/19 5' 4" (1 626 m)        Wt Readings from Last 3 Encounters:   05/14/19 39 9 kg (88 lb)   03/25/19 39 2 kg (86 lb 6 4 oz)   03/07/19 39 7 kg (87 lb 9 6 oz)        Temp Readings from Last 3 Encounters:   05/14/19 98 °F (36 7 °C) (Tympanic)   03/25/19 97 6 °F (36 4 °C)   03/14/19 98 6 °F (37 °C) (Oral)        BP Readings from Last 3 Encounters:   05/14/19 118/78   03/25/19 108/66   03/14/19 122/64           Physical Exam   Constitutional: She is oriented to person, place, and time  She appears well-developed  No distress  underweight   HENT:   Head: Normocephalic and atraumatic  Mouth/Throat: No oropharyngeal exudate  Eyes: Pupils are equal, round, and reactive to light  Conjunctivae are normal    Neck: Normal range of motion  Neck supple  No tracheal deviation present  Cardiovascular: Normal rate and regular rhythm  Exam reveals no gallop and no friction rub  No murmur heard  Pulmonary/Chest: Effort normal and breath sounds normal  No respiratory distress  She has no wheezes  She has no rales  She exhibits no tenderness  On 02  Abdominal: Soft  She exhibits no distension  There is no tenderness  Musculoskeletal: Normal range of motion  Lymphadenopathy:     She has no cervical adenopathy  Neurological: She is alert and oriented to person, place, and time  Skin: Skin is warm and dry  No rash noted  She is not diaphoretic  No erythema  No pallor  Psychiatric: She has a normal mood and affect  Her behavior is normal  Judgment and thought content normal    Vitals reviewed        ECO  Due to lung disease      Emergency Contacts:    Charity Calderon, [phone],

## 2019-07-19 ENCOUNTER — OFFICE VISIT (OUTPATIENT)
Dept: HEMATOLOGY ONCOLOGY | Facility: CLINIC | Age: 74
End: 2019-07-19
Payer: MEDICARE

## 2019-07-19 VITALS
SYSTOLIC BLOOD PRESSURE: 110 MMHG | HEIGHT: 65 IN | TEMPERATURE: 97.8 F | HEART RATE: 105 BPM | WEIGHT: 84 LBS | RESPIRATION RATE: 16 BRPM | BODY MASS INDEX: 13.99 KG/M2 | DIASTOLIC BLOOD PRESSURE: 80 MMHG

## 2019-07-19 DIAGNOSIS — C34.91 MALIGNANT NEOPLASM OF RIGHT LUNG, UNSPECIFIED PART OF LUNG (HCC): Primary | ICD-10-CM

## 2019-07-19 PROCEDURE — 99214 OFFICE O/P EST MOD 30 MIN: CPT | Performed by: PHYSICIAN ASSISTANT

## 2019-07-31 ENCOUNTER — TELEPHONE (OUTPATIENT)
Dept: INTERNAL MEDICINE CLINIC | Facility: CLINIC | Age: 74
End: 2019-07-31

## 2019-07-31 NOTE — TELEPHONE ENCOUNTER
Patient called  Apparently is visiting [facility] in [place]  Had an episode of cramping in her right arm and hand and leg  Transient and they did call 911 all her vitals were stable including her oxygenation count  She states she was sitting out in the sun for number of days and probably was not well hydrated  Patient states she is back to normal today with no residual   She was told if she experiences this again she should proceed to the hospital immediately but otherwise when she returns to the area we would be more than happy to see her

## 2019-08-09 DIAGNOSIS — J44.9 CHRONIC OBSTRUCTIVE PULMONARY DISEASE, UNSPECIFIED COPD TYPE (HCC): Primary | ICD-10-CM

## 2019-08-22 DIAGNOSIS — J44.9 CHRONIC OBSTRUCTIVE PULMONARY DISEASE, UNSPECIFIED COPD TYPE (HCC): ICD-10-CM

## 2019-08-22 RX ORDER — ARFORMOTEROL TARTRATE 15 UG/2ML
15 SOLUTION RESPIRATORY (INHALATION) 2 TIMES DAILY
Qty: 120 VIAL | Refills: 5 | Status: SHIPPED | OUTPATIENT
Start: 2019-08-22 | End: 2020-02-03 | Stop reason: SDUPTHER

## 2019-08-26 ENCOUNTER — OFFICE VISIT (OUTPATIENT)
Dept: INTERNAL MEDICINE CLINIC | Facility: CLINIC | Age: 74
End: 2019-08-26
Payer: MEDICARE

## 2019-08-26 VITALS
HEIGHT: 65 IN | WEIGHT: 83.6 LBS | DIASTOLIC BLOOD PRESSURE: 80 MMHG | OXYGEN SATURATION: 92 % | RESPIRATION RATE: 16 BRPM | HEART RATE: 86 BPM | SYSTOLIC BLOOD PRESSURE: 122 MMHG | TEMPERATURE: 97.7 F | BODY MASS INDEX: 13.93 KG/M2

## 2019-08-26 DIAGNOSIS — F17.219 CIGARETTE NICOTINE DEPENDENCE WITH NICOTINE-INDUCED DISORDER: ICD-10-CM

## 2019-08-26 DIAGNOSIS — E78.01 FAMILIAL HYPERCHOLESTEROLEMIA: ICD-10-CM

## 2019-08-26 DIAGNOSIS — C34.91 MALIGNANT NEOPLASM OF RIGHT LUNG, UNSPECIFIED PART OF LUNG (HCC): ICD-10-CM

## 2019-08-26 DIAGNOSIS — R63.4 ABNORMAL WEIGHT LOSS: ICD-10-CM

## 2019-08-26 DIAGNOSIS — J96.11 CHRONIC HYPOXEMIC RESPIRATORY FAILURE (HCC): Primary | ICD-10-CM

## 2019-08-26 DIAGNOSIS — Z12.11 COLON CANCER SCREENING: ICD-10-CM

## 2019-08-26 PROCEDURE — 99214 OFFICE O/P EST MOD 30 MIN: CPT | Performed by: INTERNAL MEDICINE

## 2019-08-26 NOTE — PROGRESS NOTES
Assessment/Plan:    Chronic hypoxemic respiratory failure ([facility] )  Patient is O2 dependent and continues to use her oxygen continuously  She states she has had no problems with increasing shortness of breath recently  She uses her inhalers as prescribed  Home patient will continue present treatment but was told if any acute exacerbations of her lung disease to please call immediately for evaluation  Patient continues to follow-up with her pulmonologist   Patient plans to receive the influenza vaccine in October  Is up-to-date with pneumococcal vaccine    Malignant neoplasm of right lung [facility])  Patient does have a history of lung cancer, stage IV status post chemotherapy  Patient continues to follow-up with Oncology and they are planning for the patient undergoing a repeat CT scan in the near future  Abnormal weight loss  Patient's weight is stable but is slowly decreasing over period of time  Patient states further that she is trying to supplement her diet as much as possible  I did discuss with her seeing a dietician to see if there is any way we can boost her caloric intake and she refuses  Nicotine dependence  Patient admits that occasionally she still is smoking cigarettes  She was told that she needs to quit completely and in order to help her with her nicotine dependence that she should consider nicotine replacement either oral or topical in order to keep her from smoking which will further deteriorate her respiratory function  Hyperlipidemia  Patient does have a history of hyperlipidemia, family history of coronary artery disease  We will check a lipid profile with her next visit  She was told that she needs to increase her caloric intake but to avoid fats and cholesterol with her diet       Diagnoses and all orders for this visit:    Chronic hypoxemic respiratory failure ([facility] )  -     Comprehensive metabolic panel;  Future  -     CBC and differential; Future  -     TSH, 3rd generation with Free T4 reflex; Future  -     Urinalysis with reflex to microscopic; Future    Familial hypercholesterolemia  -     Comprehensive metabolic panel; Future  -     CBC and differential; Future  -     Lipid panel; Future  -     TSH, 3rd generation with Free T4 reflex; Future    Cigarette nicotine dependence with nicotine-induced disorder    Malignant neoplasm of right lung, unspecified part of lung (HCC)  -     Comprehensive metabolic panel; Future  -     CBC and differential; Future    Colon cancer screening  -     Occult Blood, Fecal Immunochemical; Future    Abnormal weight loss    Body mass index (BMI) less than 19 in adult          Subjective:      Patient ID: Jing Shaw is a 69 y o  female  Patient is a 43-year-old female with a history of multiple medical problems including stage IV carcinoma of the lung, chronic obstructive pulmonary disease with history in the past of chronic hypoxic respiratory failure O2 dependent  Patient is here today for follow-up pressure 6 month period of time  We did review recent lab testing with the patient  Patient states in general she is doing relatively well but is extremely busy caring for her mother and father who are of advanced age  The following portions of the patient's history were reviewed and updated as appropriate:   She  has a past medical history of Breast cancer ([address] ) (1996), COPD, severe ([address] ), Requires supplemental oxygen, and Stage 4 lung cancer ([address] )    She   Patient Active Problem List    Diagnosis Date Noted    Shortness of breath 03/13/2019    Acute on chronic respiratory failure with hypoxia ([address] ) 03/10/2019    Goals of care, counseling/discussion 03/10/2019    Respiratory distress 03/07/2019    Simple chronic bronchitis ([address] ) 02/26/2019    Chronic hypoxemic respiratory failure ([address] ) 02/26/2019    Healthcare maintenance 08/29/2018    Malignant neoplasm of right lung ([address] ) 03/09/2018    Depressive disorder 11/14/2017    COPD exacerbation ([facility] ) 10/14/2016    Hyperlipidemia 04/08/2016    Loss of appetite 01/23/2015    Abnormal weight loss 10/05/2012    Nicotine dependence 10/05/2012     She  has a past surgical history that includes Appendectomy; Tonsillectomy; Mastectomy, radical (Right, 1996); Colonoscopy (N/A, 4/18/2016); pr bronchoscopy,diagnostic (N/A, 10/17/2017); and Lung cancer surgery  Her family history includes Heart disease in her mother  She  reports that she has been smoking cigarettes  She started smoking about 57 years ago  She has a 55 00 pack-year smoking history  She has never used smokeless tobacco  She reports that she drank alcohol  She reports that she does not use drugs  Current Outpatient Medications   Medication Sig Dispense Refill    albuterol (VENTOLIN HFA) 90 mcg/act inhaler Inhale 2 puffs every 4 (four) hours as needed for wheezing 1 Inhaler 0    arformoterol (BROVANA) 15 mcg/2 mL nebulizer solution Take 1 vial (15 mcg total) by nebulization 2 (two) times a day 120 vial 5    budesonide (PULMICORT) 0 25 mg/2 mL nebulizer solution TAKE 1 VIAL (0 25 MG TOTAL) BY NEBULIZATION 2 (TWO) TIMES A  mL 1    budesonide (PULMICORT) 0 5 mg/2 mL nebulizer solution Take 1 vial (0 5 mg total) by nebulization 2 (two) times a day Rinse mouth after use  120 mL 5    Cholecalciferol (VITAMIN D) 2000 UNITS tablet Take 2,000 Units by mouth daily   ipratropium (ATROVENT) 0 02 % nebulizer solution Take 1 vial (0 5 mg total) by nebulization 3 (three) times a day 90 vial 5    Multiple Vitamins-Minerals (MULTIVITAMIN WITH MINERALS) tablet Take 1 tablet by mouth daily   fluticasone (FLONASE) 50 mcg/act nasal spray 1 spray into each nostril daily (Patient not taking: Reported on 7/19/2019) 1 Bottle 5     No current facility-administered medications for this visit        Current Outpatient Medications on File Prior to Visit   Medication Sig    albuterol (VENTOLIN HFA) 90 mcg/act inhaler Inhale 2 puffs every 4 (four) hours as needed for wheezing    arformoterol (BROVANA) 15 mcg/2 mL nebulizer solution Take 1 vial (15 mcg total) by nebulization 2 (two) times a day    budesonide (PULMICORT) 0 25 mg/2 mL nebulizer solution TAKE 1 VIAL (0 25 MG TOTAL) BY NEBULIZATION 2 (TWO) TIMES A DAY    budesonide (PULMICORT) 0 5 mg/2 mL nebulizer solution Take 1 vial (0 5 mg total) by nebulization 2 (two) times a day Rinse mouth after use   Cholecalciferol (VITAMIN D) 2000 UNITS tablet Take 2,000 Units by mouth daily   ipratropium (ATROVENT) 0 02 % nebulizer solution Take 1 vial (0 5 mg total) by nebulization 3 (three) times a day    Multiple Vitamins-Minerals (MULTIVITAMIN WITH MINERALS) tablet Take 1 tablet by mouth daily   fluticasone (FLONASE) 50 mcg/act nasal spray 1 spray into each nostril daily (Patient not taking: Reported on 7/19/2019)     No current facility-administered medications on file prior to visit  She has No Known Allergies       Review of Systems   Constitutional: Negative  HENT: Negative  Eyes: Positive for visual disturbance ( patient does have bilateral cataracts with decreased visual acuity)  Negative for photophobia, pain, discharge, redness and itching  Respiratory: Positive for shortness of breath (Chronic shortness of breath with any brisk exertion)  Negative for cough, choking, chest tightness, wheezing and stridor  Cardiovascular: Negative  Gastrointestinal: Negative  Endocrine: Negative  Genitourinary: Negative  Musculoskeletal: Negative  Skin: Negative  Allergic/Immunologic: Negative  Neurological: Negative  Hematological: Negative  Psychiatric/Behavioral: Negative            Objective:      /80 (BP Location: Left arm, Patient Position: Sitting)   Pulse 86   Temp 97 7 °F (36 5 °C)   Resp 16   Ht 5' 5" (1 651 m)   Wt 37 9 kg (83 lb 9 6 oz)   SpO2 92%   BMI 13 91 kg/m²          Physical Exam   Constitutional: She is oriented to person, place, and time  She appears well-developed  No distress  Extremely thin 79-year-old female who is awake alert in no acute distress and oriented x3   HENT:   Head: Normocephalic and atraumatic  Right Ear: External ear normal    Left Ear: External ear normal    Nose: Nose normal    Mouth/Throat: Oropharynx is clear and moist  No oropharyngeal exudate  Eyes: Pupils are equal, round, and reactive to light  Conjunctivae and EOM are normal  Right eye exhibits no discharge  Left eye exhibits no discharge  No scleral icterus  Neck: Normal range of motion  Neck supple  No JVD present  No tracheal deviation present  No thyromegaly present  Cardiovascular: Normal rate, regular rhythm, normal heart sounds and intact distal pulses  Exam reveals no gallop and no friction rub  No murmur heard  Pulmonary/Chest: Effort normal  No stridor  No respiratory distress  She has no wheezes  She has no rales  She exhibits no tenderness  Significantly decreased breath sounds anteriorly and posteriorly but no rales rhonchi or wheezes could be appreciated on exam today  Patient is wearing her oxygen   Abdominal: Soft  Bowel sounds are normal  She exhibits no distension and no mass  There is no tenderness  There is no rebound and no guarding  No hernia  Musculoskeletal: Normal range of motion  She exhibits deformity  She exhibits no edema or tenderness  Lymphadenopathy:     She has no cervical adenopathy  Neurological: She is alert and oriented to person, place, and time  She displays normal reflexes  No cranial nerve deficit or sensory deficit  She exhibits normal muscle tone  Coordination normal    Skin: Skin is warm and dry  Capillary refill takes less than 2 seconds  She is not diaphoretic  No erythema  No pallor  Psychiatric: She has a normal mood and affect  Her behavior is normal  Thought content normal    Nursing note and vitals reviewed  BMI Counseling: Body mass index is 13 91 kg/m²   Discussed the patient's BMI with her  The BMI is below average  BMI counseling and education was provided to the patient  Patient was advised to gain weight  Dietary education for weight gain was provided to the patient today

## 2019-08-26 NOTE — ASSESSMENT & PLAN NOTE
Patient does have a history of lung cancer, stage IV status post chemotherapy  Patient continues to follow-up with Oncology and they are planning for the patient undergoing a repeat CT scan in the near future

## 2019-08-26 NOTE — ASSESSMENT & PLAN NOTE
Patient is O2 dependent and continues to use her oxygen continuously  She states she has had no problems with increasing shortness of breath recently  She uses her inhalers as prescribed  Home patient will continue present treatment but was told if any acute exacerbations of her lung disease to please call immediately for evaluation  Patient continues to follow-up with her pulmonologist   Patient plans to receive the influenza vaccine in October    Is up-to-date with pneumococcal vaccine

## 2019-08-26 NOTE — ASSESSMENT & PLAN NOTE
Patient's weight is stable but is slowly decreasing over period of time  Patient states further that she is trying to supplement her diet as much as possible  I did discuss with her seeing a dietician to see if there is any way we can boost her caloric intake and she refuses

## 2019-08-26 NOTE — ASSESSMENT & PLAN NOTE
Patient admits that occasionally she still is smoking cigarettes  She was told that she needs to quit completely and in order to help her with her nicotine dependence that she should consider nicotine replacement either oral or topical in order to keep her from smoking which will further deteriorate her respiratory function

## 2019-08-26 NOTE — ASSESSMENT & PLAN NOTE
Patient does have a history of hyperlipidemia, family history of coronary artery disease  We will check a lipid profile with her next visit    She was told that she needs to increase her caloric intake but to avoid fats and cholesterol with her diet

## 2019-08-26 NOTE — PATIENT INSTRUCTIONS
Calorie Counting Diet   WHAT YOU NEED TO KNOW:   What is a calorie counting diet? It is a meal plan based on counting calories each day to reach a healthy body weight  You will need to eat fewer calories if you are trying to lose weight  Weight loss may decrease your risk for certain health problems or improve your health if you have health problems  Some of these health problems include heart disease, high blood pressure, and diabetes  What foods should I avoid? Your dietitian will tell you if you need to avoid certain foods based on your body weight and health condition  You may need to avoid high-fat foods if you are at risk for or have heart disease  You may need to eat fewer foods from the breads and starches food group if you have diabetes  How many calories are in foods? The following is a list of foods and drinks with the approximate number of calories in each  Check the food label to find the exact number of calories  A dietitian can tell you how many calories you should have from each food group each day    · Carbohydrate:      ¨ ½ of a 3-inch bagel, 1 slice of bread, or ½ of a hamburger bun or hot dog bun (80)    ¨ 1 (8-inch) flour tortilla or ½ cup of cooked rice (100)    ¨ 1 (6-inch) corn tortilla (80)    ¨ 1 (6-inch) pancake or 1 cup of bran flakes cereal (110)    ¨ ½ cup of cooked cereal (80)    ¨ ½ cup of cooked pasta (85)    ¨ 1 ounce of pretzels (100)    ¨ 3 cups of air-popped popcorn without butter or oil (80)    · Dairy:      ¨ 1 cup of skim or 1% milk (90)    ¨ 1 cup of 2% milk (120)    ¨ 1 cup of whole milk (160)    ¨ 1 cup of 2% chocolate milk (220)    ¨ 1 ounce of low-fat cheese with 3 grams of fat per ounce (70)    ¨ 1 ounce of cheddar cheese (114)    ¨ ½ cup of 1% fat cottage cheese (80)    ¨ 1 cup of plain or sugar-free, fat-free yogurt (90)    · Protein foods:      ¨ 3 ounces of fish (not breaded or fried) (95)    ¨ 3 ounces of breaded, fried fish (195)    ¨ ¾ cup of tuna canned in water (105)    ¨ 3 ounces of chicken breast without skin (105)    ¨ 1 fried chicken breast with skin (350)    ¨ ¼ cup of fat free egg substitute (40)    ¨ 1 large egg (75)    ¨ 3 ounces of lean beef or pork (165)    ¨ 3 ounces of fried pork chop or ham (185)    ¨ ½ cup of cooked dried beans, such as kidney, bella, lentils, or navy (115)    ¨ 3 ounces of bologna or lunch meat (225)    ¨ 2 links of breakfast sausage (140)    · Vegetables:      ¨ ½ cup of sliced mushrooms (10)    ¨ 1 cup of salad greens, such as lettuce, spinach, or [place] (15)    ¨ ½ cup of steamed asparagus (20)    ¨ ½ cup of cooked summer squash, zucchini squash, or green or wax beans (25)    ¨ 1 cup of broccoli or cauliflower florets, or 1 medium tomato (25)    ¨ 1 large raw carrot or ½ cup of cooked carrots (40)    ¨ ? of a medium cucumber or 1 stalk of celery (5)    ¨ 1 small baked potato (160)    ¨ 1 cup of breaded, fried vegetables (230)    · Fruit:      ¨ 1 (6-inch) banana (55)     ¨ ½ of a 4-inch grapefruit (55)    ¨ 15 grapes (60)    ¨ 1 medium orange or apple (70)    ¨ 1 large peach (65)    ¨ 1 cup of fresh pineapple chunks (75)    ¨ 1 cup of melon cubes (50)    ¨ 1¼ cups of whole strawberries (45)    ¨ ½ cup of fruit canned in juice (55)    ¨ ½ cup of fruit canned in heavy syrup (110)    ¨ ?  cup of raisins (130)    ¨ ½ cup of unsweetened fruit juice (60)    ¨ ½ cup of grape, cranberry, or prune juice (90)    · Fat:      ¨ 10 peanuts or 2 teaspoons of peanut butter (55)    ¨ 2 tablespoons of avocado or 1 tablespoon of regular salad dressing (45)    ¨ 2 slices of pavon (90)    ¨ 1 teaspoon of oil, such as safflower, canola, corn, or olive oil (45)    ¨ 2 teaspoons of low-fat margarine, or 1 tablespoon of low-fat mayonnaise (50)    ¨ 1 teaspoon of regular margarine (40)    ¨ 1 tablespoon of regular mayonnaise (135)    ¨ 1 tablespoon of cream cheese or 2 tablespoons of low-fat cream cheese (45)    ¨ 2 tablespoons of vegetable shortening (215)    · Dessert and sweets:      ¨ 8 animal crackers or 5 vanilla wafers (80)    ¨ 1 frozen fruit juice bar (80)    ¨ ½ cup of ice milk or low-fat frozen yogurt (90)    ¨ ½ cup of sherbet or sorbet (125)    ¨ ½ cup of sugar-free pudding or custard (60)    ¨ ½ cup of ice cream (140)    ¨ ½ cup of pudding or custard (175)    ¨ 1 (2-inch) square chocolate brownie (185)    · Combination foods:      ¨ Bean burrito made with an 8-inch tortilla, without cheese (275)    ¨ Chicken breast sandwich with lettuce and tomato (325)    ¨ 1 cup of chicken noodle soup (60)    ¨ 1 beef taco (175)    ¨ Regular hamburger with lettuce and tomato (310)    ¨ Regular cheeseburger with lettuce and tomato (410)     ¨ ¼ of a 12-inch cheese pizza (280)    ¨ Fried fish sandwich with lettuce and tomato (425)    ¨ Hot dog and bun (275)    ¨ 1½ cups of macaroni and cheese (310)    ¨ Taco salad with a fried tortilla shell (870)    · Low-calorie foods:      ¨ 1 tablespoon of ketchup or 1 tablespoon of fat free sour cream (15)    ¨ 1 teaspoon of mustard (5)    ¨ ¼ cup of salsa (20)    ¨ 1 large dill pickle (15)    ¨ 1 tablespoon of fat free salad dressing (10)    ¨ 2 teaspoons of low-sugar, light jam or jelly, or 1 tablespoon of sugar-free syrup (15)    ¨ 1 sugar-free popsicle (15)    ¨ 1 cup of club soda, seltzer water, or diet soda (0)  CARE AGREEMENT:   You have the right to help plan your care  Discuss treatment options with your caregivers to decide what care you want to receive  You always have the right to refuse treatment  The above information is an  only  It is not intended as medical advice for individual conditions or treatments  Talk to your doctor, nurse or pharmacist before following any medical regimen to see if it is safe and effective for you  © 2017 [employer] Information is for End User's use only and may not be sold, redistributed or otherwise used for commercial purposes   All illustrations and images included in CareNotes® are the copyrighted property of [employer]  or [employer]

## 2019-09-20 ENCOUNTER — OFFICE VISIT (OUTPATIENT)
Dept: INTERNAL MEDICINE CLINIC | Facility: CLINIC | Age: 74
End: 2019-09-20
Payer: MEDICARE

## 2019-09-20 VITALS
SYSTOLIC BLOOD PRESSURE: 112 MMHG | RESPIRATION RATE: 16 BRPM | BODY MASS INDEX: 13.9 KG/M2 | HEIGHT: 65 IN | OXYGEN SATURATION: 98 % | DIASTOLIC BLOOD PRESSURE: 84 MMHG | WEIGHT: 83.4 LBS | TEMPERATURE: 97.4 F | HEART RATE: 117 BPM

## 2019-09-20 DIAGNOSIS — R25.1 EPISODE OF SHAKING: Primary | ICD-10-CM

## 2019-09-20 DIAGNOSIS — J96.11 CHRONIC HYPOXEMIC RESPIRATORY FAILURE (HCC): ICD-10-CM

## 2019-09-20 DIAGNOSIS — R25.2 CRAMP OF LIMB: ICD-10-CM

## 2019-09-20 PROCEDURE — 99214 OFFICE O/P EST MOD 30 MIN: CPT | Performed by: NURSE PRACTITIONER

## 2019-09-20 RX ORDER — MAGNESIUM 200 MG
1 TABLET ORAL DAILY
COMMUNITY
End: 2019-10-16 | Stop reason: ALTCHOICE

## 2019-09-20 NOTE — ASSESSMENT & PLAN NOTE
O2 is stable with 2L nasal canula  Compliant with medications, she has no acute shortness of breath  Fu with pulmonology

## 2019-09-20 NOTE — ASSESSMENT & PLAN NOTE
Recurring episodes of "uncontrollable shaking" of the left forearm with hand and forearm "cramping "  These episodes are brief, lasting 30 seconds -1 minute, followed by 5-10 minutes of numbness/tingling in the arm  No focal neuro deficits on exam   Symptoms do sound suggestive of possible seizure activity  Will be checking blood work to assess for metabolic cause of her symptoms and pt to see neuro for further evaluation  Will f/u in 1 month  Addendum: spoke with pt  We will order an MRI of the brain to evaluate for a metastatic lesion which could be a possible explanation of her symptoms

## 2019-09-20 NOTE — PROGRESS NOTES
Assessment/Plan:    Episode of shaking  Recurring episodes of "uncontrollable shaking" of the left forearm with hand and forearm "cramping "  These episodes are brief, lasting 30 seconds -1 minute, followed by 5-10 minutes of numbness/tingling in the arm  No focal neuro deficits on exam   Symptoms do sound suggestive of possible seizure activity  Will be checking blood work to assess for metabolic cause of her symptoms and pt to see neuro for further evaluation  Will f/u in 1 month  Addendum: spoke with pt  We will order an MRI of the brain to evaluate for a metastatic lesion which could be a possible explanation of her symptoms  Cramp of limb  Will check blood work to assess electrolytes and thyroid function  Pt also referred to neuro for further evaluation, see discussion below  F/U in 1 month  Chronic hypoxemic respiratory failure (HCC)  O2 is stable with 2L nasal canula  Compliant with medications, she has no acute shortness of breath  Fu with pulmonology  Diagnoses and all orders for this visit:    Episode of shaking  -     Ambulatory referral to Neurology; Future  -     MRI brain w wo contrast; Future    Cramp of limb  -     Magnesium; Future  -     Comprehensive metabolic panel; Future  -     Ambulatory referral to Neurology; Future  -     TSH, 3rd generation with Free T4 reflex; Future  -     MRI brain w wo contrast; Future    Chronic hypoxemic respiratory failure (HCC)    Other orders  -     Magnesium 200 MG TABS; Take 1 tablet by mouth daily          Subjective:      Patient ID: Becky Chaves is a 69 y o  female  Pt is a 70 y/o female here today for evaluation of some recurring episodes of hand/arm cramping  She states that the first episode was while away on vacation in [facility] and she developed cramping in the hands and arms which then progressed to uncontrollable shaking of her extremities    EMS was called and they evaluated her, her vitals were stable and she was not experiencing any pain, headache, shortness of breath  They told her that she was dehydrated  She had another similar episode about 2 weeks ago, this time only the L hand/forearm were effected and she had another episode last night around 7PM   She states that she starts with a cramping and curling of her L 4th finger which then extends up her hand into her forearm then with shaking of her arm  The cramping occurs for about 30 seconds to a minute and is followed by numbness/tingling which lasts about 5-10 minutes  She denies any associated headache, dizziness, chest pain, shortness of breath, changes of her hearing or vision  She is conscious during episodes, no change in speech, no loss of bowel/bladder  Since the first episode, she has been taking supplemental magnesium 250 mg daily  No other changes in her medications  The following portions of the patient's history were reviewed and updated as appropriate: allergies, current medications, past family history, past medical history, past social history, past surgical history and problem list     Review of Systems   Constitutional: Negative for activity change, appetite change, chills, fatigue, fever and unexpected weight change  HENT: Negative for hearing loss  Eyes: Negative for visual disturbance  Respiratory: Negative for cough, chest tightness and shortness of breath  Cardiovascular: Negative for chest pain, palpitations and leg swelling  Gastrointestinal: Negative for constipation, diarrhea, nausea and vomiting  Genitourinary: Negative for dysuria and frequency  Musculoskeletal: Negative for arthralgias and myalgias  Cramping episodes, see hpi   Neurological: Positive for weakness  Negative for dizziness, facial asymmetry, speech difficulty, light-headedness, numbness and headaches  Psychiatric/Behavioral: Negative for behavioral problems, confusion, dysphoric mood, hallucinations and sleep disturbance   The patient is not nervous/anxious  Objective:      /84 (BP Location: Left arm, Patient Position: Sitting)   Pulse (!) 117   Temp (!) 97 4 °F (36 3 °C)   Resp 16   Ht 5' 5" (1 651 m)   Wt 37 8 kg (83 lb 6 4 oz)   SpO2 98%   BMI 13 88 kg/m²          Physical Exam   Constitutional: She is oriented to person, place, and time  Vital signs are normal  She appears well-developed and well-nourished  She is cooperative  HENT:   Right Ear: Hearing, tympanic membrane, external ear and ear canal normal    Left Ear: Hearing, tympanic membrane, external ear and ear canal normal    Nose: Nose normal  No mucosal edema  Mouth/Throat: Uvula is midline, oropharynx is clear and moist and mucous membranes are normal    Eyes: Pupils are equal, round, and reactive to light  Conjunctivae and lids are normal    Neck: Normal range of motion  No JVD present  Carotid bruit is not present  Cardiovascular: Normal rate, regular rhythm, normal heart sounds and intact distal pulses  No murmur heard  Pulmonary/Chest: Effort normal and breath sounds normal  No respiratory distress  Abdominal: Soft  Normal appearance and bowel sounds are normal  There is no tenderness  Musculoskeletal: Normal range of motion  She exhibits no edema  Lymphadenopathy:     She has no cervical adenopathy  Neurological: She is alert and oriented to person, place, and time  She has normal strength and normal reflexes  She displays normal reflexes  No cranial nerve deficit or sensory deficit  Some nystagmus noted in all directions  Skin: Skin is warm, dry and intact  Psychiatric: She has a normal mood and affect  Her speech is normal and behavior is normal  Judgment and thought content normal  Cognition and memory are normal    Vitals reviewed

## 2019-09-20 NOTE — ASSESSMENT & PLAN NOTE
Will check blood work to assess electrolytes and thyroid function  Pt also referred to neuro for further evaluation, see discussion below  F/U in 1 month

## 2019-09-23 ENCOUNTER — APPOINTMENT (OUTPATIENT)
Dept: LAB | Age: 74
End: 2019-09-23
Payer: MEDICARE

## 2019-09-23 ENCOUNTER — TRANSCRIBE ORDERS (OUTPATIENT)
Dept: ADMINISTRATIVE | Age: 74
End: 2019-09-23

## 2019-09-23 DIAGNOSIS — R25.2 CRAMP OF LIMB: ICD-10-CM

## 2019-09-23 LAB
ALBUMIN SERPL BCP-MCNC: 4.3 G/DL (ref 3.5–5)
ALP SERPL-CCNC: 74 U/L (ref 46–116)
ALT SERPL W P-5'-P-CCNC: 19 U/L (ref 12–78)
ANION GAP SERPL CALCULATED.3IONS-SCNC: 3 MMOL/L (ref 4–13)
AST SERPL W P-5'-P-CCNC: 16 U/L (ref 5–45)
BILIRUB SERPL-MCNC: 0.47 MG/DL (ref 0.2–1)
BUN SERPL-MCNC: 18 MG/DL (ref 5–25)
CALCIUM SERPL-MCNC: 9.3 MG/DL (ref 8.3–10.1)
CHLORIDE SERPL-SCNC: 101 MMOL/L (ref 100–108)
CO2 SERPL-SCNC: 35 MMOL/L (ref 21–32)
CREAT SERPL-MCNC: 0.64 MG/DL (ref 0.6–1.3)
GFR SERPL CREATININE-BSD FRML MDRD: 89 ML/MIN/1.73SQ M
GLUCOSE P FAST SERPL-MCNC: 81 MG/DL (ref 65–99)
MAGNESIUM SERPL-MCNC: 2.2 MG/DL (ref 1.6–2.6)
POTASSIUM SERPL-SCNC: 4.9 MMOL/L (ref 3.5–5.3)
PROT SERPL-MCNC: 7.2 G/DL (ref 6.4–8.2)
SODIUM SERPL-SCNC: 139 MMOL/L (ref 136–145)
TSH SERPL DL<=0.05 MIU/L-ACNC: 0.73 UIU/ML (ref 0.36–3.74)

## 2019-09-23 PROCEDURE — 84443 ASSAY THYROID STIM HORMONE: CPT

## 2019-09-23 PROCEDURE — 36415 COLL VENOUS BLD VENIPUNCTURE: CPT

## 2019-09-23 PROCEDURE — 80053 COMPREHEN METABOLIC PANEL: CPT

## 2019-09-23 PROCEDURE — 83735 ASSAY OF MAGNESIUM: CPT

## 2019-09-25 ENCOUNTER — TELEPHONE (OUTPATIENT)
Dept: INTERNAL MEDICINE CLINIC | Facility: CLINIC | Age: 74
End: 2019-09-25

## 2019-10-07 ENCOUNTER — HOSPITAL ENCOUNTER (OUTPATIENT)
Dept: RADIOLOGY | Age: 74
Discharge: HOME | End: 2019-10-07
Payer: MEDICARE

## 2019-10-07 ENCOUNTER — TELEPHONE (OUTPATIENT)
Dept: OTHER | Facility: OTHER | Age: 74
End: 2019-10-07

## 2019-10-07 DIAGNOSIS — R25.1 EPISODE OF SHAKING: ICD-10-CM

## 2019-10-07 DIAGNOSIS — R25.2 CRAMP OF LIMB: ICD-10-CM

## 2019-10-07 PROCEDURE — A9585 GADOBUTROL INJECTION: HCPCS | Performed by: NURSE PRACTITIONER

## 2019-10-07 PROCEDURE — 70553 MRI BRAIN STEM W/O & W/DYE: CPT

## 2019-10-07 RX ADMIN — GADOBUTROL 4 ML: 604.72 INJECTION INTRAVENOUS at 10:04

## 2019-10-08 ENCOUNTER — TELEPHONE (OUTPATIENT)
Dept: INTERNAL MEDICINE CLINIC | Facility: CLINIC | Age: 74
End: 2019-10-08

## 2019-10-08 ENCOUNTER — TELEPHONE (OUTPATIENT)
Dept: HEMATOLOGY ONCOLOGY | Facility: CLINIC | Age: 74
End: 2019-10-08

## 2019-10-08 ENCOUNTER — TELEPHONE (OUTPATIENT)
Dept: OTHER | Facility: OTHER | Age: 74
End: 2019-10-08

## 2019-10-08 ENCOUNTER — TELEPHONE (OUTPATIENT)
Dept: NEUROLOGY | Facility: CLINIC | Age: 74
End: 2019-10-08

## 2019-10-08 NOTE — TELEPHONE ENCOUNTER
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

## 2019-10-08 NOTE — TELEPHONE ENCOUNTER
ONI reached out to office regarding referral  Would like patient to be scheduled ASAP  Patient aware of MRI results and metastatic disease  Already following up with heme/onc  Please reach out to patient for scheduling

## 2019-10-08 NOTE — TELEPHONE ENCOUNTER
Patient states that someone called her just a few minutes ago and did not state as to why  Tried back line and was told for patient to call back in the morning- all physicians are gone  Made patient aware who will call the office tomorrow morning

## 2019-10-08 NOTE — TELEPHONE ENCOUNTER
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

## 2019-10-08 NOTE — TELEPHONE ENCOUNTER
I have made an appt for the patient for tomorrow at 9:40 am  I left a vm for the patient with this information   If the patient calls back please direct to me

## 2019-10-09 ENCOUNTER — TELEPHONE (OUTPATIENT)
Dept: PALLIATIVE MEDICINE | Facility: CLINIC | Age: 74
End: 2019-10-09

## 2019-10-09 ENCOUNTER — OFFICE VISIT (OUTPATIENT)
Dept: HEMATOLOGY ONCOLOGY | Facility: CLINIC | Age: 74
End: 2019-10-09
Payer: MEDICARE

## 2019-10-09 ENCOUNTER — TELEPHONE (OUTPATIENT)
Dept: INTERNAL MEDICINE CLINIC | Facility: CLINIC | Age: 74
End: 2019-10-09

## 2019-10-09 VITALS
BODY MASS INDEX: 14.33 KG/M2 | SYSTOLIC BLOOD PRESSURE: 122 MMHG | DIASTOLIC BLOOD PRESSURE: 70 MMHG | RESPIRATION RATE: 14 BRPM | WEIGHT: 86 LBS | OXYGEN SATURATION: 97 % | HEIGHT: 65 IN | TEMPERATURE: 97.2 F | HEART RATE: 120 BPM

## 2019-10-09 DIAGNOSIS — C79.31 METASTATIC ADENOCARCINOMA TO BRAIN (HCC): ICD-10-CM

## 2019-10-09 DIAGNOSIS — C34.91 MALIGNANT NEOPLASM OF RIGHT LUNG, UNSPECIFIED PART OF LUNG (HCC): Primary | ICD-10-CM

## 2019-10-09 PROCEDURE — 99215 OFFICE O/P EST HI 40 MIN: CPT | Performed by: INTERNAL MEDICINE

## 2019-10-09 RX ORDER — DEXAMETHASONE 2 MG/1
2 TABLET ORAL 2 TIMES DAILY WITH MEALS
Qty: 60 TABLET | Refills: 2 | Status: SHIPPED | OUTPATIENT
Start: 2019-10-09 | End: 2019-10-26 | Stop reason: HOSPADM

## 2019-10-09 RX ORDER — LEVETIRACETAM 500 MG/1
500 TABLET ORAL EVERY 12 HOURS SCHEDULED
Qty: 60 TABLET | Refills: 2 | Status: SHIPPED | OUTPATIENT
Start: 2019-10-09 | End: 2019-11-25 | Stop reason: SDUPTHER

## 2019-10-09 NOTE — PROGRESS NOTES
Hematology Outpatient Follow - Up Note  Dario Turner 69 y o  female MRN: @ Encounter: [id number]        Date:  10/9/2019        Assessment/ Plan:    1  Stage IV adenocarcinoma of the lung diagnosed initially in October 2017, moderately differentiated involving right hilum, mediastinum, precarinal, subcarinal and tumor extension into the right mainstem bronchus, bronchus intermedius, right middle lobe are right cervical area proven by biopsy    Molecular tests were negative for EGFR mutation, ALK gene rearrangement, Ross 1 mutation, PD L1 expression 0, positive for K-tanya mutation, TP 48    Treated with concurrent radiation therapy with chemo utilizing weekly Taxol/carboplatin completed on 02/02/2018    2  Now with brain metastases with primary lesion in the right parietal lobe measuring 2 8 cm with vasogenic edema, seizure activity, 2 small punctuate metastases, possible metastases to C6    3  Dexamethasone 2 mg p o  B i d , Keppra 500 mg p o  B i d     4  CT/PET scan to assess disease extension    5  Radiation Oncology for brain metastases    6  Palliative Care for medical marijuana    7  Follow-up with PET scan results and the plan depends on the PET scan             HPI:69year-old  female seen initially 11/6/2017 regarding moderately differentiated adenocarcinoma of the lung positive for TTF 1 and napsin and negative for P 40 diagnosed via right mainstem bronchus biopsy on October 17, 2017 by Dr Rocío Arroyo  scan chest 9/26/2017 ordered by Dr Rocío Arroyo performed as lung cancer screening study showed subcarinal adenopathy measuring 1 7 cm  Precarinal adenopathy measures 1 9 cm  The right lower lobe endobronchial soft tissue appears mass like  Lung-RADS 4A, suspicious   PET scan 11/2/2017 showed confluent of hypermetabolic right hilar and mediastinal, precarinal, subcarinal lymphadenopathy compatible with malignancy, SUV measures 17 4  right hilar and precarinal adenopathy measures approximately 4 2 x 2 2 cm  Subcarinal adenopathy measures approximately 2 5 x 1 5 cm  Right hilar devan mass image 100 measures 2 7 x 1 47 m  Tumor extends into the right mainstem bronchus, bronchus intermedius, right middle lobe and right lower lobe bronchi, as seen previously  There is also a small right thoracic paraspinal node with mild FDG activity, measuring 1 3 x 0 4 cm, SUV 2 5  is an irregular right upper lung density along the major fissure measuring 1 6 x 1 3 cm, with mild FDG activity, SUV 1 1  This may be inflammatory, versus malignancy  There is an ill-defined hypermetabolic nodule in the right parotid gland region, measuring approximately 2 2 x 1 7 cm SUV measures 11 3  This may represent a metastasis versus primary parotid gland lesions such as a Warthin's tumor or amorphic adenoma  mm left lower lung nodular density too small for PET characterization   Subtle hypermetabolic focus anterior to the right diaphragmatic larisa, SUV 2 6   She has chronic COPD secondary to active smoking, progressive dyspnea on exertion  She has had a chronic cough for many years   Used to smoke 2 packs of cigarettes daily for many many years, she does not drink alcohol   both parents still living has 1 sister in [place]  She elected to proceed with FNA right glucose avid neck mass  Pathology: Conclusive evidence of malignancy  small cell carcinoma consistent with metastasis from the patient's know adenocarcinoma of the lung  On immunostaining, the tumor cells are positive for TTF-1 and Napsin A but negative for p40 helping support the above diagnosis   RT to this area      She finished concurrent radiation therapy with weekly Taxol /carboplatin to the chest area as well as to the right neck area    Subsequent CT scans showed no evidence of disease       Interval History:  Seizure activity in the past 3 weeks, MRI of the brain on October 2019 showed large lesion in the right parietal frontal area measuring 2 8 cm with vasogenic edema, punctuate metastasis, possible metastases in the cervical spine without cord involvement        Previous Treatment:         Test Results:    Imaging: Mri Brain W Wo Contrast    Result Date: 10/7/2019  Narrative: MRI BRAIN WITH AND WITHOUT CONTRAST INDICATION: R25 2: Cramp and spasm R25 1: Tremor, unspecified  History of lung carcinoma  COMPARISON:  None  TECHNIQUE: Sagittal T1, axial T2, axial FLAIR, axial T1, axial Lewis, axial diffusion  Sagittal, axial T1 postcontrast   Axial bravo postcontrast with coronal reconstructions  PICC IV Contrast:  IMAGE QUALITY:   Diagnostic  FINDINGS: BRAIN PARENCHYMA:  There is a heterogeneously enhancing mass within the left posterior frontal vertex measuring 2 8 cm in AP diameter  Extensive surrounding vasogenic edema with mild localized mass effect within the superior  There are 2 additional enhancing lesions identified within the brain parenchyma, one within the anterior right occipital lobe on series 10 image 73 measuring 5 mm, and one slightly more posteriorly and inferiorly within the left parafalcine occipital lobe measuring 6 mm  Scattered white matter hyperintensities on T2 and FLAIR imaging suggestive of mild chronic microangiopathy  Mild chronic microangiopathic change within the brainstem  Diffusion imaging demonstrates a punctate focus of restricted diffusion involving the left inferior parietal lobe on series 3 image 20  This appears restricted on the ADC map and is consistent with a small acute to subacute infarct  VENTRICLES:  Normal  SELLA AND PITUITARY GLAND:  Normal  ORBITS:  Normal  PARANASAL SINUSES:  Normal  VASCULATURE:  Evaluation of the major intracranial vasculature demonstrates appropriate flow voids  CALVARIUM AND SKULL BASE:  Normal marrow signal within the calvarium, skull base and clivus    EXTRACRANIAL SOFT TISSUES:  There is low signal within the C3 and C4 vertebral bodies demonstrating heterogeneous enhancement after infiltration of contrast, suspicious for osseous metastasis  Impression: 3 separate intracranial enhancing masses, the largest of which is seen within the right posterior frontal vertex measuring 2 8 cm in maximum diameter surrounding vasogenic edema  Findings consistent with intracranial metastasis  Punctate diffusion abnormality on series 3 image 20 within the left inferior parietal lobe suggestive of small acute to subacute infarct  Replacement of normal fatty marrow identified within the C3 and C4 vertebral bodies and posterior elements  This enhances after administration of contrast and is suggestive of osseous metastasis  I personally discussed this study with Sandria Goodell on 10/7/2019 at 10:03 AM  Workstation performed: [id number]       Labs:   Lab Results   Component Value Date    WBC 5 32 07/05/2019    HGB 13 6 07/05/2019    HCT 43 0 07/05/2019    MCV 99 (H) 07/05/2019     07/05/2019     Lab Results   Component Value Date     02/27/2015    K 4 9 09/23/2019     09/23/2019    CO2 35 (H) 09/23/2019    ANIONGAP 10 02/27/2015    BUN 18 09/23/2019    CREATININE 0 64 09/23/2019    GLUCOSE 94 02/27/2015    GLUF 81 09/23/2019    CALCIUM 9 3 09/23/2019    AST 16 09/23/2019    ALT 19 09/23/2019    ALKPHOS 74 09/23/2019    PROT 7 5 02/27/2015    BILITOT 0 53 02/27/2015    EGFR 89 09/23/2019       No results found for: IRON, TIBC, FERRITIN    No results found for: [id number]      ROS:   Review of Systems   Constitutional: Negative for activity change, appetite change, diaphoresis, fatigue, fever and unexpected weight change  HENT: Negative for facial swelling, hearing loss, rhinorrhea, sinus pressure, sinus pain, sneezing, sore throat and tinnitus  Eyes: Negative for photophobia, pain, discharge, redness, itching and visual disturbance  Respiratory: Positive for shortness of breath (Exertional dyspnea)  Negative for apnea and chest tightness      Cardiovascular: Negative for chest pain, palpitations and leg swelling  Gastrointestinal: Negative for abdominal distention, abdominal pain, blood in stool, constipation, diarrhea, nausea, rectal pain and vomiting  Endocrine: Negative for cold intolerance, heat intolerance, polydipsia and polyphagia  Genitourinary: Negative for difficulty urinating, dyspareunia, frequency, hematuria, pelvic pain and urgency  Musculoskeletal: Negative for arthralgias, back pain, gait problem, joint swelling and myalgias  Skin: Negative for color change, pallor and rash  Allergic/Immunologic: Negative for environmental allergies and food allergies  Neurological: Positive for seizures ( tonic clonic) and weakness  Negative for dizziness, tremors, syncope, speech difficulty, numbness and headaches  Hematological: Negative for adenopathy  Does not bruise/bleed easily  Psychiatric/Behavioral: Negative for agitation, confusion, dysphoric mood, hallucinations and suicidal ideas  Current Medications: Reviewed  Allergies: Reviewed  PMH/FH/SH:  Reviewed      Physical Exam:    Body surface area is 1 38 meters squared  Wt Readings from Last 3 Encounters:   10/09/19 39 kg (86 lb)   09/20/19 37 8 kg (83 lb 6 4 oz)   08/26/19 37 9 kg (83 lb 9 6 oz)        Temp Readings from Last 3 Encounters:   10/09/19 (!) 97 2 °F (36 2 °C)   09/20/19 (!) 97 4 °F (36 3 °C)   08/26/19 97 7 °F (36 5 °C)        BP Readings from Last 3 Encounters:   10/09/19 122/70   09/20/19 112/84   08/26/19 122/80         Pulse Readings from Last 3 Encounters:   10/09/19 (!) 120   09/20/19 (!) 117   08/26/19 86        Physical Exam   Constitutional: She is oriented to person, place, and time  She appears well-developed  No distress  Cachectic   HENT:   Head: Normocephalic and atraumatic  Eyes: Conjunctivae are normal    Neck: Normal range of motion  Neck supple  No tracheal deviation present  Cardiovascular: Normal rate and regular rhythm  Exam reveals no gallop and no friction rub     No murmur heard  Pulmonary/Chest: Effort normal  No respiratory distress  She has no wheezes  She has no rales  She exhibits no tenderness  Distant breath sounds bilaterally   Abdominal: Soft  She exhibits no distension  There is no tenderness  Musculoskeletal: She exhibits no edema  Lymphadenopathy:     She has no cervical adenopathy  Neurological: She is alert and oriented to person, place, and time  Skin: Skin is warm and dry  She is not diaphoretic  No erythema  No pallor  Psychiatric: She has a normal mood and affect  Her behavior is normal  Judgment and thought content normal    Vitals reviewed  ECO    Goals and Barriers:  Current Goal: Minimize effects of disease  Barriers: None  Patient's Capacity to Self Care:  Patient is able to self care      Code Status: [unfilled]

## 2019-10-09 NOTE — TELEPHONE ENCOUNTER
Medical Marijuana Pre-Visit Screening for Palliative & Supportive Care    Referral Source: Dr Mono Guthrieblas  Diagnosis: Brain tumor  Is the diagnosis an approved serious medical condition as outlined by [place] 16: Yes  History/Symptoms: loss appetite, SOB, seizures with Brain Mets  Does the patient's diagnosis fall within the current scope of our Palliative & Supportive Care practice: Yes  Does the patient intend to use MMJ with palliative intent: Yes    Does the patient currently have a signed controlled substances contract with another provider: no  Is the patient a resident of [place] with a valid state ID or 's license: Yes  Has the patient registered on the [address] [place] [address]  website: Yes  [email or web address]     Prior to any scheduled visit, the patient has been informed of the following:  · [facility] providers are knowledgeable about many ways to help people  A visit to discuss MMJ does not mean that the provider agrees that this is the best way to help you and may make other recommendations  · There is an expectation and requirement by the [place] MMJ law for continuity of care if your certification is completed  · You will be expected to sign an informed consent  · A PDMP report will be reviewed before your visit  · This may effect your ability to purchase a handgun  · Medical marijuana products are not covered by insurance  The dispensaries do not accept credit cards  · The certification does not exempt you from any employer based drug screening programs and may effect your ability to participate in federally funded programs  · [facility] does not allow for medical marijuana possession or use at any of it's inpatient facilities  If it is brought it you will be asked to send it home with a designated representative (friend or family member)    If not one is available to take the product(s) home they will be stored in a secure location until you are discharged    · Please spend time becoming familiar with the information on the website before your visit: [facility]    Date of scheduled visit: 10/24/19  10:20am

## 2019-10-09 NOTE — TELEPHONE ENCOUNTER
Pt wanted to inform Dr Andre that she saw her oncologist Jr Mccauley today  He prescribed her seizure medication, steroids and a PET scan  Pt canceled appt with neuro dr as per oncologist's request  If you have any questions, pt can be reached back at [phone]  Thank you!

## 2019-10-09 NOTE — TELEPHONE ENCOUNTER
----- Message from Mireya Bonilla sent at 10/9/2019  2:52 PM EDT -----  Regarding: RE: Linda Mercado, cousin  called back has reg# ready for a call from [facility] - screen questions  reg# [id number]  ----- Message -----  From: Shamar De Los Santos MA  Sent: 10/9/2019  10:48 AM EDT  To: Sebastien Dejesus RN, #  Subject: MMJ inquiry                                      I spoke with Alissa Mcneill relative/friend of this Dr Atiya Abernathy pt  Patient saw Dr Nelson English as inpt at [facility] and is inquiring @ [address] - I faxed Alissa Mcneill the website and they will call us after they registered on the site to be screened

## 2019-10-11 ENCOUNTER — TELEPHONE (OUTPATIENT)
Dept: HEMATOLOGY ONCOLOGY | Facility: CLINIC | Age: 74
End: 2019-10-11

## 2019-10-11 NOTE — TELEPHONE ENCOUNTER
Stated that she has been having the dry heaves all day  She wants to know if this is a side effect of one of the 2 medications that Dr Myles Diana put her on  She was seen on 10  9 19

## 2019-10-14 ENCOUNTER — HOSPITAL ENCOUNTER (OUTPATIENT)
Dept: RADIOLOGY | Age: 74
Discharge: HOME | End: 2019-10-14
Payer: MEDICARE

## 2019-10-14 DIAGNOSIS — C34.11 MALIGNANT NEOPLASM OF UPPER LOBE OF RIGHT LUNG (HCC): ICD-10-CM

## 2019-10-14 DIAGNOSIS — C79.31 METASTATIC ADENOCARCINOMA TO BRAIN (HCC): ICD-10-CM

## 2019-10-14 LAB — GLUCOSE SERPL-MCNC: 76 MG/DL (ref 65–140)

## 2019-10-14 PROCEDURE — 82948 REAGENT STRIP/BLOOD GLUCOSE: CPT

## 2019-10-14 PROCEDURE — A9552 F18 FDG: HCPCS

## 2019-10-14 PROCEDURE — 78815 PET IMAGE W/CT SKULL-THIGH: CPT

## 2019-10-16 ENCOUNTER — OFFICE VISIT (OUTPATIENT)
Dept: NEUROSURGERY | Facility: CLINIC | Age: 74
End: 2019-10-16
Payer: MEDICARE

## 2019-10-16 VITALS
DIASTOLIC BLOOD PRESSURE: 61 MMHG | HEART RATE: 119 BPM | HEIGHT: 65 IN | SYSTOLIC BLOOD PRESSURE: 122 MMHG | RESPIRATION RATE: 16 BRPM | WEIGHT: 86.4 LBS | TEMPERATURE: 98.8 F | BODY MASS INDEX: 14.39 KG/M2

## 2019-10-16 DIAGNOSIS — C79.31 METASTATIC ADENOCARCINOMA TO BRAIN (HCC): Primary | ICD-10-CM

## 2019-10-16 DIAGNOSIS — G93.6 CEREBRAL EDEMA (HCC): ICD-10-CM

## 2019-10-16 PROCEDURE — 99205 OFFICE O/P NEW HI 60 MIN: CPT | Performed by: NEUROLOGICAL SURGERY

## 2019-10-16 NOTE — H&P (VIEW-ONLY)
Neurosurgery Office Note  Melissa Franco 69 y o  female MRN: [id number]      Assessment/Plan      Diagnoses and all orders for this visit:    Metastatic adenocarcinoma to brain [facility])  -     Case request operating room: IMAGE-GUIDED RIGHT FRONTAL CRANIOTOMY FOR TUMOR; Standing  -     Case request operating room: IMAGE-GUIDED RIGHT FRONTAL CRANIOTOMY FOR TUMOR    Cerebral edema ([facility] [address] )  -     Case request operating room: IMAGE-GUIDED RIGHT FRONTAL CRANIOTOMY FOR TUMOR; Standing  -     Case request operating room: IMAGE-GUIDED RIGHT FRONTAL CRANIOTOMY FOR TUMOR          Discussion:     74-year-old woman known history of metastatic  adenocarcinoma, lung origin  Was diagnosed in 2017,  Has followed with Dr Uyen Mock and Dhruv Castanon, underwent chemoRT, etc      patient states as far back as earlier this summer, she has had episodes of left hand "cramping" which would last for 5-10 minutes and then resolve  She describes her hand johnson into a claw posterior  Occasionally these involved hand /arm shaking  She denies ever losing consciousness with these episodes  These have become progressively more frequent since the summer  As a result, she was scheduled for an MRI scan of the brain  She had an episode most recently 10/10/19   That time she was placed on Keppra and has had no episodes since  She was also placed on 2 mg b i d of dexamethasone  MRI scan of the brain done 10/7/19 demonstrates 3 enhancing lesions, the largest 2 8 cm in AP diameter right frontal/frontoparietal with associated vasogenic edema, another measuring 5 mm in the right occipital lobe, and other measuring 6 mm in the left parafalcine occipital lobe  Case was reviewed at [facility] earlier today  Recent PET scan with subtle disease, essentially stable  Performance status good  Consensus was to treat the 2 smaller lesions with SRS     However, the patient may well struggle/continue to suffer with seizures, progressive cerebral edema/steroid dependence, etc , if the right frontal lesion was treated with radiation alone  Surgery would likely reduce the edema burden, and potentially control seizures better  I discussed this in detail today with the patient and her cousin, Yessy Chao (a hospice coordinator)  I reviewed the pros and cons of these different approaches  Risks of surgery include but not limited to infection, bleeding, VTE, seizures, CSF leak, transient or permanent neurologic dysfunction  I estimated the chance of transient hand and arm weakness at 25%, permanent weakness at 5%  Further, the patient is on home oxygen, 2 L  This complicates her anesthetic options, and I will consult with Dr Andreea Shannon to determine if she is at prohibitive risk for anesthesia  The patient is most inclined to proceed with surgery, and then adjuvant SRS/ S RT to the cavity as well as definitive treatment for the other 2 occipital lesions  If her anesthetic risk is prohibitive, we will treat all 3 with SRS/ S RT  The patient does have a connection with Dr Eduar Garces, and we are happy to provide imaging, reports, etc , at the patient's request to Dr Fernanda Shen for review  Plan, and written consent obtained for: Image guided right frontal craniotomy for resection of mass  We will for motor mapping to attempt to minimize the risk of permanent neurologic disability  Metrics:  EQ5D5L 1 000, VA S 80, KPS 80, ECOG 1     CHIEF COMPLAINT    Chief Complaint   Patient presents with    Consult     NP Consult to discuss treatment; referred by Dr Blanca Posadas    History of Present Illness     69y o  year old female     HPI    See Discussion    REVIEW OF SYSTEMS    Review of Systems   Constitutional: Positive for appetite change (increased a little bit with steroids) and fatigue (moderate, daily)  HENT: Negative  Eyes: Negative  Respiratory: Positive for cough (rarely productive, clear) and shortness of breath (with exertion)  Requires 2 liters  O2   Cardiovascular: Negative  Gastrointestinal: Negative  1 episode of heaves 4-5 hrs last Thursday night   Endocrine: Positive for cold intolerance  Genitourinary: Negative  Musculoskeletal: Positive for neck pain (occasional)  Gets a little off balance if moving too quickly   Skin: Negative  Allergic/Immunologic: Positive for food allergies (clams, oysters, mussels)  Neurological: Positive for seizures (none since starting Keppra (all left sided)) and numbness (B/L fingers 4,5 N/T)  Negative for dizziness, light-headedness and headaches  Hematological: Bruises/bleeds easily (bruises)  Psychiatric/Behavioral: Negative  Meds/Allergies     Current Outpatient Medications   Medication Sig Dispense Refill    albuterol (VENTOLIN HFA) 90 mcg/act inhaler Inhale 2 puffs every 4 (four) hours as needed for wheezing 1 Inhaler 0    arformoterol (BROVANA) 15 mcg/2 mL nebulizer solution Take 1 vial (15 mcg total) by nebulization 2 (two) times a day 120 vial 5    budesonide (PULMICORT) 0 5 mg/2 mL nebulizer solution Take 1 vial (0 5 mg total) by nebulization 2 (two) times a day Rinse mouth after use  120 mL 5    Cholecalciferol (VITAMIN D) 2000 UNITS tablet Take 2,000 Units by mouth daily   dexamethasone (DECADRON) 2 mg tablet Take 1 tablet (2 mg total) by mouth 2 (two) times a day with meals 60 tablet 2    ipratropium (ATROVENT) 0 02 % nebulizer solution Take 1 vial (0 5 mg total) by nebulization 3 (three) times a day 90 vial 5    levETIRAcetam (KEPPRA) 500 mg tablet Take 1 tablet (500 mg total) by mouth every 12 (twelve) hours 60 tablet 2    Multiple Vitamins-Minerals (MULTIVITAMIN WITH MINERALS) tablet Take 1 tablet by mouth daily  No current facility-administered medications for this visit          No Known Allergies    PAST HISTORY    Past Medical History:   Diagnosis Date    Breast cancer ([facility] [address] ) 1996    COPD, severe ([facility] [address] )     Requires supplemental oxygen     Stage 4 lung cancer [facility])        Past Surgical History:   Procedure Laterality Date    APPENDECTOMY      COLONOSCOPY N/A 4/18/2016    Procedure: COLONOSCOPY;  Surgeon: Jelani Paige MD;  Location: [facility]; Service:     LUNG CANCER SURGERY      MASTECTOMY, RADICAL Right 1996    DC [address] N/A 10/17/2017    Procedure: Irma Valentin;  Surgeon: Anastacia Black MD;  Location: [facility]; Service: Pulmonary    TONSILLECTOMY         Social History     Tobacco Use    Smoking status: Current Some Day Smoker     Packs/day: 1 00     Years: 55 00     Pack years: 55 00     Types: Cigarettes     Start date: 1962    Smokeless tobacco: Never Used    Tobacco comment: NOW DOWN TO 3 CIGARETTES A DAY ( 05/14/19)   Substance Use Topics    Alcohol use: Not Currently     Alcohol/week: 0 0 standard drinks     Frequency: Never     Drinks per session: Patient refused     Binge frequency: Never    Drug use: No       Family History   Problem Relation Age of Onset    Heart disease Mother         cardiac disorder         Above history personally reviewed  EXAM    Vitals:Blood pressure 122/61, pulse (!) 119, temperature 98 8 °F (37 1 °C), temperature source Tympanic, resp  rate 16, height 5' 5" (1 651 m), weight 39 2 kg (86 lb 6 4 oz)  ,Body mass index is 14 38 kg/m²  Physical Exam   Constitutional: She is oriented to person, place, and time  She appears well-developed and well-nourished  HENT:   Head: Normocephalic  Eyes: No scleral icterus  Neck: Neck supple  Cardiovascular: Normal rate  Pulmonary/Chest: Effort normal  No respiratory distress  Portable oxygen   Abdominal: Soft  Neurological: She is alert and oriented to person, place, and time  Finger-nose-finger test:  error initially with left hand to nose, corrected on retry  Right intact  GCS eye subscore is 4  GCS verbal subscore is 5  GCS motor subscore is 6  Skin: Skin is warm and dry  Psychiatric: She has a normal mood and affect  Her speech is normal and behavior is normal    Vitals reviewed  Neurologic Exam     Mental Status   Oriented to person, place, and time  Attention: normal    Speech: speech is normal   Level of consciousness: alert    Cranial Nerves     CN VII   Facial expression full, symmetric  Motor Exam   Muscle bulk: normal  Overall muscle tone: normal  Right arm pronator drift: absent  Left arm pronator drift: absent  Moves all extremities, grossly normal     Sensory Exam   Light touch normal      No extinction  Gait, Coordination, and Reflexes     Coordination   Finger-nose-finger test:  error initially with left hand to nose, corrected on retry  Right intact  Tremor   Resting tremor: absent  Intention tremor: absent  Action tremor: absent  No aids  MEDICAL DECISION MAKING    Imaging Studies:     Mri Brain W Wo Contrast    Result Date: 10/7/2019  Narrative: MRI BRAIN WITH AND WITHOUT CONTRAST INDICATION: R25 2: Cramp and spasm R25 1: Tremor, unspecified  History of lung carcinoma  COMPARISON:  None  TECHNIQUE: Sagittal T1, axial T2, axial FLAIR, axial T1, axial [place], axial diffusion  Sagittal, axial T1 postcontrast   Axial bravo postcontrast with coronal reconstructions  PICC IV Contrast:  IMAGE QUALITY:   Diagnostic  FINDINGS: BRAIN PARENCHYMA:  There is a heterogeneously enhancing mass within the left posterior frontal vertex measuring 2 8 cm in AP diameter  Extensive surrounding vasogenic edema with mild localized mass effect within the superior  There are 2 additional enhancing lesions identified within the brain parenchyma, one within the anterior right occipital lobe on series 10 image 73 measuring 5 mm, and one slightly more posteriorly and inferiorly within the left parafalcine occipital lobe measuring 6 mm  Scattered white matter hyperintensities on T2 and FLAIR imaging suggestive of mild chronic microangiopathy    Mild chronic microangiopathic change within the brainstem  Diffusion imaging demonstrates a punctate focus of restricted diffusion involving the left inferior parietal lobe on series 3 image 20  This appears restricted on the ADC map and is consistent with a small acute to subacute infarct  VENTRICLES:  Normal  SELLA AND PITUITARY GLAND:  Normal  ORBITS:  Normal  PARANASAL SINUSES:  Normal  VASCULATURE:  Evaluation of the major intracranial vasculature demonstrates appropriate flow voids  CALVARIUM AND SKULL BASE:  Normal marrow signal within the calvarium, skull base and clivus  EXTRACRANIAL SOFT TISSUES:  There is low signal within the C3 and C4 vertebral bodies demonstrating heterogeneous enhancement after infiltration of contrast, suspicious for osseous metastasis  Impression: 3 separate intracranial enhancing masses, the largest of which is seen within the right posterior frontal vertex measuring 2 8 cm in maximum diameter surrounding vasogenic edema  Findings consistent with intracranial metastasis  Punctate diffusion abnormality on series 3 image 20 within the left inferior parietal lobe suggestive of small acute to subacute infarct  Replacement of normal fatty marrow identified within the C3 and C4 vertebral bodies and posterior elements  This enhances after administration of contrast and is suggestive of osseous metastasis  I personally discussed this study with Wally Colmenares on 10/7/2019 at 10:03 AM  Workstation performed: [id number]     Nm Pet Ct Skull Base To Mid Thigh    Result Date: 10/14/2019  Narrative: PET/CT SCAN INDICATION:  Metastatic lung carcinoma with brain metastases  There is also a previous history of breast carcinoma  Assess treatment response MODIFIER: PS COMPARISON: Most recent chest CT dated 7/10/2019 and earlier, prior PET scan 6/18/2018 CELL TYPE:  Moderately differentiated adenocarcinoma of the right lung TECHNIQUE:   8 7 mCi F-18-FDG administered IV   Multiplanar attenuation corrected and non attenuation corrected PET images are available for interpretation, and contiguous, low dose, axial CT sections were obtained from the skull base through the femurs   Intravenous contrast material was not utilized  This examination, like all CT scans performed in the [facility], was performed utilizing techniques to minimize radiation dose exposure, including the use of iterative reconstruction and automated exposure control  Fasting serum glucose: 76 mg/dl FINDINGS: VISUALIZED BRAIN:   The right frontal parietal lesion seen on MRI study dated 10/7/2019 is noted is a hypermetabolic lesion on the PET scan, image 53, SUV max is 17 0  No obvious midline shift or hemorrhage noted on this limited study  HEAD/NECK:   There is a physiologic distribution of FDG  No FDG avid cervical adenopathy is seen  CT images: Unremarkable  CHEST:   The right lower lobe posterior subpleural opacity initially seen on 3/7/2019 has essentially resolved  However, although no discretely enlarged lymph node is seen, there is focal hypermetabolism in the subcarinal aspect of the mediastinum with SUV max of 3 0  There is also focal hypermetabolism within the inferomedial aspect of the right pulmonary hilum on image 155 with SUV max of 3 4  Again, no discretely enlarged lymph nodes are appreciated  There is a focal region of right posterior mediastinal/right paravertebral soft tissue opacity on image 186 measuring 1 2 x 0 8 cm, SUV max 3 4  There is severe pulmonary emphysema  Biapical pleural-parenchymal opacities are identified but demonstrating no abnormal activity suggesting scarring  No evidence of pleural effusion or pericardial effusion CT images: Coronary artery calcification ABDOMEN:   There is a focus of increased activity identified in the posterior right upper quadrant, near the larisa of the right diaphragm    This was also present on the previous PET/CT from 6/18/2018 but is now more intense, SUV max is currently 3 8, earlier 2 3  No definite abnormality seen at CT images without oral or IV contrast   There are no other foci of abnormal radiopharmaceutical activity  CT images: Paucity of intra-abdominal adipose tissue  No hydronephrosis or bowel obstruction  No ascites  PELVIS: No FDG avid soft tissue lesions are seen  CT images: Scattered colonic diverticula OSSEOUS STRUCTURES: No FDG avid lesions are seen  Note made of MRI demonstrating findings suspicious for metastatic disease to the C3 and C4 vertebral bodies  Aside from degenerative disc disease and facet joint arthritis, no specific abnormalities of the cervical spine are detected on this study  Impression: 1  The right frontoparietal lesion seen on MRI is hypermetabolic on this examination, as above 2  Although the right lower lobe subpleural opacity is no longer present, there is a focal area of hypermetabolism in the subcarinal mediastinum, and right inferior hilum  Although no discretely enlarged lymph nodes are seen, these areas are concerning for neoplastic involvement of lymph nodes  Similarly, right sided posterior mediastinal/paravertebral soft tissue hypermetabolic nodule concerning for adenopathy/metastatic lesion  3  Posterior right upper quadrant focus of increased activity, SUV max 3 8  Potential site of malignant adenopathy  No other signs of hypermetabolic soft tissue metastasis in the abdomen or pelvis 4  No osseous lesions are detected  Note made of previous MRI concern of cervical spine metastatic disease  Aside from degenerative disc disease and facet joint arthritis, no specific cervical spine osseous abnormalities are seen on this PET/CT Workstation performed: [id number]       I have personally reviewed pertinent reports     and I have personally reviewed pertinent films in PACS with a Radiologist   At [facility]

## 2019-10-16 NOTE — PROGRESS NOTES
Neurosurgery Office Note  Estefania Ruiz 69 y o  female MRN: [id number]      Assessment/Plan      Diagnoses and all orders for this visit:    Metastatic adenocarcinoma to brain [facility])  -     Case request operating room: IMAGE-GUIDED RIGHT FRONTAL CRANIOTOMY FOR TUMOR; Standing  -     Case request operating room: IMAGE-GUIDED RIGHT FRONTAL CRANIOTOMY FOR TUMOR    Cerebral edema ([facility] [address] )  -     Case request operating room: IMAGE-GUIDED RIGHT FRONTAL CRANIOTOMY FOR TUMOR; Standing  -     Case request operating room: IMAGE-GUIDED RIGHT FRONTAL CRANIOTOMY FOR TUMOR          Discussion:     72-year-old woman known history of metastatic  adenocarcinoma, lung origin  Was diagnosed in 2017,  Has followed with Dr Ellie Pena and Homero Meza, underwent chemoRT, etc      patient states as far back as earlier this summer, she has had episodes of left hand "cramping" which would last for 5-10 minutes and then resolve  She describes her hand johnson into a claw posterior  Occasionally these involved hand /arm shaking  She denies ever losing consciousness with these episodes  These have become progressively more frequent since the summer  As a result, she was scheduled for an MRI scan of the brain  She had an episode most recently 10/10/19   That time she was placed on Keppra and has had no episodes since  She was also placed on 2 mg b i d of dexamethasone  MRI scan of the brain done 10/7/19 demonstrates 3 enhancing lesions, the largest 2 8 cm in AP diameter right frontal/frontoparietal with associated vasogenic edema, another measuring 5 mm in the right occipital lobe, and other measuring 6 mm in the left parafalcine occipital lobe  Case was reviewed at [facility] earlier today  Recent PET scan with subtle disease, essentially stable  Performance status good  Consensus was to treat the 2 smaller lesions with SRS     However, the patient may well struggle/continue to suffer with seizures, progressive cerebral edema/steroid dependence, etc , if the right frontal lesion was treated with radiation alone  Surgery would likely reduce the edema burden, and potentially control seizures better  I discussed this in detail today with the patient and her cousin, Loura Hashimoto (a hospice coordinator)  I reviewed the pros and cons of these different approaches  Risks of surgery include but not limited to infection, bleeding, VTE, seizures, CSF leak, transient or permanent neurologic dysfunction  I estimated the chance of transient hand and arm weakness at 25%, permanent weakness at 5%  Further, the patient is on home oxygen, 2 L  This complicates her anesthetic options, and I will consult with Dr Tera Gan to determine if she is at prohibitive risk for anesthesia  The patient is most inclined to proceed with surgery, and then adjuvant SRS/ S RT to the cavity as well as definitive treatment for the other 2 occipital lesions  If her anesthetic risk is prohibitive, we will treat all 3 with SRS/ S RT  The patient does have a connection with Dr Joy Calderon, and we are happy to provide imaging, reports, etc , at the patient's request to Dr Kalpana Yanes for review  Plan, and written consent obtained for: Image guided right frontal craniotomy for resection of mass  We will for motor mapping to attempt to minimize the risk of permanent neurologic disability  Metrics:  EQ5D5L 1 000, VA S 80, KPS 80, ECOG 1     CHIEF COMPLAINT    Chief Complaint   Patient presents with    Consult     NP Consult to discuss treatment; referred by Dr Jefferson Lei    History of Present Illness     69y o  year old female     HPI    See Discussion    REVIEW OF SYSTEMS    Review of Systems   Constitutional: Positive for appetite change (increased a little bit with steroids) and fatigue (moderate, daily)  HENT: Negative  Eyes: Negative  Respiratory: Positive for cough (rarely productive, clear) and shortness of breath (with exertion)  Requires 2 liters  O2   Cardiovascular: Negative  Gastrointestinal: Negative  1 episode of heaves 4-5 hrs last Thursday night   Endocrine: Positive for cold intolerance  Genitourinary: Negative  Musculoskeletal: Positive for neck pain (occasional)  Gets a little off balance if moving too quickly   Skin: Negative  Allergic/Immunologic: Positive for food allergies (clams, oysters, mussels)  Neurological: Positive for seizures (none since starting Keppra (all left sided)) and numbness (B/L fingers 4,5 N/T)  Negative for dizziness, light-headedness and headaches  Hematological: Bruises/bleeds easily (bruises)  Psychiatric/Behavioral: Negative  Meds/Allergies     Current Outpatient Medications   Medication Sig Dispense Refill    albuterol (VENTOLIN HFA) 90 mcg/act inhaler Inhale 2 puffs every 4 (four) hours as needed for wheezing 1 Inhaler 0    arformoterol (BROVANA) 15 mcg/2 mL nebulizer solution Take 1 vial (15 mcg total) by nebulization 2 (two) times a day 120 vial 5    budesonide (PULMICORT) 0 5 mg/2 mL nebulizer solution Take 1 vial (0 5 mg total) by nebulization 2 (two) times a day Rinse mouth after use  120 mL 5    Cholecalciferol (VITAMIN D) 2000 UNITS tablet Take 2,000 Units by mouth daily   dexamethasone (DECADRON) 2 mg tablet Take 1 tablet (2 mg total) by mouth 2 (two) times a day with meals 60 tablet 2    ipratropium (ATROVENT) 0 02 % nebulizer solution Take 1 vial (0 5 mg total) by nebulization 3 (three) times a day 90 vial 5    levETIRAcetam (KEPPRA) 500 mg tablet Take 1 tablet (500 mg total) by mouth every 12 (twelve) hours 60 tablet 2    Multiple Vitamins-Minerals (MULTIVITAMIN WITH MINERALS) tablet Take 1 tablet by mouth daily  No current facility-administered medications for this visit          No Known Allergies    PAST HISTORY    Past Medical History:   Diagnosis Date    Breast cancer ([facility] [address] ) 1996    COPD, severe ([facility] [address] )     Requires supplemental oxygen     Stage 4 lung cancer [facility])        Past Surgical History:   Procedure Laterality Date    APPENDECTOMY      COLONOSCOPY N/A 4/18/2016    Procedure: COLONOSCOPY;  Surgeon: Malena Evans MD;  [facility]; Service:     LUNG CANCER SURGERY      MASTECTOMY, RADICAL Right 1996    [address] N/A 10/17/2017    Procedure: [place];  Surgeon: Suraj Aceves MD;  Location: [facility]; Service: Pulmonary    TONSILLECTOMY         Social History     Tobacco Use    Smoking status: Current Some Day Smoker     Packs/day: 1 00     Years: 55 00     Pack years: 55 00     Types: Cigarettes     Start date: 1962    Smokeless tobacco: Never Used    Tobacco comment: NOW DOWN TO 3 CIGARETTES A DAY ( 05/14/19)   Substance Use Topics    Alcohol use: Not Currently     Alcohol/week: 0 0 standard drinks     Frequency: Never     Drinks per session: Patient refused     Binge frequency: Never    Drug use: No       Family History   Problem Relation Age of Onset    Heart disease Mother         cardiac disorder         Above history personally reviewed  EXAM    Vitals:Blood pressure 122/61, pulse (!) 119, temperature 98 8 °F (37 1 °C), temperature source Tympanic, resp  rate 16, height 5' 5" (1 651 m), weight 39 2 kg (86 lb 6 4 oz)  ,Body mass index is 14 38 kg/m²  Physical Exam   Constitutional: She is oriented to person, place, and time  She appears well-developed and well-nourished  HENT:   Head: Normocephalic  Eyes: No scleral icterus  Neck: Neck supple  Cardiovascular: Normal rate  Pulmonary/Chest: Effort normal  No respiratory distress  Portable oxygen   Abdominal: Soft  Neurological: She is alert and oriented to person, place, and time  Finger-nose-finger test:  error initially with left hand to nose, corrected on retry  Right intact  GCS eye subscore is 4  GCS verbal subscore is 5  GCS motor subscore is 6  Skin: Skin is warm and dry  Psychiatric: She has a normal mood and affect  Her speech is normal and behavior is normal    Vitals reviewed  Neurologic Exam     Mental Status   Oriented to person, place, and time  Attention: normal    Speech: speech is normal   Level of consciousness: alert    Cranial Nerves     CN VII   Facial expression full, symmetric  Motor Exam   Muscle bulk: normal  Overall muscle tone: normal  Right arm pronator drift: absent  Left arm pronator drift: absent  Moves all extremities, grossly normal     Sensory Exam   Light touch normal      No extinction  Gait, Coordination, and Reflexes     Coordination   Finger-nose-finger test:  error initially with left hand to nose, corrected on retry  Right intact  Tremor   Resting tremor: absent  Intention tremor: absent  Action tremor: absent  No aids  MEDICAL DECISION MAKING    Imaging Studies:     Mri Brain W Wo Contrast    Result Date: 10/7/2019  Narrative: MRI BRAIN WITH AND WITHOUT CONTRAST INDICATION: R25 2: Cramp and spasm R25 1: Tremor, unspecified  History of lung carcinoma  COMPARISON:  None  TECHNIQUE: Sagittal T1, axial T2, axial FLAIR, axial T1, axial [place], axial diffusion  Sagittal, axial T1 postcontrast   Axial bravo postcontrast with coronal reconstructions  PICC IV Contrast:  IMAGE QUALITY:   Diagnostic  FINDINGS: BRAIN PARENCHYMA:  There is a heterogeneously enhancing mass within the left posterior frontal vertex measuring 2 8 cm in AP diameter  Extensive surrounding vasogenic edema with mild localized mass effect within the superior  There are 2 additional enhancing lesions identified within the brain parenchyma, one within the anterior right occipital lobe on series 10 image 73 measuring 5 mm, and one slightly more posteriorly and inferiorly within the left parafalcine occipital lobe measuring 6 mm  Scattered white matter hyperintensities on T2 and FLAIR imaging suggestive of mild chronic microangiopathy    Mild chronic microangiopathic change within the brainstem  Diffusion imaging demonstrates a punctate focus of restricted diffusion involving the left inferior parietal lobe on series 3 image 20  This appears restricted on the ADC map and is consistent with a small acute to subacute infarct  VENTRICLES:  Normal  SELLA AND PITUITARY GLAND:  Normal  ORBITS:  Normal  PARANASAL SINUSES:  Normal  VASCULATURE:  Evaluation of the major intracranial vasculature demonstrates appropriate flow voids  CALVARIUM AND SKULL BASE:  Normal marrow signal within the calvarium, skull base and clivus  EXTRACRANIAL SOFT TISSUES:  There is low signal within the C3 and C4 vertebral bodies demonstrating heterogeneous enhancement after infiltration of contrast, suspicious for osseous metastasis  Impression: 3 separate intracranial enhancing masses, the largest of which is seen within the right posterior frontal vertex measuring 2 8 cm in maximum diameter surrounding vasogenic edema  Findings consistent with intracranial metastasis  Punctate diffusion abnormality on series 3 image 20 within the left inferior parietal lobe suggestive of small acute to subacute infarct  Replacement of normal fatty marrow identified within the C3 and C4 vertebral bodies and posterior elements  This enhances after administration of contrast and is suggestive of osseous metastasis  I personally discussed this study with Sahara Gomez on 10/7/2019 at 10:03 AM  Workstation performed: [id number]     Nm Pet Ct Skull Base To Mid Thigh    Result Date: 10/14/2019  Narrative: PET/CT SCAN INDICATION:  Metastatic lung carcinoma with brain metastases  There is also a previous history of breast carcinoma  Assess treatment response MODIFIER: PS COMPARISON: Most recent chest CT dated 7/10/2019 and earlier, prior PET scan 6/18/2018 CELL TYPE:  Moderately differentiated adenocarcinoma of the right lung TECHNIQUE:   8 7 mCi F-18-FDG administered IV   Multiplanar attenuation corrected and non attenuation corrected PET images are available for interpretation, and contiguous, low dose, axial CT sections were obtained from the skull base through the femurs   Intravenous contrast material was not utilized  This examination, like all CT scans performed in the [facility], was performed utilizing techniques to minimize radiation dose exposure, including the use of iterative reconstruction and automated exposure control  Fasting serum glucose: 76 mg/dl FINDINGS: VISUALIZED BRAIN:   The right frontal parietal lesion seen on MRI study dated 10/7/2019 is noted is a hypermetabolic lesion on the PET scan, image 53, SUV max is 17 0  No obvious midline shift or hemorrhage noted on this limited study  HEAD/NECK:   There is a physiologic distribution of FDG  No FDG avid cervical adenopathy is seen  CT images: Unremarkable  CHEST:   The right lower lobe posterior subpleural opacity initially seen on 3/7/2019 has essentially resolved  However, although no discretely enlarged lymph node is seen, there is focal hypermetabolism in the subcarinal aspect of the mediastinum with SUV max of 3 0  There is also focal hypermetabolism within the inferomedial aspect of the right pulmonary hilum on image 155 with SUV max of 3 4  Again, no discretely enlarged lymph nodes are appreciated  There is a focal region of right posterior mediastinal/right paravertebral soft tissue opacity on image 186 measuring 1 2 x 0 8 cm, SUV max 3 4  There is severe pulmonary emphysema  Biapical pleural-parenchymal opacities are identified but demonstrating no abnormal activity suggesting scarring  No evidence of pleural effusion or pericardial effusion CT images: Coronary artery calcification ABDOMEN:   There is a focus of increased activity identified in the posterior right upper quadrant, near the larisa of the right diaphragm    This was also present on the previous PET/CT from 6/18/2018 but is now more intense, SUV max is currently 3 8, earlier 2 3  No definite abnormality seen at CT images without oral or IV contrast   There are no other foci of abnormal radiopharmaceutical activity  CT images: Paucity of intra-abdominal adipose tissue  No hydronephrosis or bowel obstruction  No ascites  PELVIS: No FDG avid soft tissue lesions are seen  CT images: Scattered colonic diverticula OSSEOUS STRUCTURES: No FDG avid lesions are seen  Note made of MRI demonstrating findings suspicious for metastatic disease to the C3 and C4 vertebral bodies  Aside from degenerative disc disease and facet joint arthritis, no specific abnormalities of the cervical spine are detected on this study  Impression: 1  The right frontoparietal lesion seen on MRI is hypermetabolic on this examination, as above 2  Although the right lower lobe subpleural opacity is no longer present, there is a focal area of hypermetabolism in the subcarinal mediastinum, and right inferior hilum  Although no discretely enlarged lymph nodes are seen, these areas are concerning for neoplastic involvement of lymph nodes  Similarly, right sided posterior mediastinal/paravertebral soft tissue hypermetabolic nodule concerning for adenopathy/metastatic lesion  3  Posterior right upper quadrant focus of increased activity, SUV max 3 8  Potential site of malignant adenopathy  No other signs of hypermetabolic soft tissue metastasis in the abdomen or pelvis 4  No osseous lesions are detected  Note made of previous MRI concern of cervical spine metastatic disease  Aside from degenerative disc disease and facet joint arthritis, no specific cervical spine osseous abnormalities are seen on this PET/CT Workstation performed: [id number]       I have personally reviewed pertinent reports     and I have personally reviewed pertinent films in PACS with a Radiologist   At [facility]

## 2019-10-18 ENCOUNTER — TELEPHONE (OUTPATIENT)
Dept: NEUROSURGERY | Facility: CLINIC | Age: 74
End: 2019-10-18

## 2019-10-18 ENCOUNTER — APPOINTMENT (OUTPATIENT)
Dept: LAB | Age: 74
End: 2019-10-18
Payer: MEDICARE

## 2019-10-18 ENCOUNTER — OFFICE VISIT (OUTPATIENT)
Dept: LAB | Age: 74
End: 2019-10-18
Payer: MEDICARE

## 2019-10-18 ENCOUNTER — ANESTHESIA EVENT (OUTPATIENT)
Dept: PERIOP | Facility: HOSPITAL | Age: 74
DRG: 025 | End: 2019-10-18
Payer: MEDICARE

## 2019-10-18 DIAGNOSIS — G93.6 CEREBRAL EDEMA (HCC): ICD-10-CM

## 2019-10-18 DIAGNOSIS — C79.31 METASTATIC ADENOCARCINOMA TO BRAIN (HCC): ICD-10-CM

## 2019-10-18 LAB
ABO GROUP BLD: NORMAL
ALBUMIN SERPL BCP-MCNC: 4.9 G/DL (ref 3.5–5)
ALP SERPL-CCNC: 94 U/L (ref 46–116)
ALT SERPL W P-5'-P-CCNC: 31 U/L (ref 12–78)
ANION GAP SERPL CALCULATED.3IONS-SCNC: 9 MMOL/L (ref 4–13)
APTT PPP: 26 SECONDS (ref 23–37)
AST SERPL W P-5'-P-CCNC: 22 U/L (ref 5–45)
ATRIAL RATE: 100 BPM
BASOPHILS # BLD AUTO: 0.05 THOUSANDS/ΜL (ref 0–0.1)
BASOPHILS NFR BLD AUTO: 1 % (ref 0–1)
BILIRUB SERPL-MCNC: 0.47 MG/DL (ref 0.2–1)
BILIRUB UR QL STRIP: NEGATIVE
BLD GP AB SCN SERPL QL: NEGATIVE
BUN SERPL-MCNC: 18 MG/DL (ref 5–25)
CALCIUM SERPL-MCNC: 9.7 MG/DL (ref 8.3–10.1)
CHLORIDE SERPL-SCNC: 97 MMOL/L (ref 100–108)
CLARITY UR: CLEAR
CO2 SERPL-SCNC: 30 MMOL/L (ref 21–32)
COLOR UR: YELLOW
CREAT SERPL-MCNC: 0.63 MG/DL (ref 0.6–1.3)
EOSINOPHIL # BLD AUTO: 0.04 THOUSAND/ΜL (ref 0–0.61)
EOSINOPHIL NFR BLD AUTO: 0 % (ref 0–6)
ERYTHROCYTE [DISTWIDTH] IN BLOOD BY AUTOMATED COUNT: 14.6 % (ref 11.6–15.1)
EST. AVERAGE GLUCOSE BLD GHB EST-MCNC: 114 MG/DL
GFR SERPL CREATININE-BSD FRML MDRD: 90 ML/MIN/1.73SQ M
GLUCOSE P FAST SERPL-MCNC: 81 MG/DL (ref 65–99)
GLUCOSE UR STRIP-MCNC: NEGATIVE MG/DL
HBA1C MFR BLD: 5.6 % (ref 4.2–6.3)
HCT VFR BLD AUTO: 46.1 % (ref 34.8–46.1)
HGB BLD-MCNC: 14.6 G/DL (ref 11.5–15.4)
HGB UR QL STRIP.AUTO: NEGATIVE
IMM GRANULOCYTES # BLD AUTO: 0.05 THOUSAND/UL (ref 0–0.2)
IMM GRANULOCYTES NFR BLD AUTO: 1 % (ref 0–2)
INR PPP: 0.91 (ref 0.84–1.19)
KETONES UR STRIP-MCNC: NEGATIVE MG/DL
LEUKOCYTE ESTERASE UR QL STRIP: NEGATIVE
LYMPHOCYTES # BLD AUTO: 1.67 THOUSANDS/ΜL (ref 0.6–4.47)
LYMPHOCYTES NFR BLD AUTO: 17 % (ref 14–44)
MCH RBC QN AUTO: 31.4 PG (ref 26.8–34.3)
MCHC RBC AUTO-ENTMCNC: 31.7 G/DL (ref 31.4–37.4)
MCV RBC AUTO: 99 FL (ref 82–98)
MONOCYTES # BLD AUTO: 0.79 THOUSAND/ΜL (ref 0.17–1.22)
MONOCYTES NFR BLD AUTO: 8 % (ref 4–12)
NEUTROPHILS # BLD AUTO: 7.35 THOUSANDS/ΜL (ref 1.85–7.62)
NEUTS SEG NFR BLD AUTO: 73 % (ref 43–75)
NITRITE UR QL STRIP: NEGATIVE
NRBC BLD AUTO-RTO: 0 /100 WBCS
P AXIS: 81 DEGREES
PH UR STRIP.AUTO: 7 [PH]
PLATELET # BLD AUTO: 321 THOUSANDS/UL (ref 149–390)
PMV BLD AUTO: 10.1 FL (ref 8.9–12.7)
POTASSIUM SERPL-SCNC: 4.5 MMOL/L (ref 3.5–5.3)
PR INTERVAL: 140 MS
PROT SERPL-MCNC: 8.2 G/DL (ref 6.4–8.2)
PROT UR STRIP-MCNC: NEGATIVE MG/DL
PROTHROMBIN TIME: 11.9 SECONDS (ref 11.6–14.5)
QRS AXIS: 79 DEGREES
QRSD INTERVAL: 72 MS
QT INTERVAL: 336 MS
QTC INTERVAL: 433 MS
RBC # BLD AUTO: 4.65 MILLION/UL (ref 3.81–5.12)
RH BLD: POSITIVE
SODIUM SERPL-SCNC: 136 MMOL/L (ref 136–145)
SP GR UR STRIP.AUTO: 1.01 (ref 1–1.03)
SPECIMEN EXPIRATION DATE: NORMAL
T WAVE AXIS: 68 DEGREES
UROBILINOGEN UR QL STRIP.AUTO: 0.2 E.U./DL
VENTRICULAR RATE: 100 BPM
WBC # BLD AUTO: 9.95 THOUSAND/UL (ref 4.31–10.16)

## 2019-10-18 PROCEDURE — 80053 COMPREHEN METABOLIC PANEL: CPT

## 2019-10-18 PROCEDURE — 81003 URINALYSIS AUTO W/O SCOPE: CPT | Performed by: NEUROLOGICAL SURGERY

## 2019-10-18 PROCEDURE — 93010 ELECTROCARDIOGRAM REPORT: CPT | Performed by: INTERNAL MEDICINE

## 2019-10-18 PROCEDURE — 36415 COLL VENOUS BLD VENIPUNCTURE: CPT

## 2019-10-18 PROCEDURE — 85025 COMPLETE CBC W/AUTO DIFF WBC: CPT

## 2019-10-18 PROCEDURE — 85730 THROMBOPLASTIN TIME PARTIAL: CPT

## 2019-10-18 PROCEDURE — 93005 ELECTROCARDIOGRAM TRACING: CPT

## 2019-10-18 PROCEDURE — 85610 PROTHROMBIN TIME: CPT

## 2019-10-18 PROCEDURE — 83036 HEMOGLOBIN GLYCOSYLATED A1C: CPT

## 2019-10-18 PROCEDURE — 86900 BLOOD TYPING SEROLOGIC ABO: CPT

## 2019-10-18 PROCEDURE — 86850 RBC ANTIBODY SCREEN: CPT

## 2019-10-18 PROCEDURE — 86901 BLOOD TYPING SEROLOGIC RH(D): CPT

## 2019-10-18 NOTE — PRE-PROCEDURE INSTRUCTIONS
Pre-Surgery Instructions:   Medication Instructions    arformoterol (BROVANA) 15 mcg/2 mL nebulizer solution Instructed patient per Anesthesia Guidelines   budesonide (PULMICORT) 0 5 mg/2 mL nebulizer solution Instructed patient per Anesthesia Guidelines   Cholecalciferol (VITAMIN D) 2000 UNITS tablet Instructed patient per Anesthesia Guidelines   dexamethasone (DECADRON) 2 mg tablet Instructed patient per Anesthesia Guidelines   ipratropium (ATROVENT) 0 02 % nebulizer solution Instructed patient per Anesthesia Guidelines   levETIRAcetam (KEPPRA) 500 mg tablet Instructed patient per Anesthesia Guidelines   Multiple Vitamins-Minerals (MULTIVITAMIN WITH MINERALS) tablet Instructed patient per Anesthesia Guidelines  Pre op instructions reviewed; verbalized understanding  Antiepileptic Med Class     Continue to take this medication on your normal schedule  If this is an oral medication and you take it in the morning, then you may take this medicine with a sip of water  Inhalational Med Class     Continue to take these inhaler medications on your normal schedule up to and including the day of surgery  Steroids Med Class     Continue to take this medication on your normal schedule  If this is an oral medication and you take it in the morning, then you may take this medicine with a sip of water  Vitamin Med Class     You may continue to take any vitamin that your surgeon has prescribed to you up to the day before surgery  If your surgeon has not specifically prescribed this vitamin or instructed you to continue then stop taking 7 days prior to surgery

## 2019-10-18 NOTE — TELEPHONE ENCOUNTER
Patient called requesting for an update regarding if Dr Monster Crawford heard back from Dr Rico Holley if patient is able to undergo surgery due to pulmonary status  Discussed with OR , Adan Moran  We have not heard back from Dr Rico Moran called his office yesterday  Informed patient of this and how we will call her once we have an update  She was appreciative for checking

## 2019-10-21 ENCOUNTER — TELEPHONE (OUTPATIENT)
Dept: NEUROSURGERY | Facility: CLINIC | Age: 74
End: 2019-10-21

## 2019-10-21 NOTE — TELEPHONE ENCOUNTER
Patient called inquiring if the office heard back from Dr Tera Gan yet  Informed patient we have not and how I just left a voice message for his office  Informed patient the office will reach out to her once we hear back  Patient understood and was appreciative

## 2019-10-21 NOTE — TELEPHONE ENCOUNTER
Called Dr Dariela Huggins office to see if there is an update  Dr Nupur Zuniga sent a staff message on 10/16/19 to Dr Marylen Loh to see if patient is cleared for surgery  Left a voice message at Dr Dariela Huggins office requesting for a call back  Provided my direct line

## 2019-10-22 ENCOUNTER — OFFICE VISIT (OUTPATIENT)
Dept: HEMATOLOGY ONCOLOGY | Facility: CLINIC | Age: 74
End: 2019-10-22
Payer: MEDICARE

## 2019-10-22 ENCOUNTER — TELEPHONE (OUTPATIENT)
Dept: PULMONOLOGY | Facility: CLINIC | Age: 74
End: 2019-10-22

## 2019-10-22 ENCOUNTER — OFFICE VISIT (OUTPATIENT)
Dept: PULMONOLOGY | Facility: CLINIC | Age: 74
End: 2019-10-22
Payer: MEDICARE

## 2019-10-22 VITALS
DIASTOLIC BLOOD PRESSURE: 62 MMHG | SYSTOLIC BLOOD PRESSURE: 108 MMHG | HEIGHT: 65 IN | RESPIRATION RATE: 16 BRPM | TEMPERATURE: 99.6 F | WEIGHT: 85.4 LBS | BODY MASS INDEX: 14.23 KG/M2 | HEART RATE: 112 BPM | OXYGEN SATURATION: 99 %

## 2019-10-22 VITALS
BODY MASS INDEX: 14.33 KG/M2 | RESPIRATION RATE: 14 BRPM | DIASTOLIC BLOOD PRESSURE: 62 MMHG | SYSTOLIC BLOOD PRESSURE: 112 MMHG | TEMPERATURE: 97.9 F | HEART RATE: 120 BPM | WEIGHT: 86 LBS | HEIGHT: 65 IN

## 2019-10-22 DIAGNOSIS — C34.01 MALIGNANT NEOPLASM OF HILUS OF RIGHT LUNG (HCC): ICD-10-CM

## 2019-10-22 DIAGNOSIS — Z01.818 PREOPERATIVE EVALUATION TO RULE OUT SURGICAL CONTRAINDICATION: ICD-10-CM

## 2019-10-22 DIAGNOSIS — J96.11 CHRONIC HYPOXEMIC RESPIRATORY FAILURE (HCC): ICD-10-CM

## 2019-10-22 DIAGNOSIS — J44.9 COPD (CHRONIC OBSTRUCTIVE PULMONARY DISEASE) (HCC): Primary | ICD-10-CM

## 2019-10-22 DIAGNOSIS — C34.90 LUNG CANCER METASTATIC TO BRAIN (HCC): ICD-10-CM

## 2019-10-22 DIAGNOSIS — C79.31 METASTATIC ADENOCARCINOMA TO BRAIN (HCC): Primary | ICD-10-CM

## 2019-10-22 DIAGNOSIS — C79.31 LUNG CANCER METASTATIC TO BRAIN (HCC): ICD-10-CM

## 2019-10-22 PROCEDURE — 99214 OFFICE O/P EST MOD 30 MIN: CPT | Performed by: INTERNAL MEDICINE

## 2019-10-22 PROCEDURE — 99215 OFFICE O/P EST HI 40 MIN: CPT | Performed by: INTERNAL MEDICINE

## 2019-10-22 NOTE — TELEPHONE ENCOUNTER
Established patient needing pre-op clearance  Surgery: IMAGE-GUIDED RIGHT FRONTAL CRANIOTOMY FOR TUMOR :  Surgery date: 10/24/19  Last seen by us: 5/14/19  On oxygen: Yes    Patient will be in today at 340

## 2019-10-22 NOTE — TELEPHONE ENCOUNTER
Patient called reporting Dr Cintron Co office reached out to her and scheduled an appointment for today on 10/22/19  Patient reports Dr Adarsh Gonzalez requested to see patient in the office before deciding if she is cleared for surgery  Added Dr Victor Hugo Anders to this message as a FYI

## 2019-10-22 NOTE — PROGRESS NOTES
Office Progress Note - Pulmonary    Liv Harmon 69 y o  female MRN: [id number]    Encounter: [id number]      Assessment:   Chronic obstructive pulmonary disease   Chronic hypoxemic respiratory failure   Allergic rhinitis   Lung cancer with metastatic disease to the brain   Preop evaluation  Plan:    Brovana 15 mcg nebulized twice a day   Budesonide 0 5 mg nebulized twice a day   Ipratropium nebulized 3 times a day   Albuterol nebulized 4 times a day as needed   Fluticasone nasal spray 2 sprays to each nostril once a day   Oxygen supplement 24 hours a day   The patient has an increased risk for postop pulmonary complications however her risk is not prohibitive to proceed with the brain surgery   Follow-up in 4 months  Discussion:   The patient's COPD is in remission  I have maintained her on the Brovana and budesonide nebulized twice a day and ipratropium nebulized 3 times a day  She will use albuterol nebulized 4 times a day as needed  Her allergic rhinitis is well treated  She will continue the fluticasone nasal spray 2 sprays to each nostril once a day  She will continue the oxygen 24 hours a day  Because of her severe COPD and chronic hypoxemic respiratory failure the patient has an increased risk for the up come and scheduled brain surgery  However her risk for the surgery is not prohibitive to proceed  I will see her in 4 months in a follow-up visit  Discussed with Dr Sayra Lopez  Subjective: The patient is here for preop evaluation  She was found to have a brain metastases of her lung cancer  She was started on steroids  She is scheduled for brain metastases resection in 2 days  The patient stated that her breathing has not changed over the last several months  She denies any significant cough, wheezing or sputum production  Denies any chest pain  She is using Brovana and budesonide nebulized twice a day and ipratropium nebulized 3 times a day    She is using the oxygen 24 hours a day  She has no nocturnal symptoms  Review of systems:  A 12 point system review is done and aside from what is stated above the rest of the review of systems is negative  Family history and social history are reviewed  Medications list is reviewed  Vitals: Blood pressure 108/62, pulse (!) 112, temperature 99 6 °F (37 6 °C), temperature source Tympanic, resp  rate 16, height 5' 5" (1 651 m), weight 38 7 kg (85 lb 6 4 oz), SpO2 99 %  ,     Physical Exam  Gen: Awake, alert, oriented x 3, no acute distress  HEENT: Mucous membranes moist, no oral lesions, no thrush  NECK: No accessory muscle use, JVP not elevated  Cardiac: Regular, single S1, single S2, no murmurs, no rubs, no gallops  Lungs:  Decreased breath sounds  No wheezing or rhonchi  Abdomen: normoactive bowel sounds, soft nontender, nondistended, no rebound or rigidity, no guarding  Extremities: no cyanosis, no clubbing, no edema  Neuro:  Grossly nonfocal   Skin:  No rash  [facility] of the brain [facility] on [facility] and it showed 3 separate intracranial enhancing masses  There is surrounding vasogenic edema      Lab Results   Component Value Date    WBC 9 95 10/18/2019    HGB 14 6 10/18/2019    HCT 46 1 10/18/2019    MCV 99 (H) 10/18/2019     10/18/2019     Lab Results   Component Value Date    SODIUM 136 10/18/2019    K 4 5 10/18/2019    CL 97 (L) 10/18/2019    CO2 30 10/18/2019    BUN 18 10/18/2019    CREATININE 0 63 10/18/2019    GLUC 233 (H) 03/08/2019    CALCIUM 9 7 10/18/2019

## 2019-10-22 NOTE — PROGRESS NOTES
Hematology Outpatient Follow - Up Note  Efra Rios 69 y o  female MRN: @ Encounter: [id number]        Date:  10/22/2019        Assessment/ Plan:    1  Adenocarcinoma of the lung diagnosed initially in October 2017, moderately differentiated, stage IV involving the right hilum, mediastinum, precarinal, subcarinal and tumor extension into the right main bronchus, bronchus intermedius, right middle lobe and the right cervical area proven by biopsy    Molecular test were negative for EGFR mutation, ALK gene rearrangement, Ross 1 mutation, PD L1 expression of 0, positive for K-tanya mutation and TP 53 which carries a poor prognosis    She was treated with radiation therapy concurrent with weekly Taxol/carboplatin completed in 02/2018    Relapse in September 2019 with brain metastases primary in the right parietal lobe measuring 2 8 cm, 2 small punctuate metastases    She is on Keppra and dexamethasone    The case was discussed in the neurosurgery tumor conference, the decision was to have surgical resection of the right brain lesion and SB RT to small punctuate metastatic lesions and to the cavity of the right lesion    PET scan showed mild SUV in the normal size lymph node in the right infrahilar area, superior abdominal area concerning for metastatic disease, no evidence of bony or hepatic metastases    we reviewed the PET scan together, we decided to watch and observe and follow up in 3 months with CT scan of the chest and upper abdomen    I left a message for pulmonary service for clearance        HPI:    37-year-old  female seen initially 11/6/2017 regarding moderately differentiated adenocarcinoma of the lung positive for TTF 1 and napsin and negative for P 40 diagnosed via right mainstem bronchus biopsy on October 17, 2017 by Dr Yuliet Loyola  scan chest 9/26/2017 ordered by Dr Yuliet Loyola performed as lung cancer screening study showed subcarinal adenopathy measuring 1 7 cm   Precarinal adenopathy measures 1  9 cm  The right lower lobe endobronchial soft tissue appears mass like  Lung-RADS 4A, suspicious  PET scan 11/2/2017 showed confluent of hypermetabolic right hilar and mediastinal, precarinal, subcarinal lymphadenopathy compatible with malignancy, SUV measures 17 4  right hilar and precarinal adenopathy measures approximately 4 2 x 2 2 cm  Subcarinal adenopathy measures approximately 2 5 x 1 5 cm  Right hilar devan mass image 100 measures 2 7 x 1 47 m  Tumor extends into the right mainstem bronchus, bronchus intermedius, right middle lobe and right lower lobe bronchi, as seen previously  There is also a small right thoracic paraspinal node with mild FDG activity, measuring 1 3 x 0 4 cm, SUV 2 5  is an irregular right upper lung density along the major fissure measuring 1 6 x 1 3 cm, with mild FDG activity, SUV 1 1  This may be inflammatory, versus malignancy  There is an ill-defined hypermetabolic nodule in the right parotid gland region, measuring approximately 2 2 x 1 7 cm SUV measures 11 3  This may represent a metastasis versus primary parotid gland lesions such as a Warthin's tumor or amorphic adenoma  mm left lower lung nodular density too small for PET characterization   Subtle hypermetabolic focus anterior to the right diaphragmatic larisa, SUV 2 6   She has chronic COPD secondary to active smoking, progressive dyspnea on exertion  She has had a chronic cough for many years   Used to smoke 2 packs of cigarettes daily for many many years, she does not drink alcohol   both parents still living has 1 sister in [place]  She elected to proceed with FNA right glucose avid neck mass  Pathology: Conclusive evidence of malignancy  small cell carcinoma consistent with metastasis from the patient's know adenocarcinoma of the lung   On immunostaining, the tumor cells are positive for TTF-1 and Napsin A but negative for p40 helping support the above diagnosis   RT to this area      She finished concurrent radiation therapy with weekly Taxol /carboplatin to the chest area as well as to the right neck area  Seizure activity in September 2019 MRI of the brain in October 2019 showed large lesion in the right parietal frontal area measuring 2 8 cm with vasogenic edema, small 2 additional foci of metastases    PET scan thereafter showed small changes in the right infrahilar area as well as the lymph node in the superior abdomen area close to the diaphragm might be metastatic disease  Interval History:        Previous Treatment:         Test Results:    Imaging: Mri Brain W Wo Contrast    Result Date: 10/7/2019  Narrative: MRI BRAIN WITH AND WITHOUT CONTRAST INDICATION: R25 2: Cramp and spasm R25 1: Tremor, unspecified  History of lung carcinoma  COMPARISON:  None  TECHNIQUE: Sagittal T1, axial T2, axial FLAIR, axial T1, axial [place], axial diffusion  Sagittal, axial T1 postcontrast   Axial bravo postcontrast with coronal reconstructions  PICC IV Contrast:  IMAGE QUALITY:   Diagnostic  FINDINGS: BRAIN PARENCHYMA:  There is a heterogeneously enhancing mass within the left posterior frontal vertex measuring 2 8 cm in AP diameter  Extensive surrounding vasogenic edema with mild localized mass effect within the superior  There are 2 additional enhancing lesions identified within the brain parenchyma, one within the anterior right occipital lobe on series 10 image 73 measuring 5 mm, and one slightly more posteriorly and inferiorly within the left parafalcine occipital lobe measuring 6 mm  Scattered white matter hyperintensities on T2 and FLAIR imaging suggestive of mild chronic microangiopathy  Mild chronic microangiopathic change within the brainstem  Diffusion imaging demonstrates a punctate focus of restricted diffusion involving the left inferior parietal lobe on series 3 image 20  This appears restricted on the ADC map and is consistent with a small acute to subacute infarct     VENTRICLES:  Normal  SELLA AND PITUITARY GLAND:  Normal  ORBITS:  Normal  PARANASAL SINUSES:  Normal  VASCULATURE:  Evaluation of the major intracranial vasculature demonstrates appropriate flow voids  CALVARIUM AND SKULL BASE:  Normal marrow signal within the calvarium, skull base and clivus  EXTRACRANIAL SOFT TISSUES:  There is low signal within the C3 and C4 vertebral bodies demonstrating heterogeneous enhancement after infiltration of contrast, suspicious for osseous metastasis  Impression: 3 separate intracranial enhancing masses, the largest of which is seen within the right posterior frontal vertex measuring 2 8 cm in maximum diameter surrounding vasogenic edema  Findings consistent with intracranial metastasis  Punctate diffusion abnormality on series 3 image 20 within the left inferior parietal lobe suggestive of small acute to subacute infarct  Replacement of normal fatty marrow identified within the C3 and C4 vertebral bodies and posterior elements  This enhances after administration of contrast and is suggestive of osseous metastasis  I personally discussed this study with Sandria Goodell on 10/7/2019 at 10:03 AM  Workstation performed: [id number]     Nm Pet Ct Skull Base To Mid Thigh    Result Date: 10/14/2019  Narrative: PET/CT SCAN INDICATION:  Metastatic lung carcinoma with brain metastases  There is also a previous history of breast carcinoma  Assess treatment response MODIFIER: PS COMPARISON: Most recent chest CT dated 7/10/2019 and earlier, prior PET scan 6/18/2018 CELL TYPE:  Moderately differentiated adenocarcinoma of the right lung TECHNIQUE:   8 7 mCi F-18-FDG administered IV  Multiplanar attenuation corrected and non attenuation corrected PET images are available for interpretation, and contiguous, low dose, axial CT sections were obtained from the skull base through the femurs   Intravenous contrast material was not utilized    This examination, like all CT scans performed in the [facility], was performed utilizing techniques to minimize radiation dose exposure, including the use of iterative reconstruction and automated exposure control  Fasting serum glucose: 76 mg/dl FINDINGS: VISUALIZED BRAIN:   The right frontal parietal lesion seen on MRI study dated 10/7/2019 is noted is a hypermetabolic lesion on the PET scan, image 53, SUV max is 17 0  No obvious midline shift or hemorrhage noted on this limited study  HEAD/NECK:   There is a physiologic distribution of FDG  No FDG avid cervical adenopathy is seen  CT images: Unremarkable  CHEST:   The right lower lobe posterior subpleural opacity initially seen on 3/7/2019 has essentially resolved  However, although no discretely enlarged lymph node is seen, there is focal hypermetabolism in the subcarinal aspect of the mediastinum with SUV max of 3 0  There is also focal hypermetabolism within the inferomedial aspect of the right pulmonary hilum on image 155 with SUV max of 3 4  Again, no discretely enlarged lymph nodes are appreciated  There is a focal region of right posterior mediastinal/right paravertebral soft tissue opacity on image 186 measuring 1 2 x 0 8 cm, SUV max 3 4  There is severe pulmonary emphysema  Biapical pleural-parenchymal opacities are identified but demonstrating no abnormal activity suggesting scarring  No evidence of pleural effusion or pericardial effusion CT images: Coronary artery calcification ABDOMEN:   There is a focus of increased activity identified in the posterior right upper quadrant, near the larisa of the right diaphragm  This was also present on the previous PET/CT from 6/18/2018 but is now more intense, SUV max is currently 3 8, earlier 2 3  No definite abnormality seen at CT images without oral or IV contrast   There are no other foci of abnormal radiopharmaceutical activity  CT images: Paucity of intra-abdominal adipose tissue  No hydronephrosis or bowel obstruction  No ascites   PELVIS: No FDG avid soft tissue lesions are seen  CT images: Scattered colonic diverticula OSSEOUS STRUCTURES: No FDG avid lesions are seen  Note made of MRI demonstrating findings suspicious for metastatic disease to the C3 and C4 vertebral bodies  Aside from degenerative disc disease and facet joint arthritis, no specific abnormalities of the cervical spine are detected on this study  Impression: 1  The right frontoparietal lesion seen on MRI is hypermetabolic on this examination, as above 2  Although the right lower lobe subpleural opacity is no longer present, there is a focal area of hypermetabolism in the subcarinal mediastinum, and right inferior hilum  Although no discretely enlarged lymph nodes are seen, these areas are concerning for neoplastic involvement of lymph nodes  Similarly, right sided posterior mediastinal/paravertebral soft tissue hypermetabolic nodule concerning for adenopathy/metastatic lesion  3  Posterior right upper quadrant focus of increased activity, SUV max 3 8  Potential site of malignant adenopathy  No other signs of hypermetabolic soft tissue metastasis in the abdomen or pelvis 4  No osseous lesions are detected  Note made of previous MRI concern of cervical spine metastatic disease    Aside from degenerative disc disease and facet joint arthritis, no specific cervical spine osseous abnormalities are seen on this PET/CT Workstation performed: [id number]       Labs:   Lab Results   Component Value Date    WBC 9 95 10/18/2019    HGB 14 6 10/18/2019    HCT 46 1 10/18/2019    MCV 99 (H) 10/18/2019     10/18/2019     Lab Results   Component Value Date     02/27/2015    K 4 5 10/18/2019    CL 97 (L) 10/18/2019    CO2 30 10/18/2019    ANIONGAP 10 02/27/2015    BUN 18 10/18/2019    CREATININE 0 63 10/18/2019    GLUCOSE 94 02/27/2015    GLUF 81 10/18/2019    CALCIUM 9 7 10/18/2019    AST 22 10/18/2019    ALT 31 10/18/2019    ALKPHOS 94 10/18/2019    PROT 7 5 02/27/2015    BILITOT 0 53 02/27/2015 EGFR 90 10/18/2019       No results found for: IRON, TIBC, FERRITIN    No results found for: [id number]      ROS:   Review of Systems   Constitutional: Negative for activity change, appetite change, diaphoresis, fatigue, fever and unexpected weight change  HENT: Negative for facial swelling, hearing loss, rhinorrhea, sinus pressure, sinus pain, sneezing, sore throat and tinnitus  Eyes: Negative for photophobia, pain, discharge, redness, itching and visual disturbance  Respiratory: Positive for shortness of breath  Negative for apnea and chest tightness  Cardiovascular: Negative for chest pain, palpitations and leg swelling  Gastrointestinal: Negative for abdominal distention, abdominal pain, blood in stool, constipation, diarrhea, nausea, rectal pain and vomiting  Endocrine: Negative for cold intolerance, heat intolerance, polydipsia and polyphagia  Genitourinary: Negative for difficulty urinating, dyspareunia, frequency, hematuria, pelvic pain and urgency  Musculoskeletal: Negative for arthralgias, back pain, gait problem, joint swelling and myalgias  Skin: Negative for color change, pallor and rash  Allergic/Immunologic: Negative for environmental allergies and food allergies  Neurological: Negative for dizziness, tremors, seizures, syncope, speech difficulty, numbness and headaches  Hematological: Negative for adenopathy  Does not bruise/bleed easily  Psychiatric/Behavioral: Negative for agitation, confusion, dysphoric mood, hallucinations and suicidal ideas  Current Medications: Reviewed  Allergies: Reviewed  PMH/FH/SH:  Reviewed      Physical Exam:    Body surface area is 1 38 meters squared      Wt Readings from Last 3 Encounters:   10/22/19 39 kg (86 lb)   10/16/19 39 2 kg (86 lb 6 4 oz)   10/09/19 39 kg (86 lb)        Temp Readings from Last 3 Encounters:   10/22/19 97 9 °F (36 6 °C)   10/16/19 98 8 °F (37 1 °C) (Tympanic)   10/09/19 (!) 97 2 °F (36 2 °C)        BP Readings from Last 3 Encounters:   10/22/19 112/62   10/16/19 122/61   10/09/19 122/70         Pulse Readings from Last 3 Encounters:   10/22/19 (!) 120   10/16/19 (!) 119   10/09/19 (!) 120        Physical Exam   Constitutional: She is oriented to person, place, and time  She appears well-developed and well-nourished  No distress  Fragile   HENT:   Head: Normocephalic and atraumatic  Eyes: Conjunctivae are normal    Neck: Normal range of motion  Neck supple  No tracheal deviation present  Cardiovascular: Normal rate and regular rhythm  Exam reveals no gallop and no friction rub  No murmur heard  Pulmonary/Chest: Effort normal  No respiratory distress  She has no wheezes  She has no rales  She exhibits no tenderness  Distant breath sounds bilaterally   Abdominal: Soft  She exhibits no distension  There is no tenderness  Lymphadenopathy:     She has no cervical adenopathy  Neurological: She is alert and oriented to person, place, and time  Skin: Skin is warm and dry  She is not diaphoretic  No erythema  No pallor  Psychiatric: She has a normal mood and affect  Her behavior is normal  Judgment and thought content normal    Vitals reviewed  ECO    Goals and Barriers:  Current Goal: Minimize effects of disease  Barriers: None  Patient's Capacity to Self Care:  Patient is able to self care      Code Status: [unfilled]

## 2019-10-23 ENCOUNTER — DOCUMENTATION (OUTPATIENT)
Dept: NEUROSURGERY | Facility: CLINIC | Age: 74
End: 2019-10-23

## 2019-10-23 NOTE — PROGRESS NOTES
Patient is scheduled for surgery tomorrow on 10/24/19 with Dr Maykel Garcia  Patient[place] PDMP record is attached for review

## 2019-10-24 ENCOUNTER — HOSPITAL ENCOUNTER (INPATIENT)
Facility: HOSPITAL | Age: 74
LOS: 2 days | Discharge: HOME HEALTH | DRG: 025 | End: 2019-10-26
Attending: NEUROLOGICAL SURGERY | Admitting: NEUROLOGICAL SURGERY
Payer: MEDICARE

## 2019-10-24 ENCOUNTER — ANESTHESIA (OUTPATIENT)
Dept: PERIOP | Facility: HOSPITAL | Age: 74
DRG: 025 | End: 2019-10-24
Payer: MEDICARE

## 2019-10-24 DIAGNOSIS — R63.0 LOSS OF APPETITE: ICD-10-CM

## 2019-10-24 DIAGNOSIS — G93.6 CEREBRAL EDEMA (HCC): ICD-10-CM

## 2019-10-24 DIAGNOSIS — J96.11 CHRONIC HYPOXEMIC RESPIRATORY FAILURE (HCC): ICD-10-CM

## 2019-10-24 DIAGNOSIS — Z99.81 ON HOME OXYGEN THERAPY: ICD-10-CM

## 2019-10-24 DIAGNOSIS — C34.01 MALIGNANT NEOPLASM OF HILUS OF RIGHT LUNG (HCC): ICD-10-CM

## 2019-10-24 DIAGNOSIS — R63.4 ABNORMAL WEIGHT LOSS: ICD-10-CM

## 2019-10-24 DIAGNOSIS — C79.31 METASTATIC ADENOCARCINOMA TO BRAIN (HCC): ICD-10-CM

## 2019-10-24 LAB — GLUCOSE SERPL-MCNC: 91 MG/DL (ref 65–140)

## 2019-10-24 PROCEDURE — 88307 TISSUE EXAM BY PATHOLOGIST: CPT | Performed by: PATHOLOGY

## 2019-10-24 PROCEDURE — 88342 IMHCHEM/IMCYTCHM 1ST ANTB: CPT | Performed by: PATHOLOGY

## 2019-10-24 PROCEDURE — 82948 REAGENT STRIP/BLOOD GLUCOSE: CPT

## 2019-10-24 PROCEDURE — 99291 CRITICAL CARE FIRST HOUR: CPT | Performed by: ANESTHESIOLOGY

## 2019-10-24 PROCEDURE — 88331 PATH CONSLTJ SURG 1 BLK 1SPC: CPT | Performed by: PATHOLOGY

## 2019-10-24 PROCEDURE — 86920 COMPATIBILITY TEST SPIN: CPT

## 2019-10-24 PROCEDURE — 00B00ZZ EXCISION OF BRAIN, OPEN APPROACH: ICD-10-PCS | Performed by: NEUROLOGICAL SURGERY

## 2019-10-24 PROCEDURE — 88331 PATH CONSLTJ SURG 1 BLK 1SPC: CPT | Performed by: NEUROLOGICAL SURGERY

## 2019-10-24 PROCEDURE — C1713 ANCHOR/SCREW BN/BN,TIS/BN: HCPCS | Performed by: NEUROLOGICAL SURGERY

## 2019-10-24 PROCEDURE — 94760 N-INVAS EAR/PLS OXIMETRY 1: CPT

## 2019-10-24 PROCEDURE — 88341 IMHCHEM/IMCYTCHM EA ADD ANTB: CPT | Performed by: PATHOLOGY

## 2019-10-24 PROCEDURE — 61510 CRNEC TREPH EXC BRN TUM STTL: CPT | Performed by: NEUROLOGICAL SURGERY

## 2019-10-24 PROCEDURE — 61781 SCAN PROC CRANIAL INTRA: CPT | Performed by: NEUROLOGICAL SURGERY

## 2019-10-24 PROCEDURE — 94640 AIRWAY INHALATION TREATMENT: CPT

## 2019-10-24 DEVICE — IMPLANTABLE DEVICE
Type: IMPLANTABLE DEVICE | Site: CRANIAL | Status: FUNCTIONAL
Brand: THINFLAP SYSTEM

## 2019-10-24 DEVICE — IMPLANTABLE DEVICE
Type: IMPLANTABLE DEVICE | Site: CRANIAL | Status: FUNCTIONAL
Brand: THINFLAP

## 2019-10-24 RX ORDER — SODIUM CHLORIDE FOR INHALATION 0.9 %
3 VIAL, NEBULIZER (ML) INHALATION EVERY 6 HOURS PRN
Status: DISCONTINUED | OUTPATIENT
Start: 2019-10-24 | End: 2019-10-26 | Stop reason: HOSPADM

## 2019-10-24 RX ORDER — DEXAMETHASONE SODIUM PHOSPHATE 4 MG/ML
4 INJECTION, SOLUTION INTRA-ARTICULAR; INTRALESIONAL; INTRAMUSCULAR; INTRAVENOUS; SOFT TISSUE EVERY 8 HOURS SCHEDULED
Status: DISCONTINUED | OUTPATIENT
Start: 2019-10-26 | End: 2019-10-26

## 2019-10-24 RX ORDER — CEFAZOLIN SODIUM 1 G/50ML
1000 SOLUTION INTRAVENOUS ONCE
Status: COMPLETED | OUTPATIENT
Start: 2019-10-24 | End: 2019-10-24

## 2019-10-24 RX ORDER — LIDOCAINE HYDROCHLORIDE AND EPINEPHRINE 10; 10 MG/ML; UG/ML
INJECTION, SOLUTION INFILTRATION; PERINEURAL AS NEEDED
Status: DISCONTINUED | OUTPATIENT
Start: 2019-10-24 | End: 2019-10-24 | Stop reason: HOSPADM

## 2019-10-24 RX ORDER — DOCUSATE SODIUM 100 MG/1
100 CAPSULE, LIQUID FILLED ORAL 2 TIMES DAILY
Status: DISCONTINUED | OUTPATIENT
Start: 2019-10-24 | End: 2019-10-26 | Stop reason: HOSPADM

## 2019-10-24 RX ORDER — ARFORMOTEROL TARTRATE 15 UG/2ML
15 SOLUTION RESPIRATORY (INHALATION) 2 TIMES DAILY
Status: DISCONTINUED | OUTPATIENT
Start: 2019-10-24 | End: 2019-10-24

## 2019-10-24 RX ORDER — BISACODYL 10 MG
10 SUPPOSITORY, RECTAL RECTAL DAILY PRN
Status: DISCONTINUED | OUTPATIENT
Start: 2019-10-24 | End: 2019-10-26 | Stop reason: HOSPADM

## 2019-10-24 RX ORDER — ACETAMINOPHEN 325 MG/1
975 TABLET ORAL EVERY 8 HOURS SCHEDULED
Status: DISCONTINUED | OUTPATIENT
Start: 2019-10-24 | End: 2019-10-26 | Stop reason: HOSPADM

## 2019-10-24 RX ORDER — DEXAMETHASONE SODIUM PHOSPHATE 4 MG/ML
4 INJECTION, SOLUTION INTRA-ARTICULAR; INTRALESIONAL; INTRAMUSCULAR; INTRAVENOUS; SOFT TISSUE EVERY 6 HOURS SCHEDULED
Status: DISCONTINUED | OUTPATIENT
Start: 2019-10-24 | End: 2019-10-26

## 2019-10-24 RX ORDER — FUROSEMIDE 10 MG/ML
INJECTION INTRAMUSCULAR; INTRAVENOUS AS NEEDED
Status: DISCONTINUED | OUTPATIENT
Start: 2019-10-24 | End: 2019-10-24 | Stop reason: SURG

## 2019-10-24 RX ORDER — MANNITOL 250 MG/ML
INJECTION, SOLUTION INTRAVENOUS AS NEEDED
Status: DISCONTINUED | OUTPATIENT
Start: 2019-10-24 | End: 2019-10-24 | Stop reason: SURG

## 2019-10-24 RX ORDER — SODIUM CHLORIDE, SODIUM GLUCONATE, SODIUM ACETATE, POTASSIUM CHLORIDE, MAGNESIUM CHLORIDE, SODIUM PHOSPHATE, DIBASIC, AND POTASSIUM PHOSPHATE .53; .5; .37; .037; .03; .012; .00082 G/100ML; G/100ML; G/100ML; G/100ML; G/100ML; G/100ML; G/100ML
75 INJECTION, SOLUTION INTRAVENOUS CONTINUOUS
Status: DISCONTINUED | OUTPATIENT
Start: 2019-10-24 | End: 2019-10-25

## 2019-10-24 RX ORDER — ALBUTEROL SULFATE 2.5 MG/3ML
2.5 SOLUTION RESPIRATORY (INHALATION) ONCE AS NEEDED
Status: DISCONTINUED | OUTPATIENT
Start: 2019-10-24 | End: 2019-10-24 | Stop reason: HOSPADM

## 2019-10-24 RX ORDER — DEXAMETHASONE SODIUM PHOSPHATE 10 MG/ML
INJECTION, SOLUTION INTRAMUSCULAR; INTRAVENOUS AS NEEDED
Status: DISCONTINUED | OUTPATIENT
Start: 2019-10-24 | End: 2019-10-24 | Stop reason: SURG

## 2019-10-24 RX ORDER — OXYCODONE HYDROCHLORIDE 5 MG/1
5 TABLET ORAL EVERY 4 HOURS PRN
Status: DISCONTINUED | OUTPATIENT
Start: 2019-10-24 | End: 2019-10-26 | Stop reason: HOSPADM

## 2019-10-24 RX ORDER — ALBUMIN, HUMAN INJ 5% 5 %
SOLUTION INTRAVENOUS CONTINUOUS PRN
Status: DISCONTINUED | OUTPATIENT
Start: 2019-10-24 | End: 2019-10-24 | Stop reason: SURG

## 2019-10-24 RX ORDER — FENTANYL CITRATE/PF 50 MCG/ML
25 SYRINGE (ML) INJECTION
Status: DISCONTINUED | OUTPATIENT
Start: 2019-10-24 | End: 2019-10-24 | Stop reason: HOSPADM

## 2019-10-24 RX ORDER — MAGNESIUM HYDROXIDE 1200 MG/15ML
LIQUID ORAL AS NEEDED
Status: DISCONTINUED | OUTPATIENT
Start: 2019-10-24 | End: 2019-10-24 | Stop reason: HOSPADM

## 2019-10-24 RX ORDER — LIDOCAINE HYDROCHLORIDE 10 MG/ML
INJECTION, SOLUTION INFILTRATION; PERINEURAL AS NEEDED
Status: DISCONTINUED | OUTPATIENT
Start: 2019-10-24 | End: 2019-10-24 | Stop reason: SURG

## 2019-10-24 RX ORDER — DEXAMETHASONE SODIUM PHOSPHATE 10 MG/ML
2 INJECTION, SOLUTION INTRAMUSCULAR; INTRAVENOUS EVERY 8 HOURS
Status: DISCONTINUED | OUTPATIENT
Start: 2019-10-31 | End: 2019-10-26

## 2019-10-24 RX ORDER — HYDROMORPHONE HCL/PF 1 MG/ML
0.5 SYRINGE (ML) INJECTION
Status: DISCONTINUED | OUTPATIENT
Start: 2019-10-24 | End: 2019-10-26 | Stop reason: HOSPADM

## 2019-10-24 RX ORDER — ONDANSETRON 2 MG/ML
INJECTION INTRAMUSCULAR; INTRAVENOUS AS NEEDED
Status: DISCONTINUED | OUTPATIENT
Start: 2019-10-24 | End: 2019-10-24 | Stop reason: SURG

## 2019-10-24 RX ORDER — LEVETIRACETAM 500 MG/1
500 TABLET ORAL EVERY 12 HOURS SCHEDULED
Status: DISCONTINUED | OUTPATIENT
Start: 2019-10-24 | End: 2019-10-26 | Stop reason: HOSPADM

## 2019-10-24 RX ORDER — FENTANYL CITRATE 50 UG/ML
INJECTION, SOLUTION INTRAMUSCULAR; INTRAVENOUS AS NEEDED
Status: DISCONTINUED | OUTPATIENT
Start: 2019-10-24 | End: 2019-10-24 | Stop reason: SURG

## 2019-10-24 RX ORDER — DEXAMETHASONE SODIUM PHOSPHATE 10 MG/ML
2 INJECTION, SOLUTION INTRAMUSCULAR; INTRAVENOUS EVERY 12 HOURS SCHEDULED
Status: DISCONTINUED | OUTPATIENT
Start: 2019-11-02 | End: 2019-10-26

## 2019-10-24 RX ORDER — SODIUM CHLORIDE, SODIUM LACTATE, POTASSIUM CHLORIDE, CALCIUM CHLORIDE 600; 310; 30; 20 MG/100ML; MG/100ML; MG/100ML; MG/100ML
100 INJECTION, SOLUTION INTRAVENOUS CONTINUOUS
Status: DISCONTINUED | OUTPATIENT
Start: 2019-10-24 | End: 2019-10-25

## 2019-10-24 RX ORDER — SODIUM CHLORIDE 9 MG/ML
INJECTION, SOLUTION INTRAVENOUS CONTINUOUS PRN
Status: DISCONTINUED | OUTPATIENT
Start: 2019-10-24 | End: 2019-10-24 | Stop reason: SURG

## 2019-10-24 RX ORDER — CEFAZOLIN SODIUM 1 G/50ML
1000 SOLUTION INTRAVENOUS EVERY 8 HOURS
Status: COMPLETED | OUTPATIENT
Start: 2019-10-24 | End: 2019-10-25

## 2019-10-24 RX ORDER — OXYCODONE HYDROCHLORIDE 10 MG/1
10 TABLET ORAL EVERY 4 HOURS PRN
Status: DISCONTINUED | OUTPATIENT
Start: 2019-10-24 | End: 2019-10-26 | Stop reason: HOSPADM

## 2019-10-24 RX ORDER — PROPOFOL 10 MG/ML
INJECTION, EMULSION INTRAVENOUS AS NEEDED
Status: DISCONTINUED | OUTPATIENT
Start: 2019-10-24 | End: 2019-10-24 | Stop reason: SURG

## 2019-10-24 RX ORDER — ALBUTEROL SULFATE 90 UG/1
2 AEROSOL, METERED RESPIRATORY (INHALATION) EVERY 4 HOURS PRN
Status: DISCONTINUED | OUTPATIENT
Start: 2019-10-24 | End: 2019-10-26 | Stop reason: HOSPADM

## 2019-10-24 RX ORDER — ONDANSETRON 2 MG/ML
4 INJECTION INTRAMUSCULAR; INTRAVENOUS EVERY 6 HOURS PRN
Status: DISCONTINUED | OUTPATIENT
Start: 2019-10-24 | End: 2019-10-26 | Stop reason: HOSPADM

## 2019-10-24 RX ORDER — BUPIVACAINE HYDROCHLORIDE AND EPINEPHRINE 5; 5 MG/ML; UG/ML
INJECTION, SOLUTION EPIDURAL; INTRACAUDAL; PERINEURAL AS NEEDED
Status: DISCONTINUED | OUTPATIENT
Start: 2019-10-24 | End: 2019-10-24 | Stop reason: HOSPADM

## 2019-10-24 RX ORDER — BUDESONIDE 0.5 MG/2ML
0.5 INHALANT ORAL
Status: DISCONTINUED | OUTPATIENT
Start: 2019-10-24 | End: 2019-10-26 | Stop reason: HOSPADM

## 2019-10-24 RX ORDER — FORMOTEROL FUMARATE 20 UG/2ML
20 SOLUTION RESPIRATORY (INHALATION)
Status: DISCONTINUED | OUTPATIENT
Start: 2019-10-24 | End: 2019-10-26 | Stop reason: HOSPADM

## 2019-10-24 RX ORDER — SUCCINYLCHOLINE/SOD CL,ISO/PF 100 MG/5ML
SYRINGE (ML) INTRAVENOUS AS NEEDED
Status: DISCONTINUED | OUTPATIENT
Start: 2019-10-24 | End: 2019-10-24 | Stop reason: SURG

## 2019-10-24 RX ORDER — PROPOFOL 10 MG/ML
INJECTION, EMULSION INTRAVENOUS CONTINUOUS PRN
Status: DISCONTINUED | OUTPATIENT
Start: 2019-10-24 | End: 2019-10-24 | Stop reason: SURG

## 2019-10-24 RX ORDER — DEXAMETHASONE SODIUM PHOSPHATE 10 MG/ML
2 INJECTION, SOLUTION INTRAMUSCULAR; INTRAVENOUS EVERY 24 HOURS
Status: DISCONTINUED | OUTPATIENT
Start: 2019-11-04 | End: 2019-10-26

## 2019-10-24 RX ORDER — DEXAMETHASONE SODIUM PHOSPHATE 10 MG/ML
2 INJECTION, SOLUTION INTRAMUSCULAR; INTRAVENOUS EVERY 6 HOURS SCHEDULED
Status: DISCONTINUED | OUTPATIENT
Start: 2019-10-28 | End: 2019-10-26

## 2019-10-24 RX ORDER — CHLORHEXIDINE GLUCONATE 0.12 MG/ML
15 RINSE ORAL ONCE
Status: COMPLETED | OUTPATIENT
Start: 2019-10-24 | End: 2019-10-24

## 2019-10-24 RX ADMIN — PHENYLEPHRINE HYDROCHLORIDE 100 MCG: 10 INJECTION INTRAVENOUS at 11:00

## 2019-10-24 RX ADMIN — ONDANSETRON 4 MG: 2 INJECTION INTRAMUSCULAR; INTRAVENOUS at 13:36

## 2019-10-24 RX ADMIN — CEFAZOLIN SODIUM 1000 MG: 1 SOLUTION INTRAVENOUS at 20:08

## 2019-10-24 RX ADMIN — ALBUMIN (HUMAN): 12.5 SOLUTION INTRAVENOUS at 13:47

## 2019-10-24 RX ADMIN — SODIUM CHLORIDE: 0.9 INJECTION, SOLUTION INTRAVENOUS at 10:41

## 2019-10-24 RX ADMIN — ACETAMINOPHEN 975 MG: 325 TABLET ORAL at 21:18

## 2019-10-24 RX ADMIN — FENTANYL CITRATE 50 MCG: 50 INJECTION, SOLUTION INTRAMUSCULAR; INTRAVENOUS at 10:45

## 2019-10-24 RX ADMIN — FUROSEMIDE 10 MG: 10 INJECTION, SOLUTION INTRAMUSCULAR; INTRAVENOUS at 12:12

## 2019-10-24 RX ADMIN — DOCUSATE SODIUM 100 MG: 100 CAPSULE, LIQUID FILLED ORAL at 20:10

## 2019-10-24 RX ADMIN — DEXAMETHASONE SODIUM PHOSPHATE 4 MG: 4 INJECTION, SOLUTION INTRAMUSCULAR; INTRAVENOUS at 17:08

## 2019-10-24 RX ADMIN — FENTANYL CITRATE 50 MCG: 50 INJECTION, SOLUTION INTRAMUSCULAR; INTRAVENOUS at 12:08

## 2019-10-24 RX ADMIN — Medication 60 MG: at 10:45

## 2019-10-24 RX ADMIN — PHENYLEPHRINE HYDROCHLORIDE 50 MCG: 10 INJECTION INTRAVENOUS at 11:58

## 2019-10-24 RX ADMIN — FORMOTEROL FUMARATE DIHYDRATE 20 MCG: 20 SOLUTION RESPIRATORY (INHALATION) at 19:37

## 2019-10-24 RX ADMIN — PHENYLEPHRINE HYDROCHLORIDE 200 MCG: 10 INJECTION INTRAVENOUS at 11:02

## 2019-10-24 RX ADMIN — LEVETIRACETAM 500 MG: 500 TABLET, FILM COATED ORAL at 20:07

## 2019-10-24 RX ADMIN — SODIUM CHLORIDE, SODIUM GLUCONATE, SODIUM ACETATE, POTASSIUM CHLORIDE AND MAGNESIUM CHLORIDE 75 ML/HR: 526; 502; 368; 37; 30 INJECTION, SOLUTION INTRAVENOUS at 15:12

## 2019-10-24 RX ADMIN — CHLORHEXIDINE GLUCONATE 15 ML: 1.2 RINSE ORAL at 09:02

## 2019-10-24 RX ADMIN — PHENYLEPHRINE HYDROCHLORIDE 200 MCG: 10 INJECTION INTRAVENOUS at 11:04

## 2019-10-24 RX ADMIN — MANNITOL 25 G: 12.5 INJECTION, SOLUTION INTRAVENOUS at 12:12

## 2019-10-24 RX ADMIN — DEXMEDETOMIDINE 0.4 MCG/KG/HR: 100 INJECTION, SOLUTION, CONCENTRATE INTRAVENOUS at 10:50

## 2019-10-24 RX ADMIN — ALBUMIN (HUMAN): 12.5 SOLUTION INTRAVENOUS at 11:39

## 2019-10-24 RX ADMIN — REMIFENTANIL HYDROCHLORIDE 0.2 MCG/KG/MIN: 1 INJECTION, POWDER, LYOPHILIZED, FOR SOLUTION INTRAVENOUS at 10:50

## 2019-10-24 RX ADMIN — DEXAMETHASONE SODIUM PHOSPHATE 10 MG: 10 INJECTION, SOLUTION INTRAMUSCULAR; INTRAVENOUS at 11:05

## 2019-10-24 RX ADMIN — SODIUM CHLORIDE, SODIUM LACTATE, POTASSIUM CHLORIDE, AND CALCIUM CHLORIDE 100 ML/HR: .6; .31; .03; .02 INJECTION, SOLUTION INTRAVENOUS at 09:24

## 2019-10-24 RX ADMIN — SODIUM CHLORIDE, SODIUM LACTATE, POTASSIUM CHLORIDE, AND CALCIUM CHLORIDE: .6; .31; .03; .02 INJECTION, SOLUTION INTRAVENOUS at 10:36

## 2019-10-24 RX ADMIN — PROPOFOL 100 MCG/KG/MIN: 10 INJECTION, EMULSION INTRAVENOUS at 10:50

## 2019-10-24 RX ADMIN — PHENYLEPHRINE HYDROCHLORIDE 25 MCG: 10 INJECTION INTRAVENOUS at 12:57

## 2019-10-24 RX ADMIN — PHENYLEPHRINE HYDROCHLORIDE 50 MCG: 10 INJECTION INTRAVENOUS at 11:14

## 2019-10-24 RX ADMIN — IPRATROPIUM BROMIDE 0.5 MG: 0.5 SOLUTION RESPIRATORY (INHALATION) at 19:37

## 2019-10-24 RX ADMIN — CEFAZOLIN SODIUM 1000 MG: 1 SOLUTION INTRAVENOUS at 11:36

## 2019-10-24 RX ADMIN — PHENYLEPHRINE HYDROCHLORIDE 50 MCG: 10 INJECTION INTRAVENOUS at 12:32

## 2019-10-24 RX ADMIN — ACETAMINOPHEN 975 MG: 325 TABLET ORAL at 17:06

## 2019-10-24 RX ADMIN — LIDOCAINE HYDROCHLORIDE 50 MG: 10 INJECTION, SOLUTION INFILTRATION; PERINEURAL at 10:45

## 2019-10-24 RX ADMIN — BUDESONIDE 0.5 MG: 0.5 INHALANT RESPIRATORY (INHALATION) at 19:37

## 2019-10-24 RX ADMIN — PROPOFOL 150 MG: 10 INJECTION, EMULSION INTRAVENOUS at 10:45

## 2019-10-24 RX ADMIN — SODIUM CHLORIDE: 0.9 INJECTION, SOLUTION INTRAVENOUS at 13:06

## 2019-10-24 RX ADMIN — PHENYLEPHRINE HYDROCHLORIDE 50 MCG/MIN: 10 INJECTION INTRAVENOUS at 11:02

## 2019-10-24 NOTE — ANESTHESIA PREPROCEDURE EVALUATION
Review of Systems/Medical History  Patient summary reviewed  Chart reviewed  No history of anesthetic complications     Cardiovascular  Exercise tolerance (METS): >4,  Hyperlipidemia, No angina , MARCOS,    Pulmonary  Not a smoker , COPD (on 2L O2 via nc at all times, O2 sat reading at home 98-99%) severe- O2 dependent , Shortness of breath, No recent URI ,   Comment: Metastatic adenocarcinoma of the lung     GI/Hepatic      Comment: Sip of milk at 0600am          Endo/Other  Negative endo/other ROS      GYN    Breast cancer (no use right arm) Right mastectomy and axillary node dissection       Hematology   Musculoskeletal       Neurology      Comment: Brain mets  +seizures, has not had one since starting decadron  Left hand affected, reduced  strength  Psychology   Depression ,              Physical Exam    Airway    Mallampati score: I  TM Distance: >3 FB  Neck ROM: full     Dental   No notable dental hx     Cardiovascular  Cardiovascular exam normal    Pulmonary  Pulmonary exam normal     Other Findings        Anesthesia Plan  ASA Score- 2     Anesthesia Type- general with ASA Monitors  Additional Monitors: arterial line  Airway Plan: ETT  Comment: PIV x2  Plan discussed  Consent obtained        Plan Factors-    Induction- intravenous and rapid sequence induction  Postoperative Plan- Plan for postoperative opioid use  Planned trial extubation    Informed Consent- Anesthetic plan and risks discussed with patient  I personally reviewed this patient with the CRNA  Discussed and agreed on the Anesthesia Plan with the CRNA              Allergies   Allergen Reactions    Other Vomiting     Oysters, clams and mussels     Social History     Socioeconomic History    Marital status:      Spouse name: Not on file    Number of children: 1    Years of education: 12    Highest education level: Not on file   Occupational History    Occupation: retired   Social Needs    Financial resource strain: Not on file    Food insecurity:     Worry: Not on file     Inability: Not on file    Transportation needs:     Medical: Not on file     Non-medical: Not on file   Tobacco Use    Smoking status: Current Some Day Smoker     Packs/day: 0 00     Years: 55 00     Pack years: 0 00     Types: Cigarettes     Start date: 1962    Smokeless tobacco: Never Used    Tobacco comment: NOW DOWN TO 3 CIGARETTES A  DAY ( 05/14/19)   Substance and Sexual Activity    Alcohol use: Not Currently     Alcohol/week: 0 0 standard drinks     Frequency: Never     Binge frequency: Never    Drug use: No    Sexual activity: Not Currently   Lifestyle    Physical activity:     Days per week: Not on file     Minutes per session: Not on file    Stress: Not on file   Relationships    Social connections:     Talks on phone: Not on file     Gets together: Not on file     Attends Congregational service: Not on file     Active member of club or organization: Not on file     Attends meetings of clubs or organizations: Not on file     Relationship status: Not on file    Intimate partner violence:     Fear of current or ex partner: Not on file     Emotionally abused: Not on file     Physically abused: Not on file     Forced sexual activity: Not on file   Other Topics Concern    Not on file   Social History Narrative    Lives in an apartment  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

## 2019-10-24 NOTE — ANESTHESIA POSTPROCEDURE EVALUATION
Post-Op Assessment Note    CV Status:  Stable  Pain Score: 0    Pain management: adequate     Mental Status:  Alert and awake   Hydration Status:  Euvolemic   PONV Controlled:  Controlled   Airway Patency:  Patent   Post Op Vitals Reviewed: Yes      Staff: CRNA           BP   114/54   Temp      Pulse  80   Resp 14   SpO2   100

## 2019-10-24 NOTE — RESPIRATORY THERAPY NOTE
RT Protocol Note  Liv Harmon 69 y o  female MRN: [id number]  Unit/Bed#: ICU 01 Encounter: [id number]    Assessment    Principal Problem:    Malignant neoplasm of right lung [facility])  Active Problems:    Chronic hypoxemic respiratory failure ([facility] )    Metastatic adenocarcinoma to brain (HCC)    Cerebral edema ([place] [address] )    On home oxygen therapy      Home Pulmonary Medications:  brovana bid, pulmicort bid and atrovent tid       Past Medical History:   Diagnosis Date    Breast cancer ([place] [address] ) 1996    COPD, severe ([place] [address] )     Requires supplemental oxygen     3L 24 hrs/day    Stage 4 lung cancer ([place] [address] )      Social History     Socioeconomic History    Marital status:      Spouse name: None    Number of children: 1    Years of education: 15    Highest education level: None   Occupational History    Occupation: retired   Social Needs    Financial resource strain: None    Food insecurity:     Worry: None     Inability: None    Transportation needs:     Medical: None     Non-medical: None   Tobacco Use    Smoking status: Current Some Day Smoker     Packs/day: 0 00     Years: 55 00     Pack years: 0 00     Types: Cigarettes     Start date: 1962    Smokeless tobacco: Never Used    Tobacco comment: NOW DOWN TO 3 CIGARETTES A  DAY ( 05/14/19)   Substance and Sexual Activity    Alcohol use: Not Currently     Alcohol/week: 0 0 standard drinks     Frequency: Never     Binge frequency: Never    Drug use: No    Sexual activity: Not Currently   Lifestyle    Physical activity:     Days per week: None     Minutes per session: None    Stress: None   Relationships    Social connections:     Talks on phone: None     Gets together: None     Attends Evangelical service: None     Active member of club or organization: None     Attends meetings of clubs or organizations: None     Relationship status: None    Intimate partner violence:     Fear of current or ex partner: None     Emotionally abused: None     Physically abused: None     Forced sexual activity: None   Other Topics Concern    None   Social History Narrative    Lives in an apartment  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  Subjective         Objective    Physical Exam:   Assessment Type: (P) Assess only  General Appearance: (P) Awake  Respiratory Pattern: (P) Normal  Chest Assessment: (P) Chest expansion symmetrical  Bilateral Breath Sounds: (P) Clear, Diminished    Vitals:  Blood pressure (!) 100/46, pulse 86, temperature 97 5 °F (36 4 °C), temperature source Oral, resp  rate 18, SpO2 100 %  Imaging and other studies: I have personally reviewed pertinent reports  Plan    Respiratory Plan: Home Bronchodilator Patient pathway        Resp Comments: (P) 69 y o  female who presents to ICU s/p right frontal craniotomy due to 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  Pt has hx of COPD and is on 2lpm NC at home  BS clear/diminished  Pt denies sob sating 99%  Per pt, pt takes bid pulmicort, brovana bid and atrovent tid  Will cont pts home regimen and follow per protocol

## 2019-10-24 NOTE — ANESTHESIA PROCEDURE NOTES
Arterial Line Insertion  Performed by: Maurice Woods CRNA  Authorized by: Demetri Sewell MD   Consent: Verbal consent obtained  Written consent obtained  Risks and benefits: risks, benefits and alternatives were discussed  Consent given by: patient  Patient understanding: patient states understanding of the procedure being performed  Patient consent: the patient's understanding of the procedure matches consent given  Procedure consent: procedure consent matches procedure scheduled  Required items: required blood products, implants, devices, and special equipment available  Patient identity confirmed: arm band  Time out: Immediately prior to procedure a "time out" was called to verify the correct patient, procedure, equipment, support staff and site/side marked as required  Preparation: Patient was prepped and draped in the usual sterile fashion    Indications: multiple ABGs and hemodynamic monitoring  Orientation:  Left  Location: radial artery  Procedure Details:  Jb's test normal: yes  Needle gauge: 20  Seldinger technique: Seldinger technique used  Number of attempts: 1    Post-procedure:  Post-procedure: dressing applied  Waveform: good waveform and waveform confirmed  Post-procedure CNS: normal and unchanged  Patient tolerance: Patient tolerated the procedure well with no immediate complications

## 2019-10-24 NOTE — CONSULTS
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 today 10/24  Blood loss was minimal   Hx from patient  Pt  has a past medical history of COPD on 2 L home oxygen,R  breast cancer 26 years ago s/p R mastectomy and Pt had stage IV lung cancer diagnosed in 2017 follows with oncology ( Dr Chery Kumar and Kenneth Elam) and had completed concurrent weekly treatment with  Chemo and radiation in 2018 with Taxol/Carboplastin with no evidence of disease  on subssequent Ct scans  Patient reports experinecing left hand and forearm cramping with her hand johnson into a claw like position that lasts about 30 secs to 1 minutes followed by 5-10 minutes of numbness/tingling  dating back to about the beginning of August this year, concerning for seizure like activity hence started on Keppra 500 mg bid and Dexamethasone 2 mg bid  Pt reports 4 episodes in total and they were never associated with LOC  Brain MRI on  10/07/2019  was concerning for 3 separate intracranial enhancing masses largest of which is noted within the right posterior frontal vertex measuring 2 8 cm in maximum diameter  with surrounding  vasogenic edema,  with possible metastasis in the cervical spine without cord involvement  A PET scan that showed  a focal area of hypermetabolism in the sub carinal node  and right inferior hilum with enlarged lymph nodes concerning for neoplastic involvements  as well as right-sided posterior mediastinal /paravertebral soft tissue hypermetabolic nodule concerning for adenopathy /metastatic lesion      On my evaluation, Pt reports some numbness in her left hand that is somewhat improved from before surgery  C/o of discomfort at the A-line insertion site  Denies pain at the incision site, headache, CP, SOB  Admits to smoking about 3 cigarettes daily  History obtained from chart review and the patient  ROS: Review of Systems   Review of Systems   Constitutional: Negative for appetite change, fatigue and fever  HENT: Negative for congestion  Respiratory: Positive for cough  Negative for shortness of breath and wheezing  Gastrointestinal: Negative for abdominal pain  Neurological: Negative for headaches         PMH:   Active Ambulatory Problems     Diagnosis Date Noted    Malignant neoplasm of right lung ([facility] ) 03/09/2018    Abnormal weight loss 10/05/2012    Loss of appetite 01/23/2015    Hyperlipidemia 04/08/2016    Healthcare maintenance 08/29/2018    Simple chronic bronchitis ([facility] ) 02/26/2019    Chronic hypoxemic respiratory failure (HCC) 02/26/2019    Respiratory distress 03/07/2019    Nicotine dependence 10/05/2012    COPD exacerbation ([place] [address] ) 10/14/2016    Depressive disorder 11/14/2017    Acute on chronic respiratory failure with hypoxia ([place] [address] ) 03/10/2019    Goals of care, counseling/discussion 03/10/2019    Shortness of breath 03/13/2019    Cramp of limb 09/20/2019    Episode of shaking 09/20/2019    Metastatic adenocarcinoma to brain ([facility] ) 10/09/2019     Resolved Ambulatory Problems     Diagnosis Date Noted    No Resolved Ambulatory Problems     Past Medical History:   Diagnosis Date    Breast cancer ([facility] ) 1996    COPD, severe ([facility] )     Requires supplemental oxygen     Stage 4 lung cancer ([place] [address] )        Family History:   Family History   Problem Relation Age of Onset    Heart disease Mother         cardiac disorder    Dementia Mother     Heart failure Father     Heart disease Maternal Grandmother     Heart disease Maternal Grandfather        Social History:   Social History     Tobacco Use   Smoking Status Current Some Day Smoker    Packs/day: 0 00    Years: 55 00    Pack years: 0 00    Types: Cigarettes    Start date: 1962   Smokeless Tobacco Never Used   Tobacco Comment    NOW DOWN TO 3 CIGARETTES A  DAY ( 05/14/19)      Social History     Substance and Sexual Activity   Alcohol Use Not Currently    Alcohol/week: 0 0 standard drinks    Frequency: Never    Binge frequency: Never        Allergies:    Allergies   Allergen Reactions    Other Vomiting     Oysters, clams and mussels       Medications:   Scheduled Meds:    Current Facility-Administered Medications:  acetaminophen 975 mg Oral [facility] Qiana Jones MD    albuterol 2 puff Inhalation Q4H PRN Qiana Jones MD    arformoterol 15 mcg Nebulization BID Qiana Jones MD    bisacodyl 10 mg Rectal Daily PRN Qiana Jones MD    budesonide 0 5 mg Nebulization BID Qiana Jones MD    cefazolin 1,000 mg Intravenous Q8H Qiana Jones MD    dexamethasone 4 mg Intravenous Q6H [address] Qiana Jones MD    Followed by        Kashif Sellers ON 10/26/2019] dexamethasone 4 mg Intravenous [facility] Qiana Jones MD    Followed by        Kashif Sellers ON 10/28/2019] dexamethasone 2 mg Intravenous Q6H [address] Qiana Jones MD    Followed by        Kashif Sellers ON 10/31/2019] dexamethasone 2 mg Intravenous Q8H Qiana Jones MD    Followed by        Kashif Sellers ON 11/2/2019] dexamethasone 2 mg Intravenous Q12H [address] Qiana Jones MD    Followed by        Kashif Sellers ON 11/4/2019] dexamethasone 2 mg Intravenous Q24H Qiana Jones MD    docusate sodium 100 mg Oral BID Qiana Jones MD    HYDROmorphone 0 5 mg Intravenous Q3H PRN Qiana Jones MD    ipratropium 0 5 mg Nebulization TID Qiana Jones MD    lactated ringers 100 mL/hr Intravenous Continuous Weston Martínez PA-C Last Rate: Stopped (10/24/19 1513)   levETIRAcetam 500 mg Oral Q12H [address] Qiana Jones MD    multi-electrolyte 75 mL/hr Intravenous Continuous Qiana Jones MD Last Rate: 75 mL/hr (10/24/19 1512)   naloxone 0 04 mg Intravenous Q1MIN PRN Adore Gustafson MD    [START ON 10/25/2019] nicotine 7 mg Transdermal Daily Lupe Saunders MD    ondansetron 4 mg Intravenous Q6H PRN Adore Gustafson MD    oxyCODONE 10 mg Oral Q4H PRN Adore Gustafson MD    oxyCODONE 5 mg Oral Q4H PRN Adore Gustafson MD      Continuous Infusions:    lactated ringers 100 mL/hr Last Rate: Stopped (10/24/19 1513)   multi-electrolyte 75 mL/hr Last Rate: 75 mL/hr (10/24/19 1512)     PRN Meds:  albuterol    bisacodyl    HYDROmorphone    naloxone    ondansetron    oxyCODONE    oxyCODONE      Invasive lines and devices: Invasive Devices     Peripheral Intravenous Line            Peripheral IV 10/24/19 Left Hand less than 1 day    Peripheral IV 10/24/19 Left Wrist less than 1 day          Arterial Line            Arterial Line 10/24/19 Left Radial less than 1 day          Drain            Urethral Catheter Latex 16 Fr  less than 1 day                Vitals:  Vitals:    10/24/19 1445 10/24/19 1500 10/24/19 1530 10/24/19 1600   BP: 112/61 (!) 100/46     Pulse: 78 80 78 86   Resp: 20 22 18 18   Temp:  98 5 °F (36 9 °C)  97 5 °F (36 4 °C)   TempSrc:    Oral   SpO2: 100% 100% 100% 100%       Physical Exam   Constitutional: She is oriented to person, place, and time  [place] looking   Eyes: Pupils are equal, round, and reactive to light  Neck: Normal range of motion  Cardiovascular: Normal rate, regular rhythm and normal heart sounds  Pulmonary/Chest: Effort normal and breath sounds normal  She has no wheezes  She has no rales  Abdominal: Soft  Bowel sounds are normal    Musculoskeletal: Normal range of motion  Neurological: She is alert and oriented to person, place, and time  No cranial nerve deficit  A0x4  Muscle strength 5/5 all extremities, slightly decreased hand squeeze on left hand compared to right    Skin:   Right parieto-temporal incision, clean, dry and intact    Psychiatric: She has a normal mood and affect  Vitals reviewed        Arterial Line BP: 126/48  Temp Min: 97 2 °F (36 2 °C)  Max: 98 8 °F (37 1 °C)     There is no height or weight on file to calculate BMI  Hemodynamic Monitoring:  N/A      Intake/Output Summary (Last 24 hours) at 10/24/2019 1629  Last data filed at 10/24/2019 1512  Gross per 24 hour   Intake 2800 ml   Output 2900 ml   Net -100 ml     I/O       10/22 0701 - 10/23 0700 10/23 0701 - 10/24 0700 10/24 0701 - 10/25 0700    I V    2200    IV Piggyback   500    Total Intake   2700    Urine   2000    Total Output   2000    Net   +700                 Invasive/non-invasive ventilation settings:   Respiratory    Lab Data (Last 4 hours)    None         O2/Vent Data (Last 4 hours)    None                Physical Exam:  Physical Exam   Constitutional: She is oriented to person, place, and time  [place] looking   Eyes: Pupils are equal, round, and reactive to light  Neck: Normal range of motion  Cardiovascular: Normal rate, regular rhythm and normal heart sounds  Pulmonary/Chest: Effort normal and breath sounds normal  She has no wheezes  She has no rales  Abdominal: Soft  Bowel sounds are normal    Musculoskeletal: Normal range of motion  Neurological: She is alert and oriented to person, place, and time  No cranial nerve deficit  A0x4  Muscle strength 5/5 all extremities, slightly decreased hand squeeze on left hand compared to right    Skin:   Right parieto-temporal incision, clean, dry and intact    Psychiatric: She has a normal mood and affect  Vitals reviewed           Labs:   Results from last 7 days   Lab Units 10/18/19  0854   WBC Thousand/uL 9 95   HEMOGLOBIN g/dL 14 6   HEMATOCRIT % 46 1   PLATELETS Thousands/uL 321   NEUTROS PCT % 73   MONOS PCT % 8     Results from last 7 days   Lab Units 10/18/19  0854   POTASSIUM mmol/L 4 5   CHLORIDE mmol/L 97*   CO2 mmol/L 30   BUN mg/dL 18   CREATININE mg/dL 0 63   CALCIUM mg/dL 9 7   ALK PHOS U/L 94   ALT U/L 31   AST U/L 22         No results found for: PHOS   Results from last 7 days Lab Units 10/18/19  0854   INR  0 91   PTT seconds 26     No results found for: TROPONINT  ABG:No results found for: PHART, LJX8PNK, PO2ART, ORS3VMZ, [id number], BEART, SOURCE    Micro:  Blood Culture:   Lab Results   Component Value Date    BLOODCX No Growth After 5 Days  03/07/2019    BLOODCX No Growth After 5 Days  03/07/2019     Urine Culture: No results found for: URINECX  Sputum Culture: No components found for: SPUTUMCX  Wound Culure: No results found for: WOUNDCULT         VTE Mechanical Prophylaxis: sequential compression device      Assessment and Plan:  Neuro:  · POD#0 status post frontal craniotomy due to 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   · Decadron  4 mg Q 6   · Keppra  500 mg Q 12 for seizure prophylaxis  · Head CT  in 24 hours   · Neuro checks Q 1  · Seizure precautions  · Pain regimen:  · Oxycodone 5 mg Q 4 p r n  For moderate pain  · Oxycodone  10 mg Q 4 p r n  For severe pain  · Dilaudid 0 5 mg q 3h p r n  For breakthrough pain  · Tylenol 975 mg Q 8      CV:  · Hemodynamically stable  · Isolyte @ 75cc/hr  · MAP goal > 65    Pulm:  · History of COPD on 2 L home oxygen  · Continue home regimen: Pulmicort,  Atrovent and albuterol  P r n  · Respiratory protocol     GI:  · No acute issues  · Bowel regimen    :  ·  No acute issues  ·  Monitor I/O's     FEN:  · Regular diet  · Monitor lytes and replte with goal of K>4, Mg >2, Phos>3    Endo:  · No acute issues      ID:  · Afebrile, no  Leukocytosis    Heme:  · No acute issues  · Monitor Hgb with Transfusion goal <7    MSK/Skin  · Encourage early ambulation  · Wound care(incision on right temporal area)  · PT/OT      Dispo:ICU    Counseling / Coordination of Care  Total time spent today 30 minutes  Greater than 50% of total time was spent with the patient and / or family counseling and / or coordination of care   A description of the counseling / coordination of care: 30

## 2019-10-24 NOTE — PLAN OF CARE
Problem: Prexisting or High Potential for Compromised Skin Integrity  Goal: Skin integrity is maintained or improved  Description  INTERVENTIONS:  - Identify patients at risk for skin breakdown  - Assess and monitor skin integrity  - Assess and monitor nutrition and hydration status  - Monitor labs   - Assess for incontinence   - Turn and reposition patient  - Assist with mobility/ambulation  - Relieve pressure over bony prominences  - Avoid friction and shearing  - Provide appropriate hygiene as needed including keeping skin clean and dry  - Evaluate need for skin moisturizer/barrier cream  - Collaborate with interdisciplinary team   - Patient/family teaching  - Consider wound care consult   Outcome: Progressing     Problem: Potential for Falls  Goal: Patient will remain free of falls  Description  INTERVENTIONS:  - Assess patient frequently for physical needs  -  Identify cognitive and physical deficits and behaviors that affect risk of falls    -  [place] fall precautions as indicated by assessment   - Educate patient/family on patient safety including physical limitations  - Instruct patient to call for assistance with activity based on assessment  - Modify environment to reduce risk of injury  - Consider OT/PT consult to assist with strengthening/mobility  Outcome: Progressing     Problem: NEUROSENSORY - ADULT  Goal: Achieves stable or improved neurological status  Description  INTERVENTIONS  - Monitor and report changes in neurological status  - Monitor vital signs such as temperature, blood pressure, glucose, and any other labs ordered   - Initiate measures to prevent increased intracranial pressure  - Monitor for seizure activity and implement precautions if appropriate      Outcome: Progressing  Goal: Remains free of injury related to seizures activity  Description  INTERVENTIONS  - Maintain airway, patient safety  and administer oxygen as ordered  - Monitor patient for seizure activity, document and report duration and description of seizure to physician/advanced practitioner  - If seizure occurs,  ensure patient safety during seizure  - Reorient patient post seizure  - Seizure pads on all 4 side rails  - Instruct patient/family to notify RN of any seizure activity including if an aura is experienced  - Instruct patient/family to call for assistance with activity based on nursing assessment  - Administer anti-seizure medications if ordered    Outcome: Progressing  Goal: Achieves maximal functionality and self care  Description  INTERVENTIONS  - Monitor swallowing and airway patency with patient fatigue and changes in neurological status  - Encourage and assist patient to increase activity and self care     - Encourage visually impaired, hearing impaired and aphasic patients to use assistive/communication devices  Outcome: Progressing     Problem: CARDIOVASCULAR - ADULT  Goal: Maintains optimal cardiac output and hemodynamic stability  Description  INTERVENTIONS:  - Monitor I/O, vital signs and rhythm  - Monitor for S/S and trends of decreased cardiac output  - Administer and titrate ordered vasoactive medications to optimize hemodynamic stability  - Assess quality of pulses, skin color and temperature  - Assess for signs of decreased coronary artery perfusion  - Instruct patient to report change in severity of symptoms  Outcome: Progressing     Problem: RESPIRATORY - ADULT  Goal: Achieves optimal ventilation and oxygenation  Description  INTERVENTIONS:  - Assess for changes in respiratory status  - Assess for changes in mentation and behavior  - Position to facilitate oxygenation and minimize respiratory effort  - Oxygen administered by appropriate delivery if ordered  - Initiate smoking cessation education as indicated  - Encourage broncho-pulmonary hygiene including cough, deep breathe, Incentive Spirometry  - Assess the need for suctioning and aspirate as needed  - Assess and instruct to report SOB or any respiratory difficulty  - Respiratory Therapy support as indicated  Outcome: Progressing     Problem: GASTROINTESTINAL - ADULT  Goal: Maintains or returns to baseline bowel function  Description  INTERVENTIONS:  - Assess bowel function  - Encourage oral fluids to ensure adequate hydration  - Administer IV fluids if ordered to ensure adequate hydration  - Administer ordered medications as needed  - Encourage mobilization and activity  - Consider nutritional services referral to assist patient with adequate nutrition and appropriate food choices  Outcome: Progressing     Problem: GENITOURINARY - ADULT  Goal: Maintains or returns to baseline urinary function  Description  INTERVENTIONS:  - Assess urinary function  - Encourage oral fluids to ensure adequate hydration if ordered  - Administer IV fluids as ordered to ensure adequate hydration  - Administer ordered medications as needed  - Offer frequent toileting  - Follow urinary retention protocol if ordered  Outcome: Progressing     Problem: METABOLIC, FLUID AND ELECTROLYTES - ADULT  Goal: Electrolytes maintained within normal limits  Description  INTERVENTIONS:  - Monitor labs and assess patient for signs and symptoms of electrolyte imbalances  - Administer electrolyte replacement as ordered  - Monitor response to electrolyte replacements, including repeat lab results as appropriate  - Instruct patient on fluid and nutrition as appropriate  Outcome: Progressing  Goal: Fluid balance maintained  Description  INTERVENTIONS:  - Monitor labs   - Monitor I/O and WT  - Instruct patient on fluid and nutrition as appropriate  - Assess for signs & symptoms of volume excess or deficit  Outcome: Progressing  Goal: Glucose maintained within target range  Description  INTERVENTIONS:  - Monitor Blood Glucose as ordered  - Assess for signs and symptoms of hyperglycemia and hypoglycemia  - Administer ordered medications to maintain glucose within target range  - Assess nutritional intake and initiate nutrition service referral as needed  Outcome: Progressing     Problem: SKIN/TISSUE INTEGRITY - ADULT  Goal: Skin integrity remains intact  Description  INTERVENTIONS  - Identify patients at risk for skin breakdown  - Assess and monitor skin integrity  - Assess and monitor nutrition and hydration status  - Monitor labs (i e  albumin)  - Assess for incontinence   - Turn and reposition patient  - Assist with mobility/ambulation  - Relieve pressure over bony prominences  - Avoid friction and shearing  - Provide appropriate hygiene as needed including keeping skin clean and dry  - Evaluate need for skin moisturizer/barrier cream  - Collaborate with interdisciplinary team (i e  Nutrition, Rehabilitation, etc )   - Patient/family teaching  Outcome: Progressing  Goal: Incision(s), wounds(s) or drain site(s) healing without S/S of infection  Description  INTERVENTIONS  - Assess and document risk factors for skin impairment   - Assess and document dressing, incision, wound bed, drain sites and surrounding tissue  - Consider nutrition services referral as needed  - Oral mucous membranes remain intact  - Provide patient/ family education  Outcome: Progressing  Goal: Oral mucous membranes remain intact  Description  INTERVENTIONS  - Assess oral mucosa and hygiene practices  - Implement preventative oral hygiene regimen  - Implement oral medicated treatments as ordered  - Initiate Nutrition services referral as needed  Outcome: Progressing     Problem: HEMATOLOGIC - ADULT  Goal: Maintains hematologic stability  Description  INTERVENTIONS  - Assess for signs and symptoms of bleeding or hemorrhage  - Monitor labs  - Administer supportive blood products/factors as ordered and appropriate  Outcome: Progressing     Problem: MUSCULOSKELETAL - ADULT  Goal: Maintain or return mobility to safest level of function  Description  INTERVENTIONS:  - Assess patient's ability to carry out ADLs; assess patient's baseline for ADL function and identify physical deficits which impact ability to perform ADLs (bathing, care of mouth/teeth, toileting, grooming, dressing, etc )  - Assess/evaluate cause of self-care deficits   - Assess range of motion  - Assess patient's mobility  - Assess patient's need for assistive devices and provide as appropriate  - Encourage maximum independence but intervene and supervise when necessary  - Involve family in performance of ADLs  - Assess for home care needs following discharge   - Consider OT consult to assist with ADL evaluation and planning for discharge  - Provide patient education as appropriate  Outcome: Progressing  Goal: Maintain proper alignment of affected body part  Description  INTERVENTIONS:  - Support, maintain and protect limb and body alignment  - Provide patient/ family with appropriate education  Outcome: Progressing     Problem: PAIN - ADULT  Goal: Verbalizes/displays adequate comfort level or baseline comfort level  Description  Interventions:  - Encourage patient to monitor pain and request assistance  - Assess pain using appropriate pain scale  - Administer analgesics based on type and severity of pain and evaluate response  - Implement non-pharmacological measures as appropriate and evaluate response  - Consider cultural and social influences on pain and pain management  - Notify physician/advanced practitioner if interventions unsuccessful or patient reports new pain  Outcome: Progressing     Problem: INFECTION - ADULT  Goal: Absence or prevention of progression during hospitalization  Description  INTERVENTIONS:  - Assess and monitor for signs and symptoms of infection  - Monitor lab/diagnostic results  - Monitor all insertion sites, i e  indwelling lines, tubes, and drains  - Monitor endotracheal if appropriate and nasal secretions for changes in amount and color  - [place] appropriate cooling/warming therapies per order  - Administer medications as ordered  - Instruct and encourage patient and family to use good hand hygiene technique  - Identify and instruct in appropriate isolation precautions for identified infection/condition  Outcome: Progressing  Goal: Absence of fever/infection during neutropenic period  Description  INTERVENTIONS:  - Monitor WBC    Outcome: Progressing     Problem: SAFETY ADULT  Goal: Maintain or return to baseline ADL function  Description  INTERVENTIONS:  -  Assess patient's ability to carry out ADLs; assess patient's baseline for ADL function and identify physical deficits which impact ability to perform ADLs (bathing, care of mouth/teeth, toileting, grooming, dressing, etc )  - Assess/evaluate cause of self-care deficits   - Assess range of motion  - Assess patient's mobility; develop plan if impaired  - Assess patient's need for assistive devices and provide as appropriate  - Encourage maximum independence but intervene and supervise when necessary  - Involve family in performance of ADLs  - Assess for home care needs following discharge   - Consider OT consult to assist with ADL evaluation and planning for discharge  - Provide patient education as appropriate  Outcome: Progressing  Goal: Maintain or return mobility status to optimal level  Description  INTERVENTIONS:  - Assess patient's baseline mobility status (ambulation, transfers, stairs, etc )    - Identify cognitive and physical deficits and behaviors that affect mobility  - Identify mobility aids required to assist with transfers and/or ambulation (gait belt, sit-to-stand, lift, walker, cane, etc )  - [place] fall precautions as indicated by assessment  - Record patient progress and toleration of activity level on Mobility SBAR; progress patient to next Phase/Stage  - Instruct patient to call for assistance with activity based on assessment  - Consider rehabilitation consult to assist with strengthening/weightbearing, etc   Outcome: Progressing     Problem: Nutrition/Hydration-ADULT  Goal: Nutrient/Hydration intake appropriate for improving, restoring or maintaining nutritional needs  Description  Monitor and assess patient's nutrition/hydration status for malnutrition  Collaborate with interdisciplinary team and initiate plan and interventions as ordered  Monitor patient's weight and dietary intake as ordered or per policy  Utilize nutrition screening tool and intervene as necessary  Determine patient's food preferences and provide high-protein, high-caloric foods as appropriate       INTERVENTIONS:  - Monitor oral intake, urinary output, labs, and treatment plans  - Assess nutrition and hydration status and recommend course of action  - Evaluate amount of meals eaten  - Assist patient with eating if necessary   - Allow adequate time for meals  - Recommend/ encourage appropriate diets, oral nutritional supplements, and vitamin/mineral supplements  - Order, calculate, and assess calorie counts as needed  - Recommend, monitor, and adjust tube feedings and TPN/PPN based on assessed needs  - Assess need for intravenous fluids  - Provide specific nutrition/hydration education as appropriate  - Include patient/family/caregiver in decisions related to nutrition  Outcome: Progressing

## 2019-10-24 NOTE — OP NOTE
Operative Note     Pre-op Diagnosis:   Metastatic adenocarcinoma to brain ([facility] [address] ) [C79 31]  Cerebral edema ([facility] [address] ) [G93 6]    Post-op Dignosis:     Post-Op Diagnosis Codes:     * Metastatic adenocarcinoma to brain ([facility] [address] ) [C79 31]     * Cerebral edema ([facility] [address] ) [G93 6]    Procedure:  Procedure(s):  IMAGE-GUIDED RIGHT FRONTAL CRANIOTOMY FOR TUMOR    Surgeon: Surgeon(s) and Role:     * Mariana Beyer MD - Primary     Anesthesia: General    Staff:   Circulator: Marylee Foy, RN  Relief Circulator: Trish Carrasco RN  Relief Scrub: Trish Carrasco RN  Scrub Person: Ray Rivera  No qualified Resident was available  Estimated Blood Loss: Minimal    Specimens:                Order Name Source Comment Collection Info Order Time   TISSUE EXAM Brain  Collected By: Mariana Beyer MD 10/24/2019 12:56 PM       Drains:   Urethral Catheter Latex 16 Fr  (Active)   Collection Container Standard drainage bag 10/24/2019 11:02 AM                Findings:   As above  Complications:  None    Anesthesia: General Endotracheal Anesthesia/Total IV Anesthetic with stable Neuro monitoring      INDICATIONS    Office note, 10/16/19:    " 70-year-old woman known history of metastatic  adenocarcinoma, lung origin  Was diagnosed in 2017,  Has followed with Dr Reinier Gambino and Susan Sepulveda, underwent chemoRT, etc       patient states as far back as earlier summer 2019, she has had episodes of left hand "cramping" which would last for 5-10 minutes and then resolve  She describes her hand johnson into a claw posture  Occasionally these involved hand /arm shaking  She denies ever losing consciousness with these episodes  These have become progressively more frequent since the summer  As a result, she was scheduled for an MRI scan of the brain  She had an episode most recently 10/10/19   That time she was placed on Keppra and has had no episodes since     She was also placed on 2 mg b i d of dexamethasone      MRI scan of the brain done 10/7/19 demonstrates 3 enhancing lesions, the largest 2 8 cm in AP diameter right frontal/frontoparietal with associated vasogenic edema, another measuring 5 mm in the right occipital lobe, and other measuring 6 mm in the left parafalcine occipital lobe        Case was reviewed at [address] [place] [address] earlier today  Recent PET scan with subtle disease, essentially stable  Performance status good  Consensus was to treat the 2 smaller lesions with SRS  However, the patient may well struggle/continue to suffer with seizures, progressive cerebral edema/steroid dependence, etc , if the right frontal lesion was treated with radiation alone  Surgery would likely reduce the edema burden, and potentially control seizures better      I discussed this in detail today with the patient and her cousin, Loura Hashimoto (a hospice coordinator)  I reviewed the pros and cons of these different approaches  Risks of surgery include but not limited to infection, bleeding, VTE, seizures, CSF leak, transient or permanent neurologic dysfunction  I estimated the chance of transient hand and arm weakness at 25%, permanent weakness at 5%  Further, the patient is on home oxygen, 2 L  [The patient was seen preoperatively by Dr Tera Gan and felt to be at risk, but not prohibitive]  The patient is most inclined to proceed with surgery, and then adjuvant SRS/ S RT to the cavity as well as definitive treatment for the other 2 occipital lesions       The patient does have a connection with Dr Joy Calderon, and we are happy to provide imaging, reports, etc , at the patient's request to Dr Kalpana Yanes for review        Plan, and written consent obtained for: Image guided right frontal craniotomy for resection of mass    We will for motor mapping to attempt to minimize the risk of permanent neurologic disability      Metrics:  [id number] 1 000, VA S 80, KPS 80, ECOG 1        DETAILS OF PROCEDURE    The patient was brought to the OR and successfully induced with general endotracheal anesthesia, and a radial A-line as well as sufficient IV access placed  She was supine on the OR table, head rotated left to allow access to the right fronto-parietal scalp  THere was a shoulder roll under the right shoulder  Preoperatively, the patient's stereotactic imaging study was uploaded to the Stealth Image guidance workstation  The workstation was used to  identify the superior, inferior, anterior and posterior margins of the delineated target, and thus design the desired craniotomy bone flap as well as the skin incision  The incision was linear, parasagittal, on the right  The [employer] Navigation patient tracker was attached to the [place] head christie and the Stealth Navigation Unit brought to the operative field  The patient's Stealth preoperative imaging was registered to the patient's scalp  A successful registration with acceptable error was completed  The planned incision was marked  The area was minimally clipped  The area was prepped and draped in standard fashion  A timeout was performed  The patient received antibiotics per protocol, as well as 10 mg of dexamethasone, and was dosed as appropriate with anti-seizure medication  She was also given 25 g of mannitol and 10 mg of lasix  The skin was infiltrated with 1% lidocaine with epinephrine  Skin incision was made with a skin scalpel, and dissection carried down with the Bovie cautery  Crystal clips were applied as needed  Bur holes were made using the match stick bit on the [employer] [place] drill  These were located lateral and medial within the frontoparietal exposure  Epidural dissection was performed, and then the craniotome bit on the [employer] [place] drill was used to generate a craniotomy flap  The bone flap was elevated with the [place] dissector without difficulty   Epidural hemostasis was achieved with FloSeal, thrombin soaked Gelfoam, tamponade with cottonoid patties, etc      The dura was opened in a C-shaped fashion, open face to the midline  Immediately apparent was a metastatic appearing nodule, cresting to the surface, within the center of the exposure  A 1x4 contact grid was placed subdural and maneuvered until reliable phase reversal mapping was obtained  The mass was noted to be within the central sulcus  However, a cortisectomy to reach it was not necessary  Rhoton tools were used to dissect arachoid around the edge of the mass,  it from adjacent cortex  The brain/tumor interface was carefully developed in all directions, bipolar coagulating & dividing bridging vessels when necessary  In this fashion, the mass was dissected free on 5 sides, only the base not visible  The #4 Evalina Natalya was used around the base of the mass and the mass was delivered, bipolaring across the base  The mass was sent for frozen and permanent pathology  Pathology reported that it was consistent with adenocarcinoma, final dx deferred  Hemostasis was achieved in the resection cavity with FloSeal & tamponade & bipolar coagulation  The cavity was lined FloSeal     The dura was closed with a 4-0 Nurolon suture in an running fashion, achieving a water tight closure  The area was copiously irrigated with antibiotic containing irrigation  The bone flap was re affixed with BioMet thin flap titanium plates and screws  The area was again copiously irrigated with antibiotic containing irrigation  The galea was closed with inverted, interrupted 2-0 Vicryl Plus suture  The skin was closed with a running 3-0 Monocryl suture  All instrument counts, sponge counts, and needle counts were correct prior to closure of the skin  The incision was cleansed, and then sealed and dressed with HistoAcryl  The area was blocked with 0 5% Marcaine with epinephrine  The drapes were withdrawn and the area cleansed  The patient was taken out of the [place] head christie, and there was no bleeding from the pin sites   They were transferred to their ICU bed, awoken from general endotracheal anesthesia, extubated, and taken to the postoperative anesthesia unit in cardio vascularly stable condition        SIGNATURE: Vaughn Mcihael MD, PhD  DATE: 10/24/2019   TIME: 1:48 PM

## 2019-10-24 NOTE — PLAN OF CARE
Problem: Prexisting or High Potential for Compromised Skin Integrity  Goal: Skin integrity is maintained or improved  Description  INTERVENTIONS:  - Identify patients at risk for skin breakdown  - Assess and monitor skin integrity  - Assess and monitor nutrition and hydration status  - Monitor labs   - Assess for incontinence   - Turn and reposition patient  - Assist with mobility/ambulation  - Relieve pressure over bony prominences  - Avoid friction and shearing  - Provide appropriate hygiene as needed including keeping skin clean and dry  - Evaluate need for skin moisturizer/barrier cream  - Collaborate with interdisciplinary team   - Patient/family teaching  - Consider wound care consult   Outcome: Progressing     Problem: Potential for Falls  Goal: Patient will remain free of falls  Description  INTERVENTIONS:  - Assess patient frequently for physical needs  -  Identify cognitive and physical deficits and behaviors that affect risk of falls    -  [place] fall precautions as indicated by assessment   - Educate patient/family on patient safety including physical limitations  - Instruct patient to call for assistance with activity based on assessment  - Modify environment to reduce risk of injury  - Consider OT/PT consult to assist with strengthening/mobility  Outcome: Progressing     Problem: NEUROSENSORY - ADULT  Goal: Achieves stable or improved neurological status  Description  INTERVENTIONS  - Monitor and report changes in neurological status  - Monitor vital signs such as temperature, blood pressure, glucose, and any other labs ordered   - Initiate measures to prevent increased intracranial pressure  - Monitor for seizure activity and implement precautions if appropriate      Outcome: Progressing  Goal: Remains free of injury related to seizures activity  Description  INTERVENTIONS  - Maintain airway, patient safety  and administer oxygen as ordered  - Monitor patient for seizure activity, document and report duration and description of seizure to physician/advanced practitioner  - If seizure occurs,  ensure patient safety during seizure  - Reorient patient post seizure  - Seizure pads on all 4 side rails  - Instruct patient/family to notify RN of any seizure activity including if an aura is experienced  - Instruct patient/family to call for assistance with activity based on nursing assessment  - Administer anti-seizure medications if ordered    Outcome: Progressing  Goal: Achieves maximal functionality and self care  Description  INTERVENTIONS  - Monitor swallowing and airway patency with patient fatigue and changes in neurological status  - Encourage and assist patient to increase activity and self care     - Encourage visually impaired, hearing impaired and aphasic patients to use assistive/communication devices  Outcome: Progressing     Problem: CARDIOVASCULAR - ADULT  Goal: Maintains optimal cardiac output and hemodynamic stability  Description  INTERVENTIONS:  - Monitor I/O, vital signs and rhythm  - Monitor for S/S and trends of decreased cardiac output  - Administer and titrate ordered vasoactive medications to optimize hemodynamic stability  - Assess quality of pulses, skin color and temperature  - Assess for signs of decreased coronary artery perfusion  - Instruct patient to report change in severity of symptoms  Outcome: Progressing     Problem: RESPIRATORY - ADULT  Goal: Achieves optimal ventilation and oxygenation  Description  INTERVENTIONS:  - Assess for changes in respiratory status  - Assess for changes in mentation and behavior  - Position to facilitate oxygenation and minimize respiratory effort  - Oxygen administered by appropriate delivery if ordered  - Initiate smoking cessation education as indicated  - Encourage broncho-pulmonary hygiene including cough, deep breathe, Incentive Spirometry  - Assess the need for suctioning and aspirate as needed  - Assess and instruct to report SOB or any respiratory difficulty  - Respiratory Therapy support as indicated  Outcome: Progressing     Problem: GASTROINTESTINAL - ADULT  Goal: Maintains or returns to baseline bowel function  Description  INTERVENTIONS:  - Assess bowel function  - Encourage oral fluids to ensure adequate hydration  - Administer IV fluids if ordered to ensure adequate hydration  - Administer ordered medications as needed  - Encourage mobilization and activity  - Consider nutritional services referral to assist patient with adequate nutrition and appropriate food choices  Outcome: Progressing     Problem: GENITOURINARY - ADULT  Goal: Maintains or returns to baseline urinary function  Description  INTERVENTIONS:  - Assess urinary function  - Encourage oral fluids to ensure adequate hydration if ordered  - Administer IV fluids as ordered to ensure adequate hydration  - Administer ordered medications as needed  - Offer frequent toileting  - Follow urinary retention protocol if ordered  Outcome: Progressing     Problem: METABOLIC, FLUID AND ELECTROLYTES - ADULT  Goal: Electrolytes maintained within normal limits  Description  INTERVENTIONS:  - Monitor labs and assess patient for signs and symptoms of electrolyte imbalances  - Administer electrolyte replacement as ordered  - Monitor response to electrolyte replacements, including repeat lab results as appropriate  - Instruct patient on fluid and nutrition as appropriate  Outcome: Progressing  Goal: Fluid balance maintained  Description  INTERVENTIONS:  - Monitor labs   - Monitor I/O and WT  - Instruct patient on fluid and nutrition as appropriate  - Assess for signs & symptoms of volume excess or deficit  Outcome: Progressing  Goal: Glucose maintained within target range  Description  INTERVENTIONS:  - Monitor Blood Glucose as ordered  - Assess for signs and symptoms of hyperglycemia and hypoglycemia  - Administer ordered medications to maintain glucose within target range  - Assess nutritional intake and initiate nutrition service referral as needed  Outcome: Progressing     Problem: SKIN/TISSUE INTEGRITY - ADULT  Goal: Skin integrity remains intact  Description  INTERVENTIONS  - Identify patients at risk for skin breakdown  - Assess and monitor skin integrity  - Assess and monitor nutrition and hydration status  - Monitor labs (i e  albumin)  - Assess for incontinence   - Turn and reposition patient  - Assist with mobility/ambulation  - Relieve pressure over bony prominences  - Avoid friction and shearing  - Provide appropriate hygiene as needed including keeping skin clean and dry  - Evaluate need for skin moisturizer/barrier cream  - Collaborate with interdisciplinary team (i e  Nutrition, Rehabilitation, etc )   - Patient/family teaching  Outcome: Progressing  Goal: Incision(s), wounds(s) or drain site(s) healing without S/S of infection  Description  INTERVENTIONS  - Assess and document risk factors for skin impairment   - Assess and document dressing, incision, wound bed, drain sites and surrounding tissue  - Consider nutrition services referral as needed  - Oral mucous membranes remain intact  - Provide patient/ family education  Outcome: Progressing  Goal: Oral mucous membranes remain intact  Description  INTERVENTIONS  - Assess oral mucosa and hygiene practices  - Implement preventative oral hygiene regimen  - Implement oral medicated treatments as ordered  - Initiate Nutrition services referral as needed  Outcome: Progressing     Problem: HEMATOLOGIC - ADULT  Goal: Maintains hematologic stability  Description  INTERVENTIONS  - Assess for signs and symptoms of bleeding or hemorrhage  - Monitor labs  - Administer supportive blood products/factors as ordered and appropriate  Outcome: Progressing     Problem: MUSCULOSKELETAL - ADULT  Goal: Maintain or return mobility to safest level of function  Description  INTERVENTIONS:  - Assess patient's ability to carry out ADLs; assess patient's baseline for ADL function and identify physical deficits which impact ability to perform ADLs (bathing, care of mouth/teeth, toileting, grooming, dressing, etc )  - Assess/evaluate cause of self-care deficits   - Assess range of motion  - Assess patient's mobility  - Assess patient's need for assistive devices and provide as appropriate  - Encourage maximum independence but intervene and supervise when necessary  - Involve family in performance of ADLs  - Assess for home care needs following discharge   - Consider OT consult to assist with ADL evaluation and planning for discharge  - Provide patient education as appropriate  Outcome: Progressing  Goal: Maintain proper alignment of affected body part  Description  INTERVENTIONS:  - Support, maintain and protect limb and body alignment  - Provide patient/ family with appropriate education  Outcome: Progressing     Problem: PAIN - ADULT  Goal: Verbalizes/displays adequate comfort level or baseline comfort level  Description  Interventions:  - Encourage patient to monitor pain and request assistance  - Assess pain using appropriate pain scale  - Administer analgesics based on type and severity of pain and evaluate response  - Implement non-pharmacological measures as appropriate and evaluate response  - Consider cultural and social influences on pain and pain management  - Notify physician/advanced practitioner if interventions unsuccessful or patient reports new pain  Outcome: Progressing     Problem: INFECTION - ADULT  Goal: Absence or prevention of progression during hospitalization  Description  INTERVENTIONS:  - Assess and monitor for signs and symptoms of infection  - Monitor lab/diagnostic results  - Monitor all insertion sites, i e  indwelling lines, tubes, and drains  - Monitor endotracheal if appropriate and nasal secretions for changes in amount and color  - [place] appropriate cooling/warming therapies per order  - Administer medications as ordered  - Instruct and encourage patient and family to use good hand hygiene technique  - Identify and instruct in appropriate isolation precautions for identified infection/condition  Outcome: Progressing  Goal: Absence of fever/infection during neutropenic period  Description  INTERVENTIONS:  - Monitor WBC    Outcome: Progressing     Problem: SAFETY ADULT  Goal: Maintain or return to baseline ADL function  Description  INTERVENTIONS:  -  Assess patient's ability to carry out ADLs; assess patient's baseline for ADL function and identify physical deficits which impact ability to perform ADLs (bathing, care of mouth/teeth, toileting, grooming, dressing, etc )  - Assess/evaluate cause of self-care deficits   - Assess range of motion  - Assess patient's mobility; develop plan if impaired  - Assess patient's need for assistive devices and provide as appropriate  - Encourage maximum independence but intervene and supervise when necessary  - Involve family in performance of ADLs  - Assess for home care needs following discharge   - Consider OT consult to assist with ADL evaluation and planning for discharge  - Provide patient education as appropriate  Outcome: Progressing  Goal: Maintain or return mobility status to optimal level  Description  INTERVENTIONS:  - Assess patient's baseline mobility status (ambulation, transfers, stairs, etc )    - Identify cognitive and physical deficits and behaviors that affect mobility  - Identify mobility aids required to assist with transfers and/or ambulation (gait belt, sit-to-stand, lift, walker, cane, etc )  - [place] fall precautions as indicated by assessment  - Record patient progress and toleration of activity level on Mobility SBAR; progress patient to next Phase/Stage  - Instruct patient to call for assistance with activity based on assessment  - Consider rehabilitation consult to assist with strengthening/weightbearing, etc   Outcome: Progressing

## 2019-10-25 ENCOUNTER — APPOINTMENT (INPATIENT)
Dept: RADIOLOGY | Facility: HOSPITAL | Age: 74
DRG: 025 | End: 2019-10-25
Payer: MEDICARE

## 2019-10-25 LAB
ANION GAP SERPL CALCULATED.3IONS-SCNC: 7 MMOL/L (ref 4–13)
BUN SERPL-MCNC: 19 MG/DL (ref 5–25)
CALCIUM SERPL-MCNC: 7.9 MG/DL (ref 8.3–10.1)
CHLORIDE SERPL-SCNC: 105 MMOL/L (ref 100–108)
CO2 SERPL-SCNC: 27 MMOL/L (ref 21–32)
CREAT SERPL-MCNC: 0.63 MG/DL (ref 0.6–1.3)
ERYTHROCYTE [DISTWIDTH] IN BLOOD BY AUTOMATED COUNT: 14.7 % (ref 11.6–15.1)
GFR SERPL CREATININE-BSD FRML MDRD: 90 ML/MIN/1.73SQ M
GLUCOSE SERPL-MCNC: 122 MG/DL (ref 65–140)
HCT VFR BLD AUTO: 35.5 % (ref 34.8–46.1)
HGB BLD-MCNC: 11.8 G/DL (ref 11.5–15.4)
MAGNESIUM SERPL-MCNC: 1.9 MG/DL (ref 1.6–2.6)
MCH RBC QN AUTO: 32.8 PG (ref 26.8–34.3)
MCHC RBC AUTO-ENTMCNC: 33.2 G/DL (ref 31.4–37.4)
MCV RBC AUTO: 99 FL (ref 82–98)
PHOSPHATE SERPL-MCNC: 3.2 MG/DL (ref 2.3–4.1)
PLATELET # BLD AUTO: 250 THOUSANDS/UL (ref 149–390)
PMV BLD AUTO: 8.3 FL (ref 8.9–12.7)
POTASSIUM SERPL-SCNC: 4.3 MMOL/L (ref 3.5–5.3)
RBC # BLD AUTO: 3.6 MILLION/UL (ref 3.81–5.12)
SODIUM SERPL-SCNC: 139 MMOL/L (ref 136–145)
WBC # BLD AUTO: 17.35 THOUSAND/UL (ref 4.31–10.16)

## 2019-10-25 PROCEDURE — G8988 SELF CARE GOAL STATUS: HCPCS

## 2019-10-25 PROCEDURE — 83735 ASSAY OF MAGNESIUM: CPT | Performed by: FAMILY MEDICINE

## 2019-10-25 PROCEDURE — 99233 SBSQ HOSP IP/OBS HIGH 50: CPT | Performed by: ANESTHESIOLOGY

## 2019-10-25 PROCEDURE — 70450 CT HEAD/BRAIN W/O DYE: CPT

## 2019-10-25 PROCEDURE — G8987 SELF CARE CURRENT STATUS: HCPCS

## 2019-10-25 PROCEDURE — 85027 COMPLETE CBC AUTOMATED: CPT | Performed by: NEUROLOGICAL SURGERY

## 2019-10-25 PROCEDURE — 94760 N-INVAS EAR/PLS OXIMETRY 1: CPT

## 2019-10-25 PROCEDURE — G8978 MOBILITY CURRENT STATUS: HCPCS

## 2019-10-25 PROCEDURE — 99024 POSTOP FOLLOW-UP VISIT: CPT | Performed by: NEUROLOGICAL SURGERY

## 2019-10-25 PROCEDURE — 97166 OT EVAL MOD COMPLEX 45 MIN: CPT

## 2019-10-25 PROCEDURE — 94640 AIRWAY INHALATION TREATMENT: CPT

## 2019-10-25 PROCEDURE — 84100 ASSAY OF PHOSPHORUS: CPT | Performed by: FAMILY MEDICINE

## 2019-10-25 PROCEDURE — 80048 BASIC METABOLIC PNL TOTAL CA: CPT | Performed by: NEUROLOGICAL SURGERY

## 2019-10-25 PROCEDURE — 97163 PT EVAL HIGH COMPLEX 45 MIN: CPT

## 2019-10-25 PROCEDURE — G8979 MOBILITY GOAL STATUS: HCPCS

## 2019-10-25 RX ADMIN — DEXAMETHASONE SODIUM PHOSPHATE 4 MG: 4 INJECTION, SOLUTION INTRAMUSCULAR; INTRAVENOUS at 01:05

## 2019-10-25 RX ADMIN — ACETAMINOPHEN 975 MG: 325 TABLET ORAL at 14:08

## 2019-10-25 RX ADMIN — DEXAMETHASONE SODIUM PHOSPHATE 4 MG: 4 INJECTION, SOLUTION INTRAMUSCULAR; INTRAVENOUS at 12:15

## 2019-10-25 RX ADMIN — DEXAMETHASONE SODIUM PHOSPHATE 4 MG: 4 INJECTION, SOLUTION INTRAMUSCULAR; INTRAVENOUS at 23:56

## 2019-10-25 RX ADMIN — DEXAMETHASONE SODIUM PHOSPHATE 4 MG: 4 INJECTION, SOLUTION INTRAMUSCULAR; INTRAVENOUS at 05:48

## 2019-10-25 RX ADMIN — MAGNESIUM OXIDE TAB 400 MG (240 MG ELEMENTAL MG) 800 MG: 400 (240 MG) TAB at 09:15

## 2019-10-25 RX ADMIN — ACETAMINOPHEN 975 MG: 325 TABLET ORAL at 21:35

## 2019-10-25 RX ADMIN — DEXAMETHASONE SODIUM PHOSPHATE 4 MG: 4 INJECTION, SOLUTION INTRAMUSCULAR; INTRAVENOUS at 17:51

## 2019-10-25 RX ADMIN — IPRATROPIUM BROMIDE 0.5 MG: 0.5 SOLUTION RESPIRATORY (INHALATION) at 14:24

## 2019-10-25 RX ADMIN — LEVETIRACETAM 500 MG: 500 TABLET, FILM COATED ORAL at 21:35

## 2019-10-25 RX ADMIN — BUDESONIDE 0.5 MG: 0.5 INHALANT RESPIRATORY (INHALATION) at 20:53

## 2019-10-25 RX ADMIN — IPRATROPIUM BROMIDE 0.5 MG: 0.5 SOLUTION RESPIRATORY (INHALATION) at 07:49

## 2019-10-25 RX ADMIN — DOCUSATE SODIUM 100 MG: 100 CAPSULE, LIQUID FILLED ORAL at 17:51

## 2019-10-25 RX ADMIN — IPRATROPIUM BROMIDE 0.5 MG: 0.5 SOLUTION RESPIRATORY (INHALATION) at 20:53

## 2019-10-25 RX ADMIN — BUDESONIDE 0.5 MG: 0.5 INHALANT RESPIRATORY (INHALATION) at 07:48

## 2019-10-25 RX ADMIN — ACETAMINOPHEN 975 MG: 325 TABLET ORAL at 05:48

## 2019-10-25 RX ADMIN — SODIUM CHLORIDE, SODIUM GLUCONATE, SODIUM ACETATE, POTASSIUM CHLORIDE AND MAGNESIUM CHLORIDE 75 ML/HR: 526; 502; 368; 37; 30 INJECTION, SOLUTION INTRAVENOUS at 02:13

## 2019-10-25 RX ADMIN — CEFAZOLIN SODIUM 1000 MG: 1 SOLUTION INTRAVENOUS at 04:10

## 2019-10-25 RX ADMIN — LEVETIRACETAM 500 MG: 500 TABLET, FILM COATED ORAL at 09:14

## 2019-10-25 RX ADMIN — CALCIUM GLUCONATE 1 G: 98 INJECTION, SOLUTION INTRAVENOUS at 03:03

## 2019-10-25 RX ADMIN — DOCUSATE SODIUM 100 MG: 100 CAPSULE, LIQUID FILLED ORAL at 09:15

## 2019-10-25 RX ADMIN — FORMOTEROL FUMARATE DIHYDRATE 20 MCG: 20 SOLUTION RESPIRATORY (INHALATION) at 07:48

## 2019-10-25 NOTE — MALNUTRITION/BMI
This medical record reflects one or more clinical indicators suggestive of malnutrition and underweight  Malnutrition Findings:   Malnutrition type: Chronic illness(r/t medical condition, as evidenced by intake meeting <75% estimated needs >1 month, and severe muscle wasting at clavicle  Treatment: liberal diet, encourage snacks; pt declined supplements )  Degree of Malnutrition: Other severe protein calorie malnutrition  Malnutrition Characteristics: Muscle loss, Inadequate energy    BMI Findings:  BMI Classifications: Underweight < 18 5     Body mass index is 14 49 kg/m²  See Nutrition note dated 10/25/19 for additional details  Completed nutrition assessment is viewable in the nutrition documentation

## 2019-10-25 NOTE — PROGRESS NOTES
Progress Note - [facility] 1945, 69 y o  female MRN: [id number]    Unit/Bed#: ICU 01 Encounter: [id number]    Primary Care Provider: Genaro Wood DO   Date and time admitted to hospital: 10/24/2019  7:57 AM        Metastatic adenocarcinoma to brain [facility])  Assessment & Plan  · POD#1 right frontal craniotomy for tumor  Hx of adenocarcinoma, lung and brain  · Some LUE weakness to IO and wrist - exam limited by location of IV and A-line access  States numbness much improved since surgery  No further seizure activity  States claw-like "cramping" has ceased in left hand  · Imaging reviewed by myself and attending as follows:  · CT head 10/25/19: stable postoperative appearance  · STAT CT head if decrease in GCS > 2 pts in 1 hour  · Continue Decadron 4 mg q6h with slow wean  · Continue Keppra 500 mg BID  · Pain well-controlled  · Mobilize with PT/OT  · On home O2 therapy, has this set up already at home along with shower chair and walker  On Pulmicort and Atrovent  · DVT ppx: SCDs  · Scheduled for follow up MRI w/wout in 2 weeks [facility]  · Neurosurgery will continue to follow as primary team  Please call with any questions or concerns  Cerebral edema [facility])  Assessment & Plan  See above  Chronic hypoxemic respiratory failure (HCC)  Assessment & Plan  · On home O2 therapy  · On pulmicort and Ipratropium  * Malignant neoplasm of right lung [facility])  Assessment & Plan  · Diagnosed in 2017  Followed with Dr Hannah Marcelo and Dr Cruz Ear  Underwent chemo, RT   · Most recent PET with subtle, stable disease  Subjective/Objective   Chief Complaint: "I am feeling good "    Subjective: Denies headache  Complaining of left wrist pain from IV and A-line sites  Denies N/V  Eating and voiding well  Denies any new numbness or weakness  Objective: Resting comfortably in bed, eating celestina crackers and drinking  Slight left wrist weakness, slight ataxia  Otherwise neurologically intact      I/O 10/23 0701 - 10/24 0700 10/24 0701 - 10/25 0700 10/25 0701 - 10/26 0700    I V  (mL/kg)  3410 (86 3) 150 (3 8)    IV Piggyback  650     Total Intake(mL/kg)  4060 (102 8) 150 (3 8)    Urine (mL/kg/hr)  3890     Total Output  3890     Net  +170 +150                 Invasive Devices     Peripheral Intravenous Line            Peripheral IV 10/24/19 Left Wrist 1 day    Peripheral IV 10/24/19 Left Hand less than 1 day          Arterial Line            Arterial Line 10/24/19 Left Radial less than 1 day                Physical Exam:  Vitals: Blood pressure (!) 100/46, pulse 96, temperature 98 °F (36 7 °C), temperature source Oral, resp  rate 17, height 5' 5" (1 651 m), weight 39 5 kg (87 lb 1 3 oz), SpO2 98 %  ,Body mass index is 14 49 kg/m²      Hemodynamic Monitoring: MAP: Arterial Line MAP (mmHg): 84 mmHg    General appearance: alert, appears stated age, cooperative and no distress  Head: Normocephalic, right parietal craniotomy incision well-approximated, clean and dry  Eyes: EOMI, PERRL, pupils 4 mm bilaterally  Neck: supple, symmetrical, trachea midline and NT  Back: no kyphosis present, no tenderness to percussion or palpation  Lungs: non labored breathing  Heart: regular heart rate  Neurologic:   Mental status: Alert, oriented x3, thought content appropriate  Cranial nerves: grossly intact (Cranial nerves II-XII)  Sensory: normal to light touch, JPS, DST  Motor: moving all extremities strength grossly 5/5 except left wrist flex/ex 4/5 and IO 4/5  Reflexes: 2+ and symmetric, 3+ bilaterally lower extremities  Coordination: finger to nose slightly ataxic on left, very mild right pronator drift      Lab Results:  Results from last 7 days   Lab Units 10/25/19  0554   WBC Thousand/uL 17 35*   HEMOGLOBIN g/dL 11 8   HEMATOCRIT % 35 5   PLATELETS Thousands/uL 250     Results from last 7 days   Lab Units 10/25/19  0104   POTASSIUM mmol/L 4 3   CHLORIDE mmol/L 105   CO2 mmol/L 27   BUN mg/dL 19   CREATININE mg/dL 0 63 CALCIUM mg/dL 7 9*     Results from last 7 days   Lab Units 10/25/19  0554   MAGNESIUM mg/dL 1 9     Results from last 7 days   Lab Units 10/25/19  0554   PHOSPHORUS mg/dL 3 2         No results found for: TROPONINT  ABG:No results found for: PHART, YMO9XKI, PO2ART, HEH2UEN, G3LGRAOX, BEART, SOURCE    Imaging Studies: I have personally reviewed pertinent reports  and I have personally reviewed pertinent films in PACS    EKG, Pathology, and Other Studies: I have personally reviewed pertinent reports        VTE Pharmacologic Prophylaxis: Sequential compression device (Venodyne)     VTE Mechanical Prophylaxis: sequential compression device

## 2019-10-25 NOTE — PROGRESS NOTES
Patient report was called to Noah Trent RN from the [address] [place] [address] 6 Nursing unit  The patient will be transferred to Room 617 as a Medical-Surgical status patient

## 2019-10-25 NOTE — PLAN OF CARE
Problem: PHYSICAL THERAPY ADULT  Goal: Performs mobility at highest level of function for planned discharge setting  See evaluation for individualized goals  Description  Treatment/Interventions: OT, Spoke to nursing, Gait training, Bed mobility, Patient/family training, Endurance training, LE strengthening/ROM, Functional transfer training  Equipment Recommended: (pt reports have all DME needed)       See flowsheet documentation for full assessment, interventions and recommendations  Note:   Prognosis: Good  Problem List: Decreased strength, Decreased range of motion, Impaired balance, Decreased mobility, Decreased safety awareness, Pain, Orthopedic restrictions, Decreased coordination  Assessment: Pt is 69 y o  female seen for PT evaluation s/p admit to [facility] on 10/24/2019 w/ Malignant neoplasm of right lung ([facility] [address] )  PT consulted to assess pt's functional mobility and d/c needs  Order placed for PT eval and tx, w/ up w/ A order  Comorbidities affecting pt's physical performance at time of assessment include: lung CA on home O2  PTA, pt was ambulates unrestricted distances and all terrain, lives in multi-level home and has 5+4 NEIL  Personal factors affecting pt at time of IE include: ambulating w/ assistive device, stairs to enter home, decreased cognition, unable to perform physical activity, inability to perform IADLs and inability to perform ADLs  Please find objective findings from PT assessment regarding body systems outlined above with impairments and limitations including weakness, impaired balance, decreased endurance, gait deviations, pain, decreased activity tolerance, decreased functional mobility tolerance, decreased safety awareness and fall risk  Pt very eager to complete therapy evaluation  Completed bed mobility with S although required increased time  Educated in hand placement with new DME use  Ambulated with slow although overall steady gait using RW   Anticipate pt will progress quickly to achieve goals to return home with increased family support  The following objective measures performed on IE also reveal limitations: Barthel Index: 80/100  Pt's clinical presentation is currently unstable/unpredictable seen in pt's presentation of critical care monitoring, o2  Pt to benefit from continued PT tx to address deficits as defined above and maximize level of functional independent mobility and consistency  From PT/mobility standpoint, recommendation at time of d/c would be Home PT pending progress in order to facilitate return to PLOF  Barriers to Discharge: Inaccessible home environment, Decreased caregiver support  Barriers to Discharge Comments: Pt reports that family is visiting and staying through Nov to A with pt and pt parents  Recommendation: Home PT, Home with family support     PT - OK to Discharge: Yes(with increased family support)    See flowsheet documentation for full assessment

## 2019-10-25 NOTE — ASSESSMENT & PLAN NOTE
· POD#1 right frontal craniotomy for tumor  Hx of adenocarcinoma, lung and brain  · Some LUE weakness to IO and wrist - exam limited by location of IV and A-line access  States numbness much improved since surgery  No further seizure activity  States claw-like "cramping" has ceased in left hand  · Imaging reviewed by myself and attending as follows:  · CT head 10/25/19: stable postoperative appearance  · STAT CT head if decrease in GCS > 2 pts in 1 hour  · Continue Decadron 4 mg q6h with slow wean  · Continue Keppra 500 mg BID  · Pain well-controlled  · Mobilize with PT/OT  · On home O2 therapy, has this set up already at home along with shower chair and walker  On Pulmicort and Atrovent  · DVT ppx: SCDs  · Scheduled for follow up MRI w/wout in [facility] weeks [facility]  · Neurosurgery will continue to follow as primary team  Please call with any questions or concerns

## 2019-10-25 NOTE — PROGRESS NOTES
Progress Note - Critical Care   Victor Hugo Ely 69 y o  female MRN: [id number]  Unit/Bed#: ICU 01 Encounter: [id number]    Attending Physician: Adore Gustafson MD      ______________________________________________________________________  Assessment and Plan:   Principal Problem:    Malignant neoplasm of right lung [facility])  Active Problems:    Chronic hypoxemic respiratory failure ([facility] [address] [facility] )    [facility] )    Cerebral edema ([facility] )    On home oxygen therapy  Resolved Problems:    * No resolved hospital problems  *    Neuro:  · POD#1 s/p frontal craniotomy due to 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   · Decadron  4 mg Q 6   · Keppra  500 mg Q 12 for seizure prophylaxis  · Head CT post surgery done, pending report  · Neuro checks Q 1  · Seizure precautions  · Pain regimen:  · Oxycodone 5 mg Q 4 p r n  For moderate pain  · Oxycodone  10 mg Q 4 p r n  For severe pain  · Dilaudid 0 5 mg q 3h p r n  For breakthrough pain  · Tylenol 975 mg Q 8        CV:  · Hemodynamically stable  · MAP goal > 65     Pulm:  · History of COPD on 2 L home oxygen  · Continue home regimen: Pulmicort,  Atrovent and albuterol  P r n  · Respiratory protocol     GI:  · No acute issues  · Bowel regimen     :  ·  No acute issues  ·  Monitor I/O's     FEN:  · Regular diet  · Monitor lytes and replte with goal of K>4, Mg >2, Phos>3     Endo:  · No acute issues      ID:  · Afebrile, Leukocytosis due to steroid use     Heme:  · No acute issues  · Monitor Hgb with Transfusion goal <7     MSK/Skin  · Encourage early ambulation  · Wound care(incision on right parieto-temporal area)  · PT/OT         Disposition: Transfer to the floor    Code Status: Full Code    Counseling / Coordination of Care  Total time spent today 30 minutes  Greater than 50% of total time was spent with the patient and / or family counseling and / or coordination of care   A description of the counseling / coordination of care: 30  _____________________________________________________________    24 Hour Events:   No overnight events  Pt received 1 g Calcium Gluconate for Calcium of 7 9  Pt c/o discomfort at the A- line insertion site  Denies headache or pain at the incision site and numbness left hand     ______________________________________________________________________    Physical Exam:     Physical Exam   Constitutional: She is oriented to person, place, and time  Appears cachectic   Eyes: Pupils are equal, round, and reactive to light  Neck: Normal range of motion  Cardiovascular: Normal rate, regular rhythm and normal heart sounds  Pulmonary/Chest: Effort normal and breath sounds normal  She has no wheezes  She has no rales  Abdominal: Soft  Bowel sounds are normal  There is no tenderness  Musculoskeletal: Normal range of motion  Neurological: She is alert and oriented to person, place, and time  Muscle strength 5/5 all 4 extremities, sensation intact    Skin: Skin is warm and dry  Psychiatric: She has a normal mood and affect  Vitals reviewed  ______________________________________________________________________  Vitals:    10/25/19 0300 10/25/19 0433 10/25/19 0500 10/25/19 0600   BP:       Pulse: 84 96 90 88   Resp: 16 19 16 16   Temp:       TempSrc:       SpO2: 98% 98% 98% 99%   Weight:    39 5 kg (87 lb 1 3 oz)   Height:           Temperature:   Temp (24hrs), Av 8 °F (36 6 °C), Min:97 2 °F (36 2 °C), Max:98 7 °F (37 1 °C)    Current Temperature: 97 6 °F (36 4 °C)  Weights:   IBW: 57 kg    Body mass index is 14 49 kg/m²    Weight (last 2 days)     Date/Time   Weight    10/25/19 0600   39 5 (87 08)    10/24/19 1600   40 1 (88 4)            Hemodynamic Monitoring:  N/A     Non-Invasive/Invasive Ventilation Settings:  Respiratory    Lab Data (Last 4 hours)    None         O2/Vent Data (Last 4 hours)    None              No results found for: PHART, TDC5XHG, PO2ART, HZE5NRJ, I0FQAEWF, BEART, SOURCE    Intake and Outputs:  I/O       10/23 0701 - 10/24 0700 10/24 0701 - 10/25 0700    I V  (mL/kg)  3260 (81 3)    IV Piggyback  650    Total Intake(mL/kg)  3910 (97 5)    Urine (mL/kg/hr)  3765    Total Output  3765    Net  +145                Nutrition:        Diet Orders   (From admission, onward)             Start     Ordered    10/24/19 1353  Diet Regular; Regular House  Diet effective now     Question Answer Comment   Diet Type Regular    Regular Regular House    RD to adjust diet per protocol? Yes        10/24/19 1352                Labs:   Results from last 7 days   Lab Units 10/25/19  0554 10/18/19  0854   WBC Thousand/uL 17 35* 9 95   HEMOGLOBIN g/dL 11 8 14 6   HEMATOCRIT % 35 5 46 1   PLATELETS Thousands/uL 250 321   NEUTROS PCT %  --  73   MONOS PCT %  --  8     Results from last 7 days   Lab Units 10/25/19  0104 10/18/19  0854   POTASSIUM mmol/L 4 3 4 5   CHLORIDE mmol/L 105 97*   CO2 mmol/L 27 30   BUN mg/dL 19 18   CREATININE mg/dL 0 63 0 63   CALCIUM mg/dL 7 9* 9 7   ALK PHOS U/L  --  94   ALT U/L  --  31   AST U/L  --  22     Results from last 7 days   Lab Units 10/25/19  0554   MAGNESIUM mg/dL 1 9     Lab Results   Component Value Date    PHOS 3 2 10/25/2019      Results from last 7 days   Lab Units 10/18/19  0854   INR  0 91   PTT seconds 26     0   Lab Value Date/Time    TROPONINI <0 02 03/07/2019 1816         ABG:No results found for: PHART, LFT8SXU, PO2ART, DNS8LSL, B2PBVXMZ, BEART, SOURCE    Imaging: Head CT Routine 24 hours post surgery      Micro:  Lab Results   Component Value Date    BLOODCX No Growth After 5 Days  03/07/2019    BLOODCX No Growth After 5 Days  03/07/2019    SPUTUMCULTUR 2+ Growth of  03/12/2019    SPUTUMCULTUR  03/12/2019     Commensal respiratory hunter only; No significant growth of Staph aureus/MRSA or Pseudomonas aeruginosa  Allergies:    Allergies   Allergen Reactions    Other Vomiting     Oysters, clams and mussels     Medications:   Scheduled Meds:    Current Facility-Administered Medications:  acetaminophen 975 mg Oral [facility] Anais Reynoso MD    albuterol 2 puff Inhalation Q4H PRN Anais Reynoso MD    bisacodyl 10 mg Rectal Daily PRN Anais Reynoso MD    budesonide 0 5 mg Nebulization BID Anais Reynoso MD    dexamethasone 4 mg Intravenous Q6H [facility] Anais Reynoso MD    Followed by        Mehrdad Smith ON 10/26/2019] dexamethasone 4 mg Intravenous [facility] Anais Reynoso MD    Followed by        Mehrdad Smith ON 10/28/2019] dexamethasone 2 mg Intravenous Q6H Maame Arzate MD    Followed by        Mehrdad Smith ON 10/31/2019] dexamethasone 2 mg Intravenous Q8H Anais Reynoso MD    Followed by        Mehrdad Smith ON 11/2/2019] dexamethasone 2 mg Intravenous Q12H [facility] Anais Reynoso MD    Followed by        Mehrdad Smith ON 11/4/2019] dexamethasone 2 mg Intravenous Q24H Anais Reynoso MD    docusate sodium 100 mg Oral BID Anais Reynoso MD    formoterol 20 mcg Nebulization Q12H Roberta Nissen, DO    HYDROmorphone 0 5 mg Intravenous Q3H PRN Anais Reynoso MD    ipratropium 0 5 mg Nebulization TID Anais Reynoso MD    lactated ringers 100 mL/hr Intravenous Continuous Raymond Dixon PA-C Last Rate: Stopped (10/24/19 1513)   levETIRAcetam 500 mg Oral Q12H [facility] Anais Reynoso MD    multi-electrolyte 75 mL/hr Intravenous Continuous Anais Reynoso MD Last Rate: 75 mL/hr (10/25/19 7128)   naloxone 0 04 mg Intravenous Q1MIN PRN Anais Reynoso MD    nicotine 7 mg Transdermal Daily Saskia Frost MD    ondansetron 4 mg Intravenous Q6H PRN Anais Reynoso MD    oxyCODONE 10 mg Oral Q4H PRN Anais Reynoso MD    oxyCODONE 5 mg Oral Q4H PRN Anais Reynoso MD    sodium chloride 3 mL Nebulization Q6H PRN Anais Reynoso MD      Continuous Infusions:    lactated ringers 100 mL/hr Last Rate: Stopped (10/24/19 1513)   multi-electrolyte 75 mL/hr Last Rate: 75 mL/hr (10/25/19 0213)     PRN Meds:    albuterol 2 puff Q4H PRN   bisacodyl 10 mg Daily PRN   HYDROmorphone 0 5 mg Q3H PRN   naloxone 0 04 mg Q1MIN PRN   ondansetron 4 mg Q6H PRN   oxyCODONE 10 mg Q4H PRN   oxyCODONE 5 mg Q4H PRN   sodium chloride 3 mL Q6H PRN     VTE Mechanical Prophylaxis: sequential compression device  Invasive lines and devices: Invasive Devices     Peripheral Intravenous Line            Peripheral IV 10/24/19 Left Hand less than 1 day    Peripheral IV 10/24/19 Left Wrist less than 1 day          Arterial Line            Arterial Line 10/24/19 Left Radial less than 1 day                     Portions of the record may have been created with voice recognition software  Occasional wrong word or "sound a like" substitutions may have occurred due to the inherent limitations of voice recognition software  Read the chart carefully and recognize, using context, where substitutions have occurred      Shruthi Ibarra MD

## 2019-10-25 NOTE — PLAN OF CARE
Problem: Prexisting or High Potential for Compromised Skin Integrity  Goal: Skin integrity is maintained or improved  Description  INTERVENTIONS:  - Identify patients at risk for skin breakdown  - Assess and monitor skin integrity  - Assess and monitor nutrition and hydration status  - Monitor labs   - Assess for incontinence   - Turn and reposition patient  - Assist with mobility/ambulation  - Relieve pressure over bony prominences  - Avoid friction and shearing  - Provide appropriate hygiene as needed including keeping skin clean and dry  - Evaluate need for skin moisturizer/barrier cream  - Collaborate with interdisciplinary team   - Patient/family teaching  - Consider wound care consult   Outcome: Progressing     Problem: Potential for Falls  Goal: Patient will remain free of falls  Description  INTERVENTIONS:  - Assess patient frequently for physical needs  -  Identify cognitive and physical deficits and behaviors that affect risk of falls    -  [place] fall precautions as indicated by assessment   - Educate patient/family on patient safety including physical limitations  - Instruct patient to call for assistance with activity based on assessment  - Modify environment to reduce risk of injury  - Consider OT/PT consult to assist with strengthening/mobility  Outcome: Progressing     Problem: NEUROSENSORY - ADULT  Goal: Achieves stable or improved neurological status  Description  INTERVENTIONS  - Monitor and report changes in neurological status  - Monitor vital signs such as temperature, blood pressure, glucose, and any other labs ordered   - Initiate measures to prevent increased intracranial pressure  - Monitor for seizure activity and implement precautions if appropriate      Outcome: Progressing  Goal: Remains free of injury related to seizures activity  Description  INTERVENTIONS  - Maintain airway, patient safety  and administer oxygen as ordered  - Monitor patient for seizure activity, document and report duration and description of seizure to physician/advanced practitioner  - If seizure occurs,  ensure patient safety during seizure  - Reorient patient post seizure  - Seizure pads on all 4 side rails  - Instruct patient/family to notify RN of any seizure activity including if an aura is experienced  - Instruct patient/family to call for assistance with activity based on nursing assessment  - Administer anti-seizure medications if ordered    Outcome: Progressing  Goal: Achieves maximal functionality and self care  Description  INTERVENTIONS  - Monitor swallowing and airway patency with patient fatigue and changes in neurological status  - Encourage and assist patient to increase activity and self care     - Encourage visually impaired, hearing impaired and aphasic patients to use assistive/communication devices  Outcome: Progressing     Problem: CARDIOVASCULAR - ADULT  Goal: Maintains optimal cardiac output and hemodynamic stability  Description  INTERVENTIONS:  - Monitor I/O, vital signs and rhythm  - Monitor for S/S and trends of decreased cardiac output  - Administer and titrate ordered vasoactive medications to optimize hemodynamic stability  - Assess quality of pulses, skin color and temperature  - Assess for signs of decreased coronary artery perfusion  - Instruct patient to report change in severity of symptoms  Outcome: Progressing     Problem: RESPIRATORY - ADULT  Goal: Achieves optimal ventilation and oxygenation  Description  INTERVENTIONS:  - Assess for changes in respiratory status  - Assess for changes in mentation and behavior  - Position to facilitate oxygenation and minimize respiratory effort  - Oxygen administered by appropriate delivery if ordered  - Initiate smoking cessation education as indicated  - Encourage broncho-pulmonary hygiene including cough, deep breathe, Incentive Spirometry  - Assess the need for suctioning and aspirate as needed  - Assess and instruct to report SOB or any respiratory difficulty  - Respiratory Therapy support as indicated  Outcome: Progressing     Problem: GASTROINTESTINAL - ADULT  Goal: Maintains or returns to baseline bowel function  Description  INTERVENTIONS:  - Assess bowel function  - Encourage oral fluids to ensure adequate hydration  - Administer IV fluids if ordered to ensure adequate hydration  - Administer ordered medications as needed  - Encourage mobilization and activity  - Consider nutritional services referral to assist patient with adequate nutrition and appropriate food choices  Outcome: Progressing     Problem: GENITOURINARY - ADULT  Goal: Maintains or returns to baseline urinary function  Description  INTERVENTIONS:  - Assess urinary function  - Encourage oral fluids to ensure adequate hydration if ordered  - Administer IV fluids as ordered to ensure adequate hydration  - Administer ordered medications as needed  - Offer frequent toileting  - Follow urinary retention protocol if ordered  Outcome: Progressing     Problem: METABOLIC, FLUID AND ELECTROLYTES - ADULT  Goal: Electrolytes maintained within normal limits  Description  INTERVENTIONS:  - Monitor labs and assess patient for signs and symptoms of electrolyte imbalances  - Administer electrolyte replacement as ordered  - Monitor response to electrolyte replacements, including repeat lab results as appropriate  - Instruct patient on fluid and nutrition as appropriate  Outcome: Progressing  Goal: Fluid balance maintained  Description  INTERVENTIONS:  - Monitor labs   - Monitor I/O and WT  - Instruct patient on fluid and nutrition as appropriate  - Assess for signs & symptoms of volume excess or deficit  Outcome: Progressing  Goal: Glucose maintained within target range  Description  INTERVENTIONS:  - Monitor Blood Glucose as ordered  - Assess for signs and symptoms of hyperglycemia and hypoglycemia  - Administer ordered medications to maintain glucose within target range  - Assess nutritional intake and initiate nutrition service referral as needed  Outcome: Progressing     Problem: SKIN/TISSUE INTEGRITY - ADULT  Goal: Skin integrity remains intact  Description  INTERVENTIONS  - Identify patients at risk for skin breakdown  - Assess and monitor skin integrity  - Assess and monitor nutrition and hydration status  - Monitor labs (i e  albumin)  - Assess for incontinence   - Turn and reposition patient  - Assist with mobility/ambulation  - Relieve pressure over bony prominences  - Avoid friction and shearing  - Provide appropriate hygiene as needed including keeping skin clean and dry  - Evaluate need for skin moisturizer/barrier cream  - Collaborate with interdisciplinary team (i e  Nutrition, Rehabilitation, etc )   - Patient/family teaching  Outcome: Progressing  Goal: Incision(s), wounds(s) or drain site(s) healing without S/S of infection  Description  INTERVENTIONS  - Assess and document risk factors for skin impairment   - Assess and document dressing, incision, wound bed, drain sites and surrounding tissue  - Consider nutrition services referral as needed  - Oral mucous membranes remain intact  - Provide patient/ family education  Outcome: Progressing  Goal: Oral mucous membranes remain intact  Description  INTERVENTIONS  - Assess oral mucosa and hygiene practices  - Implement preventative oral hygiene regimen  - Implement oral medicated treatments as ordered  - Initiate Nutrition services referral as needed  Outcome: Progressing     Problem: HEMATOLOGIC - ADULT  Goal: Maintains hematologic stability  Description  INTERVENTIONS  - Assess for signs and symptoms of bleeding or hemorrhage  - Monitor labs  - Administer supportive blood products/factors as ordered and appropriate  Outcome: Progressing     Problem: MUSCULOSKELETAL - ADULT  Goal: Maintain or return mobility to safest level of function  Description  INTERVENTIONS:  - Assess patient's ability to carry out ADLs; assess patient's baseline for ADL function and identify physical deficits which impact ability to perform ADLs (bathing, care of mouth/teeth, toileting, grooming, dressing, etc )  - Assess/evaluate cause of self-care deficits   - Assess range of motion  - Assess patient's mobility  - Assess patient's need for assistive devices and provide as appropriate  - Encourage maximum independence but intervene and supervise when necessary  - Involve family in performance of ADLs  - Assess for home care needs following discharge   - Consider OT consult to assist with ADL evaluation and planning for discharge  - Provide patient education as appropriate  Outcome: Progressing  Goal: Maintain proper alignment of affected body part  Description  INTERVENTIONS:  - Support, maintain and protect limb and body alignment  - Provide patient/ family with appropriate education  Outcome: Progressing     Problem: PAIN - ADULT  Goal: Verbalizes/displays adequate comfort level or baseline comfort level  Description  Interventions:  - Encourage patient to monitor pain and request assistance  - Assess pain using appropriate pain scale  - Administer analgesics based on type and severity of pain and evaluate response  - Implement non-pharmacological measures as appropriate and evaluate response  - Consider cultural and social influences on pain and pain management  - Notify physician/advanced practitioner if interventions unsuccessful or patient reports new pain  Outcome: Progressing     Problem: INFECTION - ADULT  Goal: Absence or prevention of progression during hospitalization  Description  INTERVENTIONS:  - Assess and monitor for signs and symptoms of infection  - Monitor lab/diagnostic results  - Monitor all insertion sites, i e  indwelling lines, tubes, and drains  - Monitor endotracheal if appropriate and nasal secretions for changes in amount and color  - [place] appropriate cooling/warming therapies per order  - Administer medications as ordered  - Instruct and encourage patient and family to use good hand hygiene technique  - Identify and instruct in appropriate isolation precautions for identified infection/condition  Outcome: Progressing  Goal: Absence of fever/infection during neutropenic period  Description  INTERVENTIONS:  - Monitor WBC    Outcome: Progressing     Problem: SAFETY ADULT  Goal: Maintain or return to baseline ADL function  Description  INTERVENTIONS:  -  Assess patient's ability to carry out ADLs; assess patient's baseline for ADL function and identify physical deficits which impact ability to perform ADLs (bathing, care of mouth/teeth, toileting, grooming, dressing, etc )  - Assess/evaluate cause of self-care deficits   - Assess range of motion  - Assess patient's mobility; develop plan if impaired  - Assess patient's need for assistive devices and provide as appropriate  - Encourage maximum independence but intervene and supervise when necessary  - Involve family in performance of ADLs  - Assess for home care needs following discharge   - Consider OT consult to assist with ADL evaluation and planning for discharge  - Provide patient education as appropriate  Outcome: Progressing  Goal: Maintain or return mobility status to optimal level  Description  INTERVENTIONS:  - Assess patient's baseline mobility status (ambulation, transfers, stairs, etc )    - Identify cognitive and physical deficits and behaviors that affect mobility  - Identify mobility aids required to assist with transfers and/or ambulation (gait belt, sit-to-stand, lift, walker, cane, etc )  - [place] fall precautions as indicated by assessment  - Record patient progress and toleration of activity level on Mobility SBAR; progress patient to next Phase/Stage  - Instruct patient to call for assistance with activity based on assessment  - Consider rehabilitation consult to assist with strengthening/weightbearing, etc   Outcome: Progressing     Problem: Nutrition/Hydration-ADULT  Goal: Nutrient/Hydration intake appropriate for improving, restoring or maintaining nutritional needs  Description  Monitor and assess patient's nutrition/hydration status for malnutrition  Collaborate with interdisciplinary team and initiate plan and interventions as ordered  Monitor patient's weight and dietary intake as ordered or per policy  Utilize nutrition screening tool and intervene as necessary  Determine patient's food preferences and provide high-protein, high-caloric foods as appropriate       INTERVENTIONS:  - Monitor oral intake, urinary output, labs, and treatment plans  - Assess nutrition and hydration status and recommend course of action  - Evaluate amount of meals eaten  - Assist patient with eating if necessary   - Allow adequate time for meals  - Recommend/ encourage appropriate diets, oral nutritional supplements, and vitamin/mineral supplements  - Order, calculate, and assess calorie counts as needed  - Recommend, monitor, and adjust tube feedings and TPN/PPN based on assessed needs  - Assess need for intravenous fluids  - Provide specific nutrition/hydration education as appropriate  - Include patient/family/caregiver in decisions related to nutrition  Outcome: Progressing

## 2019-10-25 NOTE — SOCIAL WORK
CM met with pt and her family  CM introduced self/role with dcp  Pt resides a lone in a home attached to her parents  Pt has 9 NEIL  Bedroom/bathroom are on 2nd floor and laundry in the basement  Pt reports her parents home is attached through her kitchen and can use their first floor bathroom if needed  Pt independent with ADLs and ambulation PTA  Pt reports she owns a RW, [facility], shower chair, SPC and Rolator  Pt reports she is retired and able to drive  Pt reports having 2 hours per week of HHA through [address] [place] [address] (private pay) to help with house work  Pt reports she helps her parents at home but the family is assisting while pt is int  hospital  Pt has previous hx of VNA and reports she is current with PALS  Pt denies hx of STR, MH, or drug/alcohol abuse  Pharmacy is [facility]  Pt requested scripts sent to [facility] if she is d/c Sunday due to [facility] hours  Pt reports she provided copy of POA and LW to ICU and should be scanned to her chart  Pt aware PT/OT recommending RW (which pt has) and home therapy at d/c  CM met with pt to discuss same  Pt agreeable and preference is [facility]  [facility] referral made - [facility] able to accept and f/u providers updated  A post acute care recommendation was made by your care team for [facility]  Discussed Freedom of Choice with patient  List of agencies given to patient via in person  patient aware the list is custom filtered for them by preference  and that [facility] post acute providers are designated  CM reviewed d/c planning process including the following: identifying help at home, patient preference for d/c planning needs, Discharge Lounge, [facility] Meds to Bed program, availability of treatment team to discuss questions or concerns patient and/or family may have regarding understanding medications and recognizing signs and symptoms once discharged  CM also encouraged patient to follow up with all recommended appointments after discharge   Patient advised of importance for patient and family to participate in managing patients medical well being

## 2019-10-25 NOTE — ASSESSMENT & PLAN NOTE
· Diagnosed in 2017  Followed with Dr Oneil Guy and Dr Brie Hair  Underwent chemo, RT   · Most recent PET with subtle, stable disease

## 2019-10-25 NOTE — PLAN OF CARE
Problem: OCCUPATIONAL THERAPY ADULT  Goal: Performs self-care activities at highest level of function for planned discharge setting  See evaluation for individualized goals  Description  Treatment Interventions: ADL retraining, Functional transfer training, Endurance training, Cognitive reorientation, Patient/family training, Equipment evaluation/education, Compensatory technique education, Energy conservation, Activityengagement, Continued evaluation          See flowsheet documentation for full assessment, interventions and recommendations  Note:   Limitation: Decreased ADL status, Decreased endurance, Decreased self-care trans, Decreased high-level ADLs  Prognosis: Fair  Assessment: Pt is a 69 y o  Female who presented to [facility] on 10/16/19 reporting she has been having episodes of left hand cramping lasting approximately 5-10 min starting earlier this summer  These have become progressively more frequent with most recent episode on 10/10/19  That time she was placed on Keppra and has had no episodes since then  Pt had scheduled MRI on 10/7 which showed 3 enhancing lesions in the brain  Pt is s/p craniotomy on 10/24/19  Pt's PMH  includes: stage 4 lung cancer, severe COPD, breast cancer, requires supplemental O2, and smokes  Pt seen for OT evaluation POD #1 on 10/25/19 with active OOB orders  Pt lives in an adjoining house with her parents because she is their caregiver  Pt has 3 stories with a basement  Pt reports 9STE (5 and 4) and full flights to each floor, including the basement  Pt reports railings on all steps inside and out  Pt reports her bed/br is on 2nd fl and laundry is in basement  Pt states she would be able to use parent's bathroom and BSC if necessary  At baseline, pt reports she was I with all ADLs, IADLs, functional mobility, and driving  Pt states she has access to [facility], and  but was not using any DME PTA   Currently, pt requires supervision for UB/LB ADLs, toileting, and bed mobility, and CTG for functional transfers and functional mobility  Pt presents with the following impairments: decreased endurance, decreased activity tolerance, decreased standing tolerance, decreased standing balance  These deficits limit his ability to perform dressing, bathing, toileting, functional transfers, functional mobility, community mobility, and leisure pursuits  Based on the above mentioned deficts and ongoing limitations, would consider pt to be moderate complexity evaluation  Pt scored 75/100 on the Barthel Index  Upon D/C, OT recommends home OT, once medically stable  Pt will continue to be seen for skilled OT services 3-5x/wk to achieve listed goals       OT Discharge Recommendation: Home OT  OT - OK to Discharge: Yes(once medically stable)    Cassidy Steele, OTS

## 2019-10-25 NOTE — PHYSICAL THERAPY NOTE
Physical Therapy Evaluation     Patient's Name: Estefania Ruiz    Admitting Diagnosis  Primary brain tumor [facility]) [D49 6]    Problem List  Patient Active Problem List   Diagnosis    Malignant neoplasm of right lung ([facility] [address] )    Abnormal weight loss    Loss of appetite    Hyperlipidemia    Healthcare maintenance    Simple chronic bronchitis (HCC)    Chronic hypoxemic respiratory failure (HCC)    Respiratory distress    Nicotine dependence    COPD exacerbation (HCC)    Depressive disorder    Acute on chronic respiratory failure with hypoxia (HCC)    Goals of care, counseling/discussion    Shortness of breath    Cramp of limb    Episode of shaking    Metastatic adenocarcinoma to brain ([facility] [address] )    Cerebral edema ([facility] [address] )    On home oxygen therapy       Past Medical History  Past Medical History:   Diagnosis Date    Breast cancer ([facility] [address] ) 1996    COPD, severe ([facility] [address] )     Requires supplemental oxygen     3L 24 hrs/day    Stage 4 lung cancer ([facility] [address] )        Past Surgical History  Past Surgical History:   Procedure Laterality Date    APPENDECTOMY      COLONOSCOPY N/A 4/18/2016    Procedure: COLONOSCOPY;  Surgeon: Jelani Paige MD;  [facility]; Service:     LUNG CANCER SURGERY      MASTECTOMY, RADICAL Right 1996    [address] N/A 10/17/2017    Procedure: Irma Valentin;  Surgeon: Anastacia Black MD;  [facility]; Service: Pulmonary    [place] EXCIS [address] TUMOR Right 10/24/2019    Procedure: IMAGE-GUIDED RIGHT FRONTAL CRANIOTOMY FOR TUMOR;  Surgeon: Evangelina Travis MD;  [facility];  Service: Neurosurgery    TONSILLECTOMY          10/25/19 1025   Note Type   Note type Eval/Treat   Pain Assessment   Pain Assessment No/denies pain   Pain Score No Pain   Home Living   Type of Home House   Home Layout Multi-level   Bathroom Shower/Tub Tub/shower unit   Bathroom Toilet Standard   Bathroom Equipment Commode; [address]   Additional Comments 5+4 NEIL, full flight to basement, full flight to second floor  Prior Function   Level of [place] Independent with ADLs and functional mobility   Lives With Family   ADL Assistance Independent   IADLs Independent   Falls in the last 6 months 0   Vocational Retired   Comments Pt reports living on second floor of home, parents live on first  5+4 NEIL   Restrictions/Precautions   Weight Bearing Precautions Per Order No   Other Precautions Seizure;O2;Multiple lines; Fall Risk; Chair Alarm; Bed Alarm;Telemetry   General   Family/Caregiver Present Yes   Cognition   Orientation Level Oriented X4   RLE Assessment   RLE Assessment WFL   LLE Assessment   LLE Assessment WFL   Coordination   Movements are Fluid and Coordinated 0   Coordination and Movement Description slow and gaurded with fatigue   Bed Mobility   Supine to Sit 5  Supervision   Additional items HOB elevated; Increased time required   Sit to Supine Unable to assess  (pt left resting in chair as requested)   Transfers   Sit to Stand 4  Minimal assistance  (close S)   Additional items Assist x 1; Increased time required   Stand to Sit 4  Minimal assistance  (close S)   Additional items Assist x 1; Increased time required   Ambulation/Elevation   Gait pattern Excessively slow; Shuffling;Decreased foot clearance   Gait Assistance 4  Minimal assist   Additional items Assist x 1   Assistive Device Rolling walker   Distance 120+120   Balance   Static Sitting Good   Dynamic Sitting Fair +   Static Standing Fair -   Dynamic Standing Poor +   Ambulatory Poor +   Endurance Deficit   Endurance Deficit Yes   Endurance Deficit Description limited by fatigue compared to baseline function   Activity Tolerance   Activity Tolerance Patient limited by fatigue   Medical Staff Made Aware OT and Cm for D/C planning   Nurse Made Aware yes, nsg gave clearance to work with pt   Assessment   Prognosis Good   Problem List Decreased strength;Decreased range of motion; Impaired balance;Decreased mobility; Decreased safety awareness;Pain;Orthopedic restrictions;Decreased coordination   Assessment Pt is 69 y o  female seen for PT evaluation s/p admit to [facility] on 10/24/2019 w/ Malignant neoplasm of right lung ([address] )  PT consulted to assess pt's functional mobility and d/c needs  Order placed for PT eval and tx, w/ up w/ A order  Comorbidities affecting pt's physical performance at time of assessment include: lung CA on home O2  PTA, pt was ambulates unrestricted distances and all terrain, lives in multi-level home and has 5+4 NEIL  Personal factors affecting pt at time of IE include: ambulating w/ assistive device, stairs to enter home, decreased cognition, unable to perform physical activity, inability to perform IADLs and inability to perform ADLs  Please find objective findings from PT assessment regarding body systems outlined above with impairments and limitations including weakness, impaired balance, decreased endurance, gait deviations, pain, decreased activity tolerance, decreased functional mobility tolerance, decreased safety awareness and fall risk  Pt very eager to complete therapy evaluation  Completed bed mobility with S although required increased time  Educated in hand placement with new DME use  Ambulated with slow although overall steady gait using RW  Anticipate pt will progress quickly to achieve goals to return home with increased family support  The following objective measures performed on IE also reveal limitations: Barthel Index: 80/100  Pt's clinical presentation is currently unstable/unpredictable seen in pt's presentation of critical care monitoring, o2  Pt to benefit from continued PT tx to address deficits as defined above and maximize level of functional independent mobility and consistency  From PT/mobility standpoint, recommendation at time of d/c would be Home PT pending progress in order to facilitate return to PLOF     Barriers to Discharge Inaccessible home environment;Decreased caregiver support   Barriers to Discharge Comments Pt reports that family is visiting and staying through Nov to A with pt and pt parents  Goals   Patient Goals To go home   STG Expiration Date 11/06/19   Short Term Goal #1 1  Complete bed mobility and transfers I to decrease need for caregiver in home  2  Ambulate 300' I to complete household and community mobility without A  3  Improve dynamic balance to good to decrease need for UE support during ambulation  4  Be educated & demonstate 12 steps to be able to enter home without A  Plan   Treatment/Interventions OT; Spoke to nursing;Gait training;Bed mobility; Patient/family training; Endurance training;LE strengthening/ROM; Functional transfer training   PT Frequency   (3-6x/wk)   Recommendation   Recommendation Home PT; Home with family support   Equipment Recommended   (pt reports have all DME needed)   PT - OK to Discharge Yes  (with increased family support)   Barthel Index   Feeding 10   Bathing 5   Grooming Score 5   Dressing Score 10   Bladder Score 10   Bowels Score 10   Toilet Use Score 10   Transfers (Bed/Chair) Score 10   Mobility (Level Surface) Score 10   Stairs Score 0   Barthel Index Score 80           Massiel Cronin, PT

## 2019-10-25 NOTE — OCCUPATIONAL THERAPY NOTE
[address] Evaluation     Patient Name: Radha SPRINGER Date: 10/25/2019  Problem List  Principal Problem:    Malignant neoplasm of right lung [facility])  Active Problems:    Chronic hypoxemic respiratory failure (HCC)    Metastatic adenocarcinoma to brain (HCC)    Cerebral edema (HCC)    On home oxygen therapy    Past Medical History  Past Medical History:   Diagnosis Date    Breast cancer ([facility] [address] ) 1996    COPD, severe ([facility] [address] )     Requires supplemental oxygen     3L 24 hrs/day    Stage 4 lung cancer ([facility] [address] )      Past Surgical History  Past Surgical History:   Procedure Laterality Date    APPENDECTOMY      COLONOSCOPY N/A 4/18/2016    Procedure: COLONOSCOPY;  Surgeon: Demetria Arceo MD;  [facility]; Service:     LUNG CANCER SURGERY      MASTECTOMY, RADICAL Right 1996    [place] [address] N/A 10/17/2017    Procedure: Juhi Pinta;  Surgeon: Drew Marcelo MD;  [facility]; Service: Pulmonary    [place] EXCIS [address] TUMOR Right 10/24/2019    Procedure: IMAGE-GUIDED RIGHT FRONTAL CRANIOTOMY FOR TUMOR;  Surgeon: Taylor Weaver MD;  [facility];  Service: Neurosurgery    TONSILLECTOMY               10/25/19 1023   Note Type   Note type Eval/Treat   Restrictions/Precautions   Weight Bearing Precautions Per Order No   Other Precautions Seizure;O2;Fall Risk;Multiple lines;Telemetry   Pain Assessment   Pain Assessment No/denies pain   Pain Score No Pain   Home Living   Type of Home House   Home Layout Multi-level; Laundry in basement;Bed/bath upstairs;Stairs to enter with rails  [facility] with basement, 9STE (5 and 4), full flight to each floor)   Bathroom Shower/Tub Tub/shower unit   Bathroom Toilet Standard   Bathroom Equipment Commode; Shower chair   Home Equipment Walker   Additional Comments Pt lives in a [place] with a basement that is adjointed to pt's parent's house   Pt reports 9 total NEIL from the outside (5 and 4) and full flights of steps to each floor of the house and basement  Pt has a tub/shower and standard toilet  Pt reports she has a [place] and BSC available but was not using either PTA  Pt reports she is planning to get a grab bar put in her shower  Pt reports her luandry is located in the basement and her Bed/Br is on 2nd floor  Pt states she would be able to use her parent's bathroom and the [facility] if necessary  Pt reports she has a RW available but was not using PTA  Prior Function   Level of [place] Independent with ADLs and functional mobility   Lives With Family  (parents in adjointed houses)   [address] Help From Other (Comment)  (spoke with CM, reports pt has aids that assist with IADLs)   ADL Assistance Independent   IADLs Independent   Falls in the last 6 months 0   Vocational Retired   Comments Pt reports living in adjointed house with parents because she is their caretaker, but reports she lives alone on her side of the house   Lifestyle   Autonomy PTA pt reports I with ADLs, IADLs, functional mobility, and was driving  Reciprocal Relationships lives with her parents in adjointed house  Has good family support with extended family   Service to Others retired   Rayne Boogie enjoys staying active and gardening   Psychosocial   Psychosocial (WDL) WDL   Length of Time/Family Visitation 0-5 min  (family present during last 5 min of eval)   Subjective   Subjective "I try to stay active"   ADL   Eating Assistance 7  Independent   Grooming Assistance [address]  5  Supervision/Setup   LB [address] 5  [address]  5  Supervision/Setup   LB Dressing Assistance 5  Supervision/Setup   LB Dressing Deficit Don/doff R sock; Don/doff L sock; Supervision/safety   Toileting Assistance  5  Supervision/Setup   Toileting Deficit Supervison/safety; Perineal hygiene   Functional Assistance 4  Minimal Assistance   Additional Comments Pt sat in chair and was able to reach feet to don/doff socks with supervision for safety  Pt able to perform toileting with supervision  Pt able to perform perineal hygiene  Bed Mobility   Supine to Sit 5  Supervision   Additional items HOB elevated; Increased time required   Transfers   Sit to Stand 4  Minimal assistance   Additional items Assist x 1; Increased time required   Stand to Sit 4  Minimal assistance   Additional items Assist x 1; Increased time required   Toilet transfer 4  Minimal assistance   Additional items Assist x 1;Standard toilet   Additional Comments Pt performed sit to stand from EOB and stand to sit into chair with CTG of 1 and close supervision for safety  Pt utilized RW during functional transfers for safety and stability  Functional Mobility   Functional Mobility 4  Minimal assistance   Additional Comments Pt perfomed functional mobility with use of RW  Pt needed CTG and close supervision for safety  Pt on O2 tank during functional mobility and demonstrated no SOB or lightheadedness  Additional items Rolling walker   Balance   Static Sitting Good   Dynamic Sitting Fair +   Static Standing Fair -   Dynamic Standing Poor +   Ambulatory Poor +   Activity Tolerance   Activity Tolerance Patient tolerated treatment well   Medical Staff Made Aware No SANTIZO   Nurse Made Aware Yes, RN Sharda Rodriguez Assessment   RUE Assessment WFL   LUE Assessment   LUE Assessment WFL   Hand Function   Gross Motor Coordination Functional   Fine Motor Coordination Functional   Sensation   Light Touch No apparent deficits   Vision-Basic Assessment   Current Vision Wears glasses all the time  (no reports of visual changes since admission)   Cognition   Overall Cognitive Status WFL   Arousal/Participation Responsive; Cooperative; Alert   Attention Within functional limits   Orientation Level Oriented X4   Memory Within functional limits   Following Commands Follows all commands and directions without difficulty   Comments Pt is pleasant and cooperative   Pt demonstrates intact memory with ability to recall home set up and biographical info  Pt able to attend to therapy session with no cues for redirection  Assessment   Limitation Decreased ADL status; Decreased endurance;Decreased self-care trans;Decreased high-level ADLs   Prognosis Fair   Assessment Pt is a 69 y o  Female who presented to [facility] on 10/16[facility]19 reporting she has been having episodes of left hand cramping lasting approximately 5-10 min starting earlier this summer  These have become progressively more frequent with most recent episode on 10/10/19  That time she was placed on Keppra and has had no episodes since then  Pt had scheduled MRI on 10/7 which showed 3 enhancing lesions in the brain  Pt is s/p craniotomy on 10/24/19  Pt's PMH  includes: stage 4 lung cancer, severe COPD, breast cancer, requires supplemental O2, and smokes  Pt seen for OT evaluation POD #1 on 10/25/19 with active OOB orders  Pt lives in an adjoining house with her parents because she is their caregiver  Pt has 3 stories with a basement  Pt reports 9STE (5 and 4) and full flights to each floor, including the basement  Pt reports railings on all steps inside and out  Pt reports her bed/br is on 2nd fl and laundry is in basement  Pt states she would be able to use parent's bathroom and BSC if necessary  At baseline, pt reports she was I with all ADLs, IADLs, functional mobility, and driving  Pt states she has access to [facility] [place], and  but was not using any DME PTA  Currently, pt requires supervision for UB/LB ADLs, toileting, and bed mobility, and CTG for functional transfers and functional mobility  Pt presents with the following impairments: decreased endurance, decreased activity tolerance, decreased standing tolerance, decreased standing balance  These deficits limit his ability to perform dressing, bathing, toileting, functional transfers, functional mobility, community mobility, and leisure pursuits   Based on the above mentioned deficts and ongoing limitations, would consider pt to be moderate complexity evaluation  Pt scored 75/100 on the Barthel Index  Upon D/C, OT recommends home OT, once medically stable  Pt will continue to be seen for skilled OT services 3-5x/wk to achieve listed goals  Goals   Patient Goals "to go home on Sunday and resume prior activities"   LTG Time Frame 7-10   Long Term Goal #1 see below listed goals   Plan   Treatment Interventions ADL retraining;Functional transfer training; Endurance training;Cognitive reorientation;Patient/family training;Equipment evaluation/education; Compensatory technique education; Energy conservation; Activityengagement;Continued evaluation   Goal Expiration Date 11/04/19   OT Frequency 3-5x/wk   Recommendation   OT Discharge Recommendation Home OT   OT - OK to Discharge Yes  (once medically stable)   Barthel Index   Feeding 10   Bathing 5   Grooming Score 5   Dressing Score 10   Bladder Score 10   Bowels Score 10   Toilet Use Score 5   Transfers (Bed/Chair) Score 10   Mobility (Level Surface) Score 10   Stairs Score 0   Barthel Index Score 75   Modified [place] Scale   Modified Yecenia Scale 3     GOALS  Pt will perform bed mobility Mod I with use of AE/DME as needed to increase functional independence  Pt will perform functional transfers with Mod I and use of DME as needed to increase functional independence improved engagement in functional activity  Pt will perform functional mobility with Mod I and use of DME as needed to increase functional mobility for improved engagement in ADLs/IADLs  Pt will complete all UB ADLs Mod I with use of AE/DME as needed to increase functional independence with ADL performance  Pt will complete all LB ADLs Mod I with use of AE/DME as needed to increase functional independence with ADL performance  Pt will complete toileting Mod I with use of AE as needed to improve functional independence for increased participation in ADLs/IADLs    Pt will be AOx4 100% of the time to improve engagement during meaningful activity  Pt will stand for 10 min to complete ADL task Mod I with use of DME as needed to improve standing tolerance for sink-level ADLs      CATARINO Harrell

## 2019-10-26 VITALS
DIASTOLIC BLOOD PRESSURE: 59 MMHG | OXYGEN SATURATION: 100 % | HEART RATE: 94 BPM | WEIGHT: 87 LBS | HEIGHT: 65 IN | SYSTOLIC BLOOD PRESSURE: 118 MMHG | BODY MASS INDEX: 14.49 KG/M2 | TEMPERATURE: 98.8 F | RESPIRATION RATE: 18 BRPM

## 2019-10-26 LAB
ABO GROUP BLD BPU: NORMAL
ABO GROUP BLD BPU: NORMAL
BPU ID: NORMAL
BPU ID: NORMAL
CROSSMATCH: NORMAL
CROSSMATCH: NORMAL
UNIT DISPENSE STATUS: NORMAL
UNIT DISPENSE STATUS: NORMAL
UNIT PRODUCT CODE: NORMAL
UNIT PRODUCT CODE: NORMAL
UNIT RH: NORMAL
UNIT RH: NORMAL

## 2019-10-26 PROCEDURE — 94640 AIRWAY INHALATION TREATMENT: CPT

## 2019-10-26 PROCEDURE — NC001 PR NO CHARGE: Performed by: NURSE PRACTITIONER

## 2019-10-26 PROCEDURE — 94760 N-INVAS EAR/PLS OXIMETRY 1: CPT

## 2019-10-26 PROCEDURE — 99024 POSTOP FOLLOW-UP VISIT: CPT | Performed by: NURSE PRACTITIONER

## 2019-10-26 RX ORDER — DEXAMETHASONE 4 MG/1
4 TABLET ORAL EVERY 6 HOURS SCHEDULED
Qty: 1 TABLET | Refills: 0 | Status: SHIPPED | OUTPATIENT
Start: 2019-10-26 | End: 2019-10-26 | Stop reason: HOSPADM

## 2019-10-26 RX ORDER — DEXAMETHASONE 2 MG/1
2 TABLET ORAL EVERY 8 HOURS SCHEDULED
Qty: 48 TABLET | Refills: 0 | Status: SHIPPED | OUTPATIENT
Start: 2019-11-07 | End: 2019-10-26 | Stop reason: HOSPADM

## 2019-10-26 RX ORDER — DEXAMETHASONE 2 MG/1
2 TABLET ORAL EVERY 12 HOURS SCHEDULED
Qty: 48 TABLET | Refills: 0 | Status: SHIPPED | OUTPATIENT
Start: 2019-11-23 | End: 2019-10-26 | Stop reason: HOSPADM

## 2019-10-26 RX ORDER — DEXAMETHASONE 2 MG/1
2 TABLET ORAL EVERY 12 HOURS SCHEDULED
Status: DISCONTINUED | OUTPATIENT
Start: 2019-10-30 | End: 2019-10-26 | Stop reason: HOSPADM

## 2019-10-26 RX ORDER — DEXAMETHASONE 2 MG/1
2 TABLET ORAL DAILY
Qty: 2 TABLET | Refills: 0 | Status: SHIPPED | OUTPATIENT
Start: 2019-11-02 | End: 2019-10-26 | Stop reason: HOSPADM

## 2019-10-26 RX ORDER — DEXAMETHASONE 4 MG/1
4 TABLET ORAL EVERY 6 HOURS SCHEDULED
Status: DISCONTINUED | OUTPATIENT
Start: 2019-10-26 | End: 2019-10-26 | Stop reason: HOSPADM

## 2019-10-26 RX ORDER — DEXAMETHASONE 2 MG/1
2 TABLET ORAL DAILY
Status: DISCONTINUED | OUTPATIENT
Start: 2019-11-02 | End: 2019-10-26 | Stop reason: HOSPADM

## 2019-10-26 RX ORDER — DEXAMETHASONE 2 MG/1
2 TABLET ORAL EVERY 6 HOURS SCHEDULED
Qty: 48 TABLET | Refills: 0 | Status: SHIPPED | OUTPATIENT
Start: 2019-10-26 | End: 2019-10-26 | Stop reason: HOSPADM

## 2019-10-26 RX ORDER — DEXAMETHASONE 2 MG/1
2 TABLET ORAL EVERY 8 HOURS SCHEDULED
Status: DISCONTINUED | OUTPATIENT
Start: 2019-10-28 | End: 2019-10-26 | Stop reason: HOSPADM

## 2019-10-26 RX ORDER — DEXAMETHASONE 2 MG/1
4 TABLET ORAL EVERY 6 HOURS SCHEDULED
Qty: 30 TABLET | Refills: 0 | Status: SHIPPED | OUTPATIENT
Start: 2019-10-26 | End: 2019-10-27

## 2019-10-26 RX ORDER — DEXAMETHASONE 2 MG/1
2 TABLET ORAL EVERY 12 HOURS SCHEDULED
Qty: 4 TABLET | Refills: 0 | Status: SHIPPED | OUTPATIENT
Start: 2019-10-30 | End: 2019-10-26 | Stop reason: HOSPADM

## 2019-10-26 RX ORDER — DEXAMETHASONE 4 MG/1
4 TABLET ORAL EVERY 6 HOURS SCHEDULED
Qty: 30 TABLET | Refills: 0 | Status: SHIPPED | OUTPATIENT
Start: 2019-10-26 | End: 2019-10-26

## 2019-10-26 RX ORDER — DEXAMETHASONE 2 MG/1
2 TABLET ORAL EVERY 8 HOURS SCHEDULED
Qty: 6 TABLET | Refills: 0 | Status: SHIPPED | OUTPATIENT
Start: 2019-10-28 | End: 2019-10-26 | Stop reason: HOSPADM

## 2019-10-26 RX ORDER — DEXAMETHASONE 4 MG/1
4 TABLET ORAL EVERY 6 HOURS SCHEDULED
Qty: 1 TABLET | Refills: 0 | Status: SHIPPED | OUTPATIENT
Start: 2019-10-26 | End: 2019-10-26

## 2019-10-26 RX ORDER — DEXAMETHASONE 2 MG/1
2 TABLET ORAL DAILY
Qty: 30 TABLET | Refills: 0 | Status: SHIPPED | OUTPATIENT
Start: 2019-12-18 | End: 2019-10-26 | Stop reason: HOSPADM

## 2019-10-26 RX ORDER — DEXAMETHASONE 2 MG/1
2 TABLET ORAL EVERY 6 HOURS SCHEDULED
Status: DISCONTINUED | OUTPATIENT
Start: 2019-10-26 | End: 2019-10-26 | Stop reason: HOSPADM

## 2019-10-26 RX ORDER — DEXAMETHASONE 2 MG/1
2 TABLET ORAL EVERY 6 HOURS SCHEDULED
Qty: 8 TABLET | Refills: 0 | Status: SHIPPED | OUTPATIENT
Start: 2019-10-26 | End: 2019-10-26 | Stop reason: HOSPADM

## 2019-10-26 RX ORDER — ACETAMINOPHEN 325 MG/1
975 TABLET ORAL EVERY 8 HOURS SCHEDULED
Qty: 30 TABLET | Refills: 0 | Status: SHIPPED | OUTPATIENT
Start: 2019-10-26 | End: 2020-03-03

## 2019-10-26 RX ADMIN — DEXAMETHASONE SODIUM PHOSPHATE 4 MG: 4 INJECTION, SOLUTION INTRAMUSCULAR; INTRAVENOUS at 05:58

## 2019-10-26 RX ADMIN — ACETAMINOPHEN 975 MG: 325 TABLET ORAL at 05:57

## 2019-10-26 RX ADMIN — DOCUSATE SODIUM 100 MG: 100 CAPSULE, LIQUID FILLED ORAL at 08:55

## 2019-10-26 RX ADMIN — BUDESONIDE 0.5 MG: 0.5 INHALANT RESPIRATORY (INHALATION) at 08:02

## 2019-10-26 RX ADMIN — IPRATROPIUM BROMIDE 0.5 MG: 0.5 SOLUTION RESPIRATORY (INHALATION) at 08:02

## 2019-10-26 RX ADMIN — LEVETIRACETAM 500 MG: 500 TABLET, FILM COATED ORAL at 08:55

## 2019-10-26 NOTE — DISCHARGE SUMMARY
Discharge Summary- Neurosurgery   Сергей Shannon 69 y o  female MRN: [id number]  Unit/Bed#: [facility] 803-71 Encounter: [id number]      Admission Date:   Admission Orders (From admission, onward)     Ordered        10/24/19 1348  Inpatient Admission  Once                     Discharge Date: 10/26/19    Admitting Diagnosis: Primary brain tumor [facility]) [D49 6]    Discharge Diagnosis: Primary brain tumor  Resolved Problems  Date Reviewed: 10/24/2019    None          Attending Physician: Linn Watson MD    Consulting Physician: Elio Grider ([facility])    Procedures Performed: Procedure(s):  IMAGE-GUIDED RIGHT FRONTAL CRANIOTOMY FOR TUMOR    Pathology: not resulted yet    Hospital Course: The patient presented for elective image-guided right frontal craniotomy for tumor resection on 10/24/19  Her operative course was uncomplicated  She recovered for one day in the intensive care unit  She continued to progress working with physical therapy and occupational therapy  She was discharged home on postoperative day two in good condition to home  Condition at Discharge: good     Discharge Instructions/Information to Patient and Family:   See after visit summary for information provided to patient and family  Provisions for Follow-Up Care:  See after visit summary for information related to follow-up care and any pertinent home health orders  Disposition: Home    Planned Readmission: No    Discharge Statement   I spent 20 minutes discharging the patient  This time was spent on the day of discharge  I had direct contact with the patient on the day of discharge  Additional documentation is required if more than 30 minutes were spent on discharge  Discharge Medications:  See after visit summary for reconciled discharge medications provided to patient and family

## 2019-10-26 NOTE — ASSESSMENT & PLAN NOTE
· POD#2 right frontal craniotomy for tumor  Hx of adenocarcinoma, lung and brain  · Some LUE weakness to IO and wrist, very minimal  States numbness much improved since surgery  No further seizure activity  States claw-like "cramping" has ceased in left hand  · Imaging reviewed by myself and attending as follows:    · CT head 10/25/19: Postsurgical changes from right frontal craniotomy with small amount of extra-axial/subdural fluid and air in the right frontal region  Expected postoperative finding  Vasogenic edema noted in the right frontal region, unchanged  No increasing mass effect or midline shift  · STAT CT head if decrease in GCS > 2 pts in 1 hour  · Continue Decadron 4 mg q6h with slow wean  · Continue Keppra 500 mg BID  · Pain well-controlled  · Mobilize with PT/OT  · On home O2 therapy, has this set up already at home along with shower chair and walker  On Pulmicort and Atrovent  · DVT ppx: SCDs  · Scheduled for follow up MRI w/wout in 2 weeks for [facility]  · Neurosurgery will discharge patient to home today  Please call with any questions or concerns

## 2019-10-26 NOTE — PLAN OF CARE
Problem: Prexisting or High Potential for Compromised Skin Integrity  Goal: Skin integrity is maintained or improved  Description  INTERVENTIONS:  - Identify patients at risk for skin breakdown  - Assess and monitor skin integrity  - Assess and monitor nutrition and hydration status  - Monitor labs   - Assess for incontinence   - Turn and reposition patient  - Assist with mobility/ambulation  - Relieve pressure over bony prominences  - Avoid friction and shearing  - Provide appropriate hygiene as needed including keeping skin clean and dry  - Evaluate need for skin moisturizer/barrier cream  - Collaborate with interdisciplinary team   - Patient/family teaching  - Consider wound care consult   Outcome: Progressing     Problem: Potential for Falls  Goal: Patient will remain free of falls  Description  INTERVENTIONS:  - Assess patient frequently for physical needs  -  Identify cognitive and physical deficits and behaviors that affect risk of falls    -  [place] fall precautions as indicated by assessment   - Educate patient/family on patient safety including physical limitations  - Instruct patient to call for assistance with activity based on assessment  - Modify environment to reduce risk of injury  - Consider OT/PT consult to assist with strengthening/mobility  Outcome: Progressing     Problem: NEUROSENSORY - ADULT  Goal: Achieves stable or improved neurological status  Description  INTERVENTIONS  - Monitor and report changes in neurological status  - Monitor vital signs such as temperature, blood pressure, glucose, and any other labs ordered   - Initiate measures to prevent increased intracranial pressure  - Monitor for seizure activity and implement precautions if appropriate      Outcome: Progressing  Goal: Remains free of injury related to seizures activity  Description  INTERVENTIONS  - Maintain airway, patient safety  and administer oxygen as ordered  - Monitor patient for seizure activity, document and report duration and description of seizure to physician/advanced practitioner  - If seizure occurs,  ensure patient safety during seizure  - Reorient patient post seizure  - Seizure pads on all 4 side rails  - Instruct patient/family to notify RN of any seizure activity including if an aura is experienced  - Instruct patient/family to call for assistance with activity based on nursing assessment  - Administer anti-seizure medications if ordered    Outcome: Progressing  Goal: Achieves maximal functionality and self care  Description  INTERVENTIONS  - Monitor swallowing and airway patency with patient fatigue and changes in neurological status  - Encourage and assist patient to increase activity and self care     - Encourage visually impaired, hearing impaired and aphasic patients to use assistive/communication devices  Outcome: Progressing     Problem: CARDIOVASCULAR - ADULT  Goal: Maintains optimal cardiac output and hemodynamic stability  Description  INTERVENTIONS:  - Monitor I/O, vital signs and rhythm  - Monitor for S/S and trends of decreased cardiac output  - Administer and titrate ordered vasoactive medications to optimize hemodynamic stability  - Assess quality of pulses, skin color and temperature  - Assess for signs of decreased coronary artery perfusion  - Instruct patient to report change in severity of symptoms  Outcome: Progressing     Problem: RESPIRATORY - ADULT  Goal: Achieves optimal ventilation and oxygenation  Description  INTERVENTIONS:  - Assess for changes in respiratory status  - Assess for changes in mentation and behavior  - Position to facilitate oxygenation and minimize respiratory effort  - Oxygen administered by appropriate delivery if ordered  - Initiate smoking cessation education as indicated  - Encourage broncho-pulmonary hygiene including cough, deep breathe, Incentive Spirometry  - Assess the need for suctioning and aspirate as needed  - Assess and instruct to report SOB or any respiratory difficulty  - Respiratory Therapy support as indicated  Outcome: Progressing     Problem: GASTROINTESTINAL - ADULT  Goal: Maintains or returns to baseline bowel function  Description  INTERVENTIONS:  - Assess bowel function  - Encourage oral fluids to ensure adequate hydration  - Administer IV fluids if ordered to ensure adequate hydration  - Administer ordered medications as needed  - Encourage mobilization and activity  - Consider nutritional services referral to assist patient with adequate nutrition and appropriate food choices  Outcome: Progressing     Problem: GENITOURINARY - ADULT  Goal: Maintains or returns to baseline urinary function  Description  INTERVENTIONS:  - Assess urinary function  - Encourage oral fluids to ensure adequate hydration if ordered  - Administer IV fluids as ordered to ensure adequate hydration  - Administer ordered medications as needed  - Offer frequent toileting  - Follow urinary retention protocol if ordered  Outcome: Progressing     Problem: METABOLIC, FLUID AND ELECTROLYTES - ADULT  Goal: Electrolytes maintained within normal limits  Description  INTERVENTIONS:  - Monitor labs and assess patient for signs and symptoms of electrolyte imbalances  - Administer electrolyte replacement as ordered  - Monitor response to electrolyte replacements, including repeat lab results as appropriate  - Instruct patient on fluid and nutrition as appropriate  Outcome: Progressing  Goal: Fluid balance maintained  Description  INTERVENTIONS:  - Monitor labs   - Monitor I/O and WT  - Instruct patient on fluid and nutrition as appropriate  - Assess for signs & symptoms of volume excess or deficit  Outcome: Progressing  Goal: Glucose maintained within target range  Description  INTERVENTIONS:  - Monitor Blood Glucose as ordered  - Assess for signs and symptoms of hyperglycemia and hypoglycemia  - Administer ordered medications to maintain glucose within target range  - Assess nutritional intake and initiate nutrition service referral as needed  Outcome: Progressing     Problem: SKIN/TISSUE INTEGRITY - ADULT  Goal: Skin integrity remains intact  Description  INTERVENTIONS  - Identify patients at risk for skin breakdown  - Assess and monitor skin integrity  - Assess and monitor nutrition and hydration status  - Monitor labs (i e  albumin)  - Assess for incontinence   - Turn and reposition patient  - Assist with mobility/ambulation  - Relieve pressure over bony prominences  - Avoid friction and shearing  - Provide appropriate hygiene as needed including keeping skin clean and dry  - Evaluate need for skin moisturizer/barrier cream  - Collaborate with interdisciplinary team (i e  Nutrition, Rehabilitation, etc )   - Patient/family teaching  Outcome: Progressing  Goal: Incision(s), wounds(s) or drain site(s) healing without S/S of infection  Description  INTERVENTIONS  - Assess and document risk factors for skin impairment   - Assess and document dressing, incision, wound bed, drain sites and surrounding tissue  - Consider nutrition services referral as needed  - Oral mucous membranes remain intact  - Provide patient/ family education  Outcome: Progressing  Goal: Oral mucous membranes remain intact  Description  INTERVENTIONS  - Assess oral mucosa and hygiene practices  - Implement preventative oral hygiene regimen  - Implement oral medicated treatments as ordered  - Initiate Nutrition services referral as needed  Outcome: Progressing     Problem: HEMATOLOGIC - ADULT  Goal: Maintains hematologic stability  Description  INTERVENTIONS  - Assess for signs and symptoms of bleeding or hemorrhage  - Monitor labs  - Administer supportive blood products/factors as ordered and appropriate  Outcome: Progressing     Problem: MUSCULOSKELETAL - ADULT  Goal: Maintain or return mobility to safest level of function  Description  INTERVENTIONS:  - Assess patient's ability to carry out ADLs; assess patient's baseline for ADL function and identify physical deficits which impact ability to perform ADLs (bathing, care of mouth/teeth, toileting, grooming, dressing, etc )  - Assess/evaluate cause of self-care deficits   - Assess range of motion  - Assess patient's mobility  - Assess patient's need for assistive devices and provide as appropriate  - Encourage maximum independence but intervene and supervise when necessary  - Involve family in performance of ADLs  - Assess for home care needs following discharge   - Consider OT consult to assist with ADL evaluation and planning for discharge  - Provide patient education as appropriate  Outcome: Progressing  Goal: Maintain proper alignment of affected body part  Description  INTERVENTIONS:  - Support, maintain and protect limb and body alignment  - Provide patient/ family with appropriate education  Outcome: Progressing     Problem: PAIN - ADULT  Goal: Verbalizes/displays adequate comfort level or baseline comfort level  Description  Interventions:  - Encourage patient to monitor pain and request assistance  - Assess pain using appropriate pain scale  - Administer analgesics based on type and severity of pain and evaluate response  - Implement non-pharmacological measures as appropriate and evaluate response  - Consider cultural and social influences on pain and pain management  - Notify physician/advanced practitioner if interventions unsuccessful or patient reports new pain  Outcome: Progressing     Problem: INFECTION - ADULT  Goal: Absence or prevention of progression during hospitalization  Description  INTERVENTIONS:  - Assess and monitor for signs and symptoms of infection  - Monitor lab/diagnostic results  - Monitor all insertion sites, i e  indwelling lines, tubes, and drains  - Monitor endotracheal if appropriate and nasal secretions for changes in amount and color  - [place] appropriate cooling/warming therapies per order  - Administer medications as ordered  - Instruct and encourage patient and family to use good hand hygiene technique  - Identify and instruct in appropriate isolation precautions for identified infection/condition  Outcome: Progressing  Goal: Absence of fever/infection during neutropenic period  Description  INTERVENTIONS:  - Monitor WBC    Outcome: Progressing     Problem: SAFETY ADULT  Goal: Maintain or return to baseline ADL function  Description  INTERVENTIONS:  -  Assess patient's ability to carry out ADLs; assess patient's baseline for ADL function and identify physical deficits which impact ability to perform ADLs (bathing, care of mouth/teeth, toileting, grooming, dressing, etc )  - Assess/evaluate cause of self-care deficits   - Assess range of motion  - Assess patient's mobility; develop plan if impaired  - Assess patient's need for assistive devices and provide as appropriate  - Encourage maximum independence but intervene and supervise when necessary  - Involve family in performance of ADLs  - Assess for home care needs following discharge   - Consider OT consult to assist with ADL evaluation and planning for discharge  - Provide patient education as appropriate  Outcome: Progressing  Goal: Maintain or return mobility status to optimal level  Description  INTERVENTIONS:  - Assess patient's baseline mobility status (ambulation, transfers, stairs, etc )    - Identify cognitive and physical deficits and behaviors that affect mobility  - Identify mobility aids required to assist with transfers and/or ambulation (gait belt, sit-to-stand, lift, walker, cane, etc )  - [place] fall precautions as indicated by assessment  - Record patient progress and toleration of activity level on Mobility SBAR; progress patient to next Phase/Stage  - Instruct patient to call for assistance with activity based on assessment  - Consider rehabilitation consult to assist with strengthening/weightbearing, etc   Outcome: Progressing     Problem: Nutrition/Hydration-ADULT  Goal: Nutrient/Hydration intake appropriate for improving, restoring or maintaining nutritional needs  Description  Monitor and assess patient's nutrition/hydration status for malnutrition  Collaborate with interdisciplinary team and initiate plan and interventions as ordered  Monitor patient's weight and dietary intake as ordered or per policy  Utilize nutrition screening tool and intervene as necessary  Determine patient's food preferences and provide high-protein, high-caloric foods as appropriate       INTERVENTIONS:  - Monitor oral intake, urinary output, labs, and treatment plans  - Assess nutrition and hydration status and recommend course of action  - Evaluate amount of meals eaten  - Assist patient with eating if necessary   - Allow adequate time for meals  - Recommend/ encourage appropriate diets, oral nutritional supplements, and vitamin/mineral supplements  - Order, calculate, and assess calorie counts as needed  - Recommend, monitor, and adjust tube feedings and TPN/PPN based on assessed needs  - Assess need for intravenous fluids  - Provide specific nutrition/hydration education as appropriate  - Include patient/family/caregiver in decisions related to nutrition  Outcome: Progressing

## 2019-10-26 NOTE — ASSESSMENT & PLAN NOTE
· Diagnosed in 2017  Followed with Dr Danuta Sinha and Dr Vee Tello  Underwent chemo, RT   · Most recent PET with subtle, stable disease

## 2019-10-26 NOTE — DISCHARGE INSTRUCTIONS
Discharge Instructions  Craniotomy for tumor resection     Activity:  1  Do not lift, push or pull more than 10 pounds for 2 weeks  2  Avoid bending, lifting and twisting for 2 weeks  No running  No athletic activities until cleared  3  No driving for at least 2 weeks or until cleared by Neurosurgery  4  When able to shower, continue to use clean towel and washcloth for 2 weeks post-op  5  Do not use a hair dryer, and avoid hair products such as mousse, oils, and gels  Do not brush your hair away from the incision since this will put strain on the suture line  6  Do not dye or perm hair for 6 weeks or until cleared by physician  7  Continue to change bed linens and pajamas more frequently  Wear clean clothes daily  8  May walk as tolerated  Recommend 4 short walks daily  Surgical incision care:  1  Keep dressings in place for 3 days  After 3 days, incisions may be left open to air, but should remain clean  2  Keep incisions dry for 3 days  3  May shower after 3 days using a baby shampoo including head incision  Rinse off shampoo and pat dry  4  Avoid rubbing the incision but gently massage hair  5  Do not immerse the incisions in water for 6 weeks  6  Staples/suture will be removed at your 2 week postoperative visit  7  Do not apply any creams or ointments to the incision, unless otherwise [facility]  8  Contact office if increasing redness, drainage, pain or swelling occurs around the incisions or if you develop a fever greater than 101F  9  Do not dye/perm hair or use any hair products until cleared by Neurosurgery  Postoperative medication:  1  [facility] will provide pain medication in the postoperative period  All prescriptions must come from a single practice  a  Take medications as prescribed  Call office with any questions/concerns  b  May use over the counter Tylenol  No NSAIDs (ie   Ibuprofen, Aleve, Advil, Naproxen)  2  Please contact office for questions regarding dosage and modifications  3  No antiplatelet or anticoagulation medication (ie  Coumadin, Aspirin, Plavix) until cleared by [address], unless otherwise instructed  Please contact [facility] if you have any questions about the effects of any of your medications on blood clotting  4  Do not operate heavy machinery or vehicles while taking sedating medications  5  Use a bowel regimen while on opioids as they induce constipation  Ie  Senokot-S, Miralax, Colace, etc  Increase fiber and water intake  Follow-up as scheduled for a 2 week post-operative visit for an incision check and final pathology  ** Please notify the office if incision becomes red, swollen, tender, or has increased drainage, and temp>101  Return to the ER if you experience increased headache, drowsiness, weakness, nausea/vomiting, or seizures  **    Your dexamethasone taper will be as follows: One dose (2 pills of 2 mg) of 4 mg at 1800 today    THEN 2 tabs of 2mg (4 mg) 3 x day for 2 days  THEN 1 tab of 2 mg 4 x day for 2 days  THEN 1 tab of 2 mg 3 x day for 2 days  THEN 1 tab of 2 mg 2 x day for 2 days  THEN 1 tab of 2 mg 1 x day for 2 days

## 2019-10-26 NOTE — ASSESSMENT & PLAN NOTE
Protein calorie malnutrition secondary to chronic respiratory failure and metastatic adenocarcinoma  Patient with BMI 14 48  Encourage frequent meals and snacks

## 2019-10-26 NOTE — PROGRESS NOTES
Progress Note - Liv Harmon 1945, 69 y o  female MRN: [id number]    Unit/Bed#: [facility] 331-66 Encounter: [id number]    Primary Care Provider: Anette Villa DO   Date and time admitted to hospital: 10/24/2019  7:57 AM        Metastatic adenocarcinoma to brain [facility])  Assessment & Plan  · POD#2 right frontal craniotomy for tumor  Hx of adenocarcinoma, lung and brain  · Some LUE weakness to IO and wrist, very minimal  States numbness much improved since surgery  No further seizure activity  States claw-like "cramping" has ceased in left hand  · Imaging reviewed by myself and attending as follows:    · CT head 10/25/19: Postsurgical changes from right frontal craniotomy with small amount of extra-axial/subdural fluid and air in the right frontal region  Expected postoperative finding  Vasogenic edema noted in the right frontal region, unchanged  No increasing mass effect or midline shift  · STAT CT head if decrease in GCS > 2 pts in 1 hour  · Continue Decadron 4 mg q6h with slow wean  · Continue Keppra 500 mg BID  · Pain well-controlled  · Mobilize with PT/OT  · On home O2 therapy, has this set up already at home along with shower chair and walker  On Pulmicort and Atrovent  · DVT ppx: SCDs  · Scheduled for follow up MRI w/wout in 2 weeks [facility]  · Neurosurgery will discharge patient to home today  Please call with any questions or concerns  Loss of appetite  Assessment & Plan  Protein calorie malnutrition secondary to chronic respiratory failure and metastatic adenocarcinoma  Patient with BMI 14 48  Encourage frequent meals and snacks  * Malignant neoplasm of right lung [facility])  Assessment & Plan  · Diagnosed in 2017  Followed with Dr Sayra Lopez and Dr Blanca Solano  Underwent chemo, RT   · Most recent PET with subtle, stable disease  Subjective/Objective   Chief Complaint: "I feel great!"    Subjective: Denies HA, pain well controlled with Tylenol  Denies vision changes   Denies numbness or weakness besides slight finger numbness in left digits  States eating and voiding well  No BM yet  Objective: No focal neurological deficits except for wrist strength 4/5 in left  IO 4/5  Ambulating well with PT/OT  I/O       10/24 0701 - 10/25 0700 10/25 0701 - 10/26 0700 10/26 0701 - 10/27 0700    P  O   1460     I V  (mL/kg) 3410 (86 3) 350 (8 9)     IV Piggyback 650      Total Intake(mL/kg) 4060 (102 8) 1810 (45 8)     Urine (mL/kg/hr) 3890 300 (0 3)     Total Output 3890 300     Net +170 +1510            Unmeasured Urine Occurrence  1 x           Invasive Devices     Peripheral Intravenous Line            Peripheral IV 10/24/19 Left Wrist 2 days                Physical Exam:  Vitals: Blood pressure 118/59, pulse 94, temperature 98 8 °F (37 1 °C), temperature source Oral, resp  rate 18, height 5' 5" (1 651 m), weight 39 5 kg (87 lb), SpO2 100 %  ,Body mass index is 14 48 kg/m²        General appearance: alert, appears stated age, cooperative and no distress  Head: Normocephalic, right parietal incision well-approximated, clean and dry  Eyes: EOMI, PERRL, pupils 4 mm bilaterally  Neck: supple, symmetrical, trachea midline and NT  Back: no kyphosis present, no tenderness to percussion or palpation  Lungs: non labored breathing  Heart: regular heart rate  Neurologic:   Mental status: Alert, oriented x3, thought content appropriate  Cranial nerves: grossly intact (Cranial nerves II-XII)  Sensory: normal to light touch, DST, JPS  Motor: moving all extremities without focal weakness, strength grossly 5/5 except for left wrist flex/ex and IO 4/5  Reflexes: 2+ and symmetric, no Biggs's or clonus  Coordination: finger to nose normal bilaterally, no drift bilaterally      Lab Results:  Results from last 7 days   Lab Units 10/25/19  0554   WBC Thousand/uL 17 35*   HEMOGLOBIN g/dL 11 8   HEMATOCRIT % 35 5   PLATELETS Thousands/uL 250     Results from last 7 days   Lab Units 10/25/19  0104   POTASSIUM mmol/L 4  3   CHLORIDE mmol/L 105   CO2 mmol/L 27   BUN mg/dL 19   CREATININE mg/dL 0 63   CALCIUM mg/dL 7 9*     Results from last 7 days   Lab Units 10/25/19  0554   MAGNESIUM mg/dL 1 9     Results from last 7 days   Lab Units 10/25/19  0554   PHOSPHORUS mg/dL 3 2         No results found for: TROPONINT  ABG:No results found for: PHART, GUW3RLK, PO2ART, DAR7MDZ, Z1CPPUFV, BEART, SOURCE    Imaging Studies: I have personally reviewed pertinent reports  and I have personally reviewed pertinent films in PACS    EKG, Pathology, and Other Studies: I have personally reviewed pertinent reports        VTE Pharmacologic Prophylaxis: Sequential compression device (Venodyne)     VTE Mechanical Prophylaxis: sequential compression device

## 2019-10-26 NOTE — ASSESSMENT & PLAN NOTE
· POD#2 right frontal craniotomy for tumor  Hx of adenocarcinoma, lung and brain  · Some LUE weakness to IO and wrist, very minimal  States numbness much improved since surgery  No further seizure activity  States claw-like "cramping" has ceased in left hand  · Imaging reviewed by myself and attending as follows:    · CT head 10/25/19: Postsurgical changes from right frontal craniotomy with small amount of extra-axial/subdural fluid and air in the right frontal region  Expected postoperative finding  Vasogenic edema noted in the right frontal region, unchanged  No increasing mass effect or midline shift  · STAT CT head if decrease in GCS > 2 pts in 1 hour  · Continue Decadron 4 mg q6h with slow wean  · Continue Keppra 500 mg BID  · Pain well-controlled  · Mobilize with PT/OT  · On home O2 therapy, has this set up already at home along with shower chair and walker  On Pulmicort and Atrovent  · DVT ppx: SCDs  · Scheduled for follow up MRI w/wout in 2 weeks for [facility]  · Neurosurgery will discharge patient to home today  Please call with any questions or concerns

## 2019-10-26 NOTE — ASSESSMENT & PLAN NOTE
· Diagnosed in 2017  Followed with Dr Hannah Marcelo and Dr Nancy Ontiveros  Underwent chemo, RT   · Most recent PET with subtle, stable disease

## 2019-10-28 ENCOUNTER — TELEPHONE (OUTPATIENT)
Dept: NEUROSURGERY | Facility: CLINIC | Age: 74
End: 2019-10-28

## 2019-10-28 NOTE — TELEPHONE ENCOUNTER
Patient was discharged from the hospital on 10/26/19  Spoke with patient over the phone to see how she is doing post operatively  Patient reports she is doing well overall and denies any incisional issues or fevers  Patient is able to ambulate around the house and complete ADLs  Reviewed incision care with the patient  Instructed patient to shower daily and to use a mild soap to cleanse the incision with gentle pressure  Instructed patient to not use any ointments, creams, or lotions on the incision  Patient is aware to call the office if any redness, swelling, drainage, dehiscence of incision, or fever >100 F occurs  Educated the patient about the importance of preventing blood clots and provided measures how to prevent them  Patient is aware to call the office if any concerns or questions may arise  Reminded patient of her upcoming appointments with the date/time/location  Patient was appreciative for the call

## 2019-10-29 ENCOUNTER — TRANSITIONAL CARE MANAGEMENT (OUTPATIENT)
Dept: INTERNAL MEDICINE CLINIC | Facility: CLINIC | Age: 74
End: 2019-10-29

## 2019-11-04 ENCOUNTER — HOSPITAL ENCOUNTER (OUTPATIENT)
Dept: RADIOLOGY | Facility: IMAGING CENTER | Age: 74
Discharge: HOME | End: 2019-11-04
Payer: MEDICARE

## 2019-11-04 DIAGNOSIS — C79.31 METASTATIC ADENOCARCINOMA TO BRAIN (HCC): ICD-10-CM

## 2019-11-04 PROCEDURE — A9585 GADOBUTROL INJECTION: HCPCS | Performed by: NURSE PRACTITIONER

## 2019-11-04 PROCEDURE — 70553 MRI BRAIN STEM W/O & W/DYE: CPT

## 2019-11-04 RX ADMIN — GADOBUTROL 4.5 ML: 604.72 INJECTION INTRAVENOUS at 10:20

## 2019-11-06 ENCOUNTER — OFFICE VISIT (OUTPATIENT)
Dept: NEUROSURGERY | Facility: CLINIC | Age: 74
End: 2019-11-06

## 2019-11-06 ENCOUNTER — RADIATION ONCOLOGY CONSULT (OUTPATIENT)
Dept: RADIATION ONCOLOGY | Facility: HOSPITAL | Age: 74
End: 2019-11-06
Attending: RADIOLOGY
Payer: MEDICARE

## 2019-11-06 ENCOUNTER — TELEPHONE (OUTPATIENT)
Dept: NEUROLOGY | Facility: CLINIC | Age: 74
End: 2019-11-06

## 2019-11-06 VITALS
BODY MASS INDEX: 14.56 KG/M2 | HEART RATE: 115 BPM | HEIGHT: 65 IN | WEIGHT: 87.4 LBS | RESPIRATION RATE: 18 BRPM | TEMPERATURE: 97.9 F | SYSTOLIC BLOOD PRESSURE: 110 MMHG | OXYGEN SATURATION: 99 % | DIASTOLIC BLOOD PRESSURE: 60 MMHG

## 2019-11-06 VITALS
HEART RATE: 115 BPM | TEMPERATURE: 97.9 F | SYSTOLIC BLOOD PRESSURE: 110 MMHG | HEIGHT: 65 IN | DIASTOLIC BLOOD PRESSURE: 60 MMHG | RESPIRATION RATE: 18 BRPM | BODY MASS INDEX: 14.56 KG/M2 | WEIGHT: 87.4 LBS

## 2019-11-06 DIAGNOSIS — C34.91 MALIGNANT NEOPLASM OF RIGHT LUNG, UNSPECIFIED PART OF LUNG (HCC): ICD-10-CM

## 2019-11-06 DIAGNOSIS — G40.109 SIMPLE PARTIAL SEIZURES (HCC): ICD-10-CM

## 2019-11-06 DIAGNOSIS — C79.31 METASTATIC ADENOCARCINOMA TO BRAIN (HCC): ICD-10-CM

## 2019-11-06 DIAGNOSIS — G93.6 CEREBRAL EDEMA (HCC): ICD-10-CM

## 2019-11-06 DIAGNOSIS — C79.31 METASTATIC ADENOCARCINOMA TO BRAIN (HCC): Primary | ICD-10-CM

## 2019-11-06 PROCEDURE — 99211 OFF/OP EST MAY X REQ PHY/QHP: CPT | Performed by: RADIOLOGY

## 2019-11-06 PROCEDURE — 99024 POSTOP FOLLOW-UP VISIT: CPT | Performed by: NEUROLOGICAL SURGERY

## 2019-11-06 NOTE — PROGRESS NOTES
Ana Ayden 1945 is a 69 y o  female     Pt is a 69 y o female with stage IV adenocarcinoma of the lung initially diagnosed October 2017, moderately differentiated involving right hilum, mediastinum, precarinal, subcarinal lymph nodes with tumor extension into the right mainstem bronchus, bronchus intermedius, right middle lobe and right cervical area proven by biopsy  She had a lung cancer screening CT scan followed by bronchoscopy and biopsy of R neck mass  She was treated with concurrent chemoradiation to the right lung, mediastinum and right neck which completed 2/9/18  She was last seen by radiation oncology 3/13/18  She had been on observation since that time  She has chronic COPD secondary to active smoking  Admitted to the hospital for a COPD exacerbation 3/2019    CT chest 7/2019 MARTIN    9/20/19 Loyde Hodgkin- pt seen for evaluation of episodes of hand/arm cramping and shaking  Will order MRI brain    10/7/19 MRI brain- 3 separate intracranial enhancing masses, the largest of which is seen within the right posterior frontal vertex measuring 2 8 cm in maximum diameter surrounding vasogenic edema  Findings consistent with intracranial metastasis  Punctate diffusion abnormality on series 3 image 20 within the left inferior parietal lobe suggestive of small acute to subacute infarct  Replacement of normal fatty marrow identified within the C3 and C4 vertebral bodies and posterior elements  This enhances after administration of contrast and is suggestive of osseous metastasis  10/9/19 Dr Kenyon Grayson- now with seizure activity, brain metastases with primary lesion in the right parietal lobe measuring 2 8 cm with vasogenic edema, 2 small punctuate metastases, possible metastases to C6  Decadron 2 mg po BID, Keppra 500 mg po BID  PET scan  Refer to rad onc  F/u with PET results and plan  10/14/19 PET scan- 1   The right frontoparietal lesion seen on MRI is hypermetabolic on this examination, as above  2  Although the right lower lobe subpleural opacity is no longer present, there is a focal area of hypermetabolism in the subcarinal mediastinum, and right inferior hilum  Although no discretely enlarged lymph nodes are seen, these areas are concerning   for neoplastic involvement of lymph nodes  Similarly, right sided posterior mediastinal/paravertebral soft tissue hypermetabolic nodule concerning for adenopathy/metastatic lesion  3  Posterior right upper quadrant focus of increased activity, SUV max 3 8  Potential site of malignant adenopathy  No other signs of hypermetabolic soft tissue metastasis in the abdomen or pelvis  4  No osseous lesions are detected  Note made of previous MRI concern of cervical spine metastatic disease  Aside from degenerative disc disease and facet joint arthritis, no specific cervical spine osseous abnormalities are seen on this PET/CT    Pt cancelled neurology appt for 10/10/19    10/16/19 Dr Erika Simon- pt reviewed at [facility], consensus was to treat the 2 smaller lesions with [facility] however pt may struggle/continue to suffer with seizures, progressive cerebral edema/steroid dependence etc if the R frontal lesion was treated with radiation alone  Surgery would likely reduce the edema burden and potentially control seizures better    10/22/19 Dr Uyen Mock- PET showed mild SUV in the normal size lymph node in the right infrahilar area, superior abdominal area concerning for metastatic disease, no evidence of bony or hepatic metastasis  Decided to watch and observe and f/u in 3 months with CT scan of chest and abdomen  10/22/19 Dr Stefani Lambertr- because of her severe COPD and chronic hypoxemic respiratory failure the pt has an increased risk for the upcoming brain surgery   However her risk for the surgery is not prohibitive to proceed    10/24/19 image-guided R frontal craniotomy for tumor    11/4/19 MRI brain- Expected postoperative change within the right posterior frontal vertex status post resection of metastasis  Mild residual enhancement action cavity  This is the 1st postoperative examination and should serve as baseline for follow-up exams  Improving   surrounding edema within the right posterior frontal and anterior parietal lobe  2 additional metastasis are seen within the occipital lobes which are unchanged from the prior examination measuring 5 and 6 mm each  There are 2 new, small enhancing lesions identified best seen on series 1001, images 127 than the right occipital lobe and 165 within the right parietal cortex  1/17/20 CT chest and neck  1/29/20 Dr Lexi Copeland  2/3/20 Dr Haydee Jung         Malignant neoplasm of right lung [facility])    9/26/2017 Initial Diagnosis     Malignant neoplasm of right lung ([facility] [address] )      10/17/2017 Biopsy     Bronchoscopy was performed  Right main stem endobronchial lesion and collapse of the bronchial intermedius consistent with lung cancer  11/17/2017 - 2/2/2018 Chemotherapy     Concurrent chemo and radiation: Taxol/ Carboplatin       12/1/2017 - 2/9/2018 Radiation     Treatment:  Course: C1    Plan ID Energy Fractions Dose per Fraction (cGy) Total Dose Delivered (cGy) Elapsed Days   R LUNG_MED 6X 33 / 33 180 5,940 48   R NECK 6X 30 / 30 200 6,000 43      Treatment dates:  C1: 12/1/2017 - 2/9/2018 12/7/2017 Biopsy     Biopsy of right neck mass  Conclusive evidence of malignancy  Non-small cell carcinoma consistent with metastasis from the patient's know adenocarcinoma of the lung  Metastatic adenocarcinoma to brain [facility])    10/9/2019 Initial Diagnosis     Metastatic adenocarcinoma to brain ([facility] [address] )      10/28/2019 Biopsy     Image-guided right frontal craniotomy for tumor    Final Diagnosis   A   Brain, Right Frontal Mass, Resection:  - Metastatic adenocarcinoma, consistent with pulmonary primary                Clinical Trial: no      Health Maintenance   Topic Date Due    Hepatitis C Screening  1945    HEPATITIS B VACCINES (1 of 3 - Risk 3-dose series) 06/03/1966    INFLUENZA VACCINE  07/01/2019    Medicare Annual Wellness Visit (AWV)  08/29/2019    BMI: Followup Plan  08/26/2020    Fall Risk  09/20/2020    Urinary Incontinence Screening  09/20/2020    BMI: Adult  11/04/2020    CRC Screening: Colonoscopy  04/18/2026    DTaP,Tdap,and Td Vaccines (2 - Td) 10/07/2027    Pneumococcal Vaccine: 65+ Years  Completed    Pneumococcal Vaccine: Pediatrics (0 to 5 Years) and At-Risk Patients (6 to 59 Years)  Aged Out       Past Medical History:   Diagnosis Date    Breast cancer ([facility] [address] ) 1996    COPD, severe ([facility] [address] )     Requires supplemental oxygen     3L 24 hrs/day    Stage 4 lung cancer ([facility] [address] )        Past Surgical History:   Procedure Laterality Date    APPENDECTOMY      COLONOSCOPY N/A 4/18/2016    Procedure: COLONOSCOPY;  Surgeon: Mukesh Dyson MD;  [facility]; Service:     LUNG CANCER SURGERY      MASTECTOMY, RADICAL Right 1996    [place] [address] N/A 10/17/2017    Procedure: Vaishali Mccoy;  Surgeon: Javier Gates MD;  [facility];   Service: Pulmonary    [place] EXCIS SUPRATENT BRAIN TUMOR Right 10/24/2019    Procedure: IMAGE-GUIDED RIGHT FRONTAL CRANIOTOMY FOR TUMOR;  Surgeon: Ernestine Hassan MD;  [facility];  Service: Neurosurgery    TONSILLECTOMY         Family History   Problem Relation Age of Onset    Heart disease Mother         cardiac disorder    Dementia Mother     Heart failure Father     Heart disease Maternal Grandmother     Heart disease Maternal Grandfather        Social History     Tobacco Use    Smoking status: Current Some Day Smoker     Packs/day: 0 00     Years: 55 00     Pack years: 0 00     Types: Cigarettes     Start date: 1962    Smokeless tobacco: Never Used    Tobacco comment: NOW DOWN TO 3-4 CIGARETTES A  DAY   Substance Use Topics    Alcohol use: Not Currently     Alcohol/week: 0 0 standard drinks     Frequency: Never     Binge frequency: Never    Drug use: No          Current Outpatient Medications:     acetaminophen (TYLENOL) 325 mg tablet, Take 3 tablets (975 mg total) by mouth every 8 (eight) hours (Patient taking differently: Take 650 mg by mouth every 6 (six) hours as needed ), Disp: 30 tablet, Rfl: 0    albuterol (VENTOLIN HFA) 90 mcg/act inhaler, Inhale 2 puffs every 4 (four) hours as needed for wheezing, Disp: 1 Inhaler, Rfl: 0    arformoterol (BROVANA) 15 mcg/2 mL nebulizer solution, Take 1 vial (15 mcg total) by nebulization 2 (two) times a day, Disp: 120 vial, Rfl: 5    budesonide (PULMICORT) 0 5 mg/2 mL nebulizer solution, Take 1 vial (0 5 mg total) by nebulization 2 (two) times a day Rinse mouth after use , Disp: 120 mL, Rfl: 5    ipratropium (ATROVENT) 0 02 % nebulizer solution, Take 1 vial (0 5 mg total) by nebulization 3 (three) times a day, Disp: 90 vial, Rfl: 5    levETIRAcetam (KEPPRA) 500 mg tablet, Take 1 tablet (500 mg total) by mouth every 12 (twelve) hours, Disp: 60 tablet, Rfl: 2    Allergies   Allergen Reactions    Other Vomiting     Oysters, clams and mussels        Review of Systems:  Review of Systems   Constitutional: Positive for appetite change (increased) and fatigue (increased, 6/10)  HENT: Positive for congestion  Eyes: Positive for visual disturbance (blurry vision since stopping steroids Sunday)  Respiratory: Positive for cough (clear phlegm) and shortness of breath (with exertion)  2 L O2 24/7   Cardiovascular: Negative  Gastrointestinal: Negative  Endocrine: Positive for cold intolerance  Genitourinary: Negative  Musculoskeletal: Negative  Skin: Positive for wound (surgical incision)  Allergic/Immunologic: Positive for food allergies  Neurological: Positive for dizziness (with quick movement), light-headedness (with quick movement) and numbness (tingling in fingers )  Negative for seizures and headaches  Little off balance with quick movements   Hematological: Bruises/bleeds easily  Psychiatric/Behavioral: Negative  Vitals:    11/06/19 1342   BP: 110/60   Pulse: (!) 115   Resp: 18   Temp: 97 9 °F (36 6 °C)   SpO2: 99%   Weight: 39 6 kg (87 lb 6 4 oz)   Height: 5' 5" (1 651 m)       Pain assessment: 0    Pain Score: 0-No pain      Imaging:No images are attached to the encounter       Teaching NCI RT packet given

## 2019-11-06 NOTE — PROGRESS NOTES
Neurosurgery Office Note  Dariela Sharpe 69 y o  female MRN: [id number]      Assessment/Plan      Diagnoses and all orders for this visit:    Metastatic adenocarcinoma to brain [facility])  -     Ambulatory referral to Neurology; Future    Simple partial seizures ([facility] [address] )  -     Ambulatory referral to Neurology; Future    Cerebral edema [facility])          Discussion:    72-year-old woman known history of metastatic  adenocarcinoma, lung origin  S/p Right parietal craniotomy for resection 10/24/19  Pathology metastatic adenocarcinoma  Tolerated surgery well, discharged POD#2  No hand cramping episodes since surgery  Maintained on Keppra, has considerable supply  Will refer her to Epilepsy Neurology to manage AED, etc      Had dexterity improved  No issues with incision, healing well  postop MRI scan performed 11/4/19, shows resection of right frontoparietal met, great improvement in associated cerebral edema, and previously known to occipital smaller metastases  However also seen on this new study were 2-3 additional small new brain lesions consistent with metastases  Patient seen in conjunction with Dr Hans Robert  Because of these new lesions, and the assumption their others also present and I had visible, we are recommending whole-brain radiation therapy over pin point radiation  R/B/A reviewed  She would like to proceed  I will plan to see her at about 3 months post surgery, after WBRT, and after the f/u MRI brain  CHIEF COMPLAINT    Chief Complaint   Patient presents with    Post-op     2 week POV with Path Review, discuss treatment plan       HISTORY    History of Present Illness     69y o  year old female     HPI    See Discussion    REVIEW OF SYSTEMS    Review of Systems   Constitutional: Positive for appetite change (increased a little bit with steroids) and fatigue (5-6/10)  HENT: Negative  Eyes: Positive for visual disturbance (a little blurriness since Sunday)     Respiratory: Positive for cough (rarely productive, clear) and shortness of breath (with exertion)  Requires 2 liters  O2   Cardiovascular: Negative  Gastrointestinal: Negative  Endocrine: Positive for cold intolerance  Genitourinary: Negative  Musculoskeletal: Neck pain: occasional         Gets a little off balance if moving too quickly   Skin: Positive for wound (surgical incision WNL, healing)  Allergic/Immunologic: Positive for food allergies (clams, oysters, mussels)  Neurological: Positive for dizziness (with quick movement), seizures (none since starting Keppra (all left sided)), light-headedness (with quick movement) and numbness (B/L fingers 4,5 N/T)  Negative for headaches         "feels like a cloud in her head"  Believes her fine finger skills/coordination have improved since surgery   Hematological: Bruises/bleeds easily (bruises)  Psychiatric/Behavioral: Negative  Meds/Allergies     Current Outpatient Medications   Medication Sig Dispense Refill    acetaminophen (TYLENOL) 325 mg tablet Take 3 tablets (975 mg total) by mouth every 8 (eight) hours (Patient taking differently: Take 650 mg by mouth every 6 (six) hours as needed ) 30 tablet 0    albuterol (VENTOLIN HFA) 90 mcg/act inhaler Inhale 2 puffs every 4 (four) hours as needed for wheezing 1 Inhaler 0    arformoterol (BROVANA) 15 mcg/2 mL nebulizer solution Take 1 vial (15 mcg total) by nebulization 2 (two) times a day 120 vial 5    budesonide (PULMICORT) 0 5 mg/2 mL nebulizer solution Take 1 vial (0 5 mg total) by nebulization 2 (two) times a day Rinse mouth after use  120 mL 5    ipratropium (ATROVENT) 0 02 % nebulizer solution Take 1 vial (0 5 mg total) by nebulization 3 (three) times a day 90 vial 5    levETIRAcetam (KEPPRA) 500 mg tablet Take 1 tablet (500 mg total) by mouth every 12 (twelve) hours 60 tablet 2     No current facility-administered medications for this visit          Allergies   Allergen Reactions    Other Vomiting     Oysters, clams and mussels       PAST HISTORY    Past Medical History:   Diagnosis Date    Breast cancer ([facility] [address] ) 1996    COPD, severe ([facility] [address] )     Requires supplemental oxygen     3L 24 hrs/day    Stage 4 lung cancer ([facility] [address] )        Past Surgical History:   Procedure Laterality Date    APPENDECTOMY      COLONOSCOPY N/A 4/18/2016    Procedure: COLONOSCOPY;  Surgeon: Julia Rogel MD;  [facility]; Service:     LUNG CANCER SURGERY      MASTECTOMY, RADICAL Right 1996    [place] [address] N/A 10/17/2017    Procedure: Gerardine Berth;  Surgeon: Jcarlos Martinez MD;  [facility]; Service: Pulmonary    [place] EXCIS SUPRATENT BRAIN TUMOR Right 10/24/2019    Procedure: IMAGE-GUIDED RIGHT FRONTAL CRANIOTOMY FOR TUMOR;  Surgeon: Linn Watson MD;  [facility];  Service: Neurosurgery    TONSILLECTOMY         Social History     Tobacco Use    Smoking status: Current Some Day Smoker     Packs/day: 0 00     Years: 55 00     Pack years: 0 00     Types: Cigarettes     Start date: 1962    Smokeless tobacco: Never Used    Tobacco comment: NOW DOWN TO 3-4 CIGARETTES A  DAY   Substance Use Topics    Alcohol use: Not Currently     Alcohol/week: 0 0 standard drinks     Frequency: Never     Binge frequency: Never    Drug use: No       Family History   Problem Relation Age of Onset    Heart disease Mother         cardiac disorder    Dementia Mother     Heart failure Father     Heart disease Maternal Grandmother     Heart disease Maternal Grandfather          Above history personally reviewed  EXAM    Vitals:Blood pressure 110/60, pulse (!) 115, temperature 97 9 °F (36 6 °C), temperature source Temporal, resp  rate 18, height 5' 5" (1 651 m), weight 39 6 kg (87 lb 6 4 oz)  ,Body mass index is 14 54 kg/m²  Physical Exam     Right parietal incision healing well  Neurologic Exam     No ambulatory aids         MEDICAL DECISION MAKING    Imaging Studies:     Ct Head Wo Contrast    Result Date: 10/25/2019  Narrative: CT BRAIN - WITHOUT CONTRAST INDICATION:   CNS metastatic lesions, monitor  COMPARISON:  None  TECHNIQUE:  CT examination of the brain was performed  In addition to axial images, coronal 2D reformatted images were created and submitted for interpretation  Radiation dose length product (DLP) for this visit:  905 95 mGy-cm   This examination, like all CT scans performed in the [facility], was performed utilizing techniques to minimize radiation dose exposure, including the use of iterative  reconstruction and automated exposure control  IMAGE QUALITY:  Diagnostic  FINDINGS: PARENCHYMA:  Again noted is vasogenic edema in the right frontal region related to patient's known mass in the posterior right frontal region which has been recently biopsied  There is no intraparenchymal hemorrhage seen  VENTRICLES AND EXTRA-AXIAL SPACES:  The ventricle size remains unchanged  There is small amount of extra-axial fluid and air in the right frontal region VISUALIZED ORBITS AND PARANASAL SINUSES:  Unremarkable  CALVARIUM AND EXTRACRANIAL SOFT TISSUES:  Soft tissue swelling noted Air noted in the scalp from recent postsurgical changes    Impression: Postsurgical changes from right frontal craniotomy with small amount of extra-axial /subdural fluid and air in the right frontal region ,  Expected postoperative finding Vasogenic edema noted in the right frontal region, unchanged No increase in mass effect or midline shift Workstation performed: [id number]     Mri Brain W Wo Contrast    Result Date: 11/5/2019  Narrative: MRI BRAIN WITH AND WITHOUT CONTRAST INDICATION: C79 31: Secondary malignant neoplasm of brain  COMPARISON:  10/7/2019 TECHNIQUE: Sagittal T1, axial T2, axial FLAIR, axial T1, axial Gradient, axial diffusion  Sagittal, axial T1 postcontrast   Axial 3D T1 gradient post contrast with coronal recons     IV Contrast:  4 5 mL of Gadobutrol injection (SINGLE-DOSE)  IMAGE QUALITY:   Diagnostic  FINDINGS: BRAIN PARENCHYMA:  Patient has undergone previous right frontal parietal vertex craniotomy with resection of previous metastatic lesion  There is a small resection cavity present prefrontal gyrus with mild vasogenic edema, improved compared to the prior  examination  Minimal enhancement of the resection cavity peripherally  This is the 1st postoperative examination and should serve as baseline for follow-up exams  Additional enhancing metastasis are identified mainly within the occipital lobes, including a rounded enhancing lesion measuring 6 mm, within the medial left occipital lobe, image 13 of axial T1 postcontrast, a slightly smaller 5 mm enhancing nodule within the anterior right occipital lobe on image 14  Both of these metastasis were seen on the prior study  There are 2 new enhancing lesions identified, not present on the prior exam   There is a small cortically based enhancing nodule within the more posterior superior right occipital lobe seen on 3-D T1 weighted imaging, series 1001  These are seen on image 165 within the right parietal cortex measuring 2 mm and within the right lateral occipital lobe, image 127 measuring 3 mm  Stable scattered white matter lesions consistent with chronic microangiopathy  VENTRICLES:  Normal for the patient's age  SELLA AND PITUITARY GLAND:  Normal  ORBITS:  Normal  PARANASAL SINUSES:  Normal  VASCULATURE:  Evaluation of the major intracranial vasculature demonstrates appropriate flow voids  CALVARIUM AND SKULL BASE:  Normal  EXTRACRANIAL SOFT TISSUES:  Normal      Impression: Expected postoperative change within the right posterior frontal vertex status post resection of metastasis  Mild residual enhancement action cavity  This is the 1st postoperative examination and should serve as baseline for follow-up exams  Improving  surrounding edema within the right posterior frontal and anterior parietal lobe   2 additional metastasis are seen within the occipital lobes which are unchanged from the prior examination measuring 5 and 6 mm each  There are 2 new, small enhancing lesions identified best seen on series 1001, images 127 than the right occipital lobe and 165 within the right parietal cortex  The study was marked in EPIC for significant notification  Workstation performed: [id number]       I have personally reviewed pertinent reports     and I have personally reviewed pertinent films in PACS with a Radiologist

## 2019-11-06 NOTE — PROGRESS NOTES
Consultation - Radiation Oncology     KDS:[id number] : 1945  Encounter: [id number]  Patient Information: 164Daysi Landa  Chief Complaint   Patient presents with    Consult     radiation oncology     Cancer Staging  No matching staging information was found for the patient  History of Present Illness   Enma Sparrow is a 69y o  year old female  with stage IV adenocarcinoma of the lung initially diagnosed 2017, moderately differentiated involving right hilum, mediastinum, precarinal, subcarinal lymph nodes with tumor extension into the right mainstem bronchus, bronchus intermedius, right middle lobe and right cervical area proven by biopsy  She had a lung cancer screening CT scan followed by bronchoscopy and biopsy of R neck mass  She was treated with concurrent chemoradiation to the right lung, mediastinum and right neck which completed 18  She was last seen by radiation oncology 3/13/18  She had been on observation since that time  She has chronic COPD secondary to active smoking       Admitted to the hospital for a COPD exacerbation 3/2019     CT chest 2019 MARTIN     19 Duane Licona- pt seen for evaluation of episodes of hand/arm cramping and shaking  Will order MRI brain     10/7/19 MRI brain- 3 separate intracranial enhancing masses, the largest of which is seen within the right posterior frontal vertex measuring 2 8 cm in maximum diameter surrounding vasogenic edema  Findings consistent with intracranial metastasis  Punctate diffusion abnormality on series 3 image 20 within the left inferior parietal lobe suggestive of small acute to subacute infarct    Replacement of normal fatty marrow identified within the C3 and C4 vertebral bodies and posterior elements   This enhances after administration of contrast and is suggestive of osseous metastasis      10/9/19 Dr Atiya Abernathy- now with seizure activity, brain metastases with primary lesion in the right parietal lobe measuring 2 8 cm with vasogenic edema, 2 small punctuate metastases, possible metastases to C6  Decadron 2 mg po BID, Keppra 500 mg po BID  PET scan  Refer to rad onc  F/u with PET results and plan      10/14/19 PET scan- 1  The right frontoparietal lesion seen on MRI is hypermetabolic on this examination, as above  2  Although the right lower lobe subpleural opacity is no longer present, there is a focal area of hypermetabolism in the subcarinal mediastinum, and right inferior hilum   Although no discretely enlarged lymph nodes are seen, these areas are concerning   for neoplastic involvement of lymph nodes   Similarly, right sided posterior mediastinal/paravertebral soft tissue hypermetabolic nodule concerning for adenopathy/metastatic lesion  3  Posterior right upper quadrant focus of increased activity, SUV max 3 8   Potential site of malignant adenopathy   No other signs of hypermetabolic soft tissue metastasis in the abdomen or pelvis  4  No osseous lesions are detected   Note made of previous MRI concern of cervical spine metastatic disease   Aside from degenerative disc disease and facet joint arthritis, no specific cervical spine osseous abnormalities are seen on this PET/CT     Pt cancelled neurology appt for 10/10/19  10/16/19 Dr Matt Noyola- pt reviewed at [facility], consensus was to treat the 2 smaller lesions with [facility] however pt may struggle/continue to suffer with seizures, progressive cerebral edema/steroid dependence etc if the R frontal lesion was treated with radiation alone  Surgery would likely reduce the edema burden and potentially control seizures better     10/22/19 Dr Johan Cole- PET showed mild SUV in the normal size lymph node in the right infrahilar area, superior abdominal area concerning for metastatic disease, no evidence of bony or hepatic metastasis   Decided to watch and observe and f/u in 3 months with CT scan of chest and abdomen      10/22/19 Dr Bertha Griffiths- because of her severe COPD and chronic hypoxemic respiratory failure the pt has an increased risk for the upcoming brain surgery  However her risk for the surgery is not prohibitive to proceed     10/24/19 image-guided R frontal craniotomy for tumor  Pathology is consistent with metastatic adenocarcinoma of lung primary     11/4/19 MRI brain- Expected postoperative change within the right posterior frontal vertex status post resection of metastasis   Mild residual enhancement action cavity   This is the 1st postoperative examination and should serve as baseline for follow-up exams   Improving   surrounding edema within the right posterior frontal and anterior parietal lobe  2 additional metastasis are seen within the occipital lobes which are unchanged from the prior examination measuring 5 and 6 mm each  There are 2 new, small enhancing lesions identified best seen on series 1001, images 127 than the right occipital lobe and 165 within the right parietal cortex      1/17/20 CT chest and neck  1/29/20 Dr Deandra Decker  2/3/20 Dr Horvath Clonts    She currently is off Decadron for 4 days  She denies any headaches  She feels her vision is slightly worse intermittently on Monday however has been baseline  No seizures well on Keppra  Historical Information      Malignant neoplasm of right lung ([address] )    9/26/2017 Initial Diagnosis     Malignant neoplasm of right lung ([address] )      10/17/2017 Biopsy     Bronchoscopy was performed  Right main stem endobronchial lesion and collapse of the bronchial intermedius consistent with lung cancer         11/17/2017 - 2/2/2018 Chemotherapy     Concurrent chemo and radiation: Taxol/ Carboplatin       12/1/2017 - 2/9/2018 Radiation     Treatment:  Course: C1    Plan ID Energy Fractions Dose per Fraction (cGy) Total Dose Delivered (cGy) Elapsed Days   R LUNG_MED 6X 33 / 33 180 5,940 48   R NECK 6X 30 / 30 200 6,000 43      Treatment dates:  C1: 12/1/2017 - 2/9/2018 12/7/2017 Biopsy     Biopsy of right neck mass  Conclusive evidence of malignancy  Non-small cell carcinoma consistent with metastasis from the patient's know adenocarcinoma of the lung  Metastatic adenocarcinoma to brain [facility])    10/9/2019 Initial Diagnosis     Metastatic adenocarcinoma to brain ([facility] )      10/28/2019 Biopsy     Image-guided right frontal craniotomy for tumor    Final Diagnosis   A  Brain, Right Frontal Mass, Resection:  - Metastatic adenocarcinoma, consistent with pulmonary primary                Past Medical History:   Diagnosis Date    Breast cancer ([place] [address] ) 1996    COPD, severe ([place] [address] )     Requires supplemental oxygen     3L 24 hrs/day    Stage 4 lung cancer ([place] [address] )      Past Surgical History:   Procedure Laterality Date    APPENDECTOMY      COLONOSCOPY N/A 4/18/2016    Procedure: COLONOSCOPY;  Surgeon: Kenya Nguyen MD;  [facility]: [facility]; Service:     LUNG CANCER SURGERY      MASTECTOMY, RADICAL Right 1996    [address] N/A 10/17/2017    Procedure: Conner Hicks;  Surgeon: Roberta Guan MD;  [facility];   Service: Pulmonary    [place] EXCIS SUPRATENT BRAIN TUMOR Right 10/24/2019    Procedure: IMAGE-GUIDED RIGHT FRONTAL CRANIOTOMY FOR TUMOR;  Surgeon: Yvette Centeno MD;  [facility];  Service: Neurosurgery    TONSILLECTOMY         Family History   Problem Relation Age of Onset    Heart disease Mother         cardiac disorder    Dementia Mother     Heart failure Father     Heart disease Maternal Grandmother     Heart disease Maternal Grandfather        Social History   Social History     Substance and Sexual Activity   Alcohol Use Not Currently    Alcohol/week: 0 0 standard drinks    Frequency: Never    Binge frequency: Never     Social History     Substance and Sexual Activity   Drug Use No     Social History     Tobacco Use   Smoking Status Current Some Day Smoker    Packs/day: 0 00    Years: 55 00    Pack years: 0 00    Types: Cigarettes    Start date: 1962 Smokeless Tobacco Never Used   Tobacco Comment    NOW DOWN TO 3-4 CIGARETTES A  DAY         Meds/Allergies     Current Outpatient Medications:     acetaminophen (TYLENOL) 325 mg tablet, Take 3 tablets (975 mg total) by mouth every 8 (eight) hours (Patient taking differently: Take 650 mg by mouth every 6 (six) hours as needed ), Disp: 30 tablet, Rfl: 0    albuterol (VENTOLIN HFA) 90 mcg/act inhaler, Inhale 2 puffs every 4 (four) hours as needed for wheezing, Disp: 1 Inhaler, Rfl: 0    arformoterol (BROVANA) 15 mcg/2 mL nebulizer solution, Take 1 vial (15 mcg total) by nebulization 2 (two) times a day, Disp: 120 vial, Rfl: 5    budesonide (PULMICORT) 0 5 mg/2 mL nebulizer solution, Take 1 vial (0 5 mg total) by nebulization 2 (two) times a day Rinse mouth after use , Disp: 120 mL, Rfl: 5    ipratropium (ATROVENT) 0 02 % nebulizer solution, Take 1 vial (0 5 mg total) by nebulization 3 (three) times a day, Disp: 90 vial, Rfl: 5    levETIRAcetam (KEPPRA) 500 mg tablet, Take 1 tablet (500 mg total) by mouth every 12 (twelve) hours, Disp: 60 tablet, Rfl: 2  Allergies   Allergen Reactions    Other Vomiting     Oysters, clams and mussels         Review of Systems    Constitutional: Positive for appetite change (increased) and fatigue (increased, 6/10)  HENT: Positive for congestion  Eyes: Positive for visual disturbance (blurry vision since stopping steroids Sunday)  Respiratory: Positive for cough (clear phlegm) and shortness of breath (with exertion)  2 L O2 24/7   Cardiovascular: Negative  Gastrointestinal: Negative  Endocrine: Positive for cold intolerance  Genitourinary: Negative  Musculoskeletal: Negative  Skin: Positive for wound (surgical incision)  Allergic/Immunologic: Positive for food allergies  Neurological: Positive for dizziness (with quick movement), light-headedness (with quick movement) and numbness (tingling in fingers )  Negative for seizures and headaches  Little off balance with quick movements   Hematological: Bruises/bleeds easily  Psychiatric/Behavioral: Negative  OBJECTIVE:   /60   Pulse (!) 115   Temp 97 9 °F (36 6 °C)   Resp 18   Ht 5' 5" (1 651 m)   Wt 39 6 kg (87 lb 6 4 oz)   SpO2 99%   BMI 14 54 kg/m²   Pain Assessment:  0  Performance Status: ECOG/Zubrod/WHO: 1 - Symptomatic but completely ambulatory    Physical Exam   She is thin  She is conversing appropriately  Her scar is healing well without any sign of erythema or drainage noted  She is on nasal oxygen  Ambulating independently  RESULTS  Lab Results    Chemistry        Component Value Date/Time     02/27/2015 0801    K 4 3 10/25/2019 0104    K 4 2 02/27/2015 0801     10/25/2019 0104     02/27/2015 0801    CO2 27 10/25/2019 0104    CO2 28 02/27/2015 0801    BUN 19 10/25/2019 0104    BUN 16 02/27/2015 0801    CREATININE 0 63 10/25/2019 0104    CREATININE 0 66 02/27/2015 0801        Component Value Date/Time    CALCIUM 7 9 (L) 10/25/2019 0104    CALCIUM 8 7 02/27/2015 0801    ALKPHOS 94 10/18/2019 0854    ALKPHOS 78 02/27/2015 0801    AST 22 10/18/2019 0854    AST 20 02/27/2015 0801    ALT 31 10/18/2019 0854    ALT 19 02/27/2015 0801    BILITOT 0 53 02/27/2015 0801            Lab Results   Component Value Date    WBC 17 35 (H) 10/25/2019    HGB 11 8 10/25/2019    HCT 35 5 10/25/2019    MCV 99 (H) 10/25/2019     10/25/2019         Imaging Studies  Ct Head Wo Contrast    Result Date: 10/25/2019  Narrative: CT BRAIN - WITHOUT CONTRAST INDICATION:   CNS metastatic lesions, monitor  COMPARISON:  None  TECHNIQUE:  CT examination of the brain was performed  In addition to axial images, coronal 2D reformatted images were created and submitted for interpretation  Radiation dose length product (DLP) for this visit:  905 95 mGy-cm     This examination, like all CT scans performed in the [facility], was performed utilizing techniques to minimize radiation dose exposure, including the use of iterative  reconstruction and automated exposure control  IMAGE QUALITY:  Diagnostic  FINDINGS: PARENCHYMA:  Again noted is vasogenic edema in the right frontal region related to patient's known mass in the posterior right frontal region which has been recently biopsied  There is no intraparenchymal hemorrhage seen  VENTRICLES AND EXTRA-AXIAL SPACES:  The ventricle size remains unchanged  There is small amount of extra-axial fluid and air in the right frontal region VISUALIZED ORBITS AND PARANASAL SINUSES:  Unremarkable  CALVARIUM AND EXTRACRANIAL SOFT TISSUES:  Soft tissue swelling noted Air noted in the scalp from recent postsurgical changes    Impression: Postsurgical changes from right frontal craniotomy with small amount of extra-axial /subdural fluid and air in the right frontal region ,  Expected postoperative finding Vasogenic edema noted in the right frontal region, unchanged No increase in mass effect or midline shift Workstation performed: [id number]     Mri Brain W Wo Contrast    Result Date: 11/5/2019  Narrative: MRI BRAIN WITH AND WITHOUT CONTRAST INDICATION: C79 31: Secondary malignant neoplasm of brain  COMPARISON:  10/7/2019 TECHNIQUE: Sagittal T1, axial T2, axial FLAIR, axial T1, axial Gradient, axial diffusion  Sagittal, axial T1 postcontrast   Axial 3D T1 gradient post contrast with coronal recons     IV Contrast:  4 5 mL of Gadobutrol injection (SINGLE-DOSE)  IMAGE QUALITY:   Diagnostic  FINDINGS: BRAIN PARENCHYMA:  Patient has undergone previous right frontal parietal vertex craniotomy with resection of previous metastatic lesion  There is a small resection cavity present prefrontal gyrus with mild vasogenic edema, improved compared to the prior  examination  Minimal enhancement of the resection cavity peripherally  This is the 1st postoperative examination and should serve as baseline for follow-up exams   Additional enhancing metastasis are identified mainly within the occipital lobes, including a rounded enhancing lesion measuring 6 mm, within the medial left occipital lobe, image 13 of axial T1 postcontrast, a slightly smaller 5 mm enhancing nodule within the anterior right occipital lobe on image 14  Both of these metastasis were seen on the prior study  There are 2 new enhancing lesions identified, not present on the prior exam   There is a small cortically based enhancing nodule within the more posterior superior right occipital lobe seen on 3-D T1 weighted imaging, series 1001  These are seen on image 165 within the right parietal cortex measuring 2 mm and within the right lateral occipital lobe, image 127 measuring 3 mm  Stable scattered white matter lesions consistent with chronic microangiopathy  VENTRICLES:  Normal for the patient's age  SELLA AND PITUITARY GLAND:  Normal  ORBITS:  Normal  PARANASAL SINUSES:  Normal  VASCULATURE:  Evaluation of the major intracranial vasculature demonstrates appropriate flow voids  CALVARIUM AND SKULL BASE:  Normal  EXTRACRANIAL SOFT TISSUES:  Normal      Impression: Expected postoperative change within the right posterior frontal vertex status post resection of metastasis  Mild residual enhancement action cavity  This is the 1st postoperative examination and should serve as baseline for follow-up exams  Improving  surrounding edema within the right posterior frontal and anterior parietal lobe  2 additional metastasis are seen within the occipital lobes which are unchanged from the prior examination measuring 5 and 6 mm each  There are 2 new, small enhancing lesions identified best seen on series 1001, images 127 than the right occipital lobe and 165 within the right parietal cortex  The study was marked in EPIC for significant notification   Workstation performed: [id number]     Nm Pet Ct Skull Base To Mid Thigh    Result Date: 10/14/2019  Narrative: PET/CT SCAN INDICATION:  Metastatic lung carcinoma with brain metastases  There is also a previous history of breast carcinoma  Assess treatment response MODIFIER: PS COMPARISON: Most recent chest CT dated 7/10/2019 and earlier, prior PET scan 6/18/2018 CELL TYPE:  Moderately differentiated adenocarcinoma of the right lung TECHNIQUE:   8 7 mCi F-18-FDG administered IV  Multiplanar attenuation corrected and non attenuation corrected PET images are available for interpretation, and contiguous, low dose, axial CT sections were obtained from the skull base through the femurs   Intravenous contrast material was not utilized  This examination, like all CT scans performed in the [facility], was performed utilizing techniques to minimize radiation dose exposure, including the use of iterative reconstruction and automated exposure control  Fasting serum glucose: 76 mg/dl FINDINGS: VISUALIZED BRAIN:   The right frontal parietal lesion seen on MRI study dated 10/7/2019 is noted is a hypermetabolic lesion on the PET scan, image 53, SUV max is 17 0  No obvious midline shift or hemorrhage noted on this limited study  HEAD/NECK:   There is a physiologic distribution of FDG  No FDG avid cervical adenopathy is seen  CT images: Unremarkable  CHEST:   The right lower lobe posterior subpleural opacity initially seen on 3/7/2019 has essentially resolved  However, although no discretely enlarged lymph node is seen, there is focal hypermetabolism in the subcarinal aspect of the mediastinum with SUV max of 3 0  There is also focal hypermetabolism within the inferomedial aspect of the right pulmonary hilum on image 155 with SUV max of 3 4  Again, no discretely enlarged lymph nodes are appreciated  There is a focal region of right posterior mediastinal/right paravertebral soft tissue opacity on image 186 measuring 1 2 x 0 8 cm, SUV max 3 4  There is severe pulmonary emphysema    Biapical pleural-parenchymal opacities are identified but demonstrating no abnormal activity suggesting scarring  No evidence of pleural effusion or pericardial effusion CT images: Coronary artery calcification ABDOMEN:   There is a focus of increased activity identified in the posterior right upper quadrant, near the larisa of the right diaphragm  This was also present on the previous PET/CT from 6/18/2018 but is now more intense, SUV max is currently 3 8, earlier 2 3  No definite abnormality seen at CT images without oral or IV contrast   There are no other foci of abnormal radiopharmaceutical activity  CT images: Paucity of intra-abdominal adipose tissue  No hydronephrosis or bowel obstruction  No ascites  PELVIS: No FDG avid soft tissue lesions are seen  CT images: Scattered colonic diverticula OSSEOUS STRUCTURES: No FDG avid lesions are seen  Note made of MRI demonstrating findings suspicious for metastatic disease to the C3 and C4 vertebral bodies  Aside from degenerative disc disease and facet joint arthritis, no specific abnormalities of the cervical spine are detected on this study  Impression: 1  The right frontoparietal lesion seen on MRI is hypermetabolic on this examination, as above 2  Although the right lower lobe subpleural opacity is no longer present, there is a focal area of hypermetabolism in the subcarinal mediastinum, and right inferior hilum  Although no discretely enlarged lymph nodes are seen, these areas are concerning for neoplastic involvement of lymph nodes  Similarly, right sided posterior mediastinal/paravertebral soft tissue hypermetabolic nodule concerning for adenopathy/metastatic lesion  3  Posterior right upper quadrant focus of increased activity, SUV max 3 8  Potential site of malignant adenopathy  No other signs of hypermetabolic soft tissue metastasis in the abdomen or pelvis 4  No osseous lesions are detected  Note made of previous MRI concern of cervical spine metastatic disease    Aside from degenerative disc disease and facet joint arthritis, no specific cervical spine osseous abnormalities are seen on this PET/CT Workstation performed: [id number]         Pathology:    Final Diagnosis   A  Brain, Right Frontal Mass, Resection:  - Metastatic adenocarcinoma, consistent with pulmonary primary  See Note  ASSESSMENT  1  Malignant neoplasm of right lung, unspecified part of lung [facility])  Ambulatory referral to Radiation Oncology   2  Metastatic adenocarcinoma to brain [facility])  Ambulatory referral to Radiation Oncology     Cancer Staging  No matching staging information was found for the patient  PLAN/DISCUSSION  No orders of the defined types were placed in this encounter  Freddie Gould is a 69y o  year old female with stage IV lung carcinoma  She was seen in Neuro-Oncology Clinic today along with Dr Luis Salazar  She underwent resection of a right frontal brain metastasis which was consistent with metastatic adenocarcinoma from her lung  Repeat MRI scan from 11/4/2019 reveals stability of her 2 other small metastasis and decreased size of her cavity  However there are 2 additional new lesions which were not present on her MRI scan in October  We discussed options at this point  In particular we discussed concern of new metastasis developing within 1 month  Should she received stereotactic radiosurgery to all of the lesions including the cavity there is significant likelihood that she may present with additional lesions elsewhere in the brain  Whole brain radiation was  discussed with her which would address all of the lesions seen  including the cavity now and hopefully prevent any further recurrences in the near future  We discussed the pros and con of each modality  In particular with whole brain radiation she is likely to experience additional neuro cognitive changes in comparison to [facility] which we discussed would be likely gradual over time  She would be eligible for SRS in the future should the need arise    The possible side effects of whole-brain treatment to dose of 3000 cGy given in 10 treatment fractions were reviewed with her and her family  These can include fatigue, scalp irritation, hair loss, neurocognitive changes including memory changes long-term  After significant discussion regarding both modalities she has elected to proceed with whole-brain radiation  She has been scheduled for CT simulation planning and her treatment will begin shortly thereafter  Mahad Kaur MD  11/6/2019,2:36 PM      Portions of the record may have been created with voice recognition software   Occasional wrong word or "sound a like" substitutions may have occurred due to the inherent limitations of voice recognition software   Read the chart carefully and recognize, using context, where substitutions have occurred

## 2019-11-06 NOTE — TELEPHONE ENCOUNTER
Best contact number for patient:    Emergency Contact name and number:    Referring provider: Dr Ferdinand Harrell to Schedule Appt     Referring provider Telephone:___[phone]_____________    Primary Care Provider Name:     Reason for Appointment/Dx:    Neurology Location patient would like to be seen:    Order received  Yes                                                Records Received YES    Have you ever seen another Neurologist? [address] [place] [address] Name: Medicare    ID/Policy #:    Secondary Insurance:    ID/Policy#: Workman's Comp/ Accident/ School  Information      Workman's Comp/Accident/School related?  NO    If yes name of Insurance company:    Date of Injury:    Open Claim: NO     Name and Telephone Number:    Notes:                   Appointment date: _2-19-19 1:00pm Dr Rodríguez____________________________

## 2019-11-07 ENCOUNTER — TELEPHONE (OUTPATIENT)
Dept: NEUROLOGY | Facility: CLINIC | Age: 74
End: 2019-11-07

## 2019-11-07 ENCOUNTER — APPOINTMENT (OUTPATIENT)
Dept: RADIATION ONCOLOGY | Facility: HOSPITAL | Age: 74
End: 2019-11-07
Attending: RADIOLOGY
Payer: MEDICARE

## 2019-11-07 PROCEDURE — 77370 RADIATION PHYSICS CONSULT: CPT | Performed by: RADIOLOGY

## 2019-11-07 PROCEDURE — 77290 THER RAD SIMULAJ FIELD CPLX: CPT | Performed by: RADIOLOGY

## 2019-11-07 PROCEDURE — 77334 RADIATION TREATMENT AID(S): CPT | Performed by: RADIOLOGY

## 2019-11-07 NOTE — TELEPHONE ENCOUNTER
----- Message from Sharri Patel RN sent at 11/6/2019  4:24 PM EST -----  Katherine Grant- please reach out to the patient to offer a sooner appointment with Dr Gerda Loyd  Can offer 11/25, 11/26, or 11/27 at 8am in the [place] office  Thanks! Sri  ----- Message -----  From: Ayla Pisano MD  Sent: 11/6/2019   4:20 PM EST  To: Sharri Patel RN    yes  ----- Message -----  From: Sharri Patel RN  Sent: 11/6/2019   4:15 PM EST  To: MD Dr Gerda Sigala- would you be willing to see this patient as an add-on during your studies week later this month?    ----- Message -----  From: Dylan Valle RN  Sent: 11/6/2019   3:58 PM EST  To: ONEYDA Blackwood,    Her seizures have been under controlled with keppra  She has not experienced any break through seizures after being placed on keppra  Patient prefers [place] office if possible  Thank you,  Geo Pascal    ----- Message -----  From: Sharri Patel RN  Sent: 11/6/2019   3:38 PM EST  To: ONEYDA Guerra,   It looks like the patient was started on Keppra earlier in October  Do you if the patient is still experiencing seizures? Just so I can gauge how soon we need to make a fit in appointment  Is the patient willing to go to any office or is their one particular office I should be looking at? Thanks!  ----- Message -----  From: Dylan Valle RN  Sent: 11/6/2019   3:04 PM EST  To: ONEYDA Blackwood,    I am reaching out to you in regards to the patient Dane Canas 1945  Dr Rayshawn Pinto referred the patient to Neurology since the patient experienced simple partial seizures and will need AED management  We are treating patient's brain mets she is s/p craniotomy for resection of brain mass  I called to obtain an appointment with Neurology and the soonest they could offer is 2/19/2020 with Dr Gerda Loyd at [place]  Is there any way the patient can be seen sooner?     Thank you,  Geo Pascal RN  Neuro Oncology Nurse Navigator

## 2019-11-07 NOTE — TELEPHONE ENCOUNTER
Spoke with patient, patient scheduled for 11/25 @ 8am with Dr Huyen Christina at the [facility] location

## 2019-11-08 ENCOUNTER — OFFICE VISIT (OUTPATIENT)
Dept: INTERNAL MEDICINE CLINIC | Facility: CLINIC | Age: 74
End: 2019-11-08
Payer: MEDICARE

## 2019-11-08 VITALS
SYSTOLIC BLOOD PRESSURE: 104 MMHG | OXYGEN SATURATION: 97 % | HEIGHT: 65 IN | BODY MASS INDEX: 14.39 KG/M2 | WEIGHT: 86.4 LBS | DIASTOLIC BLOOD PRESSURE: 72 MMHG | HEART RATE: 53 BPM | TEMPERATURE: 97.5 F

## 2019-11-08 DIAGNOSIS — G40.109 SIMPLE PARTIAL SEIZURES (HCC): ICD-10-CM

## 2019-11-08 DIAGNOSIS — J96.11 CHRONIC HYPOXEMIC RESPIRATORY FAILURE (HCC): ICD-10-CM

## 2019-11-08 DIAGNOSIS — Z23 ENCOUNTER FOR IMMUNIZATION: Primary | ICD-10-CM

## 2019-11-08 DIAGNOSIS — C79.31 METASTATIC ADENOCARCINOMA TO BRAIN (HCC): ICD-10-CM

## 2019-11-08 DIAGNOSIS — F17.219 CIGARETTE NICOTINE DEPENDENCE WITH NICOTINE-INDUCED DISORDER: ICD-10-CM

## 2019-11-08 PROBLEM — R06.03 RESPIRATORY DISTRESS: Status: RESOLVED | Noted: 2019-03-07 | Resolved: 2019-11-08

## 2019-11-08 PROBLEM — J41.0 SIMPLE CHRONIC BRONCHITIS (HCC): Status: RESOLVED | Noted: 2019-02-26 | Resolved: 2019-11-08

## 2019-11-08 PROBLEM — J96.21 ACUTE ON CHRONIC RESPIRATORY FAILURE WITH HYPOXIA (HCC): Status: RESOLVED | Noted: 2019-03-10 | Resolved: 2019-11-08

## 2019-11-08 PROCEDURE — 90662 IIV NO PRSV INCREASED AG IM: CPT

## 2019-11-08 PROCEDURE — 99495 TRANSJ CARE MGMT MOD F2F 14D: CPT | Performed by: INTERNAL MEDICINE

## 2019-11-08 PROCEDURE — G0008 ADMIN INFLUENZA VIRUS VAC: HCPCS

## 2019-11-08 NOTE — ASSESSMENT & PLAN NOTE
Indication that the patient did have metastatic disease was the beginning of the patient having seizure activity  Patient remains on Keppra 500 mg twice a day  No further seizure activity at this time    This is being managed by her oncologist

## 2019-11-08 NOTE — ASSESSMENT & PLAN NOTE
Patient does have an underlying history of chronic obstructive pulmonary disease, chronic hypoxic respiratory failure  He is O2 dependent  O2 sat in the office today was 97%  Patient denies any increasing shortness of breath with exertion    She will receive the influenza vaccine today and knows to call immediately if any acute upper respiratory tract symptoms

## 2019-11-08 NOTE — PROGRESS NOTES
Assessment/Plan:  TCM Call (since 10/8/2019)     Date and time call was made  10/28/2019 11:25 AM    Hospital care reviewed  Records reviewed    Patient was hospitialized at  [place] [id number]    Date of Admission  10/24/19    Date of discharge  10/26/19    Diagnosis  Malignant neoplasm of right lung    Disposition  Home    Current Symptoms  None      TCM Call (since 10/8/2019)     Post hospital issues  None    Scheduled for follow up? Yes    I have advised the patient to call PCP with any new or worsening symptoms  Rickie Ibarra    Counseling  Patient    Comments  Patient appt is scheduled for 11/8/19          Metastatic adenocarcinoma to brain [facility])  Patient is here today for transition of care visit  Patient had recent resection of 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  Patient underwent the procedure by the neurosurgeon  Did extremely well postoperative day and was stable and sent home 2 days after the procedure  Patient is denying any pain  No new neurologic problems  Is arranging for starting radiation treatment for the metastatic lesions to the brain  Patient states she was on large doses of steroids and tolerated them well with some increase in appetite which was beneficial   Patient is not gone back to smoking  Patient on exam has no focal neurologic changes noted  Will continue with Oncology and again as mentioned radiation therapy  Simple partial seizures ([facility] [address] )  Indication that the patient did have metastatic disease was the beginning of the patient having seizure activity  Patient remains on Keppra 500 mg twice a day  No further seizure activity at this time  This is being managed by her oncologist    Nicotine dependence  Despite diagnosis of metastatic adenocarcinoma of the lung patient did continue to smoke up until recent admission to the hospital   Patient states that she is not smoking at this point in time    Patient is commended on this    Chronic hypoxemic respiratory failure [facility])  Patient does have an underlying history of chronic obstructive pulmonary disease, chronic hypoxic respiratory failure  He is O2 dependent  O2 sat in the office today was 97%  Patient denies any increasing shortness of breath with exertion  She will receive the influenza vaccine today and knows to call immediately if any acute upper respiratory tract symptoms       Diagnoses and all orders for this visit:    Encounter for immunization  -     influenza vaccine, [id number], high-dose, PF 0 5 mL (FLUZONE HIGH-DOSE)    Metastatic adenocarcinoma to brain ([facility] [address] )    Simple partial seizures (HCC)    Cigarette nicotine dependence with nicotine-induced disorder    Chronic hypoxemic respiratory failure (HCC)          Subjective:      Patient ID: Dena Carrera is a 69 y o  female  Patient is here today for transition of care visit  History of adenocarcinoma of the lung, recent findings of metastatic disease to the brain  Status post craniotomy, resection of tumor on the right  Patient had uneventful hospital stay and was discharged to home after 2 days and apparently has been doing well  Was placed on a high dose of steroids which she states has increased her appetite  Patient has had no further seizure activity noted  Will be going for radiation treatment for metastatic disease to the brain      The following portions of the patient's history were reviewed and updated as appropriate: She  has a past medical history of Breast cancer ([address] ) (1996), COPD, severe ([address] ), Requires supplemental oxygen, and Stage 4 lung cancer ([address] )    She   Patient Active Problem List    Diagnosis Date Noted    Simple partial seizures ([address] ) 11/06/2019    On home oxygen therapy 10/24/2019    Cerebral edema ([address] ) 10/16/2019    Metastatic adenocarcinoma to brain ([address] ) 10/09/2019    Cramp of limb 09/20/2019    Episode of shaking 09/20/2019    Shortness of breath 03/13/2019    Acute on chronic respiratory failure with hypoxia ([place] [address] ) 03/10/2019    Goals of care, counseling/discussion 03/10/2019    Respiratory distress 03/07/2019    Simple chronic bronchitis ([place] [address] ) 02/26/2019    Chronic hypoxemic respiratory failure ([place] [address] ) 02/26/2019    Healthcare maintenance 08/29/2018    Malignant neoplasm of right lung (HCC) 03/09/2018    Depressive disorder 11/14/2017    COPD exacerbation ([place] [address] ) 10/14/2016    Hyperlipidemia 04/08/2016    Loss of appetite 01/23/2015    Abnormal weight loss 10/05/2012    Nicotine dependence 10/05/2012     She  has a past surgical history that includes Appendectomy; Tonsillectomy; Mastectomy, radical (Right, 1996); Colonoscopy (N/A, 4/18/2016); pr bronchoscopy,diagnostic (N/A, 10/17/2017); Lung cancer surgery; and pr excis supratent brain tumor (Right, 10/24/2019)  Her family history includes Dementia in her mother; Heart disease in her maternal grandfather, maternal grandmother, and mother; Heart failure in her father  She  reports that she has been smoking cigarettes  She started smoking about 57 years ago  She has been smoking about 0 00 packs per day for the past 55 00 years  She has never used smokeless tobacco  She reports that she drank alcohol  She reports that she does not use drugs  Current Outpatient Medications   Medication Sig Dispense Refill    acetaminophen (TYLENOL) 325 mg tablet Take 3 tablets (975 mg total) by mouth every 8 (eight) hours (Patient taking differently: Take 650 mg by mouth every 6 (six) hours as needed ) 30 tablet 0    albuterol (VENTOLIN HFA) 90 mcg/act inhaler Inhale 2 puffs every 4 (four) hours as needed for wheezing 1 Inhaler 0    arformoterol (BROVANA) 15 mcg/2 mL nebulizer solution Take 1 vial (15 mcg total) by nebulization 2 (two) times a day 120 vial 5    budesonide (PULMICORT) 0 5 mg/2 mL nebulizer solution Take 1 vial (0 5 mg total) by nebulization 2 (two) times a day Rinse mouth after use   120 mL 5    ipratropium (ATROVENT) 0 02 % nebulizer solution Take 1 vial (0 5 mg total) by nebulization 3 (three) times a day 90 vial 5    levETIRAcetam (KEPPRA) 500 mg tablet Take 1 tablet (500 mg total) by mouth every 12 (twelve) hours 60 tablet 2     No current facility-administered medications for this visit  Current Outpatient Medications on File Prior to Visit   Medication Sig    acetaminophen (TYLENOL) 325 mg tablet Take 3 tablets (975 mg total) by mouth every 8 (eight) hours (Patient taking differently: Take 650 mg by mouth every 6 (six) hours as needed )    albuterol (VENTOLIN HFA) 90 mcg/act inhaler Inhale 2 puffs every 4 (four) hours as needed for wheezing    arformoterol (BROVANA) 15 mcg/2 mL nebulizer solution Take 1 vial (15 mcg total) by nebulization 2 (two) times a day    budesonide (PULMICORT) 0 5 mg/2 mL nebulizer solution Take 1 vial (0 5 mg total) by nebulization 2 (two) times a day Rinse mouth after use   ipratropium (ATROVENT) 0 02 % nebulizer solution Take 1 vial (0 5 mg total) by nebulization 3 (three) times a day    levETIRAcetam (KEPPRA) 500 mg tablet Take 1 tablet (500 mg total) by mouth every 12 (twelve) hours     No current facility-administered medications on file prior to visit  She is allergic to other       Review of Systems   Constitutional: Positive for activity change (Patient is slowly beginning to increase her activity level) and appetite change ( some increased appetite)  Negative for chills, diaphoresis, fatigue, fever and unexpected weight change  HENT: Negative  Eyes: Negative  Respiratory: Positive for shortness of breath ( chronic shortness of breath would prescribe exertion but no increased)  Negative for apnea, cough, choking, chest tightness, wheezing and stridor  Cardiovascular: Negative  Gastrointestinal: Negative  Endocrine: Negative  Genitourinary: Negative  Musculoskeletal: Negative  Skin: Positive for wound ( operative site clean dry no evidence of infection)   Negative for color change, pallor and rash  Allergic/Immunologic: Negative  Neurological: Negative  No recurrence of seizure activity   Hematological: Negative  Psychiatric/Behavioral: Negative  Objective:      /72   Pulse (!) 53   Temp 97 5 °F (36 4 °C)   Ht 5' 5" (1 651 m)   Wt 39 2 kg (86 lb 6 4 oz)   SpO2 97%   BMI 14 38 kg/m²          Physical Exam   Constitutional: She is oriented to person, place, and time  She appears well-developed  No distress  Extremely thin, pleasant 79-year-old female who is awake alert in no acute distress  Accompanied today to the appointment by her sister and mother   HENT:   Head: Normocephalic  Right Ear: External ear normal    Left Ear: External ear normal    Nose: Nose normal    Mouth/Throat: Oropharynx is clear and moist  No oropharyngeal exudate  Appropriate healing of operative site with no evidence of infection   Eyes: Pupils are equal, round, and reactive to light  Conjunctivae and EOM are normal  Right eye exhibits no discharge  Left eye exhibits no discharge  No scleral icterus  Neck: Normal range of motion  Neck supple  No JVD present  No tracheal deviation present  No thyromegaly present  Cardiovascular: Regular rhythm, normal heart sounds and intact distal pulses  Exam reveals no gallop and no friction rub  No murmur heard  Pulmonary/Chest: Effort normal  No stridor  No respiratory distress  She has no wheezes  She has no rales  She exhibits no tenderness  Severely decreased breath sounds anteriorly and posteriorly but no rales rhonchi or wheezes could be appreciated on exam   Abdominal: Soft  Bowel sounds are normal  She exhibits no distension and no mass  There is no tenderness  There is no rebound and no guarding  No hernia  Scalp   Musculoskeletal: Normal range of motion  She exhibits deformity  She exhibits no edema or tenderness     Generalized wasting of muscular tissue to upper and lower extremities no acute degenerative changes or deformity   Lymphadenopathy:     She has no cervical adenopathy  Neurological: She is alert and oriented to person, place, and time  She displays normal reflexes  No cranial nerve deficit or sensory deficit  She exhibits abnormal muscle tone (Some decreased muscle tone and strength upper and lower extremities)  Coordination normal    Skin: Skin is warm and dry  No rash noted  She is not diaphoretic  No erythema  No pallor  Psychiatric: She has a normal mood and affect  Her behavior is normal  Judgment and thought content normal    Nursing note and vitals reviewed

## 2019-11-08 NOTE — ASSESSMENT & PLAN NOTE
Despite diagnosis of metastatic adenocarcinoma of the lung patient did continue to smoke up until recent admission to the hospital   Patient states that she is not smoking at this point in time    Patient is commended on this

## 2019-11-08 NOTE — ASSESSMENT & PLAN NOTE
Patient is here today for transition of care visit  Patient had recent resection of 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  Patient underwent the procedure by the neurosurgeon  Did extremely well postoperative day and was stable and sent home 2 days after the procedure  Patient is denying any pain  No new neurologic problems  Is arranging for starting radiation treatment for the metastatic lesions to the brain  Patient states she was on large doses of steroids and tolerated them well with some increase in appetite which was beneficial   Patient is not gone back to smoking  Patient on exam has no focal neurologic changes noted  Will continue with Oncology and again as mentioned radiation therapy

## 2019-11-08 NOTE — PROGRESS NOTES
Palliative and Supportive Care   Brianna Lynn 69 y o  female [id number]    Assessment/Plan:  1  Malignant neoplasm of hilus of right lung ([facility] [address] )    2  Metastatic adenocarcinoma to brain ([facility] [address] )    3  Loss of appetite    4  Nausea    5  Caregiver stress    6  Medical marijuana use      The patient qualifies for use of MMJ in the state [facility] by having the following medical condition - cancer  From a palliative care stand point the patient is suffering with decreased appetite, nausea, stress  These symptoms and side effects might be alleviated with use of MMJ products  The patient registered online  The patient read and I reviewed the informed consent document with the patient  I answered all questions related to it before the patient signed it  The patient's medical certification was completed on this date  The patient was given a signed copy of the informed consent and medical certification  I issued a certification for MMJ use with palliative intent -  To help alleviate cancer related symptoms and cancer treatment related side effects  I do not endorse the belief that MMJ can treat cancer and strongly encouraged the patient to continue treatments and surveillance as recommend by cancer specialists  Representatives have queried the patient's controlled substance dispensing history in the Prescription Drug Monitoring Program in compliance with regulations before I have prescribed any controlled substances  The prescription history is consistent with prescribed therapy and our practice policies  30 minutes were spent face to face with Brianna Lynn and her family (cousin who is Claudeen Cea, a former hospice liaison for [employer], and her sister who is up from [facility]) with greater than 50% of the time spent in counseling or coordination of care including discussions of risks and benefits of treatment and follow up requirements     All of the patient's questions were answered during this discussion  Return in about 6 weeks (around 12/23/2019) for symptom assessment and management  Subjective:   Chief Complaint  Follow up visit for:  symptom management  HPI     Ana Hensley is a 69 y o  female with stage IV lung cancer  She was diagnosed in October 2017  She was treated with radiation therapy concurrent with weekly Taxol/carboplatin completed in 02/2018  She relapsed in 9/2019 with brain metastases  Her medical oncologist is Dr Kenyon Grayson  She is status post a right parietal craniotomy for resection 10/24/19 by Dr Matthew Mckeon in neurosurgery  She has elected to undergo whole brain radiation with   [address] which will start next week  The patient was referred to the department of Palliative & Supportive Care for concurrent symptom management  She is here today to discuss an interested in obtaining a medical marijuana certification  She is underweight and doesn't have much of an appetite  She did not tolerate use of megace  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 hired a caretaker about 2 hours a week  Her sister lives in [place]  Cousin Alayna Denise) lives 2 blocks away  She worries about how she will manage her care taking responsibilities through her radiation treatments  She thinks she will hire more help if she has too but also states it is difficult to have strangers come in  They are hoping to stagger family visits for help  She has a son in [place] with a 3year old daughter, who is expecting a son soon  In addition to helping with her appetite, she is hoping that use of MMJ helps her relax but she denies a significant component of anxiety  The following portions of the medical history were reviewed: past medical history, problem list, medication list, and social history      Current Outpatient Medications:     acetaminophen (TYLENOL) 325 mg tablet, Take 3 tablets (975 mg total) by mouth every 8 (eight) hours (Patient taking differently: Take 650 mg by mouth every 6 (six) hours as needed ), Disp: 30 tablet, Rfl: 0    albuterol (VENTOLIN HFA) 90 mcg/act inhaler, Inhale 2 puffs every 4 (four) hours as needed for wheezing, Disp: 1 Inhaler, Rfl: 0    arformoterol (BROVANA) 15 mcg/2 mL nebulizer solution, Take 1 vial (15 mcg total) by nebulization 2 (two) times a day, Disp: 120 vial, Rfl: 5    budesonide (PULMICORT) 0 5 mg/2 mL nebulizer solution, Take 1 vial (0 5 mg total) by nebulization 2 (two) times a day Rinse mouth after use , Disp: 120 mL, Rfl: 5    ipratropium (ATROVENT) 0 02 % nebulizer solution, Take 1 vial (0 5 mg total) by nebulization 3 (three) times a day, Disp: 90 vial, Rfl: 5    levETIRAcetam (KEPPRA) 500 mg tablet, Take 1 tablet (500 mg total) by mouth every 12 (twelve) hours, Disp: 60 tablet, Rfl: 2  Review of Systems   Constitutional: Positive for activity change, appetite change and unexpected weight change  All other systems negative    Objective:  Vital Signs  /60 (BP Location: Left arm, Patient Position: Sitting, Cuff Size: Standard)   Pulse 104   Temp 97 5 °F (36 4 °C) (Tympanic)   Resp 20   Ht 5' 5" (1 651 m)   Wt 39 5 kg (87 lb)   SpO2 96%   BMI 14 48 kg/m²    Physical Exam    Constitutional: very thin  In no acute physical or emotional distress  Head: Normocephalic and atraumatic  Eyes: EOM are normal  No ocular discharge  No scleral icterus  Neck: No visible adenopathy or masses  Respiratory: Effort normal  No stridor  No respiratory distress  Gastrointestinal: thin  Musculoskeletal: No edema  Neurological: Alert, oriented and appropriately conversant  Skin: No diaphoresis, no rashes seen on exposed areas of skin  Psychiatric: Displays a normal mood and affect   Behavior, judgement and thought content appear normal

## 2019-11-11 ENCOUNTER — APPOINTMENT (OUTPATIENT)
Dept: RADIATION ONCOLOGY | Facility: HOSPITAL | Age: 74
End: 2019-11-11
Attending: RADIOLOGY
Payer: MEDICARE

## 2019-11-11 ENCOUNTER — OFFICE VISIT (OUTPATIENT)
Dept: PALLIATIVE MEDICINE | Facility: CLINIC | Age: 74
End: 2019-11-11
Payer: MEDICARE

## 2019-11-11 VITALS
TEMPERATURE: 97.5 F | HEART RATE: 104 BPM | RESPIRATION RATE: 20 BRPM | WEIGHT: 87 LBS | DIASTOLIC BLOOD PRESSURE: 60 MMHG | BODY MASS INDEX: 14.49 KG/M2 | HEIGHT: 65 IN | SYSTOLIC BLOOD PRESSURE: 100 MMHG | OXYGEN SATURATION: 96 %

## 2019-11-11 DIAGNOSIS — R11.0 NAUSEA: ICD-10-CM

## 2019-11-11 DIAGNOSIS — C34.01 MALIGNANT NEOPLASM OF HILUS OF RIGHT LUNG (HCC): Primary | ICD-10-CM

## 2019-11-11 DIAGNOSIS — Z63.6 CAREGIVER STRESS: ICD-10-CM

## 2019-11-11 DIAGNOSIS — Z79.899 MEDICAL MARIJUANA USE: ICD-10-CM

## 2019-11-11 DIAGNOSIS — C79.31 METASTATIC ADENOCARCINOMA TO BRAIN (HCC): ICD-10-CM

## 2019-11-11 DIAGNOSIS — R63.0 LOSS OF APPETITE: ICD-10-CM

## 2019-11-11 PROBLEM — K11.7 XEROSTOMIA: Status: ACTIVE | Noted: 2019-11-11

## 2019-11-11 PROBLEM — Z00.00 HEALTHCARE MAINTENANCE: Status: RESOLVED | Noted: 2018-08-29 | Resolved: 2019-11-11

## 2019-11-11 PROCEDURE — 99214 OFFICE O/P EST MOD 30 MIN: CPT | Performed by: FAMILY MEDICINE

## 2019-11-11 SDOH — SOCIAL STABILITY - SOCIAL INSECURITY: DEPENDENT RELATIVE NEEDING CARE AT HOME: Z63.6

## 2019-11-12 ENCOUNTER — APPOINTMENT (OUTPATIENT)
Dept: RADIATION ONCOLOGY | Facility: HOSPITAL | Age: 74
End: 2019-11-12
Attending: RADIOLOGY
Payer: MEDICARE

## 2019-11-12 PROCEDURE — 77334 RADIATION TREATMENT AID(S): CPT | Performed by: RADIOLOGY

## 2019-11-12 PROCEDURE — 77295 3-D RADIOTHERAPY PLAN: CPT | Performed by: RADIOLOGY

## 2019-11-12 PROCEDURE — 77412 RADIATION TX DELIVERY LVL 3: CPT | Performed by: RADIOLOGY

## 2019-11-12 PROCEDURE — 77300 RADIATION THERAPY DOSE PLAN: CPT | Performed by: RADIOLOGY

## 2019-11-13 ENCOUNTER — APPOINTMENT (OUTPATIENT)
Dept: RADIATION ONCOLOGY | Facility: HOSPITAL | Age: 74
End: 2019-11-13
Attending: RADIOLOGY
Payer: MEDICARE

## 2019-11-13 DIAGNOSIS — C79.31 METASTATIC ADENOCARCINOMA TO BRAIN (HCC): Primary | ICD-10-CM

## 2019-11-13 PROCEDURE — 77387 GUIDANCE FOR RADJ TX DLVR: CPT | Performed by: RADIOLOGY

## 2019-11-13 PROCEDURE — 77412 RADIATION TX DELIVERY LVL 3: CPT | Performed by: RADIOLOGY

## 2019-11-14 ENCOUNTER — APPOINTMENT (OUTPATIENT)
Dept: RADIATION ONCOLOGY | Facility: HOSPITAL | Age: 74
End: 2019-11-14
Attending: RADIOLOGY
Payer: MEDICARE

## 2019-11-14 ENCOUNTER — TELEPHONE (OUTPATIENT)
Dept: NEUROLOGY | Facility: CLINIC | Age: 74
End: 2019-11-14

## 2019-11-14 PROCEDURE — 77387 GUIDANCE FOR RADJ TX DLVR: CPT | Performed by: RADIOLOGY

## 2019-11-14 PROCEDURE — 77412 RADIATION TX DELIVERY LVL 3: CPT | Performed by: RADIOLOGY

## 2019-11-14 NOTE — TELEPHONE ENCOUNTER
Spoke with patient, patient has never seen a Neurologist  Patient did receive the new patient paperwork  Patient reminded to arrive 15-20 mins prior to her appt time 
Statement Selected

## 2019-11-15 ENCOUNTER — APPOINTMENT (OUTPATIENT)
Dept: RADIATION ONCOLOGY | Facility: HOSPITAL | Age: 74
End: 2019-11-15
Attending: RADIOLOGY
Payer: MEDICARE

## 2019-11-15 PROCEDURE — 77412 RADIATION TX DELIVERY LVL 3: CPT | Performed by: RADIOLOGY

## 2019-11-15 PROCEDURE — 77387 GUIDANCE FOR RADJ TX DLVR: CPT | Performed by: RADIOLOGY

## 2019-11-18 ENCOUNTER — APPOINTMENT (OUTPATIENT)
Dept: RADIATION ONCOLOGY | Facility: HOSPITAL | Age: 74
End: 2019-11-18
Attending: RADIOLOGY
Payer: MEDICARE

## 2019-11-18 ENCOUNTER — APPOINTMENT (OUTPATIENT)
Dept: LAB | Facility: CLINIC | Age: 74
End: 2019-11-18
Payer: MEDICARE

## 2019-11-18 DIAGNOSIS — C79.31 METASTATIC ADENOCARCINOMA TO BRAIN (HCC): ICD-10-CM

## 2019-11-18 LAB
BASOPHILS # BLD AUTO: 0.02 THOUSANDS/ΜL (ref 0–0.1)
BASOPHILS NFR BLD AUTO: 0 % (ref 0–1)
EOSINOPHIL # BLD AUTO: 0.04 THOUSAND/ΜL (ref 0–0.61)
EOSINOPHIL NFR BLD AUTO: 1 % (ref 0–6)
ERYTHROCYTE [DISTWIDTH] IN BLOOD BY AUTOMATED COUNT: 14.1 % (ref 11.6–15.1)
HCT VFR BLD AUTO: 40.7 % (ref 34.8–46.1)
HGB BLD-MCNC: 12.7 G/DL (ref 11.5–15.4)
IMM GRANULOCYTES # BLD AUTO: 0.03 THOUSAND/UL (ref 0–0.2)
IMM GRANULOCYTES NFR BLD AUTO: 1 % (ref 0–2)
LYMPHOCYTES # BLD AUTO: 1.06 THOUSANDS/ΜL (ref 0.6–4.47)
LYMPHOCYTES NFR BLD AUTO: 21 % (ref 14–44)
MCH RBC QN AUTO: 32.1 PG (ref 26.8–34.3)
MCHC RBC AUTO-ENTMCNC: 31.2 G/DL (ref 31.4–37.4)
MCV RBC AUTO: 103 FL (ref 82–98)
MONOCYTES # BLD AUTO: 0.52 THOUSAND/ΜL (ref 0.17–1.22)
MONOCYTES NFR BLD AUTO: 11 % (ref 4–12)
NEUTROPHILS # BLD AUTO: 3.28 THOUSANDS/ΜL (ref 1.85–7.62)
NEUTS SEG NFR BLD AUTO: 66 % (ref 43–75)
NRBC BLD AUTO-RTO: 0 /100 WBCS
PLATELET # BLD AUTO: 261 THOUSANDS/UL (ref 149–390)
PMV BLD AUTO: 9.1 FL (ref 8.9–12.7)
RBC # BLD AUTO: 3.96 MILLION/UL (ref 3.81–5.12)
WBC # BLD AUTO: 4.95 THOUSAND/UL (ref 4.31–10.16)

## 2019-11-18 PROCEDURE — 77412 RADIATION TX DELIVERY LVL 3: CPT | Performed by: RADIOLOGY

## 2019-11-18 PROCEDURE — 77336 RADIATION PHYSICS CONSULT: CPT | Performed by: RADIOLOGY

## 2019-11-18 PROCEDURE — 85025 COMPLETE CBC W/AUTO DIFF WBC: CPT

## 2019-11-18 PROCEDURE — 77387 GUIDANCE FOR RADJ TX DLVR: CPT | Performed by: RADIOLOGY

## 2019-11-18 PROCEDURE — 36415 COLL VENOUS BLD VENIPUNCTURE: CPT

## 2019-11-19 ENCOUNTER — APPOINTMENT (OUTPATIENT)
Dept: RADIATION ONCOLOGY | Facility: HOSPITAL | Age: 74
End: 2019-11-19
Attending: RADIOLOGY
Payer: MEDICARE

## 2019-11-19 PROCEDURE — 77387 GUIDANCE FOR RADJ TX DLVR: CPT | Performed by: RADIOLOGY

## 2019-11-19 PROCEDURE — 77412 RADIATION TX DELIVERY LVL 3: CPT | Performed by: RADIOLOGY

## 2019-11-19 PROCEDURE — 77417 THER RADIOLOGY PORT IMAGE(S): CPT | Performed by: RADIOLOGY

## 2019-11-20 ENCOUNTER — TELEPHONE (OUTPATIENT)
Dept: NEUROLOGY | Facility: CLINIC | Age: 74
End: 2019-11-20

## 2019-11-20 ENCOUNTER — APPOINTMENT (OUTPATIENT)
Dept: RADIATION ONCOLOGY | Facility: HOSPITAL | Age: 74
End: 2019-11-20
Attending: RADIOLOGY
Payer: MEDICARE

## 2019-11-20 PROCEDURE — 77412 RADIATION TX DELIVERY LVL 3: CPT | Performed by: RADIOLOGY

## 2019-11-20 PROCEDURE — 77387 GUIDANCE FOR RADJ TX DLVR: CPT | Performed by: RADIOLOGY

## 2019-11-21 ENCOUNTER — APPOINTMENT (OUTPATIENT)
Dept: RADIATION ONCOLOGY | Facility: HOSPITAL | Age: 74
End: 2019-11-21
Attending: RADIOLOGY
Payer: MEDICARE

## 2019-11-21 DIAGNOSIS — C79.31 METASTATIC ADENOCARCINOMA TO BRAIN (HCC): Primary | ICD-10-CM

## 2019-11-21 PROCEDURE — 77387 GUIDANCE FOR RADJ TX DLVR: CPT | Performed by: RADIOLOGY

## 2019-11-21 PROCEDURE — 77412 RADIATION TX DELIVERY LVL 3: CPT | Performed by: RADIOLOGY

## 2019-11-22 ENCOUNTER — APPOINTMENT (OUTPATIENT)
Dept: RADIATION ONCOLOGY | Facility: HOSPITAL | Age: 74
End: 2019-11-22
Attending: RADIOLOGY
Payer: MEDICARE

## 2019-11-22 PROCEDURE — 77412 RADIATION TX DELIVERY LVL 3: CPT | Performed by: RADIOLOGY

## 2019-11-22 PROCEDURE — 77387 GUIDANCE FOR RADJ TX DLVR: CPT | Performed by: RADIOLOGY

## 2019-11-24 ENCOUNTER — APPOINTMENT (OUTPATIENT)
Dept: RADIATION ONCOLOGY | Facility: HOSPITAL | Age: 74
End: 2019-11-24
Attending: RADIOLOGY
Payer: MEDICARE

## 2019-11-24 PROCEDURE — 77387 GUIDANCE FOR RADJ TX DLVR: CPT | Performed by: RADIOLOGY

## 2019-11-24 PROCEDURE — 77336 RADIATION PHYSICS CONSULT: CPT | Performed by: RADIOLOGY

## 2019-11-24 PROCEDURE — 77412 RADIATION TX DELIVERY LVL 3: CPT | Performed by: RADIOLOGY

## 2019-11-25 ENCOUNTER — OFFICE VISIT (OUTPATIENT)
Dept: NEUROLOGY | Facility: CLINIC | Age: 74
End: 2019-11-25
Payer: MEDICARE

## 2019-11-25 VITALS
WEIGHT: 87 LBS | BODY MASS INDEX: 17.08 KG/M2 | HEART RATE: 113 BPM | SYSTOLIC BLOOD PRESSURE: 100 MMHG | DIASTOLIC BLOOD PRESSURE: 64 MMHG | HEIGHT: 60 IN

## 2019-11-25 DIAGNOSIS — C79.31 METASTATIC ADENOCARCINOMA TO BRAIN (HCC): ICD-10-CM

## 2019-11-25 DIAGNOSIS — G40.109 SIMPLE PARTIAL SEIZURES (HCC): ICD-10-CM

## 2019-11-25 DIAGNOSIS — C34.91 MALIGNANT NEOPLASM OF RIGHT LUNG, UNSPECIFIED PART OF LUNG (HCC): ICD-10-CM

## 2019-11-25 PROCEDURE — 99214 OFFICE O/P EST MOD 30 MIN: CPT | Performed by: PSYCHIATRY & NEUROLOGY

## 2019-11-25 RX ORDER — LEVETIRACETAM 500 MG/1
500 TABLET ORAL EVERY 12 HOURS SCHEDULED
Qty: 180 TABLET | Refills: 3 | Status: ON HOLD | OUTPATIENT
Start: 2019-11-25 | End: 2020-07-17 | Stop reason: SDUPTHER

## 2019-11-25 NOTE — PROGRESS NOTES
Patient ID: Freddie Gould is a 69 y o  female  Assessment/Plan:    No problem-specific Assessment & Plan notes found for this encounter  {Assess/PlanSmartLinks:06816}       Subjective:    HPI    {[facility] HPI texts:25080}    {Common ambulatory SmartLinks:31262}         Objective:    Blood pressure 100/64, height 5' (1 524 m), weight 39 5 kg (87 lb)  Physical Exam    Neurological Exam      ROS:    Review of Systems   Constitutional: Negative  Negative for appetite change and fever  HENT: Negative  Negative for hearing loss, tinnitus, trouble swallowing and voice change  Eyes: Negative  Negative for photophobia and pain  Respiratory: Negative  Negative for shortness of breath (copd)  Cardiovascular: Negative  Negative for palpitations  Gastrointestinal: Negative  Negative for nausea and vomiting  Endocrine: Negative  Negative for cold intolerance and heat intolerance  Genitourinary: Negative  Negative for dysuria, frequency and urgency  Musculoskeletal: Negative  Negative for myalgias and neck pain  Skin: Negative  Negative for rash  Neurological: Negative  Negative for dizziness, tremors, seizures, syncope, facial asymmetry, speech difficulty, weakness, light-headedness, numbness (tingling come and goes) and headaches  Hematological: Negative  Does not bruise/bleed easily  Psychiatric/Behavioral: Negative  Negative for confusion, hallucinations and sleep disturbance

## 2019-11-25 NOTE — PATIENT INSTRUCTIONS
1  Continue levetiracetam 500 mg twice daily  2  Let us know if there are events concerning for seizure  3  Return in about 6 months  4  Return to driving gradually  Stop driving immediately if you have a seizure  FIRST AID FOR SEIZURES    What to Do If You Witness a Seizure:     Generalized convulsive (called a generalized tonic-clonic or grand mal seizure)  During this seizure, the person may cry out, suddenly stiffen up, make jerking movements, and fall  The person may turn pale or blue from difficulty breathing  Actions:  1  Stay calm  Talk in a soothing voice and if possible keep onlookers away  2  Prevent injury  Move objects away that the person might hit while jerking uncontrollably  3  Time when the seizure starts and ends  Most seizures stop after only a few minutes  4  Turn him or her gently onto one side  This will help keep the airway clear  5  Never place anything in his/her mouth or give him/her anything by mouth during a seizure  -- Do not give the person water, pills, or food until fully alert  6  Loosen tight clothing or jewelry around his/her neck  7  Make the person as comfortable as possible  8  Place something soft under their head  9  Do not hold the person down  If the person having a seizure thrashes around there is no need for you to restrain them  They are more likely to be combative if restrained  Remember to consider your safety as well  10  Keep onlookers away  11  Be sensitive and supportive, and ask others to do the same  12  Stay with the person until he/she is fully alert  Complex partial seizure (confusional spells)  During this kind of seizure, the person may have a glassy stare; give no response or inappropriate responses when questioned; sit, stand, or walk about aimlessly; make lip smacking or chewing motions; fidget with clothes; appear to be drunk, drugged, or confused  Actions:  1   Make sure the person is safe and wont harm themselves  2  Try to remove harmful objects from around the person (tools, utensils, glasses)  3  Do NOT be aggressive or attempt to restrain the person  They are more likely to be combative if restrained  Remember to consider your safety as well  4  Help prevent the person from wandering, and direct the person to chair or safe position  5  Never place anything in his/her mouth or give him/her anything by mouth during a seizure  -- Do not give the person water, pills, or food until fully alert  6  Keep onlookers away  7  Be sensitive and supportive, and ask others to do the same  8  Stay with the person until he/she is fully alert  CALL 911 if:  1  A convulsive seizure lasts more than 5 minutes  2  The person turns blue during the seizure  3  The person does not start breathing after the seizure  Begin mouth to mouth resuscitation if this would occur  4  The person has one seizure right after the other without coming back to normal consciousness between the seizures  5  The person has not regained consciousness or is still confused after 30 minutes  6  You know the person does not have epilepsy  7  You know the person has diabetes or low blood sugar  8  The person is pregnant, ill, or injured  9  The seizure occurred in water, because the person may have inhaled water  10  The person requests an ambulance or medical help  Rescue medication  Your doctor may prescribe a rescue medication such as lorazepam (Ativan), diazepam (Valium / Diastat), or clonazepam (Klonopin) to terminate a seizure or if you have a history of cluster of seizures   Follow the instructions given by your doctor for these medications    Recognizing Common Seizures (examples)   · Simple partial seizures: Isolated twitching, numbness, sweating, dizziness, nausea/vomiting, disturbances to hearing, vision, smell or taste  No loss of consciousness occurs, and the person remains aware of his/her environment     · Complex partial seizures: Staring, motionless, picking at clothes, smacking lips, swallowing repeatedly or wandering around  The person is not aware of their surroundings and is not fully responsive  · Atonic seizures: Drop attacks or sudden, rapid fall to ground with rapid recovery  · Myoclonic seizures: Brief forceful jerks which can affect the whole body or just part of it  · Absence seizures: May appear to be daydreaming or spacing out   The person is momentarily unresponsive and unaware of what is happening around him/her  · Tonic seizures: Stiffening of part or of the entire body  · Generalized Tonic-Clonic Seizures  Grand-mal seizure   Sudden loss of consciousness with body stiffening followed by continuous jerking movements  A blue tinge around the mouth is likely but lack of oxygen is rare  Loss of bladder and/or bowel control may occur

## 2019-11-25 NOTE — PROGRESS NOTES
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 manifest as 3 simple partial motor seizures prior to tumor resection  Her neurological exam reveals impaired fine motor function in the right hand, but is otherwise essentially normal      We discussed the pathophysiology of epilepsy/seizure and seizure safety/precautions  We discussed factors that can lower seizure threshold and the side effects of antiepileptic medications  She is not experiencing any side effects related to levetiracetam   Explained that I would likely continue on an AED for the time being though we will follow up about 6 months  While seizure risk is low another tumor responsive of seizures been resected, there is still possibility of remaining damaged neural tissue that could serve as a nidus for seizure  Simple partial seizures do not preclude driving  She had adequate warning that were lowered to get off the road if she had another event in seizure risk lower now that she is on an AED and has had her right frontal tumor resected  Patient Instructions   1  Continue levetiracetam 500 mg twice daily  2  Let us know if there are events concerning for seizure  3  Return in about 6 months  4  Return to driving gradually  Stop driving immediately if you have a seizure  Diagnoses and all orders for this visit:    Simple partial seizures ([address] )  -     Ambulatory referral to Neurology    Metastatic adenocarcinoma to brain [facility])  -     Ambulatory referral to Neurology  -     levETIRAcetam (KEPPRA) 500 mg tablet; Take 1 tablet (500 mg total) by mouth every 12 (twelve) hours    Malignant neoplasm of right lung, unspecified part of lung (HCC)  -     levETIRAcetam (KEPPRA) 500 mg tablet; Take 1 tablet (500 mg total) by mouth every 12 (twelve) hours        Rolf Levi is a 69 y o  right handed female presenting to the [place] [address] [facility] for evaluation of focal epilepsy due to metastatic lung cancer  7/30/2019 she was sitting outside with her sister  Sudden left hand started cramping  Progressed to LUE shaking  No face or leg involvement  Remainded conscious  Baseline after other than some trouble with fine motor of left hand  2 weeks later had one in middle of night  3rd was a 1 5 weeks later in August  The 1st was the most severe  No other events concerning for seizures  As recorded Dr Acosta Heads note on 10/16/2019: "MRI scan of the brain done 10/7/19 demonstrates 3 enhancing lesions, the largest 2 8 cm in AP diameter right frontal/frontoparietal with associated vasogenic edema, another measuring 5 mm in the right occipital lobe, and other measuring 6 mm in the left parafalcine occipital lobe " She underwent image guided right frontal craniotomy for tumor resection on 10/24/2019  She is scheduled to have whole brain radiation  Follow-up MRIs plan for 3 months  She has not been driving, but she would like to get back to driving so that she can do small errands as 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 who live in the same house  Her sister has been staying appear that is returning to [facility] next Monday  New mood problems other than expected adjustment  Continues to have some fine motor problems in left hand  Current AEDs:  Levetiracetam 500 mg twice daily (started 10/10/2018)  Medication side effects: None  Medication adherence: Yes    Event/Seizure semiology:  LUE motor seizure with retained awareness (3 events, first on 7/30/2019 and then 2 more in August)    Special Features  Status epilepticus: no  Self Injury Seizures: no  Precipitating Factors: tumor    Epilepsy Risk Factors:  CNS neoplasm    Prior AEDs:  None    Prior Evaluation:  MRI brain 10/2019, see emr  No EEG    History Reviewed:    The following were reviewed and updated as appropriate: allergies, current medications, past family history, past medical history, past social history, past surgical history and problem list     Psychiatric History:  None    Social History:   Driving: No  Lives Alone: No  Parents live on the first floor and she lives in same house  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  Work part-time [employer] for a 4 years after that      ROS:  Constitutional: Negative  Negative for appetite change and fever  HENT: Negative  Negative for hearing loss, tinnitus, trouble swallowing and voice change  Eyes: Negative  Negative for photophobia and pain  Respiratory: Negative  Negative for shortness of breath (copd)  Cardiovascular: Negative  Negative for palpitations  Gastrointestinal: Negative  Negative for nausea and vomiting  Endocrine: Negative  Negative for cold intolerance and heat intolerance  Genitourinary: Negative  Negative for dysuria, frequency and urgency  Musculoskeletal: Negative  Negative for myalgias and neck pain  Skin: Negative  Negative for rash  Neurological: Negative  Negative for dizziness, tremors, seizures, syncope, facial asymmetry, speech difficulty, weakness, light-headedness, numbness (tingling come and goes) and headaches  Hematological: Negative  Does not bruise/bleed easily  Psychiatric/Behavioral: Negative  Negative for confusion, hallucinations and sleep disturbance  ROS reviewed and updated as appropriate  Objective    /64 (BP Location: Left arm, Patient Position: Sitting, Cuff Size: Standard)   Pulse (!) 113   Ht 5' (1 524 m)   Wt 39 5 kg (87 lb)   BMI 16 99 kg/m²      General Exam  General: thin, nasal canula oxygen, no acute distress  HEENT: mucous membranes moist, anicteric sclera  Neck: good ROM  Skin: no rash on visible skin  Neurological Exam  Mental Status: awake, alert, and fully oriented to person, place, time, and situation  Attention and memory intact   Fund of knowledge is appropriate for age and education  There is no neglect  Language: fluency, naming, comprehension, and repetition normal        Cranial Nerves: Pupils equal and reactive to light  Visual fields full to confrontation  Fundoscopic exam revealed limited view of fundus due to small pupil, cataract, refractive error and low tolerance for exam  Extraocular motions intact with full versions, normal pursuits and saccades  Facial strength full and symmetric  Facial sensation intact in V1-V3  Hearing intact bilaterally  Tongue protrudes to midline  Palate elevates symmetrically  Speech clear without notable dysarthria  Shoulder shrug activation full and symmetric  Motor: Normal bulk and tone  No pronator drift  Strength is 5/5 proximally and distally in all 4 extremities  No involuntary movements  Struggles with fine motor movements involving the left fingers - repetitive finger tapping, sequential finger tapping  Sensory: Sensation intact to light touch distally in all extremities  Coordination: Mild action tremor R > L  Otherwise, normal finger-to-nose  Station and gait: Casual gait normal      Reflexes: Reflexes 2+ throughout (biceps, brachioradialis, patella, achilles)  Left bicep slight increased compared to right               Michael Ambrose MD   [facility]  [facility] [address] [facility]

## 2019-12-02 ENCOUNTER — TELEPHONE (OUTPATIENT)
Dept: RADIATION ONCOLOGY | Facility: HOSPITAL | Age: 74
End: 2019-12-02

## 2019-12-02 NOTE — TELEPHONE ENCOUNTER
Patient called to ask what she could use on her scalp for itching, told her she could use baby oil or hydrocortisone  Pt verbalized understanding

## 2019-12-10 ENCOUNTER — TELEPHONE (OUTPATIENT)
Dept: INTERNAL MEDICINE CLINIC | Facility: CLINIC | Age: 74
End: 2019-12-10

## 2019-12-10 DIAGNOSIS — J44.9 CHRONIC OBSTRUCTIVE PULMONARY DISEASE, UNSPECIFIED COPD TYPE (HCC): Primary | ICD-10-CM

## 2019-12-10 DIAGNOSIS — J06.9 UPPER RESPIRATORY TRACT INFECTION, UNSPECIFIED TYPE: Primary | ICD-10-CM

## 2019-12-10 RX ORDER — AZITHROMYCIN 250 MG/1
TABLET, FILM COATED ORAL
Qty: 6 TABLET | Refills: 0 | Status: SHIPPED | OUTPATIENT
Start: 2019-12-10 | End: 2019-12-14

## 2019-12-10 RX ORDER — PREDNISONE 20 MG/1
40 TABLET ORAL DAILY
Qty: 10 TABLET | Refills: 0 | Status: SHIPPED | OUTPATIENT
Start: 2019-12-10 | End: 2019-12-15

## 2019-12-10 RX ORDER — AZITHROMYCIN 250 MG/1
TABLET, FILM COATED ORAL
Qty: 6 TABLET | Refills: 0 | Status: SHIPPED | OUTPATIENT
Start: 2019-12-10 | End: 2019-12-10

## 2019-12-10 NOTE — PROGRESS NOTES
Patient states that has an upper respiratory tract infection, nasal congestion, some slight increasing cough  She denies any fever or chills  His undergoing radiation treatment for her metastatic brain lesions  States she is losing her hair from the radiation  She was given a prescription for a Zithromax Z-Sal by a physician recently but she did give this to her brother-in-law because he was sick when he came in for Thanksgiving  Prescription was sent to the pharmacy

## 2019-12-13 DIAGNOSIS — J44.9 CHRONIC OBSTRUCTIVE PULMONARY DISEASE, UNSPECIFIED COPD TYPE (HCC): ICD-10-CM

## 2019-12-13 RX ORDER — BUDESONIDE 0.25 MG/2ML
0.25 INHALANT ORAL 2 TIMES DAILY
Qty: 360 ML | Refills: 1 | Status: SHIPPED | OUTPATIENT
Start: 2019-12-13 | End: 2020-06-10

## 2020-01-02 ENCOUNTER — SOCIAL WORK (OUTPATIENT)
Dept: PALLIATIVE MEDICINE | Facility: CLINIC | Age: 75
End: 2020-01-02

## 2020-01-02 ENCOUNTER — OFFICE VISIT (OUTPATIENT)
Dept: PALLIATIVE MEDICINE | Facility: CLINIC | Age: 75
End: 2020-01-02
Payer: MEDICARE

## 2020-01-02 ENCOUNTER — SOCIAL WORK (OUTPATIENT)
Dept: PALLIATIVE MEDICINE | Facility: CLINIC | Age: 75
End: 2020-01-02
Payer: MEDICARE

## 2020-01-02 VITALS
WEIGHT: 86.5 LBS | TEMPERATURE: 96.7 F | HEIGHT: 60 IN | OXYGEN SATURATION: 99 % | DIASTOLIC BLOOD PRESSURE: 58 MMHG | RESPIRATION RATE: 18 BRPM | SYSTOLIC BLOOD PRESSURE: 92 MMHG | HEART RATE: 104 BPM | BODY MASS INDEX: 16.98 KG/M2

## 2020-01-02 DIAGNOSIS — R23.4 SCAB: ICD-10-CM

## 2020-01-02 DIAGNOSIS — C34.01 MALIGNANT NEOPLASM OF HILUS OF RIGHT LUNG (HCC): Primary | ICD-10-CM

## 2020-01-02 DIAGNOSIS — Z71.89 COUNSELING AND COORDINATION OF CARE: Primary | ICD-10-CM

## 2020-01-02 DIAGNOSIS — R63.0 LOSS OF APPETITE: ICD-10-CM

## 2020-01-02 DIAGNOSIS — Z63.6 CAREGIVER STRESS: ICD-10-CM

## 2020-01-02 DIAGNOSIS — F32.A DEPRESSIVE DISORDER: ICD-10-CM

## 2020-01-02 PROBLEM — R11.0 NAUSEA: Status: RESOLVED | Noted: 2019-11-11 | Resolved: 2020-01-02

## 2020-01-02 PROCEDURE — 99214 OFFICE O/P EST MOD 30 MIN: CPT | Performed by: FAMILY MEDICINE

## 2020-01-02 PROCEDURE — NC001 PR NO CHARGE

## 2020-01-02 RX ORDER — MIRTAZAPINE 15 MG/1
15 TABLET, FILM COATED ORAL
Qty: 30 TABLET | Refills: 5 | Status: SHIPPED | OUTPATIENT
Start: 2020-01-02 | End: 2020-03-03

## 2020-01-02 SDOH — SOCIAL STABILITY - SOCIAL INSECURITY: DEPENDENT RELATIVE NEEDING CARE AT HOME: Z63.6

## 2020-01-02 NOTE — PROGRESS NOTES
Palliative LSW met with patient and Dr Sai Wills this morning  Patient reported that she is doing well and had a wonderful holiday  She discussed that everyone came over to her house for Thanksgiving and she was able to see her sons and granddaughter  Patient is excited for her son to have another child in the next few weeks  Patient reported that she finished radiation and that all went well  Her sister came up for 5 weeks to help her and watch their parents  She also discussed that an agency is coming in to help with cleaning and that has been a huge help  Patient is still struggling to eat, and is not putting on any weight  LSW and Dr Sai Wills asked if she was using her MMJ to which she stated that she had only used it twice so she is not sure if this would help with her appetite  Patient had questions on what were some good foods to eat that could help her to gain weight  Dr Sai Wills suggested anything with high carbs and asked if she would like to see a dietician  Patient reported she has seen some before and it never really helped  LSW provided emotional support and will follow up when able

## 2020-01-02 NOTE — PROGRESS NOTES
Palliative and Supportive Care   Dario Turner 69 y o  female [id number]    Assessment/Plan:  1  Malignant neoplasm of hilus of right lung ([address] )    2  Loss of appetite    3  Caregiver stress    4  Depressive disorder    5  Scab      Requested Prescriptions     Signed Prescriptions Disp Refills    mirtazapine (REMERON) 15 mg tablet 30 tablet 5     Sig: Take 1 tablet (15 mg total) by mouth daily at bedtime     Medications Discontinued During This Encounter   Medication Reason    budesonide (PULMICORT) 0 5 mg/2 mL nebulizer solution Duplicate order     Stress to patient that in this period of no ongoing cancer treatment, she should be doing everything possible to gain weight  Her underweight status alone hold prognostic significance  She has met with onc nutrition before and has literature on high calorie foods  I addition to encouraging her use of MMJ product, she will start mirtazepine  This may also help her with caregiver stress and depression  Her goal weight for our next visit will be 90lbs  For her scalp scab recommend use of baby oil and a fine comb  Representatives have queried the patient's controlled substance dispensing history in the Prescription Drug Monitoring Program in compliance with regulations before I have prescribed any controlled substances  The prescription history is consistent with prescribed therapy and our practice policies  25 minutes were spent face to face with Dario Turner with greater than 50% of the time spent in counseling or coordination of care including discussions of treatment instructions and follow up requirements   All of the patient's questions were answered during this discussion  Return in about 2 months (around 3/2/2020)  Subjective:   Chief Complaint  Follow up visit for:  symptom management  HPI     Dario Turner is a 69 y o  female with stage IV lung cancer  She was diagnosed in October 2017    She was treated with radiation therapy concurrent with weekly Taxol/carboplatin completed in 02/2018  She relapsed in 9/2019 with brain metastases  Her medical oncologist is Dr Fani Sanchez  She is status post a right parietal craniotomy for resection 10/24/19 by Dr Des Anthony in neurosurgery  She has elected to undergo whole brain radiation with Dr Kathryn Mcfadden in rad/onc which was completed in 11/2019        The patient was referred to the department of Palliative & Supportive Care for concurrent symptom management  At her last visit to palliative care she was interested in obtaining a certification for medical marijuana use  She was hoping that it would help with her appetite and overall stress level  She has visited the dispensary once and used a tincture - [address] [facility],  [address] just twice  Since the last time I saw her she finished radiation and was on 1 course of abx for bronchitis  She continues not to have much of an appetite but states that she tries to snack all day  Her level of caregiver stress remains high ronni during her mother's sun downing periods  She is sleeping well  The following portions of the medical history were reviewed: past medical history, problem list, medication list, and social history      Current Outpatient Medications:     acetaminophen (TYLENOL) 325 mg tablet, Take 3 tablets (975 mg total) by mouth every 8 (eight) hours (Patient taking differently: Take 650 mg by mouth every 6 (six) hours as needed ), Disp: 30 tablet, Rfl: 0    albuterol (VENTOLIN HFA) 90 mcg/act inhaler, Inhale 2 puffs every 4 (four) hours as needed for wheezing, Disp: 1 Inhaler, Rfl: 0    arformoterol (BROVANA) 15 mcg/2 mL nebulizer solution, Take 1 vial (15 mcg total) by nebulization 2 (two) times a day, Disp: 120 vial, Rfl: 5    budesonide (PULMICORT) 0 25 mg/2 mL nebulizer solution, TAKE 1 VIAL (0 25 MG TOTAL) BY NEBULIZATION 2 (TWO) TIMES A DAY, Disp: 360 mL, Rfl: 1    ipratropium (ATROVENT) 0 02 % nebulizer solution, Take 1 vial (0 5 mg total) by nebulization 3 (three) times a day, Disp: 90 vial, Rfl: 5    levETIRAcetam (KEPPRA) 500 mg tablet, Take 1 tablet (500 mg total) by mouth every 12 (twelve) hours, Disp: 180 tablet, Rfl: 3    mirtazapine (REMERON) 15 mg tablet, Take 1 tablet (15 mg total) by mouth daily at bedtime, Disp: 30 tablet, Rfl: 5  Review of Systems   Constitutional: Positive for appetite change, fatigue and unexpected weight change  All other systems negative    Objective:  Vital Signs  BP 92/58 (BP Location: Left arm, Patient Position: Sitting, Cuff Size: Standard)   Pulse 104   Temp (!) 96 7 °F (35 9 °C) (Tympanic)   Resp 18   Ht 5' (1 524 m)   Wt 39 2 kg (86 lb 8 oz)   SpO2 99%   BMI 16 89 kg/m²    Physical Exam    Constitutional: thin, cachectic female  In no acute physical or emotional distress  Head: Normocephalic and atraumatic  Eyes: EOM are normal  No ocular discharge  No scleral icterus  Neck: No visible adenopathy or masses  Respiratory: Effort normal  No stridor  No respiratory distress  Gastrointestinal: No abdominal distension  Musculoskeletal: No edema  Muscle atrophy  Neurological: Alert, oriented and appropriately conversant  Skin: No diaphoresis, no rashes seen on exposed areas of skin  Psychiatric: Displays a normal mood and affect   Behavior, judgement and thought content appear normal

## 2020-01-06 NOTE — PROGRESS NOTES
Palliative LSW met with patient and Dr Myles Modi this morning  Patient reported that she is doing well and had a wonderful holiday  She discussed that everyone came over to her house for Thanksgiving and she was able to see her sons and granddaughter  Patient is excited for her son to have another child in the next few weeks        Patient reported that she finished radiation and that all went well  Her sister came up for 5 weeks to help her and watch their parents  She also discussed that an agency is coming in to help with cleaning and that has been a huge help       Patient is still struggling to eat, and is not putting on any weight  LSW and Dr Myles Modi asked if she was using her MMJ to which she stated that she had only used it twice so she is not sure if this would help with her appetite  Patient had questions on what were some good foods to eat that could help her to gain weight  Dr Myles Modi suggested anything with high carbs and asked if she would like to see a dietician  Patient reported she has seen some before and it never really helped      LSW provided emotional support and will follow up when able

## 2020-01-14 ENCOUNTER — APPOINTMENT (OUTPATIENT)
Dept: LAB | Age: 75
End: 2020-01-14
Payer: MEDICARE

## 2020-01-14 DIAGNOSIS — C34.01 MALIGNANT NEOPLASM OF HILUS OF RIGHT LUNG (HCC): ICD-10-CM

## 2020-01-14 DIAGNOSIS — C79.31 METASTATIC ADENOCARCINOMA TO BRAIN (HCC): ICD-10-CM

## 2020-01-14 LAB
ALBUMIN SERPL BCP-MCNC: 3.7 G/DL (ref 3.5–5)
ALP SERPL-CCNC: 73 U/L (ref 46–116)
ALT SERPL W P-5'-P-CCNC: 19 U/L (ref 12–78)
ANION GAP SERPL CALCULATED.3IONS-SCNC: 5 MMOL/L (ref 4–13)
AST SERPL W P-5'-P-CCNC: 16 U/L (ref 5–45)
BASOPHILS # BLD AUTO: 0.05 THOUSANDS/ΜL (ref 0–0.1)
BASOPHILS NFR BLD AUTO: 1 % (ref 0–1)
BILIRUB SERPL-MCNC: 0.41 MG/DL (ref 0.2–1)
BUN SERPL-MCNC: 11 MG/DL (ref 5–25)
CALCIUM SERPL-MCNC: 9.2 MG/DL (ref 8.3–10.1)
CHLORIDE SERPL-SCNC: 102 MMOL/L (ref 100–108)
CO2 SERPL-SCNC: 30 MMOL/L (ref 21–32)
CREAT SERPL-MCNC: 0.69 MG/DL (ref 0.6–1.3)
EOSINOPHIL # BLD AUTO: 0.1 THOUSAND/ΜL (ref 0–0.61)
EOSINOPHIL NFR BLD AUTO: 2 % (ref 0–6)
ERYTHROCYTE [DISTWIDTH] IN BLOOD BY AUTOMATED COUNT: 13.4 % (ref 11.6–15.1)
GFR SERPL CREATININE-BSD FRML MDRD: 87 ML/MIN/1.73SQ M
GLUCOSE SERPL-MCNC: 81 MG/DL (ref 65–140)
HCT VFR BLD AUTO: 39.6 % (ref 34.8–46.1)
HGB BLD-MCNC: 12.5 G/DL (ref 11.5–15.4)
IMM GRANULOCYTES # BLD AUTO: 0.01 THOUSAND/UL (ref 0–0.2)
IMM GRANULOCYTES NFR BLD AUTO: 0 % (ref 0–2)
LYMPHOCYTES # BLD AUTO: 1.24 THOUSANDS/ΜL (ref 0.6–4.47)
LYMPHOCYTES NFR BLD AUTO: 20 % (ref 14–44)
MCH RBC QN AUTO: 32.3 PG (ref 26.8–34.3)
MCHC RBC AUTO-ENTMCNC: 31.6 G/DL (ref 31.4–37.4)
MCV RBC AUTO: 102 FL (ref 82–98)
MONOCYTES # BLD AUTO: 0.72 THOUSAND/ΜL (ref 0.17–1.22)
MONOCYTES NFR BLD AUTO: 11 % (ref 4–12)
NEUTROPHILS # BLD AUTO: 4.22 THOUSANDS/ΜL (ref 1.85–7.62)
NEUTS SEG NFR BLD AUTO: 66 % (ref 43–75)
NRBC BLD AUTO-RTO: 0 /100 WBCS
PLATELET # BLD AUTO: 284 THOUSANDS/UL (ref 149–390)
PMV BLD AUTO: 10.3 FL (ref 8.9–12.7)
POTASSIUM SERPL-SCNC: 4.6 MMOL/L (ref 3.5–5.3)
PROT SERPL-MCNC: 7.2 G/DL (ref 6.4–8.2)
RBC # BLD AUTO: 3.87 MILLION/UL (ref 3.81–5.12)
SODIUM SERPL-SCNC: 137 MMOL/L (ref 136–145)
WBC # BLD AUTO: 6.34 THOUSAND/UL (ref 4.31–10.16)

## 2020-01-14 PROCEDURE — 85025 COMPLETE CBC W/AUTO DIFF WBC: CPT

## 2020-01-14 PROCEDURE — 80053 COMPREHEN METABOLIC PANEL: CPT

## 2020-01-14 PROCEDURE — 36415 COLL VENOUS BLD VENIPUNCTURE: CPT

## 2020-01-17 ENCOUNTER — HOSPITAL ENCOUNTER (OUTPATIENT)
Dept: RADIOLOGY | Age: 75
Discharge: HOME | End: 2020-01-17
Payer: MEDICARE

## 2020-01-17 DIAGNOSIS — C34.91 MALIGNANT NEOPLASM OF RIGHT LUNG, UNSPECIFIED PART OF LUNG (HCC): ICD-10-CM

## 2020-01-17 DIAGNOSIS — C34.01 MALIGNANT NEOPLASM OF HILUS OF RIGHT LUNG (HCC): ICD-10-CM

## 2020-01-17 DIAGNOSIS — C79.31 METASTATIC ADENOCARCINOMA TO BRAIN (HCC): ICD-10-CM

## 2020-01-17 PROCEDURE — 70491 CT SOFT TISSUE NECK W/DYE: CPT

## 2020-01-17 PROCEDURE — 71260 CT THORAX DX C+: CPT

## 2020-01-17 RX ADMIN — IOHEXOL 85 ML: 350 INJECTION, SOLUTION INTRAVENOUS at 09:15

## 2020-01-20 ENCOUNTER — HOSPITAL ENCOUNTER (OUTPATIENT)
Dept: RADIOLOGY | Age: 75
Discharge: HOME | End: 2020-01-20
Payer: MEDICARE

## 2020-01-20 DIAGNOSIS — C79.31 METASTATIC ADENOCARCINOMA TO BRAIN (HCC): ICD-10-CM

## 2020-01-20 PROCEDURE — A9585 GADOBUTROL INJECTION: HCPCS | Performed by: RADIOLOGY

## 2020-01-20 PROCEDURE — 70553 MRI BRAIN STEM W/O & W/DYE: CPT

## 2020-01-20 RX ADMIN — GADOBUTROL 3.5 ML: 604.72 INJECTION INTRAVENOUS at 11:53

## 2020-01-22 ENCOUNTER — TELEPHONE (OUTPATIENT)
Dept: PULMONOLOGY | Facility: CLINIC | Age: 75
End: 2020-01-22

## 2020-01-24 ENCOUNTER — OFFICE VISIT (OUTPATIENT)
Dept: HEMATOLOGY ONCOLOGY | Facility: CLINIC | Age: 75
End: 2020-01-24
Payer: MEDICARE

## 2020-01-24 VITALS
RESPIRATION RATE: 18 BRPM | HEIGHT: 60 IN | HEART RATE: 105 BPM | SYSTOLIC BLOOD PRESSURE: 106 MMHG | TEMPERATURE: 98.3 F | BODY MASS INDEX: 16.94 KG/M2 | OXYGEN SATURATION: 97 % | WEIGHT: 86.3 LBS | DIASTOLIC BLOOD PRESSURE: 68 MMHG

## 2020-01-24 DIAGNOSIS — C79.31 METASTATIC ADENOCARCINOMA TO BRAIN (HCC): ICD-10-CM

## 2020-01-24 DIAGNOSIS — C34.01 MALIGNANT NEOPLASM OF HILUS OF RIGHT LUNG (HCC): Primary | ICD-10-CM

## 2020-01-24 PROCEDURE — 99214 OFFICE O/P EST MOD 30 MIN: CPT | Performed by: INTERNAL MEDICINE

## 2020-01-24 NOTE — PROGRESS NOTES
Hematology Outpatient Follow - Up Note  Nela Flattery 69 y o  female MRN: @ Encounter: [id number]        Date:  1/24/2020        Assessment/ Plan:    Adenocarcinoma of the lung diagnosed initially in October 2017 moderately differentiated stage IV involving the right hilum, mediastinum, precarinal, subcarinal and tumor extension into the right main bronchus bronchus intermedius and right middle lobe and right cervical area proven by the biopsy molecular tests came back negative for EGFR mutation, Ross 1 mutation, AK gene rearrangement, PDL expression of 0, positive for K-tanya, TP 48    Treated with radiation therapy concurrent with weekly Taxol/carboplatin completed in 02/2018    Relapse in September 2019 with brain metastases primary in the right parietal lobe 2 8 cm and 2 small punctuate metastases status post resection of the large right brain lesion and SB RT to the cavity as well at to the small punctuate metastatic lesions    CT scan of the chest showed no evidence of progression or new lesion continue watchful observation    Brain metastases followed by Radiation Oncology    Follow-up in 3 months with CT scan of the chest CBC, CMP            HPI: 68-year-old  female seen initially 11/6/2017 regarding moderately differentiated adenocarcinoma of the lung positive for TTF 1 and napsin and negative for P 40 diagnosed via right mainstem bronchus biopsy on October 17, 2017 by Dr Max Tee  scan chest 9/26/2017 ordered by Dr Max Tee performed as lung cancer screening study showed subcarinal adenopathy measuring 1 7 cm  Precarinal adenopathy measures 1 9 cm  The right lower lobe endobronchial soft tissue appears mass like  Lung-RADS 4A, suspicious  PET scan 11/2/2017 showed confluent of hypermetabolic right hilar and mediastinal, precarinal, subcarinal lymphadenopathy compatible with malignancy, SUV measures 17 4  right hilar and precarinal adenopathy measures approximately 4 2 x 2 2 cm   Subcarinal adenopathy measures approximately 2 5 x 1 5 cm  Right hilar devan mass image 100 measures 2 7 x 1 47 m  Tumor extends into the right mainstem bronchus, bronchus intermedius, right middle lobe and right lower lobe bronchi, as seen previously  There is also a small right thoracic paraspinal node with mild FDG activity, measuring 1 3 x 0 4 cm, SUV 2 5  is an irregular right upper lung density along the major fissure measuring 1 6 x 1 3 cm, with mild FDG activity, SUV 1 1  This may be inflammatory, versus malignancy  There is an ill-defined hypermetabolic nodule in the right parotid gland region, measuring approximately 2 2 x 1 7 cm SUV measures 11 3  This may represent a metastasis versus primary parotid gland lesions such as a Warthin's tumor or amorphic adenoma  mm left lower lung nodular density too small for PET characterization   Subtle hypermetabolic focus anterior to the right diaphragmatic larisa, SUV 2 6   She has chronic COPD secondary to active smoking, progressive dyspnea on exertion  She has had a chronic cough for many years   Used to smoke 2 packs of cigarettes daily for many many years, she does not drink alcohol   both parents still living has 1 sister in [place]  She elected to proceed with FNA right glucose avid neck mass  Pathology: Conclusive evidence of malignancy  small cell carcinoma consistent with metastasis from the patient's know adenocarcinoma of the lung   On immunostaining, the tumor cells are positive for TTF-1 and Napsin A but negative for p40 helping support the above diagnosis   RT to this area      She finished concurrent radiation therapy with weekly Taxol /carboplatin to the chest area as well as to the right neck area      Seizure activity in September 2019 MRI of the brain in October 2019 showed large lesion in the right parietal frontal area measuring 2 8 cm with vasogenic edema, small 2 additional foci of metastases     PET scan thereafter showed small changes in the right infrahilar area as well as the lymph node in the superior abdomen area close to the diaphragm might be metastatic disease  Repeat CT scan of the chest in January 2020 showed stable disease in the necrotic mediastinal lymph node, no new lesions, MRI of the brain showed favorable response to therapy            Previous Treatment:         Test Results:    Imaging: Ct Soft Tissue Neck W Contrast    Result Date: 1/20/2020  Narrative: CT NECK WITH CONTRAST INDICATION:   C34 91: Malignant neoplasm of unspecified part of right bronchus or lung  COMPARISON:  PET/CT 10/14/2019, CT the brain 10/25/2019, MR of the brain 11/4/2019 TECHNIQUE:  Axial, sagittal, and coronal 2D reformatted images were created from the axial source data and submitted for interpretation  Radiation dose length product (DLP) for this visit:  250 4 mGy-cm   This examination, like all CT scans performed in the [facility], was performed utilizing techniques to minimize radiation dose exposure, including the use of iterative reconstruction and automated exposure control  IV Contrast:  85 mL of iohexol (OMNIPAQUE) IMAGE QUALITY:  Diagnostic  FINDINGS: VISUALIZED BRAIN PARENCHYMA:  No acute intracranial pathology of the visualized brain parenchyma  VISUALIZED ORBITS AND PARANASAL SINUSES:  Normal  NASAL CAVITY AND NASOPHARYNX:  Normal  SUPRAHYOID NECK:  Normal oral cavity, tongue base, tonsillar fossa and epiglottis  Parapharyngeal, , sublingual and submandibular spaces are normal  INFRAHYOID NECK:  Aryepiglottic folds and piriform sinuses are normal   Normal glottis and subglottic airway  THYROID GLAND:  Small right thyroid nodules  PAROTID AND SUBMANDIBULAR GLANDS:  Normal  LYMPH NODES:  No pathologic or enlarged adenopathy  VASCULAR STRUCTURES:  Normal enhancement of the cervical vasculature  THORACIC INLET:  Apical pleural thickening and advanced emphysematous changes   BONY STRUCTURES: No acute fracture or destructive osseous lesion  Impression: Normal enhanced CT of the neck  No airway pathology or pathologic adenopathy  Workstation performed: [id number]     Ct Chest W Contrast    Result Date: 1/17/2020  Narrative: CT CHEST WITH IV CONTRAST INDICATION:   C34 91: Malignant neoplasm of unspecified part of right bronchus or lung  COMPARISON:  CT of the chest on July 10, 2019  PET/CT on October 14, 2019  TECHNIQUE: CT examination of the chest was performed  Axial, sagittal, and coronal 2D reformatted images were created from the source data and submitted for interpretation  Radiation dose length product (DLP) for this visit:  154 mGy-cm   This examination, like all CT scans performed in the [facility], was performed utilizing techniques to minimize radiation dose exposure, including the use of iterative reconstruction and automated exposure control  IV Contrast:  85 mL of iohexol (OMNIPAQUE)  was administered intravenously without immediate adverse reaction  FINDINGS: LUNGS:  Diffuse emphysematous changes  Stable biapical and right lateral pleural parenchymal scarring  Stable right perihilar scarring and bronchiectasis  Previously seen peripheral posterior right lower lobe opacity has resolved  PLEURA:  Unremarkable  HEART/GREAT VESSELS:  Coronary artery calcifications  No pericardial effusion  MEDIASTINUM AND JENNIFER: There is a stable 0 6 x 1 1 x 1 0 cm lymph node inferior to the right main bronchus (series 604, image 70) which demonstrates central hypodensity which may be necrotic, FDG avid on prior scan  There is an additional similar appearing right subcarinal lymph node measuring approximately 4 mm in short axis (series 5, image 56) also FDG avid, stable  Right paravertebral nodule adjacent to T12 which was FDG avid on prior PET scan measures 1 1 x 1 0 x 0 6 cm (series 605, image 69), previously 1 1 x 0 8 x 0 6 cm, not significantly changed in size   CHEST WALL AND LOWER NECK:   Stable partially calcified right thyroid nodule  Right mastectomy and right axillary lymph node dissection  VISUALIZED STRUCTURES IN THE UPPER ABDOMEN:  There is focal thickening of the midesophagus (series 4, image 28) similar to prior examination, correlate with endoscopy  OSSEOUS STRUCTURES:  No acute fracture or destructive osseous lesion  Impression: 1  Stable emphysematous changes and biapical, right lateral, and right perihilar scarring  Previously seen peripheral posterior right lower lobe opacity has resolved  2   Small necrotic lymph nodes adjacent to the right mainstem bronchus and nodule in the right paravertebral region are not significantly changed in size, all of which are FDG avid on prior PET/CT concerning for metastasis  3   Focal thickening of the midesophagus similar to prior examination, not FDG avid and may be due to esophagitis, correlate with endoscopy  Workstation performed: [id number]     Mri Brain W Wo Contrast    Result Date: 1/22/2020  Narrative: MRI BRAIN WITH AND WITHOUT CONTRAST INDICATION: C79 31: Secondary malignant neoplasm of brain  Tumor resection  3 month follow-up  History of metastatic lung cancer  COMPARISON:  11/4/2019; 10/7/2019 TECHNIQUE: Sagittal T1, axial T2, axial FLAIR, axial T1, axial [place], axial diffusion  Sagittal, axial T1 postcontrast   Axial bravo postcontrast with coronal reconstructions  IV Contrast:  3 5 mL of gadobutrol injection (MULTI-DOSE)  IMAGE QUALITY:   Diagnostic  FINDINGS: BRAIN PARENCHYMA:  There has been significant interval treatment response  The previously noted crenulated irregular enhancement in the lateral aspect of the right frontal lobe subjacent to the right frontal craniotomy in the region of previous resection has involuted without residual nodular enhancement in this region  Additionally the FLAIR signal abnormality/FLAIR envelope in this region is significantly smaller than on the prior study   Additionally, the previously noted enhancing metastases have resolved and/or diminished in size  The previously noted small cortical lesion in the parasagittal aspect of the right occipital lobe is much smaller now perhaps only 1 to 2 mm in diameter on image 71, series 11  Similar involution noted with a slightly larger 4 mm lesion in the parasagittal aspect of the left occipital lobe image 65, series 11, previously approximately 6 mm  The 2 smaller punctate lesions detected previously in the right parietal lobe are no longer identified  There is no new enhancing metastasis  Elsewhere there are scattered subcortical foci of FLAIR hyperintensity  Mild striated hyperintensity in the brainstem again noted  There is no diffusion restriction  No extra-axial fluid collection  Cerebellar tonsils normally positioned  VENTRICLES:  Normal for the patient's age  SELLA AND PITUITARY GLAND:  Normal  ORBITS:  Normal  PARANASAL SINUSES:  Mucosal thickening of the sinuses with no air fluid levels  Bilateral mastoid air cell signal abnormality noted  No mass in the nasopharynx  VASCULATURE:  Evaluation of the major intracranial vasculature demonstrates appropriate flow voids  CALVARIUM AND SKULL BASE:  Right frontal craniotomy superiorly laterally redemonstrated  Additionally there is a small rounded T1 and T2 hyperintense lesion in the left parietal bone, posterior laterally seen on image 2, series 3, measuring approximately 5 mm in maximal dimension, retrospectively stable from 10/7/2019 study, possibly a small hemangioma or venous lake  Heterogeneous signal in the CC 3 vertebra is hypointense on T1-weighted images and may represent metastatic disease, previously present  EXTRACRANIAL SOFT TISSUES:  Normal      Impression: 1  Significant interval treatment response with involution of the crenulated enhancement around the resection cavity in the right frontal lobe laterally and significant impaction of the FLAIR envelope   2   2 tiny bilateral occipital lobe metastases are diminished in size from the prior study indicative of treatment response  The 2 previously noted right parietal lobe metastases are no longer detected  No new metastases  3   Marrow signal abnormality in the C3 vertebra suspicious for metastatic disease  This was present previously  4   A few scattered white matter lesions may represent mild, chronic microangiopathy and/or treatment related changes, stable  No acute infarction or intracranial hemorrhage  Workstation performed: [id number]       Labs:   Lab Results   Component Value Date    WBC 6 34 01/14/2020    HGB 12 5 01/14/2020    HCT 39 6 01/14/2020     (H) 01/14/2020     01/14/2020     Lab Results   Component Value Date     02/27/2015    K 4 6 01/14/2020     01/14/2020    CO2 30 01/14/2020    ANIONGAP 10 02/27/2015    BUN 11 01/14/2020    CREATININE 0 69 01/14/2020    GLUCOSE 94 02/27/2015    GLUF 81 10/18/2019    CALCIUM 9 2 01/14/2020    AST 16 01/14/2020    ALT 19 01/14/2020    ALKPHOS 73 01/14/2020    PROT 7 5 02/27/2015    BILITOT 0 53 02/27/2015    EGFR 87 01/14/2020       No results found for: IRON, TIBC, FERRITIN    No results found for: [id number]      ROS: Review of Systems   Constitutional: Negative for appetite change, chills, diaphoresis, fatigue and unexpected weight change  HENT:   Negative for mouth sores, nosebleeds, sore throat, trouble swallowing and voice change  Eyes: Negative for eye problems and icterus  Respiratory: Positive for shortness of breath  Negative for chest tightness, cough, hemoptysis and wheezing  Cardiovascular: Negative for chest pain, leg swelling and palpitations  Gastrointestinal: Negative for abdominal distention, abdominal pain, blood in stool, constipation, diarrhea, nausea and vomiting  Endocrine: Negative for hot flashes  Genitourinary: Negative for bladder incontinence, difficulty urinating, dyspareunia, dysuria and frequency      Musculoskeletal: Negative for arthralgias, back pain, gait problem, neck pain and neck stiffness  Skin: Negative for itching and rash  Neurological: Negative for dizziness, gait problem, headaches, numbness, seizures and speech difficulty  Hematological: Negative for adenopathy  Does not bruise/bleed easily  Psychiatric/Behavioral: Negative for decreased concentration, depression, sleep disturbance and suicidal ideas  The patient is not nervous/anxious  Current Medications: Reviewed  Allergies: Reviewed  PMH/FH/SH:  Reviewed      Physical Exam:    Body surface area is 1 31 meters squared  Wt Readings from Last 3 Encounters:   01/24/20 39 1 kg (86 lb 4 8 oz)   01/02/20 39 2 kg (86 lb 8 oz)   11/25/19 39 5 kg (87 lb)        Temp Readings from Last 3 Encounters:   01/24/20 98 3 °F (36 8 °C)   01/02/20 (!) 96 7 °F (35 9 °C) (Tympanic)   11/11/19 97 5 °F (36 4 °C) (Tympanic)        BP Readings from Last 3 Encounters:   01/24/20 106/68   01/02/20 92/58   11/25/19 100/64         Pulse Readings from Last 3 Encounters:   01/24/20 105   01/02/20 104   11/25/19 (!) 113        Physical Exam   Constitutional: She is oriented to person, place, and time  She appears cachectic  No distress  HENT:   Head: Normocephalic and atraumatic  Eyes: Conjunctivae are normal    Neck: Normal range of motion  Neck supple  No tracheal deviation present  Cardiovascular: Regular rhythm  Tachycardia present  Exam reveals no gallop and no friction rub  No murmur heard  Pulmonary/Chest: Effort normal  No respiratory distress  She has decreased breath sounds in the right upper field, the right middle field, the right lower field, the left upper field, the left middle field and the left lower field  She has no wheezes  She has no rales  She exhibits no tenderness  Abdominal: Soft  She exhibits no distension  There is no tenderness  Lymphadenopathy:     She has no cervical adenopathy     Neurological: She is alert and oriented to person, place, and time    Skin: Skin is warm and dry  She is not diaphoretic  No erythema  No pallor  Psychiatric: She has a normal mood and affect  Her behavior is normal  Judgment and thought content normal    Vitals reviewed  ECO    Goals and Barriers:  Current Goal: Minimize effects of disease  Barriers: None  Patient's Capacity to Self Care:  Patient is able to self care      Code Status: [unfilled]

## 2020-01-29 ENCOUNTER — OFFICE VISIT (OUTPATIENT)
Dept: NEUROSURGERY | Facility: CLINIC | Age: 75
End: 2020-01-29
Payer: MEDICARE

## 2020-01-29 VITALS
BODY MASS INDEX: 16.81 KG/M2 | WEIGHT: 85.6 LBS | RESPIRATION RATE: 18 BRPM | SYSTOLIC BLOOD PRESSURE: 105 MMHG | HEART RATE: 110 BPM | TEMPERATURE: 97.6 F | DIASTOLIC BLOOD PRESSURE: 58 MMHG | HEIGHT: 60 IN

## 2020-01-29 DIAGNOSIS — C79.31 METASTATIC ADENOCARCINOMA TO BRAIN (HCC): Primary | ICD-10-CM

## 2020-01-29 PROCEDURE — 99214 OFFICE O/P EST MOD 30 MIN: CPT | Performed by: NEUROLOGICAL SURGERY

## 2020-01-29 NOTE — PROGRESS NOTES
Neurosurgery Office Note  Ponoam Jones 69 y o  female MRN: [id number]      Assessment/Plan      Diagnoses and all orders for this visit:    Metastatic adenocarcinoma to brain [facility])  -     MRI brain with and without contrast; Future          Discussion:    24-year-old woman known history of metastatic  adenocarcinoma, lung origin  S/p Right parietal craniotomy for resection 10/24/19  Pathology metastatic adenocarcinoma  Tolerated surgery well, discharged POD#2  No hand cramping episodes since surgery  Maintained on Keppra, has considerable supply  Will refer her to Epilepsy Neurology to manage AED, etc      Postop MRI scan performed 11/4/19, showed resection of right frontoparietal met, great improvement in associated cerebral edema, and previously known to occipital smaller metastases  However also seen on this new study were 2-3 additional small new brain lesions consistent with metastases  S/p WBRT 11/2019    New MRI brain 1/2020 shows improvement in all areas and no new lesions  I have recommended a f/u MRI brain in 3 months, c/w NCCN guidelines  I have ordered the study, and we'll see her after it is completed  CHIEF COMPLAINT    Chief Complaint   Patient presents with    Follow-up     3 month follow up with new MRI       HISTORY    History of Present Illness     69y o  year old female     HPI    See Discussion    REVIEW OF SYSTEMS    Review of Systems   Constitutional: Positive for appetite change (terrible) and fatigue (5-8/10, tired by end of day)  HENT: Negative  Eyes: Negative  Respiratory: Positive for cough (rarely productive, clear) and shortness of breath (with exertion)  Requires 2 liters  O2   Cardiovascular: Negative  Gastrointestinal: Negative  Endocrine: Positive for cold intolerance  Genitourinary: Negative  Musculoskeletal: Negative  Neck pain: occasional    Skin: Negative      Allergic/Immunologic: Positive for food allergies (clams, oysters, mussels)  Neurological: Positive for seizures (none since starting Keppra (all left sided))  Negative for dizziness, light-headedness, numbness and headaches  Tingling right side neck / chest area post RT     Hematological: Bruises/bleeds easily (bruises)  Psychiatric/Behavioral: Negative  Normal stress         Meds/Allergies     Current Outpatient Medications   Medication Sig Dispense Refill    albuterol (VENTOLIN HFA) 90 mcg/act inhaler Inhale 2 puffs every 4 (four) hours as needed for wheezing 1 Inhaler 0    arformoterol (BROVANA) 15 mcg/2 mL nebulizer solution Take 1 vial (15 mcg total) by nebulization 2 (two) times a day 120 vial 5    budesonide (PULMICORT) 0 25 mg/2 mL nebulizer solution TAKE 1 VIAL (0 25 MG TOTAL) BY NEBULIZATION 2 (TWO) TIMES A  mL 1    ipratropium (ATROVENT) 0 02 % nebulizer solution Take 1 vial (0 5 mg total) by nebulization 3 (three) times a day 90 vial 5    levETIRAcetam (KEPPRA) 500 mg tablet Take 1 tablet (500 mg total) by mouth every 12 (twelve) hours 180 tablet 3    acetaminophen (TYLENOL) 325 mg tablet Take 3 tablets (975 mg total) by mouth every 8 (eight) hours (Patient not taking: Reported on 1/29/2020) 30 tablet 0    mirtazapine (REMERON) 15 mg tablet Take 1 tablet (15 mg total) by mouth daily at bedtime (Patient not taking: Reported on 1/29/2020) 30 tablet 5     No current facility-administered medications for this visit          Allergies   Allergen Reactions    Megace [Megestrol] Nausea Only    Other Vomiting     Oysters, clams and mussels       PAST HISTORY    Past Medical History:   Diagnosis Date    Breast cancer ([facility] [address] ) 1996    Cancer ([facility] [address] )     COPD, severe ([address] )     Lung disease     Requires supplemental oxygen     3L 24 hrs/day    Stage 4 lung cancer ([facility] [address] )        Past Surgical History:   Procedure Laterality Date    APPENDECTOMY      COLONOSCOPY N/A 4/18/2016    Procedure: COLONOSCOPY;  Surgeon: Lexx Ch MD;  [facility]: [facility]; Service:     LUNG CANCER SURGERY      MASTECTOMY, RADICAL Right 1996    OR [address] N/A 10/17/2017    Procedure: Sergio Do[address]±a Ana;  Surgeon: Oscar Montanez MD;  [facility]; Service: Pulmonary    OR EXCIS SUPRATENT BRAIN TUMOR Right 10/24/2019    Procedure: IMAGE-GUIDED RIGHT FRONTAL CRANIOTOMY FOR TUMOR;  Surgeon: Tahira Nichols MD;  [facility];  Service: Neurosurgery    TONSILLECTOMY         Social History     Tobacco Use    Smoking status: Current Some Day Smoker     Packs/day: 0 00     Years: 55 00     Pack years: 0 00     Types: Cigarettes     Start date: 1962    Smokeless tobacco: Never Used    Tobacco comment: NOW DOWN TO 3-4 CIGARETTES A  DAY   Substance Use Topics    Alcohol use: Not Currently     Alcohol/week: 0 0 standard drinks     Frequency: Never     Binge frequency: Never    Drug use: No       Family History   Problem Relation Age of Onset    Heart disease Mother         cardiac disorder    Dementia Mother     Heart failure Father     Heart disease Maternal Grandmother     Heart disease Maternal Grandfather          The following portions of the patient's history were reviewed in this encounter and updated as appropriate: Past medical, surgical, family, and social history, as well as medications, allergies, and review of systems  EXAM    Vitals:Blood pressure 105/58, pulse (!) 110, temperature 97 6 °F (36 4 °C), temperature source Tympanic, resp  rate 18, height 5' (1 524 m), weight 38 8 kg (85 lb 9 6 oz)  ,Body mass index is 16 72 kg/m²  Physical Exam   Constitutional: She is oriented to person, place, and time  She appears well-developed  Underweight   HENT:   Head: Normocephalic  alopecia   Eyes: No scleral icterus  Neck: Neck supple  Cardiovascular: Normal rate  Pulmonary/Chest: Effort normal    NC O2   Abdominal: Soft  Neurological: She is alert and oriented to person, place, and time   GCS eye subscore is 4  GCS verbal subscore is 5  GCS motor subscore is 6  Skin: Skin is warm and dry  Psychiatric: She has a normal mood and affect  Her speech is normal and behavior is normal    Vitals reviewed  Neurologic Exam     Mental Status   Oriented to person, place, and time  Attention: normal    Speech: speech is normal   Level of consciousness: alert    MOCA 27/30     Cranial Nerves     CN VII   Facial expression full, symmetric  Motor Exam   Muscle bulk: normal  Overall muscle tone: normal  Moves all extremities, grossly normal     Gait, Coordination, and Reflexes     Tremor   Resting tremor: absent  Intention tremor: absent  Action tremor: absent  No aids  MEDICAL DECISION MAKING    Imaging Studies:     Ct Soft Tissue Neck W Contrast    Result Date: 1/20/2020  Narrative: CT NECK WITH CONTRAST INDICATION:   C34 91: Malignant neoplasm of unspecified part of right bronchus or lung  COMPARISON:  PET/CT 10/14/2019, CT the brain 10/25/2019, MR of the brain 11/4/2019 TECHNIQUE:  Axial, sagittal, and coronal 2D reformatted images were created from the axial source data and submitted for interpretation  Radiation dose length product (DLP) for this visit:  250 4 mGy-cm   This examination, like all CT scans performed in the [facility], was performed utilizing techniques to minimize radiation dose exposure, including the use of iterative reconstruction and automated exposure control  IV Contrast:  85 mL of iohexol (OMNIPAQUE) IMAGE QUALITY:  Diagnostic  FINDINGS: VISUALIZED BRAIN PARENCHYMA:  No acute intracranial pathology of the visualized brain parenchyma  VISUALIZED ORBITS AND PARANASAL SINUSES:  Normal  NASAL CAVITY AND NASOPHARYNX:  Normal  SUPRAHYOID NECK:  Normal oral cavity, tongue base, tonsillar fossa and epiglottis    Parapharyngeal, , sublingual and submandibular spaces are normal  INFRAHYOID NECK:  Aryepiglottic folds and piriform sinuses are normal   Normal glottis and subglottic airway  THYROID GLAND:  Small right thyroid nodules  PAROTID AND SUBMANDIBULAR GLANDS:  Normal  LYMPH NODES:  No pathologic or enlarged adenopathy  VASCULAR STRUCTURES:  Normal enhancement of the cervical vasculature  THORACIC INLET:  Apical pleural thickening and advanced emphysematous changes  BONY STRUCTURES: No acute fracture or destructive osseous lesion  Impression: Normal enhanced CT of the neck  No airway pathology or pathologic adenopathy  Workstation performed: [id number]     Ct Chest W Contrast    Result Date: 1/17/2020  Narrative: CT CHEST WITH IV CONTRAST INDICATION:   C34 91: Malignant neoplasm of unspecified part of right bronchus or lung  COMPARISON:  CT of the chest on July 10, 2019  PET/CT on October 14, 2019  TECHNIQUE: CT examination of the chest was performed  Axial, sagittal, and coronal 2D reformatted images were created from the source data and submitted for interpretation  Radiation dose length product (DLP) for this visit:  154 mGy-cm   This examination, like all CT scans performed in the [facility], was performed utilizing techniques to minimize radiation dose exposure, including the use of iterative reconstruction and automated exposure control  IV Contrast:  85 mL of iohexol (OMNIPAQUE)  was administered intravenously without immediate adverse reaction  FINDINGS: LUNGS:  Diffuse emphysematous changes  Stable biapical and right lateral pleural parenchymal scarring  Stable right perihilar scarring and bronchiectasis  Previously seen peripheral posterior right lower lobe opacity has resolved  PLEURA:  Unremarkable  HEART/GREAT VESSELS:  Coronary artery calcifications  No pericardial effusion  MEDIASTINUM AND JENNIFER: There is a stable 0 6 x 1 1 x 1 0 cm lymph node inferior to the right main bronchus (series 604, image 70) which demonstrates central hypodensity which may be necrotic, FDG avid on prior scan    There is an additional similar appearing right subcarinal lymph node measuring approximately 4 mm in short axis (series 5, image 56) also FDG avid, stable  Right paravertebral nodule adjacent to T12 which was FDG avid on prior PET scan measures 1 1 x 1 0 x 0 6 cm (series 605, image 69), previously 1 1 x 0 8 x 0 6 cm, not significantly changed in size  CHEST WALL AND LOWER NECK:   Stable partially calcified right thyroid nodule  Right mastectomy and right axillary lymph node dissection  VISUALIZED STRUCTURES IN THE UPPER ABDOMEN:  There is focal thickening of the midesophagus (series 4, image 28) similar to prior examination, correlate with endoscopy  OSSEOUS STRUCTURES:  No acute fracture or destructive osseous lesion  Impression: 1  Stable emphysematous changes and biapical, right lateral, and right perihilar scarring  Previously seen peripheral posterior right lower lobe opacity has resolved  2   Small necrotic lymph nodes adjacent to the right mainstem bronchus and nodule in the right paravertebral region are not significantly changed in size, all of which are FDG avid on prior PET/CT concerning for metastasis  3   Focal thickening of the midesophagus similar to prior examination, not FDG avid and may be due to esophagitis, correlate with endoscopy  Workstation performed: [id number]     Mri Brain W Wo Contrast    Result Date: 1/22/2020  Narrative: MRI BRAIN WITH AND WITHOUT CONTRAST INDICATION: C79 31: Secondary malignant neoplasm of brain  Tumor resection  3 month follow-up  History of metastatic lung cancer  COMPARISON:  11/4/2019; 10/7/2019 TECHNIQUE: Sagittal T1, axial T2, axial FLAIR, axial T1, axial [place], axial diffusion  Sagittal, axial T1 postcontrast   Axial bravo postcontrast with coronal reconstructions  IV Contrast:  3 5 mL of gadobutrol injection (MULTI-DOSE)  IMAGE QUALITY:   Diagnostic  FINDINGS: BRAIN PARENCHYMA:  There has been significant interval treatment response    The previously noted crenulated irregular enhancement in the lateral aspect of the right frontal lobe subjacent to the right frontal craniotomy in the region of previous resection has involuted without residual nodular enhancement in this region  Additionally the FLAIR signal abnormality/FLAIR envelope in this region is significantly smaller than on the prior study  Additionally, the previously noted enhancing metastases have resolved and/or diminished in size  The previously noted small cortical lesion in the parasagittal aspect of the right occipital lobe is much smaller now perhaps only 1 to 2 mm in diameter on image 71, series 11  Similar involution noted with a slightly larger 4 mm lesion in the parasagittal aspect of the left occipital lobe image 65, series 11, previously approximately 6 mm  The 2 smaller punctate lesions detected previously in the right parietal lobe are no longer identified  There is no new enhancing metastasis  Elsewhere there are scattered subcortical foci of FLAIR hyperintensity  Mild striated hyperintensity in the brainstem again noted  There is no diffusion restriction  No extra-axial fluid collection  Cerebellar tonsils normally positioned  VENTRICLES:  Normal for the patient's age  SELLA AND PITUITARY GLAND:  Normal  ORBITS:  Normal  PARANASAL SINUSES:  Mucosal thickening of the sinuses with no air fluid levels  Bilateral mastoid air cell signal abnormality noted  No mass in the nasopharynx  VASCULATURE:  Evaluation of the major intracranial vasculature demonstrates appropriate flow voids  CALVARIUM AND SKULL BASE:  Right frontal craniotomy superiorly laterally redemonstrated  Additionally there is a small rounded T1 and T2 hyperintense lesion in the left parietal bone, posterior laterally seen on image 2, series 3, measuring approximately 5 mm in maximal dimension, retrospectively stable from 10/7/2019 study, possibly a small hemangioma or venous lake    Heterogeneous signal in the CC 3 vertebra is hypointense on T1-weighted images and may represent metastatic disease, previously present  EXTRACRANIAL SOFT TISSUES:  Normal      Impression: 1  Significant interval treatment response with involution of the crenulated enhancement around the resection cavity in the right frontal lobe laterally and significant impaction of the FLAIR envelope  2   2 tiny bilateral occipital lobe metastases are diminished in size from the prior study indicative of treatment response  The 2 previously noted right parietal lobe metastases are no longer detected  No new metastases  3   Marrow signal abnormality in the C3 vertebra suspicious for metastatic disease  This was present previously  4   A few scattered white matter lesions may represent mild, chronic microangiopathy and/or treatment related changes, stable  No acute infarction or intracranial hemorrhage  Workstation performed: [id number]       I have personally reviewed pertinent reports     and I have personally reviewed pertinent films in PACS

## 2020-02-03 ENCOUNTER — OFFICE VISIT (OUTPATIENT)
Dept: PULMONOLOGY | Facility: CLINIC | Age: 75
End: 2020-02-03
Payer: MEDICARE

## 2020-02-03 VITALS
HEART RATE: 86 BPM | BODY MASS INDEX: 14.33 KG/M2 | OXYGEN SATURATION: 93 % | WEIGHT: 86 LBS | TEMPERATURE: 98 F | HEIGHT: 65 IN | SYSTOLIC BLOOD PRESSURE: 120 MMHG | DIASTOLIC BLOOD PRESSURE: 72 MMHG

## 2020-02-03 DIAGNOSIS — J44.9 CHRONIC OBSTRUCTIVE PULMONARY DISEASE, UNSPECIFIED COPD TYPE (HCC): Primary | ICD-10-CM

## 2020-02-03 DIAGNOSIS — J30.89 NON-SEASONAL ALLERGIC RHINITIS, UNSPECIFIED TRIGGER: ICD-10-CM

## 2020-02-03 DIAGNOSIS — C34.90 LUNG CANCER METASTATIC TO BRAIN (HCC): ICD-10-CM

## 2020-02-03 DIAGNOSIS — J96.11 CHRONIC HYPOXEMIC RESPIRATORY FAILURE (HCC): ICD-10-CM

## 2020-02-03 DIAGNOSIS — C79.31 LUNG CANCER METASTATIC TO BRAIN (HCC): ICD-10-CM

## 2020-02-03 DIAGNOSIS — R06.00 DOE (DYSPNEA ON EXERTION): ICD-10-CM

## 2020-02-03 PROCEDURE — 99214 OFFICE O/P EST MOD 30 MIN: CPT | Performed by: INTERNAL MEDICINE

## 2020-02-03 PROCEDURE — 3008F BODY MASS INDEX DOCD: CPT | Performed by: INTERNAL MEDICINE

## 2020-02-03 PROCEDURE — 4040F PNEUMOC VAC/ADMIN/RCVD: CPT | Performed by: INTERNAL MEDICINE

## 2020-02-03 PROCEDURE — 1160F RVW MEDS BY RX/DR IN RCRD: CPT | Performed by: INTERNAL MEDICINE

## 2020-02-03 RX ORDER — ARFORMOTEROL TARTRATE 15 UG/2ML
15 SOLUTION RESPIRATORY (INHALATION) 2 TIMES DAILY
Qty: 120 VIAL | Refills: 5 | Status: SHIPPED | OUTPATIENT
Start: 2020-02-03 | End: 2020-07-20

## 2020-02-03 NOTE — PROGRESS NOTES
Office Progress Note - Pulmonary    Serene Pretty 69 y o  female MRN: [id number]    Encounter: [id number]      Assessment:   Chronic obstructive pulmonary disease   Chronic hypoxemic respiratory failure   Dyspnea on exertion   Adenocarcinoma of the lung with metastases to the brain status post brain met resection and radiation  Plan:    Budesonide 0 5 mg nebulized twice a day   Brovana 15 mcg nebulized twice a day   Ipratropium nebulized 3 times a day   Albuterol nebulizer 4 times a day as needed   Oxygen supplement 24 hours a day   Regular walks to improve stamina   Follow-up in 4 months  Discussion:   The patient's COPD is in remission  I have maintained her on the budesonide and Brovana nebulized twice a day and ipratropium nebulized 3 times a day  She will use the albuterol nebulizer 4 times a day as needed  She will use the oxygen 24 hours a day  Regular walks to improve stamina  Her cancer is in remission  She has a PET-CT and MRI of the brain scheduled in the near future  She is planning a trip to [place] in April to see her new grandson  I will see her in 4 months in a follow-up visit  Subjective: The patient is here for a follow-up visit  In November she had her brain met resected without difficulty  She had uneventful postoperative course  A after this surgery she had radiation to her brain  She did not have any breathing difficulties in the postop period  She has shortness of breath on exertion which is chronic and has not changed  She denies any significant cough, wheezing or sputum production  Denies any chest pain or palpitations  She has no nocturnal symptoms  Her appetite is not very good  She has maintained her weight  She is on budesonide and Brovana nebulized twice a day and ipratropium nebulized 3 times a day  She did not need to use her albuterol rescue treatments      Review of systems:  A 12 point system review is done and aside from what is stated above the rest of the review of systems is negative  Family history and social history are reviewed  Medications list is reviewed  Vitals: Blood pressure 120/72, pulse 86, temperature 98 °F (36 7 °C), height 5' 5" (1 651 m), weight 39 kg (86 lb), SpO2 93 %  ,     Physical Exam  Gen: Awake, alert, oriented x 3, no acute distress  HEENT: Mucous membranes moist, no oral lesions, no thrush  NECK: No accessory muscle use, JVP not elevated  Cardiac: Regular, single S1, single S2, no murmurs, no rubs, no gallops  Lungs:  Decreased breath sounds  No wheezing or rhonchi  Abdomen: normoactive bowel sounds, soft nontender, nondistended, no rebound or rigidity, no guarding  Extremities: no cyanosis, no clubbing, no edema  Neuro:  Grossly nonfocal   Skin:  No rash  Lab Results   Component Value Date     02/27/2015    SODIUM 137 01/14/2020    K 4 6 01/14/2020     01/14/2020    CO2 30 01/14/2020    ANIONGAP 10 02/27/2015    AGAP 5 01/14/2020    BUN 11 01/14/2020    CREATININE 0 69 01/14/2020    GLUC 81 01/14/2020    GLUF 81 10/18/2019    CALCIUM 9 2 01/14/2020    AST 16 01/14/2020    ALT 19 01/14/2020    ALKPHOS 73 01/14/2020    PROT 7 5 02/27/2015    TP 7 2 01/14/2020    BILITOT 0 53 02/27/2015    TBILI 0 41 01/14/2020    EGFR 87 01/14/2020     Lab Results   Component Value Date    WBC 6 34 01/14/2020    HGB 12 5 01/14/2020    HCT 39 6 01/14/2020     (H) 01/14/2020     01/14/2020     CT of the chest from January 17, 2020 is reviewed on the [facility] system  It showed emphysematous changes  She has small necrotic lymph nodes adjacent to the right mainstem

## 2020-02-19 NOTE — PROGRESS NOTES
Patient arrived without any complaint today  Patient tolerated infusion well without any complication  Next appt confirmed and AVS was given 
[FreeTextEntry1] : Rheumatology consult 1/30/20 reviewed.

## 2020-02-21 ENCOUNTER — APPOINTMENT (OUTPATIENT)
Dept: LAB | Age: 75
End: 2020-02-21
Payer: MEDICARE

## 2020-02-21 DIAGNOSIS — C34.91 MALIGNANT NEOPLASM OF RIGHT LUNG, UNSPECIFIED PART OF LUNG (HCC): ICD-10-CM

## 2020-02-21 DIAGNOSIS — J96.11 CHRONIC HYPOXEMIC RESPIRATORY FAILURE (HCC): Primary | ICD-10-CM

## 2020-02-21 DIAGNOSIS — E78.01 FAMILIAL HYPERCHOLESTEROLEMIA: ICD-10-CM

## 2020-02-21 LAB
ALBUMIN SERPL BCP-MCNC: 3.9 G/DL (ref 3.5–5)
ALP SERPL-CCNC: 79 U/L (ref 46–116)
ALT SERPL W P-5'-P-CCNC: 16 U/L (ref 12–78)
ANION GAP SERPL CALCULATED.3IONS-SCNC: 6 MMOL/L (ref 4–13)
AST SERPL W P-5'-P-CCNC: 16 U/L (ref 5–45)
BASOPHILS # BLD AUTO: 0.03 THOUSANDS/ΜL (ref 0–0.1)
BASOPHILS NFR BLD AUTO: 1 % (ref 0–1)
BILIRUB SERPL-MCNC: 0.33 MG/DL (ref 0.2–1)
BILIRUB UR QL STRIP: NEGATIVE
BUN SERPL-MCNC: 9 MG/DL (ref 5–25)
CALCIUM SERPL-MCNC: 8.9 MG/DL (ref 8.3–10.1)
CHLORIDE SERPL-SCNC: 100 MMOL/L (ref 100–108)
CHOLEST SERPL-MCNC: 221 MG/DL (ref 50–200)
CLARITY UR: CLEAR
CO2 SERPL-SCNC: 29 MMOL/L (ref 21–32)
COLOR UR: YELLOW
CREAT SERPL-MCNC: 0.57 MG/DL (ref 0.6–1.3)
EOSINOPHIL # BLD AUTO: 0.06 THOUSAND/ΜL (ref 0–0.61)
EOSINOPHIL NFR BLD AUTO: 1 % (ref 0–6)
ERYTHROCYTE [DISTWIDTH] IN BLOOD BY AUTOMATED COUNT: 12.6 % (ref 11.6–15.1)
GFR SERPL CREATININE-BSD FRML MDRD: 93 ML/MIN/1.73SQ M
GLUCOSE P FAST SERPL-MCNC: 78 MG/DL (ref 65–99)
GLUCOSE UR STRIP-MCNC: NEGATIVE MG/DL
HCT VFR BLD AUTO: 38.9 % (ref 34.8–46.1)
HDLC SERPL-MCNC: 78 MG/DL
HGB BLD-MCNC: 12.3 G/DL (ref 11.5–15.4)
HGB UR QL STRIP.AUTO: NEGATIVE
IMM GRANULOCYTES # BLD AUTO: 0.01 THOUSAND/UL (ref 0–0.2)
IMM GRANULOCYTES NFR BLD AUTO: 0 % (ref 0–2)
KETONES UR STRIP-MCNC: NEGATIVE MG/DL
LDLC SERPL CALC-MCNC: 128 MG/DL (ref 0–100)
LEUKOCYTE ESTERASE UR QL STRIP: NEGATIVE
LYMPHOCYTES # BLD AUTO: 0.84 THOUSANDS/ΜL (ref 0.6–4.47)
LYMPHOCYTES NFR BLD AUTO: 17 % (ref 14–44)
MCH RBC QN AUTO: 31.6 PG (ref 26.8–34.3)
MCHC RBC AUTO-ENTMCNC: 31.6 G/DL (ref 31.4–37.4)
MCV RBC AUTO: 100 FL (ref 82–98)
MONOCYTES # BLD AUTO: 0.45 THOUSAND/ΜL (ref 0.17–1.22)
MONOCYTES NFR BLD AUTO: 9 % (ref 4–12)
NEUTROPHILS # BLD AUTO: 3.55 THOUSANDS/ΜL (ref 1.85–7.62)
NEUTS SEG NFR BLD AUTO: 72 % (ref 43–75)
NITRITE UR QL STRIP: NEGATIVE
NONHDLC SERPL-MCNC: 143 MG/DL
NRBC BLD AUTO-RTO: 0 /100 WBCS
PH UR STRIP.AUTO: 7.5 [PH]
PLATELET # BLD AUTO: 276 THOUSANDS/UL (ref 149–390)
PMV BLD AUTO: 9.6 FL (ref 8.9–12.7)
POTASSIUM SERPL-SCNC: 4.4 MMOL/L (ref 3.5–5.3)
PROT SERPL-MCNC: 7.2 G/DL (ref 6.4–8.2)
PROT UR STRIP-MCNC: NEGATIVE MG/DL
RBC # BLD AUTO: 3.89 MILLION/UL (ref 3.81–5.12)
SODIUM SERPL-SCNC: 135 MMOL/L (ref 136–145)
SP GR UR STRIP.AUTO: 1.01 (ref 1–1.03)
TRIGL SERPL-MCNC: 76 MG/DL
TSH SERPL DL<=0.05 MIU/L-ACNC: 0.7 UIU/ML (ref 0.36–3.74)
UROBILINOGEN UR QL STRIP.AUTO: 0.2 E.U./DL
WBC # BLD AUTO: 4.94 THOUSAND/UL (ref 4.31–10.16)

## 2020-02-21 PROCEDURE — 36415 COLL VENOUS BLD VENIPUNCTURE: CPT

## 2020-02-21 PROCEDURE — 85025 COMPLETE CBC W/AUTO DIFF WBC: CPT

## 2020-02-21 PROCEDURE — 81003 URINALYSIS AUTO W/O SCOPE: CPT

## 2020-02-21 PROCEDURE — 80053 COMPREHEN METABOLIC PANEL: CPT

## 2020-02-21 PROCEDURE — 84443 ASSAY THYROID STIM HORMONE: CPT

## 2020-02-21 PROCEDURE — 80061 LIPID PANEL: CPT

## 2020-02-26 ENCOUNTER — OFFICE VISIT (OUTPATIENT)
Dept: INTERNAL MEDICINE CLINIC | Facility: CLINIC | Age: 75
End: 2020-02-26
Payer: MEDICARE

## 2020-02-26 VITALS
SYSTOLIC BLOOD PRESSURE: 102 MMHG | HEART RATE: 97 BPM | OXYGEN SATURATION: 90 % | HEIGHT: 65 IN | WEIGHT: 85 LBS | BODY MASS INDEX: 14.16 KG/M2 | TEMPERATURE: 97.7 F | DIASTOLIC BLOOD PRESSURE: 68 MMHG

## 2020-02-26 DIAGNOSIS — F17.219 CIGARETTE NICOTINE DEPENDENCE WITH NICOTINE-INDUCED DISORDER: ICD-10-CM

## 2020-02-26 DIAGNOSIS — J96.11 CHRONIC HYPOXEMIC RESPIRATORY FAILURE (HCC): ICD-10-CM

## 2020-02-26 DIAGNOSIS — R63.4 ABNORMAL WEIGHT LOSS: ICD-10-CM

## 2020-02-26 DIAGNOSIS — Z00.00 MEDICARE ANNUAL WELLNESS VISIT, SUBSEQUENT: Primary | ICD-10-CM

## 2020-02-26 PROCEDURE — 4004F PT TOBACCO SCREEN RCVD TLK: CPT | Performed by: INTERNAL MEDICINE

## 2020-02-26 PROCEDURE — G0438 PPPS, INITIAL VISIT: HCPCS | Performed by: INTERNAL MEDICINE

## 2020-02-26 PROCEDURE — 1125F AMNT PAIN NOTED PAIN PRSNT: CPT | Performed by: INTERNAL MEDICINE

## 2020-02-26 PROCEDURE — 1170F FXNL STATUS ASSESSED: CPT | Performed by: INTERNAL MEDICINE

## 2020-02-26 PROCEDURE — 4040F PNEUMOC VAC/ADMIN/RCVD: CPT | Performed by: INTERNAL MEDICINE

## 2020-02-26 PROCEDURE — 3008F BODY MASS INDEX DOCD: CPT | Performed by: INTERNAL MEDICINE

## 2020-02-26 PROCEDURE — 1160F RVW MEDS BY RX/DR IN RCRD: CPT | Performed by: INTERNAL MEDICINE

## 2020-02-26 PROCEDURE — 99214 OFFICE O/P EST MOD 30 MIN: CPT | Performed by: INTERNAL MEDICINE

## 2020-02-26 NOTE — ASSESSMENT & PLAN NOTE
Patient continues to follow-up with neurosurgery physician and they continue to monitor the lesions in the brain  Patient did have some partial seizures with initial presentation of her brain metastasis  No new seizure activity and she remains on anti epilepsy medication    She has received resection of lesion and also radiation treatment

## 2020-02-26 NOTE — PROGRESS NOTES
Assessment/Plan:    Medicare annual wellness visit, subsequent  Patient is a 60-year-old female who is here today and underwent repeat Medicare wellness visit  She does have a history of metastatic adenocarcinoma of the lung with metastatic disease the brain  Patient continues to follow-up with all her specialists including pulmonologist, Neurosurgery, Oncology  With her present condition she is up-to-date with all lab testing and parameters  Despite having history of COPD and lung cancer she continues to smoke she states approximately 2 cigarettes a day but smells heavily of tobacco smoke today in the office  Her weight is maintained  Her oxygen level is somewhat low initially in the office but patient does have an oximeter that she uses to check her oxygenation at home which she states is usually 98-99% on her supplemental O2  With studies she still has metastatic disease to the brain with multiple lesions seen on repeat studies  Oncologist feels that her lung cancer is stable without any new progression  Nicotine dependence  One of my major concerns with the patient is her continued abuse of tobacco, cigarette smoking  We told the patient with her underlying disease this is not acceptable and she needs to quit entirely period patient is considering trying some nicotine supplements especially since she is going to be visiting family in [place] in the near future  Hopefully patient will be successful in her endeavors    Malignant neoplasm of right lung [facility])  Patient continues to follow-up with her pulmonologist, oncologist   They feel that the cancer in the lung is stable at this point time  She will continue with serial CT scans   Patient states her oxygen levels have been stable  No increasing shortness of breath with exertion  No hemoptysis  Will continue with present inhalers      Metastatic adenocarcinoma to brain [facility])  Patient continues to follow-up with neurosurgery physician and they continue to monitor the lesions in the brain  Patient did have some partial seizures with initial presentation of her brain metastasis  No new seizure activity and she remains on anti epilepsy medication  She has received resection of lesion and also radiation treatment    Chronic hypoxemic respiratory failure ([address] )  Patient remains on supplemental O2 with a history of chronic obstructive pulmonary disease, hypoxemia  Again patient had recent evaluation by her pulmonologist to feels from a respiratory standpoint the patient is stable  Again patient will continue her present inhalers  Will call if any acute exacerbations of her COPD either our office or her pulmonologist or proceed immediately to the emergency room for evaluation    Abnormal weight loss  Patient's weight is stable but extremely low  She states she is trying to supplement her diet with Ensure and boost, increase her protein intake  Patient relates that her appetite is unchanged       Diagnoses and all orders for this visit:    Medicare annual wellness visit, subsequent    Cigarette nicotine dependence with nicotine-induced disorder    Chronic hypoxemic respiratory failure (HCC)    Abnormal weight loss          Subjective:      Patient ID: Monserrat Zambrano is a 69 y o  female  Patient is 77-year-old female with history of metastatic adenocarcinoma of the lung status post chemotherapy radiation, metastatic disease to the brain status post resection of lesion, radiation treatment  Chronic obstructive pulmonary disease O2 dependent, history of chronic nicotine dependence, cigarette abuse  Patient is here today for a Medicare wellness visit  She continues to have lab testing and evaluations through her specialist   Patient is very excited with recent birth of a grandson  She states from a respiratory standpoint she has been stable with no acute exacerbations of her COPD    Patient had recent evaluation with her pulmonologist and also her neurosurgeon  Continues to have follow-up studies and scans  She continues to live with her elderly mother and father and is mostly the primary caregiver for both of these people      The following portions of the patient's history were reviewed and updated as appropriate: She  has a past medical history of Breast cancer ([facility] ) (1996), Cancer ([facility] ), COPD, severe ([place] [id number] ), Lung disease, Requires supplemental oxygen, and Stage 4 lung cancer ([facility] )  She   Patient Active Problem List    Diagnosis Date Noted    Scab 01/02/2020    Xerostomia 11/11/2019    Caregiver stress 11/11/2019    Medical marijuana use 11/11/2019    Simple partial seizures ([facility] ) 11/06/2019    On home oxygen therapy 10/24/2019    Cerebral edema ([place] [address] ) 10/16/2019    Metastatic adenocarcinoma to brain ([facility] ) 10/09/2019    Cramp of limb 09/20/2019    Episode of shaking 09/20/2019    Shortness of breath 03/13/2019    Medicare annual wellness visit, subsequent 03/10/2019    Chronic hypoxemic respiratory failure ([facility] ) 02/26/2019    Malignant neoplasm of right lung ([facility] ) 03/09/2018    Depressive disorder 11/14/2017    Hyperlipidemia 04/08/2016    Loss of appetite 01/23/2015    Abnormal weight loss 10/05/2012    Nicotine dependence 10/05/2012     She  has a past surgical history that includes Appendectomy; Tonsillectomy; Mastectomy, radical (Right, 1996); Colonoscopy (N/A, 4/18/2016); pr bronchoscopy,diagnostic (N/A, 10/17/2017); Lung cancer surgery; and pr excis supratent brain tumor (Right, 10/24/2019)  Her family history includes Dementia in her mother; Heart disease in her maternal grandfather, maternal grandmother, and mother; Heart failure in her father  She  reports that she has been smoking cigarettes  She started smoking about 58 years ago  She has been smoking about 0 00 packs per day for the past 55 00 years  She has never used smokeless tobacco  She reports that she drank alcohol  She reports that she does not use drugs    Current Outpatient Medications   Medication Sig Dispense Refill    albuterol (VENTOLIN HFA) 90 mcg/act inhaler Inhale 2 puffs every 4 (four) hours as needed for wheezing 1 Inhaler 0    arformoterol (BROVANA) 15 mcg/2 mL nebulizer solution Take 1 vial (15 mcg total) by nebulization 2 (two) times a day 120 vial 5    budesonide (PULMICORT) 0 25 mg/2 mL nebulizer solution TAKE 1 VIAL (0 25 MG TOTAL) BY NEBULIZATION 2 (TWO) TIMES A  mL 1    ipratropium (ATROVENT) 0 02 % nebulizer solution Take 1 vial (0 5 mg total) by nebulization 3 (three) times a day 90 vial 5    levETIRAcetam (KEPPRA) 500 mg tablet Take 1 tablet (500 mg total) by mouth every 12 (twelve) hours 180 tablet 3    acetaminophen (TYLENOL) 325 mg tablet Take 3 tablets (975 mg total) by mouth every 8 (eight) hours (Patient not taking: Reported on 1/29/2020) 30 tablet 0    mirtazapine (REMERON) 15 mg tablet Take 1 tablet (15 mg total) by mouth daily at bedtime (Patient not taking: Reported on 1/29/2020) 30 tablet 5     No current facility-administered medications for this visit        Current Outpatient Medications on File Prior to Visit   Medication Sig    albuterol (VENTOLIN HFA) 90 mcg/act inhaler Inhale 2 puffs every 4 (four) hours as needed for wheezing    arformoterol (BROVANA) 15 mcg/2 mL nebulizer solution Take 1 vial (15 mcg total) by nebulization 2 (two) times a day    budesonide (PULMICORT) 0 25 mg/2 mL nebulizer solution TAKE 1 VIAL (0 25 MG TOTAL) BY NEBULIZATION 2 (TWO) TIMES A DAY    ipratropium (ATROVENT) 0 02 % nebulizer solution Take 1 vial (0 5 mg total) by nebulization 3 (three) times a day    levETIRAcetam (KEPPRA) 500 mg tablet Take 1 tablet (500 mg total) by mouth every 12 (twelve) hours    acetaminophen (TYLENOL) 325 mg tablet Take 3 tablets (975 mg total) by mouth every 8 (eight) hours (Patient not taking: Reported on 1/29/2020)    mirtazapine (REMERON) 15 mg tablet Take 1 tablet (15 mg total) by mouth daily at bedtime (Patient not taking: Reported on 1/29/2020)     No current facility-administered medications on file prior to visit  She is allergic to megace [megestrol] and other       Review of Systems   Constitutional: Negative  HENT: Negative  Eyes: Negative  Respiratory: Positive for shortness of breath  Negative for apnea, cough, choking, chest tightness, wheezing and stridor  Patient does have a history of chronic obstructive pulmonary disease, chronic shortness of breath with exertion and she states there has been no increase  He she continues to smoke   Cardiovascular: Negative  Gastrointestinal: Negative  Endocrine: Negative  Genitourinary: Negative  Musculoskeletal: Positive for arthralgias (Some minor diffuse arthritic aches and pains but nothing new or disabling)  Negative for back pain, gait problem, joint swelling, myalgias, neck pain and neck stiffness  Skin: Negative  Some hair loss to her scalp  Allergic/Immunologic: Negative  Neurological: Positive for seizures ( no recent seizure activity) and weakness ( some generalized weakness but no increase)  Negative for dizziness, tremors, syncope, facial asymmetry, speech difficulty, light-headedness, numbness and headaches  Hematological: Negative  Psychiatric/Behavioral: Negative  Objective:      /68   Pulse 97   Temp 97 7 °F (36 5 °C)   Ht 5' 5" (1 651 m)   Wt 38 6 kg (85 lb)   SpO2 90%   BMI 14 14 kg/m²          Physical Exam   Constitutional: She is oriented to person, place, and time  She appears well-developed  No distress  Frail, thin 29-year-old female who is awake alert, wearing her supplemental oxygen   HENT:   Head: Normocephalic and atraumatic  Right Ear: External ear normal    Left Ear: External ear normal    Nose: Nose normal    Mouth/Throat: No oropharyngeal exudate  Generalized wasting of musculature to the facial tissue    Slight erythema to oral mucosa, slight erythema to posterior airway   Eyes: Pupils are equal, round, and reactive to light  Conjunctivae and EOM are normal  Right eye exhibits no discharge  Left eye exhibits no discharge  No scleral icterus  Neck: Normal range of motion  Neck supple  No JVD present  No tracheal deviation present  No thyromegaly present  Cardiovascular: Normal rate, regular rhythm, normal heart sounds and intact distal pulses  Exam reveals no gallop and no friction rub  No murmur heard  Pulmonary/Chest: Effort normal  No stridor  No respiratory distress  She has no wheezes  She has no rales  She exhibits no tenderness  Severely decreased breath sounds anteriorly and posteriorly with faint rhonchi heard through all lung fields which did clear with cough  Abdominal: Soft  Bowel sounds are normal  She exhibits no distension and no mass  There is no tenderness  There is no rebound and no guarding  No hernia  Scaphoid   Musculoskeletal: She exhibits deformity  She exhibits no edema or tenderness  Generalized wasting of muscular tissue upper lower extremities, patient looks emaciated but no specific changes from previously degenerative changes to the hands and digits  Flattening to lumbar curve no acute orthopedic changes or tenderness to palpation or with movement   Lymphadenopathy:     She has no cervical adenopathy  Neurological: She is alert and oriented to person, place, and time  She displays normal reflexes  No cranial nerve deficit or sensory deficit  She exhibits normal muscle tone  Coordination normal    Skin: Skin is warm and dry  Capillary refill takes less than 2 seconds  No rash noted  She is not diaphoretic  No erythema  No pallor  Psychiatric: She has a normal mood and affect  Her behavior is normal  Judgment and thought content normal    Nursing note and vitals reviewed

## 2020-02-26 NOTE — ASSESSMENT & PLAN NOTE
Patient's weight is stable but extremely low  She states she is trying to supplement her diet with Ensure and boost, increase her protein intake    Patient relates that her appetite is unchanged

## 2020-02-26 NOTE — ASSESSMENT & PLAN NOTE
Patient continues to follow-up with her pulmonologist, oncologist   They feel that the cancer in the lung is stable at this point time  She will continue with serial CT scans   Patient states her oxygen levels have been stable  No increasing shortness of breath with exertion  No hemoptysis  Will continue with present inhalers

## 2020-02-26 NOTE — PROGRESS NOTES
Assessment and Plan:     Problem List Items Addressed This Visit     None        BMI Counseling: Body mass index is 14 14 kg/m²  The BMI is below normal  Patient advised to gain weight  BMI Counseling: Body mass index is 14 14 kg/m²  Follow-up plan was not completed due to patient receiving palliative care  Tobacco Cessation Counseling: Tobacco cessation counseling was provided  The patient is sincerely urged to quit consumption of tobacco  She is not ready to quit tobacco  Medication options and side effects of medication discussed  Patient refused medication  Tobacco use screening or tobacco cessation counseling was not performed due to limited life expectancy  Preventive health issues were discussed with patient, and age appropriate screening tests were ordered as noted in patient's After Visit Summary  Personalized health advice and appropriate referrals for health education or preventive services given if needed, as noted in patient's After Visit Summary       History of Present Illness:     Patient presents for Medicare Annual Wellness visit    Patient Care Team:  Pablo Vann DO as PCP - Morenita Gutierrez MD (Pulmonary Disease)  Saundra Severance, MD (Hematology and Oncology)  Samella Najjar, MD as Endoscopist (General Surgery)  Kate Jade MD (Radiation Oncology)  Andres Koehler MD (Neurosurgery)  Arabella Be, ONEYDA as  ([address])     Problem List:     Patient Active Problem List   Diagnosis    Malignant neoplasm of right lung ([address] )    Abnormal weight loss    Loss of appetite    Hyperlipidemia    Chronic hypoxemic respiratory failure (HCC)    Nicotine dependence    Depressive disorder    Goals of care, counseling/discussion    Shortness of breath    Cramp of limb    Episode of shaking    Metastatic adenocarcinoma to brain ([address] )    Cere[facility] )    On home oxygen therapy    Simple partial seizures ([address] )    Xerostomia    Caregiver stress    Medical marijuana use    Scab      Past Medical and Surgical History:     Past Medical History:   Diagnosis Date    [facility] ([facility] ) 1996    Cancer ([facility] )     [facility] )     Lung disease     Requires supplemental oxygen     3L 24 hrs/day    Stage 4 lung cancer ([facility] )      Past Surgical History:   Procedure Laterality Date    APPENDECTOMY      COLONOSCOPY N/A 4/18/2016    Procedure: COLONOSCOPY;  Surgeon: Jaimee Washington MD;  [facility]: [facility]; Service:     LUNG CANCER SURGERY      MASTECTOMY, RADICAL Right 1996    DC [address] N/A 10/17/2017    Procedure: Danelle Eglin;  Surgeon: Mich Sim MD;  [facility]: [facility];   Service: Pulmonary    DC EXCIS SUPRATENT BRAIN TUMOR Right 10/24/2019    Procedure: IMAGE-GUIDED RIGHT FRONTAL CRANIOTOMY FOR TUMOR;  Surgeon: Michael Miller MD;  [facility];  Service: Neurosurgery    TONSILLECTOMY        Family History:     Family History   Problem Relation Age of Onset    Heart disease Mother         cardiac disorder    Dementia Mother     Heart failure Father     Heart disease Maternal Grandmother     Heart disease Maternal Grandfather       Social History:        Social History     Socioeconomic History    Marital status:      Spouse name: None    Number of children: 1    Years of education: 15    Highest education level: None   Occupational History    Occupation: retired   Social Needs    Financial resource strain: None    Food insecurity:     Worry: None     Inability: None    Transportation needs:     Medical: None     Non-medical: None   Tobacco Use    Smoking status: Current Some Day Smoker     Packs/day: 0 00     Years: 55 00     Pack years: 0 00     Types: Cigarettes     Start date: 1962    Smokeless tobacco: Never Used    Tobacco comment: NOW DOWN TO 3-4 CIGARETTES A  DAY   Substance and Sexual Activity    Alcohol use: Not Currently     Alcohol/week: 0 0 standard drinks Frequency: Never     Binge frequency: Never    Drug use: No    Sexual activity: Not Currently   Lifestyle    Physical activity:     Days per week: None     Minutes per session: None    Stress: None   Relationships    Social connections:     Talks on phone: None     Gets together: None     Attends [employer] service: None     Active member of club or organization: None     Attends meetings of clubs or organizations: None     Relationship status: None    Intimate partner violence:     Fear of current or ex partner: None     Emotionally abused: None     Physically abused: None     Forced sexual activity: None   Other Topics Concern    None   Social History Narrative    Lives in an apartment  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  Medications and Allergies:     Current Outpatient Medications   Medication Sig Dispense Refill    albuterol (VENTOLIN HFA) 90 mcg/act inhaler Inhale 2 puffs every 4 (four) hours as needed for wheezing 1 Inhaler 0    arformoterol (BROVANA) 15 mcg/2 mL nebulizer solution Take 1 vial (15 mcg total) by nebulization 2 (two) times a day 120 vial 5    budesonide (PULMICORT) 0 25 mg/2 mL nebulizer solution TAKE 1 VIAL (0 25 MG TOTAL) BY NEBULIZATION 2 (TWO) TIMES A  mL 1    ipratropium (ATROVENT) 0 02 % nebulizer solution Take 1 vial (0 5 mg total) by nebulization 3 (three) times a day 90 vial 5    levETIRAcetam (KEPPRA) 500 mg tablet Take 1 tablet (500 mg total) by mouth every 12 (twelve) hours 180 tablet 3    acetaminophen (TYLENOL) 325 mg tablet Take 3 tablets (975 mg total) by mouth every 8 (eight) hours (Patient not taking: Reported on 1/29/2020) 30 tablet 0    mirtazapine (REMERON) 15 mg tablet Take 1 tablet (15 mg total) by mouth daily at bedtime (Patient not taking: Reported on 1/29/2020) 30 tablet 5     No current facility-administered medications for this visit        Allergies   Allergen Reactions    Megace [Megestrol] Nausea Only    Other Vomiting     Oysters, clams and mussels      Immunizations:     Immunization History   Administered Date(s) Administered    INFLUENZA 03/08/2016, 09/19/2016, 10/07/2017    Influenza Split High Dose Preservative Free IM 03/08/2016, 09/19/2016    Influenza TIV (IM) 10/05/2012, 10/07/2017    Influenza, high dose seasonal 0 5 mL 10/13/2018, 11/08/2019    Pneumococcal Conjugate 13-Valent 10/14/2016    Pneumococcal Polysaccharide PPV23 02/26/2019    Tdap 10/07/2017      Health Maintenance:         Topic Date Due    Hepatitis C Screening  1945    CRC Screening: Colonoscopy  04/18/2026     There are no preventive care reminders to display for this patient  Medicare Health Risk Assessment:     /68   Temp 97 7 °F (36 5 °C)   Ht 5' 5" (1 651 m)   Wt 38 6 kg (85 lb)   BMI 14 14 kg/m²      Lubna Valero is here for her Subsequent Wellness visit  Health Risk Assessment:   Patient rates overall health as good  Patient feels that their physical health rating is same  Eyesight was rated as same  Hearing was rated as same  Patient feels that their emotional and mental health rating is same  Pain experienced in the last 7 days has been none  Patient states that she has experienced no weight loss or gain in last 6 months  Depression Screening:   PHQ-2 Score: 0      Fall Risk Screening: In the past year, patient has experienced: no history of falling in past year      Urinary Incontinence Screening:   Patient has not leaked urine accidently in the last six months  Home Safety:  Patient does not have trouble with stairs inside or outside of their home  Patient has working smoke alarms and has working carbon monoxide detector  Home safety hazards include: none  Nutrition:   Current diet is Regular  Medications:   Patient is currently taking over-the-counter supplements  OTC medications include: see medication list  Patient is able to manage medications       Activities of Daily Living (ADLs)/Instrumental Activities of Daily Living (IADLs):   Walk and transfer into and out of bed and chair?: Yes  Dress and groom yourself?: Yes    Bathe or shower yourself?: Yes    Feed yourself? Yes  Do your laundry/housekeeping?: Yes  Manage your money, pay your bills and track your expenses?: Yes  Make your own meals?: Yes    Do your own shopping?: Yes    Previous Hospitalizations:   Any hospitalizations or ED visits within the last 12 months?: Yes    How many hospitalizations have you had in the last year?: 1-2    Advance Care Planning:   Living will: Yes    Durable POA for healthcare:  Yes    Advanced directive: Yes      PREVENTIVE SCREENINGS      Cardiovascular Screening:    General: Screening Not Indicated and History Lipid Disorder      Diabetes Screening:     General: Screening Current      Colorectal Cancer Screening:     General: Screening Current      Breast Cancer Screening:     General: History Breast Cancer      Cervical Cancer Screening:    General: Screening Not Indicated      Lung Cancer Screening:     General: Screening Not Indicated and History Lung Cancer      Graham Geller DO

## 2020-02-26 NOTE — ASSESSMENT & PLAN NOTE
Patient is a 44-year-old female who is here today and underwent repeat Medicare wellness visit  She does have a history of metastatic adenocarcinoma of the lung with metastatic disease the brain  Patient continues to follow-up with all her specialists including pulmonologist, Neurosurgery, Oncology  With her present condition she is up-to-date with all lab testing and parameters  Despite having history of COPD and lung cancer she continues to smoke she states approximately 2 cigarettes a day but smells heavily of tobacco smoke today in the office  Her weight is maintained  Her oxygen level is somewhat low initially in the office but patient does have an oximeter that she uses to check her oxygenation at home which she states is usually 98-99% on her supplemental O2  With studies she still has metastatic disease to the brain with multiple lesions seen on repeat studies  Oncologist feels that her lung cancer is stable without any new progression

## 2020-02-26 NOTE — ASSESSMENT & PLAN NOTE
Patient remains on supplemental O2 with a history of chronic obstructive pulmonary disease, hypoxemia  Again patient had recent evaluation by her pulmonologist to feels from a respiratory standpoint the patient is stable  Again patient will continue her present inhalers    Will call if any acute exacerbations of her COPD either our office or her pulmonologist or proceed immediately to the emergency room for evaluation

## 2020-02-27 PROBLEM — Z51.5 PALLIATIVE CARE PATIENT: Status: ACTIVE | Noted: 2020-02-27

## 2020-02-27 PROBLEM — Z00.00 MEDICARE ANNUAL WELLNESS VISIT, SUBSEQUENT: Status: RESOLVED | Noted: 2019-03-10 | Resolved: 2020-02-27

## 2020-02-27 NOTE — PROGRESS NOTES
Palliative and Supportive Care   Baldwin Closs 69 y o  female [id number]    Assessment/Plan:  1  Malignant neoplasm of hilus of right lung ([facility] [address] )    2  Loss of appetite    3  Caregiver stress    4  Malignant cachexia ([facility] [address] )    5  Medical marijuana use      Requested Prescriptions     Signed Prescriptions Disp Refills    mirtazapine (REMERON) 15 mg tablet       Si/2 to 1 tab PO at bedtime     Patient is committed to continuing to work with [facility] and possibly adding back mirtazpine at bedtime to help with appetite stimulation and weight gain  Samuel did not work in the past and steroids are not ideal as she would be more at risk for infections that she may not have the reserve to recover from  The idea of trying olanzapine was also brought up but pt wants to stick with MMJ and remeron for now  In terms of travel plans in May she will watch the news for Flu and Corona virus activity  In the meantime I have advised her to strategically isolate herself from large crowds and sick people  Make sure her pantry is stocked for her an her parents daily needs if it was recommended that they stay home for a few weeks  Wash hands frequently  She should travel with a list of her physicians, medications, and some of her medical history  She can not fly with [employer] but since she is going to [place] her son should look for a product that she can buy once she is out there that would be most similar to what she is using here  She should also be aware of the closest urgent care and hospital system should she get sick on her trip  Representatives have queried the patient's controlled substance dispensing history in the Prescription Drug Monitoring Program in compliance with regulations before I have prescribed any controlled substances  The prescription history is consistent with prescribed therapy and our practice policies  Return in about 4 months (around 7/3/2020)  Subjective:   In attendance at this visit: Freddie Gould  Chief Complaint  Follow up visit for:  symptom management  HPI     Freddie Gould is a 69 y o  female with stage IV lung cancer  Waqar Rosales was diagnosed in October 2017   She was treated with radiation therapy concurrent with weekly Taxol/carboplatin completed in 02/2018   She relapsed in 9/2019 with brain metastases  VALERIA BALTAZAR  [facility] medical oncologist is Dr Hu Brenner  Waqar Rosales is status post a right parietal craniotomy for resection 10/24/19 by Dr Brenda Dorantes in neurosurgery  Waqar Rosales has elected to undergo whole brain radiation with Dr Francisco Hanson in rad/onc which was completed in 11/2019        The patient was referred to the department of [address] [place], [place] [address]jhonathan for concurrent symptom management   Her low weight has been a concern  In addition to [facility] products she was issued mirtazepine to help with appetite stimulation at her last visit  She has stopped the mirtazepine due to nausea  She tried megace in the past and it didn't work  She is using an MMJ tincture daily around 2pm   She knows she needs to gain weight      She has a high level of caregiver stress taking care of her elderly parents but she is managing  Since our last visit, she had a grandson - Vitor Fairly  She is going out to see them in [place] in May  She is worried about corona virus  The following portions of the medical history were reviewed: past medical history, problem list, medication list, and social history      Current Outpatient Medications:     albuterol (VENTOLIN HFA) 90 mcg/act inhaler, Inhale 2 puffs every 4 (four) hours as needed for wheezing, Disp: 1 Inhaler, Rfl: 0    arformoterol (BROVANA) 15 mcg/2 mL nebulizer solution, Take 1 vial (15 mcg total) by nebulization 2 (two) times a day, Disp: 120 vial, Rfl: 5    budesonide (PULMICORT) 0 25 mg/2 mL nebulizer solution, TAKE 1 VIAL (0 25 MG TOTAL) BY NEBULIZATION 2 (TWO) TIMES A DAY, Disp: 360 mL, Rfl: 1    ipratropium (ATROVENT) 0 02 % nebulizer solution, Take 1 vial (0 5 mg total) by nebulization 3 (three) times a day, Disp: 90 vial, Rfl: 5    levETIRAcetam (KEPPRA) 500 mg tablet, Take 1 tablet (500 mg total) by mouth every 12 (twelve) hours, Disp: 180 tablet, Rfl: 3    mirtazapine (REMERON) 15 mg tablet, 1/2 to 1 tab PO at bedtime, Disp: , Rfl:     vitamin B-12 (VITAMIN B-12) 1,000 mcg tablet, Take 1,000 mcg by mouth daily, Disp: , Rfl:   Review of Systems   Constitutional: Positive for appetite change  Psychiatric/Behavioral: Positive for sleep disturbance  The patient is nervous/anxious  All other systems negative    Objective:  Vital Signs  /68 (BP Location: Left arm, Patient Position: Sitting, Cuff Size: Standard)   Temp (!) 97 2 °F (36 2 °C) (Tympanic)   Ht 5' 5" (1 651 m)   Wt 38 8 kg (85 lb 8 oz)   BMI 14 23 kg/m²    Physical Exam    Constitutional: severely underweight  In no acute physical or emotional distress  Head: Normocephalic and atraumatic  Eyes: EOM are normal  No ocular discharge  No scleral icterus  Neck: No visible adenopathy or masses  Respiratory: Effort normal  No stridor  No respiratory distress  Gastrointestinal: No abdominal distension  Musculoskeletal: No edema  Neurological: Alert, oriented and appropriately conversant  Skin: No diaphoresis, no rashes seen on exposed areas of skin  Psychiatric: Displays a normal mood and affect   Behavior, judgement and thought content appear normal

## 2020-03-03 ENCOUNTER — OFFICE VISIT (OUTPATIENT)
Dept: PALLIATIVE MEDICINE | Facility: CLINIC | Age: 75
End: 2020-03-03
Payer: MEDICARE

## 2020-03-03 VITALS
WEIGHT: 85.5 LBS | TEMPERATURE: 97.2 F | BODY MASS INDEX: 14.24 KG/M2 | SYSTOLIC BLOOD PRESSURE: 100 MMHG | DIASTOLIC BLOOD PRESSURE: 68 MMHG | HEIGHT: 65 IN

## 2020-03-03 DIAGNOSIS — C34.01 MALIGNANT NEOPLASM OF HILUS OF RIGHT LUNG (HCC): Primary | ICD-10-CM

## 2020-03-03 DIAGNOSIS — R63.0 LOSS OF APPETITE: ICD-10-CM

## 2020-03-03 DIAGNOSIS — R64 MALIGNANT CACHEXIA (HCC): ICD-10-CM

## 2020-03-03 DIAGNOSIS — Z79.899 MEDICAL MARIJUANA USE: ICD-10-CM

## 2020-03-03 DIAGNOSIS — Z63.6 CAREGIVER STRESS: ICD-10-CM

## 2020-03-03 PROCEDURE — 3008F BODY MASS INDEX DOCD: CPT | Performed by: FAMILY MEDICINE

## 2020-03-03 PROCEDURE — 4004F PT TOBACCO SCREEN RCVD TLK: CPT | Performed by: FAMILY MEDICINE

## 2020-03-03 PROCEDURE — 1160F RVW MEDS BY RX/DR IN RCRD: CPT | Performed by: FAMILY MEDICINE

## 2020-03-03 PROCEDURE — 1170F FXNL STATUS ASSESSED: CPT | Performed by: FAMILY MEDICINE

## 2020-03-03 PROCEDURE — 4040F PNEUMOC VAC/ADMIN/RCVD: CPT | Performed by: FAMILY MEDICINE

## 2020-03-03 PROCEDURE — 99214 OFFICE O/P EST MOD 30 MIN: CPT | Performed by: FAMILY MEDICINE

## 2020-03-03 RX ORDER — LANOLIN ALCOHOL/MO/W.PET/CERES
1000 CREAM (GRAM) TOPICAL DAILY
COMMUNITY

## 2020-03-03 RX ORDER — MIRTAZAPINE 15 MG/1
15 TABLET, FILM COATED ORAL
COMMUNITY
End: 2020-03-03 | Stop reason: SDUPTHER

## 2020-03-03 RX ORDER — MIRTAZAPINE 15 MG/1
TABLET, FILM COATED ORAL
Start: 2020-03-03

## 2020-03-03 SDOH — SOCIAL STABILITY - SOCIAL INSECURITY: DEPENDENT RELATIVE NEEDING CARE AT HOME: Z63.6

## 2020-04-02 ENCOUNTER — TELEPHONE (OUTPATIENT)
Dept: HEMATOLOGY ONCOLOGY | Facility: MEDICAL CENTER | Age: 75
End: 2020-04-02

## 2020-04-13 DIAGNOSIS — J44.9 CHRONIC OBSTRUCTIVE PULMONARY DISEASE, UNSPECIFIED COPD TYPE (HCC): Primary | ICD-10-CM

## 2020-04-13 RX ORDER — AZITHROMYCIN 250 MG/1
TABLET, FILM COATED ORAL
Qty: 6 TABLET | Refills: 0 | Status: SHIPPED | OUTPATIENT
Start: 2020-04-13 | End: 2020-04-17

## 2020-04-13 RX ORDER — PREDNISONE 20 MG/1
40 TABLET ORAL DAILY
Qty: 10 TABLET | Refills: 0 | Status: SHIPPED | OUTPATIENT
Start: 2020-04-13 | End: 2020-04-18

## 2020-04-22 ENCOUNTER — HOSPITAL ENCOUNTER (OUTPATIENT)
Dept: RADIOLOGY | Age: 75
Discharge: HOME | End: 2020-04-22
Payer: MEDICARE

## 2020-04-22 ENCOUNTER — APPOINTMENT (OUTPATIENT)
Dept: LAB | Age: 75
End: 2020-04-22
Payer: MEDICARE

## 2020-04-22 DIAGNOSIS — C79.31 METASTATIC ADENOCARCINOMA TO BRAIN (HCC): ICD-10-CM

## 2020-04-22 DIAGNOSIS — C34.01 MALIGNANT NEOPLASM OF HILUS OF RIGHT LUNG (HCC): ICD-10-CM

## 2020-04-22 LAB
ALBUMIN SERPL BCP-MCNC: 3.9 G/DL (ref 3.5–5)
ALP SERPL-CCNC: 87 U/L (ref 46–116)
ALT SERPL W P-5'-P-CCNC: 21 U/L (ref 12–78)
ANION GAP SERPL CALCULATED.3IONS-SCNC: 6 MMOL/L (ref 4–13)
AST SERPL W P-5'-P-CCNC: 17 U/L (ref 5–45)
BASOPHILS # BLD AUTO: 0.06 THOUSANDS/ΜL (ref 0–0.1)
BASOPHILS NFR BLD AUTO: 1 % (ref 0–1)
BILIRUB SERPL-MCNC: 0.52 MG/DL (ref 0.2–1)
BUN SERPL-MCNC: 17 MG/DL (ref 5–25)
CALCIUM SERPL-MCNC: 8.8 MG/DL (ref 8.3–10.1)
CHLORIDE SERPL-SCNC: 101 MMOL/L (ref 100–108)
CO2 SERPL-SCNC: 30 MMOL/L (ref 21–32)
CREAT SERPL-MCNC: 0.66 MG/DL (ref 0.6–1.3)
EOSINOPHIL # BLD AUTO: 0.07 THOUSAND/ΜL (ref 0–0.61)
EOSINOPHIL NFR BLD AUTO: 1 % (ref 0–6)
ERYTHROCYTE [DISTWIDTH] IN BLOOD BY AUTOMATED COUNT: 13.1 % (ref 11.6–15.1)
GFR SERPL CREATININE-BSD FRML MDRD: 89 ML/MIN/1.73SQ M
GLUCOSE P FAST SERPL-MCNC: 79 MG/DL (ref 65–99)
HCT VFR BLD AUTO: 40.7 % (ref 34.8–46.1)
HGB BLD-MCNC: 12.9 G/DL (ref 11.5–15.4)
IMM GRANULOCYTES # BLD AUTO: 0.01 THOUSAND/UL (ref 0–0.2)
IMM GRANULOCYTES NFR BLD AUTO: 0 % (ref 0–2)
LYMPHOCYTES # BLD AUTO: 1.29 THOUSANDS/ΜL (ref 0.6–4.47)
LYMPHOCYTES NFR BLD AUTO: 24 % (ref 14–44)
MCH RBC QN AUTO: 31.5 PG (ref 26.8–34.3)
MCHC RBC AUTO-ENTMCNC: 31.7 G/DL (ref 31.4–37.4)
MCV RBC AUTO: 100 FL (ref 82–98)
MONOCYTES # BLD AUTO: 0.54 THOUSAND/ΜL (ref 0.17–1.22)
MONOCYTES NFR BLD AUTO: 10 % (ref 4–12)
NEUTROPHILS # BLD AUTO: 3.39 THOUSANDS/ΜL (ref 1.85–7.62)
NEUTS SEG NFR BLD AUTO: 64 % (ref 43–75)
NRBC BLD AUTO-RTO: 0 /100 WBCS
PLATELET # BLD AUTO: 267 THOUSANDS/UL (ref 149–390)
PMV BLD AUTO: 9.4 FL (ref 8.9–12.7)
POTASSIUM SERPL-SCNC: 4.1 MMOL/L (ref 3.5–5.3)
PROT SERPL-MCNC: 7.2 G/DL (ref 6.4–8.2)
RBC # BLD AUTO: 4.09 MILLION/UL (ref 3.81–5.12)
SODIUM SERPL-SCNC: 137 MMOL/L (ref 136–145)
WBC # BLD AUTO: 5.36 THOUSAND/UL (ref 4.31–10.16)

## 2020-04-22 PROCEDURE — A9585 GADOBUTROL INJECTION: HCPCS | Performed by: NEUROLOGICAL SURGERY

## 2020-04-22 PROCEDURE — 36415 COLL VENOUS BLD VENIPUNCTURE: CPT

## 2020-04-22 PROCEDURE — 71260 CT THORAX DX C+: CPT

## 2020-04-22 PROCEDURE — 80053 COMPREHEN METABOLIC PANEL: CPT

## 2020-04-22 PROCEDURE — 70553 MRI BRAIN STEM W/O & W/DYE: CPT

## 2020-04-22 PROCEDURE — 85025 COMPLETE CBC W/AUTO DIFF WBC: CPT

## 2020-04-22 RX ADMIN — IODIXANOL 85 ML: 320 INJECTION, SOLUTION INTRAVASCULAR at 11:21

## 2020-04-22 RX ADMIN — GADOBUTROL 3 ML: 604.72 INJECTION INTRAVENOUS at 10:33

## 2020-04-24 ENCOUNTER — TELEPHONE (OUTPATIENT)
Dept: HEMATOLOGY ONCOLOGY | Facility: CLINIC | Age: 75
End: 2020-04-24

## 2020-04-28 ENCOUNTER — TELEMEDICINE (OUTPATIENT)
Dept: HEMATOLOGY ONCOLOGY | Facility: CLINIC | Age: 75
End: 2020-04-28
Payer: MEDICARE

## 2020-04-28 DIAGNOSIS — C34.01 MALIGNANT NEOPLASM OF HILUS OF RIGHT LUNG (HCC): ICD-10-CM

## 2020-04-28 DIAGNOSIS — C79.31 METASTATIC ADENOCARCINOMA TO BRAIN (HCC): Primary | ICD-10-CM

## 2020-04-28 PROCEDURE — 99442 PR PHYS/QHP TELEPHONE EVALUATION 11-20 MIN: CPT | Performed by: INTERNAL MEDICINE

## 2020-04-29 ENCOUNTER — TELEMEDICINE (OUTPATIENT)
Dept: NEUROSURGERY | Facility: CLINIC | Age: 75
End: 2020-04-29
Payer: MEDICARE

## 2020-04-29 ENCOUNTER — TELEMEDICINE (OUTPATIENT)
Dept: RADIATION ONCOLOGY | Facility: HOSPITAL | Age: 75
End: 2020-04-29
Attending: RADIOLOGY
Payer: MEDICARE

## 2020-04-29 ENCOUNTER — APPOINTMENT (OUTPATIENT)
Dept: RADIATION ONCOLOGY | Facility: HOSPITAL | Age: 75
End: 2020-04-29
Payer: MEDICARE

## 2020-04-29 DIAGNOSIS — C79.31 BRAIN METASTASES (HCC): ICD-10-CM

## 2020-04-29 DIAGNOSIS — C79.31 METASTATIC ADENOCARCINOMA TO BRAIN (HCC): Primary | ICD-10-CM

## 2020-04-29 PROBLEM — G93.6 CEREBRAL EDEMA (HCC): Status: RESOLVED | Noted: 2019-10-16 | Resolved: 2020-04-29

## 2020-04-29 PROCEDURE — 99213 OFFICE O/P EST LOW 20 MIN: CPT | Performed by: NEUROLOGICAL SURGERY

## 2020-04-29 PROCEDURE — 99211 OFF/OP EST MAY X REQ PHY/QHP: CPT | Performed by: RADIOLOGY

## 2020-05-27 ENCOUNTER — TELEPHONE (OUTPATIENT)
Dept: PULMONOLOGY | Facility: CLINIC | Age: 75
End: 2020-05-27

## 2020-05-27 ENCOUNTER — TELEPHONE (OUTPATIENT)
Dept: NEUROLOGY | Facility: CLINIC | Age: 75
End: 2020-05-27

## 2020-06-02 ENCOUNTER — TELEMEDICINE (OUTPATIENT)
Dept: PULMONOLOGY | Facility: CLINIC | Age: 75
End: 2020-06-02
Payer: MEDICARE

## 2020-06-02 VITALS — WEIGHT: 86 LBS | BODY MASS INDEX: 14.33 KG/M2 | OXYGEN SATURATION: 97 % | HEIGHT: 65 IN | HEART RATE: 105 BPM

## 2020-06-02 DIAGNOSIS — R06.00 DOE (DYSPNEA ON EXERTION): ICD-10-CM

## 2020-06-02 DIAGNOSIS — C34.90 LUNG CANCER METASTATIC TO BRAIN (HCC): ICD-10-CM

## 2020-06-02 DIAGNOSIS — J96.11 CHRONIC HYPOXEMIC RESPIRATORY FAILURE (HCC): ICD-10-CM

## 2020-06-02 DIAGNOSIS — C79.31 LUNG CANCER METASTATIC TO BRAIN (HCC): ICD-10-CM

## 2020-06-02 DIAGNOSIS — J30.89 NON-SEASONAL ALLERGIC RHINITIS, UNSPECIFIED TRIGGER: ICD-10-CM

## 2020-06-02 DIAGNOSIS — J44.9 CHRONIC OBSTRUCTIVE PULMONARY DISEASE, UNSPECIFIED COPD TYPE (HCC): Primary | ICD-10-CM

## 2020-06-02 PROCEDURE — 99214 OFFICE O/P EST MOD 30 MIN: CPT | Performed by: INTERNAL MEDICINE

## 2020-06-03 ENCOUNTER — TELEMEDICINE (OUTPATIENT)
Dept: NEUROLOGY | Facility: CLINIC | Age: 75
End: 2020-06-03
Payer: MEDICARE

## 2020-06-03 ENCOUNTER — TELEPHONE (OUTPATIENT)
Dept: NEUROLOGY | Facility: CLINIC | Age: 75
End: 2020-06-03

## 2020-06-03 VITALS — WEIGHT: 85.5 LBS | BODY MASS INDEX: 14.24 KG/M2 | HEIGHT: 65 IN

## 2020-06-03 DIAGNOSIS — G40.109 SIMPLE PARTIAL SEIZURES (HCC): Primary | ICD-10-CM

## 2020-06-03 DIAGNOSIS — C79.31 BRAIN METASTASES (HCC): ICD-10-CM

## 2020-06-03 PROCEDURE — 99442 PR PHYS/QHP TELEPHONE EVALUATION 11-20 MIN: CPT | Performed by: PSYCHIATRY & NEUROLOGY

## 2020-06-09 ENCOUNTER — HOSPITAL ENCOUNTER (OUTPATIENT)
Dept: NEUROLOGY | Facility: CLINIC | Age: 75
Discharge: HOME | End: 2020-06-09
Payer: MEDICARE

## 2020-06-09 DIAGNOSIS — G40.109 SIMPLE PARTIAL SEIZURES (HCC): ICD-10-CM

## 2020-06-09 PROCEDURE — 95819 EEG AWAKE AND ASLEEP: CPT

## 2020-06-09 PROCEDURE — 95816 EEG AWAKE AND DROWSY: CPT | Performed by: PSYCHIATRY & NEUROLOGY

## 2020-06-10 ENCOUNTER — TELEPHONE (OUTPATIENT)
Dept: NEUROLOGY | Facility: CLINIC | Age: 75
End: 2020-06-10

## 2020-06-10 DIAGNOSIS — J44.9 CHRONIC OBSTRUCTIVE PULMONARY DISEASE, UNSPECIFIED COPD TYPE (HCC): ICD-10-CM

## 2020-06-10 RX ORDER — BUDESONIDE 0.25 MG/2ML
0.25 INHALANT ORAL 2 TIMES DAILY
Qty: 360 ML | Refills: 1 | Status: SHIPPED | OUTPATIENT
Start: 2020-06-10 | End: 2020-08-24 | Stop reason: SDUPTHER

## 2020-07-01 ENCOUNTER — APPOINTMENT (EMERGENCY)
Dept: RADIOLOGY | Facility: HOSPITAL | Age: 75
DRG: 481 | End: 2020-07-01
Payer: MEDICARE

## 2020-07-01 ENCOUNTER — HOSPITAL ENCOUNTER (INPATIENT)
Facility: HOSPITAL | Age: 75
LOS: 4 days | DRG: 481 | End: 2020-07-05
Attending: EMERGENCY MEDICINE | Admitting: GENERAL PRACTICE
Payer: MEDICARE

## 2020-07-01 DIAGNOSIS — Z99.81 ON HOME OXYGEN THERAPY: ICD-10-CM

## 2020-07-01 DIAGNOSIS — S72.001A CLOSED FRACTURE OF NECK OF RIGHT FEMUR, INITIAL ENCOUNTER (HCC): Primary | ICD-10-CM

## 2020-07-01 DIAGNOSIS — C34.01 MALIGNANT NEOPLASM OF HILUS OF RIGHT LUNG (HCC): ICD-10-CM

## 2020-07-01 DIAGNOSIS — S72.001A HIP FRACTURE, RIGHT, CLOSED, INITIAL ENCOUNTER (HCC): ICD-10-CM
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
## 2020-07-01 PROBLEM — E87.1 HYPONATREMIA: Status: ACTIVE | Noted: 2020-07-01

## 2020-07-01 PROBLEM — S72.009A HIP FRACTURE (HCC): Status: ACTIVE | Noted: 2020-07-01

## 2020-07-01 PROBLEM — R63.6 UNDERWEIGHT: Status: ACTIVE | Noted: 2020-07-01

## 2020-07-01 LAB
ABO GROUP BLD: NORMAL
ANION GAP SERPL CALCULATED.3IONS-SCNC: 5 MMOL/L (ref 4–13)
APTT PPP: 25 SECONDS (ref 23–37)
ATRIAL RATE: 102 BPM
BASOPHILS # BLD AUTO: 0.03 THOUSANDS/ΜL (ref 0–0.1)
BASOPHILS NFR BLD AUTO: 1 % (ref 0–1)
BLD GP AB SCN SERPL QL: NEGATIVE
BUN SERPL-MCNC: 12 MG/DL (ref 5–25)
CALCIUM SERPL-MCNC: 9.2 MG/DL (ref 8.3–10.1)
CHLORIDE SERPL-SCNC: 100 MMOL/L (ref 100–108)
CO2 SERPL-SCNC: 28 MMOL/L (ref 21–32)
CREAT SERPL-MCNC: 0.5 MG/DL (ref 0.6–1.3)
EOSINOPHIL # BLD AUTO: 0.04 THOUSAND/ΜL (ref 0–0.61)
EOSINOPHIL NFR BLD AUTO: 1 % (ref 0–6)
ERYTHROCYTE [DISTWIDTH] IN BLOOD BY AUTOMATED COUNT: 13.1 % (ref 11.6–15.1)
GFR SERPL CREATININE-BSD FRML MDRD: 96 ML/MIN/1.73SQ M
GLUCOSE SERPL-MCNC: 85 MG/DL (ref 65–140)
HCT VFR BLD AUTO: 40.4 % (ref 34.8–46.1)
HGB BLD-MCNC: 13.5 G/DL (ref 11.5–15.4)
IMM GRANULOCYTES # BLD AUTO: 0.03 THOUSAND/UL (ref 0–0.2)
IMM GRANULOCYTES NFR BLD AUTO: 1 % (ref 0–2)
INR PPP: 0.95 (ref 0.84–1.19)
LYMPHOCYTES # BLD AUTO: 1.09 THOUSANDS/ΜL (ref 0.6–4.47)
LYMPHOCYTES NFR BLD AUTO: 18 % (ref 14–44)
MCH RBC QN AUTO: 32.3 PG (ref 26.8–34.3)
MCHC RBC AUTO-ENTMCNC: 33.4 G/DL (ref 31.4–37.4)
MCV RBC AUTO: 97 FL (ref 82–98)
MONOCYTES # BLD AUTO: 0.62 THOUSAND/ΜL (ref 0.17–1.22)
MONOCYTES NFR BLD AUTO: 10 % (ref 4–12)
NEUTROPHILS # BLD AUTO: 4.13 THOUSANDS/ΜL (ref 1.85–7.62)
NEUTS SEG NFR BLD AUTO: 69 % (ref 43–75)
NRBC BLD AUTO-RTO: 0 /100 WBCS
P AXIS: 78 DEGREES
PLATELET # BLD AUTO: 255 THOUSANDS/UL (ref 149–390)
PMV BLD AUTO: 8.7 FL (ref 8.9–12.7)
POTASSIUM SERPL-SCNC: 4.4 MMOL/L (ref 3.5–5.3)
PR INTERVAL: 162 MS
PROTHROMBIN TIME: 12.7 SECONDS (ref 11.6–14.5)
QRS AXIS: 77 DEGREES
QRSD INTERVAL: 70 MS
QT INTERVAL: 312 MS
QTC INTERVAL: 406 MS
RBC # BLD AUTO: 4.18 MILLION/UL (ref 3.81–5.12)
RH BLD: POSITIVE
SARS-COV-2 RNA RESP QL NAA+PROBE: NEGATIVE
SODIUM SERPL-SCNC: 133 MMOL/L (ref 136–145)
SPECIMEN EXPIRATION DATE: NORMAL
T WAVE AXIS: 71 DEGREES
VENTRICULAR RATE: 102 BPM
WBC # BLD AUTO: 5.94 THOUSAND/UL (ref 4.31–10.16)

## 2020-07-01 PROCEDURE — 94664 DEMO&/EVAL PT USE INHALER: CPT

## 2020-07-01 PROCEDURE — 73552 X-RAY EXAM OF FEMUR 2/>: CPT

## 2020-07-01 PROCEDURE — 85730 THROMBOPLASTIN TIME PARTIAL: CPT | Performed by: ORTHOPAEDIC SURGERY

## 2020-07-01 PROCEDURE — 99222 1ST HOSP IP/OBS MODERATE 55: CPT | Performed by: GENERAL PRACTICE

## 2020-07-01 PROCEDURE — 80048 BASIC METABOLIC PNL TOTAL CA: CPT | Performed by: EMERGENCY MEDICINE

## 2020-07-01 PROCEDURE — 86850 RBC ANTIBODY SCREEN: CPT | Performed by: ORTHOPAEDIC SURGERY

## 2020-07-01 PROCEDURE — 36415 COLL VENOUS BLD VENIPUNCTURE: CPT | Performed by: EMERGENCY MEDICINE

## 2020-07-01 PROCEDURE — 94640 AIRWAY INHALATION TREATMENT: CPT

## 2020-07-01 PROCEDURE — 71045 X-RAY EXAM CHEST 1 VIEW: CPT

## 2020-07-01 PROCEDURE — 86901 BLOOD TYPING SEROLOGIC RH(D): CPT | Performed by: ORTHOPAEDIC SURGERY

## 2020-07-01 PROCEDURE — 86923 COMPATIBILITY TEST ELECTRIC: CPT

## 2020-07-01 PROCEDURE — 99285 EMERGENCY DEPT VISIT HI MDM: CPT

## 2020-07-01 PROCEDURE — 96374 THER/PROPH/DIAG INJ IV PUSH: CPT

## 2020-07-01 PROCEDURE — 85025 COMPLETE CBC W/AUTO DIFF WBC: CPT | Performed by: EMERGENCY MEDICINE

## 2020-07-01 PROCEDURE — 94760 N-INVAS EAR/PLS OXIMETRY 1: CPT

## 2020-07-01 PROCEDURE — 93010 ELECTROCARDIOGRAM REPORT: CPT | Performed by: INTERNAL MEDICINE

## 2020-07-01 PROCEDURE — 73502 X-RAY EXAM HIP UNI 2-3 VIEWS: CPT

## 2020-07-01 PROCEDURE — U0003 INFECTIOUS AGENT DETECTION BY NUCLEIC ACID (DNA OR RNA); SEVERE ACUTE RESPIRATORY SYNDROME CORONAVIRUS 2 (SARS-COV-2) (CORONAVIRUS DISEASE [COVID-19]), AMPLIFIED PROBE TECHNIQUE, MAKING USE OF HIGH THROUGHPUT TECHNOLOGIES AS DESCRIBED BY CMS-2020-01-R: HCPCS | Performed by: ORTHOPAEDIC SURGERY

## 2020-07-01 PROCEDURE — 85610 PROTHROMBIN TIME: CPT | Performed by: ORTHOPAEDIC SURGERY

## 2020-07-01 PROCEDURE — 86900 BLOOD TYPING SEROLOGIC ABO: CPT | Performed by: ORTHOPAEDIC SURGERY

## 2020-07-01 PROCEDURE — 99285 EMERGENCY DEPT VISIT HI MDM: CPT | Performed by: EMERGENCY MEDICINE

## 2020-07-01 PROCEDURE — 93005 ELECTROCARDIOGRAM TRACING: CPT

## 2020-07-01 RX ORDER — BUDESONIDE 0.25 MG/2ML
0.25 INHALANT ORAL 2 TIMES DAILY
Status: DISCONTINUED | OUTPATIENT
Start: 2020-07-01 | End: 2020-07-01

## 2020-07-01 RX ORDER — GINSENG 100 MG
1 CAPSULE ORAL ONCE
Status: COMPLETED | OUTPATIENT
Start: 2020-07-01 | End: 2020-07-01

## 2020-07-01 RX ORDER — OXYCODONE HYDROCHLORIDE 5 MG/1
2.5 TABLET ORAL EVERY 4 HOURS PRN
Status: DISCONTINUED | OUTPATIENT
Start: 2020-07-01 | End: 2020-07-05 | Stop reason: HOSPADM

## 2020-07-01 RX ORDER — BUDESONIDE 0.25 MG/2ML
0.25 INHALANT ORAL
Status: DISCONTINUED | OUTPATIENT
Start: 2020-07-01 | End: 2020-07-05 | Stop reason: HOSPADM

## 2020-07-01 RX ORDER — ACETAMINOPHEN 325 MG/1
975 TABLET ORAL EVERY 8 HOURS SCHEDULED
Status: DISCONTINUED | OUTPATIENT
Start: 2020-07-01 | End: 2020-07-05 | Stop reason: HOSPADM

## 2020-07-01 RX ORDER — ACETAMINOPHEN 325 MG/1
975 TABLET ORAL ONCE
Status: COMPLETED | OUTPATIENT
Start: 2020-07-01 | End: 2020-07-01

## 2020-07-01 RX ORDER — FENTANYL CITRATE 50 UG/ML
25 INJECTION, SOLUTION INTRAMUSCULAR; INTRAVENOUS ONCE
Status: COMPLETED | OUTPATIENT
Start: 2020-07-01 | End: 2020-07-01

## 2020-07-01 RX ORDER — SODIUM CHLORIDE 9 MG/ML
75 INJECTION, SOLUTION INTRAVENOUS CONTINUOUS
Status: DISCONTINUED | OUTPATIENT
Start: 2020-07-02 | End: 2020-07-03

## 2020-07-01 RX ORDER — OXYCODONE HYDROCHLORIDE 5 MG/1
5 TABLET ORAL EVERY 4 HOURS PRN
Status: DISCONTINUED | OUTPATIENT
Start: 2020-07-01 | End: 2020-07-05 | Stop reason: HOSPADM

## 2020-07-01 RX ORDER — ALBUTEROL SULFATE 90 UG/1
2 AEROSOL, METERED RESPIRATORY (INHALATION) EVERY 4 HOURS PRN
Status: DISCONTINUED | OUTPATIENT
Start: 2020-07-01 | End: 2020-07-05 | Stop reason: HOSPADM

## 2020-07-01 RX ORDER — SODIUM CHLORIDE, SODIUM LACTATE, POTASSIUM CHLORIDE, CALCIUM CHLORIDE 600; 310; 30; 20 MG/100ML; MG/100ML; MG/100ML; MG/100ML
75 INJECTION, SOLUTION INTRAVENOUS CONTINUOUS
Status: DISCONTINUED | OUTPATIENT
Start: 2020-07-02 | End: 2020-07-01

## 2020-07-01 RX ORDER — LEVETIRACETAM 500 MG/1
500 TABLET ORAL EVERY 12 HOURS SCHEDULED
Status: DISCONTINUED | OUTPATIENT
Start: 2020-07-01 | End: 2020-07-03

## 2020-07-01 RX ADMIN — BUDESONIDE 0.25 MG: 0.25 INHALANT RESPIRATORY (INHALATION) at 20:07

## 2020-07-01 RX ADMIN — ACETAMINOPHEN 975 MG: 325 TABLET ORAL at 12:18

## 2020-07-01 RX ADMIN — LEVETIRACETAM 500 MG: 500 TABLET, FILM COATED ORAL at 21:17

## 2020-07-01 RX ADMIN — IPRATROPIUM BROMIDE 0.5 MG: 0.5 SOLUTION RESPIRATORY (INHALATION) at 20:07

## 2020-07-01 RX ADMIN — ACETAMINOPHEN 975 MG: 325 TABLET ORAL at 21:17

## 2020-07-01 RX ADMIN — SODIUM CHLORIDE 75 ML/HR: 0.9 INJECTION, SOLUTION INTRAVENOUS at 23:29

## 2020-07-01 RX ADMIN — OXYCODONE HYDROCHLORIDE 5 MG: 5 TABLET ORAL at 21:17

## 2020-07-01 RX ADMIN — BACITRACIN 1 SMALL APPLICATION: 500 OINTMENT TOPICAL at 13:04

## 2020-07-01 RX ADMIN — FENTANYL CITRATE 25 MCG: 50 INJECTION, SOLUTION INTRAMUSCULAR; INTRAVENOUS at 15:55

## 2020-07-01 NOTE — H&P
H&P- Aviva Arita 1945, 70 y o  female MRN: [id number]    Unit/Bed#: [facility] 623-01 Encounter: [id number]    Primary Care Provider: Chris Miramontes DO   Date and time admitted to hospital: 7/1/2020 11:46 AM        * Hip fracture [facility])  Assessment & Plan  Ortho consulted  Pt high risk for surgery as in the past there was difficulty extubating her after brain surgery  I talked to pulm, and they asked for consult and they will see her in AM   If pulm says pt acceptable risk for surgery, acceptable from my end to proceed  Pain control w/ scheduled tylenol and oxy prn    Hyponatremia  Assessment & Plan  Ch[place] at [place]    Underweight  Assessment & Plan  Body mass index is 14 14 kg/m²  Regular diet    Simple partial seizures (HCC)  Assessment & Plan  Keppra    COPD (chronic obstructive pulmonary disease) (HCC)  Assessment & Plan  No exac  C/w home UDNs  Pulm consult to help risk stratify    Chronic hypoxemic respiratory failure (HCC)  Assessment & Plan  Stable on 2L O2    Malignant neoplasm of right lung (HCC)  Assessment & Plan  Stable from onc standpoint      VTE Prophylaxis: Pharmacologic VTE Prophylaxis contraindicated due to plan for OR tomorrow  / sequential compression device   Code Status: Level 2 DNR  POLST: POLST form is not discussed and not completed at this time  Discussion with family: no    Anticipated Length of Stay:  Patient will be admitted on an Inpatient basis with an anticipated length of stay of  At least 2 midnights  Justification for Hospital Stay: need for surgery of hip px    Total Time for Visit, including Counseling / Coordination of Care: 45 minutes  Greater than 50% of this total time spent on direct patient counseling and coordination of care  Chief Complaint:   Right hip pain    History of Present Illness:    Aviva Arita is a 70 y o  female w/ metastatic lung ca who presents with right hip pain after fall  Pt feels uncomfortable right now  Unable to bear weight  Denies CP or SOB  No fevers  Admits after brain surgery she had difficulty getting extubated and Dr Vicente Hensley told her she would be high risk for future surgeries,  Fentanyl for relief of pain  Review of Systems:    Review of Systems   Constitutional: Negative  HENT: Negative  Eyes: Negative  Respiratory: Negative  Cardiovascular: Negative  Gastrointestinal: Negative  Endocrine: Negative  Genitourinary: Negative  Musculoskeletal: Positive for arthralgias  Skin: Negative  Allergic/Immunologic: Negative  Neurological: Negative  Hematological: Negative  Psychiatric/Behavioral: Negative  Past Medical and Surgical History:     Past Medical History:   Diagnosis Date    Breast cancer ([facility] [address] ) 1996    Cancer ([facility] [address] )     COPD, severe ([facility] [address] )     Lung disease     Requires supplemental oxygen     3L 24 hrs/day    [facility]        Past Surgical History:   Procedure Laterality Date    APPENDECTOMY      COLONOSCOPY N/A 4/18/2016    Procedure: COLONOSCOPY;  Surgeon: Cherelle Duran MD;  [facility]: [facility]; Service:     LUNG CANCER SURGERY      MASTECTOMY, RADICAL Right 1996    ID [address] N/A 10/17/2017    Procedure: Melissa Nascimento;  Surgeon: Ron Paulino MD;  [facility]: [facility]; Service: Pulmonary    ID EXCIS [address] TUMOR Right 10/24/2019    Procedure: IMAGE-GUIDED RIGHT FRONTAL CRANIOTOMY FOR TUMOR;  Surgeon: Anne-Marie Adhikari MD;  [facility];  Service: Neurosurgery    TONSILLECTOMY         Meds/Allergies:    Prior to Admission medications    Medication Sig Start Date End Date Taking?  Authorizing Provider   albuterol (VENTOLIN HFA) 90 mcg/act inhaler Inhale 2 puffs every 4 (four) hours as needed for wheezing 3/14/19  Yes Krupa Ervin MD   arformoterol [facility]) 15 mcg/2 mL nebulizer solution Take 1 vial (15 mcg total) by nebulization 2 (two) times a day 2/3/20  Yes Ron Paulino MD budesonide (PULMICORT) 0 25 mg/2 mL nebulizer solution TAKE 1 VIAL (0 25 MG TOTAL) BY NEBULIZATION 2 (TWO) TIMES A DAY 6/10/20  Yes ONI Young   ipratropium (ATROVENT) 0 02 % nebulizer solution Take 1 vial (0 5 mg total) by nebulization 3 (three) times a day 8/9/19  Yes ONI Young   levETIRAcetam (KEPPRA) 500 mg tablet Take 1 tablet (500 mg total) by mouth every 12 (twelve) hours 11/25/19  Yes Mandeep Frances MD   vitamin B-12 (VITAMIN B-12) 1,000 mcg tablet Take 1,000 mcg by mouth daily   Yes Historical Provider, MD   mirtazapine (REMERON) 15 mg tablet 1/2 to 1 tab PO at bedtime  Patient not taking: Reported on 4/29/2020 3/3/20   Pérez Mcnulty MD     I have reviewed home medications with patient personally  Allergies:    Allergies   Allergen Reactions    Megace [Megestrol] Nausea Only    Other Vomiting     Oysters, clams and mussels       Social History:     Marital Status:      Substance Use History:   Social History     Substance and Sexual Activity   Alcohol Use Not Currently    Alcohol/week: 0 0 standard drinks    Frequency: Never    Binge frequency: Never     Social History     Tobacco Use   Smoking Status Current Some Day Smoker    Packs/day: 0 00    Years: 55 00    Pack years: 0 00    Types: Cigarettes    Start date: 1962   Smokeless Tobacco Never Used   Tobacco Comment    NOW DOWN TO 2 A DAY     Social History     Substance and Sexual Activity   Drug Use Yes    Types: Marijuana    Comment: MEDICAL MARIJUANA       Family History:    Family History   Problem Relation Age of Onset    Heart disease Mother         cardiac disorder    Dementia Mother     Heart failure Father     Heart disease Maternal Grandmother     Heart disease Maternal Grandfather        Physical Exam:     Vitals:   Blood Pressure: 131/70 (07/01/20 1738)  Pulse: 93 (07/01/20 1738)  Temperature: 98 3 °F (36 8 °C) (07/01/20 1738)  Temp Source: Oral (07/01/20 1152)  Respirations: 18 (07/01/20 1600)  Weight - Scale: 38 6 kg (85 lb) (07/01/20 1152)  SpO2: 100 % (07/01/20 1738)    Physical Exam   Constitutional: She is oriented to person, place, and time  No distress  HENT:   Head: Normocephalic and atraumatic  Eyes: Conjunctivae and EOM are normal    Neck: Normal range of motion  Cardiovascular: Normal rate and regular rhythm  Pulmonary/Chest: Effort normal    Decreased BS   Abdominal: Soft  Bowel sounds are normal  She exhibits no distension  There is no tenderness  Musculoskeletal: Normal range of motion  She exhibits no edema  Neurological: She is alert and oriented to person, place, and time  Skin: Skin is warm and dry  She is not diaphoretic  Additional Data:     Lab Results: I have personally reviewed pertinent reports  Results from last 7 days   Lab Units 07/01/20  1408   WBC Thousand/uL 5 94   HEMOGLOBIN g/dL 13 5   HEMATOCRIT % 40 4   PLATELETS Thousands/uL 255   NEUTROS PCT % 69   LYMPHS PCT % 18   MONOS PCT % 10   EOS PCT % 1     Results from last 7 days   Lab Units 07/01/20  1408   SODIUM mmol/L 133*   POTASSIUM mmol/L 4 4   CHLORIDE mmol/L 100   CO2 mmol/L 28   BUN mg/dL 12   CREATININE mg/dL 0 50*   ANION GAP mmol/L 5   CALCIUM mg/dL 9 2   GLUCOSE RANDOM mg/dL 85     Results from last 7 days   Lab Units 07/01/20  1500   INR  0 95                   Imaging: I have personally reviewed pertinent reports  XR femur 2 vw right   Final Result by Fide Kerns MD (07/01 1606)      Limited study  No fracture visible in the field-of-view  Workstation performed: [id number]         XR chest 1 view portable   Final Result by Juliann Vázquez MD (07/01 7398)   Chronic changes as described with emphysema  No acute cardiopulmonary disease  Workstation performed: [id number]         XR hip/pelv 2-3 vws right   ED Interpretation by Saran Weathers MD (07/01 1309)   Impacted femoral neck fracture        Final Result by Mariama Vaughn MD (07/01 1317)      Impacted right femoral neck fracture with associated foreshortening of the femoral neck  Workstation performed: [id number]             EKG, Pathology, and Other Studies Reviewed on Admission:   · EKG: sinus tach    Allscripts / Epic Records Reviewed: Yes     ** Please Note: This note has been constructed using a voice recognition system   **

## 2020-07-01 NOTE — PLAN OF CARE
Problem: Potential for Falls  Goal: Patient will remain free of falls  Description  INTERVENTIONS:  - Assess patient frequently for physical needs  -  Identify cognitive and physical deficits and behaviors that affect risk of falls    -  [place] fall precautions as indicated by assessment   - Educate patient/family on patient safety including physical limitations  - Instruct patient to call for assistance with activity based on assessment  - Modify environment to reduce risk of injury  - Consider OT/PT consult to assist with strengthening/mobility  Outcome: Progressing     Problem: PAIN - ADULT  Goal: Verbalizes/displays adequate comfort level or baseline comfort level  Description  Interventions:  - Encourage patient to monitor pain and request assistance  - Assess pain using appropriate pain scale  - Administer analgesics based on type and severity of pain and evaluate response  - Implement non-pharmacological measures as appropriate and evaluate response  - Consider cultural and social influences on pain and pain management  - Notify physician/advanced practitioner if interventions unsuccessful or patient reports new pain  Outcome: Progressing     Problem: INFECTION - ADULT  Goal: Absence or prevention of progression during hospitalization  Description  INTERVENTIONS:  - Assess and monitor for signs and symptoms of infection  - Monitor lab/diagnostic results  - Monitor all insertion sites, i e  indwelling lines, tubes, and drains  - Monitor endotracheal if appropriate and nasal secretions for changes in amount and color  - [place] appropriate cooling/warming therapies per order  - Administer medications as ordered  - Instruct and encourage patient and family to use good hand hygiene technique  - Identify and instruct in appropriate isolation precautions for identified infection/condition  Outcome: Progressing  Goal: Absence of fever/infection during neutropenic period  Description  INTERVENTIONS:  - Monitor WBC    Outcome: Progressing     Problem: SAFETY ADULT  Goal: Maintain or return to baseline ADL function  Description  INTERVENTIONS:  -  Assess patient's ability to carry out ADLs; assess patient's baseline for ADL function and identify physical deficits which impact ability to perform ADLs (bathing, care of mouth/teeth, toileting, grooming, dressing, etc )  - Assess/evaluate cause of self-care deficits   - Assess range of motion  - Assess patient's mobility; develop plan if impaired  - Assess patient's need for assistive devices and provide as appropriate  - Encourage maximum independence but intervene and supervise when necessary  - Involve family in performance of ADLs  - Assess for home care needs following discharge   - Consider OT consult to assist with ADL evaluation and planning for discharge  - Provide patient education as appropriate  Outcome: Progressing  Goal: Maintain or return mobility status to optimal level  Description  INTERVENTIONS:  - Assess patient's baseline mobility status (ambulation, transfers, stairs, etc )    - Identify cognitive and physical deficits and behaviors that affect mobility  - Identify mobility aids required to assist with transfers and/or ambulation (gait belt, sit-to-stand, lift, walker, cane, etc )  - [place] fall precautions as indicated by assessment  - Record patient progress and toleration of activity level on Mobility SBAR; progress patient to next Phase/Stage  - Instruct patient to call for assistance with activity based on assessment  - Consider rehabilitation consult to assist with strengthening/weightbearing, etc   Outcome: Progressing     Problem: DISCHARGE PLANNING  Goal: Discharge to home or other facility with appropriate resources  Description  INTERVENTIONS:  - Identify barriers to discharge w/patient and caregiver  - Arrange for needed discharge resources and transportation as appropriate  - Identify discharge learning needs (meds, wound care, etc )  - Arrange for interpretive services to assist at discharge as needed  - Refer to Case Management Department for coordinating discharge planning if the patient needs post-hospital services based on physician/advanced practitioner order or complex needs related to functional status, cognitive ability, or social support system  Outcome: Progressing     Problem: Knowledge Deficit  Goal: Patient/family/caregiver demonstrates understanding of disease process, treatment plan, medications, and discharge instructions  Description  Complete learning assessment and assess knowledge base    Interventions:  - Provide teaching at level of understanding  - Provide teaching via preferred learning methods  Outcome: Progressing

## 2020-07-01 NOTE — ASSESSMENT & PLAN NOTE
Ortho consulted  Pt high risk for surgery as in the past there was difficulty extubating her after brain surgery    I talked to pulm, and they asked for consult and they will see her in AM   If pulm says pt acceptable risk for surgery, acceptable from my end to proceed  Pain control w/ scheduled tylenol and oxy prn

## 2020-07-01 NOTE — CONSULTS
Orthopedics   Nicolette Cornelius 70 y o  female MRN: [id number]  Unit/Bed#: X ray      Chief Complaint:   right hip pain    HPI:   70 y  o female community ambulator on 2L  home O2 for COPD, and stage 4 lung cancer status with recent brain metastases requiring neurosurgery on 10/2019 now status post fall complaining of right hip pain and inability to bear weight  Pain is well localized to the hip and is made worse with motion or contact to the area  Patient reports that she has not had any chemo or radiation since last October and is reportedly in remission  She sees Dr Zachariah Carpio for her oncologic care  She is active and is the primary caregiver for her two parents  Review Of Systems:   · Skin: Normal  · Neuro: See HPI  · Musculoskeletal: See HPI  · 14 point review of systems negative except as stated above     Past Medical History:   Past Medical History:   Diagnosis Date    [facility] 1996    [facility]     COPD, severe [facility]     Lung disease     Requires supplemental oxygen     3L 24 hrs/day    Stage 4 lung cancer [facility]        Past Surgical History:   Past Surgical History:   Procedure Laterality Date    APPENDECTOMY      COLONOSCOPY N/A 4/18/2016    Procedure: COLONOSCOPY;  Surgeon: Cate Arriola MD;  [facility]: [facility]; Service:     LUNG CANCER SURGERY      MASTECTOMY, RADICAL Right 1996    [address] N/A 10/17/2017    Procedure: Tho Jansen;  Surgeon: Mya Snell MD;  Location: [facility];   Service: Pulmonary    OH EXCIS SUPRATENT BRAIN TUMOR Right 10/24/2019    Procedure: IMAGE-GUIDED RIGHT FRONTAL CRANIOTOMY FOR TUMOR;  Surgeon: Liliya Almeida MD;  [facility];  Service: Neurosurgery    TONSILLECTOMY         Family History:  Family history reviewed and non-contributory  Family History   Problem Relation Age of Onset    Heart disease Mother         cardiac disorder    Dementia Mother     Heart failure Father     Heart disease Maternal Grandmother     Heart disease Maternal Grandfather        Social History:  Social History     Socioeconomic History    Marital status:      Spouse name: None    Number of children: 1    Years of education: 15    Highest education level: None   Occupational History    Occupation: retired   Social Needs    Financial resource strain: None    Food insecurity:     Worry: None     Inability: None    Transportation needs:     Medical: None     Non-medical: None   Tobacco Use    Smoking status: Current Some Day Smoker     Packs/day: 0 00     Years: 55 00     Pack years: 0 00     Types: Cigarettes     Start date: 1962    Smokeless tobacco: Never Used    Tobacco comment: NOW DOWN TO 2 A DAY   Substance and Sexual Activity    Alcohol use: Not Currently     Alcohol/week: 0 0 standard drinks     Frequency: Never     Binge frequency: Never    Drug use: Yes     Types: Marijuana     Comment: MEDICAL MARIJUANA    Sexual activity: Not Currently   Lifestyle    Physical activity:     Days per week: None     Minutes per session: None    Stress: None   Relationships    Social connections:     Talks on phone: None     Gets together: None     Attends Zoroastrian service: None     Active member of club or organization: None     Attends meetings of clubs or organizations: None     Relationship status: None    Intimate partner violence:     Fear of current or ex partner: None     Emotionally abused: None     Physically abused: None     Forced sexual activity: None   Other Topics Concern    None   Social History Narrative    Lives in an apartment  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  Allergies:    Allergies   Allergen Reactions    Megace [Megestrol] Nausea Only    Other Vomiting     Oysters, clams and mussels           Labs:  0   Lab Value Date/Time    HCT 40 4 07/01/2020 1408    HCT 40 7 04/22/2020 0847    HCT 38 9 02/21/2020 0858    HCT 42 6 02/27/2015 0801    HGB 13 5 07/01/2020 1408    HGB 12 9 04/22/2020 0847    HGB 12 3 02/21/2020 0858    HGB 14 2 02/27/2015 0801    INR 0 91 10/18/2019 0854    WBC 5 94 07/01/2020 1408    WBC 5 36 04/22/2020 0847    WBC 4 94 02/21/2020 0858    WBC 7 61 02/27/2015 0801       Meds:  No current facility-administered medications for this encounter  Current Outpatient Medications:     albuterol (VENTOLIN HFA) 90 mcg/act inhaler, Inhale 2 puffs every 4 (four) hours as needed for wheezing, Disp: 1 Inhaler, Rfl: 0    arformoterol (BROVANA) 15 mcg/2 mL nebulizer solution, Take 1 vial (15 mcg total) by nebulization 2 (two) times a day, Disp: 120 vial, Rfl: 5    budesonide (PULMICORT) 0 25 mg/2 mL nebulizer solution, TAKE 1 VIAL (0 25 MG TOTAL) BY NEBULIZATION 2 (TWO) TIMES A DAY, Disp: 360 mL, Rfl: 1    ipratropium (ATROVENT) 0 02 % nebulizer solution, Take 1 vial (0 5 mg total) by nebulization 3 (three) times a day, Disp: 90 vial, Rfl: 5    levETIRAcetam (KEPPRA) 500 mg tablet, Take 1 tablet (500 mg total) by mouth every 12 (twelve) hours, Disp: 180 tablet, Rfl: 3    vitamin B-12 (VITAMIN B-12) 1,000 mcg tablet, Take 1,000 mcg by mouth daily, Disp: , Rfl:     mirtazapine (REMERON) 15 mg tablet, 1/2 to 1 tab PO at bedtime (Patient not taking: Reported on 4/29/2020), Disp: , Rfl:     Blood Culture:   Lab Results   Component Value Date    BLOODCX No Growth After 5 Days  03/07/2019       Wound Culture:   No results found for: WOUNDCULT    Ins and Outs:  No intake/output data recorded  Physical Exam:   /83   Pulse 98   Temp 97 7 °F (36 5 °C) (Oral)   Resp 18   Wt 38 6 kg (85 lb)   SpO2 100%   BMI 14 14 kg/m²   Gen: Alert and oriented to person, place, time  HEENT: EOMI, eyes clear, moist mucus membranes, hearing intact  Respiratory: Bilateral chest rise   No audible wheezing found  Cardiovascular: Regular Rate and Rhythm  Abdomen: soft nontender/nondistended  Musculoskeletal: right lower extremity  · Skin intact  · Tender to palpation over hip  · Pain with int/external rotation  · Sensation intact L3-S1  · Positive ankle dorsi/plantar flexion, EHL/FHL  · 2+ DP pulse  ·   Radiology:   I personally reviewed the films  X-rays right hip shows femoral neck fracture    _*_*_*_*_*_*_*_*_*_*_*_*_*_*_*_*_*_*_*_*_*_*_*_*_*_*_*_*_*_*_*_*_*_*_*_*_*_*_*_*_*    Assessment:  70 y  o female status post fall with right femoral neck fracture  Patient would benefit from operative fixation vs  Arthroplasty for her femoral neck fracture    Plan:   · Non weight bearing right lower extremity  · Analgesics for pain  · Informed consent obtained  · Pre op labs in ED  · NPO at midnight  · Azar Catheter insertion  · Medicine consult for all medical management and preoperative risk stratification  · To OR tomorrow potential  · Body mass index is 14 14 kg/m²    · Dispo: Ortho will follow      Dl Limon MD

## 2020-07-01 NOTE — ED PROVIDER NOTES
History  Chief Complaint   Patient presents with    Fall     Pt states she was outside and lost her balance  Pt denies any dizzines  -headstrike, -LOC, -thinners  Pt c/o R hip pain and R elbow pain     70-year-old female, history lung cancer, currently undergoing therapy, does not take any blood thinning medications including Coumadin, novel oral anticoagulants, or aspirin, presenting to the emergency department for evaluation of right hip pain after fall  Patient states that she uses oxygen chronically, was going up a set of stairs when she believes that she got her oxygen caught on a railing or its long causing her to lose her balance and fall off of the stairs on to a flower bed that is next to the stairs and then onto a brick patio  The patient denies hitting her head and denies loss of consciousness  She is complaining of some skin tears on her right hand, as well as her primary complaint of right hip pain that is made worse with movement  Patient has not taking any medications to try to alleviate the pain prior to arrival   Patient denies any headache, neck pain, chest pain, back pain, abdominal pain  Review of her medical records reveals that she had her last tetanus shot in 2017  Prior to Admission Medications   Prescriptions Last Dose Informant Patient Reported? Taking?    albuterol (VENTOLIN HFA) 90 mcg/act inhaler  Self No Yes   Sig: Inhale 2 puffs every 4 (four) hours as needed for wheezing   arformoterol (BROVANA) 15 mcg/2 mL nebulizer solution  Self No Yes   Sig: Take 1 vial (15 mcg total) by nebulization 2 (two) times a day   budesonide (PULMICORT) 0 25 mg/2 mL nebulizer solution   No Yes   Sig: TAKE 1 VIAL (0 25 MG TOTAL) BY NEBULIZATION 2 (TWO) TIMES A DAY   ipratropium (ATROVENT) 0 02 % nebulizer solution  Self No Yes   Sig: Take 1 vial (0 5 mg total) by nebulization 3 (three) times a day   levETIRAcetam (KEPPRA) 500 mg tablet  Self No Yes   Sig: Take 1 tablet (500 mg total) by mouth every 12 (twelve) hours   Patient taking differently: Take 250 mg by mouth every 12 (twelve) hours    mirtazapine (REMERON) 15 mg tablet Not Taking at Unknown time Self No No   Si/2 to 1 tab PO at bedtime   Patient not taking: Reported on 2020   vitamin B-12 (VITAMIN B-12) 1,000 mcg tablet  Self Yes Yes   Sig: Take 1,000 mcg by mouth daily      Facility-Administered Medications: None       Past Medical History:   Diagnosis Date    Breast cancer ([facility] ) 1996    Cancer ([place] [address] )     COPD, severe ([facility] )     Lung disease     Requires supplemental oxygen     3L 24 hrs/day    Stage 4 lung cancer ([place] [address] )        Past Surgical History:   Procedure Laterality Date    APPENDECTOMY      COLONOSCOPY N/A 2016    Procedure: COLONOSCOPY;  Surgeon: Antonieta Benoit MD;  [facility]; Service:     LUNG CANCER SURGERY      MASTECTOMY, RADICAL Right     [address] N/A 10/17/2017    Procedure: Gaby Matias;  Surgeon: Bushra Martinez MD;  [facility]; Service: Pulmonary    NV EXCIS SUPRATENT BRAIN TUMOR Right 10/24/2019    Procedure: IMAGE-GUIDED RIGHT FRONTAL CRANIOTOMY FOR TUMOR;  Surgeon: Dahiana León MD;  [facility];  Service: Neurosurgery    TONSILLECTOMY         Family History   Problem Relation Age of Onset    Heart disease Mother         cardiac disorder    Dementia Mother     Heart failure Father     Heart disease Maternal Grandmother     Heart disease Maternal Grandfather      I have reviewed and agree with the history as documented      E-Cigarette/Vaping    E-Cigarette Use Never User      E-Cigarette/Vaping Substances    Nicotine No     THC No     CBD No      Social History     Tobacco Use    Smoking status: Current Some Day Smoker     Packs/day: 0 00     Years: 55 00     Pack years: 0 00     Types: Cigarettes     Start date:     Smokeless tobacco: Never Used    Tobacco comment: NOW DOWN TO 2 A DAY   Substance Use Topics    Alcohol use: Not Currently     Alcohol/week: 0 0 standard drinks     Frequency: Never     Binge frequency: Never    Drug use: Yes     Types: Marijuana     Comment: MEDICAL MARIJUANA       Review of Systems   Constitutional: Negative for diaphoresis and fever  HENT: Negative for congestion and drooling  Eyes: Negative for redness and visual disturbance  Respiratory: Negative for cough and shortness of breath  Cardiovascular: Negative for chest pain and leg swelling  Gastrointestinal: Negative for abdominal pain, diarrhea and vomiting  Genitourinary: Negative for difficulty urinating and dysuria  Musculoskeletal: Negative for back pain, joint swelling, neck pain and neck stiffness  Right hip pain  Skin: Positive for wound  Negative for color change and rash  Neurological: Negative for speech difficulty and headaches  All other systems reviewed and are negative  Physical Exam  Physical Exam   Constitutional: She is oriented to person, place, and time  She appears well-developed and well-nourished  HENT:   Head: Normocephalic and atraumatic  Eyes: Pupils are equal, round, and reactive to light  Conjunctivae are normal    Neck: Normal range of motion  Neck supple  Cardiovascular: Normal rate, regular rhythm and intact distal pulses  Pulmonary/Chest: Effort normal and breath sounds normal    Abdominal: Soft  There is no tenderness  Musculoskeletal:        Legs:  Neurological: She is alert and oriented to person, place, and time  She has normal strength  Skin: Skin is warm and dry  Vitals reviewed        Vital Signs  ED Triage Vitals   Temperature Pulse Respirations Blood Pressure SpO2   07/01/20 1152 07/01/20 1152 07/01/20 1152 07/01/20 1152 07/01/20 1152   97 7 °F (36 5 °C) 105 16 116/95 99 %      Temp Source Heart Rate Source Patient Position - Orthostatic VS BP Location FiO2 (%)   07/01/20 1152 07/01/20 1152 07/01/20 1500 07/01/20 2122 --   Oral Monitor Lying Left arm       Pain Score       07/01/20 1152       8           Vitals:    07/03/20 1106 07/03/20 1521 07/04/20 0020 07/04/20 0826   BP: 102/58 108/55 108/56 100/54   Pulse: 104 (!) 106 100    Patient Position - Orthostatic VS: Sitting            Visual Acuity  Visual Acuity      Most Recent Value   L Pupil Size (mm)  2   R Pupil Size (mm)  2          ED Medications  Medications   ipratropium (ATROVENT) 0 02 % inhalation solution 0 5 mg (0 5 mg Nebulization Given 7/4/20 0723)   cyanocobalamin (VITAMIN B-12) tablet 1,000 mcg (1,000 mcg Oral Given 7/3/20 0831)   albuterol (PROVENTIL HFA,VENTOLIN HFA) inhaler 2 puff ( Inhalation MAR Unhold 7/2/20 1442)   acetaminophen (TYLENOL) tablet 975 mg (975 mg Oral Given 7/4/20 0510)   oxyCODONE (ROXICODONE) IR tablet 5 mg ( Oral See Alternative 7/4/20 0513)     Or   oxyCODONE (ROXICODONE) IR tablet 2 5 mg (2 5 mg Oral Refused 7/4/20 0513)   nicotine (NICODERM CQ) 7 mg/24hr TD 24 hr patch 1 patch (1 patch Transdermal Not Given 7/3/20 0831)   budesonide (PULMICORT) inhalation solution 0 25 mg (0 25 mg Nebulization Given 7/4/20 0723)   lactated ringers bolus 500 mL (has no administration in time range)     And   lactated ringers bolus 500 mL (has no administration in time range)   sodium chloride 0 9 % bolus 500 mL (has no administration in time range)     And   sodium chloride 0 9 % bolus 500 mL (has no administration in time range)   HYDROmorphone (DILAUDID) injection 0 2 mg (has no administration in time range)   enoxaparin (LOVENOX) subcutaneous injection 40 mg (40 mg Subcutaneous Given 7/3/20 0831)   levETIRAcetam (KEPPRA) tablet 250 mg (250 mg Oral Given 7/3/20 2122)   levalbuterol (XOPENEX) inhalation solution 1 25 mg (1 25 mg Nebulization Given 7/4/20 0723)   acetaminophen (TYLENOL) tablet 975 mg (975 mg Oral Given 7/1/20 1218)   bacitracin topical ointment 1 small application (1 small application Topical Given 7/1/20 1304)   fentanyl citrate (PF) 100 MCG/2ML 25 mcg (25 mcg Intravenous Given 7/1/20 7935)   ceFAZolin (ANCEF) IVPB (premix) 2,000 mg 50 mL (2,000 mg Intravenous New Bag 7/3/20 8938)       Diagnostic Studies  Results Reviewed     Procedure Component Value Units Date/Time    Novel Coronavirus [facility] [[id number]]  (Normal) Collected:  07/01/20 1500    Lab Status:  Final result Specimen:  Nares from Nose Updated:  07/01/20 1614     SARS-CoV-2 Negative    Narrative: The specimen collection materials, transport medium, and/or testing methodology utilized in the production of these test results have been proven to be reliable in a limited validation with an abbreviated program under the Emergency Utilization Authorization provided by the FDA  Testing reported as "Presumptive positive" will be confirmed with secondary testing with a reference laboratory to ensure result accuracy  Clinical caution and judgement should be used with the interpretation of these results with consideration of the clinical impression and other laboratory testing  Testing reported as "Positive" or "Negative" has been proven to be accurate according to standard laboratory validation requirements  All testing is performed with control materials showing appropriate reactivity at standard intervals        APTT [[id number]]  (Normal) Collected:  07/01/20 1500    Lab Status:  Final result Specimen:  Blood from Arm, Left Updated:  07/01/20 1540     PTT 25 seconds     Protime-INR [[id number]]  (Normal) Collected:  07/01/20 1500    Lab Status:  Final result Specimen:  Blood from Arm, Left Updated:  07/01/20 1540     Protime 12 7 seconds      INR 6 37    Basic metabolic panel [[id number]]  (Abnormal) Collected:  07/01/20 1408    Lab Status:  Final result Specimen:  Blood from Arm, Left Updated:  07/01/20 1441     Sodium 133 mmol/L      Potassium 4 4 mmol/L      Chloride 100 mmol/L      CO2 28 mmol/L      ANION GAP 5 mmol/L      BUN 12 mg/dL      Creatinine 0 50 mg/dL      Glucose 85 mg/dL      Calcium 9 2 mg/dL eGFR 96 ml/min/1 73sq m     Narrative:       [place] guidelines for Chronic Kidney Disease (CKD):     Stage 1 with normal or high GFR (GFR > 90 mL/min/1 73 square meters)    Stage 2 Mild CKD (GFR = 60-89 mL/min/1 73 square meters)    Stage 3A Moderate CKD (GFR = 45-59 mL/min/1 73 square meters)    Stage 3B Moderate CKD (GFR = 30-44 mL/min/1 73 square meters)    Stage 4 Severe CKD (GFR = 15-29 mL/min/1 73 square meters)    Stage 5 End Stage CKD (GFR <15 mL/min/1 73 square meters)  Note: GFR calculation is accurate only with a steady state creatinine    CBC and differential [[id number]]  (Abnormal) Collected:  07/01/20 1408    Lab Status:  Final result Specimen:  Blood from Arm, Left Updated:  07/01/20 1419     WBC 5 94 Thousand/uL      RBC 4 18 Million/uL      Hemoglobin 13 5 g/dL      Hematocrit 40 4 %      MCV 97 fL      MCH 32 3 pg      MCHC 33 4 g/dL      RDW 13 1 %      MPV 8 7 fL      Platelets 943 Thousands/uL      nRBC 0 /100 WBCs      Neutrophils Relative 69 %      Immat GRANS % 1 %      Lymphocytes Relative 18 %      Monocytes Relative 10 %      Eosinophils Relative 1 %      Basophils Relative 1 %      Neutrophils Absolute 4 13 Thousands/µL      Immature Grans Absolute 0 03 Thousand/uL      Lymphocytes Absolute 1 09 Thousands/µL      Monocytes Absolute 0 62 Thousand/µL      Eosinophils Absolute 0 04 Thousand/µL      Basophils Absolute 0 03 Thousands/µL                  XR hip/pelv 2-3 vws right if performed   Final Result by Kong Morel MD (07/02 1605)      Fluoroscopic guidance provided for surgical procedure  Please refer to the separate procedure notes for additional details  Workstation performed: [id number]         XR femur 2 vw right   Final Result by Cassia Rodríguez MD (07/01 1606)      Limited study  No fracture visible in the field-of-view        Workstation performed: [id number]         XR chest 1 view portable   Final Result by Misty Gutierrez, MD (07/01 1445)   Chronic changes as described with emphysema  No acute cardiopulmonary disease  Workstation performed: [id number]         XR hip/pelv 2-3 vws right   ED Interpretation by Makenna Lund MD (07/01 1309)   Impacted femoral neck fracture  Final Result by Prasad Parker MD (07/01 1317)      Impacted right femoral neck fracture with associated foreshortening of the femoral neck  Workstation performed: [id number]                    Procedures  ECG 12 Lead Documentation Only  Date/Time: 7/1/2020 12:20 PM  Performed by: Makenna Lund MD  Authorized by: Makenna Lund MD     ECG reviewed by me, the ED Provider: yes    Patient location:  ED  Previous ECG:     Previous ECG:  Compared to current  Interpretation:     Interpretation: abnormal    Rate:     ECG rate assessment: tachycardic    Rhythm:     Rhythm: sinus tachycardia    Ectopy:     Ectopy: none    QRS:     QRS axis:  Normal  Conduction:     Conduction: normal    ST segments:     ST segments:  Normal  T waves:     T waves: normal               ED Course       US AUDIT      Most Recent Value   Initial Alcohol Screen: US AUDIT-C    1  How often do you have a drink containing alcohol?  0 Filed at: 07/01/2020 1203   2  How many drinks containing alcohol do you have on a typical day you are drinking? 0 Filed at: 07/01/2020 1203   3a  Male UNDER 65: How often do you have five or more drinks on one occasion? 0 Filed at: 07/01/2020 1203   3b  FEMALE Any Age, or MALE 65+: How often do you have 4 or more drinks on one occassion? 0 Filed at: 07/01/2020 1203   Audit-C Score  0 Filed at: 07/01/2020 1203              Identification of Seniors at Risk      Most Recent Value   (ISAR) Identification of Seniors at Risk   Before the illness or injury that brought you to the Emergency, did you need someone to help you on a regular basis?   0 Filed at: 07/01/2020 1152   In the last 24 hours, have you needed more help than usual?  0 Filed at: 07/01/2020 1152   Have you been hospitalized for one or more nights during the past 6 months? 0 Filed at: 07/01/2020 1152   In general, do you see well?  0 Filed at: 07/01/2020 1152   In general, do you have serious problems with your memory? 0 Filed at: 07/01/2020 1152   Do you take more than three different medications every day? 1 Filed at: 07/01/2020 1152   ISAR Score  1 Filed at: 07/01/2020 1152          ANDREW/DAST-10      Most Recent Value   How many times in the past year have you    Used an illegal drug or used a prescription medication for non-medical reasons? Never Filed at: 07/01/2020 1203                                MDM      Disposition  Final diagnoses:   Fall, initial encounter   Hip fracture, right, closed, initial encounter [facility])     Time reflects when diagnosis was documented in both MDM as applicable and the Disposition within this note     Time User Action Codes Description Comment    7/1/2020  3:51 PM Wolfgang Lira Add [S72 001A] Closed fracture of neck of right femur, initial encounter ([facility] )     7/1/2020  4:29 PM Argdell Knight Add [M00  CJLI] Fall, initial encounter     7/1/2020  4:30 PM Jessie Knight Add [S72 001A] Hip fracture, right, closed, initial encounter ([facility] )     7/1/2020  5:45 PM Lottie Mcclellan Add [C34 01] Malignant neoplasm of hilus of right lung ([facility] )     7/1/2020  5:45 PM Lottie Mcclellan Add [Z99 81] On home oxygen therapy       ED Disposition     ED Disposition Condition Date/Time Comment    Admit Stable Wed Jul 1, 2020  4:29 PM Case was discussed with SOFIA and the patient's admission status was agreed to be Admission Status: inpatient status to the service of Dr Monalisa Staley          Follow-up Information     Follow up With Specialties Details Why [address], DO Internal Medicine Follow up  [address]  [address] [facility] [address]  Sharyle Seminole, MD Orthopedic Surgery Follow up in 2 week(s) Usman or Andrea Concepcion [address]            Current Discharge Medication List      CONTINUE these medications which have NOT CHANGED    Details   albuterol (VENTOLIN HFA) 90 mcg/act inhaler Inhale 2 puffs every 4 (four) hours as needed for wheezing  Qty: 1 Inhaler, Refills: 0    Associated Diagnoses: COPD exacerbation (HCC)      arformoterol (BROVANA) 15 mcg/2 mL nebulizer solution Take 1 vial (15 mcg total) by nebulization 2 (two) times a day  Qty: 120 vial, Refills: 5    Comments: DX Code J44 9  Associated Diagnoses: Chronic obstructive pulmonary disease, unspecified COPD type (HCC)      budesonide (PULMICORT) 0 25 mg/2 mL nebulizer solution TAKE 1 VIAL (0 25 MG TOTAL) BY NEBULIZATION 2 (TWO) TIMES A DAY  Qty: 360 mL, Refills: 1    Associated Diagnoses: Chronic obstructive pulmonary disease, unspecified COPD type (HCC)      ipratropium (ATROVENT) 0 02 % nebulizer solution Take 1 vial (0 5 mg total) by nebulization 3 (three) times a day  Qty: 90 vial, Refills: 5    Associated Diagnoses: Chronic obstructive pulmonary disease, unspecified COPD type (HCC)      levETIRAcetam (KEPPRA) 500 mg tablet Take 1 tablet (500 mg total) by mouth every 12 (twelve) hours  Qty: 180 tablet, Refills: 3    Associated Diagnoses: Malignant neoplasm of right lung, unspecified part of lung ([facility] ); Metastatic adenocarcinoma to brain ([facility] )      vitamin B-12 (VITAMIN B-12) 1,000 mcg tablet Take 1,000 mcg by mouth daily      mirtazapine (REMERON) 15 mg tablet 1/2 to 1 tab PO at bedtime    Associated Diagnoses: Loss of appetite; Caregiver stress           No discharge procedures on file      PDMP Review     None          ED Provider  Electronically Signed by           Luis Felipe Chatterjee MD  07/04/20 8325

## 2020-07-02 ENCOUNTER — ANESTHESIA (INPATIENT)
Dept: PERIOP | Facility: HOSPITAL | Age: 75
DRG: 481 | End: 2020-07-02
Payer: MEDICARE

## 2020-07-02 ENCOUNTER — ANESTHESIA EVENT (INPATIENT)
Dept: PERIOP | Facility: HOSPITAL | Age: 75
DRG: 481 | End: 2020-07-02
Payer: MEDICARE

## 2020-07-02 ENCOUNTER — APPOINTMENT (INPATIENT)
Dept: RADIOLOGY | Facility: HOSPITAL | Age: 75
DRG: 481 | End: 2020-07-02
Payer: MEDICARE

## 2020-07-02 PROBLEM — E44.1 MILD PROTEIN-CALORIE MALNUTRITION (HCC): Status: ACTIVE | Noted: 2020-07-02

## 2020-07-02 LAB
25(OH)D3 SERPL-MCNC: 15.6 NG/ML (ref 30–100)
ANION GAP SERPL CALCULATED.3IONS-SCNC: 3 MMOL/L (ref 4–13)
BUN SERPL-MCNC: 11 MG/DL (ref 5–25)
CALCIUM SERPL-MCNC: 8.5 MG/DL (ref 8.3–10.1)
CHLORIDE SERPL-SCNC: 101 MMOL/L (ref 100–108)
CO2 SERPL-SCNC: 30 MMOL/L (ref 21–32)
CREAT SERPL-MCNC: 0.42 MG/DL (ref 0.6–1.3)
ERYTHROCYTE [DISTWIDTH] IN BLOOD BY AUTOMATED COUNT: 12.7 % (ref 11.6–15.1)
GFR SERPL CREATININE-BSD FRML MDRD: 102 ML/MIN/1.73SQ M
GLUCOSE SERPL-MCNC: 83 MG/DL (ref 65–140)
HCT VFR BLD AUTO: 33.2 % (ref 34.8–46.1)
HGB BLD-MCNC: 10.8 G/DL (ref 11.5–15.4)
MAGNESIUM SERPL-MCNC: 1.8 MG/DL (ref 1.6–2.6)
MCH RBC QN AUTO: 32 PG (ref 26.8–34.3)
MCHC RBC AUTO-ENTMCNC: 32.5 G/DL (ref 31.4–37.4)
MCV RBC AUTO: 99 FL (ref 82–98)
PHOSPHATE SERPL-MCNC: 3.2 MG/DL (ref 2.3–4.1)
PLATELET # BLD AUTO: 221 THOUSANDS/UL (ref 149–390)
PMV BLD AUTO: 8.7 FL (ref 8.9–12.7)
POTASSIUM SERPL-SCNC: 4.2 MMOL/L (ref 3.5–5.3)
RBC # BLD AUTO: 3.37 MILLION/UL (ref 3.81–5.12)
SODIUM SERPL-SCNC: 134 MMOL/L (ref 136–145)
WBC # BLD AUTO: 5.85 THOUSAND/UL (ref 4.31–10.16)

## 2020-07-02 PROCEDURE — 80048 BASIC METABOLIC PNL TOTAL CA: CPT | Performed by: GENERAL PRACTICE

## 2020-07-02 PROCEDURE — 84100 ASSAY OF PHOSPHORUS: CPT | Performed by: GENERAL PRACTICE

## 2020-07-02 PROCEDURE — NC001 PR NO CHARGE: Performed by: ORTHOPAEDIC SURGERY

## 2020-07-02 PROCEDURE — C1713 ANCHOR/SCREW BN/BN,TIS/BN: HCPCS | Performed by: ORTHOPAEDIC SURGERY

## 2020-07-02 PROCEDURE — 94760 N-INVAS EAR/PLS OXIMETRY 1: CPT

## 2020-07-02 PROCEDURE — 27235 TREAT THIGH FRACTURE: CPT | Performed by: ORTHOPAEDIC SURGERY

## 2020-07-02 PROCEDURE — 99223 1ST HOSP IP/OBS HIGH 75: CPT | Performed by: INTERNAL MEDICINE

## 2020-07-02 PROCEDURE — 73502 X-RAY EXAM HIP UNI 2-3 VIEWS: CPT

## 2020-07-02 PROCEDURE — 99232 SBSQ HOSP IP/OBS MODERATE 35: CPT | Performed by: NURSE PRACTITIONER

## 2020-07-02 PROCEDURE — C1769 GUIDE WIRE: HCPCS | Performed by: ORTHOPAEDIC SURGERY

## 2020-07-02 PROCEDURE — 85027 COMPLETE CBC AUTOMATED: CPT | Performed by: GENERAL PRACTICE

## 2020-07-02 PROCEDURE — 0QS604Z REPOSITION RIGHT UPPER FEMUR WITH INTERNAL FIXATION DEVICE, OPEN APPROACH: ICD-10-PCS | Performed by: ORTHOPAEDIC SURGERY

## 2020-07-02 PROCEDURE — 94664 DEMO&/EVAL PT USE INHALER: CPT

## 2020-07-02 PROCEDURE — 82805 BLOOD GASES W/O2 SATURATION: CPT | Performed by: INTERNAL MEDICINE

## 2020-07-02 PROCEDURE — 36600 WITHDRAWAL OF ARTERIAL BLOOD: CPT

## 2020-07-02 PROCEDURE — 99223 1ST HOSP IP/OBS HIGH 75: CPT | Performed by: ORTHOPAEDIC SURGERY

## 2020-07-02 PROCEDURE — 94640 AIRWAY INHALATION TREATMENT: CPT

## 2020-07-02 PROCEDURE — 83735 ASSAY OF MAGNESIUM: CPT | Performed by: GENERAL PRACTICE

## 2020-07-02 PROCEDURE — 82306 VITAMIN D 25 HYDROXY: CPT | Performed by: GENERAL PRACTICE

## 2020-07-02 PROCEDURE — G9197 ORDER FOR CEPH: HCPCS | Performed by: ORTHOPAEDIC SURGERY

## 2020-07-02 DEVICE — 7.3MM CANNULATED SCREW 16MM THREAD/70MM: Type: IMPLANTABLE DEVICE | Site: HIP | Status: FUNCTIONAL

## 2020-07-02 DEVICE — 7.3MM CANNULATED SCREW 16MM THREAD/75MM: Type: IMPLANTABLE DEVICE | Site: HIP | Status: FUNCTIONAL

## 2020-07-02 RX ORDER — PROMETHAZINE HYDROCHLORIDE 25 MG/ML
12.5 INJECTION, SOLUTION INTRAMUSCULAR; INTRAVENOUS ONCE AS NEEDED
Status: DISCONTINUED | OUTPATIENT
Start: 2020-07-02 | End: 2020-07-02 | Stop reason: HOSPADM

## 2020-07-02 RX ORDER — PROPOFOL 10 MG/ML
INJECTION, EMULSION INTRAVENOUS CONTINUOUS PRN
Status: DISCONTINUED | OUTPATIENT
Start: 2020-07-02 | End: 2020-07-02 | Stop reason: SURG

## 2020-07-02 RX ORDER — ALBUTEROL SULFATE 2.5 MG/3ML
2.5 SOLUTION RESPIRATORY (INHALATION) EVERY 6 HOURS
Status: DISCONTINUED | OUTPATIENT
Start: 2020-07-02 | End: 2020-07-03

## 2020-07-02 RX ORDER — SODIUM CHLORIDE, SODIUM LACTATE, POTASSIUM CHLORIDE, CALCIUM CHLORIDE 600; 310; 30; 20 MG/100ML; MG/100ML; MG/100ML; MG/100ML
125 INJECTION, SOLUTION INTRAVENOUS CONTINUOUS
Status: DISCONTINUED | OUTPATIENT
Start: 2020-07-02 | End: 2020-07-03

## 2020-07-02 RX ORDER — EPHEDRINE SULFATE 50 MG/ML
INJECTION INTRAVENOUS AS NEEDED
Status: DISCONTINUED | OUTPATIENT
Start: 2020-07-02 | End: 2020-07-02 | Stop reason: SURG

## 2020-07-02 RX ORDER — HYDROMORPHONE HCL/PF 1 MG/ML
0.2 SYRINGE (ML) INJECTION
Status: DISCONTINUED | OUTPATIENT
Start: 2020-07-02 | End: 2020-07-02 | Stop reason: HOSPADM

## 2020-07-02 RX ORDER — CEFAZOLIN SODIUM 2 G/50ML
2000 SOLUTION INTRAVENOUS EVERY 8 HOURS
Status: COMPLETED | OUTPATIENT
Start: 2020-07-02 | End: 2020-07-03

## 2020-07-02 RX ORDER — ALBUTEROL SULFATE 2.5 MG/3ML
2.5 SOLUTION RESPIRATORY (INHALATION) ONCE AS NEEDED
Status: DISCONTINUED | OUTPATIENT
Start: 2020-07-02 | End: 2020-07-02 | Stop reason: HOSPADM

## 2020-07-02 RX ORDER — KETAMINE HCL IN NACL, ISO-OSM 100MG/10ML
SYRINGE (ML) INJECTION AS NEEDED
Status: DISCONTINUED | OUTPATIENT
Start: 2020-07-02 | End: 2020-07-02 | Stop reason: SURG

## 2020-07-02 RX ORDER — HYDROMORPHONE HCL/PF 1 MG/ML
0.2 SYRINGE (ML) INJECTION EVERY 2 HOUR PRN
Status: DISCONTINUED | OUTPATIENT
Start: 2020-07-02 | End: 2020-07-04

## 2020-07-02 RX ORDER — ONDANSETRON 2 MG/ML
4 INJECTION INTRAMUSCULAR; INTRAVENOUS ONCE AS NEEDED
Status: DISCONTINUED | OUTPATIENT
Start: 2020-07-02 | End: 2020-07-02 | Stop reason: HOSPADM

## 2020-07-02 RX ORDER — MIDAZOLAM HYDROCHLORIDE 2 MG/2ML
INJECTION, SOLUTION INTRAMUSCULAR; INTRAVENOUS AS NEEDED
Status: DISCONTINUED | OUTPATIENT
Start: 2020-07-02 | End: 2020-07-02 | Stop reason: SURG

## 2020-07-02 RX ORDER — CEFAZOLIN SODIUM 2 G/50ML
SOLUTION INTRAVENOUS AS NEEDED
Status: DISCONTINUED | OUTPATIENT
Start: 2020-07-02 | End: 2020-07-02 | Stop reason: SURG

## 2020-07-02 RX ORDER — HYDRALAZINE HYDROCHLORIDE 20 MG/ML
5 INJECTION INTRAMUSCULAR; INTRAVENOUS
Status: DISCONTINUED | OUTPATIENT
Start: 2020-07-02 | End: 2020-07-02 | Stop reason: HOSPADM

## 2020-07-02 RX ORDER — SODIUM CHLORIDE, SODIUM LACTATE, POTASSIUM CHLORIDE, CALCIUM CHLORIDE 600; 310; 30; 20 MG/100ML; MG/100ML; MG/100ML; MG/100ML
INJECTION, SOLUTION INTRAVENOUS CONTINUOUS PRN
Status: DISCONTINUED | OUTPATIENT
Start: 2020-07-02 | End: 2020-07-02 | Stop reason: SURG

## 2020-07-02 RX ORDER — ALBUMIN, HUMAN INJ 5% 5 %
SOLUTION INTRAVENOUS CONTINUOUS PRN
Status: DISCONTINUED | OUTPATIENT
Start: 2020-07-02 | End: 2020-07-02 | Stop reason: SURG

## 2020-07-02 RX ORDER — BUPIVACAINE HYDROCHLORIDE 7.5 MG/ML
INJECTION, SOLUTION INTRASPINAL AS NEEDED
Status: DISCONTINUED | OUTPATIENT
Start: 2020-07-02 | End: 2020-07-02 | Stop reason: SURG

## 2020-07-02 RX ORDER — FENTANYL CITRATE 50 UG/ML
INJECTION, SOLUTION INTRAMUSCULAR; INTRAVENOUS AS NEEDED
Status: DISCONTINUED | OUTPATIENT
Start: 2020-07-02 | End: 2020-07-02 | Stop reason: SURG

## 2020-07-02 RX ORDER — FENTANYL CITRATE/PF 50 MCG/ML
25 SYRINGE (ML) INJECTION
Status: DISCONTINUED | OUTPATIENT
Start: 2020-07-02 | End: 2020-07-02 | Stop reason: HOSPADM

## 2020-07-02 RX ORDER — HYDROMORPHONE HCL/PF 1 MG/ML
0.5 SYRINGE (ML) INJECTION
Status: DISCONTINUED | OUTPATIENT
Start: 2020-07-02 | End: 2020-07-02 | Stop reason: HOSPADM

## 2020-07-02 RX ORDER — METOCLOPRAMIDE HYDROCHLORIDE 5 MG/ML
10 INJECTION INTRAMUSCULAR; INTRAVENOUS ONCE AS NEEDED
Status: DISCONTINUED | OUTPATIENT
Start: 2020-07-02 | End: 2020-07-02 | Stop reason: HOSPADM

## 2020-07-02 RX ORDER — PROPOFOL 10 MG/ML
INJECTION, EMULSION INTRAVENOUS AS NEEDED
Status: DISCONTINUED | OUTPATIENT
Start: 2020-07-02 | End: 2020-07-02 | Stop reason: SURG

## 2020-07-02 RX ORDER — LABETALOL 20 MG/4 ML (5 MG/ML) INTRAVENOUS SYRINGE
10
Status: DISCONTINUED | OUTPATIENT
Start: 2020-07-02 | End: 2020-07-02 | Stop reason: HOSPADM

## 2020-07-02 RX ADMIN — IPRATROPIUM BROMIDE 0.5 MG: 0.5 SOLUTION RESPIRATORY (INHALATION) at 08:06

## 2020-07-02 RX ADMIN — IPRATROPIUM BROMIDE 0.5 MG: 0.5 SOLUTION RESPIRATORY (INHALATION) at 20:58

## 2020-07-02 RX ADMIN — ALBUMIN (HUMAN): 12.5 INJECTION, SOLUTION INTRAVENOUS at 13:25

## 2020-07-02 RX ADMIN — OXYCODONE HYDROCHLORIDE 5 MG: 5 TABLET ORAL at 20:50

## 2020-07-02 RX ADMIN — ALBUTEROL SULFATE 2.5 MG: 2.5 SOLUTION RESPIRATORY (INHALATION) at 20:58

## 2020-07-02 RX ADMIN — MIDAZOLAM 1 MG: 1 INJECTION INTRAMUSCULAR; INTRAVENOUS at 12:45

## 2020-07-02 RX ADMIN — MIDAZOLAM 1 MG: 1 INJECTION INTRAMUSCULAR; INTRAVENOUS at 12:34

## 2020-07-02 RX ADMIN — PROPOFOL 25 MCG/KG/MIN: 10 INJECTION, EMULSION INTRAVENOUS at 12:58

## 2020-07-02 RX ADMIN — LEVETIRACETAM 250 MG: 500 TABLET, FILM COATED ORAL at 08:39

## 2020-07-02 RX ADMIN — BUDESONIDE 0.25 MG: 0.25 INHALANT RESPIRATORY (INHALATION) at 20:58

## 2020-07-02 RX ADMIN — PHENYLEPHRINE HYDROCHLORIDE 200 MCG: 10 INJECTION INTRAVENOUS at 13:03

## 2020-07-02 RX ADMIN — OXYCODONE HYDROCHLORIDE 5 MG: 5 TABLET ORAL at 16:34

## 2020-07-02 RX ADMIN — ALBUMIN (HUMAN): 12.5 INJECTION, SOLUTION INTRAVENOUS at 12:50

## 2020-07-02 RX ADMIN — PHENYLEPHRINE HYDROCHLORIDE 75 MCG/MIN: 10 INJECTION INTRAVENOUS at 12:55

## 2020-07-02 RX ADMIN — PHENYLEPHRINE HYDROCHLORIDE 200 MCG: 10 INJECTION INTRAVENOUS at 13:08

## 2020-07-02 RX ADMIN — SODIUM CHLORIDE 75 ML/HR: 0.9 INJECTION, SOLUTION INTRAVENOUS at 14:27

## 2020-07-02 RX ADMIN — BUPIVACAINE HYDROCHLORIDE IN DEXTROSE 1.6 ML: 7.5 INJECTION, SOLUTION SUBARACHNOID at 12:21

## 2020-07-02 RX ADMIN — FENTANYL CITRATE 25 MCG: 50 INJECTION, SOLUTION INTRAMUSCULAR; INTRAVENOUS at 12:34

## 2020-07-02 RX ADMIN — Medication 20 MG: at 12:21

## 2020-07-02 RX ADMIN — CEFAZOLIN SODIUM 2000 MG: 2 SOLUTION INTRAVENOUS at 21:38

## 2020-07-02 RX ADMIN — Medication 20 MG: at 12:27

## 2020-07-02 RX ADMIN — BUDESONIDE 0.25 MG: 0.25 INHALANT RESPIRATORY (INHALATION) at 08:06

## 2020-07-02 RX ADMIN — ACETAMINOPHEN 975 MG: 325 TABLET ORAL at 21:38

## 2020-07-02 RX ADMIN — ALBUTEROL SULFATE 2.5 MG: 2.5 SOLUTION RESPIRATORY (INHALATION) at 09:00

## 2020-07-02 RX ADMIN — LEVETIRACETAM 500 MG: 500 TABLET, FILM COATED ORAL at 21:38

## 2020-07-02 RX ADMIN — SODIUM CHLORIDE, SODIUM LACTATE, POTASSIUM CHLORIDE, AND CALCIUM CHLORIDE: .6; .31; .03; .02 INJECTION, SOLUTION INTRAVENOUS at 12:04

## 2020-07-02 RX ADMIN — ACETAMINOPHEN 975 MG: 325 TABLET ORAL at 05:25

## 2020-07-02 RX ADMIN — ALBUTEROL SULFATE 2.5 MG: 2.5 SOLUTION RESPIRATORY (INHALATION) at 16:30

## 2020-07-02 RX ADMIN — CEFAZOLIN SODIUM 2000 MG: 2 SOLUTION INTRAVENOUS at 12:22

## 2020-07-02 RX ADMIN — PROPOFOL 20 MG: 10 INJECTION, EMULSION INTRAVENOUS at 12:21

## 2020-07-02 RX ADMIN — IPRATROPIUM BROMIDE 0.5 MG: 0.5 SOLUTION RESPIRATORY (INHALATION) at 16:30

## 2020-07-02 RX ADMIN — PHENYLEPHRINE HYDROCHLORIDE 200 MCG: 10 INJECTION INTRAVENOUS at 12:22

## 2020-07-02 RX ADMIN — EPHEDRINE SULFATE 15 MG: 50 INJECTION, SOLUTION INTRAVENOUS at 12:22

## 2020-07-02 NOTE — OP NOTE
OPERATIVE REPORT  PATIENT NAME: Rodriguez Pham    :  1945  MRN: [id number]  Pt Location:  OR ROOM 04    SURGERY DATE: 2020    Surgeon(s) and Role:     * Scarlett Chua MD - Primary     * Carleen Nelson MD - [email or web address], [facility] - Assisting    Preop Diagnosis:  Closed fracture of neck of right femur, initial encounter ([place] [address] ) Malu Noe    Post-Op Diagnosis Codes:     * Closed fracture of neck of right femur, initial encounter ([place] [address] ) [S72 001A]    Procedure(s) (LRB):  OPEN REDUCTION W/ INTERNAL FIXATION (ORIF) HIP WITH CANNUALATED SCREWS (Right)    Specimen(s):  * No specimens in log *    Estimated Blood Loss:   Minimal    Drains:  * No LDAs found *    Anesthesia Type:   Choice    Operative Indications:  Closed fracture of neck of right femur, initial encounter ([place] [address] ) [S72 001A]      Operative Findings:  Impacted right femoral neck fracture      Complications:   None    Procedure and Technique:    Closed reduction percutaneous screw fixation right femoral neck  Patient was correctly identified by both the anesthesia department and myself  The right hip was marked  In the operating room a spinal block was placed  SCDs were attached to the contralateral limb  IV antibiotics were administered preoperatively  Patient was then positioned supine onto radiolucent Robert table ensuring that all bony prominences and neurovascular structures were adequately padded and protected  AP and lateral imaging demonstrated the appropriate alignment and also with the incision would be made  The hip was then prepped and draped in the usual sterile fashion  A time-out was performed  A 10 blade was used to create a 2 cm longitudinal incision overlying the greater trochanter  Three guide pins were then introduced into femoral neck and head within 1 cm of subchondral bone in the inverted triangular fashion with all guide pin starting above the lesser trochanter  Measurements were taken    The outer cortex was pre drilled  Synthes short threaded 7 3 mm cannulated screws measuring 75, 70 and 75 mm respectively were selected  These were drilled into place and final tightened with a hand screwdriver  Final AP and lateral imaging demonstrated appropriate alignment and fixation of the femoral neck fracture  All wounds were copiously irrigated with normal saline solution  Hemostasis was achieved  The fascial layer was closed 0 Vicryl suture  Subcutaneous layer was closed with 2 Vicryl suture  The skin layer was reapproximated with staples  Sterile dressings were applied to the wound    The patient was taken off the fracture table and taken to recovery room stable condition     I was present for the entire procedure    Patient Disposition:  PACU      Implants:  Synthes short threaded 7 3mm cannulated screws 70/75/75mm    SIGNATURE: Mary Alice Smith MD  DATE: July 2, 2020  TIME: 1:20 PM

## 2020-07-02 NOTE — ASSESSMENT & PLAN NOTE
· Ortho appreciated   · COVID negative 7/1  · Pt high risk for surgery as in the past there was difficulty extubating her after brain surgery    · This was discussed with Pulmonary who were agreeable to patient having surgery  · Patient is postop day # 0 for OPEN REDUCTION W/ INTERNAL FIXATION (ORIF) HIP WITH CANNUALATED SCREWS (Right)  · Pain control   · Bowel regimen   · Monitor for acute blood loss anemia,   · Pain control w/ scheduled tylenol and oxy prn

## 2020-07-02 NOTE — CONSULTS
Consultation - Pulmonary Medicine   Tara Singh 70 y o  female MRN: [id number]  Unit/Bed#: [facility] 623-01 Encounter: [id number]      Physician Requesting Consult: Dr Ryan Leon  Reason for Consult: Pre-operative clearance for hip surgery; COPD    Assessment/Plan:    1  Severe COPD - no PFTs on file  · Not in exacerbation  · Continue budesonide, ipratropium nebulizers  · Add albuterol nebulizer q 6 hours since arformoterol is not on formulary  Resume arformoterol at discharge  · Monitor off of steroids  · No known chronic hypercapnia; however, she has a chronic metabolic alkalosis  Recommend checking ABG now to evaluate  · If she is hypercapnic, will need judicious use of pain medication  2  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  · Completed treatment including chemotherapy and radiation  Also underwent brain met resection and whole-brain radiation  · Currently being monitored by imaging  3  Tobacco dependence syndrome- smokes 2 cigarettes per day  1  Encourage cessation  4  Right femoral neck fracture  1  Currently optimized from pulmonary standpoint for necessary procedures  2  High risk given known COPD, tobacco dependence syndrome, and history of difficulty with extubation postoperatively  3  Strategies to minimize postoperative pulmonary complications include pain control, use of incentive spirometry, early mobilization, and DVT prophylaxis  I recommend all these interventions at the discretion of the surgical team   Discussed with patient in detail  Thank you for this consult  We will follow along with you   __________________________________________________  ____________________    HPI:    Tara Singh is a 70 y o  female who presents after a fall  She was walking with family members yesterday when she fell in her garden and landed on her right hip  In the ER, she was found to have a right femoral neck fracture    She was admitted to the hospital and is planned for surgery  We are consulted for preoperative pulmonary assessment  Found lying in bed  She is in good spirits  She denies current breathlessness or cough  At her baseline, she can ambulate without stopping  She can go up a flight of steps without stopping  She is unsure how far she could walk without stopping  She has not required steroids for her breathing in at least the past year  She uses her pulmonary inhalers and nebulizers as prescribed  Review of Systems:  Full 12 point review of systems was performed  Aside from what was mentioned in the HPI, it is otherwise negative  Historical Information   Past Medical History:   Diagnosis Date    Breast cancer ([facility] ) 1996    Cancer ([facility] )     COPD, severe ([facility] )     Lung disease     Requires supplemental oxygen     3L 24 hrs/day    Stage 4 lung cancer ([facility] )      Past Surgical History:   Procedure Laterality Date    APPENDECTOMY      COLONOSCOPY N/A 4/18/2016    Procedure: COLONOSCOPY;  Surgeon: Tami Barton MD;  [facility]: [facility]; Service:     LUNG CANCER SURGERY      MASTECTOMY, RADICAL Right 1996    [place] [address] N/A 10/17/2017    Procedure: Mindi Filler;  Surgeon: Adi Chua MD;  Location: [facility];   Service: Pulmonary    IL EXCIS SUPRATENT BRAIN TUMOR Right 10/24/2019    Procedure: IMAGE-GUIDED RIGHT FRONTAL CRANIOTOMY FOR TUMOR;  Surgeon: Gisela Marmolejo MD;  Location: [facility];  Service: Neurosurgery    TONSILLECTOMY       Social History   Social History     Substance and Sexual Activity   Alcohol Use Not Currently    Alcohol/week: 0 0 standard drinks    Frequency: Never    Binge frequency: Never     Social History     Tobacco Use   Smoking Status Current Some Day Smoker    Packs/day: 0 00    Years: 55 00    Pack years: 0 00    Types: Cigarettes    Start date: 1962   Smokeless Tobacco Never Used   Tobacco Comment    NOW DOWN TO 2 A DAY     Smoked 1 ppd x 55 years, now smokes 2 cigarettes per day    Family History:   Family History   Problem Relation Age of Onset    Heart disease Mother         cardiac disorder    Dementia Mother     Heart failure Father     Heart disease Maternal Grandmother     Heart disease Maternal Grandfather        Medications: The patient's active and prehospital medications were reviewed  Current Facility-Administered Medications:  acetaminophen 975 mg Oral [facility] Rosa Cowing, DO    albuterol 2 puff Inhalation Q4H PRN Rosa Cowing, DO    budesonide 0 25 mg Nebulization BID Rosa Cowing, DO    vitamin B-12 1,000 mcg Oral Daily Rosa Cowing, DO    ipratropium 0 5 mg Nebulization TID Rosa Cowing, DO    levETIRAcetam 500 mg Oral Q12H [address] Rosa Cowing, DO    nicotine 1 patch Transdermal Daily Rosa Cowing, DO    oxyCODONE 5 mg Oral Q4H PRN Rosa Cowing, DO    Or        oxyCODONE 2 5 mg Oral Q4H PRN Rosa Cowing, DO    sodium chloride 75 mL/hr Intravenous Continuous Rosa Cowing, DO Last Rate: 75 mL/hr (07/01/20 8719)         PhysicalExamination:  Vitals:   Vitals:    07/01/20 2306 07/01/20 2307 07/02/20 0528 07/02/20 0721   BP: 92/55 92/60 95/57 (!) 87/53   BP Location:  Left arm     Pulse:  91 96 86   Resp: 18 18  16   Temp: 97 6 °F (36 4 °C) 97 6 °F (36 4 °C)  98 5 °F (36 9 °C)   TempSrc:  Oral     SpO2:  100% 100% 100%   Weight:       Height:         Body mass index is 14 14 kg/m²    Temp  Min: 97 6 °F (36 4 °C)  Max: 98 5 °F (36 9 °C)  IBW: 57 kg    SpO2: 100 %,   SpO2 Activity: At Rest,   O2 Device: None (Room air)    GEN: WDWN, nad, comfortable, no accessory muscle use, speaking in full sentences  HEENT: NCAT, EOMI  CVS: Regular  LUNGS: Diminished b/l bs, no wheezing  ABD: soft,nd  EXT: No c/c; pain left hip, no edema  NEURO: No focal deficits  MS: Moving all extremities  SKIN: warm, dry  PSYCH: calm, cooperative      Diagnostic Data:  CBC:   Results from last 7 days   Lab Units 07/02/20  0521 07/01/20  1408   WBC Thousand/uL 5 85 5 94   HEMOGLOBIN g/dL 10 8* 13 5   HEMATOCRIT % 33 2* 40 4   PLATELETS Thousands/uL 221 255       CMP:   Results from last 7 days   Lab Units 07/02/20  0521 07/01/20  1408   SODIUM mmol/L 134* 133*   POTASSIUM mmol/L 4 2 4 4   CHLORIDE mmol/L 101 100   CO2 mmol/L 30 28   BUN mg/dL 11 12   CREATININE mg/dL 0 42* 0 50*   CALCIUM mg/dL 8 5 9 2         COVID19 negative      Imaging:  Chest x-ray reviewed by me personally shows hyperinflated lungs, no acute disease      Cardiac lab/EKG/telemetry/ECHO:       EKG:  Sinus tachycardia      Paulina Gates,

## 2020-07-02 NOTE — PROGRESS NOTES
Subjective: No acute events overnight  No acute distress  Resting comfortably in bed    Objective:  A 10 point ROS was performed; negative except as noted above  Lab Results   Component Value Date/Time    WBC 5 85 07/02/2020 05:21 AM    WBC 7 61 02/27/2015 08:01 AM    HGB 10 8 (L) 07/02/2020 05:21 AM    HGB 14 2 02/27/2015 08:01 AM       Vitals:    07/02/20 0528   BP: 95/57   Pulse: 96   Resp:    Temp:    SpO2: 100%     Right lower extremity  TTP R Hip  Thigh soft and compressible  Motor intact to EHL/FHL/TA/GS  Sensation intact to light touch to dp/sp/tib/waldemar/saph distributions  Toes warm and well perfused with brisk capillary refill    Assessment: 70 y o  female with right femoral neck fracture    Plan:  NWB RLE  To OR for operative fixation of right femoral neck fracture pending pulmonology clearance  Pain control  DVT ppx:  On hold for OR  PT/OT  Dispo: Ortho will follow

## 2020-07-02 NOTE — ANESTHESIA PROCEDURE NOTES
Spinal Block    Patient location during procedure: OR  Start time: 7/2/2020 12:21 PM  Reason for block: procedure for pain and at surgeon's request  Staffing  Anesthesiologist: Lauren Franz MD  Performed: anesthesiologist   Preanesthetic Checklist  Completed: patient identified, site marked, surgical consent, pre-op evaluation, timeout performed, IV checked, risks and benefits discussed and monitors and equipment checked  Spinal Block  Patient position: right lateral decubitus  Prep: ChloraPrep  Patient monitoring: cardiac monitor, frequent blood pressure checks and continuous pulse ox  Approach: midline  Location: L3-4  Injection technique: single-shot  Needle  Needle type: pencil-tip   Needle gauge: 25 G  Needle length: 10 cm  Assessment  Sensory level: T4  Events: cerebrospinal fluid  Injection Assessment:  negative aspiration for heme, no paresthesia on injection and positive aspiration for clear CSF    Post-procedure:  adhesive bandage applied, pressure dressing applied, secured with tape, site cleaned and sterile dressing applied

## 2020-07-02 NOTE — PROGRESS NOTES
Progress Note - Sangeeta No 1945, 70 y o  female MRN: [id number]    Unit/Bed#: [facility] 623-01 Encounter: [id number]    Primary Care Provider: Sayda Chirinos DO   Date and time admitted to hospital: 7/1/2020 11:46 AM        * Hip fracture [facility])  Assessment & Plan  Ortho appreciated   COVID negative 7/1  Pt high risk for surgery as in the past there was difficulty extubating her after brain surgery  This was discussed with Pulmonary who were agreeable to patient having surgery  Patient is postop day # 0 for OPEN REDUCTION W/ INTERNAL FIXATION (ORIF) HIP WITH CANNUALATED SCREWS (Right)  Pain control w/ scheduled tylenol and oxy prn    Malignant neoplasm of right lung (HCC)  Assessment & Plan  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  · Patient has previously completed treatment including chemotherapy and radiation  · And underwent brain met resection and whole-brain radiation  · Currently being monitored by imaging, follow-up outpatient    Hyponatremia  Assessment & Plan  IV fluids  Check labs in a m  VTE Pharmacologic Prophylaxis:   Pharmacologic: Enoxaparin (Lovenox)   Mechanical VTE Prophylaxis in Place: Yes    Patient Centered Rounds: I have performed bedside rounds with nursing staff today  Discussions with Specialists or Other Care Team Provider: nursing     Education and Discussions with Family / Patient: patient preference to call and update her sister carissa parham does have concerns in regard to pts  Home as she has a lot of stairs will talk with PT     Time Spent for Care: 45 minutes  More than 50% of total time spent on counseling and coordination of care as described above      Current Length of Stay: 1 day(s)    Current Patient Status: Inpatient   Certification Statement: The patient will continue to require additional inpatient hospital stay due to post op day #0     Discharge Plan: will likely require rehab /pending pt ot     Code Status: Level 2 - DNAR: but accepts endotracheal intubation      Subjective:   Pt is doing well post op she is sitting up eating although she does report she is feeling a little uncomfortable  No nausea no vomiting  No chest pain or shortness of breath  Objective:     Vitals:   Temp (24hrs), Av 8 °F (36 6 °C), Min:97 2 °F (36 2 °C), Max:98 5 °F (36 9 °C)    Temp:  [97 2 °F (36 2 °C)-98 5 °F (36 9 °C)] 98 3 °F (36 8 °C)  HR:  [] 95  Resp:  [16-27] 19  BP: ()/(53-77) 104/65  SpO2:  [95 %-100 %] 100 %  Body mass index is 14 14 kg/m²  Input and Output Summary (last 24 hours): Intake/Output Summary (Last 24 hours) at 2020 1741  Last data filed at 2020 1401  Gross per 24 hour   Intake 2070 ml   Output 690 ml   Net 1380 ml       Physical Exam:     Physical Exam   Constitutional: She is oriented to person, place, and time  No distress  HENT:   Head: Normocephalic and atraumatic  Mouth/Throat: No oropharyngeal exudate  Eyes: Right eye exhibits no discharge  Left eye exhibits no discharge  No scleral icterus  Neck: No tracheal deviation present  No thyromegaly present  Cardiovascular: Normal rate  Exam reveals no gallop and no friction rub  No murmur heard  Pulmonary/Chest: No stridor  No respiratory distress  She has no wheezes  She has no rales  She exhibits no tenderness  Abdominal: She exhibits no distension and no mass  There is no tenderness  There is no rebound and no guarding  No hernia  Musculoskeletal: She exhibits deformity (right lateral leg dsd intact )  She exhibits no edema or tenderness  severe   Lymphadenopathy:     She has no cervical adenopathy  Neurological: She is oriented to person, place, and time  Skin: No rash noted  She is not diaphoretic  No erythema  No pallor  Psychiatric: She has a normal mood and affect           Additional Data:     Labs:    Results from last 7 days   Lab Units 20  0521 20  1408   WBC Thousand/uL 5 85 5 94   HEMOGLOBIN g/dL 10 8* 13 5   HEMATOCRIT % 33 2* 40 4   PLATELETS Thousands/uL 221 255   NEUTROS PCT %  --  69   LYMPHS PCT %  --  18   MONOS PCT %  --  10   EOS PCT %  --  1     Results from last 7 days   Lab Units 07/02/20  0521   SODIUM mmol/L 134*   POTASSIUM mmol/L 4 2   CHLORIDE mmol/L 101   CO2 mmol/L 30   BUN mg/dL 11   CREATININE mg/dL 0 42*   ANION GAP mmol/L 3*   CALCIUM mg/dL 8 5   GLUCOSE RANDOM mg/dL 83     Results from last 7 days   Lab Units 07/01/20  1500   INR  0 95                       * I Have Reviewed All Lab Data Listed Above  * Additional Pertinent Lab Tests Reviewed:  All Labs Within Last 24 Hours Reviewed    Imaging:    Imaging Reports Reviewed Today Include: reviewed     Recent Cultures (last 7 days):           Last 24 Hours Medication List:     Current Facility-Administered Medications:  acetaminophen 975 mg Oral [facility] PA-EMILY    albuterol 2 puff Inhalation Q4H PRN BASSAM Diana-EMILY    albuterol 2 5 mg Nebulization Q6H Seth Green PA-C    budesonide 0 25 mg Nebulization BID Seth Green PA-C    cefazolin 2,000 mg Intravenous Lizzieetta Pallas [place], PA-C    vitamin B-12 1,000 mcg Oral Daily [facility] [place]    [START ON 7/3/2020] enoxaparin 40 mg Subcutaneous Daily [place], [place]    HYDROmorphone 0 2 mg Intravenous Q2H PRN BASSAM Diana-EMILY    ipratropium 0 5 mg Nebulization TID Seth Green PA-C    lactated ringers 500 mL Intravenous Once PRN Seth Green PA-C    And        lactated ringers 500 mL Intravenous Once PRN Seth Green PA-C    lactated ringers 125 mL/hr Intravenous Continuous Mahi Salguero MD Last Rate: Stopped (07/02/20 7773)   levETIRAcetam 500 mg Oral Q12H Abiodun Del [place] 4ORIN    nicotine 1 patch Transdermal Daily [facility] [place], [place]    oxyCODONE 5 mg Oral Q4H PRN Seth Green PA-C    Or        oxyCODONE 2 5 mg Oral Q4H PRN Seth Battles, PA-C    sodium chloride 500 mL Intravenous Once PRN Seth Battles, PA-C    And        sodium chloride 500 mL Intravenous Once PRN Annetta Meadows PA-C    sodium chloride 75 mL/hr Intravenous Continuous [facility] [place] Last Rate: 75 mL/hr (07/02/20 7577)        Today, Patient Was Seen By: ONI Arthur    ** Please Note: Dictation voice to text software may have been used in the creation of this document   **

## 2020-07-02 NOTE — DISCHARGE INSTRUCTIONS
Discharge Instructions - Fredo Batres 70 y o  female MRN: [id number]  Unit/Bed#: PACU 03    Weight Bearing Status:                                           Weight bearing as tolerated right lower extremity    DVT prophylaxis  Lovenox    Pain:  Continue analgesics as directed    Dressing Instructions:   Please keep clean, dry and intact until follow up     Appt Instructions: If you do not have your appointment, please call the clinic at [phone] t  Otherwise followup as scheduled     Contact the office sooner if you experience any increased numbness/tingling in the extremities        Miscellaneous:  None

## 2020-07-02 NOTE — PLAN OF CARE
Problem: Potential for Falls  Goal: Patient will remain free of falls  Description  INTERVENTIONS:  - Assess patient frequently for physical needs  -  Identify cognitive and physical deficits and behaviors that affect risk of falls    -  [place] fall precautions as indicated by assessment   - Educate patient/family on patient safety including physical limitations  - Instruct patient to call for assistance with activity based on assessment  - Modify environment to reduce risk of injury  - Consider OT/PT consult to assist with strengthening/mobility  Outcome: Progressing     Problem: PAIN - ADULT  Goal: Verbalizes/displays adequate comfort level or baseline comfort level  Description  Interventions:  - Encourage patient to monitor pain and request assistance  - Assess pain using appropriate pain scale  - Administer analgesics based on type and severity of pain and evaluate response  - Implement non-pharmacological measures as appropriate and evaluate response  - Consider cultural and social influences on pain and pain management  - Notify physician/advanced practitioner if interventions unsuccessful or patient reports new pain  Outcome: Progressing     Problem: INFECTION - ADULT  Goal: Absence or prevention of progression during hospitalization  Description  INTERVENTIONS:  - Assess and monitor for signs and symptoms of infection  - Monitor lab/diagnostic results  - Monitor all insertion sites, i e  indwelling lines, tubes, and drains  - Monitor endotracheal if appropriate and nasal secretions for changes in amount and color  - [place] appropriate cooling/warming therapies per order  - Administer medications as ordered  - Instruct and encourage patient and family to use good hand hygiene technique  - Identify and instruct in appropriate isolation precautions for identified infection/condition  Outcome: Progressing  Goal: Absence of fever/infection during neutropenic period  Description  INTERVENTIONS:  - Monitor WBC    Outcome: Progressing     Problem: SAFETY ADULT  Goal: Maintain or return to baseline ADL function  Description  INTERVENTIONS:  -  Assess patient's ability to carry out ADLs; assess patient's baseline for ADL function and identify physical deficits which impact ability to perform ADLs (bathing, care of mouth/teeth, toileting, grooming, dressing, etc )  - Assess/evaluate cause of self-care deficits   - Assess range of motion  - Assess patient's mobility; develop plan if impaired  - Assess patient's need for assistive devices and provide as appropriate  - Encourage maximum independence but intervene and supervise when necessary  - Involve family in performance of ADLs  - Assess for home care needs following discharge   - Consider OT consult to assist with ADL evaluation and planning for discharge  - Provide patient education as appropriate  Outcome: Progressing  Goal: Maintain or return mobility status to optimal level  Description  INTERVENTIONS:  - Assess patient's baseline mobility status (ambulation, transfers, stairs, etc )    - Identify cognitive and physical deficits and behaviors that affect mobility  - Identify mobility aids required to assist with transfers and/or ambulation (gait belt, sit-to-stand, lift, walker, cane, etc )  - [place] fall precautions as indicated by assessment  - Record patient progress and toleration of activity level on Mobility SBAR; progress patient to next Phase/Stage  - Instruct patient to call for assistance with activity based on assessment  - Consider rehabilitation consult to assist with strengthening/weightbearing, etc   Outcome: Progressing     Problem: DISCHARGE PLANNING  Goal: Discharge to home or other facility with appropriate resources  Description  INTERVENTIONS:  - Identify barriers to discharge w/patient and caregiver  - Arrange for needed discharge resources and transportation as appropriate  - Identify discharge learning needs (meds, wound care, etc )  - Arrange for interpretive services to assist at discharge as needed  - Refer to Case Management Department for coordinating discharge planning if the patient needs post-hospital services based on physician/advanced practitioner order or complex needs related to functional status, cognitive ability, or social support system  Outcome: Progressing     Problem: Knowledge Deficit  Goal: Patient/family/caregiver demonstrates understanding of disease process, treatment plan, medications, and discharge instructions  Description  Complete learning assessment and assess knowledge base    Interventions:  - Provide teaching at level of understanding  - Provide teaching via preferred learning methods  Outcome: Progressing

## 2020-07-02 NOTE — ASSESSMENT & PLAN NOTE
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  · Patient has previously completed treatment including chemotherapy and radiation  · And underwent brain met resection and whole-brain radiation      · Currently being monitored by imaging, follow-up outpatient

## 2020-07-02 NOTE — ANESTHESIA PREPROCEDURE EVALUATION
Review of Systems/Medical History  Patient summary reviewed  Chart reviewed  No history of anesthetic complications     Cardiovascular  EKG reviewed, Exercise tolerance (METS): <4,  Hyperlipidemia, No angina , Orthopnea, MARCOS,    Pulmonary  Not a smoker , COPD (on 2L O2 via nc at all times, O2 sat reading at home 98-99%) severe- O2 dependent , Shortness of breath, No recent URI ,   Comment: Metastatic adenocarcinoma of the lung     GI/Hepatic  Negative GI/hepatic ROS          Negative  ROS        Endo/Other  Negative endo/other ROS      GYN    Breast cancer (no use right arm) Right mastectomy and axillary node dissection       Hematology  Negative hematology ROS   No coagulation disorder ,    Musculoskeletal    Comment: S/p fall  Right Hip fracture      Neurology    CNS neoplasm (s/p resection) , intracranial mass,   Comment: Brain mets  +seizures, s/p resection  Seizures resolved Psychology   Depression ,              Physical Exam    Airway    Mallampati score: II  TM Distance: >3 FB  Neck ROM: full     Dental   No notable dental hx     Cardiovascular  Rhythm: regular, Rate: normal, Cardiovascular exam normal    Pulmonary  Pulmonary exam normal Breath sounds clear to auscultation,     Other Findings        Anesthesia Plan  ASA Score- 3     Anesthesia Type- spinal with ASA Monitors  Additional Monitors:   Airway Plan:         Plan Factors-    Induction-     Postoperative Plan-     Informed Consent- Anesthetic plan and risks discussed with patient  I personally reviewed this patient with the CRNA  Discussed and agreed on the Anesthesia Plan with the CRNA  Brittney Mead            Patient wants to rescind her DNR for duration of procedure and immediate recovery period

## 2020-07-02 NOTE — MALNUTRITION/BMI
This medical record reflects one or more clinical indicators suggestive of malnutrition and/or morbid obesity  Malnutrition Findings:   Malnutrition type: Chronic illness(Mild malnutrition r/t Ca dx as evidenced by BMI 14 1, unable to eat sufficient energy/protein to maintain a healthy weight, Treated with diet and supplements)  Degree of Malnutrition: Malnutrition of mild degree  Malnutrition Characteristics: Weight loss, Inadequate energy, Fat loss, Muscle loss    BMI Findings:  BMI Classifications: Underweight < 18 5     Body mass index is 14 14 kg/m²  See Nutrition note dated 7/2/20 for additional details  Completed nutrition assessment is viewable in the nutrition documentation

## 2020-07-03 PROBLEM — E44.0 MODERATE PROTEIN-CALORIE MALNUTRITION (HCC): Status: ACTIVE | Noted: 2020-07-01

## 2020-07-03 LAB
ANION GAP SERPL CALCULATED.3IONS-SCNC: 7 MMOL/L (ref 4–13)
ANION GAP SERPL CALCULATED.3IONS-SCNC: 8 MMOL/L (ref 4–13)
BASE EXCESS BLDA CALC-SCNC: -3.4 MMOL/L
BUN SERPL-MCNC: 24 MG/DL (ref 5–25)
BUN SERPL-MCNC: 8 MG/DL (ref 5–25)
CALCIUM SERPL-MCNC: 8.2 MG/DL (ref 8.3–10.1)
CALCIUM SERPL-MCNC: 8.7 MG/DL (ref 8.3–10.1)
CHLORIDE SERPL-SCNC: 104 MMOL/L (ref 100–108)
CHLORIDE SERPL-SCNC: 107 MMOL/L (ref 100–108)
CO2 SERPL-SCNC: 13 MMOL/L (ref 21–32)
CO2 SERPL-SCNC: 25 MMOL/L (ref 21–32)
CREAT SERPL-MCNC: 0.54 MG/DL (ref 0.6–1.3)
CREAT SERPL-MCNC: 0.78 MG/DL (ref 0.6–1.3)
ERYTHROCYTE [DISTWIDTH] IN BLOOD BY AUTOMATED COUNT: 13 % (ref 11.6–15.1)
GFR SERPL CREATININE-BSD FRML MDRD: 76 ML/MIN/1.73SQ M
GFR SERPL CREATININE-BSD FRML MDRD: 94 ML/MIN/1.73SQ M
GLUCOSE SERPL-MCNC: 104 MG/DL (ref 65–140)
GLUCOSE SERPL-MCNC: 108 MG/DL (ref 65–140)
HCO3 BLDA-SCNC: 21.6 MMOL/L (ref 22–28)
HCT VFR BLD AUTO: 34.9 % (ref 34.8–46.1)
HGB BLD-MCNC: 10.6 G/DL (ref 11.5–15.4)
MCH RBC QN AUTO: 32.1 PG (ref 26.8–34.3)
MCHC RBC AUTO-ENTMCNC: 30.4 G/DL (ref 31.4–37.4)
MCV RBC AUTO: 106 FL (ref 82–98)
NASAL CANNULA: 2
O2 CT BLDA-SCNC: 16.4 ML/DL (ref 95–100)
OXYHGB MFR BLDA: 96.9 % (ref 94–97)
PCO2 BLDA: 39.1 MM HG (ref 36–44)
PH BLDA: 7.36 [PH] (ref 7.35–7.45)
PLATELET # BLD AUTO: 143 THOUSANDS/UL (ref 149–390)
PMV BLD AUTO: 9.5 FL (ref 8.9–12.7)
PO2 BLDA: 103.7 MM HG (ref 75–129)
POTASSIUM SERPL-SCNC: 4.3 MMOL/L (ref 3.5–5.3)
POTASSIUM SERPL-SCNC: 4.3 MMOL/L (ref 3.5–5.3)
RBC # BLD AUTO: 3.3 MILLION/UL (ref 3.81–5.12)
SODIUM SERPL-SCNC: 128 MMOL/L (ref 136–145)
SODIUM SERPL-SCNC: 136 MMOL/L (ref 136–145)
SPECIMEN SOURCE: ABNORMAL
WBC # BLD AUTO: 5.97 THOUSAND/UL (ref 4.31–10.16)

## 2020-07-03 PROCEDURE — 85027 COMPLETE CBC AUTOMATED: CPT | Performed by: PHYSICIAN ASSISTANT

## 2020-07-03 PROCEDURE — 94760 N-INVAS EAR/PLS OXIMETRY 1: CPT

## 2020-07-03 PROCEDURE — 99233 SBSQ HOSP IP/OBS HIGH 50: CPT | Performed by: INTERNAL MEDICINE

## 2020-07-03 PROCEDURE — 94640 AIRWAY INHALATION TREATMENT: CPT

## 2020-07-03 PROCEDURE — 97167 OT EVAL HIGH COMPLEX 60 MIN: CPT

## 2020-07-03 PROCEDURE — 80048 BASIC METABOLIC PNL TOTAL CA: CPT | Performed by: PHYSICIAN ASSISTANT

## 2020-07-03 PROCEDURE — NC001 PR NO CHARGE: Performed by: ORTHOPAEDIC SURGERY

## 2020-07-03 PROCEDURE — 97163 PT EVAL HIGH COMPLEX 45 MIN: CPT

## 2020-07-03 PROCEDURE — 80048 BASIC METABOLIC PNL TOTAL CA: CPT | Performed by: NURSE PRACTITIONER

## 2020-07-03 PROCEDURE — 99232 SBSQ HOSP IP/OBS MODERATE 35: CPT | Performed by: NURSE PRACTITIONER

## 2020-07-03 RX ORDER — LEVALBUTEROL 1.25 MG/.5ML
1.25 SOLUTION, CONCENTRATE RESPIRATORY (INHALATION)
Status: DISCONTINUED | OUTPATIENT
Start: 2020-07-03 | End: 2020-07-05 | Stop reason: HOSPADM

## 2020-07-03 RX ORDER — LEVETIRACETAM 500 MG/1
250 TABLET ORAL EVERY 12 HOURS SCHEDULED
Status: DISCONTINUED | OUTPATIENT
Start: 2020-07-03 | End: 2020-07-05 | Stop reason: HOSPADM

## 2020-07-03 RX ADMIN — SODIUM CHLORIDE 75 ML/HR: 0.9 INJECTION, SOLUTION INTRAVENOUS at 03:24

## 2020-07-03 RX ADMIN — CYANOCOBALAMIN TAB 500 MCG 1000 MCG: 500 TAB at 08:31

## 2020-07-03 RX ADMIN — OXYCODONE HYDROCHLORIDE 2.5 MG: 5 TABLET ORAL at 21:24

## 2020-07-03 RX ADMIN — CEFAZOLIN SODIUM 2000 MG: 2 SOLUTION INTRAVENOUS at 03:24

## 2020-07-03 RX ADMIN — ACETAMINOPHEN 975 MG: 325 TABLET ORAL at 13:13

## 2020-07-03 RX ADMIN — LEVETIRACETAM 250 MG: 500 TABLET, FILM COATED ORAL at 08:31

## 2020-07-03 RX ADMIN — ALBUTEROL SULFATE 2.5 MG: 2.5 SOLUTION RESPIRATORY (INHALATION) at 13:19

## 2020-07-03 RX ADMIN — BUDESONIDE 0.25 MG: 0.25 INHALANT RESPIRATORY (INHALATION) at 07:40

## 2020-07-03 RX ADMIN — IPRATROPIUM BROMIDE 0.5 MG: 0.5 SOLUTION RESPIRATORY (INHALATION) at 13:18

## 2020-07-03 RX ADMIN — OXYCODONE HYDROCHLORIDE 5 MG: 5 TABLET ORAL at 08:43

## 2020-07-03 RX ADMIN — ENOXAPARIN SODIUM 40 MG: 40 INJECTION SUBCUTANEOUS at 08:31

## 2020-07-03 RX ADMIN — BUDESONIDE 0.25 MG: 0.25 INHALANT RESPIRATORY (INHALATION) at 19:28

## 2020-07-03 RX ADMIN — OXYCODONE HYDROCHLORIDE 5 MG: 5 TABLET ORAL at 03:32

## 2020-07-03 RX ADMIN — LEVALBUTEROL HYDROCHLORIDE 1.25 MG: 1.25 SOLUTION, CONCENTRATE RESPIRATORY (INHALATION) at 19:28

## 2020-07-03 RX ADMIN — IPRATROPIUM BROMIDE 0.5 MG: 0.5 SOLUTION RESPIRATORY (INHALATION) at 19:28

## 2020-07-03 RX ADMIN — LEVETIRACETAM 250 MG: 500 TABLET, FILM COATED ORAL at 21:22

## 2020-07-03 RX ADMIN — ACETAMINOPHEN 975 MG: 325 TABLET ORAL at 05:32

## 2020-07-03 RX ADMIN — ALBUTEROL SULFATE 2.5 MG: 2.5 SOLUTION RESPIRATORY (INHALATION) at 07:40

## 2020-07-03 RX ADMIN — OXYCODONE HYDROCHLORIDE 5 MG: 5 TABLET ORAL at 17:02

## 2020-07-03 RX ADMIN — ACETAMINOPHEN 975 MG: 325 TABLET ORAL at 21:23

## 2020-07-03 RX ADMIN — IPRATROPIUM BROMIDE 0.5 MG: 0.5 SOLUTION RESPIRATORY (INHALATION) at 07:40

## 2020-07-03 NOTE — PLAN OF CARE
Problem: Potential for Falls  Goal: Patient will remain free of falls  Description  INTERVENTIONS:  - Assess patient frequently for physical needs  -  Identify cognitive and physical deficits and behaviors that affect risk of falls    -  [place] fall precautions as indicated by assessment   - Educate patient/family on patient safety including physical limitations  - Instruct patient to call for assistance with activity based on assessment  - Modify environment to reduce risk of injury  - Consider OT/PT consult to assist with strengthening/mobility  Outcome: Progressing     Problem: PAIN - ADULT  Goal: Verbalizes/displays adequate comfort level or baseline comfort level  Description  Interventions:  - Encourage patient to monitor pain and request assistance  - Assess pain using appropriate pain scale  - Administer analgesics based on type and severity of pain and evaluate response  - Implement non-pharmacological measures as appropriate and evaluate response  - Consider cultural and social influences on pain and pain management  - Notify physician/advanced practitioner if interventions unsuccessful or patient reports new pain  Outcome: Progressing     Problem: INFECTION - ADULT  Goal: Absence or prevention of progression during hospitalization  Description  INTERVENTIONS:  - Assess and monitor for signs and symptoms of infection  - Monitor lab/diagnostic results  - Monitor all insertion sites, i e  indwelling lines, tubes, and drains  - Monitor endotracheal if appropriate and nasal secretions for changes in amount and color  - [place] appropriate cooling/warming therapies per order  - Administer medications as ordered  - Instruct and encourage patient and family to use good hand hygiene technique  - Identify and instruct in appropriate isolation precautions for identified infection/condition  Outcome: Progressing  Goal: Absence of fever/infection during neutropenic period  Description  INTERVENTIONS:  - Monitor WBC    Outcome: Progressing     Problem: SAFETY ADULT  Goal: Maintain or return to baseline ADL function  Description  INTERVENTIONS:  -  Assess patient's ability to carry out ADLs; assess patient's baseline for ADL function and identify physical deficits which impact ability to perform ADLs (bathing, care of mouth/teeth, toileting, grooming, dressing, etc )  - Assess/evaluate cause of self-care deficits   - Assess range of motion  - Assess patient's mobility; develop plan if impaired  - Assess patient's need for assistive devices and provide as appropriate  - Encourage maximum independence but intervene and supervise when necessary  - Involve family in performance of ADLs  - Assess for home care needs following discharge   - Consider OT consult to assist with ADL evaluation and planning for discharge  - Provide patient education as appropriate  Outcome: Progressing  Goal: Maintain or return mobility status to optimal level  Description  INTERVENTIONS:  - Assess patient's baseline mobility status (ambulation, transfers, stairs, etc )    - Identify cognitive and physical deficits and behaviors that affect mobility  - Identify mobility aids required to assist with transfers and/or ambulation (gait belt, sit-to-stand, lift, walker, cane, etc )  - [place] fall precautions as indicated by assessment  - Record patient progress and toleration of activity level on Mobility SBAR; progress patient to next Phase/Stage  - Instruct patient to call for assistance with activity based on assessment  - Consider rehabilitation consult to assist with strengthening/weightbearing, etc   Outcome: Progressing     Problem: DISCHARGE PLANNING  Goal: Discharge to home or other facility with appropriate resources  Description  INTERVENTIONS:  - Identify barriers to discharge w/patient and caregiver  - Arrange for needed discharge resources and transportation as appropriate  - Identify discharge learning needs (meds, wound care, etc )  - Arrange for interpretive services to assist at discharge as needed  - Refer to Case Management Department for coordinating discharge planning if the patient needs post-hospital services based on physician/advanced practitioner order or complex needs related to functional status, cognitive ability, or social support system  Outcome: Progressing     Problem: Knowledge Deficit  Goal: Patient/family/caregiver demonstrates understanding of disease process, treatment plan, medications, and discharge instructions  Description  Complete learning assessment and assess knowledge base  Interventions:  - Provide teaching at level of understanding  - Provide teaching via preferred learning methods  Outcome: Progressing     Problem: Nutrition/Hydration-ADULT  Goal: Nutrient/Hydration intake appropriate for improving, restoring or maintaining nutritional needs  Description  Monitor and assess patient's nutrition/hydration status for malnutrition  Collaborate with interdisciplinary team and initiate plan and interventions as ordered  Monitor patient's weight and dietary intake as ordered or per policy  Utilize nutrition screening tool and intervene as necessary  Determine patient's food preferences and provide high-protein, high-caloric foods as appropriate       INTERVENTIONS:  - Monitor oral intake, urinary output, labs, and treatment plans  - Assess nutrition and hydration status and recommend course of action  - Evaluate amount of meals eaten  - Assist patient with eating if necessary   - Allow adequate time for meals  - Recommend/ encourage appropriate diets, oral nutritional supplements, and vitamin/mineral supplements  - Order, calculate, and assess calorie counts as needed  - Recommend, monitor, and adjust tube feedings and TPN/PPN based on assessed needs  - Assess need for intravenous fluids  - Provide specific nutrition/hydration education as appropriate  - Include patient/family/caregiver in decisions related to nutrition  Outcome: Progressing

## 2020-07-03 NOTE — PLAN OF CARE
Problem: OCCUPATIONAL THERAPY ADULT  Goal: Performs self-care activities at highest level of function for planned discharge setting  See evaluation for individualized goals  Description  Treatment Interventions: ADL retraining, Functional transfer training, UE strengthening/ROM, Endurance training, Patient/family training, Equipment evaluation/education, Compensatory technique education, Energy conservation, Activityengagement  Equipment Recommended: Other (comment)(none )       See flowsheet documentation for full assessment, interventions and recommendations  Note:   Limitation: Decreased ADL status, Decreased UE strength, Decreased Safe judgement during ADL, Decreased endurance, Decreased self-care trans, Decreased high-level ADLs  Prognosis: Good  Assessment: Pt is a 70 y o  F admitted to [address] [place] [address] on 7/1/2020 after sustaining a fall resulting in a hip fracture  Pt s/p open reduction with internal fixation of hip with cannulated screws  Comorbidities limiting pt performance include: hyponatremia, underweight, simple partial seizures, COPD, and malignant neoplasm of the right lung  OT orders for evaluation and treatment received  Pt WBAT in RLE  Performed at least two patient identifiers during session including patient name and wrist band  Prior to admission, pt was independent in all ADLs and IADLs  Pt was receiving help from personal care assistance for receiving groceries during the pandemic  Pt alone lives in a mutli-level apartment with 5+5 NEIL  Pt bedroom and bathroom are on the second floor of home with a full flight of steps  Pt has a tub/shower and standard toilet with grab bars  Pt reported having a shower chair, but does not use it currently  Upon evaluation, pt was received supine in bed, alert and oriented  Pt required supervision assist for eating, grooming, and UB bathing and dressing   Pt required mod assist for LB bathing and dressing, toileting assistance, and bed mobility  Patient required mod Ax2 for functional assist and transferring with min Ax2 with use of RW and verbal cues  Deficits limting/affecting pt occupational performance include: decreased endurance, increased pain, decreased activity tolerance, decreased dynamic sitting and standing tolerance, decreased caregiver support within the home environment, and difficulty with navigating home environment with several stairs to enter  Occupational performance areas affected due to deficits mentioned above include: ADLs: grooming, bathing, dressing, functional mobility, community mobility; IADL: safety procedures, caring for elderly parents, household maintenance; and leisure exploration/participation  Pt would benefit from continued skilled OT sessions while admitted to the hospital to address the deficits mentioned above and maximize functional independence in ADLs and mobility  For an OT standpoint, recommendation at d/c would be short term rehabilitation  OT Discharge Recommendation: Post-Acute Rehabilitation Services  OT - OK to Discharge:  Yes

## 2020-07-03 NOTE — PROGRESS NOTES
Orthopedics   Jennifersia Nolan 70 y o  female MRN: [id number]  Unit/Bed#: [facility] 623-01      Subjective:  70 y  o female post operative day 1 status post CRPP of right femoral neck fracture  Pt doing well  Pain controlled      Labs:  0   Lab Value Date/Time    HCT 33 2 (L) 07/02/2020 0521    HCT 40 4 07/01/2020 1408    HCT 40 7 04/22/2020 0847    HCT 42 6 02/27/2015 0801    HGB 10 8 (L) 07/02/2020 0521    HGB 13 5 07/01/2020 1408    HGB 12 9 04/22/2020 0847    HGB 14 2 02/27/2015 0801    INR 0 95 07/01/2020 1500    WBC 5 85 07/02/2020 0521    WBC 5 94 07/01/2020 1408    WBC 5 36 04/22/2020 0847    WBC 7 61 02/27/2015 0801       Meds:    Current Facility-Administered Medications:     acetaminophen (TYLENOL) tablet 975 mg, 975 mg, Oral, Q8H [address], Clement Jones PA-C, 975 mg at 07/03/20 0532    albuterol (PROVENTIL HFA,VENTOLIN HFA) inhaler 2 puff, 2 puff, Inhalation, Q4H PRN, Clement Jones PA-C    albuterol inhalation solution 2 5 mg, 2 5 mg, Nebulization, Q6H, Clement Jones PA-C, 2 5 mg at 07/02/20 2058    budesonide (PULMICORT) inhalation solution 0 25 mg, 0 25 mg, Nebulization, BID, Clement Jones PA-C, 0 25 mg at 07/02/20 2058    cyanocobalamin (VITAMIN B-12) tablet 1,000 mcg, 1,000 mcg, Oral, Daily, Clement Jones PA-C    enoxaparin (LOVENOX) subcutaneous injection 40 mg, 40 mg, Subcutaneous, Daily, Clement Jones PA-C    HYDROmorphone (DILAUDID) injection 0 2 mg, 0 2 mg, Intravenous, Q2H PRN, Clement Jones PA-C    ipratropium (ATROVENT) 0 02 % inhalation solution 0 5 mg, 0 5 mg, Nebulization, TID, Clement Jones PA-C, 0 5 mg at 07/02/20 2058    lactated ringers bolus 500 mL, 500 mL, Intravenous, Once PRN **AND** lactated ringers bolus 500 mL, 500 mL, Intravenous, Once PRN, Clement Jones PA-C    lactated ringers infusion, 125 mL/hr, Intravenous, Continuous, Jayy Elam MD, Stopped at 07/02/20 1428    levETIRAcetam (KEPPRA) tablet 500 mg, 500 mg, Oral, Q12H [address], [facility] BASSAM Clemons-C, 500 mg at 07/02/20 2138    nicotine (NICODERM CQ) 7 mg/24hr TD 24 hr patch 1 patch, 1 patch, Transdermal, Daily, Andrez Fiddler, PA-C    oxyCODONE (ROXICODONE) IR tablet 5 mg, 5 mg, Oral, Q4H PRN, 5 mg at 07/03/20 0332 **OR** oxyCODONE (ROXICODONE) IR tablet 2 5 mg, 2 5 mg, Oral, Q4H PRN, Andrez Fiddler, PA-C    sodium chloride 0 9 % bolus 500 mL, 500 mL, Intravenous, Once PRN **AND** sodium chloride 0 9 % bolus 500 mL, 500 mL, Intravenous, Once PRN, Andrez Fiddler, PA-C    sodium chloride 0 9 % infusion, 75 mL/hr, Intravenous, Continuous, Andrez Fiddler, PA-C, Last Rate: 75 mL/hr at 07/03/20 0324, 75 mL/hr at 07/03/20 0324    Blood Culture:   Lab Results   Component Value Date    BLOODCX No Growth After 5 Days  03/07/2019       Wound Culture:   No results found for: WOUNDCULT    Ins and Outs:  I/O last 24 hours: In: 2070 [P O :320; I V :1500; IV Piggyback:250]  Out: 8145 [Urine:1705]          Physical exam:  Vitals:    07/03/20 0300   BP:    Pulse: 91   Resp: 18   Temp: 98 °F (36 7 °C)   SpO2: 100%     right lower extremity  · Dressing clean dry intact  · Sensation intact L2-S1  · Motor intact to EHL/FHL  · 2+ DP pulse    Assessment:  70 y  o female post operative day 1 status post right femoral neck fracture   Pt doing well    Plan:  · Up and out of bed  · WBAT to right lower extremity  · PT/OT  · DVT prophylaxis (will require lovenox on DC)  · Analgesics  · Labs pending, follow AM labs  · Will continue to assess for acute blood loss anemia      Jose Hernandez MD

## 2020-07-03 NOTE — PROGRESS NOTES
Progress Note - Pulmonary   Francine Yeboah 70 y o  female MRN: [id number]  Unit/Bed#: [facility] 623-01 Encounter: [id number]      Assessment and Plan:  Severe COPD not in exacerbation  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  Tobacco dependence syndrome  Chronic hypoxic respiratory failure- on 2 L minute home oxygen  Right femoral neck fracture status post CRPP, POD#1    - Breathing is at baseline     - Continue nebulized bronchodilators as ordered including albuterol q 6h, ipratropium, budesonide  At discharge, resume arformoterol and discontinue albuterol   - continue 2LPM NC oxygen which is her home requirement  Goal pulse ox 88-92%  - check ABG to evaluate baseline since she has a metabolic alkalosis  -  early mobilization is encouraged when okay with orthopedic surgery  - continue pain control  - continue to encourage incentive spirometry  - DVT prophylaxis per surgery - LMWH    Discussed with patient in detail  All concerns addressed  Addendum: ABG was drawn yesterday but was not uploaded into the computer  ABG reviewed, no hypercapnia  I am signing off  Please recall as needed  Outpatient follow up with Dr Evin Hoyos as scheduled  Subjective:   Found lying in bed  In good spirits  Has some pain in her hip but it is controlled currently  Denies breathlessness, states her breathing is at baseline  Denies significant cough  Objective:   Afebrile    Vitals: Blood pressure 91/54, pulse 96, temperature 98 6 °F (37 °C), resp  rate 12, height 5' 5" (1 651 m), weight 38 6 kg (85 lb), SpO2 99 %  , 2LPM, Body mass index is 14 14 kg/m²        Intake/Output Summary (Last 24 hours) at 7/3/2020 0811  Last data filed at 7/3/2020 0733  Gross per 24 hour   Intake 1990 ml   Output 1135 ml   Net 855 ml     Physical Exam  GEN: Thin, smiling, no distress, comfortable, speaking in full sentences, no accessory muscle use  HEENT: NCAT, EOMI, MMM  CVS:  Regular  LUNGS:  Diminished but clear, no wheezing  ABD: soft, not distended  EXT: No c/c, right hip tender to palpation,  Dressing in tact  NEURO: No focal deficits  MS: Moving all extremities  SKIN: warm, dry  PSYCH: calm, cooperative      Labs: I have personally reviewed pertinent lab results  Results from last 7 days   Lab Units 07/02/20  0521 07/01/20  1408   WBC Thousand/uL 5 85 5 94   HEMOGLOBIN g/dL 10 8* 13 5   HEMATOCRIT % 33 2* 40 4   PLATELETS Thousands/uL 221 255   NEUTROS PCT %  --  69   MONOS PCT %  --  10      Results from last 7 days   Lab Units 07/03/20  0534 07/02/20  0521 07/01/20  1408   POTASSIUM mmol/L 4 3 4 2 4 4   CHLORIDE mmol/L 107 101 100   CO2 mmol/L 13* 30 28   BUN mg/dL 8 11 12   CREATININE mg/dL 0 54* 0 42* 0 50*   CALCIUM mg/dL 8 2* 8 5 9 2     Results from last 7 days   Lab Units 07/02/20  0521   MAGNESIUM mg/dL 1 8     Results from last 7 days   Lab Units 07/02/20  0521   PHOSPHORUS mg/dL 3 2      Results from last 7 days   Lab Units 07/01/20  1500   INR  0 95   PTT seconds 25         0   Lab Value Date/Time    TROPONINI <0 02 03/07/2019 1816       Microbiology:  COVID19 negative    Imaging and other studies: I have personally reviewed pertinent films in PACS  Chest x-ray reviewed by me personally shows emphysema and no acute disease    [facility]

## 2020-07-03 NOTE — PLAN OF CARE
Problem: PHYSICAL THERAPY ADULT  Goal: Performs mobility at highest level of function for planned discharge setting  See evaluation for individualized goals  Description  Treatment/Interventions: Functional transfer training, LE strengthening/ROM, Therapeutic exercise, Endurance training, Equipment eval/education, Bed mobility, Gait training, Spoke to nursing, OT  Equipment Recommended: Walker(RW)       See flowsheet documentation for full assessment, interventions and recommendations  Note:   Prognosis: Good  Problem List: Decreased strength, Decreased range of motion, Decreased endurance, Impaired balance, Decreased mobility, Pain  Assessment: Pt is a 70 y o  Female presenting to [facility] s/p fall  Pt was admitted with a primary diagnosis of R hip fracture and other active problems including hyponatremia, being underweight, simple partial seizures, COPD, chronic hypoxemic respiratory failure and malignant neoplasm of R lung  On 7/2/20 pt underwent OPEN REDUCTION W/ INTERNAL FIXATION (ORIF) HIP WITH CANNUALATED SCREWS (Right)  Pt's PMH affecting PT eval includes stage 4 lung cancer and personal factors including being primary caregiver for both parents who are in poor health and steps to negotiate at home  Pt seen for high complexity PT eval due to ongoing medical management of admitting diagnosis, s/p surgical intervention, downward trending lab values, reliance on supplemental oxygen, increased reliance on more restrictive AD, decreased functional ability and activity tolerance compared to baseline, pain and fall risk  At time of evaluation, pt was resting in bed with sister present  Pt required Mod Ax2 with bed mobility and ambulation, and Min Ax2 with sit to stand transfers  Pt performed transfers and ambulation with RW   Based on PT eval, pt's current list of problems includes decreased B/L LE strength, decreased RLE ROM, decreased endurance, impaired balance, decreased mobility and pain  PT will continue to follow pt for remainder of hospital stay to address these impairments  At conclusion of session pt was left up in chair with all needs within reach  PT currently recommending rehab at D/C  Barriers to Discharge: Decreased caregiver support, Inaccessible home environment     PT Discharge Recommendation: [address]     PT - OK to Discharge: Yes(To rehab when medically cleared )    See flowsheet documentation for full assessment

## 2020-07-03 NOTE — PHYSICAL THERAPY NOTE
PHYSICAL THERAPY EVALUATION          Patient Name: Kait Kimbrough  TSTVA'E Date: 7/3/2020       07/03/20 1028   Note Type   Note type Eval only   Pain Assessment   Pain Assessment Tool 0-10   Pain Score No Pain   Home Living   Type of Home Apartment   Home Layout Multi-level;Stairs to enter with rails;Bed/bath upstairs  (5+5 NEIL)   Bathroom Shower/Tub Tub/shower unit   Bathroom Toilet Standard   Bathroom Equipment Grab bars in shower;Grab bars around toilet; Shower chair   [address] Other (Comment)  (Pt denies )   Additional Comments Pt lives on 2nd floor of apartment with 5+5 NEIL; Bed and bath are on 3rd floor with full flight of stairs  Prior Function   Level of [place] Independent with ADLs and functional mobility   Lives With Family; Other (Comment)  (Mother, father)   Ruth Jennings Help From Personal care attendant  (Grocery shopping)   ADL [address] [employer] in the last 6 months 1 to 4  (1)   Vocational Retired   Comments 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  Both parents are in poor health per pt report and require physical assistance  Restrictions/Precautions   Weight Bearing Precautions Per Order Yes   RLE Weight Bearing Per Order WBAT   Other Precautions WBS; Multiple lines; Fall Risk;Pain;O2   General   Additional Pertinent History Pt on 2L O2 via NC at home   Family/Caregiver Present Yes  (Sister present t/o session )   Cognition   Overall Cognitive Status WFL   Arousal/Participation Alert   Orientation Level Oriented X4   Memory Within functional limits   Following Commands Follows all commands and directions without difficulty   RLE Assessment   RLE Assessment X  (Decreased ROM 2* pain)   LLE Assessment   LLE Assessment X   Strength LLE   LLE Overall Strength 3+/5   Bed Mobility   Supine to Sit 3  Moderate assistance   Additional items Assist x 2; Increased time required;Verbal cues;LE management   Transfers   Sit to Stand 4  Minimal assistance   Additional items Assist x 2; Increased time required;Verbal cues   Stand to Sit 4  Minimal assistance   Additional items Assist x 2; Increased time required;Verbal cues;Armrests   Ambulation/Elevation   Gait pattern Decreased R stance; Excessively slow   Gait Assistance 3  Moderate assist   Additional items Assist x 2;Verbal cues; Tactile cues   Assistive Device Rolling walker   Distance 3 ft to chair  (limited by pain and fatigue)   Stair Management Assistance Not tested   Balance   Static Sitting Fair   Static Standing Poor +   Ambulatory Poor   Endurance Deficit   Endurance Deficit Yes   Endurance Deficit Description Pt on 2L O2 via NC   Activity Tolerance   Activity Tolerance Patient limited by fatigue;Patient limited by pain   Medical Staff Made Aware Pt ok to mobilize per nsg   Nurse Made Aware Slick Rojas RN   Assessment   Prognosis Good   Problem List Decreased strength;Decreased range of motion;Decreased endurance; Impaired balance;Decreased mobility;Pain   Assessment Pt is a 70 y o  Female presenting to [facility] s/p fall  Pt was admitted with a primary diagnosis of R hip fracture and other active problems including hyponatremia, being underweight, simple partial seizures, COPD, chronic hypoxemic respiratory failure and malignant neoplasm of R lung  On 7/2/20 pt underwent OPEN REDUCTION W/ INTERNAL FIXATION (ORIF) HIP WITH CANNUALATED SCREWS (Right)  Pt's PMH affecting PT eval includes stage 4 lung cancer and personal factors including being primary caregiver for both parents who are in poor health and steps to negotiate at home   Pt seen for high complexity PT eval due to ongoing medical management of admitting diagnosis, s/p surgical intervention, downward trending lab values, reliance on supplemental oxygen, increased reliance on more restrictive AD, decreased functional ability and activity tolerance compared to baseline, pain and fall risk  At time of evaluation, pt was resting in bed with sister present  Pt required Mod Ax2 with bed mobility and ambulation, and Min Ax2 with sit to stand transfers  Pt performed transfers and ambulation with RW  Based on PT eval, pt's current list of problems includes decreased B/L LE strength, decreased RLE ROM, decreased endurance, impaired balance, decreased mobility and pain  PT will continue to follow pt for remainder of hospital stay to address these impairments  At conclusion of session pt was left up in chair with all needs within reach  PT currently recommending rehab at D/C  Barriers to Discharge Decreased caregiver support; Inaccessible home environment   Goals   Patient Goals To go home   STG Expiration Date 07/13/20   Short Term Goal #1 In 10 days, pt will  Santo Blanchard Joelle 1) perform all bed mobility with Min Ax1 in order to reduce caregiver burden  2) perform transfers with supervision in order to improve functional independence  3) ambulate 100 ft with least restrictive ADand Min Ax1 in order to return to PLOF  4) improve LLE strength by 1/2 grade in order to perform transfers with greater ease  5) improve ambulatory balance by 1 grade in order to reduce risk of falls  Plan   Treatment/Interventions Functional transfer training;LE strengthening/ROM; Therapeutic exercise; Endurance training;Equipment eval/education; Bed mobility;Gait training;Spoke to nursing;OT   PT Frequency Other (Comment)  (3-6x/wk)   Recommendation   PT Discharge Recommendation Post-Acute Rehabilitation Services   Equipment Recommended Walker  (RW)   PT - OK to Discharge Yes  (To rehab when medically cleared )   Modified [place] Scale   Modified Yecenia Scale 4   Barthel Index   Feeding 10   Bathing 0   Grooming Score 0   Dressing Score 5   Bladder Score 10   Bowels Score 10   Toilet Use Score 5   Transfers (Bed/Chair) Score 5   Mobility (Level Surface) Score 0   Stairs Score 0   Barthel Index Score 45     Abbi Perea, SPT

## 2020-07-03 NOTE — ASSESSMENT & PLAN NOTE
IV fluids  Check labs in a m    Continues to decline will stop iv fluids now repeat labs later this afternoon

## 2020-07-03 NOTE — PROGRESS NOTES
Progress Note - Anthony Dean 1945, 70 y o  female MRN: [id number]    Unit/Bed#: [facility] 623-01 Encounter: [id number]    Primary Care Provider: Gigi Mock DO   Date and time admitted to hospital: 7/1/2020 11:46 AM        * Hip fracture [facility])  Assessment & Plan  · Ortho appreciated   · COVID negative 7/1  · Pt high risk for surgery as in the past there was difficulty extubating her after brain surgery  · This was discussed with Pulmonary who were agreeable to patient having surgery  · Patient is postop day # 1 for OPEN REDUCTION W/ INTERNAL FIXATION (ORIF) HIP WITH CANNUALATED SCREWS (Right)  · Pain control   · Bowel regimen   · Monitor for acute blood loss anemia,   · Pain control w/ scheduled tylenol and oxy prn    Chronic hypoxemic respiratory failure (HCC)  Assessment & Plan  Chronic Stable on 2L O2    Malignant neoplasm of right lung (HCC)  Assessment & Plan  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  · Patient has previously completed treatment including chemotherapy and radiation  · And underwent brain met resection and whole-brain radiation  · Currently being monitored by imaging, follow-up outpatient    COPD (chronic obstructive pulmonary disease) ([facility] [address] )  Assessment & Plan  No exac  C/w home UDNs  Pulm consult to help risk stratify    Hyponatremia  Assessment & Plan  IV fluids  Check labs in a m  Continues to decline will stop iv fluids now repeat labs later this afternoon     Moderate protein-calorie malnutrition (HCC)  Assessment & Plan  Body mass index is 14 14 kg/m²  Regular diet  Add ensure shake     Simple partial seizures (HCC)  Assessment & Plan  Keppra      VTE Pharmacologic Prophylaxis:   Pharmacologic: Enoxaparin (Lovenox)  Mechanical VTE Prophylaxis in Place: Yes    Patient Centered Rounds: I have performed bedside rounds with nursing staff today      Discussions with Specialists or Other Care Team Provider: nursing     Education and Discussions with Family / Patient: patient     Time Spent for Care: 45 minutes  More than 50% of total time spent on counseling and coordination of care as described above  Current Length of Stay: 2 day(s)    Current Patient Status: Inpatient   Certification Statement: The patient will continue to require additional inpatient hospital stay due to pending recs for rehab vs home     Discharge Plan:pending official pt recs     Code Status: Level 2 - DNAR: but accepts endotracheal intubation      Subjective: pt is sitting up in chair some discomfort in buttocks would like to go to bed in a bit after lunch pt set up to eat  Some discomfort not terrible  No n/v/d no chest pain or sob   Pt ideally would like to go home  Objective:     Vitals:   Temp (24hrs), Av °F (36 7 °C), Min:97 3 °F (36 3 °C), Max:98 6 °F (37 °C)    Temp:  [97 3 °F (36 3 °C)-98 6 °F (37 °C)] 97 4 °F (36 3 °C)  HR:  [] 104  Resp:  [12-19] 16  BP: ()/(51-69) 102/58  SpO2:  [95 %-100 %] 100 %  Body mass index is 14 14 kg/m²  Input and Output Summary (last 24 hours): Intake/Output Summary (Last 24 hours) at 7/3/2020 1515  Last data filed at 7/3/2020 1313  Gross per 24 hour   Intake 2355 ml   Output 1335 ml   Net 1020 ml       Physical Exam:     Physical Exam   Constitutional: She is oriented to person, place, and time  No distress  HENT:   Head: Normocephalic and atraumatic  Mouth/Throat: No oropharyngeal exudate  Eyes: Right eye exhibits no discharge  Left eye exhibits no discharge  No scleral icterus  Neck: No tracheal deviation present  No thyromegaly present  Cardiovascular: Normal rate  Exam reveals no gallop and no friction rub  No murmur heard  Pulmonary/Chest: No stridor  No respiratory distress  She has no wheezes  She has no rales  She exhibits no tenderness  Abdominal: She exhibits no distension and no mass  There is no tenderness  There is no rebound and no guarding  No hernia     Musculoskeletal: She exhibits no edema, tenderness or deformity (right hip dsd intact )  Lymphadenopathy:     She has no cervical adenopathy  Neurological: She is oriented to person, place, and time  Skin: No rash noted  She is not diaphoretic  No erythema  No pallor  Psychiatric: She has a normal mood and affect  Additional Data:     Labs:    Results from last 7 days   Lab Units 07/03/20  0534  07/01/20  1408   WBC Thousand/uL 5 97   < > 5 94   HEMOGLOBIN g/dL 10 6*   < > 13 5   HEMATOCRIT % 34 9   < > 40 4   PLATELETS Thousands/uL 143*   < > 255   NEUTROS PCT %  --   --  69   LYMPHS PCT %  --   --  18   MONOS PCT %  --   --  10   EOS PCT %  --   --  1    < > = values in this interval not displayed  Results from last 7 days   Lab Units 07/03/20  1436   SODIUM mmol/L 136   POTASSIUM mmol/L 4 3   CHLORIDE mmol/L 104   CO2 mmol/L 25   BUN mg/dL 24   CREATININE mg/dL 0 78   ANION GAP mmol/L 7   CALCIUM mg/dL 8 7   GLUCOSE RANDOM mg/dL 108     Results from last 7 days   Lab Units 07/01/20  1500   INR  0 95                       * I Have Reviewed All Lab Data Listed Above  * Additional Pertinent Lab Tests Reviewed:  All Labs Within Last 24 Hours Reviewed    Imaging:    Imaging Reports Reviewed Today Include: reviewed     Recent Cultures (last 7 days):           Last 24 Hours Medication List:     Current Facility-Administered Medications:  acetaminophen 975 mg Oral [facility] [place], PA-C   albuterol 2 puff Inhalation Q4H PRN Rudy Gia, PA-C   albuterol 2 5 mg Nebulization Q6H [place] Gia, PA-C   budesonide 0 25 mg Nebulization BID Rudy Gia, PA-C   vitamin B-12 1,000 mcg Oral Daily [place] Clam, PA-C   enoxaparin 40 mg Subcutaneous Daily [facility], [place]   HYDROmorphone 0 2 mg Intravenous Q2H PRN [place] Gia, PA-EMILY   ipratropium 0 5 mg Nebulization TID Rudy Gia, PA-C   levETIRAcetam 250 mg Oral Q12H [address] [place] [address]   nicotine 1 patch Transdermal Daily [facility], [place] oxyCODONE 5 mg Oral Q4H PRN Anastasiya Trinidad PA-C   Or       oxyCODONE 2 5 mg Oral Q4H PRN Anastasiya Trinidad PA-C        Today, Patient Was Seen By: ONI Birch    ** Please Note: Dictation voice to text software may have been used in the creation of this document   **

## 2020-07-03 NOTE — ASSESSMENT & PLAN NOTE
· Ortho appreciated   · COVID negative 7/1  · Pt high risk for surgery as in the past there was difficulty extubating her after brain surgery    · This was discussed with Pulmonary who were agreeable to patient having surgery  · Patient is postop day # 1 for OPEN REDUCTION W/ INTERNAL FIXATION (ORIF) HIP WITH CANNUALATED SCREWS (Right)  · Pain control   · Bowel regimen   · Monitor for acute blood loss anemia,   · Pain control w/ scheduled tylenol and oxy prn

## 2020-07-03 NOTE — OCCUPATIONAL THERAPY NOTE
OccupationalTherapy Evaluation Note     Patient Name: Nico Zuniga  KPEZU'P Date: 7/3/2020     07/03/20 1025   Note Type   Note type Eval only   Restrictions/Precautions   Weight Bearing Precautions Per Order Yes   RLE Weight Bearing Per Order WBAT   Braces or Orthoses Other (Comment)  (none )   Other Precautions Multiple lines;Telemetry;O2;Fall Risk;WBS;Hard of hearing   Pain Assessment   Pain Assessment Tool 0-10   Pain Score No Pain   Home Living   Type of Home Apartment   Home Layout Multi-level; Laundry in basement;Bed/bath upstairs;Stairs to enter with rails  (5+5 NEIL; full flight of stairs to bedroom/bathroom )   Bathroom Shower/Tub Tub/shower unit   Bathroom Toilet Standard   Bathroom Equipment Grab bars in shower;Grab bars around toilet; Shower chair   [address] Other (Comment)  (none )   Prior Function   Level of [place] Independent with ADLs and functional mobility   Lives With Family; Other (Comment)  (Mother and Father live in apartment attached to home)   Receives Help From Personal care attendant; Family  (PCA helps with receiving groceries )   ADL Assistance Independent   IADLs Independent   Falls in the last 6 months 1 to 4   Vocational Retired   Comments Pt is primary caregiver for parents who live in apartment attached to home  Parents are unable to assist pt  Lifestyle   Autonomy Pt lives alone in an apartment that it multi-level with 5+5 NEIL  Pt has a bedroom and full bath on the second floor of the home with a full flight of steps  Pt has a tub/shower unit and standard toilet with grab bars surrounding  Pt has a shower chair and a commode, but does not currently use them  Prior to admission, pt was independent in all ADLs and IADLs  Due to the pandemic, pt has a PCA helping receive groceries      Reciprocal Relationships Supportive Family    Service to Others Caregiver to Parents    Psychosocial   Psychosocial (WDL) WDL   Patient Behaviors/Mood Appropriate for age; Appropriate for situation   ADL   Where Assessed Supine, bed   Equipment Provided Other (Comment)  (none )   Eating Assistance 5  Supervision/Setup   Eating Deficit Setup; Increased time to complete;Supervision/safety   Grooming Assistance 5  Supervision/Setup   Grooming Deficit Setup;Supervision/safety; Increased time to complete   UB Bathing Assistance 5  Supervision/Setup   UB Bathing Deficit Setup;Supervision/safety; Increased time to complete   LB Bathing Assistance 3  Moderate Assistance   LB Bathing Deficit Setup; Increased time to complete;Supervision/safety;Verbal cueing   UB Dressing Assistance 5  Supervision/Setup   UB Dressing Deficit Setup;Supervision/safety; Increased time to complete;Verbal cueing   LB Dressing Assistance 3  Moderate Assistance   LB Dressing Deficit Setup;Verbal cueing;Supervision/safety; Increased time to complete   Toileting Assistance  3  Moderate Assistance   Toileting Deficit Setup;Verbal cueing;Supervison/safety; Increased time to complete   Functional Assistance 3  Moderate Assistance   Functional Deficit Setup;Verbal cueing;Supervision/safety; Increased time to complete   Bed Mobility   Supine to Sit 3  Moderate assistance   Additional items Assist x 2; Increased time required;Verbal cues;LE management;HOB elevated   Transfers   Sit to Stand 4  Minimal assistance   Additional items Assist x 2; Increased time required;Verbal cues   Stand to Sit 4  Minimal assistance   Additional items Assist x 2; Increased time required;Armrests; Verbal cues   Functional Mobility   Functional Mobility 3  Moderate assistance   Additional Comments Pt took small steps using RW to transfer from bed to recliner chair      Additional items Rolling walker   Balance   Static Sitting Fair   Dynamic Sitting Fair -   Static Standing Poor +   Dynamic Standing Poor   Ambulatory Poor   Activity Tolerance   Activity Tolerance Patient limited by fatigue;Patient limited by pain   Medical Staff Made Aware PT Teto    Nurse Made Aware RN verbalized pt appropriate for therapy  RUE Assessment   RUE Assessment WFL   LUE Assessment   LUE Assessment WFL   Hand Function   Gross Motor Coordination Functional   Fine Motor Coordination Functional   Sensation   Light Touch No apparent deficits  (BUEs)   Vision-Basic Assessment   Current Vision Wears glasses all the time   Visual History Other (Comment)  (none )   Patient Visual Report Other (Comment)  (no changes in vision )   Cognition   Overall Cognitive Status WFL   Arousal/Participation Alert; Responsive; Cooperative   Attention Within functional limits   Orientation Level Oriented X4   Memory Within functional limits   Following Commands Follows all commands and directions without difficulty   Comments Pt was cooperative, alert and oriented during evaluation  Pt is oriented to person, place, time, amd situation  Assessment   Limitation Decreased ADL status; Decreased UE strength;Decreased Safe judgement during ADL;Decreased endurance;Decreased self-care trans;Decreased high-level ADLs   Prognosis Good   Assessment Pt is a 70 y o  F admitted to [address] [place] [address] on 7/1/2020 after sustaining a fall resulting in a hip fracture  Pt s/p open reduction with internal fixation of hip with cannulated screws  Comorbidities limiting pt performance include: hyponatremia, underweight, simple partial seizures, COPD, and malignant neoplasm of the right lung  OT orders for evaluation and treatment received  Pt WBAT in RLE  Performed at least two patient identifiers during session including patient name and wrist band  Prior to admission, pt was independent in all ADLs and IADLs  Pt was receiving help from personal care assistance for receiving groceries during the pandemic  Pt alone lives in a mutli-level apartment with 5+5 NEIL  Pt bedroom and bathroom are on the second floor of home with a full flight of steps   Pt has a tub/shower and standard toilet with Dotflux  Pt reported having a shower chair, but does not use it currently  Upon evaluation, pt was received supine in bed, alert and oriented  Pt required supervision assist for eating, grooming, and UB bathing and dressing  Pt required mod assist for LB bathing and dressing, toileting assistance, and bed mobility  Patient required mod Ax2 for functional assist and transferring with min Ax2 with use of RW and verbal cues  Deficits limting/affecting pt occupational performance include: decreased endurance, increased pain, decreased activity tolerance, decreased dynamic sitting and standing tolerance, decreased caregiver support within the home environment, and difficulty with navigating home environment with several stairs to enter  Occupational performance areas affected due to deficits mentioned above include: ADLs: grooming, bathing, dressing, functional mobility, community mobility; IADL: safety procedures, caring for elderly parents, household maintenance; and leisure exploration/participation  Pt would benefit from continued skilled OT sessions while admitted to the hospital to address the deficits mentioned above and maximize functional independence in ADLs and mobility  For an OT standpoint, recommendation at d/c would be short term rehabilitation  Goals   Patient Goals To return home after rehab    Plan   Treatment Interventions ADL retraining;Functional transfer training;UE strengthening/ROM; Endurance training;Patient/family training;Equipment evaluation/education; Compensatory technique education; Energy conservation; Activityengagement   Goal Expiration Date 07/17/20   OT Frequency 3-5x/wk   Recommendation   OT Discharge Recommendation Post-Acute Rehabilitation Services   Equipment Recommended Other (comment)  (none )   OT - OK to Discharge Yes   Barthel Index   Feeding 10   Bathing 0   Grooming Score 0   Dressing Score 5   Bladder Score 10   Bowels Score 10   Toilet Use Score 5   Transfers (Bed/Chair) Score 5   Mobility (Level Surface) Score 0   Stairs Score 0   Barthel Index Score 45   Modified [place] Scale   Modified Yecenia Scale 4         Patient will demonstrate good joint protection/safety awareness and energy conservation techniques while completing a functional activity for 100% of opportunities with no verbal cues       Patient will transfer from bed to chair/toilet/shower with supervision assist to increase functional independence       Patient will complete ADL routine at sink to increase functional standing tolerance to 20 minutes with supervision assist       Patient will complete LB ADLs at supervision level with long handled equipment as indicated to engage in daily activities       Patient will complete UB ADLs at supervision level while seated EOB to increase dynamic sitting balance       Patient will increase OOB tolerance to 2-4 hours per day to increase engagement and participation in functional/lesiure activities with no verbal cues       Patient will ambulate to bathroom with use of AD as indicated to complete a functional task at supervision assist to maximize functional mobility       Patient will transfer from supine to EOB with supervision assist to improve functional bed mobility

## 2020-07-04 PROBLEM — D62 ACUTE BLOOD LOSS AS CAUSE OF POSTOPERATIVE ANEMIA: Status: ACTIVE | Noted: 2020-07-04

## 2020-07-04 LAB
ANION GAP SERPL CALCULATED.3IONS-SCNC: 4 MMOL/L (ref 4–13)
BUN SERPL-MCNC: 8 MG/DL (ref 5–25)
CALCIUM SERPL-MCNC: 8 MG/DL (ref 8.3–10.1)
CHLORIDE SERPL-SCNC: 102 MMOL/L (ref 100–108)
CO2 SERPL-SCNC: 31 MMOL/L (ref 21–32)
CREAT SERPL-MCNC: 0.39 MG/DL (ref 0.6–1.3)
ERYTHROCYTE [DISTWIDTH] IN BLOOD BY AUTOMATED COUNT: 13.2 % (ref 11.6–15.1)
GFR SERPL CREATININE-BSD FRML MDRD: 105 ML/MIN/1.73SQ M
GLUCOSE SERPL-MCNC: 97 MG/DL (ref 65–140)
HCT VFR BLD AUTO: 27.8 % (ref 34.8–46.1)
HGB BLD-MCNC: 8.9 G/DL (ref 11.5–15.4)
MCH RBC QN AUTO: 31.9 PG (ref 26.8–34.3)
MCHC RBC AUTO-ENTMCNC: 32 G/DL (ref 31.4–37.4)
MCV RBC AUTO: 100 FL (ref 82–98)
PLATELET # BLD AUTO: 196 THOUSANDS/UL (ref 149–390)
PMV BLD AUTO: 9.1 FL (ref 8.9–12.7)
POTASSIUM SERPL-SCNC: 4.2 MMOL/L (ref 3.5–5.3)
RBC # BLD AUTO: 2.79 MILLION/UL (ref 3.81–5.12)
SODIUM SERPL-SCNC: 137 MMOL/L (ref 136–145)
WBC # BLD AUTO: 5.25 THOUSAND/UL (ref 4.31–10.16)

## 2020-07-04 PROCEDURE — 85027 COMPLETE CBC AUTOMATED: CPT | Performed by: PHYSICIAN ASSISTANT

## 2020-07-04 PROCEDURE — 97530 THERAPEUTIC ACTIVITIES: CPT

## 2020-07-04 PROCEDURE — 97116 GAIT TRAINING THERAPY: CPT

## 2020-07-04 PROCEDURE — NC001 PR NO CHARGE: Performed by: ORTHOPAEDIC SURGERY

## 2020-07-04 PROCEDURE — 94760 N-INVAS EAR/PLS OXIMETRY 1: CPT

## 2020-07-04 PROCEDURE — 80048 BASIC METABOLIC PNL TOTAL CA: CPT | Performed by: PHYSICIAN ASSISTANT

## 2020-07-04 PROCEDURE — 99232 SBSQ HOSP IP/OBS MODERATE 35: CPT | Performed by: NURSE PRACTITIONER

## 2020-07-04 PROCEDURE — 94640 AIRWAY INHALATION TREATMENT: CPT

## 2020-07-04 RX ADMIN — LEVALBUTEROL HYDROCHLORIDE 1.25 MG: 1.25 SOLUTION, CONCENTRATE RESPIRATORY (INHALATION) at 19:55

## 2020-07-04 RX ADMIN — LEVALBUTEROL HYDROCHLORIDE 1.25 MG: 1.25 SOLUTION, CONCENTRATE RESPIRATORY (INHALATION) at 13:11

## 2020-07-04 RX ADMIN — BUDESONIDE 0.25 MG: 0.25 INHALANT RESPIRATORY (INHALATION) at 19:55

## 2020-07-04 RX ADMIN — LEVETIRACETAM 250 MG: 500 TABLET, FILM COATED ORAL at 09:37

## 2020-07-04 RX ADMIN — OXYCODONE HYDROCHLORIDE 5 MG: 5 TABLET ORAL at 10:45

## 2020-07-04 RX ADMIN — ENOXAPARIN SODIUM 40 MG: 40 INJECTION SUBCUTANEOUS at 09:37

## 2020-07-04 RX ADMIN — CYANOCOBALAMIN TAB 500 MCG 1000 MCG: 500 TAB at 09:37

## 2020-07-04 RX ADMIN — ACETAMINOPHEN 975 MG: 325 TABLET ORAL at 21:31

## 2020-07-04 RX ADMIN — OXYCODONE HYDROCHLORIDE 2.5 MG: 5 TABLET ORAL at 16:19

## 2020-07-04 RX ADMIN — IPRATROPIUM BROMIDE 0.5 MG: 0.5 SOLUTION RESPIRATORY (INHALATION) at 13:11

## 2020-07-04 RX ADMIN — BUDESONIDE 0.25 MG: 0.25 INHALANT RESPIRATORY (INHALATION) at 07:23

## 2020-07-04 RX ADMIN — OXYCODONE HYDROCHLORIDE 2.5 MG: 5 TABLET ORAL at 21:32

## 2020-07-04 RX ADMIN — IPRATROPIUM BROMIDE 0.5 MG: 0.5 SOLUTION RESPIRATORY (INHALATION) at 07:23

## 2020-07-04 RX ADMIN — ACETAMINOPHEN 975 MG: 325 TABLET ORAL at 05:10

## 2020-07-04 RX ADMIN — LEVETIRACETAM 250 MG: 500 TABLET, FILM COATED ORAL at 21:31

## 2020-07-04 RX ADMIN — IPRATROPIUM BROMIDE 0.5 MG: 0.5 SOLUTION RESPIRATORY (INHALATION) at 19:55

## 2020-07-04 RX ADMIN — LEVALBUTEROL HYDROCHLORIDE 1.25 MG: 1.25 SOLUTION, CONCENTRATE RESPIRATORY (INHALATION) at 07:23

## 2020-07-04 RX ADMIN — ACETAMINOPHEN 975 MG: 325 TABLET ORAL at 13:50

## 2020-07-04 NOTE — PROGRESS NOTES
Subjective: 70 y  o female post operative day 2 status post CRPP of right femoral neck fracture  Pt doing well  Pain controlled  Objective:  A 10 point ROS was performed; negative except as noted above  Lab Results   Component Value Date/Time    WBC 5 97 07/03/2020 05:34 AM    WBC 7 61 02/27/2015 08:01 AM    HGB 10 6 (L) 07/03/2020 05:34 AM    HGB 14 2 02/27/2015 08:01 AM       Vitals:    07/04/20 0020   BP: 108/56   Pulse: 100   Resp: 16   Temp: 98 2 °F (36 8 °C)   SpO2: 99%     Right lower extremity  Dressing C/D/I  Motor intact to EHL/FHL/TA/GS  Sensation intact to light touch to dp/sp/tib/waldemar/saph distributions  Toes warm and well perfused with brisk capillary refill    Assessment: 70 y o  female post op day #2 from Right Hip CRPP for femoral neck fracture    Doing well     Plan:  WBAT  right lower extremity   Pain control  DVT ppx: Sequential compression device (Venodyne)  and Enoxaparin (Lovenox)  PT/OT  Will continue to assess for acute blood loss anemia  Dispo: Ok for discharge from ortho perspective

## 2020-07-04 NOTE — PLAN OF CARE
Problem: PHYSICAL THERAPY ADULT  Goal: Performs mobility at highest level of function for planned discharge setting  See evaluation for individualized goals  Description  Treatment/Interventions: Functional transfer training, LE strengthening/ROM, Therapeutic exercise, Endurance training, Equipment eval/education, Bed mobility, Gait training, Spoke to nursing, OT  Equipment Recommended: Walker(ERROL)       See flowsheet documentation for full assessment, interventions and recommendations  Outcome: Progressing  Note:   Prognosis: Good  Problem List: Decreased strength, Decreased endurance, Impaired balance, Decreased mobility, Decreased safety awareness, Pain, Orthopedic restrictions  Assessment: Pt seen for PT treatment session with focus on bed mobility, functional transfers, functional mobility  Pt demonstrating good progress toward goals this session  Pt able to ambulate increased distance this session as a result of improved endurance and strength  Additionally, pt able to perform all bed mobility and functional transfers with decreased assist this level due to improved strength  Pt appears to be with improved pain control this session, allowing for increased participation in therapy  Pt left upright in bedside chair with chair alarm intact and with all needs in reach  Pt will benefit from skilled therapy in order to address current impairments and functional limitations  PT to follow pt and recommending rehab once medically cleared  Barriers to Discharge: Decreased caregiver support, Inaccessible home environment     PT Discharge Recommendation: [id number]     PT - OK to Discharge: Yes(to rehab once medically cleared)    See flowsheet documentation for full assessment

## 2020-07-04 NOTE — PHYSICAL THERAPY NOTE
PHYSICAL THERAPY TREATMENT NOTE          Patient Name: Rodriguez Pham  BKBZS'D Date: 7/4/2020 07/04/20 5137   Pain Assessment   Pain Assessment Tool 0-10   Pain Score 8  (0/10 at rest)   Pain Location/Orientation Orientation: Right;Location: Hip   Hospital Pain Intervention(s) Repositioned; Ambulation/increased activity; Rest   Restrictions/Precautions   Weight Bearing Precautions Per Order Yes   RLE Weight Bearing Per Order WBAT   Other Precautions Chair Alarm; Bed Alarm; Fall Risk;O2;Pain;WBS  (2L O2 NC)   General   Chart Reviewed Yes   Additional Pertinent History PT spoke to pt cousin (retired hospice nurse with [facility]) on phone at request of pt  PT updated cousin on current functional status and PT POC  Pt cousin requesting referral to [facility]; PT relayed request to   Response to Previous Treatment Patient with no complaints from previous session  Family/Caregiver Present No   Cognition   Overall Cognitive Status WFL   Arousal/Participation Alert   Attention Within functional limits   Orientation Level Oriented X4   Memory Within functional limits   Following Commands Follows all commands and directions without difficulty   Subjective   Subjective Pt pleasant and agreeable to participate in therapy session  Bed Mobility   Supine to Sit 3  Moderate assistance   Additional items Assist x 1; Increased time required;Verbal cues;LE management   Additional Comments Supine in bed upon PT arrival   Pt left upright in bedside chair with chair alarm intact and all needs in reach at end of therapy session  Transfers   Sit to Stand 3  Moderate assistance   Additional items Assist x 1; Increased time required;Verbal cues   Stand to Sit 3  Moderate assistance   Additional items Assist x 1; Increased time required;Verbal cues   Additional Comments Transfers with RW   VC for hand placement and safety  Ambulation/Elevation   Gait pattern Excessively slow; Step to;Short stride;Decreased foot clearance;Decreased R stance; Improper Weight shift; Poor UE support  (unsteady)   Gait Assistance 3  Moderate assist   Additional items Assist x 1; Tactile cues; Verbal cues  (assist of second for chair follow)   Assistive Device Rolling walker   Distance 3 ft x 1, 22 ft x 1   Balance   Static Sitting Fair   Dynamic Sitting Fair   Static Standing Fair -   Dynamic Standing Poor +   Ambulatory Poor +   Endurance Deficit   Endurance Deficit Yes   Endurance Deficit Description fatigue, pain, weakness   Activity Tolerance   Activity Tolerance Patient limited by fatigue;Patient limited by pain   Nurse Made Aware RN cleared pt to be seen by PT   Assessment   Prognosis Good   Problem List Decreased strength;Decreased endurance; Impaired balance;Decreased mobility; Decreased safety awareness;Pain;Orthopedic restrictions   Assessment Pt seen for PT treatment session with focus on bed mobility, functional transfers, functional mobility  Pt demonstrating good progress toward goals this session  Pt able to ambulate increased distance this session as a result of improved endurance and strength  Additionally, pt able to perform all bed mobility and functional transfers with decreased assist this level due to improved strength  Pt appears to be with improved pain control this session, allowing for increased participation in therapy  Pt left upright in bedside chair with chair alarm intact and with all needs in reach  Pt will benefit from skilled therapy in order to address current impairments and functional limitations  PT to follow pt and recommending rehab once medically cleared  Barriers to Discharge Decreased caregiver support; Inaccessible home environment   Goals   Patient Goals to get better   STG Expiration Date 07/13/20   Plan   Treatment/Interventions Functional transfer training;LE strengthening/ROM; Endurance training; Therapeutic exercise;Patient/family training;Equipment eval/education; Bed mobility;Gait training;Spoke to nursing Progress Progressing toward goals   PT Frequency Other (Comment)  (3-6x/wk)   Recommendation   PT Discharge Recommendation Post-Acute Rehabilitation Services   PT - OK to Discharge Yes  (to rehab once medically cleared)     Candis Parks, PT, DPT

## 2020-07-04 NOTE — SOCIAL WORK
CM spoke with Pt with an introduction and explanation of role  Pt reported she resides alone in an apartment attached to her parents home no use of DME but has access to a roller walker, commode, shower chair if needed, 2 liters of oxygen through [employer] and 10 steps to enter the home  Pt reported being independent with ADLs with 2hrs a week of aide services with [facility], still drives, had [facility] and was at [facility] in the past  Pt denied any mental health or drug/alcohol placements  Pt reported having a living will with her son Lyubov Farfan and cousin Lanette Gracia as [place]  Pt reported the use of [facility] on [address] and has Sayda Chirinos as a PCP  CM discuss the post acute care recommendation was made by your care team for Acute Rehab  Discussed [place] of Choice with patient  List of facilities given to patient via in person  patient aware the list is custom filtered for them by preference  and that [facility] post acute providers are designated  Pt reported being in agreement with rehab recommendation and requested a referral to [facility]  CM made the requested referral  Pt requested CM contact her cousin/POA to discuss the recommendations  CM was in contact with Lanette Gracia [phone] to review rehab recommendations which she reported being agreeable to  CM reviewed d/c planning process including the following: identifying help at home, patient preference for d/c planning needs, Discharge Lounge, [facility] Meds to Bed program, availability of treatment team to discuss questions or concerns patient and/or family may have regarding understanding medications and recognizing signs and symptoms once discharged  CM also encouraged patient to follow up with all recommended appointments after discharge  Patient advised of importance for patient and family to participate in managing patients medical well being

## 2020-07-05 ENCOUNTER — HOSPITAL ENCOUNTER (INPATIENT)
Facility: HOSPITAL | Age: 75
LOS: 12 days | Discharge: HOME HEALTH | DRG: 559 | End: 2020-07-17
Payer: MEDICARE

## 2020-07-05 VITALS
HEIGHT: 65 IN | RESPIRATION RATE: 18 BRPM | DIASTOLIC BLOOD PRESSURE: 58 MMHG | HEART RATE: 94 BPM | SYSTOLIC BLOOD PRESSURE: 108 MMHG | WEIGHT: 85 LBS | OXYGEN SATURATION: 98 % | TEMPERATURE: 98.8 F | BODY MASS INDEX: 14.16 KG/M2

## 2020-07-05 DIAGNOSIS — C34.91 MALIGNANT NEOPLASM OF RIGHT LUNG, UNSPECIFIED PART OF LUNG (HCC): ICD-10-CM

## 2020-07-05 DIAGNOSIS — C79.31 METASTATIC ADENOCARCINOMA TO BRAIN (HCC): ICD-10-CM

## 2020-07-05 DIAGNOSIS — E43 SEVERE PROTEIN-CALORIE MALNUTRITION (HCC): ICD-10-CM

## 2020-07-05 DIAGNOSIS — S72.009A HIP FRACTURE (HCC): ICD-10-CM

## 2020-07-05 DIAGNOSIS — J44.1 CHRONIC OBSTRUCTIVE PULMONARY DISEASE WITH ACUTE EXACERBATION (HCC): Primary | ICD-10-CM

## 2020-07-05 PROBLEM — E44.1 MILD PROTEIN-CALORIE MALNUTRITION (HCC): Status: ACTIVE | Noted: 2020-07-05

## 2020-07-05 PROBLEM — G40.109 SIMPLE PARTIAL SEIZURES (HCC): Status: RESOLVED | Noted: 2019-11-06 | Resolved: 2020-07-05

## 2020-07-05 PROBLEM — K59.00 CONSTIPATION: Status: RESOLVED | Noted: 2020-07-05 | Resolved: 2020-07-05

## 2020-07-05 PROBLEM — K59.00 CONSTIPATION: Status: ACTIVE | Noted: 2020-07-05

## 2020-07-05 PROBLEM — R52 PAIN: Status: ACTIVE | Noted: 2020-07-05

## 2020-07-05 LAB
ABO GROUP BLD BPU: NORMAL
ABO GROUP BLD BPU: NORMAL
ANION GAP SERPL CALCULATED.3IONS-SCNC: 1 MMOL/L (ref 4–13)
BPU ID: NORMAL
BPU ID: NORMAL
BUN SERPL-MCNC: 8 MG/DL (ref 5–25)
CALCIUM SERPL-MCNC: 8.3 MG/DL (ref 8.3–10.1)
CHLORIDE SERPL-SCNC: 100 MMOL/L (ref 100–108)
CO2 SERPL-SCNC: 34 MMOL/L (ref 21–32)
CREAT SERPL-MCNC: 0.37 MG/DL (ref 0.6–1.3)
CROSSMATCH: NORMAL
CROSSMATCH: NORMAL
ERYTHROCYTE [DISTWIDTH] IN BLOOD BY AUTOMATED COUNT: 13 % (ref 11.6–15.1)
GFR SERPL CREATININE-BSD FRML MDRD: 106 ML/MIN/1.73SQ M
GLUCOSE SERPL-MCNC: 101 MG/DL (ref 65–140)
HCT VFR BLD AUTO: 29.1 % (ref 34.8–46.1)
HGB BLD-MCNC: 9.3 G/DL (ref 11.5–15.4)
MCH RBC QN AUTO: 32.2 PG (ref 26.8–34.3)
MCHC RBC AUTO-ENTMCNC: 32 G/DL (ref 31.4–37.4)
MCV RBC AUTO: 101 FL (ref 82–98)
PLATELET # BLD AUTO: 202 THOUSANDS/UL (ref 149–390)
PMV BLD AUTO: 9.2 FL (ref 8.9–12.7)
POTASSIUM SERPL-SCNC: 4.2 MMOL/L (ref 3.5–5.3)
RBC # BLD AUTO: 2.89 MILLION/UL (ref 3.81–5.12)
SARS-COV-2 RNA RESP QL NAA+PROBE: NEGATIVE
SODIUM SERPL-SCNC: 135 MMOL/L (ref 136–145)
UNIT DISPENSE STATUS: NORMAL
UNIT DISPENSE STATUS: NORMAL
UNIT PRODUCT CODE: NORMAL
UNIT PRODUCT CODE: NORMAL
UNIT RH: NORMAL
UNIT RH: NORMAL
WBC # BLD AUTO: 5.28 THOUSAND/UL (ref 4.31–10.16)

## 2020-07-05 PROCEDURE — U0003 INFECTIOUS AGENT DETECTION BY NUCLEIC ACID (DNA OR RNA); SEVERE ACUTE RESPIRATORY SYNDROME CORONAVIRUS 2 (SARS-COV-2) (CORONAVIRUS DISEASE [COVID-19]), AMPLIFIED PROBE TECHNIQUE, MAKING USE OF HIGH THROUGHPUT TECHNOLOGIES AS DESCRIBED BY CMS-2020-01-R: HCPCS | Performed by: NURSE PRACTITIONER

## 2020-07-05 PROCEDURE — 99223 1ST HOSP IP/OBS HIGH 75: CPT

## 2020-07-05 PROCEDURE — 85027 COMPLETE CBC AUTOMATED: CPT | Performed by: PHYSICIAN ASSISTANT

## 2020-07-05 PROCEDURE — 94640 AIRWAY INHALATION TREATMENT: CPT

## 2020-07-05 PROCEDURE — 80048 BASIC METABOLIC PNL TOTAL CA: CPT | Performed by: PHYSICIAN ASSISTANT

## 2020-07-05 PROCEDURE — 1123F ACP DISCUSS/DSCN MKR DOCD: CPT | Performed by: ORTHOPAEDIC SURGERY

## 2020-07-05 PROCEDURE — NC001 PR NO CHARGE

## 2020-07-05 PROCEDURE — 99239 HOSP IP/OBS DSCHRG MGMT >30: CPT | Performed by: NURSE PRACTITIONER

## 2020-07-05 PROCEDURE — 94760 N-INVAS EAR/PLS OXIMETRY 1: CPT

## 2020-07-05 RX ORDER — ACETAMINOPHEN 325 MG/1
975 TABLET ORAL EVERY 8 HOURS SCHEDULED
Status: CANCELLED | OUTPATIENT
Start: 2020-07-05

## 2020-07-05 RX ORDER — LEVALBUTEROL 1.25 MG/.5ML
1.25 SOLUTION, CONCENTRATE RESPIRATORY (INHALATION)
Status: CANCELLED | OUTPATIENT
Start: 2020-07-05

## 2020-07-05 RX ORDER — OXYCODONE HYDROCHLORIDE 5 MG/1
2.5 TABLET ORAL EVERY 4 HOURS PRN
Status: CANCELLED | OUTPATIENT
Start: 2020-07-05

## 2020-07-05 RX ORDER — BISACODYL 10 MG
10 SUPPOSITORY, RECTAL RECTAL DAILY PRN
Status: DISCONTINUED | OUTPATIENT
Start: 2020-07-05 | End: 2020-07-17 | Stop reason: HOSPADM

## 2020-07-05 RX ORDER — DOCUSATE SODIUM 100 MG/1
100 CAPSULE, LIQUID FILLED ORAL 2 TIMES DAILY
Status: DISCONTINUED | OUTPATIENT
Start: 2020-07-05 | End: 2020-07-17 | Stop reason: HOSPADM

## 2020-07-05 RX ORDER — ALBUTEROL SULFATE 90 UG/1
2 AEROSOL, METERED RESPIRATORY (INHALATION) EVERY 4 HOURS PRN
Status: CANCELLED | OUTPATIENT
Start: 2020-07-05

## 2020-07-05 RX ORDER — LEVALBUTEROL 1.25 MG/.5ML
1.25 SOLUTION, CONCENTRATE RESPIRATORY (INHALATION)
Status: DISCONTINUED | OUTPATIENT
Start: 2020-07-05 | End: 2020-07-17 | Stop reason: HOSPADM

## 2020-07-05 RX ORDER — ALBUTEROL SULFATE 90 UG/1
2 AEROSOL, METERED RESPIRATORY (INHALATION) EVERY 4 HOURS PRN
Status: DISCONTINUED | OUTPATIENT
Start: 2020-07-05 | End: 2020-07-17 | Stop reason: HOSPADM

## 2020-07-05 RX ORDER — LACTULOSE 20 G/30ML
30 SOLUTION ORAL ONCE
Status: DISCONTINUED | OUTPATIENT
Start: 2020-07-05 | End: 2020-07-05 | Stop reason: HOSPADM

## 2020-07-05 RX ORDER — BUDESONIDE 0.25 MG/2ML
0.25 INHALANT ORAL
Status: CANCELLED | OUTPATIENT
Start: 2020-07-05

## 2020-07-05 RX ORDER — POLYETHYLENE GLYCOL 3350 17 G/17G
17 POWDER, FOR SOLUTION ORAL DAILY PRN
Status: DISCONTINUED | OUTPATIENT
Start: 2020-07-05 | End: 2020-07-06

## 2020-07-05 RX ORDER — OXYCODONE HYDROCHLORIDE 5 MG/1
2.5 TABLET ORAL EVERY 4 HOURS PRN
Status: DISCONTINUED | OUTPATIENT
Start: 2020-07-05 | End: 2020-07-17 | Stop reason: HOSPADM

## 2020-07-05 RX ORDER — LEVETIRACETAM 500 MG/1
250 TABLET ORAL EVERY 12 HOURS SCHEDULED
Status: CANCELLED | OUTPATIENT
Start: 2020-07-05

## 2020-07-05 RX ORDER — OXYCODONE HYDROCHLORIDE 5 MG/1
5 TABLET ORAL EVERY 4 HOURS PRN
Status: DISCONTINUED | OUTPATIENT
Start: 2020-07-05 | End: 2020-07-17 | Stop reason: HOSPADM

## 2020-07-05 RX ORDER — POLYETHYLENE GLYCOL 3350 17 G/17G
17 POWDER, FOR SOLUTION ORAL DAILY PRN
Status: DISCONTINUED | OUTPATIENT
Start: 2020-07-05 | End: 2020-07-05

## 2020-07-05 RX ORDER — DOCUSATE SODIUM 100 MG/1
100 CAPSULE, LIQUID FILLED ORAL 2 TIMES DAILY
Status: DISCONTINUED | OUTPATIENT
Start: 2020-07-05 | End: 2020-07-05

## 2020-07-05 RX ORDER — OXYCODONE HYDROCHLORIDE 5 MG/1
5 TABLET ORAL EVERY 4 HOURS PRN
Status: CANCELLED | OUTPATIENT
Start: 2020-07-05

## 2020-07-05 RX ORDER — ACETAMINOPHEN 325 MG/1
975 TABLET ORAL EVERY 8 HOURS SCHEDULED
Status: DISCONTINUED | OUTPATIENT
Start: 2020-07-05 | End: 2020-07-17 | Stop reason: HOSPADM

## 2020-07-05 RX ORDER — ONDANSETRON 4 MG/1
4 TABLET, ORALLY DISINTEGRATING ORAL EVERY 8 HOURS PRN
Status: DISCONTINUED | OUTPATIENT
Start: 2020-07-05 | End: 2020-07-17 | Stop reason: HOSPADM

## 2020-07-05 RX ORDER — LEVETIRACETAM 500 MG/1
250 TABLET ORAL EVERY 12 HOURS SCHEDULED
Status: DISCONTINUED | OUTPATIENT
Start: 2020-07-05 | End: 2020-07-17 | Stop reason: HOSPADM

## 2020-07-05 RX ORDER — BUDESONIDE 0.25 MG/2ML
0.25 INHALANT ORAL
Status: DISCONTINUED | OUTPATIENT
Start: 2020-07-05 | End: 2020-07-17 | Stop reason: HOSPADM

## 2020-07-05 RX ADMIN — LEVETIRACETAM 250 MG: 500 TABLET, FILM COATED ORAL at 09:38

## 2020-07-05 RX ADMIN — BUDESONIDE 0.25 MG: 0.25 INHALANT RESPIRATORY (INHALATION) at 20:11

## 2020-07-05 RX ADMIN — DOCUSATE SODIUM 100 MG: 100 CAPSULE, LIQUID FILLED ORAL at 14:59

## 2020-07-05 RX ADMIN — ENOXAPARIN SODIUM 40 MG: 40 INJECTION SUBCUTANEOUS at 09:38

## 2020-07-05 RX ADMIN — LEVALBUTEROL HYDROCHLORIDE 1.25 MG: 1.25 SOLUTION, CONCENTRATE RESPIRATORY (INHALATION) at 07:13

## 2020-07-05 RX ADMIN — BUDESONIDE 0.25 MG: 0.25 INHALANT RESPIRATORY (INHALATION) at 07:12

## 2020-07-05 RX ADMIN — LEVALBUTEROL HYDROCHLORIDE 1.25 MG: 1.25 SOLUTION, CONCENTRATE RESPIRATORY (INHALATION) at 14:55

## 2020-07-05 RX ADMIN — ACETAMINOPHEN 975 MG: 325 TABLET, FILM COATED ORAL at 14:58

## 2020-07-05 RX ADMIN — CYANOCOBALAMIN TAB 500 MCG 1000 MCG: 500 TAB at 09:38

## 2020-07-05 RX ADMIN — LEVALBUTEROL HYDROCHLORIDE 1.25 MG: 1.25 SOLUTION, CONCENTRATE RESPIRATORY (INHALATION) at 20:11

## 2020-07-05 RX ADMIN — IPRATROPIUM BROMIDE 0.5 MG: 0.5 SOLUTION RESPIRATORY (INHALATION) at 07:13

## 2020-07-05 RX ADMIN — ACETAMINOPHEN 975 MG: 325 TABLET ORAL at 05:22

## 2020-07-05 RX ADMIN — LEVETIRACETAM 250 MG: 500 TABLET, FILM COATED ORAL at 21:04

## 2020-07-05 RX ADMIN — OXYCODONE HYDROCHLORIDE 5 MG: 5 TABLET ORAL at 14:58

## 2020-07-05 RX ADMIN — IPRATROPIUM BROMIDE 0.5 MG: 0.5 SOLUTION RESPIRATORY (INHALATION) at 20:11

## 2020-07-05 RX ADMIN — ACETAMINOPHEN 325 MG: 325 TABLET, FILM COATED ORAL at 21:03

## 2020-07-05 RX ADMIN — IPRATROPIUM BROMIDE 0.5 MG: 0.5 SOLUTION RESPIRATORY (INHALATION) at 14:55

## 2020-07-05 RX ADMIN — OXYCODONE HYDROCHLORIDE 2.5 MG: 5 TABLET ORAL at 23:57

## 2020-07-05 NOTE — TREATMENT PLAN
Individualized Plan of [facility]  Karla Brooke 70 y o  female MRN: [id number]  Unit/Bed#: -01 Encounter: [id number]     PATIENT INFORMATION  ADMISSION DATE: 7/5/2020  1:54 PM IBETH CATEGORY:Orthopedic Disorders:  08 11  Unilateral Hip Fracture   ADMISSION DIAGNOSIS: Femoral neck fracture, right EXPECTED LOS: 16 days     MEDICAL/FUNCTIONAL PROGNOSIS  Pre-admit screens and post-admit evaluations reviewed and are consistent  Based on my assessment of the patient's medical conditions and current functional status, the prognosis for attaining medical and functional goals or the IRF stay is:  Good  Patient is appropriate for acute rehabilitation  Medical Goals:   Patient will be medically stable for discharge back to community setting upon completion or acute rehab program and patient/family will be able to manage medical conditions and comorbid conditions with medications and appropriate follow up upon completion of rehab program   Cardiopulmonary function/Anemia: Ensure cardiopulmonary stability and optimize cardiopulmonary function not only at rest but with activity as patient's activity level significantly increases in acute rehab compared with prior to transfer in preparation for safe discharge from [facility]  Must closely and frequently monitor blood pressure, HR, anemia to ensure adequate cardiac output during ADLs and ambulation as patient is at increased risk for orthostatic hypotension/syncope and potential injury if not monitored for and managed adequately  Blood pressure management:    Frequent monitoring of blood pressure with appropriate adjustments in blood pressure medication management to optimize blood pressure control and prevent/limit renal complications  Monitoring impact of blood pressure and side-effects of blood pressure medications at rest and with activity    Hypoxia prevention: Ensure appropriate level of oxygenation at rest and with activity to avoid symptomatic hypoxia, maximize functional performance, and decrease risk of atelectasis/pneumonia through close and frequent monitoring, providing appropriate respiratory treatments (such as incentive spirometry), and when necessary provide/adjust respiratory medications  Pain management:  Pain will improve with frequent evaluation of pain, careful adjustments in medications, frequent re-evaluation of patient's pain and medical/neurologic status to ensure optimal pain control, avoidance of potential serious and even life-threatening side-effects and drug interactions, as well as weaning pain medications as soon as possible to decrease risk of short and long-term use  Orthopedic Disorder: Hip fx causing impaired mobility, ADLs, and gait:  intensive skilled therapies with physical therapy and occupational therapy with close oversight and management by rehab specialized physician in acute rehabilitation setting to most expeditiously and effectively improve functional mobility, transfers, upper and lower body strengthening, conditioning, balance, and gait training with appropriate assistive device  Patient will have optimal supervision and management of patient's underlying orthopedic disorder with specialized rehabilitation physician during this period of recovery to ensure most expeditious and optimal recovery with decreased risks of fall/injury and other complications including acute worsening of ortho disorder, decrease risk of VTE, PNA, and skin ulceration  Inpatient rehabilitation education/teaching: To be provided to patient and typically family/caregiver (if able to be identified) by all skilled therapists, rehab nursing, case management, and rehab specialized physician to ensure optimal recovery and decrease risks of complications in both acute rehabilitation setting as well as after discharge     Protein-calorie malnutrition with failure to thrive:  Close monitoring of nutrition status, caloric intake, nutrition specialist with adjustments in diet, supplements, and at times medications to optimize nutritional status for both short-term and long-term functional recovery and prevention of complications associated with malnutrition  ANTICIPATED DISCHARGE DISPOSITION AND SERVICES  COMMUNITY SETTING:    Previous community setting  ANTICIPATED FOLLOW-UP SERVICE:   Home Health Services: PT, OT    DISCIPLINE SPECIFIC PLANS:  Required Disciplines & Services: PT, OT, Rehabilitation Physician, Rehabillitation Nursing, Case Management, Dietary    REQUIRED THERAPY (expected): Therapy Hours per Day Days per Week Tx Days (Days in ARF)   Physical Therapy 1 5 5 16   Occupational Therapy 1 5 5 16   NOTE: Additional therapy time(s) may be added as appropriate to meet patient needs and to achieve functional goals  ANTICIPATED FUNCTIONAL OUTCOMES:  ADL: Patient will be modified independent with ADLs upon completion of rehab program   Bladder/Bowel: Patient will be modified independent with bladder/bowel management upon completion of rehab program   Transfers: Patient will be ]modified independent with transfers upon completion of rehab program   Locomotion: Patient will be modified independent with locomotion upon completion of rehab program     DISCHARGE PLANNING NEEDS  Equipment needs: To be determined at team conference  REHAB ANTICIPATED PARTICIPATION RESTRICTIONS:  Uncertain at this time  To be determined closer to discharge

## 2020-07-05 NOTE — PROGRESS NOTES
Progress Note - Sg Kennedy 1945, 70 y o  female MRN: [id number]    Unit/Bed#: -[place] Encounter: [id number]    Primary Care Provider: Anup Pedraza DO   Date and time admitted to hospital: 7/1/2020 11:46 AM        * Hip fracture [facility])  Assessment & Plan  · Ortho appreciated   · COVID negative 7/1  · Pt high risk for surgery as in the past there was difficulty extubating her after brain surgery  · This was discussed with Pulmonary who were agreeable to patient having surgery  · Patient is postop day # 3 for OPEN REDUCTION W/ INTERNAL FIXATION (ORIF) HIP WITH CANNUALATED SCREWS (Right)  · Pain control   · Bowel regimen   · Monitor for acute blood loss anemia, slight increase today no need for transfusion   · Pain control w/ scheduled tylenol and oxy prn  · Pt/ot recommend rehab pt now amenable after discussion PMR ordered pre cm recommendations     Chronic hypoxemic respiratory failure (HCC)  Assessment & Plan  Chronic Stable on 2L O2    Malignant neoplasm of right lung (HCC)  Assessment & Plan  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  · Patient has previously completed treatment including chemotherapy and radiation  · And underwent brain met resection and whole-brain radiation      · Currently being monitored by imaging, follow-up outpatient    COPD (chronic obstructive pulmonary disease) ([facility] )  Assessment & Plan  · No exac  · C/w home UDNs  · Pulm consult appreciated on admission to help risk stratify    Constipation  Assessment & Plan  · On miralax , colace will add   · Lactulose now     Mild protein-calorie malnutrition ([facility] )  Assessment & Plan  Malnutrition Findings:   Malnutrition type: Chronic illness(Mild malnutrition r/t Ca dx as evidenced by BMI 14 1, unable to eat sufficient energy/protein to maintain a healthy weight, Treated with diet and supplements)  Degree of Malnutrition: Malnutrition of mild degree    BMI Findings:  BMI Classifications: Underweight < 18 5 Body mass index is 14 14 kg/m²  · Body mass index is 14 14 kg/m²  · Regular diet  · ensure shake       Acute blood loss as cause of postoperative anemia  Assessment & Plan  · Improving   · From < 10 to 9 1  · Typed and screened will manage post op  As needed    Hyponatremia  Assessment & Plan  · IV fluids completed   · Check labs in a m  · After decline 7/3 held iv fluids acceptable 135 monitor     Simple partial seizures (HCC)  Assessment & Plan  Keppra      VTE Pharmacologic Prophylaxis:   Pharmacologic: Enoxaparin (Lovenox)  Mechanical VTE Prophylaxis in Place: Yes    Patient Centered Rounds: I have performed bedside rounds with nursing staff today  Discussions with Specialists or Other Care Team Provider: nursing     Education and Discussions with Family / Patient: patient called serafin and updated her (cousin retired hospice nurse and volunteer here at [employer])     Time Spent for Care: 45 minutes  More than 50% of total time spent on counseling and coordination of care as described above  Current Length of Stay: 4 day(s)    Current Patient Status: Inpatient   Certification Statement: The patient will continue to require additional inpatient hospital stay due to ongoing need for rehab pending pmr consultation     Discharge Plan: to rehab when final eval in and insurance auth    Code Status: Level 2 - DNAR: but accepts endotracheal intubation      Subjective:   Pt is doing well less pain today she has not walked a distance and is pending pmr discussed with the pt  Pt is ok for discharge at this time   No sob no bm at this time will order bowel regimen    Objective:     Vitals:   Temp (24hrs), Av 4 °F (36 9 °C), Min:97 8 °F (36 6 °C), Max:99 °F (37 2 °C)    Temp:  [97 8 °F (36 6 °C)-99 °F (37 2 °C)] 98 8 °F (37 1 °C)  HR:  [] 94  Resp:  [18] 18  BP: ()/(54-79) 108/58  SpO2:  [95 %-100 %] 98 %  Body mass index is 14 14 kg/m²       Input and Output Summary (last 24 hours): Intake/Output Summary (Last 24 hours) at 7/5/2020 0901  Last data filed at 7/5/2020 0601  Gross per 24 hour   Intake 530 ml   Output 925 ml   Net -395 ml       Physical Exam:     Physical Exam   Constitutional: She is oriented to person, place, and time  No distress  Cachectic    HENT:   Mouth/Throat: No oropharyngeal exudate  Eyes: Pupils are equal, round, and reactive to light  Conjunctivae are normal  Right eye exhibits no discharge  Left eye exhibits no discharge  No scleral icterus  Neck: No tracheal deviation present  No thyromegaly present  Cardiovascular: Normal rate  Exam reveals no gallop and no friction rub  No murmur heard  Pulmonary/Chest: No stridor  No respiratory distress  She has no wheezes  She has no rales  She exhibits no tenderness  2 liters oxygen baseline    Abdominal: She exhibits no distension and no mass  There is no tenderness  There is no rebound and no guarding  No hernia  Musculoskeletal: She exhibits no edema, tenderness or deformity  Lymphadenopathy:     She has no cervical adenopathy  Neurological: She is oriented to person, place, and time  Skin: No rash noted  She is not diaphoretic  No erythema  No pallor  Additional Data:     Labs:    Results from last 7 days   Lab Units 07/05/20  0447  07/01/20  1408   WBC Thousand/uL 5 28   < > 5 94   HEMOGLOBIN g/dL 9 3*   < > 13 5   HEMATOCRIT % 29 1*   < > 40 4   PLATELETS Thousands/uL 202   < > 255   NEUTROS PCT %  --   --  69   LYMPHS PCT %  --   --  18   MONOS PCT %  --   --  10   EOS PCT %  --   --  1    < > = values in this interval not displayed       Results from last 7 days   Lab Units 07/05/20  0447   SODIUM mmol/L 135*   POTASSIUM mmol/L 4 2   CHLORIDE mmol/L 100   CO2 mmol/L 34*   BUN mg/dL 8   CREATININE mg/dL 0 37*   ANION GAP mmol/L 1*   CALCIUM mg/dL 8 3   GLUCOSE RANDOM mg/dL 101     Results from last 7 days   Lab Units 07/01/20  1500   INR  0 95                       * I Have Reviewed All Lab Data Listed Above  * Additional Pertinent Lab Tests Reviewed: All Labs Within Last 24 Hours Reviewed    Imaging:    Imaging Reports Reviewed Today Include: reviewed   Recent Cultures (last 7 days):           Last 24 Hours Medication List:     Current Facility-Administered Medications:  acetaminophen 975 mg Oral [facility] PA-C   albuterol 2 puff Inhalation Q4H PRN IsBASSAM Thompson-C   budesonide 0 25 mg Nebulization BID BASSAM Yo-C   vitamin B-12 1,000 mcg Oral Daily IsBASSAM Thompson-C   enoxaparin 40 mg Subcutaneous Daily IsBASSAM Thompson-C   ipratropium 0 5 mg Nebulization TID BASSAM Yo-EMILY   levalbuterol 1 25 mg Nebulization TID Dalila Acevedo DO   levETIRAcetam 250 mg Oral Q12H [address] [place], [place] [id number]   nicotine 1 patch Transdermal Daily Bennie Yo   oxyCODONE 5 mg Oral Q4H PRN BASSAM Yo-EMILY   Or       oxyCODONE 2 5 mg Oral Q4H PRN IsBASSAM Thompson-EMILY        Today, Patient Was Seen By: ONI Lao    ** Please Note: Dictation voice to text software may have been used in the creation of this document   **

## 2020-07-05 NOTE — COVID-19 HEALTH CARE FACILITY TRANSFER FORM
[facility] Transfer - COVID-19 Assessment             Name of Patient: Nico Zuniga                : 1945          Transport Date: 20       Has the patient been laboratory tested for COVID-19? [x]  NO  If No,Test was not indicated per  CDC Testing Criteria   May Transfer Patient   [] YES  If Tested Results below     COVID-19 References              SARS-CoV-2   Date/Time Value Ref Range Status   2020 10:17 AM Negative Negative Final            Question is to be completed for any patient who tests positive for COVID-19        1  [x] Yes [May Transfer] [] No [May Not Transfer]          Question is to be completed for any patient who is tested for COVID-19            2    [] Yes [May Not Transfer] [x] No [May Transfer]          Signature of Physician or Health Care Professional: ONI Hernández 20          Form updated as of 3/24/2020

## 2020-07-05 NOTE — SOCIAL WORK
[facility]  CM completed facility transfer form  CM notified Pt, sister Indira Sinha, cousin Nanette Marino and facility of d/c arrangements

## 2020-07-05 NOTE — ASSESSMENT & PLAN NOTE
IM consulted and with overall management at their discretion during ARC course  On home chronic 2L O2  Home nebs  Monitor vitals without and with activity   Incentive spirometry

## 2020-07-05 NOTE — PROGRESS NOTES
PHYSICAL MEDICINE AND REHABILITATION   PREADMISSION ASSESSMENT     Projected [facility] Diagnoses:  Impairment of mobility, safety and Activities of Daily Living (ADLs) due to Orthopedic Disorders:  08 11  Unilateral Hip Fracture  Etiologic: closed fracture of the neck of the right femur   Date of Onset: 7/1/2020   Date of surgery: 7/2/2020 OPEN REDUCTION W/ INTERNAL FIXATION (ORIF) HIP WITH CANNUALATED SCREWS (Right)    PATIENT INFORMATION  Name: Oliverio Coronel Phone #: [phone] (home)   Address: [address] [place] [address] [address] [address]  YOB: 1945 Age: 70 y o  SS#   Marital Status:   Ethnicity:    Employment Status: retired  Extended Emergency Contact Information  Primary Emergency Contact: [employer] [address] [place] [address] Phone: [phone]  Relation: Relative  Secondary Emergency Contact: Betina MendozaLamont Phone: [phone]  Relation: Son  Advance Directive: Level 2 DNAR, Unknown Advanced Directive     INSURANCE/COVERAGE:     Primary Payor: MEDICARE / Plan: MEDICARE A AND B / Product Type: Medicare A & B Fee for Service /   Secondary Payer: COMMERCIAL MISCELLANEOUS   Payer Contact:  Payer Contact:   Contact Phone:  Contact Phone:     Authorization #:   Coverage Dates:  LCD:   MEDICARE #: [id number]  Medicare Days: 60/30/60  Medical Record #: [id number]    REFERRAL SOURCE:   Referring provider: Hipolito Woods MD  Referring facility: [address] [place] [address]   Room: [place] [address]  PCP: Greg Henderson DO PCP phone number: [phone]    MEDICAL INFORMATION  HPI: Patient is a 70year old female who originally presented to the [address] [place] [address] on 7/1/2020 after she sustained a fall  The patient was outside when she lost her balance   She states that she was going up a set up stairs when she believes that her oxygen tubing got stuck on a railing (long tubing) and she fell off of the stairs into a flower bed and then into a brick patio  The patient denied any dizziness, denied headstrike, and denied LOC  She did offer complaints of right hip pain and right elbow pain  Ortho was consulted and the patient was noted to have sustained a right valgus impacted femoral neck fracture  Per ortho, the patient was recommended for surgical intervention  The patient went to the OR 7/2/2020 with Dr Dey for an ORIF with cannulate screws  (Of note, the patient does have a history of a malignant neoplasm to the right lung  She has stage IV with history of brain mets  The patient has previously completed treatment which has included both chemotherapy and radiation  She did undergo brain met resection and who brain radiation  She is currently being monitoring with imaging and is to continue to follow up as outpatient)  The patients hemoglobin prior to surgical intervention was 10 8 and post op was noted to have dropped down to 8 9  The patient did receive a blood transfusion  At this time the patients attending is clearing the patient for discharge and both PT/OT as well as reviewing physiatrist are recommending inpatient acute rehab  The patient will transfer to [address] [place] [address] in [facility] later on today  COVID negative on 7/1/2020 and new test is to be obtained  Past Medical History:   Past Surgical History: Allergies:     Past Medical History:   Diagnosis Date    Breast cancer ([facility] ) 1996    Cancer ([facility] )     COPD, severe ([facility] )     Lung disease     Requires supplemental oxygen     3L 24 hrs/day    Stage 4 lung cancer ([facility] )     Past Surgical History:   Procedure Laterality Date    APPENDECTOMY      COLONOSCOPY N/A 4/18/2016    Procedure: COLONOSCOPY;  Surgeon: Anny Mccracken MD;  [facility]: [facility];   Service:    [employer] LUNG CANCER SURGERY      MASTECTOMY, RADICAL Right 1996    [place] [address] N/A 10/17/2017    Procedure: Taiwo Mo;  Surgeon: Malena Esqueda Joselyn Guardado MD;  Location: [facility]; Service: Pulmonary    UT EXCIS [address] BRAIN TUMOR Right 10/24/2019    Procedure: IMAGE-GUIDED RIGHT FRONTAL CRANIOTOMY FOR TUMOR;  Surgeon: Arelis Wheeler MD;  [facility];  Service: Neurosurgery    TONSILLECTOMY       Allergies   Allergen Reactions    Megace [Megestrol] Nausea Only    Other Vomiting     Oysters, clams and mussels         Comorbidities/Surgeries in the last 100 days: Chronic hypoxemic respiratory failure, malignant neoplasm of the right lung, chronic obstructive pulmonary disease, acute blood loss anemia post op, hyponatremia, moderate protein-calorie malnutrition, simple partial seizures      CURRENT VITAL SIGNS:   Temp:  [97 8 °F (36 6 °C)-99 °F (37 2 °C)] 98 8 °F (37 1 °C)  HR:  [] 94  Resp:  [18] 18  BP: ()/(54-79) 108/58   Intake/Output Summary (Last 24 hours) at 7/5/2020 1004  Last data filed at 7/5/2020 0810  Gross per 24 hour   Intake 440 ml   Output 925 ml   Net -485 ml        LABORATORY RESULTS:      Lab Results   Component Value Date    HGB 9 3 (L) 07/05/2020    HGB 14 2 02/27/2015    HCT 29 1 (L) 07/05/2020    HCT 42 6 02/27/2015    WBC 5 28 07/05/2020    WBC 7 61 02/27/2015     Lab Results   Component Value Date    BUN 8 07/05/2020    BUN 16 02/27/2015     02/27/2015    K 4 2 07/05/2020    K 4 2 02/27/2015     07/05/2020     02/27/2015    GLUCOSE 94 02/27/2015    CREATININE 0 37 (L) 07/05/2020    CREATININE 0 66 02/27/2015     Lab Results   Component Value Date    PROTIME 12 7 07/01/2020    INR 0 95 07/01/2020        DIAGNOSTIC STUDIES:  Xr Chest 1 View Portable    Result Date: 7/1/2020  Impression: Chronic changes as described with emphysema  No acute cardiopulmonary disease  Workstation performed: [id number]     Xr Hip/pelv 2-3 Vws Right If Performed    Result Date: 7/2/2020  Impression: Fluoroscopic guidance provided for surgical procedure    Please refer to the separate procedure notes for additional details  Workstation performed: [id number]     Xr Hip/pelv 2-3 Vws Right    Result Date: 7/1/2020  Impression: Impacted right femoral neck fracture with associated foreshortening of the femoral neck  Workstation performed: [id number]     Xr Femur 2 Vw Right    Result Date: 7/1/2020  Impression: Limited study  No fracture visible in the field-of-view   Workstation performed: [id number]       PRECAUTIONS/SPECIAL NEEDS:  Tobacco:   Social History     Tobacco Use   Smoking Status Current Some Day Smoker    Packs/day: 0 00    Years: 55 00    Pack years: 0 00    Types: Cigarettes    Start date: 1962   Smokeless Tobacco Never Used   Tobacco Comment    NOW DOWN TO 2 A DAY   , Alcohol:    Social History     Substance and Sexual Activity   Alcohol Use Not Currently    Alcohol/week: 0 0 standard drinks    Frequency: Never    Binge frequency: Never   , Weight Bearing Precautions:  WBAT to the RLE, Anticoagulation:  lovenox, Edema Management, Safety Concerns, Pain Management, Requires O2: 2 L/min, IV: Type: peripheral Location: distal/dorsal (posterior) left forearm Reason: medications and fluids, Language Preference: English and fall precautions    MEDICATIONS:     Current Facility-Administered Medications:     acetaminophen (TYLENOL) tablet 975 mg, 975 mg, Oral, Q8H [address], [employer], PA-C, 975 mg at 07/05/20 0522    albuterol (PROVENTIL HFA,VENTOLIN HFA) inhaler 2 puff, 2 puff, Inhalation, Q4H PRN, [employer], PA-C    budesonide (PULMICORT) inhalation solution 0 25 mg, 0 25 mg, Nebulization, BID, [employer], PA-C, 0 25 mg at 07/05/20 4960    cyanocobalamin (VITAMIN B-12) tablet 1,000 mcg, 1,000 mcg, Oral, Daily, [employer], PA-C, 1,000 mcg at 07/05/20 6419    enoxaparin (LOVENOX) subcutaneous injection 40 mg, 40 mg, Subcutaneous, Daily, [employer], PA-C, 40 mg at 07/05/20 7980    ipratropium (ATROVENT) 0 02 % inhalation solution 0 5 mg, 0 5 mg, Nebulization, TID, [employer], ORIN, 0 5 mg at 07/05/20 4528    lactulose 20 g/30 mL oral solution 30 g, 30 g, Oral, Once, ONI Hernández    levalbuterol [facility]) inhalation solution 1 25 mg, 1 25 mg, Nebulization, TID, Marie Parsons DO, 1 25 mg at 07/05/20 0713    levETIRAcetam (KEPPRA) tablet 250 mg, 250 mg, Oral, Q12H [facility], ONI Hernández, 250 mg at 07/05/20 9997    nicotine (NICODERM CQ) 7 mg/24hr TD 24 hr patch 1 patch, 1 patch, Transdermal, Daily, José Miguel Dunaway PA-C    oxyCODONE (ROXICODONE) IR tablet 5 mg, 5 mg, Oral, Q4H PRN, 5 mg at 07/04/20 1045 **OR** oxyCODONE (ROXICODONE) IR tablet 2 5 mg, 2 5 mg, Oral, Q4H PRN, José Miguel Dunaway PA-C, 2 5 mg at 07/04/20 2132    SKIN INTEGRITY:   right hip incision - s/p surgical intervention    PRIOR LEVEL OF FUNCTION:  She lives in [facility] apartment  [place] is  and lives alone  Self Care: Independent, Indoor Mobility: Independent, Stairs (in/outdoor): Independent and Cognition: Independent    Patients mother and father both live in the apartment that is attached to the patients apartment  The patient is the primary caregiver for them  Her parents would not be able to assist the patient at time of discharge  FALLS IN THE LAST 6 MONTHS: 1-4    HOME ENVIRONMENT:  The living area: bedroom on 2nd floor and bathroom on 2nd floor  There are 5 +5 steps to enter the home and then a full flight of steps to the 2nd floor  The patient will not have 24 hour supervision/physical assistance available upon discharge  Since quarantine, the patient does have a PCA who goes out and helps obtain groceries  PREVIOUS DME:  Equipment in home (previous DME): Grab bars in the shower, grab bars around the toilet, shower chair, oxygen       FUNCTIONAL STATUS:  Physical Therapy Occupational Therapy Speech Therapy   7/4/2020    Bed Mobility   Supine to Sit 3  Moderate assistance   Additional items Assist x 1; Increased time required;Verbal cues;LE management   Additional Comments Supine in bed upon PT arrival   Pt left upright in bedside chair with chair alarm intact and all needs in reach at end of therapy session  Transfers   Sit to Stand 3  Moderate assistance   Additional items Assist x 1; Increased time required;Verbal cues   Stand to Sit 3  Moderate assistance   Additional items Assist x 1; Increased time required;Verbal cues   Additional Comments Transfers with RW   VC for hand placement and safety  Ambulation/Elevation   Gait pattern Excessively slow; Step to;Short stride;Decreased foot clearance;Decreased R stance; Improper Weight shift; Poor UE support  (unsteady)   Gait Assistance 3  Moderate assist   Additional items Assist x 1; Tactile cues; Verbal cues  (assist of second for chair follow)   Assistive Device Rolling walker   Distance 3 ft x 1, 22 ft x 1   Balance   Static Sitting Fair   Dynamic Sitting Fair   Static Standing Fair -   Dynamic Standing Poor +   Ambulatory Poor +   Endurance Deficit   Endurance Deficit Yes   Endurance Deficit Description fatigue, pain, weakness   Activity Tolerance   Activity Tolerance Patient limited by fatigue;Patient limited by pain   Nurse Made Aware RN cleared pt to be seen by PT   Assessment   Prognosis Good   Problem List Decreased strength;Decreased endurance; Impaired balance;Decreased mobility; Decreased safety awareness;Pain;Orthopedic restrictions   Assessment Pt seen for PT treatment session with focus on bed mobility, functional transfers, functional mobility  Pt demonstrating good progress toward goals this session  Pt able to ambulate increased distance this session as a result of improved endurance and strength  Additionally, pt able to perform all bed mobility and functional transfers with decreased assist this level due to improved strength  Pt appears to be with improved pain control this session, allowing for increased participation in therapy    Pt left upright in bedside chair with chair alarm intact and with all needs in reach  Pt will benefit from skilled therapy in order to address current impairments and functional limitations  PT to follow pt and recommending rehab once medically cleared  7/4/2020  ADL   Where Assessed Supine, bed   Equipment Provided Other (Comment)  (none )   Eating Assistance 5  Supervision/Setup   Eating Deficit Setup; Increased time to complete;Supervision/safety   Grooming Assistance 5  Supervision/Setup   Grooming Deficit Setup;Supervision/safety; Increased time to complete   UB Bathing Assistance 5  Supervision/Setup   UB Bathing Deficit Setup;Supervision/safety; Increased time to complete   LB Bathing Assistance 3  Moderate Assistance   LB Bathing Deficit Setup; Increased time to complete;Supervision/safety;Verbal cueing   UB Dressing Assistance 5  Supervision/Setup   UB Dressing Deficit Setup;Supervision/safety; Increased time to complete;Verbal cueing   LB Dressing Assistance 3  Moderate Assistance   LB Dressing Deficit Setup;Verbal cueing;Supervision/safety; Increased time to complete   Toileting Assistance  3  Moderate Assistance   Toileting Deficit Setup;Verbal cueing;Supervison/safety; Increased time to complete   Functional Assistance 3  Moderate Assistance   Functional Deficit Setup;Verbal cueing;Supervision/safety; Increased time to complete   Bed Mobility   Supine to Sit 3  Moderate assistance   Additional items Assist x 2; Increased time required;Verbal cues;LE management;HOB elevated   Transfers   Sit to Stand 4  Minimal assistance   Additional items Assist x 2; Increased time required;Verbal cues   Stand to Sit 4  Minimal assistance   Additional items Assist x 2; Increased time required;Armrests; Verbal cues   Functional Mobility   Functional Mobility 3  Moderate assistance   Additional Comments Pt took small steps using RW to transfer from bed to recliner chair      Additional items Rolling walker   Balance   Static Sitting Fair   Dynamic Sitting Fair -   Static Standing Poor +   Dynamic Standing Poor   Ambulatory Poor   Activity Tolerance   Activity Tolerance Patient limited by fatigue;Patient limited by pain   Medical Staff Made Aware  [facility]    Nurse Made Aware RN verbalized pt appropriate for therapy  RUE Assessment   RUE Assessment WFL   LUE Assessment   LUE Assessment WFL   Hand Function   Gross Motor Coordination Functional   Fine Motor Coordination Functional   Sensation   Light Touch No apparent deficits  (BUEs)   Vision-Basic Assessment   Current Vision Wears glasses all the time   Visual History Other (Comment)  (none )   Patient Visual Report Other (Comment)  (no changes in vision )   Cognition   Overall Cognitive Status WFL   Arousal/Participation Alert; Responsive; Cooperative   Attention Within functional limits   Orientation Level Oriented X4   Memory Within functional limits   Following Commands Follows all commands and directions without difficulty   Comments Pt was cooperative, alert and oriented during evaluation  Pt is oriented to person, place, time, amd situation  Assessment   Limitation Decreased ADL status; Decreased UE strength;Decreased Safe judgement during ADL;Decreased endurance;Decreased self-care trans;Decreased high-level ADLs   Prognosis Good   Assessment Pt is a 70 y o  F admitted to [address] [place] [address] on 7/1/2020 after sustaining a fall resulting in a hip fracture  Pt s/p open reduction with internal fixation of hip with cannulated screws  Comorbidities limiting pt performance include: hyponatremia, underweight, simple partial seizures, COPD, and malignant neoplasm of the right lung  OT orders for evaluation and treatment received  Pt WBAT in RLE  Performed at least two patient identifiers during session including patient name and wrist band  Prior to admission, pt was independent in all ADLs and IADLs  Pt was receiving help from personal care assistance for receiving groceries during the pandemic   [facility] with 5+5 NEIL  Pt bedroom and bathroom are on the second floor of home with a full flight of steps  Pt has a tub/shower and standard toilet with grab bars  Pt reported having a shower chair, but does not use it currently  Upon evaluation, pt was received supine in bed, alert and oriented  Pt required supervision assist for eating, grooming, and UB bathing and dressing  Pt required mod assist for LB bathing and dressing, toileting assistance, and bed mobility  Patient required mod Ax2 for functional assist and transferring with min Ax2 with use of RW and verbal cues  Deficits limting/affecting pt occupational performance include: decreased endurance, increased pain, decreased activity tolerance, decreased dynamic sitting and standing tolerance, decreased caregiver support within the home environment, and difficulty with navigating home environment with several stairs to enter  Occupational performance areas affected due to deficits mentioned above include: ADLs: grooming, bathing, dressing, functional mobility, community mobility; IADL: safety procedures, caring for elderly parents, household maintenance; and leisure exploration/participation  Pt would benefit from continued skilled OT sessions while admitted to the hospital to address the deficits mentioned above and maximize functional independence in ADLs and mobility  For an OT standpoint, recommendation at d/c would be short term rehabilitation  CURRENT GAP IN FUNCTION  Prior to Admission: Functional Status: Patient was independent with mobility/ambulation, transfers, ADL's, IADL's  Estimated length of stay: 10 to 14 days    Anticipated Post-Discharge Disposition/Treatment  Disposition: Return to previous home/apartment    Outpatient Services: Physical Therapy (PT) and Occupational Therapy (OT)    BARRIERS TO DISCHARGE  Lovenox, Weakness, Pain, Balance Difficulty, Fatigue, Home Accessibility, Caregiver Accessibility, Financial Resources, Equipment Needs, Resource Availability and Lives Alone    INTERVENTIONS FOR DISCHARGE  Adaptive equipment, Patient/Family/Caregiver Education, [employer], Support Group, Financial Assistance, Arrange DME needs, Home Modifcations, Medication Changes per MD recommendations, Therapy exercises, Center of balance support  and Energy conservation education     REQUIRED THERAPY:  Patient will require PT and OT 90 minutes each per day, five days per week to achieve rehab goals  REQUIRED FUNCTIONAL AND MEDICAL MANAGEMENT FOR INPATIENT REHABILITATION:  Skin:  right hip incision s/p surgical intervention, Pain Management: Overall pain is moderately controlled, Deep Vein Thrombosis (DVT) Prophylaxis:  low molecular weight heparin, and further internal medicine management of additional medical conditions while on the ARC, PT/OT intervention, patient/family education and training, and any needed consults PRN    RECOMMENDED LEVEL OF CARE: Patient is a 70year old female who originally presented to the [address] [place] [address] on 7/1/2020 after she sustained a fall  The patient was outside when she lost her balance  She states that she was going up a set up stairs when she believes that her oxygen tubing got stuck on a railing (long tubing) and she fell off of the stairs into a flower bed and then into a brick patio  Patient was then found to have a closed fright of the neck of the right femur  Prior to admission the patient was noted to be fully independent with both ADLS and IADLS as well as functional mobility  She was the primary caregiver for her patients  The patient does wear 2L of oxygen chronically  At this time the patient currently requires mod assist x 1 for both transfers and ambulation  With the use of a RW the patient was able to ambulate 3 feet and then 22 feet  In addition the patient also currently requires supervision with UB ADLS and mod assist with LB ADLS   At this time close medical management and PMR management is recommended for the patient while on the ARC to help monitor labs as well as other medical conditions  Nursing management will be required to monitor bowel/bladder function and skin breakdown as well as education on medication changes  Inpatient acute rehab is recommended at this time for the patient to maximize overall strength, function, endurance and mobility prior to discharge to home with the support of her friends and family

## 2020-07-05 NOTE — DISCHARGE SUMMARY
Discharge Summary - [address] [facility]    Patient Information: Anushka Bravo 70 y o  female MRN: [id number]  Unit/Bed#: -01 Encounter: [id number]    Discharging Physician / Practitioner: ONI Arthur  PCP: Shamar Lopez DO  Admission Date: 7/1/2020  Discharge Date: 07/05/20    Disposition:     Other: arc rehab     Reason for Admission: right hip pain     Discharge Diagnoses:     Principal Problem:    Hip fracture [facility])  Active Problems:    Malignant neoplasm of right lung ([facility] )    Chronic hypoxemic respiratory failure (HCC)    COPD (chronic obstructive pulmonary disease) ([place] [address] )    Simple partial seizures ([facility])    Hyponatremia    Acute blood loss as cause of postoperative anemia    Mild protein-calorie malnutrition ([place] [address] )    Constipation  Resolved Problems:    * No resolved hospital problems  *      Consultations During Hospital Stay:  · Dr Guadalupe Chua pulmonary   · Dr Melissa Santa orthopedics     Procedures Performed:     · covid 7/1: negative   · Covid 7/5/20 negative  · Right hip xray 7/1: Impacted right femoral neck fracture with associated foreshortening of the femoral neck  · Chest ray 7/1: Chronic changes as described with emphysema  No acute cardiopulmonary disease  · Xray hip right 7/2:   Fluoroscopic guidance provided for surgical procedure   Please refer to the separate procedure notes for additional details  · 07/02/2020:OPEN REDUCTION W/ INTERNAL FIXATION (ORIF) HIP WITH CANNUALATED SCREWS (Right)    Significant Findings / Test Results:     · See above      Incidental Findings:   · None    Test Results Pending at Discharge (will require follow up): · None     Outpatient Tests Requested:  · Follow-up PCP within the next week  · Call to schedule follow-up with orthopedics in 2 weeks    Complications:  None    Hospital Course:     Anushka Bravo is a 70 y o  female patient who originally presented to the hospital on 7/1/2020 due to right hip pain    Patient has history of metastatic lung cancer presents with right hip pain after fall  Patient was unable to bear weight denied chest pain shortness of breath fevers or chills  Patient will she did however states that after prior surgery on her brain she had difficulty getting extubated and her pulmonologist Dr Joselyn Sanches told her that she would be high risk for future surgeries  Patient was seen by Pulmonary for preoperative clearance in setting of history of stage 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, and severe COPD  At time of consultation they felt that patient was currently optimized from a pulmonary standpoint for surgical clearance and patient was taken to the OR which she underwent OPEN REDUCTION W/ INTERNAL FIXATION (ORIF) HIP WITH CANNUALATED SCREWS (Right) on 07/02/2020  Since surgery patient has done well  She was seen by physical therapy recommending rehabilitation  Pain has slowly improved  Patient did have some mild hyponatremia postoperatively on IV fluids  Once IV fluids discontinued sodium levels corrected  Patient is chronically on home oxygen, 2 L nasal cannula  Patient will be discharged to acute rehabilitation today 07/05/2020  Discussion was had with her cousin Pema Jaquez  She will need to follow-up with her PCP within the next week after discharge from rehabilitation and then she will need to call and schedule follow-up appointment with orthopedics for 2 week follow-up visit postoperatively  Would recommend that patient continue to follow with her pulmonologist on routine basis outpatient  Medically, stable for discharge  Condition at Discharge: good       Discharge Day Visit / Exam:     Subjective:  Patient is doing well today  She reports that she has had less pain this morning that she has last couple of days  Patient does not feel constipated has no abdominal pain no nausea vomiting or diarrhea  Still no bowel movement however bowel regimen on board    Vitals: Blood Pressure: 108/58 (07/05/20 0701)  Pulse: 94 (07/05/20 0701)  Temperature: 98 8 °F (37 1 °C) (07/05/20 0701)  Temp Source: Oral (07/02/20 1927)  Respirations: 18 (07/05/20 0701)  Height: 5' 5" (165 1 cm) (07/01/20 1735)  Weight - Scale: 38 6 kg (85 lb) (07/01/20 1735)  SpO2: 98 % (07/05/20 0713)  Exam:   Physical Exam   Constitutional: She is oriented to person, place, and time  No distress  HENT:   Head: Normocephalic  Mouth/Throat: No oropharyngeal exudate  Eyes: Conjunctivae are normal  Right eye exhibits no discharge  Left eye exhibits no discharge  No scleral icterus  Neck: No tracheal deviation present  No thyromegaly present  Cardiovascular: Normal rate  Exam reveals no gallop and no friction rub  No murmur heard  Pulmonary/Chest: No stridor  No respiratory distress  She has no wheezes  She has no rales  She exhibits no tenderness  Poor effort    Abdominal: Soft  She exhibits no distension and no mass  There is no tenderness  There is no rebound and no guarding  No hernia  Musculoskeletal: She exhibits no edema, tenderness or deformity (right lateral leg with dsd intact )  Lymphadenopathy:     She has no cervical adenopathy  Neurological: She is oriented to person, place, and time  Skin: No rash noted  She is not diaphoretic  No erythema  No pallor  Psychiatric: She has a normal mood and affect  Discussion with Family: spoke with serafin chauhan"s cousin as requested by     Discharge instructions/Information to patient and family:   See after visit summary for information provided to patient and family  Provisions for Follow-Up Care:  See after visit summary for information related to follow-up care and any pertinent home health orders  Planned Readmission: high risk      Discharge Statement:  I spent 50 minutes discharging the patient  This time was spent on the day of discharge  I had direct contact with the patient on the day of discharge   Greater than 50% of the total time was spent examining patient, answering all patient questions, arranging and discussing plan of care with patient as well as directly providing post-discharge instructions  Additional time then spent on discharge activities  Discharge Medications:  See after visit summary for reconciled discharge medications provided to patient and family        ** Please Note: This note has been constructed using a voice recognition system **

## 2020-07-05 NOTE — ASSESSMENT & PLAN NOTE
Hx of brain mets with plan for surveillance imaging   Follow-up with OP NSx and hem-onc  Monitor neuro exam closely

## 2020-07-05 NOTE — PROGRESS NOTES
Progress Note - Phoebekerenjhonathan Jose 1945, 70 y o  female MRN: [id number]    Unit/Bed#: [facility] 623-01 Encounter: [id number]    Primary Care Provider: Dorinda Taveras,    Date and time admitted to hospital: 7/1/2020 11:46 AM        * Hip fracture [facility])  Assessment & Plan  · Ortho appreciated   · COVID negative 7/1  · Pt high risk for surgery as in the past there was difficulty extubating her after brain surgery  · This was discussed with Pulmonary who were agreeable to patient having surgery  · Patient is postop day # 2 for OPEN REDUCTION W/ INTERNAL FIXATION (ORIF) HIP WITH CANNUALATED SCREWS (Right)  · Pain control   · Bowel regimen   · Monitor for acute blood loss anemia,   · Pain control w/ scheduled tylenol and oxy prn  · Pt/ot recommend rehab pt now amenable after discussion PMR ordered pre cm recommendations     Chronic hypoxemic respiratory failure (HCC)  Assessment & Plan  Chronic Stable on 2L O2    Malignant neoplasm of right lung (HCC)  Assessment & Plan  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  · Patient has previously completed treatment including chemotherapy and radiation  · And underwent brain met resection and whole-brain radiation  · Currently being monitored by imaging, follow-up outpatient    COPD (chronic obstructive pulmonary disease) ([facility] )  Assessment & Plan  No exac  C/w home UDNs  Pulm consult appreciated on admission to help risk stratify    Acute blood loss as cause of postoperative anemia  Assessment & Plan  Pt is sp acute drop today   From < 10 to 8  Typed and screened will manage post op will need urine    Hyponatremia  Assessment & Plan  IV fluids completed   Check labs in a m  After decline 7/3 held iv fluids today normalized off of ivfluids    Moderate protein-calorie malnutrition (HCC)  Assessment & Plan  Body mass index is 14 14 kg/m²    Regular diet  Add ensure shake     Simple partial seizures (HCC)  Assessment & Plan  Keppra      VTE Pharmacologic Prophylaxis:   Pharmacologic: Enoxaparin (Lovenox)  Mechanical VTE Prophylaxis in Place: Yes    Patient Centered Rounds: I have performed bedside rounds with nursing staff today  Discussions with Specialists or Other Care Team Provider: nursing     Education and Discussions with Family / Patient: patient     Time Spent for Care: 45 minutes  More than 50% of total time spent on counseling and coordination of care as described above  Current Length of Stay: 3 day(s)    Current Patient Status: Inpatient   Certification Statement: The patient will continue to require additional inpatient hospital stay due to pending rehab pmr consulted    Discharge Plan: to rehab when bed and insurance auth available     Code Status: Level 2 - DNAR: but accepts endotracheal intubation      Subjective:   Pt is sitting in bed about to eat dinner  Some pain but not as it has been  Pt states she has been able to ambulate down the [place] doing fairly well  She does now have concerns about ambulation as she felt confident should would be able to go to rehab  No chest pain no sob void appropriately no bm as of yet     Objective:     Vitals:   Temp (24hrs), Av 4 °F (36 9 °C), Min:97 8 °F (36 6 °C), Max:99 °F (37 2 °C)    Temp:  [97 8 °F (36 6 °C)-99 °F (37 2 °C)] 99 °F (37 2 °C)  HR:  [100-111] 100  Resp:  [16-18] 18  BP: ()/(54-79) 98/79  SpO2:  [96 %-100 %] 98 %  Body mass index is 14 14 kg/m²  Input and Output Summary (last 24 hours): Intake/Output Summary (Last 24 hours) at 2020 2245  Last data filed at 2020 1900  Gross per 24 hour   Intake 330 ml   Output 1400 ml   Net -1070 ml       Physical Exam:     Physical Exam   Constitutional: She is oriented to person, place, and time  HENT:   Head: Normocephalic and atraumatic  Mouth/Throat: No oropharyngeal exudate  Eyes: Pupils are equal, round, and reactive to light  Conjunctivae are normal  Right eye exhibits no discharge  Left eye exhibits no discharge  No scleral icterus  Neck: No thyromegaly present  Cardiovascular: Normal rate  Exam reveals no gallop and no friction rub  No murmur heard  Pulmonary/Chest: Effort normal  No stridor  No respiratory distress  She has no wheezes  She has no rales  She exhibits no tenderness  Abdominal: She exhibits no distension and no mass  There is no tenderness  There is no rebound and no guarding  No hernia  Musculoskeletal: She exhibits deformity (right lateral thight dsd intact no drainage noted )  Lymphadenopathy:     She has no cervical adenopathy  Neurological: She is oriented to person, place, and time  Skin: No rash noted  She is not diaphoretic  No erythema  No pallor  Psychiatric: She has a normal mood and affect  Additional Data:     Labs:    Results from last 7 days   Lab Units 07/04/20  0451  07/01/20  1408   WBC Thousand/uL 5 25   < > 5 94   HEMOGLOBIN g/dL 8 9*   < > 13 5   HEMATOCRIT % 27 8*   < > 40 4   PLATELETS Thousands/uL 196   < > 255   NEUTROS PCT %  --   --  69   LYMPHS PCT %  --   --  18   MONOS PCT %  --   --  10   EOS PCT %  --   --  1    < > = values in this interval not displayed  Results from last 7 days   Lab Units 07/04/20  0451   SODIUM mmol/L 137   POTASSIUM mmol/L 4 2   CHLORIDE mmol/L 102   CO2 mmol/L 31   BUN mg/dL 8   CREATININE mg/dL 0 39*   ANION GAP mmol/L 4   CALCIUM mg/dL 8 0*   GLUCOSE RANDOM mg/dL 97     Results from last 7 days   Lab Units 07/01/20  1500   INR  0 95                       * I Have Reviewed All Lab Data Listed Above  * Additional Pertinent Lab Tests Reviewed:  All Labs Within Last 24 Hours Reviewed    Imaging:    Imaging Reports Reviewed Today Include:reviewed     Recent Cultures (last 7 days):           Last 24 Hours Medication List:     Current Facility-Administered Medications:  acetaminophen 975 mg Oral [facility] [place] PA-C   albuterol 2 puff Inhalation Q4H PRN Brandon Bonilla PA-C   budesonide 0 25 mg Nebulization BID Roena Friendly ORIN She[place]   vitamin B-12 1,000 mcg Oral Daily Ramez Franz PA-C   enoxaparin 40 mg Subcutaneous Daily Ramez Franz PA-C   ipratropium 0 5 mg Nebulization TID Ramez Franz PA-C   levalbuterol 1 25 mg Nebulization TID Sánchez Orlando DO   levETIRAcetam 250 mg Oral Q12H [address] [place] [address] Road, CRNP   nicotine 1 patch Transdermal Daily Ramez Franz, [place]   oxyCODONE 5 mg Oral Q4H PRN Ramez Franz PA-C   Or       oxyCODONE 2 5 mg Oral Q4H PRN Ramez Franz PA-C        Today, Patient Was Seen By: ONI Trimble    ** Please Note: Dictation voice to text software may have been used in the creation of this document   **

## 2020-07-05 NOTE — ASSESSMENT & PLAN NOTE
· IV fluids completed   · Check labs in a m    · After decline 7/3 held iv fluids acceptable 135 monitor

## 2020-07-05 NOTE — ASSESSMENT & PLAN NOTE
Malnutrition Findings:   Malnutrition type: Chronic illness(Mild malnutrition r/t Ca dx as evidenced by BMI 14 1, unable to eat sufficient energy/protein to maintain a healthy weight, Treated with diet and supplements)  Degree of Malnutrition: Malnutrition of mild degree    BMI Findings:  BMI Classifications: Underweight < 18 5     Body mass index is 14 14 kg/m²  · Body mass index is 14 14 kg/m²    · Regular diet  · ensure shake

## 2020-07-05 NOTE — ASSESSMENT & PLAN NOTE
· Ortho appreciated   · COVID negative 7/1  · Pt high risk for surgery as in the past there was difficulty extubating her after brain surgery    · This was discussed with Pulmonary who were agreeable to patient having surgery  · Patient is postop day # 2 for OPEN REDUCTION W/ INTERNAL FIXATION (ORIF) HIP WITH CANNUALATED SCREWS (Right)  · Pain control   · Bowel regimen   · Monitor for acute blood loss anemia,   · Pain control w/ scheduled tylenol and oxy prn  · Pt/ot recommend rehab pt now amenable after discussion PMR ordered pre cm recommendations

## 2020-07-05 NOTE — ASSESSMENT & PLAN NOTE
IM consulted, with overall monitoring, and management at their discretion during ARC course  Monitor H/H, vitals, signs/symptoms of acute bleeding

## 2020-07-05 NOTE — ASSESSMENT & PLAN NOTE
· Ortho appreciated   · COVID negative 7/1  · Pt high risk for surgery as in the past there was difficulty extubating her after brain surgery    · This was discussed with Pulmonary who were agreeable to patient having surgery  · Patient is postop day # 3 for OPEN REDUCTION W/ INTERNAL FIXATION (ORIF) HIP WITH CANNUALATED SCREWS (Right)  · Pain control   · Bowel regimen   · Monitor for acute blood loss anemia, slight increase today no need for transfusion   · Pain control w/ scheduled tylenol and oxy prn  · Pt/ot recommend rehab pt now amenable after discussion PMR ordered pre cm recommendations

## 2020-07-05 NOTE — ASSESSMENT & PLAN NOTE
R femoral neck fx status post surgical repair via medullary nail by Dr Alphonse Dewey on 7/2  POD#14 is 7/16 - ortho aware  Weight-bearing as tolerated on affected limb

## 2020-07-05 NOTE — PROGRESS NOTES
Clinically Significant Medication Issue  Time of Stay: ADMISSION    Did a complete drug regimen review identify potential clinically significant medication issues?     no Patient is aware to contact her insurance for Benefits and coverage.

## 2020-07-05 NOTE — ASSESSMENT & PLAN NOTE
IV fluids completed   Check labs in a m    After decline 7/3 held iv fluids today normalized off of ivfluids

## 2020-07-05 NOTE — CONSULTS
Internal Medicine  Consultation Note    Patient: Padmini Hernandez  Age/sex: 70 y o  female  Medical Record #: [id number]    Assessment/Plan    R femoral neck fracture  · S/p ORIF w/2 screws 7/2  · F/u ortho as scheduled  · Pain mgmt and rehab per PMR  · WBAT    Lung CA with mets to Brain  · Stable  · Takes keppra 250mg bid    Severe COPD  · Cont inhalers  · Wears chronic oxygen  · Maintain sats >88%    Chronic tobacco use  · Cont nicotine patch    Anemia  · Monitor cbc  · Transfuse if hgb <8    Constipation  · Per PMR    Subjective/ HPI: Patient seen and examined  Pt is a 71 y/o who suffered a mechanical fall while walking in her garden landing on her right hip and suffering a fracture to the femoral neck  The patient underwent ORIF w/screws x 2 on 7/2  She is now here for rehab and we are asked to follow along for medical management  ROS:   A 10 point ROS was performed; negative except as noted above       Social History:    Substance Use History:   Social History     Substance and Sexual Activity   Alcohol Use Not Currently    Alcohol/week: 0 0 standard drinks    Frequency: Never    Binge frequency: Never     Social History     Tobacco Use   Smoking Status Current Some Day Smoker    Packs/day: 0 00    Years: 55 00    Pack years: 0 00    Types: Cigarettes    Start date: 1962   Smokeless Tobacco Never Used   Tobacco Comment    NOW DOWN TO 2 A DAY     Social History     Substance and Sexual Activity   Drug Use Yes    Types: Marijuana    Comment: [employer]IJUANA       Family History:    Family History   Problem Relation Age of Onset    Heart disease Mother         cardiac disorder    Dementia Mother     Heart failure Father     Heart disease Maternal Grandmother     Heart disease Maternal Grandfather          Review of Scheduled Meds:    Current Facility-Administered Medications:  acetaminophen 975 mg Oral Q8H [address] Nayely Conner MD   albuterol 2 puff Inhalation Q4H PRN Nayely Conner MD bisacodyl 10 mg Rectal Daily PRN Edgar Velasco MD   budesonide 0 25 mg Nebulization BID Edgar Velasco MD   [START ON 2020] vitamin B-12 1,000 mcg Oral Daily Edgar Velasco MD   docusate sodium 100 mg Oral BID Edgar Velasco MD   ipratropium 0 5 mg Nebulization TID Edgar Velasco MD   levalbuterol 1 25 mg Nebulization TID Edgar Velasco MD   levETIRAcetam 250 mg Oral Q12H [facility] Edgar Vealsco MD   ondansetron 4 mg Oral Q8H PRN Edgar Velasco MD   oxyCODONE 5 mg Oral Q4H PRN Edgar Velasco MD   Or       oxyCODONE 2 5 mg Oral Q4H PRN Edgar Velasco MD   polyethylene glycol 17 g Oral Daily PRN Edgar Velasco MD       Labs:     Results from last 7 days   Lab Units 20  0447 20  0451   WBC Thousand/uL 5 28 5 25   HEMOGLOBIN g/dL 9 3* 8 9*   HEMATOCRIT % 29 1* 27 8*   PLATELETS Thousands/uL 202 196     Results from last 7 days   Lab Units 20  0447 20  0451   SODIUM mmol/L 135* 137   POTASSIUM mmol/L 4 2 4 2   CHLORIDE mmol/L 100 102   CO2 mmol/L 34* 31   BUN mg/dL 8 8   CREATININE mg/dL 0 37* 0 39*   CALCIUM mg/dL 8 3 8 0*         Results from last 7 days   Lab Units 20  1500   INR  0 95              Lab Results   Component Value Date    BLOODCX No Growth After 5 Days  2019    BLOODCX No Growth After 5 Days  2019    SPUTUMCULTUR 2+ Growth of  2019    SPUTUMCULTUR  2019     Commensal respiratory hunter only; No significant growth of Staph aureus/MRSA or Pseudomonas aeruginosa         Input and Output Summary (last 24 hours):     No intake or output data in the 24 hours ending 20 1646    Imaging:     No orders to display       *Labs /Radiology studiesLabs reviewed  *Medications reviewed and reconciled as needed  *Please refer to order section for additional ordered labs studies  *Case discussed with primary attending during morning huddle case rounds    Vitals:   Temp (24hrs), Av 5 °F (36 9 °C), Min:97 8 °F (36 6 °C), Max:99 °F (37 2 °C)    Temp:  [97 8 °F (36 6 °C)-99 °F (37 2 °C)] 98 5 °F (36 9 °C)  HR:  [] 102  Resp:  [18] 18  BP: ()/(51-79) 108/51  SpO2:  [95 %-100 %] 98 %  Body mass index is 14 51 kg/m²  Physical Exam:   GEN: No apparent distress, interactive  NEURO: Alert and oriented x3; frail  HEENT: Pupils are equal and reactive, EOMI, mucous membranes are moist, face symmetrical  CV: S1 S2 regular, no MRG, no peripheral edema noted  RESP: Lungs are clear bilaterally decreased throughout, no wheezes, rales or rhonchi noted, 3L NC continuous, respirations easy and non labored  GI: Flat, soft non tender, non distended; +BS x4  : Voiding without difficulty  MUSC: Moves all extremities, except RLE  SKIN: pink, warm and dry, normal turgor, no rashes, lesions          Invasive Devices     Peripheral Intravenous Line            Peripheral IV 07/05/20 Distal;Dorsal (posterior); Left Forearm less than 1 day                   Code Status: Level 1 - Full Code  Current Length of Stay: 0 day(s)    Total floor / unit time spent today 45 minutes with more than 50% spent counseling/coordinating care  Counseling includes discussion with patient re: progress  and discussion with patient of his/her current medical state/information  Coordination of patient's care was performed in conjunction with primary service  Time invested included review of patient's labs, vitals, and management of their comorbidities with continued monitoring  In addition, this patient was discussed with medical team including physician and advanced extenders  The care of the patient was extensively discussed and appropriate treatment plan was formulated unique for this patient  ** Please Note: Fluency Direct voice to text software may have been used in the creation of this document   Audio transcription errors may occur**

## 2020-07-05 NOTE — ASSESSMENT & PLAN NOTE
APAP TID  Oxy 2 5-5mg Q4H prn  Counseled on and continue to encourage deep breathing/relaxation/behavioral pain management techniques:     Deep breathin seconds in, 5 seconds out, 5 times per hour when awake and PRN when in pain or anticipate pain; avoid holding breath and tightening muscles and instead breathe slowly and deeply  Monitor for oversedation, AMS/delirium, and respiratory depression   If being administered - hold opiates, muscle relaxants, benzodiazepines, and gabapentin for oversedation, AMS, or RR<12

## 2020-07-06 ENCOUNTER — TELEPHONE (OUTPATIENT)
Dept: INTERNAL MEDICINE CLINIC | Facility: CLINIC | Age: 75
End: 2020-07-06

## 2020-07-06 PROBLEM — R52 PAIN: Status: RESOLVED | Noted: 2020-07-05 | Resolved: 2020-07-06

## 2020-07-06 LAB
ANION GAP SERPL CALCULATED.3IONS-SCNC: 4 MMOL/L (ref 4–13)
BASOPHILS # BLD AUTO: 0.02 THOUSANDS/ΜL (ref 0–0.1)
BASOPHILS NFR BLD AUTO: 0 % (ref 0–1)
BUN SERPL-MCNC: 10 MG/DL (ref 5–25)
CALCIUM SERPL-MCNC: 8.6 MG/DL (ref 8.3–10.1)
CHLORIDE SERPL-SCNC: 100 MMOL/L (ref 100–108)
CO2 SERPL-SCNC: 32 MMOL/L (ref 21–32)
CREAT SERPL-MCNC: 0.38 MG/DL (ref 0.6–1.3)
EOSINOPHIL # BLD AUTO: 0.09 THOUSAND/ΜL (ref 0–0.61)
EOSINOPHIL NFR BLD AUTO: 2 % (ref 0–6)
ERYTHROCYTE [DISTWIDTH] IN BLOOD BY AUTOMATED COUNT: 13.1 % (ref 11.6–15.1)
GFR SERPL CREATININE-BSD FRML MDRD: 105 ML/MIN/1.73SQ M
GLUCOSE P FAST SERPL-MCNC: 89 MG/DL (ref 65–99)
GLUCOSE SERPL-MCNC: 89 MG/DL (ref 65–140)
HCT VFR BLD AUTO: 29.5 % (ref 34.8–46.1)
HGB BLD-MCNC: 9.3 G/DL (ref 11.5–15.4)
IMM GRANULOCYTES # BLD AUTO: 0.01 THOUSAND/UL (ref 0–0.2)
IMM GRANULOCYTES NFR BLD AUTO: 0 % (ref 0–2)
LYMPHOCYTES # BLD AUTO: 0.7 THOUSANDS/ΜL (ref 0.6–4.47)
LYMPHOCYTES NFR BLD AUTO: 13 % (ref 14–44)
MCH RBC QN AUTO: 31.7 PG (ref 26.8–34.3)
MCHC RBC AUTO-ENTMCNC: 31.5 G/DL (ref 31.4–37.4)
MCV RBC AUTO: 101 FL (ref 82–98)
MONOCYTES # BLD AUTO: 0.6 THOUSAND/ΜL (ref 0.17–1.22)
MONOCYTES NFR BLD AUTO: 11 % (ref 4–12)
NEUTROPHILS # BLD AUTO: 3.9 THOUSANDS/ΜL (ref 1.85–7.62)
NEUTS SEG NFR BLD AUTO: 74 % (ref 43–75)
NRBC BLD AUTO-RTO: 0 /100 WBCS
PLATELET # BLD AUTO: 211 THOUSANDS/UL (ref 149–390)
PMV BLD AUTO: 9.2 FL (ref 8.9–12.7)
POTASSIUM SERPL-SCNC: 4.3 MMOL/L (ref 3.5–5.3)
RBC # BLD AUTO: 2.93 MILLION/UL (ref 3.81–5.12)
SODIUM SERPL-SCNC: 136 MMOL/L (ref 136–145)
WBC # BLD AUTO: 5.32 THOUSAND/UL (ref 4.31–10.16)

## 2020-07-06 PROCEDURE — 97162 PT EVAL MOD COMPLEX 30 MIN: CPT

## 2020-07-06 PROCEDURE — 85025 COMPLETE CBC W/AUTO DIFF WBC: CPT | Performed by: NURSE PRACTITIONER

## 2020-07-06 PROCEDURE — 80048 BASIC METABOLIC PNL TOTAL CA: CPT | Performed by: NURSE PRACTITIONER

## 2020-07-06 PROCEDURE — 97116 GAIT TRAINING THERAPY: CPT

## 2020-07-06 PROCEDURE — 94640 AIRWAY INHALATION TREATMENT: CPT

## 2020-07-06 PROCEDURE — 97166 OT EVAL MOD COMPLEX 45 MIN: CPT

## 2020-07-06 PROCEDURE — 94760 N-INVAS EAR/PLS OXIMETRY 1: CPT

## 2020-07-06 PROCEDURE — 99233 SBSQ HOSP IP/OBS HIGH 50: CPT | Performed by: PHYSICAL MEDICINE & REHABILITATION

## 2020-07-06 PROCEDURE — 97535 SELF CARE MNGMENT TRAINING: CPT

## 2020-07-06 PROCEDURE — 97530 THERAPEUTIC ACTIVITIES: CPT

## 2020-07-06 RX ORDER — SENNOSIDES 8.6 MG
2 TABLET ORAL
Status: DISCONTINUED | OUTPATIENT
Start: 2020-07-06 | End: 2020-07-17 | Stop reason: HOSPADM

## 2020-07-06 RX ORDER — POLYETHYLENE GLYCOL 3350 17 G/17G
17 POWDER, FOR SOLUTION ORAL DAILY
Status: DISCONTINUED | OUTPATIENT
Start: 2020-07-06 | End: 2020-07-17 | Stop reason: HOSPADM

## 2020-07-06 RX ADMIN — OXYCODONE HYDROCHLORIDE 2.5 MG: 5 TABLET ORAL at 16:16

## 2020-07-06 RX ADMIN — ENOXAPARIN SODIUM 40 MG: 40 INJECTION SUBCUTANEOUS at 13:52

## 2020-07-06 RX ADMIN — POLYETHYLENE GLYCOL 3350 17 G: 17 POWDER, FOR SOLUTION ORAL at 16:16

## 2020-07-06 RX ADMIN — ACETAMINOPHEN 975 MG: 325 TABLET, FILM COATED ORAL at 13:54

## 2020-07-06 RX ADMIN — LEVALBUTEROL HYDROCHLORIDE 1.25 MG: 1.25 SOLUTION, CONCENTRATE RESPIRATORY (INHALATION) at 13:43

## 2020-07-06 RX ADMIN — LEVETIRACETAM 250 MG: 500 TABLET, FILM COATED ORAL at 21:59

## 2020-07-06 RX ADMIN — BUDESONIDE 0.25 MG: 0.25 INHALANT RESPIRATORY (INHALATION) at 08:26

## 2020-07-06 RX ADMIN — BUDESONIDE 0.25 MG: 0.25 INHALANT RESPIRATORY (INHALATION) at 19:39

## 2020-07-06 RX ADMIN — LEVETIRACETAM 250 MG: 500 TABLET, FILM COATED ORAL at 02:50

## 2020-07-06 RX ADMIN — CYANOCOBALAMIN TAB 500 MCG 1000 MCG: 500 TAB at 09:37

## 2020-07-06 RX ADMIN — IPRATROPIUM BROMIDE 0.5 MG: 0.5 SOLUTION RESPIRATORY (INHALATION) at 08:26

## 2020-07-06 RX ADMIN — LEVALBUTEROL HYDROCHLORIDE 1.25 MG: 1.25 SOLUTION, CONCENTRATE RESPIRATORY (INHALATION) at 19:39

## 2020-07-06 RX ADMIN — DOCUSATE SODIUM 100 MG: 100 CAPSULE, LIQUID FILLED ORAL at 09:37

## 2020-07-06 RX ADMIN — OXYCODONE HYDROCHLORIDE 5 MG: 5 TABLET ORAL at 09:36

## 2020-07-06 RX ADMIN — ACETAMINOPHEN 975 MG: 325 TABLET, FILM COATED ORAL at 05:46

## 2020-07-06 RX ADMIN — LEVALBUTEROL HYDROCHLORIDE 1.25 MG: 1.25 SOLUTION, CONCENTRATE RESPIRATORY (INHALATION) at 08:26

## 2020-07-06 RX ADMIN — ACETAMINOPHEN 975 MG: 325 TABLET, FILM COATED ORAL at 21:59

## 2020-07-06 RX ADMIN — IPRATROPIUM BROMIDE 0.5 MG: 0.5 SOLUTION RESPIRATORY (INHALATION) at 13:43

## 2020-07-06 RX ADMIN — DOCUSATE SODIUM 100 MG: 100 CAPSULE, LIQUID FILLED ORAL at 16:16

## 2020-07-06 RX ADMIN — IPRATROPIUM BROMIDE 0.5 MG: 0.5 SOLUTION RESPIRATORY (INHALATION) at 19:39

## 2020-07-06 RX ADMIN — SENNOSIDES 17.2 MG: 8.6 TABLET ORAL at 21:59

## 2020-07-06 NOTE — PROGRESS NOTES
ARC PT EVAL   07/06/20 1000   Patient Data   Rehab Impairment Impairment of mobility, safety and Activities of Daily Living (ADLs) due to Orthopedic Disorders:  08 11  Unilateral Hip Fracture   Etiologic Diagnosis closed fracture of the neck of the right femur    Date of Onset 07/01/20  (surgery date: 7/2/2020)   Home Setup   Type of Home Multi Level;Apartment   Method of Entry Stairs;Hand Rail Bilateral   Number of Stairs 10  (5+5 NEIL from back porch, 5 NEIL front door)   Number of Stairs in Home 12   In [address] Right   First Floor Bathroom Full;Tub; Shower;Combo;Grab Bars   First Floor Bathroom Accessibility Grab bars in tub/shower   Second Floor Bathroom Full;Tub; Shower;Grab Bars   Second [place] Accessibility Grab bars in tub/shower   First Floor Setup Available Yes   Home Modifications Necessary?   (per pt can stay in parents 1st floor apartment in guest room)   Available Equipment [employer]; Wheelchair;Bedside Commode  (transport w/c)   Prior Level of Function   Indoor-Mobility (Ambulation) 3  Independent - Patient completed the activities by him/herself, with or without an assistive device, with no assistance from a helper  Stairs 3  Independent - Patient completed the activities by him/herself, with or without an assistive device, with no assistance from a helper     Falls in the Last Year   Number of falls in the past 12 months 1   Type of Injury Associated with Fall Major injury  (resulted to this admission)   Patient Preference   Nickname (Patient Preference) Sarika   Psychosocial   Psychosocial (WDL) WDL   Patient Behaviors/Mood Cooperative;Calm;Pleasant   Restrictions/Precautions   Precautions Fall Risk;O2;Pain;Supervision on toilet/commode;Limb alert  (2L O2)   Weight Bearing Restrictions Yes   RLE Weight Bearing Per Order WBAT   Pain Assessment   Pain Assessment Tool 0-10   Pain Score 5  (w/ activity)   Pain Location/Orientation Orientation: Right;Location: Hip   Pain Onset/Description Frequency: Intermittent; Descriptor: Aching;Descriptor: Discomfort   Effect of Pain on Daily Activities pain did not limit activities   Patient's Stated Pain Goal No pain   Hospital Pain Intervention(s) Rest;Medication (See MAR)   Toileting Hygiene   Type of Assistance Needed Supervision  (CS)   Amount of Physical Assistance Provided No physical assistance   Comment performed pericare while seated   Toileting Hygiene CARE Score 4   Toilet Transfer   Surface Assessed Standard Toilet  (BSC over toilet)   Transfer Technique Stand Pivot   Limitations Noted In Balance; Safety;UE Strength;LE Strength; Sequencing; Endurance   Adaptive Equipment Walker;Grab Bar   Type of Assistance Needed Physical assistance   Amount of Physical Assistance Provided Less than 25%   Comment CG-Lin   Toilet Transfer CARE Score 3   Toileting   Able to Pull Clothing down yes, up yes  Able to Manage Clothing Closures Yes   Manage Hygiene Bladder   Limitations Noted In LE Strength;Balance;UE Strength   Adaptive Equipment Grab Bar   Bowel/Bladder Management   Current Bladder Elimination Toilet  (BSC over toilet)   Accident No   Limitations Noted In Balance   QI: Bladder Continence 0  Always continent (no documented incontinence)   Transfer Bed/Chair/Wheelchair   Limitations Noted In Balance;Pain Management; Endurance;UE Strength;LE Strength; Sequencing   Adaptive Equipment Roller Walker   Type of Assistance Needed Physical assistance;Verbal cues; Adaptive equipment   Amount of Physical Assistance Provided 25%-49%   Comment minAx1 w/ RW, VC for hand/foot placement and WS onto R LE as pt tends to lean to L side   Chair/Bed-to-Chair Transfer CARE Score 3   Roll Left and Right   Comment attempted but unable to tolerate due pain   Reason if not Attempted Safety concerns   Roll Left and Right CARE Score 88   Sit to Lying   Type of Assistance Needed Physical assistance;Verbal cues   Amount of Physical Assistance Provided 50%-74%   Comment modA for bilat LE, flat bed and at 32'' high to simulate pt's home setup   Sit to Lying CARE Score 2   Lying to Sitting on Side of Bed   Type of Assistance Needed Verbal cues; Incidental touching   Amount of Physical Assistance Provided No physical assistance   Comment CS-CGA as pt comes to long sit and brings LEs over side of bed to scoot to EOB   Lying to Sitting on Side of Bed CARE Score 4   Sit to Stand   Type of Assistance Needed Physical assistance;Verbal cues; Adaptive equipment   Amount of Physical Assistance Provided 25%-49%   Comment Lin w/ RW, VC for hand placement and WS onto R LE   Sit to Stand CARE Score 3   Picking Up Object   Type of Assistance Needed Physical assistance; Adaptive equipment   Amount of Physical Assistance Provided 25%-49%   Comment Lin w/ RW and jorge VC for WS onto R side   Picking Up Object CARE Score 3   Car Transfer   Reason if not Attempted Safety concerns   Car Transfer CARE Score 88   Ambulation   Primary Mode of Locomotion Prior to Admission Walk   Distance Walked (feet) 50 ft   Assist Device Roller Walker   Gait Pattern Antalgic; Slow Sharron; Forward Flexion;Narrow SCAR;Step through; Improper weight shift   Limitations Noted In Balance; Endurance;Posture; Safety;Speed;Strength   Provided Assistance with: Balance;Weight Shift   Walk Assist Level Contact Guard;Minimum Assist   Findings CG-minAx1 w/ RW W/ CF by PT for safety   Walk 10 Feet   Type of Assistance Needed Physical assistance; Adaptive equipment;Verbal cues   Amount of Physical Assistance Provided Less than 25%   Comment 10'x2, CG-Lin w/ RW and CF by PT for safety, VC to widen SCAR   Walk 10 Feet CARE Score 3   Walk 50 Feet with Two Turns   Type of Assistance Needed Physical assistance; Adaptive equipment;Verbal cues   Amount of Physical Assistance Provided Less than 25%   Comment 50'x1, CG-Lin w/ RW and CF by PT for safety, VC to widen SCAR   Walk 50 Feet with Two Turns CARE Score 3   Walk 150 Feet   Reason if not Attempted Safety concerns Walk 150 Feet CARE Score 88   Walking 10 Feet on Uneven Surfaces   Reason if not Attempted Safety concerns   Walking 10 Feet on Uneven Surfaces CARE Score 88   Wheel 50 Feet with Two Turns   Reason if not Attempted Activity not applicable   Wheel 50 Feet with Two Turns CARE Score 9   Wheel 150 Feet   Reason if not Attempted Activity not applicable   Wheel 026 Feet CARE Score 9   Curb or Single Stair   Style negotiated Single stair   Type of Assistance Needed Physical assistance;Verbal cues   Amount of Physical Assistance Provided 25%-49%   Comment bilat HR, Lin to acsend, CGA  for descend as pt descending backwards, VC for proper sequence   1 Step (Curb) CARE Score 3   4 Steps   Type of Assistance Needed Physical assistance;Verbal cues   Amount of Physical Assistance Provided 25%-49%   Comment stair  x4, bilat HR, Lin to acsend, CGA  for descend as pt descending backwards, VC for proper sequence   4 Steps CARE Score 3   12 Steps   Reason if not Attempted Safety concerns   12 Steps CARE Score 88   Stairs   Type  Steps   # of Steps 4   Weight Bearing Precautions RLE;WBAT   Assist Devices Bilateral Rail   Comprehension   QI: Comprehension 4  Undestands: Clear comprehension without cues or repetitions   Expression   QI: Expression 4  Express complex messages without difficulty and with speech that is clear and easy to [place]   RLE Assessment   RLE Assessment WFL   Strength RLE   RLE Overall Strength 3+/5   LLE Assessment   LLE Assessment WFL   Strength LLE   LLE Overall Strength 4+/5   Sensation   Light Touch No apparent deficits   Propioception No apparent deficits   Cognition   Overall Cognitive Status WFL   Arousal/Participation Alert; Cooperative   Attention Within functional limits   Orientation Level Oriented X4   Memory Within functional limits   Following Commands Follows all commands and directions without difficulty   Objective Measure   PT Findings Pt's vitals taken to determine OH, check vitals for details  Pt had no c/o of lightheadedness or fatigue  Post treatment pt left in recliner with ice for R hip  Discharge Information   Vocational Plan Retired/not working   Patient's Discharge Plan home w/ family support   Patient's Rehab Expectations "get back to 99% where I was before"   Barriers to Discharge Home Limited Family Support;Decreased Strength;Decreased Endurance;Pain; Safety Considerations; Unsafe Home Setup   Impressions Pt is a 69 y/o female presenting to [facility] s/p ORIF of R hip with cannulated screws following a fall outside of her home while ascending stairs as she believes her O2 line had gotten stuck on the railing  Pt is seen for mod complexity eval w/ comorbidities affecting pt's physical performance at time of assessment including chronic hypoxemic respiratory failure, malignant neoplasm of the right lung, chronic obstructive pulmonary disease, acute blood loss anemia post op, hyponatremia, moderate protein-calorie malnutrition, and simple partial seizures  Personal factors affecting pt at time of eval include limited home accessibility as pt has 5 NEIL to front door and 5+5 NEIL from the porch and has a FF to reach bedroom and bathroom on second floor  In addition pt has limited family support as 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 and her sister lives in [place]  Of note, pt states she has a cousin who is a retired hospice nurse and reports she can offer help at home  PTA, pt lived with parents in a multistory house that is split up into apartments owned by her parents, was independent with all mobilities and ADLs, and used a home health aid for assist with IADLs (specifically cleaning and grocery shopping)   Upon initial eval, pt has increased pain in R hip during mobilities, decreased strength in bilat LEs (R>L), impaired static/dynamic standing balance w/ decreased ability to WS onto R LE increasing risk of falls at this time, expressed fear of pain in R LE during ambulation and transfers, impaired gait, decreased endurance, and requires CG-Lin with RW support to complete mobilities  Pt is unsafe to return home at this time and requires skilled PT intervention to max mobility and safety  Pt is a good rehab candidate w/ anticipated mod-I goals using LRAQ w/ ELOS of 2 weeks      PT Therapy Minutes   PT Time In 1000   PT Time Out 1130   PT Total Time (minutes) 90   PT Mode of treatment - Individual (minutes) 90   PT Mode of treatment - Concurrent (minutes) 0   PT Mode of treatment - Group (minutes) 0   PT Mode of treatment - Co-treat (minutes) 0   PT Mode of Treatment - Total time(minutes) 90 minutes   PT Cumulative Minutes 90   Cumulative Minutes   Cumulative therapy minutes 90

## 2020-07-06 NOTE — PROGRESS NOTES
PM&R Progress Note:    HPI: Pt is a 71 y/o who suffered a mechanical fall while walking in her garden landing on her right hip and suffering a fracture to the femoral neck  The patient underwent ORIF w/screws x 2 on 7/2  ASSESSMENT: Stable, progressing      PLAN:    Rehabilitation   Continue current rehabilitation plan of care to maximize function   Current function: Evals pending   Estimated Discharge:     Pain   Controlled  DVT prophylaxis   Lovenox  Bladder plan   Continent    Bowel plan   Continent    * Hip fracture [facility])  Assessment & Plan  R femoral neck fx status post surgical repair via medullary nail by Dr Abimbola Amador on 7/2  POD#14 is 7/16  Weight-bearing as tolerated on affected limb   Monitor incision/area for infection or dehiscence/impaired healing   Monitor for signs/symptoms of VTE, atelectasis, acute blood loss anemia, or other infection   Recommend acute comprehensive interdisciplinary inpatient rehabilitation to include intensive skilled therapies (PT, OT) as outlined with oversight and management by rehabilitation physician as well as inpatient rehab level nursing, case management and weekly interdisciplinary team meetings  Follow-up with Ortho Sx after d/c and ortho if needed during ARC course       Constipation  Assessment & Plan  Continue scheduled Colace and as needed Miralax      Acute blood loss as cause of postoperative anemia  Assessment & Plan  IM consulted, with overall monitoring, and management at their discretion during ARC course  Monitor H/H, vitals, signs/symptoms of acute bleeding        Brain metastases [facility])  Assessment & Plan  Hx of brain mets with plan for surveillance imaging   Follow-up with OP NSx and hem-onc  Monitor neuro exam closely    COPD (chronic obstructive pulmonary disease) ([facility] [address] )  Assessment & Plan  IM consulted and with overall management at their discretion during ARC course  On home chronic 2L O2  Home nebs  Monitor vitals without and with activity   Incentive spirometry     Nicotine dependence  Assessment & Plan  Encourage cessation  Malignant neoplasm of right lung [facility])  Assessment & Plan  S/P chemo/radiation   Follow-up hem/onc after d/c     Painresolved as of 2020  Assessment & Plan  APAP TID  Oxy 2 5-5mg Q4H prn  Counseled on and continue to encourage deep breathing/relaxation/behavioral pain management techniques:     Deep breathin seconds in, 5 seconds out, 5 times per hour when awake and PRN when in pain or anticipate pain; avoid holding breath and tightening muscles and instead breathe slowly and deeply  Monitor for oversedation, AMS/delirium, and respiratory depression   If being administered - hold opiates, muscle relaxants, benzodiazepines, and gabapentin for oversedation, AMS, or RR<12  Appreciate IM consultants medical co-management  Labs, medications, and imaging personally reviewed  SUBJECTIVE: Patient seen face to face  No acute issues  Progressing as expected in rehabilitation  Pt reports she is doing well  ROS:  A ten point review of systems was completed and pertinent positives are listed in subjective section  All other systems reviewed were negative  OBJECTIVE:   /60 (BP Location: Left arm)   Pulse 88   Temp 98 3 °F (36 8 °C) (Oral)   Resp 20   Ht 5' 5" (1 651 m)   Wt 39 6 kg (87 lb 3 2 oz)   SpO2 96%   BMI 14 51 kg/m²     Physical Exam   Constitutional: She is oriented to person, place, and time  She appears well-developed and well-nourished  HENT:   Head: Normocephalic and atraumatic  Mouth/Throat: Oropharynx is clear and moist    Eyes: Pupils are equal, round, and reactive to light  EOM are normal    Neck: Normal range of motion  Neck supple  Cardiovascular: Intact distal pulses  Pulmonary/Chest: Effort normal    Abdominal: Soft  There is no tenderness  Musculoskeletal: Normal range of motion  Neurological: She is alert and oriented to person, place, and time  Skin: Skin is warm and dry  Psychiatric: She has a normal mood and affect  Vitals reviewed         Lab Results   Component Value Date    WBC 5 32 07/06/2020    HGB 9 3 (L) 07/06/2020    HCT 29 5 (L) 07/06/2020     (H) 07/06/2020     07/06/2020     Lab Results   Component Value Date    SODIUM 136 07/06/2020    K 4 3 07/06/2020     07/06/2020    CO2 32 07/06/2020    BUN 10 07/06/2020    CREATININE 0 38 (L) 07/06/2020    GLUC 89 07/06/2020    CALCIUM 8 6 07/06/2020     Lab Results   Component Value Date    INR 0 95 07/01/2020    INR 0 91 10/18/2019    INR 1 03 03/07/2019    PROTIME 12 7 07/01/2020    PROTIME 11 9 10/18/2019    PROTIME 13 6 03/07/2019           Current Facility-Administered Medications:     acetaminophen (TYLENOL) tablet 975 mg, 975 mg, Oral, Q8H [address], Edgar Velasco MD, 975 mg at 07/06/20 1354    albuterol (PROVENTIL HFA,VENTOLIN HFA) inhaler 2 puff, 2 puff, Inhalation, Q4H PRN, Edgar Velasco MD    bisacodyl (DULCOLAX) rectal suppository 10 mg, 10 mg, Rectal, Daily PRN, Edgar Velasco MD    budesonide (PULMICORT) inhalation solution 0 25 mg, 0 25 mg, Nebulization, BID, Edgar Velasco MD, 0 25 mg at 07/06/20 7695    cyanocobalamin (VITAMIN B-12) tablet 1,000 mcg, 1,000 mcg, Oral, Daily, Edgar Velasco MD, 1,000 mcg at 07/06/20 4221    docusate sodium (COLACE) capsule 100 mg, 100 mg, Oral, BID, Edgar Velasco MD, 100 mg at 07/06/20 4630    enoxaparin (LOVENOX) subcutaneous injection 40 mg, 40 mg, Subcutaneous, Q24H [facility], Claudia Mae PA-C, 40 mg at 07/06/20 1352    ipratropium (ATROVENT) 0 02 % inhalation solution 0 5 mg, 0 5 mg, Nebulization, TID, Edgar Velasco MD, 0 5 mg at 07/06/20 1343    levalbuterol (XOPENEX) inhalation solution 1 25 mg, 1 25 mg, Nebulization, TID, Edgar Velasco MD, 1 25 mg at 07/06/20 1343    levETIRAcetam (KEPPRA) tablet 250 mg, 250 mg, Oral, Q12H [address], Edgar Velasco MD, 250 mg at 07/06/20 0250    ondansetron (ZOFRAN-ODT) dispersible tablet 4 mg, 4 mg, Oral, Q8H PRN, Chan Duong MD    oxyCODONE (ROXICODONE) IR tablet 5 mg, 5 mg, Oral, Q4H PRN, 5 mg at 07/06/20 0936 **OR** oxyCODONE (ROXICODONE) IR tablet 2 5 mg, 2 5 mg, Oral, Q4H PRN, Chan Duong MD, 2 5 mg at 07/05/20 8460    polyethylene glycol (MIRALAX) packet 17 g, 17 g, Oral, Daily PRN, Chan Duong MD    Past Medical History:   Diagnosis Date    Breast cancer [facility]) 1996    Cancer ([place] [address] )     COPD, severe ([place] [address] )     Lung disease     Requires supplemental oxygen     3L 24 hrs/day    Stage 4 lung cancer [facility])        Patient Active Problem List    Diagnosis Date Noted    Hip fracture ([place] [address] ) 07/01/2020     Priority: High    Mild protein-calorie malnutrition ([place] [address] ) 07/05/2020    Constipation 07/05/2020    Acute blood loss as cause of postoperative anemia 07/04/2020    Hyponatremia 07/01/2020    Brain metastases ([place] [address] ) 04/29/2020    Palliative care patient 02/27/2020    Scab 01/02/2020    Xerostomia 11/11/2019    Caregiver stress 11/11/2019    Medical marijuana use 11/11/2019    On home oxygen therapy 10/24/2019    Metastatic adenocarcinoma to brain ([place] [address] ) 10/09/2019    Cramp of limb 09/20/2019    Episode of shaking 09/20/2019    Shortness of breath 03/13/2019    Chronic hypoxemic respiratory failure ([facility] ) 02/26/2019    Malignant neoplasm of right lung ([place] [address] ) 03/09/2018    Depressive disorder 11/14/2017    COPD (chronic obstructive pulmonary disease) ([place] [address] ) 10/14/2016    Hyperlipidemia 04/08/2016    Loss of appetite 01/23/2015    Malignant cachexia ([facility] ) 10/05/2012    Nicotine dependence 10/05/2012          Claudia Mae PA-C    Total time spent:  30 minutes with more than 50% spent counseling/coordinating care  Counseling includes discussion with patient re: progress and discussion with patient of his/her current medical state/information  Coordination of patient's care was performed in conjunction with consulting services   Time invested included review of patient's labs, vitals, and management of their comorbidities with continued monitoring  The care of the patient was extensively discussed and appropriate treatment plan was formulated unique for this patient  ** Please Note:  voice to text software may have been used in the creation of this document   Although proof errors in transcription or interpretation are a potential of such software**

## 2020-07-06 NOTE — SOCIAL WORK
Met w/pt and reviewed rehab routine and cm role  Pt resides in a multi level home with an apmt attached where her parents who are in their 80's reside  Pt's sister Beck Arreola is currently up from [place] taking care of their parents  Pt states she does all the home making tasks however her mother is able to make her own breakfast  Pt has an aide through [address] [place] [address] Street that comes every two weeks for grocery shopping and light housecleaning but these services are going to be increased  Pt is part of the PALS team through [facility] and her nurse Jessica Estrella comes once a month  Pt states she has all necessary dme including grab bars in the bathroom  Pt is on oxygen and supplies come through [place]  Pt uses [facility] [address] for rx needs and has been made aware of [facility]  CM was asked to contact pts gricelda Painter as she is a retired nurse and involved with pts' care  She was made aware of all of the above info and would like to be made aware of team meeting outcome  Following to assist w/dc planning recommendations

## 2020-07-06 NOTE — PROGRESS NOTES
Internal Medicine Progress Note  Patient: Karla Brooke  Age/sex: 70 y o  female  Medical Record #: [id number]      ASSESSMENT/PLAN: (Interval History)  Karla Brooke is seen and examined and management for following issues:    R femoral neck fracture; s/p ORIF 7/2/20  · F/u ortho as scheduled  · WBAT     Lung CA with mets to Brain  · Stable  · Continue Keppra 250mg BID as at home     Severe COPD  · Cont Pulmicort, Atrovent, Xopenex nebs   · At home on Atrovent, Brovana and Pulmicort  · Wears chronic oxygen 2L NC at home  · Maintain sats >88%     Chronic tobacco use  · On no nicotine patch     Anemia  · stable  · Transfuse if hgb <8     Hx right breast cancer/mastectomy/axillary LN dissection  · Limb alert right arm      The above assessment and plan was reviewed and updated as determined by my evaluation of the patient on 7/6/2020      Labs:   Results from last 7 days   Lab Units 07/06/20  0559 07/05/20  0447   WBC Thousand/uL 5 32 5 28   HEMOGLOBIN g/dL 9 3* 9 3*   HEMATOCRIT % 29 5* 29 1*   PLATELETS Thousands/uL 211 202     Results from last 7 days   Lab Units 07/06/20  0559 07/05/20  0447   SODIUM mmol/L 136 135*   POTASSIUM mmol/L 4 3 4 2   CHLORIDE mmol/L 100 100   CO2 mmol/L 32 34*   BUN mg/dL 10 8   CREATININE mg/dL 0 38* 0 37*   CALCIUM mg/dL 8 6 8 3         Results from last 7 days   Lab Units 07/01/20  1500   INR  0 95           Review of Scheduled Meds:    Current Facility-Administered Medications:  acetaminophen 975 mg Oral [facility] Kayla Pineda MD   albuterol 2 puff Inhalation Q4H PRN Kayla Pineda MD   bisacodyl 10 mg Rectal Daily PRN Kayla Pineda MD   budesonide 0 25 mg Nebulization BID Kayla Pineda MD   vitamin B-12 1,000 mcg Oral Daily Kayla Pineda MD   docusate sodium 100 mg Oral BID Kayla Pineda MD   ipratropium 0 5 mg Nebulization TID Kayla Pineda MD   levalbuterol 1 25 mg Nebulization TID Kayla Pineda MD   levETIRAcetam 250 mg Oral Q12H [address] Kayla Pineda MD ondansetron 4 mg Oral Q8H PRN Ramona Mejia MD   oxyCODONE 5 mg Oral Q4H PRN Ramona Mejia MD   Or       oxyCODONE 2 5 mg Oral Q4H PRN Ramona Mejia MD   polyethylene glycol 17 g Oral Daily PRN Ramona Mejia MD       Subjective/ HPI: Patients overnight issues or events were reviewed with nursing or staff during rounds or morning huddle session  No new or overnight issues  Offers no complaints  ROS:   A 10 point ROS was performed; negative except as noted above  Imaging:     No orders to display       *Labs /Radiology studies reviewed  *Medications reviewed and reconciled as needed  *Please refer to order section for additional ordered labs studies  *Case discussed with primary attending during morning huddle case rounds      Physical Examination:  Vitals:   Vitals:    07/06/20 0826 07/06/20 1010 07/06/20 1013 07/06/20 1016   BP:  121/56 113/54 97/50   BP Location:  Left arm Left arm Left arm   Pulse:  98 (!) 109 (!) 107   Resp:       Temp:       TempSrc:       SpO2: 96%      Weight:       Height:           General Appearance: no distress, conversive  HEENT: PERRLA, conjuctiva normal; oropharynx clear; mucous membranes moist   Neck:  Supple, normal ROM, no JVD  Lungs: CTA, normal respiratory effort, no retractions, expiratory effort normal  CV: regular rate and rhythm; no rubs/murmurs/gallops, PMI normal   ABD: soft; ND/NT; +BS  EXT: no edema  Skin: normal turgor, normal texture, no rashes  Psych: affect normal, mood normal  Neuro: AAO      The above physical exam was reviewed and updated as determined by my evaluation of the patient on 7/6/2020  Invasive Devices     Peripheral Intravenous Line            Peripheral IV 07/05/20 Distal;Dorsal (posterior); Left Forearm 1 day                   VTE Pharmacologic Prophylaxis: Lovenox  Code Status: Level 1 - Full Code  Current Length of Stay: 1 day(s)      Total time spent:  30 minutes with more than 50% spent counseling/coordinating care   Counseling includes discussion with patient re: progress  and discussion with patient of his/her current medical state/information  Coordination of patient's care was performed in conjunction with primary service  Time invested included review of patient's labs, vitals, and management of their comorbidities with continued monitoring  In addition, this patient was discussed with medical team including physician and advanced extenders  The care of the patient was extensively discussed and appropriate treatment plan was formulated unique for this patient  ** Please Note:  voice to text software may have been used in the creation of this document   Although proof errors in transcription or interpretation are a potential of such software**

## 2020-07-06 NOTE — PLAN OF CARE
Problem: Potential for Falls  Goal: Patient will remain free of falls  Description  INTERVENTIONS:  - Assess patient frequently for physical needs  -  Identify cognitive and physical deficits and behaviors that affect risk of falls    -  [place] fall precautions as indicated by assessment   - Educate patient/family on patient safety including physical limitations  - Instruct patient to call for assistance with activity based on assessment  - Modify environment to reduce risk of injury  - Consider OT/PT consult to assist with strengthening/mobility  Outcome: Progressing     Problem: Prexisting or High Potential for Compromised Skin Integrity  Goal: Skin integrity is maintained or improved  Description  INTERVENTIONS:  - Identify patients at risk for skin breakdown  - Assess and monitor skin integrity  - Assess and monitor nutrition and hydration status  - Monitor labs   - Assess for incontinence   - Turn and reposition patient  - Assist with mobility/ambulation  - Relieve pressure over bony prominences  - Avoid friction and shearing  - Provide appropriate hygiene as needed including keeping skin clean and dry  - Evaluate need for skin moisturizer/barrier cream  - Collaborate with interdisciplinary team   - Patient/family teaching  - Consider wound care consult   Outcome: Progressing     Problem: PAIN - ADULT  Goal: Verbalizes/displays adequate comfort level or baseline comfort level  Description  Interventions:  - Encourage patient to monitor pain and request assistance  - Assess pain using appropriate pain scale  - Administer analgesics based on type and severity of pain and evaluate response  - Implement non-pharmacological measures as appropriate and evaluate response  - Consider cultural and social influences on pain and pain management  - Notify physician/advanced practitioner if interventions unsuccessful or patient reports new pain  Outcome: Progressing     Problem: INFECTION - ADULT  Goal: Absence or prevention of progression during hospitalization  Description  INTERVENTIONS:  - Assess and monitor for signs and symptoms of infection  - Monitor lab/diagnostic results  - Monitor all insertion sites, i e  indwelling lines, tubes, and drains  - Monitor endotracheal if appropriate and nasal secretions for changes in amount and color  - [place] appropriate cooling/warming therapies per order  - Administer medications as ordered  - Instruct and encourage patient and family to use good hand hygiene technique  - Identify and instruct in appropriate isolation precautions for identified infection/condition  Outcome: Progressing  Goal: Absence of fever/infection during neutropenic period  Description  INTERVENTIONS:  - Monitor WBC    Outcome: Progressing     Problem: SAFETY ADULT  Goal: Maintain or return to baseline ADL function  Description  INTERVENTIONS:  -  Assess patient's ability to carry out ADLs; assess patient's baseline for ADL function and identify physical deficits which impact ability to perform ADLs (bathing, care of mouth/teeth, toileting, grooming, dressing, etc )  - Assess/evaluate cause of self-care deficits   - Assess range of motion  - Assess patient's mobility; develop plan if impaired  - Assess patient's need for assistive devices and provide as appropriate  - Encourage maximum independence but intervene and supervise when necessary  - Involve family in performance of ADLs  - Assess for home care needs following discharge   - Consider OT consult to assist with ADL evaluation and planning for discharge  - Provide patient education as appropriate  Outcome: Progressing  Goal: Maintain or return mobility status to optimal level  Description  INTERVENTIONS:  - Assess patient's baseline mobility status (ambulation, transfers, stairs, etc )    - Identify cognitive and physical deficits and behaviors that affect mobility  - Identify mobility aids required to assist with transfers and/or ambulation (gait belt, sit-to-stand, lift, walker, cane, etc )  - [place] fall precautions as indicated by assessment  - Record patient progress and toleration of activity level on Mobility SBAR; progress patient to next Phase/Stage  - Instruct patient to call for assistance with activity based on assessment  - Consider rehabilitation consult to assist with strengthening/weightbearing, etc   Outcome: Progressing     Problem: DISCHARGE PLANNING  Goal: Discharge to home or other facility with appropriate resources  Description  INTERVENTIONS:  - Identify barriers to discharge w/patient and caregiver  - Arrange for needed discharge resources and transportation as appropriate  - Identify discharge learning needs (meds, wound care, etc )  - Arrange for interpretive services to assist at discharge as needed  - Refer to Case Management Department for coordinating discharge planning if the patient needs post-hospital services based on physician/advanced practitioner order or complex needs related to functional status, cognitive ability, or social support system  Outcome: Progressing     Problem: Nutrition/Hydration-ADULT  Goal: Nutrient/Hydration intake appropriate for improving, restoring or maintaining nutritional needs  Description  Monitor and assess patient's nutrition/hydration status for malnutrition  Collaborate with interdisciplinary team and initiate plan and interventions as ordered  Monitor patient's weight and dietary intake as ordered or per policy  Utilize nutrition screening tool and intervene as necessary  Determine patient's food preferences and provide high-protein, high-caloric foods as appropriate       INTERVENTIONS:  - Monitor oral intake, urinary output, labs, and treatment plans  - Assess nutrition and hydration status and recommend course of action  - Evaluate amount of meals eaten  - Assist patient with eating if necessary   - Allow adequate time for meals  - Recommend/ encourage appropriate diets, oral nutritional supplements, and vitamin/mineral supplements  - Order, calculate, and assess calorie counts as needed  - Recommend, monitor, and adjust tube feedings and TPN/PPN based on assessed needs  - Assess need for intravenous fluids  - Provide specific nutrition/hydration education as appropriate  - Include patient/family/caregiver in decisions related to nutrition  Outcome: Progressing

## 2020-07-06 NOTE — PROGRESS NOTES
OCCUPATIONAL THERAPY EVALUATION       07/06/20 0700   Patient Data   Rehab Impairment Diagnoses:  Impairment of mobility, safety and Activities of Daily Living (ADLs) due to Orthopedic Disorders:  08 11  Unilateral Hip Fracture   Etiologic Diagnosis closed fracture of the neck of the right femur    Date of Onset 07/01/20  (surgery 7/2)   Support System   Name Nikolas Gutierrez   Relationship Cousin   Home Setup   Type of Home Multi Level;Apartment   Method of Entry Stairs   Number of Stairs 10   Number of Stairs in Home 12   First Floor Bathroom Tub;Full; Shower;Combo   First Floor Bathroom Accessibility Grab bars in tub/shower   Second Floor Bathroom Full;Combo;Grab Bars   Second Floor Bathroom Accessibility Grab bars in tub/shower   First Floor Setup Available Yes  (Can stay in parents apartment on 1st floor)   [address]; Wheelchair;Bedside Commode   Prior Level of Function   Self-Care 3  Independent - Patient completed the activities by him/herself, with or without an assistive device, with no assistance from a helper  Indoor-Mobility (Ambulation) 3  Independent - Patient completed the activities by him/herself, with or without an assistive device, with no assistance from a helper  Stairs 3  Independent - Patient completed the activities by him/herself, with or without an assistive device, with no assistance from a helper  Functional Cognition 3  Independent - Patient completed the activities by him/herself, with or without an assistive device, with no assistance from a helper     Falls in the Last Year   Number of falls in the past 12 months 1   Type of Injury Associated with Fall Major injury   Patient Preference   Nickname (Patient Preference) Sarika   Psychosocial   Psychosocial (WDL) WDL   Restrictions/Precautions   Precautions Fall Risk;Limb alert;O2;Supervision on toilet/commode  (2L)   Weight Bearing Restrictions Yes   RLE Weight Bearing Per Order WBAT   Pain Assessment   Pain Assessment Tool 0-10   Pain Score 3   Pain Location/Orientation Orientation: Right;Location: Hip   Eating Assessment   Type of Assistance Needed Set-up / clean-up   Amount of Physical Assistance Provided No physical assistance   Eating CARE Score 5   Oral Hygiene   Type of Assistance Needed Set-up / clean-up   Amount of Physical Assistance Provided No physical assistance   Comment seated at sink due to poor standing tolerance   Oral Hygiene CARE Score 5   Grooming   Limitation Noted In Strength; Sequencing; Safety;Problem Solving   Tub/Shower Transfer   Reason Not Assessed Sponge Bath;Balance; Endurance; Safety   Findings Not completed this session, will complete when appropraite   Shower/Bathe Self   Type of Assistance Needed Physical assistance   Amount of Physical Assistance Provided 50%-74%   Comment A for B lower legs B feet due to pain in R leg   Shower/Bathe Self CARE Score 2   Bathing   Assessed Bath Style Sponge Bath   Anticipated D/C Bath Style Shower;Sponge Bath   Able to Wash/Rinse/Dry (body part) Left Arm;Right Arm;L Upper Leg;R Upper Leg;Abdomen; Chest;Perineal Area; Buttocks   Limitations Noted in Balance; Coordination; Endurance;Problem Solving;ROM;Safety; Sequencing;Strength   Positioning Seated;Standing   Dressing/Undressing Clothing   Remove UB Clothes Pullover Shirt   Don UB Clothes Pullover Shirt   Type of Assistance Needed Set-up / clean-up   Amount of Physical Assistance Provided No physical assistance   Upper Body Dressing CARE Score 5   Remove LB Clothes Undergarment   Don LB Clothes Pants; Undergarment   Type of Assistance Needed Physical assistance   Amount of Physical Assistance Provided 50%-74%   Comment A to thread BLE , Min A in stance for CM   Lower Body Dressing CARE Score 2   Limitations Noted In Balance; Coordination; Endurance;Problem Solving; Safety; Sequencing;Strength;ROM   Positioning Supported Sit;Standing   Putting On/Taking Off Footwear   Type of Assistance Needed Physical assistance   Amount of Physical Assistance Provided 50%-74%   Comment A for R foot   Putting On/Taking Off Footwear CARE Score 2   Toileting Hygiene   Type of Assistance Needed Physical assistance   Amount of Physical Assistance Provided 25%-49%   Comment Min A in stance with RW,    Toileting Hygiene CARE Score 3   Toilet Transfer   Surface Assessed Standard Commode   Transfer Technique Standard   Limitations Noted In Balance;Confidence; Endurance;Problem Solving;ROM;Safety;UE Strength;LE Strength   Adaptive Equipment Walker;Grab Bar   Type of Assistance Needed Physical assistance   Amount of Physical Assistance Provided 25%-49%   Comment Min A ambulated from bed to bathroom   Toilet Transfer CARE Score 3   Toileting   Able to Pull Clothing down yes, up yes  Transfer Bed/Chair/Wheelchair   Type of Assistance Needed Physical assistance   Amount of Physical Assistance Provided 25%-49%   Comment Min A with RW   Chair/Bed-to-Chair Transfer CARE Score 3   Lying to Sitting on Side of Bed   Type of Assistance Needed Verbal cues; Incidental touching   Amount of Physical Assistance Provided No physical assistance   Comment CGA   Lying to Sitting on Side of Bed CARE Score 4   Sit to Stand   Type of Assistance Needed Physical assistance   Amount of Physical Assistance Provided 25%-49%   Comment Min A with RW   Sit to Stand CARE Score 3   Comprehension   QI: Comprehension 4  Undestands: Clear comprehension without cues or repetitions   Expression   QI: Expression 4  Express complex messages without difficulty and with speech that is clear and easy to [place]   RUE Assessment   RUE Assessment X   Strength - RUE   R Gross Arm Strength 4/5   LUE Assessment   LUE Assessment X   Strength - LUE   L Gross Arm Strength 4/5   Coordination   Movements are Fluid and Coordinated 1   Sensation   Light Touch No apparent deficits   Propioception No apparent deficits   Cognition   Overall Cognitive Status WFL   Arousal/Participation Responsive; Cooperative Attention Within functional limits   Orientation Level Oriented X4   Memory Within functional limits   Following Commands Follows all commands and directions without difficulty   Vision   Vision Comments Pt wears glasses   Objective Measure   OT Findings BP taken throughout session   Discharge Information   Vocational Plan Retired/not working   Patient's Discharge Plan home with supportive family   Patient's Rehab Expectations Get as strong as I can   Barriers to Discharge Home Limited Family Support; Unsafe Home Setup; Decreased Strength;Decreased Endurance;Pain; Safety Considerations   Impressions Pt is a 70year old female who was seen for an OT evaluation following admission to [facility]\"" following fall outside her home, which resulted in surgical intervention of ORIF with cannulate screws  Pt's current problems include: WB restrictions, O2 dependence, fatigue, and ROM limitations  Pt's precautions include: WBAT through RLE  Pt's comorbidities include: Chronic hypoxemic respiratory failure, malignant neoplasm of the right lung, chronic obstructive pulmonary disease, acute blood loss anemia post op, hyponatremia, moderate protein-calorie malnutrition, simple partial seizures  Prior to admission pt was completing ADL's Independently without AD  Pt lives in apartment, where her parents live on first floor apartment as well  Pt would be able to stay with her parents on 1st floor, which has a tub/shower combo and GB in shower  Currently, pt requires Moderate  assist for overall ADLs, and requires Minimal assist for transfers using RW  Pt is currently limited due to the following deficits impacting occupational performance, activity tolerance, endurance, standing balance/tolerance and sitting balance/tolerance  The patient has shown a decline from prior level of function  The patient participates in identification of personal goals to address in Occupational Therapy   Pt benefits from skilled OT services for I/ADL retraining to support the ability to safely participate in daily occupations safely and independently in the most least restrictive way  From OT standpoint, Pt demonstrates Good rehab potential to meet LT's  Pt is a good rehab candidate, Anticipate 2 week length of stay  Occupational Therapy plan of care to focus on the following areas:  ADL re-training, [address] training, IADL re-training, functional transfers, standing tolerance, standing balance, DME training/education, family training/education, and EC techniques/education     OT Therapy Minutes   OT Time In 0700   OT Time Out 0830   OT Total Time (minutes) 90   OT Mode of treatment - Individual (minutes) 90   OT Mode of treatment - Concurrent (minutes) 0   OT Mode of treatment - Group (minutes) 0   OT Mode of treatment - Co-treat (minutes) 0   OT Mode of Treatment - Total time(minutes) 90 minutes   OT Cumulative Minutes 90   Cumulative Minutes   Cumulative therapy minutes 90     Carlie Doll MS, OTR/L

## 2020-07-06 NOTE — PROGRESS NOTES
OCCUPATIONAL THERAPY LONG TERM GOALS       07/06/20 0700   Rehab Team Goals   ADL Team Goal Patient will be independent with ADLs with least restrictive device upon completion of rehab program   Rehab Team Interventions   OT Interventions Self Care;Home Management; Therapeutic Exercise;Community Reintegration;Patient/Family Education   Eating Goal   Eating Goal 06  Independent - Patient completes the activity by him/herself with no assistance from a helper  Status Ongoing; Target goal - two weeks   Interventions Optimal Position   Grooming Goal   Oral Hygiene Goal 06  Independent - Patient completes the activity by him/herself with no assistance from a helper  Task Wash/Dry Face;Wash/Dry Hands   Environment Seated at [facility] [employer]   Status Ongoing; Target goal - two weeks   Intervention Assistive Device; Neuromuscular Education;Balance Work; Therapeutic Exercise; Tolerance Work   Tub/Shower Transfer Goal   Method Tub Shower   Assist Device   (TBD)   Status Ongoing; Target goal - two weeks   Interventions ADL Training;Assistive Device; Neuromuscular Education   Bathing Goal   Shower/bathe self Goal 06  Independent - Patient completes the activity by him/herself with no assistance from a helper  Environment Seated;Standing   Status Ongoing; Target goal - two weeks   Intervention ADL Training;Assistive Device; Neuromuscular Education; Therapeutic Exercise   Upper Body Dressing Goal   Upper body dressing Goal 06  Independent - Patient completes the activity by him/herself with no assistance from a helper  Task Upper Body   Environment Seated;Standing   Status Ongoing; Target goal - two weeks   Intervention Assistive Device;Balance Work;Neuromuscular Education; Therapeutic Exercise; Tolerance Work   Lower Body Dressing Goal   Lower body dressing Goal 06  Independent - Patient completes the activity by him/herself with no assistance from a helper  Putting on/taking off footwear Goal 06   Independent - Patient completes the activity by him/herself with no assistance from a helper  Task Lower Body   Environment Seated;Standing   Status Ongoing; Target goal - two weeks   Intervention Assistive Device;Balance Work;Neuromuscular Education; Therapeutic Exercise; Tolerance Work   Toileting Transfer Goal   Toilet transfer Goal 06  Independent - Patient completes the activity by him/herself with no assistance from a helper  Status Ongoing; Target goal - two weeks   Intervention ADL Training;Balance Work;Assistive Device   Toileting Goal   Toileting hygiene Goal 06  Independent - Patient completes the activity by him/herself with no assistance from a helper  Task Pants Up;Pants Down;Hygiene   Safety Balance   Status Ongoing; Target goal - two weeks   Intervention ADL Training;Balance Work;Assistive Device   Comprehension Goal   Comprehension Assist Level Independant   Status Ongoing; Target goal - two weeks   Expression Goal   Expression Assist Level Independant   Status Ongoing; Target goal - two weeks   Meal Prep and Kitchen Mobility   Assist Level Independent   Status Ongoing; Target goal - two weeks   Medication Management   Assist Level Independent   Status Ongoing; Target goal - two weeks   Laundry   Assist Level Independent   Status Ongoing; Target goal - two weeks   Finance Management   Assist Level Independent   Status Ongoing; Target goal - two weeks     Allison Parks MS, OTR/L

## 2020-07-07 PROCEDURE — 97110 THERAPEUTIC EXERCISES: CPT

## 2020-07-07 PROCEDURE — 97116 GAIT TRAINING THERAPY: CPT

## 2020-07-07 PROCEDURE — 94760 N-INVAS EAR/PLS OXIMETRY 1: CPT

## 2020-07-07 PROCEDURE — 99233 SBSQ HOSP IP/OBS HIGH 50: CPT | Performed by: PHYSICAL MEDICINE & REHABILITATION

## 2020-07-07 PROCEDURE — 97530 THERAPEUTIC ACTIVITIES: CPT

## 2020-07-07 PROCEDURE — 94640 AIRWAY INHALATION TREATMENT: CPT

## 2020-07-07 PROCEDURE — 97535 SELF CARE MNGMENT TRAINING: CPT

## 2020-07-07 RX ADMIN — IPRATROPIUM BROMIDE 0.5 MG: 0.5 SOLUTION RESPIRATORY (INHALATION) at 13:45

## 2020-07-07 RX ADMIN — DOCUSATE SODIUM 100 MG: 100 CAPSULE, LIQUID FILLED ORAL at 18:08

## 2020-07-07 RX ADMIN — DOCUSATE SODIUM 100 MG: 100 CAPSULE, LIQUID FILLED ORAL at 09:22

## 2020-07-07 RX ADMIN — OXYCODONE HYDROCHLORIDE 5 MG: 5 TABLET ORAL at 14:20

## 2020-07-07 RX ADMIN — BUDESONIDE 0.25 MG: 0.25 INHALANT RESPIRATORY (INHALATION) at 07:51

## 2020-07-07 RX ADMIN — LEVALBUTEROL HYDROCHLORIDE 1.25 MG: 1.25 SOLUTION, CONCENTRATE RESPIRATORY (INHALATION) at 07:51

## 2020-07-07 RX ADMIN — OXYCODONE HYDROCHLORIDE 5 MG: 5 TABLET ORAL at 07:44

## 2020-07-07 RX ADMIN — IPRATROPIUM BROMIDE 0.5 MG: 0.5 SOLUTION RESPIRATORY (INHALATION) at 20:24

## 2020-07-07 RX ADMIN — IPRATROPIUM BROMIDE 0.5 MG: 0.5 SOLUTION RESPIRATORY (INHALATION) at 07:51

## 2020-07-07 RX ADMIN — BUDESONIDE 0.25 MG: 0.25 INHALANT RESPIRATORY (INHALATION) at 20:24

## 2020-07-07 RX ADMIN — ENOXAPARIN SODIUM 40 MG: 40 INJECTION SUBCUTANEOUS at 09:24

## 2020-07-07 RX ADMIN — LEVETIRACETAM 250 MG: 500 TABLET, FILM COATED ORAL at 21:10

## 2020-07-07 RX ADMIN — OXYCODONE HYDROCHLORIDE 2.5 MG: 5 TABLET ORAL at 23:35

## 2020-07-07 RX ADMIN — OXYCODONE HYDROCHLORIDE 2.5 MG: 5 TABLET ORAL at 01:09

## 2020-07-07 RX ADMIN — LEVALBUTEROL HYDROCHLORIDE 1.25 MG: 1.25 SOLUTION, CONCENTRATE RESPIRATORY (INHALATION) at 20:24

## 2020-07-07 RX ADMIN — LEVETIRACETAM 250 MG: 500 TABLET, FILM COATED ORAL at 09:23

## 2020-07-07 RX ADMIN — POLYETHYLENE GLYCOL 3350 17 G: 17 POWDER, FOR SOLUTION ORAL at 09:22

## 2020-07-07 RX ADMIN — ACETAMINOPHEN 975 MG: 325 TABLET, FILM COATED ORAL at 06:04

## 2020-07-07 RX ADMIN — ACETAMINOPHEN 975 MG: 325 TABLET, FILM COATED ORAL at 21:10

## 2020-07-07 RX ADMIN — CYANOCOBALAMIN TAB 500 MCG 1000 MCG: 500 TAB at 09:23

## 2020-07-07 RX ADMIN — SENNOSIDES 17.2 MG: 8.6 TABLET ORAL at 21:11

## 2020-07-07 RX ADMIN — ACETAMINOPHEN 975 MG: 325 TABLET, FILM COATED ORAL at 14:20

## 2020-07-07 RX ADMIN — LEVALBUTEROL HYDROCHLORIDE 1.25 MG: 1.25 SOLUTION, CONCENTRATE RESPIRATORY (INHALATION) at 13:45

## 2020-07-07 NOTE — PROGRESS NOTES
PM&R Progress Note:    HPI: Pt is a 69 y/o who suffered a mechanical fall while walking in her garden landing on her right hip and suffering a fracture to the femoral neck  The patient underwent ORIF w/screws x 2 on 7/2  ASSESSMENT: Stable, progressing      PLAN:    Rehabilitation   Continue current rehabilitation plan of care to maximize function   Current function: ModA bed mobility, toilet transfer, toileting and ambulation 10ft RW  Min - modA transfers  MaxA bathing, LB dressing   Estimated Discharge: TBD    Pain   Controlled  DVT prophylaxis   Lovenox  Bladder plan   Continent    Bowel plan   Continent   Last BM 7/5/2020 but disimpacted  * Hip fracture [facility])  Assessment & Plan  R femoral neck fx status post surgical repair via medullary nail by Dr Hoffman Seat on 7/2  POD#14 is 7/16  Weight-bearing as tolerated on affected limb   Monitor incision/area for infection or dehiscence/impaired healing   Monitor for signs/symptoms of VTE, atelectasis, acute blood loss anemia, or other infection   Continue acute comprehensive interdisciplinary inpatient rehabilitation to include intensive skilled therapies (PT, OT) as outlined with oversight and management by rehabilitation physician as well as inpatient rehab level nursing, case management and weekly interdisciplinary team meetings  Follow-up with Ortho Sx after d/c and ortho if needed during ARC course       Constipation  Assessment & Plan  Continue scheduled bowel meds      Acute blood loss as cause of postoperative anemia  Assessment & Plan  IM consulted, with overall monitoring, and management at their discretion during ARC course  Monitor H/H, vitals, signs/symptoms of acute bleeding        Brain metastases [facility])  Assessment & Plan  Hx of brain mets with plan for surveillance imaging   Follow-up with OP NSx and hem-onc  Monitor neuro exam closely    COPD (chronic obstructive pulmonary disease) ([facility] [address] )  Assessment & Plan  IM consulted and with overall management at their discretion during ARC course  On home chronic 2L O2  Home nebs  Monitor vitals without and with activity   Incentive spirometry     Nicotine dependence  Assessment & Plan  Encourage cessation  Malignant neoplasm of right lung [facility])  Assessment & Plan  S/P chemo/radiation   Follow-up hem/onc after d/c     Painresolved as of 2020  Assessment & Plan  APAP TID  Oxy 2 5-5mg Q4H prn  Counseled on and continue to encourage deep breathing/relaxation/behavioral pain management techniques:     Deep breathin seconds in, 5 seconds out, 5 times per hour when awake and PRN when in pain or anticipate pain; avoid holding breath and tightening muscles and instead breathe slowly and deeply  Monitor for oversedation, AMS/delirium, and respiratory depression   If being administered - hold opiates, muscle relaxants, benzodiazepines, and gabapentin for oversedation, AMS, or RR<12  Appreciate IM consultants medical co-management  Labs, medications, and imaging personally reviewed  SUBJECTIVE: Patient seen face to face  No acute issues  Progressing as expected in rehabilitation  Pt reports she is doing well  She did request Advil due to RLS but she understands that she cannot have the medication due to recent surgery  Could consider gabapentin  ROS:  A ten point review of systems was completed and pertinent positives are listed in subjective section  All other systems reviewed were negative  OBJECTIVE:   /58   Pulse 100   Temp 98 °F (36 7 °C) (Oral)   Resp 20   Ht 5' 5" (1 651 m)   Wt 39 6 kg (87 lb 3 2 oz)   SpO2 95%   BMI 14 51 kg/m²     Physical Exam   Constitutional: She is oriented to person, place, and time  She appears well-developed and well-nourished  HENT:   Head: Normocephalic and atraumatic  Mouth/Throat: Oropharynx is clear and moist    Eyes: Pupils are equal, round, and reactive to light  EOM are normal    Neck: Normal range of motion   Neck supple  Cardiovascular: Intact distal pulses  Pulmonary/Chest: Effort normal    Abdominal: Soft  There is no tenderness  Musculoskeletal: Normal range of motion  Neurological: She is alert and oriented to person, place, and time  Skin: Skin is warm and dry  Psychiatric: She has a normal mood and affect  Vitals reviewed         Lab Results   Component Value Date    WBC 5 32 07/06/2020    HGB 9 3 (L) 07/06/2020    HCT 29 5 (L) 07/06/2020     (H) 07/06/2020     07/06/2020     Lab Results   Component Value Date    SODIUM 136 07/06/2020    K 4 3 07/06/2020     07/06/2020    CO2 32 07/06/2020    BUN 10 07/06/2020    CREATININE 0 38 (L) 07/06/2020    GLUC 89 07/06/2020    CALCIUM 8 6 07/06/2020     Lab Results   Component Value Date    INR 0 95 07/01/2020    INR 0 91 10/18/2019    INR 1 03 03/07/2019    PROTIME 12 7 07/01/2020    PROTIME 11 9 10/18/2019    PROTIME 13 6 03/07/2019           Current Facility-Administered Medications:     acetaminophen (TYLENOL) tablet 975 mg, 975 mg, Oral, Q8H [address], Kian Vitale MD, 975 mg at 07/07/20 0604    albuterol (PROVENTIL HFA,VENTOLIN HFA) inhaler 2 puff, 2 puff, Inhalation, Q4H PRN, Kian Vitale MD    bisacodyl (DULCOLAX) rectal suppository 10 mg, 10 mg, Rectal, Daily PRN, Kian Vitale MD    budesonide (PULMICORT) inhalation solution 0 25 mg, 0 25 mg, Nebulization, BID, Kian Vitale MD, 0 25 mg at 07/07/20 0751    cyanocobalamin (VITAMIN B-12) tablet 1,000 mcg, 1,000 mcg, Oral, Daily, Kian Vitale MD, 1,000 mcg at 07/07/20 0934    docusate sodium (COLACE) capsule 100 mg, 100 mg, Oral, BID, Kian Vitale MD, 100 mg at 07/07/20 7611    enoxaparin (LOVENOX) subcutaneous injection 40 mg, 40 mg, Subcutaneous, Q24H [facility], Claudia Mae PA-C, 40 mg at 07/07/20 8398    ipratropium (ATROVENT) 0 02 % inhalation solution 0 5 mg, 0 5 mg, Nebulization, TID, Kian Vitale MD, 0 5 mg at 07/07/20 0751    levalbuterol (XOPENEX) inhalation solution 1 25 mg, 1 25 mg, Nebulization, TID, German Goff MD, 1 25 mg at 07/07/20 0751    levETIRAcetam (KEPPRA) tablet 250 mg, 250 mg, Oral, Q12H [address], German Goff MD, 250 mg at 07/07/20 7037    ondansetron (ZOFRAN-ODT) dispersible tablet 4 mg, 4 mg, Oral, Q8H PRN, German Goff MD    oxyCODONE (ROXICODONE) IR tablet 5 mg, 5 mg, Oral, Q4H PRN, 5 mg at 07/07/20 0744 **OR** oxyCODONE (ROXICODONE) IR tablet 2 5 mg, 2 5 mg, Oral, Q4H PRN, German Goff MD, 2 5 mg at 07/07/20 0109    polyethylene glycol (MIRALAX) packet 17 g, 17 g, Oral, Daily, Claudia Mae PA-C, 17 g at 07/07/20 5480    senna (SENOKOT) tablet 17 2 mg, 2 tablet, Oral, HS, Claudia Mae PA-C, 17 2 mg at 07/06/20 3520    Past Medical History:   Diagnosis Date    Breast cancer ([place] [address] ) 1996    Cancer ([place] [address] )     COPD, severe ([place] [address] )     Lung disease     Requires supplemental oxygen     3L 24 hrs/day    Stage 4 lung cancer [facility])        Patient Active Problem List    Diagnosis Date Noted    Hip fracture ([facility] ) 07/01/2020     Priority: High    Mild protein-calorie malnutrition ([facility] ) 07/05/2020    Constipation 07/05/2020    Acute blood loss as cause of postoperative anemia 07/04/2020    Hyponatremia 07/01/2020    Brain metastases ([facility] ) 04/29/2020    Palliative care patient 02/27/2020    Scab 01/02/2020    Xerostomia 11/11/2019    Caregiver stress 11/11/2019    Medical marijuana use 11/11/2019    On home oxygen therapy 10/24/2019    Metastatic adenocarcinoma to brain ([facility] ) 10/09/2019    Cramp of limb 09/20/2019    Episode of shaking 09/20/2019    Shortness of breath 03/13/2019    Chronic hypoxemic respiratory failure ([facility] ) 02/26/2019    Malignant neoplasm of right lung ([place] [address] ) 03/09/2018    Depressive disorder 11/14/2017    COPD (chronic obstructive pulmonary disease) ([place] [address] ) 10/14/2016    Hyperlipidemia 04/08/2016    Loss of appetite 01/23/2015    Malignant cachexia ([place] [address] ) 10/05/2012    Nicotine dependence 10/05/2012 Tessa De La Rosa PA-C    Total time spent:  30 minutes with more than 50% spent counseling/coordinating care  Counseling includes discussion with patient re: progress and discussion with patient of his/her current medical state/information  Coordination of patient's care was performed in conjunction with consulting services  Time invested included review of patient's labs, vitals, and management of their comorbidities with continued monitoring  The care of the patient was extensively discussed and appropriate treatment plan was formulated unique for this patient  ** Please Note:  voice to text software may have been used in the creation of this document   Although proof errors in transcription or interpretation are a potential of such software**

## 2020-07-07 NOTE — PROGRESS NOTES
Internal Medicine Progress Note  Patient: Lexi Dukes  Age/sex: 70 y o  female  Medical Record #: [id number]      ASSESSMENT/PLAN: (Interval History)  Lexi Dukes is seen and examined and management for following issues:    R femoral neck fracture; s/p ORIF 7/2/20  · F/u ortho as scheduled  · WBAT     Lung CA with mets to Brain  · Stable  · Continue Keppra 250mg BID as at home     Severe COPD  · Cont Pulmicort, Atrovent, Xopenex nebs   · At home on Atrovent, Brovana and Pulmicort  · Wears chronic oxygen 2L NC at home  · Maintain sats >88%     Chronic tobacco use  · On no nicotine patch     Anemia  · stable  · Transfuse if hgb <8     Hx right breast cancer/mastectomy/axillary LN dissection  · Limb alert right arm    Nutrition  · Doesn't like Ensure and someone is bringing her BOOST in      The above assessment and plan was reviewed and updated as determined by my evaluation of the patient on 7/7/2020      Labs:   Results from last 7 days   Lab Units 07/06/20  0559 07/05/20  0447   WBC Thousand/uL 5 32 5 28   HEMOGLOBIN g/dL 9 3* 9 3*   HEMATOCRIT % 29 5* 29 1*   PLATELETS Thousands/uL 211 202     Results from last 7 days   Lab Units 07/06/20  0559 07/05/20  0447   SODIUM mmol/L 136 135*   POTASSIUM mmol/L 4 3 4 2   CHLORIDE mmol/L 100 100   CO2 mmol/L 32 34*   BUN mg/dL 10 8   CREATININE mg/dL 0 38* 0 37*   CALCIUM mg/dL 8 6 8 3         Results from last 7 days   Lab Units 07/01/20  1500   INR  0 95           Review of Scheduled Meds:    Current Facility-Administered Medications:  acetaminophen 975 mg Oral [facility] Alberto Gomez MD   albuterol 2 puff Inhalation Q4H PRN Alberto Gomez MD   bisacodyl 10 mg Rectal Daily PRN Alberto Gomez MD   budesonide 0 25 mg Nebulization BID Alberto Gomez MD   vitamin B-12 1,000 mcg Oral Daily Alberto Gomez MD   docusate sodium 100 mg Oral BID Alberto Gomez MD   enoxaparin 40 mg Subcutaneous Q24H [address] Claudia Lata-Amol, PA-C   ipratropium 0 5 mg Nebulization TID Ever Harris MD   levalbuterol 1 25 mg Nebulization TID Ever Harris MD   levETIRAcetam 250 mg Oral Q12H [address] Ever Harris MD   ondansetron 4 mg Oral Q8H PRN Ever Harris MD   oxyCODONE 5 mg Oral Q4H PRN Ever Harris MD   Or       oxyCODONE 2 5 mg Oral Q4H PRN Ever Harris MD   polyethylene glycol 17 g Oral Daily Claudia Mae PA-C   senna 2 tablet Oral HS Claudia Mae PA-C       Subjective/ HPI: Patients overnight issues or events were reviewed with nursing or staff during rounds or morning huddle session  No new or overnight issues  Offers no complaints  ROS:   A 10 point ROS was performed; negative except as noted above  Imaging:     No orders to display       *Labs /Radiology studies reviewed  *Medications reviewed and reconciled as needed  *Please refer to order section for additional ordered labs studies  *Case discussed with primary attending during morning huddle case rounds      Physical Examination:  Vitals:   Vitals:    07/06/20 1939 07/06/20 2028 07/07/20 0501 07/07/20 0755   BP:  (!) 90/49 113/58    BP Location:  Left arm     Pulse:  95 100    Resp:  18 20    Temp:  98 6 °F (37 °C) 98 °F (36 7 °C)    TempSrc:  Oral Oral    SpO2: 95% 97% 96% 95%   Weight:       Height:           General Appearance: no distress, conversive  HEENT: PERRLA, conjuctiva normal; oropharynx clear; mucous membranes moist   Neck:  Supple, normal ROM, no JVD  Lungs: CTA, normal respiratory effort, no retractions, expiratory effort normal  CV: regular rate and rhythm; no rubs/murmurs/gallops, PMI normal   ABD: soft; ND/NT; +BS  EXT: no edema  Skin: normal turgor, normal texture, no rashes  Psych: affect normal, mood normal  Neuro: AAO      The above physical exam was reviewed and updated as determined by my evaluation of the patient on 7/7/2020      Invasive Devices     None                    VTE Pharmacologic Prophylaxis: Enoxaparin  Code Status: Level 1 - Full Code  Current Length of Stay: 2 day(s)      Total time spent:  30 minutes with more than 50% spent counseling/coordinating care  Counseling includes discussion with patient re: progress  and discussion with patient of his/her current medical state/information  Coordination of patient's care was performed in conjunction with primary service  Time invested included review of patient's labs, vitals, and management of their comorbidities with continued monitoring  In addition, this patient was discussed with medical team including physician and advanced extenders  The care of the patient was extensively discussed and appropriate treatment plan was formulated unique for this patient  ** Please Note:  voice to text software may have been used in the creation of this document   Although proof errors in transcription or interpretation are a potential of such software**

## 2020-07-07 NOTE — PROGRESS NOTES
07/07/20 0830   Pain Assessment   Pain Assessment Tool 0-10   Pain Score 4   Pain Location/Orientation Orientation: Right;Location: Hip;Location: Leg   Pain Onset/Description Frequency: Intermittent   Effect of Pain on Daily Activities increased with WB   Restrictions/Precautions   Precautions Fall Risk; Other (comment); Limb alert   Weight Bearing Restrictions Yes   RLE Weight Bearing Per Order WBAT   Cognition   Overall Cognitive Status WFL   Arousal/Participation Alert   Attention Within functional limits   Orientation Level Oriented X4   Memory Within functional limits   Following Commands Follows all commands and directions without difficulty   Subjective   Subjective Patietn reports some diffculty sleeping 2* restless leg   Roll Left and Right   Type of Assistance Needed Physical assistance   Amount of Physical Assistance Provided Less than 25%   Comment HOB slightly elevated; roll performed to left side with pillow between BLE to attempt position of comfort for sleeping    Roll Left and Right CARE Score 3   Sit to Lying   Type of Assistance Needed Physical assistance   Amount of Physical Assistance Provided Less than 25%   Comment H OB slightly elevated    Sit to Lying CARE Score 3   Lying to Sitting on Side of Bed   Type of Assistance Needed Incidental touching   Amount of Physical Assistance Provided No physical assistance   Comment increased time    Lying to Sitting on Side of Bed CARE Score 4   Sit to Stand   Type of Assistance Needed Physical assistance   Amount of Physical Assistance Provided 25%-49%   Comment verbal instruction required for proper hand placement    Sit to Stand CARE Score 3   Bed-Chair Transfer   Type of Assistance Needed Physical assistance   Amount of Physical Assistance Provided 25%-49%   Chair/Bed-to-Chair Transfer CARE Score 3   Transfer Bed/Chair/Wheelchair   Adaptive Equipment Roller Walker   Car Transfer   Reason if not Attempted Safety concerns   Car Transfer CARE Score 88 Walk 10 Feet   Type of Assistance Needed Physical assistance   Amount of Physical Assistance Provided 25%-49%   Comment 10'x2 <->bathroom with RW   Walk 10 Feet CARE Score 3   Walk 50 Feet with Two Turns   Comment 1x BLE buckling s/p 30'    Reason if not Attempted Safety concerns   Walk 50 Feet with Two Turns CARE Score 88   Walk 150 Feet   Reason if not Attempted Safety concerns   Walk 150 Feet CARE Score 88   Walking 10 Feet on Uneven Surfaces   Reason if not Attempted Safety concerns   Walking 10 Feet on Uneven Surfaces CARE Score 88   Ambulation   Does the patient walk? 2  Yes   Primary Mode of Locomotion Prior to Admission Walk   Distance Walked (feet) 10 ft  (x2 + 30' )   Assist Device Roller Walker   Gait Pattern Narrow SCAR; Antalgic; Inconsistant Sharron;Decreased foot clearance; Step to;Decreased R stance; Improper weight shift   Limitations Noted In Balance;Speed;Strength;Swing   Wheel 50 Feet with Two Turns   Reason if not Attempted Activity not applicable   Wheel 50 Feet with Two Turns CARE Score 9   Wheel 150 Feet   Reason if not Attempted Activity not applicable   Wheel 835 Feet CARE Score 9   Wheelchair mobility   Does the patient use a wheelchair? 0  No   12 Steps   Reason if not Attempted Safety concerns   12 Steps CARE Score 88   Toilet Transfer   Type of Assistance Needed Physical assistance   Amount of Physical Assistance Provided 25%-49%   Toilet Transfer CARE Score 3   Therapeutic Interventions   Flexibility BLE heel cord and HS gentle seated stretch    Assessment   Treatment Assessment Patient engaged in 30 minute PT treatment session focusing mostly on functional mobility  Limited this session by fatigue and BLE weakness; meseret identified s/p apx 30' with need for rest break post activity  Also spent time discussing healing and nutrition- requested in standup meeting nutrition consult   Paient also stating that she drinks the Boost Nutrition drink at home and would like for her sister to bring it in- [id number] [facility], [address] notified  Will continue to focus strengthening and mobility in PM session  Problem List Decreased endurance; Impaired balance;Decreased mobility;Pain;Decreased range of motion;Decreased strength   Barriers to Discharge Inaccessible home environment;Decreased caregiver support   PT Barriers   Functional Limitation Car transfers; Ramp negotiation;Stair negotiation;Standing;Transfers; Walking   Plan   Treatment/Interventions Functional transfer training;LE strengthening/ROM; Elevations; Therapeutic exercise; Endurance training;Patient/family training;Equipment eval/education; Bed mobility;Gait training; Compensatory technique education   Progress Progressing toward goals   Recommendation   PT Discharge Recommendation Other (Comment)  (TBD )   Equipment Recommended Walker   PT Therapy Minutes   PT Time In 0830   PT Time Out 0900   PT Total Time (minutes) 30   PT Mode of treatment - Individual (minutes) 30   PT Mode of treatment - Concurrent (minutes) 0   PT Mode of treatment - Group (minutes) 0   PT Mode of treatment - Co-treat (minutes) 0   PT Mode of Treatment - Total time(minutes) 30 minutes   PT Cumulative Minutes 120   Therapy Time missed   Time missed?  No

## 2020-07-07 NOTE — MALNUTRITION/BMI
This medical record reflects one or more clinical indicators suggestive of malnutrition and/or morbid obesity  Malnutrition Findings:   Malnutrition type: Chronic illness  Degree of Malnutrition: Other severe protein calorie malnutrition(Chronic severe protein-calorie malnutrition related to decreased appetite subsequent to CA as evidenced by ongoing prolonged poor po intakes, muscle wasting noted in temples,  and BMI 14 51; treated with regular diet and supplements  )       BMI Findings:  BMI Classifications: Underweight < 18 5     Body mass index is 14 51 kg/m²  See Nutrition note dated 7/7/20 for additional details  Completed nutrition assessment is viewable in the nutrition documentation

## 2020-07-07 NOTE — PROGRESS NOTES
07/07/20 1001   Pain Assessment   Pain Assessment Tool 0-10   Pain Score 4   Pain Location/Orientation Orientation: Right;Location: Hip;Location: Leg   Restrictions/Precautions   Precautions Fall Risk;Limb alert;O2   RLE Weight Bearing Per Order WBAT   Eating   Type of Assistance Needed Set-up / clean-up   Amount of Physical Assistance Provided No physical assistance   Eating CARE Score 5   Tub/Shower Transfer   Findings Pt engaged in dry tub transfer with use of tub transfer bench  Pt required Min A  for RLE over tub due to decreased strength and ROM in RLE  Will continue to practice   Sit to Stand   Type of Assistance Needed Physical assistance   Amount of Physical Assistance Provided 25%-49%   Sit to Stand CARE Score 3   Bed-Chair Transfer   Type of Assistance Needed Physical assistance   Amount of Physical Assistance Provided 25%-49%   Comment min A with RW   Chair/Bed-to-Chair Transfer CARE Score 3   Toileting Hygiene   Type of Assistance Needed Physical assistance   Amount of Physical Assistance Provided 25%-49%   Comment Min A for CM   Toileting Hygiene CARE Score 3   Toilet Transfer   Type of Assistance Needed Physical assistance   Amount of Physical Assistance Provided 25%-49%   Comment Min A with RW   Toilet Transfer CARE Score 3   Exercise Tools   Exercise Tools Yes   Other Exercise Tool 1 Pt enageged in UE strengthening to maximize endurance and strength for independence with ADLs and functional transfers  Pt completed 3x10 bicep curls with rest breaks due to fatigue, will continue to build UB strength   Cognition   Overall Cognitive Status WFL   Arousal/Participation Alert   Attention Within functional limits   Orientation Level Oriented X4   Memory Within functional limits   Following Commands Follows all commands and directions without difficulty   Additional Activities   Additional Activities Comments Pt engaged in education and trialing use of LHAE (dressing stick, sock aid, and reacher)   Pt ashley fair understanding of education, and able to perform activities with VC for socks and Min A for LB dressing  Will continues to address ability to complete with/without    Assessment   Treatment Assessment Pt engaged in skilled OT session with focus home set-up, D/C planning to return home, IADL responsibilities, tub transfer, Noris Bur introduction and education, and UB strengthening  Pt still undecided about purchasing hip kit, and is hoping to possibly christ socks and shoes without use of LHAE  Will continue to assess  Pt will benefit from continued skilled OT to maximize independence and safety with ADL/IADLs, functional transfers, UB strengthening, endurance training, and dynamic standing balance  Continue POC  Prognosis Good   Problem List Decreased endurance; Impaired balance;Decreased mobility;Pain;Decreased range of motion;Decreased strength   Barriers to Discharge Inaccessible home environment;Decreased caregiver support   Plan   Treatment/Interventions ADL retraining;Functional transfer training; Therapeutic exercise; Endurance training;Patient/family training;Equipment eval/education; Compensatory technique education   Progress Progressing toward goals   Recommendation   OT Discharge Recommendation Return to previous environment with social support   Equipment Recommended   (Pt has tub transfer bench )   OT Therapy Minutes   OT Time In 1000   OT Time Out 1130   OT Total Time (minutes) 90   OT Mode of treatment - Individual (minutes) 90   OT Mode of treatment - Concurrent (minutes) 0   OT Mode of treatment - Group (minutes) 0   OT Mode of treatment - Co-treat (minutes) 0   OT Mode of Treatment - Total time(minutes) 90 minutes   OT Cumulative Minutes 180

## 2020-07-07 NOTE — PROGRESS NOTES
07/07/20 1430   Pain Assessment   Pain Assessment Tool 0-10   Pain Score 5   Pain Location/Orientation Orientation: Right;Location: Hip   Pain Onset/Description Frequency: Intermittent   Effect of Pain on Daily Activities pain reported with mobility    Patient's Stated Pain Goal No pain   Restrictions/Precautions   Precautions Fall Risk;Limb alert;O2   Weight Bearing Restrictions Yes   RLE Weight Bearing Per Order WBAT   Cognition   Overall Cognitive Status WFL   Arousal/Participation Alert   Attention Within functional limits   Orientation Level Oriented X4   Memory Within functional limits   Following Commands Follows all commands and directions without difficulty   Subjective   Subjective Patient agreeable to participate in PT session    Sit to Lying   Type of Assistance Needed Incidental touching   Amount of Physical Assistance Provided No physical assistance   Comment HOB slightly elevated    Sit to Lying CARE Score 4   Lying to Sitting on Side of Bed   Type of Assistance Needed Incidental touching   Amount of Physical Assistance Provided No physical assistance   Comment HOB slightly elevated    Lying to Sitting on Side of Bed CARE Score 4   Sit to Stand   Type of Assistance Needed Physical assistance   Amount of Physical Assistance Provided Less than 25%   Comment Lin    Sit to Stand CARE Score 3   Bed-Chair Transfer   Type of Assistance Needed Physical assistance   Amount of Physical Assistance Provided Less than 25%   Comment Lin    Chair/Bed-to-Chair Transfer CARE Score 3   Transfer Bed/Chair/Wheelchair   Adaptive Equipment Roller Walker   Car Transfer   Comment perform next session    Reason if not Attempted Safety concerns   Car Transfer CARE Score 88   Walk 10 Feet   Type of Assistance Needed Physical assistance   Amount of Physical Assistance Provided Less than 25%   Comment Lin    Walk 10 Feet CARE Score 3   Walk 50 Feet with Two Turns   Type of Assistance Needed Physical assistance   Amount of Physical Assistance Provided 25%-49%   Comment mod A 2* LE weakness; 20' + 50'    Walk 50 Feet with Two Turns CARE Score 3   Walk 150 Feet   Reason if not Attempted Safety concerns   Walk 150 Feet CARE Score 88   Walking 10 Feet on Uneven Surfaces   Reason if not Attempted Safety concerns   Walking 10 Feet on Uneven Surfaces CARE Score 88   Ambulation   Does the patient walk? 2  Yes   Primary Mode of Locomotion Prior to Admission Walk   Distance Walked (feet) 10 ft  (+ 20' +50' )   Assist Device Roller Walker   Gait Pattern Narrow SCAR; Inconsistant Sharron;Decreased foot clearance; Forward Flexion; Shuffle;Improper weight shift   Limitations Noted In Balance;Speed;Strength;Swing   12 Steps   Reason if not Attempted Safety concerns   12 Steps CARE Score 88   Toilet Transfer   Type of Assistance Needed Physical assistance   Amount of Physical Assistance Provided 25%-49%   Comment patient able to perform hygiene and clothing management without assistance; required steadying in stance    Toilet Transfer CARE Score 3   Therapeutic Interventions   Strengthening BLE 20x: LAQ, hip ABD against green TB, alternating seated hip flexion; 5x STS with glute sets in standing    Equipment Use   NuStep level 0 for self selected ROM, BUE/LE, 10 minutes    Assessment   Treatment Assessment Patient engaged in PT treatment session focusing on strength and ROM in order to progress (I) with functional activities  Patient remains limited by BLE and generalized weakness, pain and overall decreased tolerance to mobility  She remains at a high fall risk related to these impairments and is unsafe to return home at this time  She requires continued skilled PT intervention to maximize function and safety  Problem List Decreased endurance; Impaired balance;Decreased mobility;Pain;Decreased range of motion;Decreased strength   Barriers to Discharge Inaccessible home environment;Decreased caregiver support   PT Barriers   Functional Limitation Car transfers; Ramp negotiation;Stair negotiation;Standing;Transfers; Walking   Plan   Treatment/Interventions Functional transfer training;LE strengthening/ROM; Therapeutic exercise; Endurance training;Cognitive reorientation;Patient/family training;Equipment eval/education; Bed mobility;Gait training; Compensatory technique education   Progress Progressing toward goals   Recommendation   PT Discharge Recommendation   (TBD )   Equipment Recommended Walker   PT Therapy Minutes   PT Time In 1430   PT Time Out 1540   PT Total Time (minutes) 70   PT Mode of treatment - Individual (minutes) 70   PT Mode of treatment - Concurrent (minutes) 0   PT Mode of treatment - Group (minutes) 0   PT Mode of treatment - Co-treat (minutes) 0   PT Mode of Treatment - Total time(minutes) 70 minutes   PT Cumulative Minutes 190   Therapy Time missed   Time missed?  No

## 2020-07-07 NOTE — PLAN OF CARE
Problem: Potential for Falls  Goal: Patient will remain free of falls  Description  INTERVENTIONS:  - Assess patient frequently for physical needs  -  Identify cognitive and physical deficits and behaviors that affect risk of falls    -  Loda fall precautions as indicated by assessment   - Educate patient/family on patient safety including physical limitations  - Instruct patient to call for assistance with activity based on assessment  - Modify environment to reduce risk of injury  - Consider OT/PT consult to assist with strengthening/mobility  Outcome: Progressing     Problem: Prexisting or High Potential for Compromised Skin Integrity  Goal: Skin integrity is maintained or improved  Description  INTERVENTIONS:  - Identify patients at risk for skin breakdown  - Assess and monitor skin integrity  - Assess and monitor nutrition and hydration status  - Monitor labs   - Assess for incontinence   - Turn and reposition patient  - Assist with mobility/ambulation  - Relieve pressure over bony prominences  - Avoid friction and shearing  - Provide appropriate hygiene as needed including keeping skin clean and dry  - Evaluate need for skin moisturizer/barrier cream  - Collaborate with interdisciplinary team   - Patient/family teaching  - Consider wound care consult   Outcome: Progressing     Problem: PAIN - ADULT  Goal: Verbalizes/displays adequate comfort level or baseline comfort level  Description  Interventions:  - Encourage patient to monitor pain and request assistance  - Assess pain using appropriate pain scale  - Administer analgesics based on type and severity of pain and evaluate response  - Implement non-pharmacological measures as appropriate and evaluate response  - Consider cultural and social influences on pain and pain management  - Notify physician/advanced practitioner if interventions unsuccessful or patient reports new pain  Outcome: Progressing     Problem: INFECTION - ADULT  Goal: Absence or prevention of progression during hospitalization  Description  INTERVENTIONS:  - Assess and monitor for signs and symptoms of infection  - Monitor lab/diagnostic results  - Monitor all insertion sites, i e  indwelling lines, tubes, and drains  - Monitor endotracheal if appropriate and nasal secretions for changes in amount and color  - Hanson appropriate cooling/warming therapies per order  - Administer medications as ordered  - Instruct and encourage patient and family to use good hand hygiene technique  - Identify and instruct in appropriate isolation precautions for identified infection/condition  Outcome: Progressing  Goal: Absence of fever/infection during neutropenic period  Description  INTERVENTIONS:  - Monitor WBC    Outcome: Progressing     Problem: SAFETY ADULT  Goal: Maintain or return to baseline ADL function  Description  INTERVENTIONS:  -  Assess patient's ability to carry out ADLs; assess patient's baseline for ADL function and identify physical deficits which impact ability to perform ADLs (bathing, care of mouth/teeth, toileting, grooming, dressing, etc )  - Assess/evaluate cause of self-care deficits   - Assess range of motion  - Assess patient's mobility; develop plan if impaired  - Assess patient's need for assistive devices and provide as appropriate  - Encourage maximum independence but intervene and supervise when necessary  - Involve family in performance of ADLs  - Assess for home care needs following discharge   - Consider OT consult to assist with ADL evaluation and planning for discharge  - Provide patient education as appropriate  Outcome: Progressing  Goal: Maintain or return mobility status to optimal level  Description  INTERVENTIONS:  - Assess patient's baseline mobility status (ambulation, transfers, stairs, etc )    - Identify cognitive and physical deficits and behaviors that affect mobility  - Identify mobility aids required to assist with transfers and/or ambulation (gait belt, sit-to-stand, lift, walker, cane, etc )  - [place] fall precautions as indicated by assessment  - Record patient progress and toleration of activity level on Mobility SBAR; progress patient to next Phase/Stage  - Instruct patient to call for assistance with activity based on assessment  - Consider rehabilitation consult to assist with strengthening/weightbearing, etc   Outcome: Progressing     Problem: DISCHARGE PLANNING  Goal: Discharge to home or other facility with appropriate resources  Description  INTERVENTIONS:  - Identify barriers to discharge w/patient and caregiver  - Arrange for needed discharge resources and transportation as appropriate  - Identify discharge learning needs (meds, wound care, etc )  - Arrange for interpretive services to assist at discharge as needed  - Refer to Case Management Department for coordinating discharge planning if the patient needs post-hospital services based on physician/advanced practitioner order or complex needs related to functional status, cognitive ability, or social support system  Outcome: Progressing     Problem: Nutrition/Hydration-ADULT  Goal: Nutrient/Hydration intake appropriate for improving, restoring or maintaining nutritional needs  Description  Monitor and assess patient's nutrition/hydration status for malnutrition  Collaborate with interdisciplinary team and initiate plan and interventions as ordered  Monitor patient's weight and dietary intake as ordered or per policy  Utilize nutrition screening tool and intervene as necessary  Determine patient's food preferences and provide high-protein, high-caloric foods as appropriate       INTERVENTIONS:  - Monitor oral intake, urinary output, labs, and treatment plans  - Assess nutrition and hydration status and recommend course of action  - Evaluate amount of meals eaten  - Assist patient with eating if necessary   - Allow adequate time for meals  - Recommend/ encourage appropriate diets, oral nutritional supplements, and vitamin/mineral supplements  - Order, calculate, and assess calorie counts as needed  - Recommend, monitor, and adjust tube feedings and TPN/PPN based on assessed needs  - Assess need for intravenous fluids  - Provide specific nutrition/hydration education as appropriate  - Include patient/family/caregiver in decisions related to nutrition  Outcome: Progressing

## 2020-07-08 PROBLEM — E46 MALNUTRITION (HCC): Status: ACTIVE | Noted: 2020-07-05

## 2020-07-08 PROCEDURE — 94760 N-INVAS EAR/PLS OXIMETRY 1: CPT

## 2020-07-08 PROCEDURE — 99233 SBSQ HOSP IP/OBS HIGH 50: CPT | Performed by: PHYSICAL MEDICINE & REHABILITATION

## 2020-07-08 PROCEDURE — 97530 THERAPEUTIC ACTIVITIES: CPT

## 2020-07-08 PROCEDURE — 94640 AIRWAY INHALATION TREATMENT: CPT

## 2020-07-08 PROCEDURE — 97116 GAIT TRAINING THERAPY: CPT

## 2020-07-08 PROCEDURE — 97110 THERAPEUTIC EXERCISES: CPT

## 2020-07-08 PROCEDURE — 97535 SELF CARE MNGMENT TRAINING: CPT

## 2020-07-08 RX ADMIN — DOCUSATE SODIUM 100 MG: 100 CAPSULE, LIQUID FILLED ORAL at 08:03

## 2020-07-08 RX ADMIN — OXYCODONE HYDROCHLORIDE 5 MG: 5 TABLET ORAL at 08:07

## 2020-07-08 RX ADMIN — ENOXAPARIN SODIUM 40 MG: 40 INJECTION SUBCUTANEOUS at 08:03

## 2020-07-08 RX ADMIN — ACETAMINOPHEN 975 MG: 325 TABLET, FILM COATED ORAL at 05:31

## 2020-07-08 RX ADMIN — ACETAMINOPHEN 975 MG: 325 TABLET, FILM COATED ORAL at 14:12

## 2020-07-08 RX ADMIN — CYANOCOBALAMIN TAB 500 MCG 1000 MCG: 500 TAB at 08:03

## 2020-07-08 RX ADMIN — IPRATROPIUM BROMIDE 0.5 MG: 0.5 SOLUTION RESPIRATORY (INHALATION) at 14:53

## 2020-07-08 RX ADMIN — OXYCODONE HYDROCHLORIDE 5 MG: 5 TABLET ORAL at 21:25

## 2020-07-08 RX ADMIN — IPRATROPIUM BROMIDE 0.5 MG: 0.5 SOLUTION RESPIRATORY (INHALATION) at 07:08

## 2020-07-08 RX ADMIN — LEVALBUTEROL HYDROCHLORIDE 1.25 MG: 1.25 SOLUTION, CONCENTRATE RESPIRATORY (INHALATION) at 19:50

## 2020-07-08 RX ADMIN — LEVETIRACETAM 250 MG: 500 TABLET, FILM COATED ORAL at 08:03

## 2020-07-08 RX ADMIN — BUDESONIDE 0.25 MG: 0.25 INHALANT RESPIRATORY (INHALATION) at 19:50

## 2020-07-08 RX ADMIN — LEVALBUTEROL HYDROCHLORIDE 1.25 MG: 1.25 SOLUTION, CONCENTRATE RESPIRATORY (INHALATION) at 14:52

## 2020-07-08 RX ADMIN — LEVETIRACETAM 250 MG: 500 TABLET, FILM COATED ORAL at 21:24

## 2020-07-08 RX ADMIN — IPRATROPIUM BROMIDE 0.5 MG: 0.5 SOLUTION RESPIRATORY (INHALATION) at 19:50

## 2020-07-08 RX ADMIN — BUDESONIDE 0.25 MG: 0.25 INHALANT RESPIRATORY (INHALATION) at 07:08

## 2020-07-08 RX ADMIN — LEVALBUTEROL HYDROCHLORIDE 1.25 MG: 1.25 SOLUTION, CONCENTRATE RESPIRATORY (INHALATION) at 07:08

## 2020-07-08 NOTE — NURSING NOTE
Patient with no c/o pain this evening, bowel medications offered and pt refused d/t large BM she just had  Patient currently resting in bed, repositioned and comfortable  Will continue to monitor

## 2020-07-08 NOTE — PROGRESS NOTES
07/08/20 1256   Pain Assessment   Pain Assessment Tool 0-10   Pain Score 4   Pain Location/Orientation Orientation: Right;Location: Hip   Restrictions/Precautions   Precautions Fall Risk;O2;Supervision on toilet/commode   Weight Bearing Restrictions Yes   RLE Weight Bearing Per Order WBAT   Cognition   Overall Cognitive Status WFL   Arousal/Participation Alert; Cooperative   Attention Within functional limits   Orientation Level Oriented X4   Memory Within functional limits   Following Commands Follows one step commands with increased time or repetition   Subjective   Subjective Patient reported after lunch ready for PT  Sit to Stand   Type of Assistance Needed Incidental touching   Amount of Physical Assistance Provided No physical assistance   Comment RW   Sit to Stand CARE Score 4   Bed-Chair Transfer   Type of Assistance Needed Incidental touching   Amount of Physical Assistance Provided No physical assistance   Comment RW   Chair/Bed-to-Chair Transfer CARE Score 4   Transfer Bed/Chair/Wheelchair   Adaptive Equipment Roller Walker   Findings Slight posterior balance lob noted   Walk 10 Feet   Type of Assistance Needed Physical assistance   Amount of Physical Assistance Provided Less than 25%   Comment min assist with WC follow   Walk 10 Feet CARE Score 3   Ambulation   Does the patient walk? 2  Yes   Primary Mode of Locomotion Prior to Admission Walk   Distance Walked (feet) 10 ft   Assist Device Roller Walker   Gait Pattern Narrow SCAR; Inconsistant Sharron;Decreased foot clearance; Forward Flexion; Shuffle;Improper weight shift   Limitations Noted In Balance;Speed;Strength;Swing   Provided Assistance with: Balance   Walk Assist Level Minimum Assist   Wheelchair mobility   Does the patient use a wheelchair? 0  No   Toilet Transfer   Surface Assessed Raised Toilet   Limitations Noted In Balance;Confidence; Endurance;Problem Solving;ROM;Safety;UE Strength;LE Strength   Adaptive Equipment Grab Bar   Positioning Concerns Grab Bars;Raised Seat;Safety   Therapeutic Interventions   Strengthening Seated- LAQ, Hip abduciton, hip adduction, hamstring curls- 20 reps, 3 second holds   Flexibility seated bilateral hamstring and gastroc stretching- 10 minutes   Balance standing endurance while performing glute sets- 1 minute 3 sets   Other Transfer per objective; ambulation per objective with RW   Assessment   Treatment Assessment Patient tolerated session well today, session focused on LE strengthening  Patient limited in her seated and standing LE endurance and strength, patient improving with toleratnce but provided adequate rest breaks in order to improve her function and recovery  Patient reported an increase in pain today during session, noted posterior LOB during session today with increased standing fatigue  Patient requires skilled PT in order to improve her status and maximize function to return home safely  Family/Caregiver Present no   PT Family training done with: no   Problem List Decreased strength;Decreased range of motion;Decreased endurance; Impaired balance;Decreased mobility;Orthopedic restrictions;Pain   Barriers to Discharge Inaccessible home environment   PT Barriers   Physical Impairment Decreased strength;Decreased range of motion;Decreased endurance; Impaired balance;Decreased mobility; Decreased coordination;Decreased cognition; Impaired judgement;Decreased safety awareness;Decreased skin integrity;Orthopedic restrictions;Pain   Functional Limitation Car transfers; Ramp negotiation;Stair negotiation;Standing;Transfers; Walking   Plan   Treatment/Interventions ADL retraining;Functional transfer training; Therapeutic exercise; Endurance training;Cognitive reorientation;Patient/family training;Equipment eval/education; Compensatory technique education   Progress Progressing toward goals   PT Therapy Minutes   PT Time In 1300   PT Time Out 1400   PT Total Time (minutes) 60   PT Mode of treatment - Individual (minutes) 60   PT Mode of treatment - Concurrent (minutes) 0   PT Mode of treatment - Group (minutes) 0   PT Mode of treatment - Co-treat (minutes) 0   PT Mode of Treatment - Total time(minutes) 60 minutes   PT Cumulative Minutes 250   Therapy Time missed   Time missed?  No

## 2020-07-08 NOTE — PROGRESS NOTES
PM&R Progress Note:    HPI: Pt is a 69 y/o who suffered a mechanical fall while walking in her garden landing on her right hip and suffering a fracture to the femoral neck  The patient underwent ORIF w/screws x 2 on 7/2  ASSESSMENT: Stable, progressing      PLAN:    Rehabilitation   Continue current rehabilitation plan of care to maximize function   Current function: Leelee bed mobility  [address] toilet transfer, toileting and ambulation 10ft RW  Clarissa Aquiles transfers  MaxA bathing, LB dressing   Estimated Discharge: Team meeting tomorrow  Pain   Controlled  DVT prophylaxis   Lovenox  Bladder plan   Continent     Bowel plan   Continent   Last BM 7/5/2020 but disimpacted  * Hip fracture [facility])  Assessment & Plan  R femoral neck fx status post surgical repair via medullary nail by Dr Rosita Duckworth on 7/2  POD#14 is 7/16  Weight-bearing as tolerated on affected limb   Monitor incision/area for infection or dehiscence/impaired healing   Monitor for signs/symptoms of VTE, atelectasis, acute blood loss anemia, or other infection   Continue acute comprehensive interdisciplinary inpatient rehabilitation to include intensive skilled therapies (PT, OT) as outlined with oversight and management by rehabilitation physician as well as inpatient rehab level nursing, case management and weekly interdisciplinary team meetings  Follow-up with Ortho Sx after d/c and ortho if needed during ARC course       Constipation  Assessment & Plan  Continue scheduled bowel meds  Continue suppository if no BM today      Malnutrition ([address] )  Assessment & Plan  Malnutrition Findings:   Malnutrition type: Chronic illness  Degree of Malnutrition: Other severe protein calorie malnutrition(Chronic severe protein-calorie malnutrition related to decreased appetite subsequent to CA as evidenced by ongoing prolonged poor po intakes, muscle wasting noted in temples,  and BMI 14 51; treated with regular diet and supplements  )    BMI Findings:  BMI Classifications: Underweight < 18 5     Body mass index is 17 72 kg/m²  Encourage dietary supplements  Acute blood loss as cause of postoperative anemia  Assessment & Plan  IM consulted, with overall monitoring, and management at their discretion during ARC course  Monitor H/H, vitals, signs/symptoms of acute bleeding        Brain metastases (HCC)  Assessment & Plan  Hx of brain mets with plan for surveillance imaging   Follow-up with OP NSx and hem-onc  Monitor neuro exam closely    COPD (chronic obstructive pulmonary disease) ([address] )  Assessment & Plan  IM consulted and with overall management at their discretion during ARC course  On home chronic 2L O2  Home nebs  Monitor vitals without and with activity   Incentive spirometry     Nicotine dependence  Assessment & Plan  Encourage cessation  Malignant neoplasm of right lung [facility])  Assessment & Plan  S/P chemo/radiation   Follow-up hem/onc after d/c     Painresolved as of 2020  Assessment & Plan  APAP TID  Oxy 2 5-5mg Q4H prn  Counseled on and continue to encourage deep breathing/relaxation/behavioral pain management techniques:     Deep breathin seconds in, 5 seconds out, 5 times per hour when awake and PRN when in pain or anticipate pain; avoid holding breath and tightening muscles and instead breathe slowly and deeply  Monitor for oversedation, AMS/delirium, and respiratory depression   If being administered - hold opiates, muscle relaxants, benzodiazepines, and gabapentin for oversedation, AMS, or RR<12  Appreciate IM consultants medical co-management  Labs, medications, and imaging personally reviewed  SUBJECTIVE: Patient seen face to face  No acute issues  Progressing as expected in rehabilitation  Oral intake and dietary supplements encouraged  Pt reports she is doing well  ROS:  A ten point review of systems was completed 2020 and pertinent positives are listed in subjective section   All other systems reviewed were negative  OBJECTIVE:   BP 93/54 (BP Location: Left arm)   Pulse 104   Temp 98 3 °F (36 8 °C) (Oral)   Resp 18   Ht 5' 5" (1 651 m)   Wt 48 3 kg (106 lb 7 7 oz)   SpO2 100%   BMI 17 72 kg/m²     Physical Exam   Constitutional: She is oriented to person, place, and time  She appears well-developed and well-nourished  HENT:   Head: Normocephalic and atraumatic  Mouth/Throat: Oropharynx is clear and moist    Eyes: Pupils are equal, round, and reactive to light  EOM are normal    Neck: Normal range of motion  Neck supple  Cardiovascular: Intact distal pulses  Pulmonary/Chest: Effort normal    Abdominal: Soft  She exhibits no distension  There is no tenderness  Musculoskeletal: Normal range of motion  Neurological: She is alert and oriented to person, place, and time  Skin: Skin is warm and dry  Psychiatric: She has a normal mood and affect  Vitals reviewed         Lab Results   Component Value Date    WBC 5 32 07/06/2020    HGB 9 3 (L) 07/06/2020    HCT 29 5 (L) 07/06/2020     (H) 07/06/2020     07/06/2020     Lab Results   Component Value Date    SODIUM 136 07/06/2020    K 4 3 07/06/2020     07/06/2020    CO2 32 07/06/2020    BUN 10 07/06/2020    CREATININE 0 38 (L) 07/06/2020    GLUC 89 07/06/2020    CALCIUM 8 6 07/06/2020     Lab Results   Component Value Date    INR 0 95 07/01/2020    INR 0 91 10/18/2019    INR 1 03 03/07/2019    PROTIME 12 7 07/01/2020    PROTIME 11 9 10/18/2019    PROTIME 13 6 03/07/2019           Current Facility-Administered Medications:     acetaminophen (TYLENOL) tablet 975 mg, 975 mg, Oral, Q8H [address], Fatuma Miller MD, 975 mg at 07/08/20 0531    albuterol (PROVENTIL HFA,VENTOLIN HFA) inhaler 2 puff, 2 puff, Inhalation, Q4H PRN, Fatuma Miller MD    bisacodyl (DULCOLAX) rectal suppository 10 mg, 10 mg, Rectal, Daily PRN, Fatuma Miller MD    budesonide (PULMICORT) inhalation solution 0 25 mg, 0 25 mg, Nebulization, BID, Mj Harvey MD, 0 25 mg at 07/08/20 0708    cyanocobalamin (VITAMIN B-12) tablet 1,000 mcg, 1,000 mcg, Oral, Daily, Mj Harvey MD, 1,000 mcg at 07/08/20 0803    docusate sodium (COLACE) capsule 100 mg, 100 mg, Oral, BID, Mj Harvey MD, 100 mg at 07/08/20 0803    enoxaparin (LOVENOX) subcutaneous injection 40 mg, 40 mg, Subcutaneous, Q24H [facility], Claudia Mae PA-C, 40 mg at 07/08/20 0803    ipratropium (ATROVENT) 0 02 % inhalation solution 0 5 mg, 0 5 mg, Nebulization, TID, Mj Harvey MD, 0 5 mg at 07/08/20 0708    levalbuterol [facility]) inhalation solution 1 25 mg, 1 25 mg, Nebulization, TID, Mj Harvey MD, 1 25 mg at 07/08/20 0708    levETIRAcetam (KEPPRA) tablet 250 mg, 250 mg, Oral, Q12H [facility], Mj Harvey MD, 250 mg at 07/08/20 0803    ondansetron (ZOFRAN-ODT) dispersible tablet 4 mg, 4 mg, Oral, Q8H PRN, Mj Harvey MD    oxyCODONE (ROXICODONE) IR tablet 5 mg, 5 mg, Oral, Q4H PRN, 5 mg at 07/08/20 0807 **OR** oxyCODONE (ROXICODONE) IR tablet 2 5 mg, 2 5 mg, Oral, Q4H PRN, Mj Harvey MD, 2 5 mg at 07/07/20 6325    polyethylene glycol (MIRALAX) packet 17 g, 17 g, Oral, Daily, Claudia Mae PA-C, 17 g at 07/07/20 2952    senna (SENOKOT) tablet 17 2 mg, 2 tablet, Oral, HS, Claudia Mae PA-C, 17 2 mg at 07/07/20 9791    Past Medical History:   Diagnosis Date    [facility] ) 1996    Cancer ([facility] )     COPD, severe ([place] [address] )     Lung disease     Requires supplemental oxygen     3L 24 hrs/day    Stage 4 lung cancer [facility])        Patient Active Problem List    Diagnosis Date Noted    Hip fracture ([place] [address] ) 07/01/2020     Priority: High    Malnutrition ([place] [address] ) 07/05/2020    Constipation 07/05/2020    Acute blood loss as cause of postoperative anemia 07/04/2020    Hyponatremia 07/01/2020    Brain metastases ([place] [address] ) 04/29/2020    Palliative care patient 02/27/2020   Luis Angela Scacherry 01/02/2020    Xerostomia 11/11/2019    Caregiver stress 11/11/2019    Medical marijuana use 11/11/2019    On home oxygen therapy 10/24/2019    Metastatic adenocarcinoma to brain ([place] [address] ) 10/09/2019    Cramp of limb 09/20/2019    Episode of shaking 09/20/2019    Shortness of breath 03/13/2019    Chronic hypoxemic respiratory failure ([place] [address] ) 02/26/2019    Malignant neoplasm of right lung ([place] [address] ) 03/09/2018    Depressive disorder 11/14/2017    COPD (chronic obstructive pulmonary disease) ([place] [address] ) 10/14/2016    Hyperlipidemia 04/08/2016    Loss of appetite 01/23/2015    Malignant cachexia ([place] [address] ) 10/05/2012    Nicotine dependence 10/05/2012          Claudia Mae PA-C    Total time spent:  30 minutes with more than 50% spent counseling/coordinating care  Counseling includes discussion with patient re: progress and discussion with patient of his/her current medical state/information  Coordination of patient's care was performed in conjunction with consulting services  Time invested included review of patient's labs, vitals, and management of their comorbidities with continued monitoring  The care of the patient was extensively discussed and appropriate treatment plan was formulated unique for this patient  ** Please Note:  voice to text software may have been used in the creation of this document   Although proof errors in transcription or interpretation are a potential of such software**

## 2020-07-08 NOTE — ASSESSMENT & PLAN NOTE
Severe protein calorie malnutrition findings    Continue nutritional consult and appreciate their recommendation  Coffee shake to be given at dinner meal

## 2020-07-08 NOTE — PCC OCCUPATIONAL THERAPY
Pt presents to [facility] following fall outside her home, which resulted in surgical intervention of ORIF with cannulate screws  Pt's current problems include: WB restrictions, O2 dependence, fatigue, and ROM limitations  Pt's precautions include: WBAT through RLE  Pt's comorbidities include: Chronic hypoxemic respiratory failure, malignant neoplasm of the right lung, chronic obstructive pulmonary disease, acute blood loss anemia post op, hyponatremia, moderate protein-calorie malnutrition, simple partial seizures  Prior to admission pt was completing ADL's Independently without AD  Pt lives in apartment, where her parents live on first floor apartment as well  Pt would be able to stay with her parents on 1st floor, which has a tub/shower combo and GB in shower  Currently, pt requires Leelee for LB ADLs, and requires CGA for transfers using RW  Pt's sister has been present for FT and is receptive to all recommendations  She will be available to stay with pt and family at initial time of d/c for ~2-3 weeks and then will be transitioning back to [place]  Anticipate pt will be prepared for d/c home Friday with family support  Recommending home OT services

## 2020-07-08 NOTE — PCC CARE MANAGEMENT
Pt is participating well with therapy and is scheduled to return home on 7/16/2020 with family  Home health care is arranged through [facility] to assist w/dc planning needs

## 2020-07-08 NOTE — PROGRESS NOTES
07/08/20 1400   Pain Assessment   Pain Assessment Tool 0-10   Pain Score 5   Pain Location/Orientation Orientation: Right;Location: Hip   Pain Onset/Description Frequency: Intermittent   Effect of Pain on Daily Activities increased with activity    Patient's Stated Pain Goal No pain   Hospital Pain Intervention(s) Medication (See MAR); Repositioned   Restrictions/Precautions   Precautions Fall Risk;O2;Supervision on toilet/commode   Weight Bearing Restrictions Yes   RLE Weight Bearing Per Order WBAT   Cognition   Overall Cognitive Status WFL   Arousal/Participation Alert   Attention Within functional limits   Orientation Level Oriented X4   Memory Within functional limits   Following Commands Follows all commands and directions without difficulty   Subjective   Subjective patient fatigued following PT session; agreeable to continue    Sit to Stand   Type of Assistance Needed Physical assistance   Amount of Physical Assistance Provided Less than 25%   Comment 2* BLE weakness and fatigue    Sit to Stand CARE Score 3   Bed-Chair Transfer   Type of Assistance Needed Physical assistance   Amount of Physical Assistance Provided 25%-49%   Comment BLE weakness, slightly buckling    Chair/Bed-to-Chair Transfer CARE Score 3   Transfer Bed/Chair/Wheelchair   Adaptive Equipment Roller Walker   Walk 10 Feet   Type of Assistance Needed Physical assistance   Amount of Physical Assistance Provided 25%-49%   Walk 10 Feet CARE Score 3   Ambulation   Does the patient walk? 2  Yes   Primary Mode of Locomotion Prior to Admission Walk   Distance Walked (feet) 10 ft  (x3)   Assist Device Roller Walker   Gait Pattern Narrow SCAR; Improper weight shift; Inconsistant Sharron;Decreased foot clearance   Limitations Noted In Balance;Speed;Strength;Swing   Provided Assistance with: Balance   Toilet Transfer   Type of Assistance Needed Physical assistance   Amount of Physical Assistance Provided 25%-49%   Comment stayed on toilet at the end of session with pull cord in hand as patient attempting to ahve BM    Toilet Transfer CARE Score 3   Equipment Use   NuStep L0, 8 minutes, BUE/LE   Assessment   Treatment Assessment Patient making progress however slow thus far with skilled PT intervention at the acute rehab center  She is limited by BLE weakness, pain, and generalized fatigue  Overall, patient needing Lin for transfers and gait however moderate A with fatigue 2* weakness  She is unsafe to return home at this time and will require continued skilled PT intervention to maximize function and reduce caregiver burden  Patient lives with her 80year old parents with NEIL  She will need to achieve mod (I) prior to discharge  Problem List Decreased endurance; Impaired balance;Decreased mobility;Pain;Decreased range of motion;Decreased strength   Barriers to Discharge Inaccessible home environment;Decreased caregiver support   PT Barriers   Functional Limitation Car transfers; Ramp negotiation;Stair negotiation;Standing;Transfers; Walking   Plan   Treatment/Interventions Functional transfer training;LE strengthening/ROM; Elevations; Therapeutic exercise; Endurance training;Patient/family training;Equipment eval/education; Bed mobility;Gait training; Compensatory technique education   Progress Progressing toward goals   Recommendation   PT Discharge Recommendation Home with skilled therapy   PT Therapy Minutes   PT Time In 1400   PT Time Out 1430   PT Total Time (minutes) 30   PT Mode of treatment - Individual (minutes) 30   PT Mode of treatment - Concurrent (minutes) 0   PT Mode of treatment - Group (minutes) 0   PT Mode of treatment - Co-treat (minutes) 0   PT Mode of Treatment - Total time(minutes) 30 minutes   PT Cumulative Minutes 280   Therapy Time missed   Time missed?  No

## 2020-07-08 NOTE — PROGRESS NOTES
07/08/20 0830   Pain Assessment   Pain Assessment Tool 0-10   Pain Score 4   Hospital Pain Intervention(s) Repositioned   Restrictions/Precautions   Precautions Fall Risk;O2;Supervision on toilet/commode   Weight Bearing Restrictions Yes   RLE Weight Bearing Per Order WBAT   Lifestyle   Autonomy 'My goal is to go to [place] to see my koffi for the first time'   Shower/Bathe Self   Type of Assistance Needed Supervision   Amount of Physical Assistance Provided No physical assistance   Comment while seated via lateral weight shifts on tub bench  Shower/Bathe Self CARE Score 4   Bathing   Assessed Bath Style Shower   Anticipated D/C Bath Style Shower   Able to [employer] No   Able to [employer] Temperature No   Able to Wash/Rinse/Dry (body part) Left Arm;Right Arm;L Upper Leg;R Upper Leg;L Lower Leg/Foot;R Lower Leg/Foot;Chest;Abdomen;Perineal Area; Buttocks   Limitations Noted in Balance; Endurance;ROM;Strength   Positioning Seated   Adaptive Equipment Tub Bench; Shower [employer]   Limitations Noted In Balance; Endurance;ROM;UE Strength;LE Strength   Adaptive Equipment Grab Bars;Transfer Bench   Assessed Tub/shower combo   Findings CGA transferring from w/c>tub transfer bench via deep sit method  Pt req' min A for ifting RLE into tub  Upper Body Dressing   Type of Assistance Needed Supervision   Amount of Physical Assistance Provided No physical assistance   Comment while seated   Upper Body Dressing CARE Score 4   Lower Body Dressing   Type of Assistance Needed Supervision; Adaptive equipment   Amount of Physical Assistance Provided No physical assistance   Comment While seated pt threads b/l LE with use of LHAE, in stance with use of grab bar for support via unilateral release for CM  Lower Body Dressing CARE Score 4   Putting On/Taking Off Footwear   Type of Assistance Needed Supervision; Adaptive equipment   Amount of Physical Assistance Provided No physical assistance   Comment While seated, pt dons L sock via cross over method, attempts to don R sock via dynamic reach below waist, however, verbalizes she has slight pain and would like to use LHAE (sock aide)   Putting On/Taking Off Footwear CARE Score 4   Dressing/Undressing Clothing   Remove UB [facility]; Undergarment;Socks   Don [employer]; Undergarment;Socks   Limitations Noted In Balance; Endurance; Safety;Strength;ROM   Adaptive Equipment Reacher;Dressing Stick; Sock Aide   Positioning Standing;Supported Sit   Sit to Lying   Type of Assistance Needed Supervision   Amount of Physical Assistance Provided No physical assistance   Sit to Lying CARE Score 4   Lying to Sitting on Side of Bed   Type of Assistance Needed Supervision   Amount of Physical Assistance Provided No physical assistance   Lying to Sitting on Side of Bed CARE Score 4   Sit to Stand   Type of Assistance Needed Incidental touching; Adaptive equipment   Amount of Physical Assistance Provided No physical assistance   Comment RW   Sit to Stand CARE Score 4   Bed-Chair Transfer   Type of Assistance Needed Incidental touching; Adaptive equipment   Amount of Physical Assistance Provided No physical assistance   Comment RW   Chair/Bed-to-Chair Transfer CARE Score 4   Exercise Tools   Other Exercise Tool 1 Pt engaged in UE strengthening to increase independence in fxl transfers and I/ADL tasks  Pt completes [id number] of bicep curls and shoulder protraction using 3lb dowel bar  Pt with frequent rest breaks in between sets, tolerates session well  Cognition   Overall Cognitive Status WFL   Arousal/Participation Alert; Cooperative   Attention Within functional limits   Orientation Level Oriented X4   Memory Within functional limits   Following Commands Follows one step commands without difficulty   Comments Prior to tx discussed with pt previous tub/shower transfer, pt reports that she is unable to recall practicing the tub/shower transfer  However, when completing transfer in session pt reports that she does remember completing it in previous sessions  Cont' to assess functional cognition as it relates to I/ADL tasks  Activity Tolerance   Activity Tolerance Patient tolerated treatment well   Assessment   Treatment Assessment Pt engaged in skilled OT session with focus on: ADL re-training and fxl tranfers  Pts R leg pain and decreased endurance cont' to be a barrier for independence  Educated pt on importance of deep breathing for pain and rest breaks for endurance  Pt reports that she will be using parent's tub/shower combo (who live underneath her apartment), which is located on the first floor with grab bars and a curtain  Pt would benefit from cont' skilled OT with focus on: ADL re-training, [address] training and UE strengthening  Prognosis Good   Problem List Decreased strength;Decreased range of motion;Decreased endurance; Impaired balance;Decreased mobility;Orthopedic restrictions;Pain   Plan   Treatment/Interventions ADL retraining;Functional transfer training; Therapeutic exercise; Endurance training;Cognitive reorientation;Patient/family training;Equipment eval/education; Compensatory technique education   Progress Progressing toward goals   Recommendation   OT Discharge Recommendation Return to previous environment with social support   OT Therapy Minutes   OT Time In 0830   OT Time Out 1000   OT Total Time (minutes) 90   OT Mode of treatment - Individual (minutes) 90   OT Mode of treatment - Concurrent (minutes) 0   OT Mode of treatment - Group (minutes) 0   OT Mode of treatment - Co-treat (minutes) 0   OT Mode of Treatment - Total time(minutes) 90 minutes   OT Cumulative Minutes 270   Therapy Time missed   Time missed?  No

## 2020-07-08 NOTE — PLAN OF CARE
Problem: Potential for Falls  Goal: Patient will remain free of falls  Description  INTERVENTIONS:  - Assess patient frequently for physical needs  -  Identify cognitive and physical deficits and behaviors that affect risk of falls    -  [place] fall precautions as indicated by assessment   - Educate patient/family on patient safety including physical limitations  - Instruct patient to call for assistance with activity based on assessment  - Modify environment to reduce risk of injury  - Consider OT/PT consult to assist with strengthening/mobility  Outcome: Progressing     Problem: Prexisting or High Potential for Compromised Skin Integrity  Goal: Skin integrity is maintained or improved  Description  INTERVENTIONS:  - Identify patients at risk for skin breakdown  - Assess and monitor skin integrity  - Assess and monitor nutrition and hydration status  - Monitor labs   - Assess for incontinence   - Turn and reposition patient  - Assist with mobility/ambulation  - Relieve pressure over bony prominences  - Avoid friction and shearing  - Provide appropriate hygiene as needed including keeping skin clean and dry  - Evaluate need for skin moisturizer/barrier cream  - Collaborate with interdisciplinary team   - Patient/family teaching  - Consider wound care consult   Outcome: Progressing     Problem: PAIN - ADULT  Goal: Verbalizes/displays adequate comfort level or baseline comfort level  Description  Interventions:  - Encourage patient to monitor pain and request assistance  - Assess pain using appropriate pain scale  - Administer analgesics based on type and severity of pain and evaluate response  - Implement non-pharmacological measures as appropriate and evaluate response  - Consider cultural and social influences on pain and pain management  - Notify physician/advanced practitioner if interventions unsuccessful or patient reports new pain  Outcome: Progressing     Problem: INFECTION - ADULT  Goal: Absence or prevention of progression during hospitalization  Description  INTERVENTIONS:  - Assess and monitor for signs and symptoms of infection  - Monitor lab/diagnostic results  - Monitor all insertion sites, i e  indwelling lines, tubes, and drains  - Monitor endotracheal if appropriate and nasal secretions for changes in amount and color  - Falls appropriate cooling/warming therapies per order  - Administer medications as ordered  - Instruct and encourage patient and family to use good hand hygiene technique  - Identify and instruct in appropriate isolation precautions for identified infection/condition  Outcome: Progressing  Goal: Absence of fever/infection during neutropenic period  Description  INTERVENTIONS:  - Monitor WBC    Outcome: Progressing     Problem: SAFETY ADULT  Goal: Maintain or return to baseline ADL function  Description  INTERVENTIONS:  -  Assess patient's ability to carry out ADLs; assess patient's baseline for ADL function and identify physical deficits which impact ability to perform ADLs (bathing, care of mouth/teeth, toileting, grooming, dressing, etc )  - Assess/evaluate cause of self-care deficits   - Assess range of motion  - Assess patient's mobility; develop plan if impaired  - Assess patient's need for assistive devices and provide as appropriate  - Encourage maximum independence but intervene and supervise when necessary  - Involve family in performance of ADLs  - Assess for home care needs following discharge   - Consider OT consult to assist with ADL evaluation and planning for discharge  - Provide patient education as appropriate  Outcome: Progressing  Goal: Maintain or return mobility status to optimal level  Description  INTERVENTIONS:  - Assess patient's baseline mobility status (ambulation, transfers, stairs, etc )    - Identify cognitive and physical deficits and behaviors that affect mobility  - Identify mobility aids required to assist with transfers and/or ambulation (gait belt, sit-to-stand, lift, walker, cane, etc )  - [place] fall precautions as indicated by assessment  - Record patient progress and toleration of activity level on Mobility SBAR; progress patient to next Phase/Stage  - Instruct patient to call for assistance with activity based on assessment  - Consider rehabilitation consult to assist with strengthening/weightbearing, etc   Outcome: Progressing     Problem: DISCHARGE PLANNING  Goal: Discharge to home or other facility with appropriate resources  Description  INTERVENTIONS:  - Identify barriers to discharge w/patient and caregiver  - Arrange for needed discharge resources and transportation as appropriate  - Identify discharge learning needs (meds, wound care, etc )  - Arrange for interpretive services to assist at discharge as needed  - Refer to Case Management Department for coordinating discharge planning if the patient needs post-hospital services based on physician/advanced practitioner order or complex needs related to functional status, cognitive ability, or social support system  Outcome: Progressing     Problem: Nutrition/Hydration-ADULT  Goal: Nutrient/Hydration intake appropriate for improving, restoring or maintaining nutritional needs  Description  Monitor and assess patient's nutrition/hydration status for malnutrition  Collaborate with interdisciplinary team and initiate plan and interventions as ordered  Monitor patient's weight and dietary intake as ordered or per policy  Utilize nutrition screening tool and intervene as necessary  Determine patient's food preferences and provide high-protein, high-caloric foods as appropriate       INTERVENTIONS:  - Monitor oral intake, urinary output, labs, and treatment plans  - Assess nutrition and hydration status and recommend course of action  - Evaluate amount of meals eaten  - Assist patient with eating if necessary   - Allow adequate time for meals  - Recommend/ encourage appropriate diets, oral nutritional supplements, and vitamin/mineral supplements  - Order, calculate, and assess calorie counts as needed  - Recommend, monitor, and adjust tube feedings and TPN/PPN based on assessed needs  - Assess need for intravenous fluids  - Provide specific nutrition/hydration education as appropriate  - Include patient/family/caregiver in decisions related to nutrition  Outcome: Progressing

## 2020-07-08 NOTE — PCC PHYSICAL THERAPY
Patient made good progress with skilled PT intervention  Plan to d/c home 7/17/20 home with HHPT, a first floor set up using a hospital bed and [facility] and assistance from patient's sister  Patient's overall progress limited by impaired tolerance to activity and generalized weakness likely related to h/o cancer with chemo and radiation  Patient owning all DME at home and was provided with HEP for at home performance 7/15/20  Sister in for family training and was able to demonstrate understanding of all recommendations

## 2020-07-08 NOTE — RESPIRATORY THERAPY NOTE
RT Protocol Note  Vannesa Harris 70 y o  female MRN: [id number]  Unit/Bed#: -01 Encounter: [id number]    Assessment    Principal Problem:    Hip fracture [facility])  Active Problems:    Malignant neoplasm of right lung ([place] [address] )    Malignant cachexia ([place] [address] )    Hyperlipidemia    Chronic hypoxemic respiratory failure (HCC)    Nicotine dependence    COPD (chronic obstructive pulmonary disease) (HCC)    Depressive disorder    Metastatic adenocarcinoma to brain ([place] [address] )    On home oxygen therapy    Brain metastases (HCC)    Hyponatremia    Acute blood loss as cause of postoperative anemia    Constipation      Home Pulmonary Medications:  Nebs TID  Home Devices/Therapy: Home O2    Past Medical History:   Diagnosis Date    Breast cancer ([place] [address] ) 1996    Cancer ([place] [address] )     COPD, severe ([place] [address] )     Lung disease     Requires supplemental oxygen     3L 24 hrs/day    Stage 4 lung cancer ([place] [address] )      Social History     Socioeconomic History    Marital status:      Spouse name: None    Number of children: 1    Years of education: 15    Highest education level: None   Occupational History    Occupation: retired   Social Needs    Financial resource strain: None    Food insecurity:     Worry: None     Inability: None    Transportation needs:     Medical: None     Non-medical: None   Tobacco Use    Smoking status: Current Some Day Smoker     Packs/day: 0 00     Years: 55 00     Pack years: 0 00     Types: Cigarettes     Start date: 1962    Smokeless tobacco: Never Used    Tobacco comment: NOW DOWN TO 2 A DAY   Substance and Sexual Activity    Alcohol use: Not Currently     Alcohol/week: 0 0 standard drinks     Frequency: Never     Binge frequency: Never    Drug use: Yes     Types: Marijuana     Comment: MEDICAL MARIJUANA    Sexual activity: Not Currently   Lifestyle    Physical activity:     Days per week: None     Minutes per session: None    Stress: None   Relationships    Social connections:     Talks on phone: None     Gets together: None     Attends Jehovah's witness service: None     Active member of club or organization: None     Attends meetings of clubs or organizations: None     Relationship status: None    Intimate partner violence:     Fear of current or ex partner: None     Emotionally abused: None     Physically abused: None     Forced sexual activity: None   Other Topics Concern    None   Social History Narrative    Lives in an apartment  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  Subjective         Objective    Physical Exam:        Vitals:  Blood pressure 130/61, pulse 98, temperature 97 7 °F (36 5 °C), temperature source Oral, resp  rate 17, height 5' 5" (1 651 m), weight 48 3 kg (106 lb 7 7 oz), SpO2 92 %  Results from last 7 days   Lab Units 07/02/20  0901   PH ART  7 361   PCO2 ART mm Hg 39 1   PO2 ART mm Hg 103 7   HCO3 ART mmol/L 21 6*   BASE EXC ART mmol/L -3 4   O2 CONTENT ART mL/dL 16 4*   O2 HGB, ARTERIAL % 96 9   ABG SOURCE  Brachial, Left       Imaging and other studies: I have personally reviewed pertinent reports  Plan    Respiratory Plan: Home Bronchodilator Patient pathway        Resp Comments: (P) Pt  with hx of severe COPD and stage 4 lung CA  Pt  uses 2L O2 at home and udn TID  Pt's BS are diminished but clear   Will continue home regimen while in the hospital

## 2020-07-09 ENCOUNTER — TELEPHONE (OUTPATIENT)
Dept: NEUROSURGERY | Facility: CLINIC | Age: 75
End: 2020-07-09

## 2020-07-09 LAB
ANION GAP SERPL CALCULATED.3IONS-SCNC: 3 MMOL/L (ref 4–13)
BASOPHILS # BLD AUTO: 0.02 THOUSANDS/ΜL (ref 0–0.1)
BASOPHILS NFR BLD AUTO: 1 % (ref 0–1)
BUN SERPL-MCNC: 12 MG/DL (ref 5–25)
CALCIUM SERPL-MCNC: 8.8 MG/DL (ref 8.3–10.1)
CHLORIDE SERPL-SCNC: 97 MMOL/L (ref 100–108)
CO2 SERPL-SCNC: 34 MMOL/L (ref 21–32)
CREAT SERPL-MCNC: 0.39 MG/DL (ref 0.6–1.3)
EOSINOPHIL # BLD AUTO: 0.09 THOUSAND/ΜL (ref 0–0.61)
EOSINOPHIL NFR BLD AUTO: 2 % (ref 0–6)
ERYTHROCYTE [DISTWIDTH] IN BLOOD BY AUTOMATED COUNT: 13 % (ref 11.6–15.1)
GFR SERPL CREATININE-BSD FRML MDRD: 105 ML/MIN/1.73SQ M
GLUCOSE SERPL-MCNC: 83 MG/DL (ref 65–140)
HCT VFR BLD AUTO: 30 % (ref 34.8–46.1)
HGB BLD-MCNC: 9.8 G/DL (ref 11.5–15.4)
IMM GRANULOCYTES # BLD AUTO: 0.01 THOUSAND/UL (ref 0–0.2)
IMM GRANULOCYTES NFR BLD AUTO: 0 % (ref 0–2)
LYMPHOCYTES # BLD AUTO: 0.56 THOUSANDS/ΜL (ref 0.6–4.47)
LYMPHOCYTES NFR BLD AUTO: 13 % (ref 14–44)
MCH RBC QN AUTO: 32.2 PG (ref 26.8–34.3)
MCHC RBC AUTO-ENTMCNC: 32.7 G/DL (ref 31.4–37.4)
MCV RBC AUTO: 99 FL (ref 82–98)
MONOCYTES # BLD AUTO: 0.7 THOUSAND/ΜL (ref 0.17–1.22)
MONOCYTES NFR BLD AUTO: 16 % (ref 4–12)
NEUTROPHILS # BLD AUTO: 3.03 THOUSANDS/ΜL (ref 1.85–7.62)
NEUTS SEG NFR BLD AUTO: 68 % (ref 43–75)
NRBC BLD AUTO-RTO: 0 /100 WBCS
PLATELET # BLD AUTO: 297 THOUSANDS/UL (ref 149–390)
PMV BLD AUTO: 9.5 FL (ref 8.9–12.7)
POTASSIUM SERPL-SCNC: 4.2 MMOL/L (ref 3.5–5.3)
RBC # BLD AUTO: 3.04 MILLION/UL (ref 3.81–5.12)
SODIUM SERPL-SCNC: 134 MMOL/L (ref 136–145)
WBC # BLD AUTO: 4.41 THOUSAND/UL (ref 4.31–10.16)

## 2020-07-09 PROCEDURE — 85025 COMPLETE CBC W/AUTO DIFF WBC: CPT | Performed by: NURSE PRACTITIONER

## 2020-07-09 PROCEDURE — 94760 N-INVAS EAR/PLS OXIMETRY 1: CPT

## 2020-07-09 PROCEDURE — 80048 BASIC METABOLIC PNL TOTAL CA: CPT | Performed by: NURSE PRACTITIONER

## 2020-07-09 PROCEDURE — 97530 THERAPEUTIC ACTIVITIES: CPT

## 2020-07-09 PROCEDURE — 97110 THERAPEUTIC EXERCISES: CPT

## 2020-07-09 PROCEDURE — 99233 SBSQ HOSP IP/OBS HIGH 50: CPT | Performed by: PHYSICAL MEDICINE & REHABILITATION

## 2020-07-09 PROCEDURE — 94640 AIRWAY INHALATION TREATMENT: CPT

## 2020-07-09 PROCEDURE — 97535 SELF CARE MNGMENT TRAINING: CPT

## 2020-07-09 RX ADMIN — ACETAMINOPHEN 975 MG: 325 TABLET, FILM COATED ORAL at 13:59

## 2020-07-09 RX ADMIN — LEVALBUTEROL HYDROCHLORIDE 1.25 MG: 1.25 SOLUTION, CONCENTRATE RESPIRATORY (INHALATION) at 13:02

## 2020-07-09 RX ADMIN — LEVALBUTEROL HYDROCHLORIDE 1.25 MG: 1.25 SOLUTION, CONCENTRATE RESPIRATORY (INHALATION) at 19:11

## 2020-07-09 RX ADMIN — ENOXAPARIN SODIUM 40 MG: 40 INJECTION SUBCUTANEOUS at 08:43

## 2020-07-09 RX ADMIN — IPRATROPIUM BROMIDE 0.5 MG: 0.5 SOLUTION RESPIRATORY (INHALATION) at 19:11

## 2020-07-09 RX ADMIN — BUDESONIDE 0.25 MG: 0.25 INHALANT RESPIRATORY (INHALATION) at 07:11

## 2020-07-09 RX ADMIN — LEVALBUTEROL HYDROCHLORIDE 1.25 MG: 1.25 SOLUTION, CONCENTRATE RESPIRATORY (INHALATION) at 07:11

## 2020-07-09 RX ADMIN — BUDESONIDE 0.25 MG: 0.25 INHALANT RESPIRATORY (INHALATION) at 19:11

## 2020-07-09 RX ADMIN — CYANOCOBALAMIN TAB 500 MCG 1000 MCG: 500 TAB at 08:44

## 2020-07-09 RX ADMIN — LEVETIRACETAM 250 MG: 500 TABLET, FILM COATED ORAL at 08:44

## 2020-07-09 RX ADMIN — ACETAMINOPHEN 975 MG: 325 TABLET, FILM COATED ORAL at 05:31

## 2020-07-09 RX ADMIN — OXYCODONE HYDROCHLORIDE 2.5 MG: 5 TABLET ORAL at 22:52

## 2020-07-09 RX ADMIN — LEVETIRACETAM 250 MG: 500 TABLET, FILM COATED ORAL at 21:40

## 2020-07-09 RX ADMIN — OXYCODONE HYDROCHLORIDE 5 MG: 5 TABLET ORAL at 08:43

## 2020-07-09 RX ADMIN — IPRATROPIUM BROMIDE 0.5 MG: 0.5 SOLUTION RESPIRATORY (INHALATION) at 13:02

## 2020-07-09 RX ADMIN — IPRATROPIUM BROMIDE 0.5 MG: 0.5 SOLUTION RESPIRATORY (INHALATION) at 07:11

## 2020-07-09 RX ADMIN — ACETAMINOPHEN 975 MG: 325 TABLET, FILM COATED ORAL at 21:41

## 2020-07-09 NOTE — PROGRESS NOTES
PM&R Progress Note:    HPI: Pt is a 71 y/o who suffered a mechanical fall while walking in her garden landing on her right hip and suffering a fracture to the femoral neck  The patient underwent ORIF w/screws x 2 on 7/2  ASSESSMENT: Stable, progressing    PLAN:    Rehabilitation   Continue current rehabilitation plan of care to maximize function  I personally attended, reviewed, and discussed medical and functional updates in team conference today  Please refer to advance care planning note for details   Expected Discharge: TBD, RETEAM, likely will need another 10 days     Pain   Controlled  DVT prophylaxis   Lovenox  Bladder plan   Continent     Bowel plan   Continent   Last BM 7/5/2020 but disimpacted  * Hip fracture [facility])  Assessment & Plan  R femoral neck fx status post surgical repair via medullary nail by Dr Perry Blank on 7/2  POD#14 is 7/16  Weight-bearing as tolerated on affected limb       Constipation  Assessment & Plan  Continue scheduled bowel meds  Continue suppository if no BM today  [facility] )  Assessment & Plan  Severe protein calorie malnutrition findings  Continue nutritional consult and appreciate their recommendation    Acute blood loss as cause of postoperative anemia  Assessment & Plan  IM consulted, with overall monitoring, and management at their discretion during ARC course  Monitor H/H, vitals, signs/symptoms of acute bleeding        On home oxygen therapy  Assessment & Plan  Chronically on 2 L of oxygen at all times    Metastatic adenocarcinoma to brain [facility])  Assessment & Plan  Follows with Dr Merline Severance as outpatient     COPD (chronic obstructive pulmonary disease) ([facility] )  Assessment & Plan  IM consulted and with overall management at their discretion during ARC course  On home chronic 2L O2  Home nebs  Monitor vitals without and with activity   Incentive spirometry     Nicotine dependence  Assessment & Plan  Encourage cessation      Malignant neoplasm of right lung [facility])  Assessment & Plan  S/P chemo/radiation   Follow-up hem/onc after d/c     Appreciate IM consultants medical co-management  Labs, medications, and imaging personally reviewed  SUBJECTIVE:  Patient seen face to face today  She states she does not like her magic cups  Denies pain  ROS:  A ten point review of systems was completed 7/9/2020 and pertinent positives are listed in subjective section  All other systems reviewed were negative  OBJECTIVE:   /50 (BP Location: Left arm)   Pulse 100   Temp 98 7 °F (37 1 °C) (Oral)   Resp 20   Ht 5' 5" (1 651 m)   Wt 48 3 kg (106 lb 7 7 oz)   SpO2 97%   BMI 17 72 kg/m²     Physical Exam   Constitutional: She is oriented to person, place, and time  She appears well-developed and well-nourished  No distress  HENT:   Head: Normocephalic  Nose: Nose normal    Eyes: Conjunctivae and EOM are normal    Neck: Neck supple  Cardiovascular: Normal rate and intact distal pulses  Pulmonary/Chest: Effort normal  She has no wheezes  Abdominal: Soft  She exhibits no distension  Musculoskeletal: She exhibits no edema  Neurological: She is alert and oriented to person, place, and time  4-/5 proximally, 4/5 distally   Skin: Skin is warm  Psychiatric: She has a normal mood and affect  Nursing note and vitals reviewed         Lab Results   Component Value Date    WBC 4 41 07/09/2020    HGB 9 8 (L) 07/09/2020    HCT 30 0 (L) 07/09/2020    MCV 99 (H) 07/09/2020     07/09/2020     Lab Results   Component Value Date    SODIUM 134 (L) 07/09/2020    K 4 2 07/09/2020    CL 97 (L) 07/09/2020    CO2 34 (H) 07/09/2020    BUN 12 07/09/2020    CREATININE 0 39 (L) 07/09/2020    GLUC 83 07/09/2020    CALCIUM 8 8 07/09/2020     Lab Results   Component Value Date    INR 0 95 07/01/2020    INR 0 91 10/18/2019    INR 1 03 03/07/2019    PROTIME 12 7 07/01/2020    PROTIME 11 9 10/18/2019    PROTIME 13 6 03/07/2019           Current Facility-Administered Medications:     acetaminophen (TYLENOL) tablet 975 mg, 975 mg, Oral, Q8H [facility], Mj Harvey MD, 975 mg at 07/09/20 1359    albuterol (PROVENTIL HFA,VENTOLIN HFA) inhaler 2 puff, 2 puff, Inhalation, Q4H PRN, Mj Harvey MD    bisacodyl (DULCOLAX) rectal suppository 10 mg, 10 mg, Rectal, Daily PRN, Mj Harvey MD    budesonide (PULMICORT) inhalation solution 0 25 mg, 0 25 mg, Nebulization, BID, Mj Harvey MD, 0 25 mg at 07/09/20 2155    cyanocobalamin (VITAMIN B-12) tablet 1,000 mcg, 1,000 mcg, Oral, Daily, Mj Harvey MD, 1,000 mcg at 07/09/20 0844    docusate sodium (COLACE) capsule 100 mg, 100 mg, Oral, BID, Mj Harvey MD, 100 mg at 07/08/20 0803    enoxaparin (LOVENOX) subcutaneous injection 40 mg, 40 mg, Subcutaneous, Q24H CHANDLER, Claudia Mae PA-C, 40 mg at 07/09/20 0843    ipratropium (ATROVENT) 0 02 % inhalation solution 0 5 mg, 0 5 mg, Nebulization, TID, Mj Harvey MD, 0 5 mg at 07/09/20 1302    levalbuterol (Mari Tamiko) inhalation solution 1 25 mg, 1 25 mg, Nebulization, TID, Mj Harvey MD, 1 25 mg at 07/09/20 1302    levETIRAcetam (KEPPRA) tablet 250 mg, 250 mg, Oral, Q12H [facility], Mj Harvey MD, 250 mg at 07/09/20 0844    ondansetron (ZOFRAN-ODT) dispersible tablet 4 mg, 4 mg, Oral, Q8H PRN, Mj Harvey MD    oxyCODONE (ROXICODONE) IR tablet 5 mg, 5 mg, Oral, Q4H PRN, 5 mg at 07/09/20 0843 **OR** oxyCODONE (ROXICODONE) IR tablet 2 5 mg, 2 5 mg, Oral, Q4H PRN, Mj Harvey MD, 2 5 mg at 07/07/20 2335    polyethylene glycol (MIRALAX) packet 17 g, 17 g, Oral, Daily, Claudia Mae PA-C, 17 g at 07/07/20 2607    senna (SENOKOT) tablet 17 2 mg, 2 tablet, Oral, HS, Claudia Mae PA-C, 17 2 mg at 07/07/20 2111    Past Medical History:   Diagnosis Date    Breast cancer ([facility] ) 1996    Cancer ([facility] )     [facility]     Lung disease     Requires supplemental oxygen     3L 24 hrs/day    Stage [facility] Patient Active Problem List    Diagnosis Date Noted    Hip fracture ([place] [address] ) 07/01/2020     Priority: High    Malnutrition ([place] [address] ) 07/05/2020    Constipation 07/05/2020    Acute blood loss as cause of postoperative anemia 07/04/2020    Hyponatremia 07/01/2020    Palliative care patient 02/27/2020    Scab 01/02/2020    Xerostomia 11/11/2019    Caregiver stress 11/11/2019    Medical marijuana use 11/11/2019    On home oxygen therapy 10/24/2019    Metastatic adenocarcinoma to brain ([place] [address] ) 10/09/2019    Cramp of limb 09/20/2019    Episode of shaking 09/20/2019    Shortness of breath 03/13/2019    Chronic hypoxemic respiratory failure ([place] [address] ) 02/26/2019    Malignant neoplasm of right lung (HCC) 03/09/2018    Depressive disorder 11/14/2017    COPD (chronic obstructive pulmonary disease) ([place] [address] ) 10/14/2016    Hyperlipidemia 04/08/2016    Loss of appetite 01/23/2015    Malignant cachexia ([place] [address] ) 10/05/2012    Nicotine dependence 10/05/2012          Charisma Bravo MD    Total time spent:  45 minutes with more than 50% spent counseling/coordinating care  Counseling includes discussion with patient re: progress and discussion with patient of his/her current medical state/information  Coordination of patient's care was performed in conjunction with consulting services  Time invested included review of patient's labs, vitals, and management of their comorbidities with continued monitoring  The care of the patient was extensively discussed and appropriate treatment plan was formulated unique for this patient  ** Please Note:  voice to text software may have been used in the creation of this document   Although proof errors in transcription or interpretation are a potential of such software**

## 2020-07-09 NOTE — PROGRESS NOTES
Internal Medicine Progress Note  Patient: Eloy Baig  Age/sex: 70 y o  female  Medical Record #: [id number]      ASSESSMENT/PLAN: (Interval History)  Eloy Baig is seen and examined and management for following issues:    R femoral neck fracture; s/p ORIF 7/2/20  · F/u ortho as scheduled  · WBAT     Lung CA with mets to brain/right parietal craniotomy 10/2019  · Completed WBR 11/2019 and chemo for lung CA 2018  · Stable  · Continue Keppra 250mg BID as at home     Severe COPD  · Cont Pulmicort, Atrovent, Xopenex nebs   · At home on Atrovent, Brovana and Pulmicort  · Wears chronic oxygen 2L NC at home  · Maintain sats >88%     Chronic tobacco use  · On no nicotine patch     Anemia  · stable  · Transfuse if hgb <8     Hx right breast cancer/mastectomy/axillary LN dissection  · Limb alert right arm     Nutrition/severe malnutrition  · Dietary following  · Doesn't like Ensure and someone is bringing her BOOST in but agreeable to try an Orangesicle shakes  · Uses MMJ as OP; intol to Remeron with nausea, Megace ineffective    The above assessment and plan was reviewed and updated as determined by my evaluation of the patient on 7/9/2020      Labs:   Results from last 7 days   Lab Units 07/09/20  0507 07/06/20  0559   WBC Thousand/uL 4 41 5 32   HEMOGLOBIN g/dL 9 8* 9 3*   HEMATOCRIT % 30 0* 29 5*   PLATELETS Thousands/uL 297 211     Results from last 7 days   Lab Units 07/09/20  0507 07/06/20  0559   SODIUM mmol/L 134* 136   POTASSIUM mmol/L 4 2 4 3   CHLORIDE mmol/L 97* 100   CO2 mmol/L 34* 32   BUN mg/dL 12 10   CREATININE mg/dL 0 39* 0 38*   CALCIUM mg/dL 8 8 8 6                   Review of Scheduled Meds:    Current Facility-Administered Medications:  acetaminophen 975 mg Oral [facility] Marilyn Cabrera MD   albuterol 2 puff Inhalation Q4H PRN Marilyn Cabrera MD   bisacodyl 10 mg Rectal Daily PRN Marilyn Cabrera MD   budesonide 0 25 mg Nebulization BID Marilyn Cabrera MD   vitamin B-12 1,000 mcg Oral Daily Ludivina Dumont MD   docusate sodium 100 mg Oral BID Ludivina Dumont MD   enoxaparin 40 mg Subcutaneous Q24H [address] Claudia Mae PA-C   ipratropium 0 5 mg Nebulization TID Ludivina Dumont MD   levalbuterol 1 25 mg Nebulization TID Ludivina Dumont MD   levETIRAcetam 250 mg Oral Q12H [address] Ludivina Dumont MD   ondansetron 4 mg Oral Q8H PRN Ludivina Dumont MD   oxyCODONE 5 mg Oral Q4H PRN Ludivina Dumont MD   Or       oxyCODONE 2 5 mg Oral Q4H PRN Ludivina Dumont MD   polyethylene glycol 17 g Oral Daily Claudia Mae PA-C   senna 2 tablet Oral HS Claudia Mae PA-C       Subjective/ HPI: Patients overnight issues or events were reviewed with nursing or staff during rounds or morning huddle session  No new or overnight issues  Offers no complaints  ROS:   A 10 point ROS was performed; negative except as noted above         Imaging:     No orders to display       *Labs /Radiology studies reviewed  *Medications reviewed and reconciled as needed  *Please refer to order section for additional ordered labs studies  *Case discussed with primary attending during morning huddle case rounds      Physical Examination:  Vitals:   Vitals:    07/08/20 1951 07/08/20 2028 07/09/20 0459 07/09/20 0532   BP:  (!) 90/48 117/53 111/51   BP Location:  Left arm Left arm Left arm   Pulse:  98 105 102   Resp:  18 18 18   Temp:  97 8 °F (36 6 °C) 98 6 °F (37 °C) 98 1 °F (36 7 °C)   TempSrc:  Oral Oral Oral   SpO2: 98% 97% 94% 97%   Weight:       Height:           General Appearance: no distress, conversive  HEENT: PERRLA, conjuctiva normal; oropharynx clear; mucous membranes moist   Neck:  Supple, normal ROM, no JVD  Lungs: CTA, normal respiratory effort, no retractions, expiratory effort normal  CV: regular rate and rhythm; no rubs/murmurs/gallops, PMI normal   ABD: soft; ND/NT; +BS  EXT: no edema  Skin: normal turgor, normal texture, no rashes  Psych: affect normal, mood normal  Neuro: AAO      The above physical exam was reviewed and updated as determined by my evaluation of the patient on 7/9/2020  Invasive Devices     None                    VTE Pharmacologic Prophylaxis: Enoxaparin  Code Status: Level 1 - Full Code  Current Length of Stay: 4 day(s)      Total time spent:  30 minutes with more than 50% spent counseling/coordinating care  Counseling includes discussion with patient re: progress  and discussion with patient of his/her current medical state/information  Coordination of patient's care was performed in conjunction with primary service  Time invested included review of patient's labs, vitals, and management of their comorbidities with continued monitoring  In addition, this patient was discussed with medical team including physician and advanced extenders  The care of the patient was extensively discussed and appropriate treatment plan was formulated unique for this patient  ** Please Note:  voice to text software may have been used in the creation of this document   Although proof errors in transcription or interpretation are a potential of such software**

## 2020-07-09 NOTE — PROGRESS NOTES
07/09/20 0900   Pain Assessment   Pain Assessment Tool 0-10   Pain Score No Pain   Restrictions/Precautions   Precautions Fall Risk;O2;Supervision on toilet/commode  (2L O2)   Weight Bearing Restrictions Yes   RLE Weight Bearing Per Order WBAT   Tub/Shower Transfer   Limitations Noted In Balance; Endurance;LE Strength   Adaptive Equipment Grab Bars;Transfer Bench   Assessed Tub/shower combo   Findings Lin SPT w/ RW for dry tub xfer  A to lift RLE in/out of tub  Putting On/Taking Off Footwear   Type of Assistance Needed Supervision   Amount of Physical Assistance Provided No physical assistance   Comment Seated EOB to don slip on shoes   Putting On/Taking Off Footwear CARE Score 4   Sit to Lying   Type of Assistance Needed Supervision   Amount of Physical Assistance Provided No physical assistance   Comment HOB slightly elevated   Sit to Lying CARE Score 4   Lying to Sitting on Side of Bed   Type of Assistance Needed Supervision   Amount of Physical Assistance Provided No physical assistance   Comment HOB slightly elevated   Lying to Sitting on Side of Bed CARE Score 4   Sit to Stand   Type of Assistance Needed Physical assistance   Amount of Physical Assistance Provided Less than 25%   Comment w/ RW and A for steadying   Sit to Stand CARE Score 3   Bed-Chair Transfer   Type of Assistance Needed Physical assistance   Amount of Physical Assistance Provided Less than 25%   Comment w/ RW and A for steadying   Chair/Bed-to-Chair Transfer CARE Score 3   Toileting Hygiene   Type of Assistance Needed Physical assistance   Amount of Physical Assistance Provided Less than 25%   Comment in stance at RW to perform CM up/down and ji hyg  A for [facility] CARE Score 3   Toilet Transfer   Type of Assistance Needed Physical assistance   Amount of Physical Assistance Provided 25%-49%   Comment Lin w/ RW to descend to toilet  Pt receptive to utilize [facility] upon D/C home to allow for 1st floor s/u     Toilet Transfer CARE Score 3   Kitchen Mobility   Kitchen-Mobility Level Walker   Kitchen Activity Retrieve items   Kitchen Mobility Comments CG w/ RW to engage in kitchen mobility task  Pt performs functional reach to retrieve items from overhead cabinets, fridge, and microwave, placing items on counter top  Demo G safety w/ managing RW and O2 line in kitchen envirnoment  No LOB during task  Therapeutic Excerise-Strength   UE Strength Yes   Right Upper Extremity- Strength   R Shoulder Flexion;ABduction;Horizontal ABduction   R Elbow Elbow flexion;Elbow extension   R Position Seated   Equipment Dumbbell  (2#)   R Weight/Reps/Sets 2 x 15   RUE Strength Comment Pt engaged in light UB strengthening utilizing 2# wt  Focus of Therex to increase pt's BUE strength for participation in ADL xfer's  Left Upper Extremity-Strength   L Shoulder Flexion;ABduction;Horizontal ABduction   L Elbow Elbow flexion;Elbow extension   L Position Seated   Equipment Dumbell  (2#)   L Weights/Reps/Sets 2 x 15   LUE Strength Comment See above  Cognition   Overall Cognitive Status WFL   Arousal/Participation Alert; Cooperative   Attention Within functional limits   Orientation Level Oriented X4   Memory Within functional limits   Following Commands Follows all commands and directions without difficulty   Activity Tolerance   Activity Tolerance Patient tolerated treatment well   Assessment   Treatment Assessment Pt engaged in 90 min skilled OT Tx session focusing on ADL xfer's, kitchen mobility, and UB strengthening  See above for further Tx details  Pt reports freq cooking meals at home for herself and for her elderly parents that live in [address] [place] [address]  Plan to engage pt in light meal prep in upcoming Tx session   Pt would benefit from cont'd skilled OT to increase BUE strength, improve dynamic standing balance, increase I w/ ADL xfer's, and overall increase I w/ ADL/IADL performance in order to progress towards OT goals and prepare for D/C home w/ family support  Prognosis Good   Problem List Decreased strength;Decreased endurance; Impaired balance;Decreased mobility;Pain;Decreased range of motion   Barriers to Discharge Inaccessible home environment;Decreased caregiver support   Plan   Treatment/Interventions ADL retraining;Functional transfer training; Therapeutic exercise; Endurance training;Patient/family training   Recommendation   OT Discharge Recommendation Return to previous environment with social support   OT Therapy Minutes   OT Time In 0900   OT Time Out 1030   OT Total Time (minutes) 90   OT Mode of treatment - Individual (minutes) 90   OT Mode of treatment - Concurrent (minutes) 0   OT Mode of treatment - Group (minutes) 0   OT Mode of treatment - Co-treat (minutes) 0   OT Mode of Treatment - Total time(minutes) 90 minutes   OT Cumulative Minutes 360   Therapy Time missed   Time missed?  No

## 2020-07-09 NOTE — PROGRESS NOTES
07/09/20 1330   Pain Assessment   Pain Assessment Tool 0-10   Restrictions/Precautions   Precautions Fall Risk;Supervision on toilet/commode;O2  (2L )   RLE Weight Bearing Per Order WBAT   Subjective   Subjective pt agreeable to perform skilled    Sit to Stand   Type of Assistance Needed Physical assistance   Amount of Physical Assistance Provided Less than 25%   Sit to Stand CARE Score 3   Bed-Chair Transfer   Type of Assistance Needed Physical assistance   Amount of Physical Assistance Provided 25%-49%   Comment with RW    Chair/Bed-to-Chair Transfer CARE Score 3   Transfer Bed/Chair/Wheelchair   Adaptive Equipment Roller Walker   Sit to Stand Moderate Assist   Stand to Sit Moderate Assist   Walk 10 Feet   Type of Assistance Needed Physical assistance   Amount of Physical Assistance Provided 25%-49%   Walk 10 Feet CARE Score 3   Walk 50 Feet with Two Turns   Type of Assistance Needed Physical assistance   Amount of Physical Assistance Provided 25%-49%   Walk 50 Feet with Two Turns CARE Score 3   Ambulation   Does the patient walk? 2   Yes   Primary Mode of Locomotion Prior to Admission Walk   Distance Walked (feet) 60 ft  ( x1 and 50 x1 )   Assist Device Roller Walker   Walk Assist Level Minimum Assist   Findings Cga to [place] with WC follow for safety    Curb or Single Stair   Style negotiated Single stair   Type of Assistance Needed Physical assistance   Amount of Physical Assistance Provided 25%-49%   1 Step (Curb) CARE Score 3   4 Steps   Type of Assistance Needed Physical assistance   Amount of Physical Assistance Provided 25%-49%   Comment HHA right vs left for home DC handrail both side but not able to reach both just single handrail    4 Steps CARE Score 3   Toilet Transfer   Type of Assistance Needed Physical assistance   Amount of Physical Assistance Provided 25%-49%   Toilet Transfer CARE Score 3   Assessment   Treatment Assessment pt perform thex ex's LAQ AP x20 Gluts x20 STS x4  and marching inplace x20 with RW for support   pt inc ambulation with RW 02 2L sp02 at 95 % , fatigue post walking ,  Pt perform STS and SPT with RW , also ascending and descending  steps 4 inch as above , pt will cont toward DC goals    Barriers to Discharge Inaccessible home environment;Decreased caregiver support   Plan   Treatment/Interventions Functional transfer training; Therapeutic exercise; Endurance training;Gait training;Bed mobility; Patient/family training   Progress Progressing toward goals   PT Therapy Minutes   PT Time In 1330   PT Time Out 1430   PT Total Time (minutes) 60   PT Mode of treatment - Individual (minutes) 60   PT Mode of treatment - Concurrent (minutes) 0   PT Mode of treatment - Group (minutes) 0   PT Mode of treatment - Co-treat (minutes) 0   PT Mode of Treatment - Total time(minutes) 60 minutes   PT Cumulative Minutes 370   Therapy Time missed   Time missed?  No

## 2020-07-09 NOTE — TEAM CONFERENCE
Acute RehabilitationTeam Conference Note  Date: 7/9/2020   Time: 10:48 AM       Patient Name:  Tara Singh       Medical Record Number: [id number]   YOB: 1945  Sex: Female          Room/Bed:  [facility]4/[facility] 974-01  Payor Info:  Payor: Rosa Paulino / Plan: MEDICARE A AND B / Product Type: Medicare A & B Fee for Service /      Admitting Diagnosis: Closed fracture of neck of femur ([facility] ) Amanda Ramirez   Admit Date/Time:  7/5/2020  1:54 PM  Admission Comments: No comment available     Primary Diagnosis:  Hip fracture ([place] [address] )  Principal Problem: Hip fracture ([place] [address] )    Patient Active Problem List    Diagnosis Date Noted    Malnutrition ([place] [address] ) 07/05/2020    Constipation 07/05/2020    Acute blood loss as cause of postoperative anemia 07/04/2020    Hip fracture ([place] [address] ) 07/01/2020    Hyponatremia 07/01/2020    Brain metastases ([place] [address] ) 04/29/2020    Palliative care patient 02/27/2020    Scab 01/02/2020    Xerostomia 11/11/2019    Caregiver stress 11/11/2019    Medical marijuana use 11/11/2019    On home oxygen therapy 10/24/2019    Metastatic adenocarcinoma to brain ([place] [address] ) 10/09/2019    Cramp of limb 09/20/2019    Episode of shaking 09/20/2019    Shortness of breath 03/13/2019    Chronic hypoxemic respiratory failure ([place] [address] ) 02/26/2019    Malignant neoplasm of right lung ([place] [address] ) 03/09/2018    Depressive disorder 11/14/2017    COPD (chronic obstructive pulmonary disease) ([place] [address] ) 10/14/2016    Hyperlipidemia 04/08/2016    Loss of appetite 01/23/2015    Malignant cachexia ([place] [address] ) 10/05/2012    Nicotine dependence 10/05/2012       Physical Therapy:    Weight Bearing Status: Weight Bearing as Tolerated(RLE)  Transfers: Moderate Assistance  Bed Mobility: Minimal Assistance  Amulation Distance (ft): 30 feet  Ambulation: Moderate Assistance  Assistive Device for Ambulation: Roller Walker  Number of Stairs: 4  Assistive Device for Stairs: Bilateral Hand Rails  Stair Assistance:  Moderate Assistance    Patient making progress however slow thus far with skilled PT intervention at the acute rehab center  She is limited by BLE weakness, pain, and generalized fatigue  Overall, patient needing Lin for transfers and gait however moderate A with fatigue 2* weakness  She is unsafe to return home at this time and will require continued skilled PT intervention to maximize function and reduce caregiver burden  Patient lives with her 80year old parents with NEIL  She will need to achieve mod (I) prior to discharge  Occupational Therapy:  Eating: Independent  Grooming: Supervision  Bathing: Incidental Touching  Bathing: Incidental Touching  Upper Body Dressing: Supervision  Lower Body Dressing: Incidental Touching  Toileting: Incidental Touching  Tub/Shower Transfer: Minimal Assistance  Toilet Transfer: Incidental Touching  Cognition: Within Defined Limits(for basic tasks)  Orientation: Person, Place, Time, Situation  Discharge Recommendations: Home with:  [address] with[de-identified] Family Support       Pt presents to [facility] following fall outside her home, which resulted in surgical intervention of ORIF with cannulate screws  Pt's current problems include: WB restrictions, O2 dependence, fatigue, and ROM limitations  Pt's precautions include: WBAT through RLE  Pt's comorbidities include: Chronic hypoxemic respiratory failure, malignant neoplasm of the right lung, chronic obstructive pulmonary disease, acute blood loss anemia post op, hyponatremia, moderate protein-calorie malnutrition, simple partial seizures  Prior to admission pt was completing ADL's Independently without AD  Pt lives in apartment, where her parents live on first floor apartment as well  Pt would be able to stay with her parents on 1st floor, which has a tub/shower combo and GB in shower  Currently, pt requires Lin for overall ADLs, and requires Lin for transfers using RW   Pt is currently limited due to the following deficits impacting occupational performance, activity tolerance, endurance, standing balance/tolerance and sitting balance/tolerance  The patient has shown a decline from prior level of function  Pt benefits from skilled OT services for I/ADL retraining to support the ability to safely participate in daily occupations safely and independently in the most least restrictive way  From OT standpoint, Pt demonstrates Good rehab potential to meet LTG's  Pt is a good rehab candidate, Anticipate 2 week length of stay  Occupational Therapy plan of care to focus on the following areas:  ADL re-training, [address] training, IADL re-training, functional transfers, standing tolerance, standing balance, DME training/education, family training/education, and EC techniques/education  Speech Therapy:           No notes on file    Nursing Notes:  Appetite: Fair  Diet Type: Regular/House                                       Incision 12/07/17 Neck Right (Active)                                Pain Location/Orientation: Orientation: Right, Location: Hip  Pain Score: 7                       Hospital Pain Intervention(s): Medication (See MAR)          R femoral neck fracture; s/p ORIF 7/2/20  Lung CA with mets to brain/right parietal craniotomy 10/2019  Completed WBR 11/2019 and chemo for lung CA 2018  On Keppra 250mg BID as at home  Severe COPD  Cont Pulmicort, Atrovent, Xopenex nebs   At home on Atrovent, Brovana and Pulmicort  Wears chronic oxygen 2L NC at home  Maintain sats >88%  Chronic tobacco use but not on patch  Pt has anemia but it has been stable     Hx right breast cancer/mastectomy/axillary LN dissection-Limb alert right arm  Nutrition/severe malnutrition -Dietary following  Doesn't like Ensure and someone is bringing her BOOST in but agreeable to try an Orangesicle shake  Uses MMJ as OP; intol to Remeron with nausea, Megace ineffective  We will encourage increase of oral intake and monitor nutrition status   We will monitor pt vitals, labs, and maintain skin integrity by repositioning pt every two hours  Pt will work with therapy to perform as much of her ADL's as possible without assistance in addition to strenghtening and maintaining muscle  Case Management:     Discharge Planning  Living Arrangements: Alone, Other (Comment)(parents live in the same building)  Support Systems: Parent, Family members  Assistance Needed: TBD  Type of Current Residence: Private residence  [address]: Yes  Type of Current Home Care Services: Other (Comment)([employer] connections to help with Grocery shopping)  Pt is participating well with therapy and expects to return home  Family is supportive and increasing services already in place  Pt known to [facility] program and will resume on dc  Following to assist w/dc planning needs  Is the patient actively participating in therapies? yes  List any modifications to the treatment plan:     Barriers Interventions   Activity tolerance, endurance Energy conservation education   Poor po intake Shakes, nutrition consult   Overall weakness, deconditioning Therapy exercises             Is the patient making expected progress toward goals?  yes  List any update or changes to goals:     Medical Goals: Patient will be medically stable for discharge to [facility] upon completion of rehab program and Patient will be able to manage medical conditions and comorbid conditions with medications and follow up upon completion of rehab program    Weekly Team Goals:   Rehab Team Goals  ADL Team Goal: Patient will be independent with ADLs with least restrictive device upon completion of rehab program  Bowel/Bladder Team Goal: Patient will require assist with bladder/bowel management with least restrictive device upon completion of rehab program  Transfer Team Goal: Patient will be independent with transfers with least restrictive device upon completion of rehab program  Locomotion Team Goal: Patient will be independent with locomotion with least restrictive device upon completion of rehab program    Discussion: pt presents with the above barriers and fatigues easily with activity  Pt is functioning at mod a with transfers and ambulation  Pt can start out at contact guard but fatigues easily and declines in function  alds are min a with mod a for lower body  Pt's goals are for mod I on dc with a total los of two weeks  Anticipated Discharge Date:  reteam [facility] Team Members Present: The following team members are supervising care for this patient and were present during this Weekly Team Conference      Physician: Dr Radha Kaufman MD  : Salty Palm MSW  Registered Nurse: Cass Alexander, RN, BSN, [address] [place] [address]  Physical Therapist: Curtis Hameed DPT  Occupational Therapist: Beni Johnson MS, OTR/L  Speech Therapist: Jacques Peck MA, CCC-SLP  Other:

## 2020-07-09 NOTE — PCC NURSING
R femoral neck fracture; s/p ORIF 7/2/20  Lung CA with mets to brain/right parietal craniotomy 10/2019  Completed WBR 11/2019 and chemo for lung CA 2018  On Keppra 250mg BID as at home  Severe COPD  Cont Pulmicort, Atrovent, Xopenex nebs   At home on Atrovent, Brovana and Pulmicort  Wears chronic oxygen 2L NC at home  Maintain sats >88%  Chronic tobacco use but not on patch  Pt has anemia but it has been stable     Hx right breast cancer/mastectomy/axillary LN dissection-Limb alert right arm  Nutrition/severe malnutrition-Dietary following  Doesn't like Ensure and someone is bringing her BOOST in but agreeable to try an Orangesicle shake  Uses MMJ as OP; intol to Remeron with nausea, Megace ineffective  We will encourage increase of oral intake and monitor nutrition status  We will monitor pt vitals, labs, and maintain skin integrity by repositioning pt every two hours  Pt will work with therapy to perform as much of her ADL's as possible without assistance in addition to strenghtening and maintaining muscle

## 2020-07-09 NOTE — TELEPHONE ENCOUNTER
Lanette Gracia, patient's cousin, called inquiring if the MRI brain that is scheduled for 7/22/20 can be done while the patient is in [facility]  Informed Lanette Gracia that is up to [facility]  Lanette Gracia said she will discuss with [facility]  She will call me back if she needs to reschedule the MRI  She was appreciative

## 2020-07-09 NOTE — SOCIAL WORK
Phone call placed to pts cousin Mirna Reyes, and reviewed team update  Mirna Reyes is asking for pts follow up MRI scheduled on 7/22 to be done while pt is still on [facility]   Discussed barriers to dc and need for reteam

## 2020-07-09 NOTE — PROGRESS NOTES
07/09/20 1100   Pain Assessment   Pain Assessment Tool 0-10   Pain Score 5   Pain Location/Orientation Orientation: Right;Location: Hip   Restrictions/Precautions   Precautions Fall Risk;Supervision on toilet/commode;O2  (2L 02 )   RLE Weight Bearing Per Order WBAT   Subjective   Subjective pt agreeable to perform skilled PT    Sit to Stand   Type of Assistance Needed Physical assistance   Amount of Physical Assistance Provided Less than 25%   Sit to Stand CARE Score 3   Bed-Chair Transfer   Type of Assistance Needed Physical assistance   Amount of Physical Assistance Provided Less than 25%   Chair/Bed-to-Chair Transfer CARE Score 3   Transfer Bed/Chair/Wheelchair   Adaptive Equipment Roller Walker   Walk 10 Feet   Type of Assistance Needed Physical assistance   Amount of Physical Assistance Provided 25%-49%   Walk 10 Feet CARE Score 3   Walk 50 Feet with Two Turns   Type of Assistance Needed Physical assistance   Amount of Physical Assistance Provided 25%-49%   Walk 50 Feet with Two Turns CARE Score 3   Ambulation   Does the patient walk? 2  Yes   Primary Mode of Locomotion Prior to Admission Walk   Distance Walked (feet) 50 ft  (X2)   Assist Device Roller Walker   Gait Pattern Narrow SCAR; Inconsistant Sharron;Decreased foot clearance; Improper weight shift   Provided Assistance with: Balance   Walk Assist Level Minimum Assist   Findings CG-minAx1 w/ RW W/ CF by PT for safety   Therapeutic Interventions   Strengthening Seated LAQ AP marching x20 STS x4    Flexibility PROM BL LE    Balance standing balance marching x10   Equipment Use   NuStep LVL 0 FOR 10 MIN    Assessment   Treatment Assessment pt perform skilled PT focus on bed mobility , xfers SPT /STS with RW , pt also perform Nu Step bike lvl 0 for 10 min   pt progressing with small endurance and strengthening , pt will cont benefit with skilled PT to meet her goals       Barriers to Discharge Inaccessible home environment;Decreased caregiver support   Plan Treatment/Interventions Functional transfer training; Therapeutic exercise;Gait training;Bed mobility; Endurance training   Progress Progressing toward goals   PT Therapy Minutes   PT Time In 1100   PT Time Out 1130   PT Total Time (minutes) 30   PT Mode of treatment - Individual (minutes) 30   PT Mode of treatment - Concurrent (minutes) 0   PT Mode of treatment - Group (minutes) 0   PT Mode of treatment - Co-treat (minutes) 0   PT Mode of Treatment - Total time(minutes) 30 minutes   PT Cumulative Minutes 310   Therapy Time missed   Time missed?  No

## 2020-07-10 PROCEDURE — 94640 AIRWAY INHALATION TREATMENT: CPT

## 2020-07-10 PROCEDURE — 97530 THERAPEUTIC ACTIVITIES: CPT | Performed by: PHYSICAL THERAPIST

## 2020-07-10 PROCEDURE — 97535 SELF CARE MNGMENT TRAINING: CPT

## 2020-07-10 PROCEDURE — 94760 N-INVAS EAR/PLS OXIMETRY 1: CPT

## 2020-07-10 PROCEDURE — 97116 GAIT TRAINING THERAPY: CPT | Performed by: PHYSICAL THERAPIST

## 2020-07-10 PROCEDURE — 99233 SBSQ HOSP IP/OBS HIGH 50: CPT | Performed by: PHYSICAL MEDICINE & REHABILITATION

## 2020-07-10 PROCEDURE — 97112 NEUROMUSCULAR REEDUCATION: CPT | Performed by: PHYSICAL THERAPIST

## 2020-07-10 RX ADMIN — LEVALBUTEROL HYDROCHLORIDE 1.25 MG: 1.25 SOLUTION, CONCENTRATE RESPIRATORY (INHALATION) at 14:27

## 2020-07-10 RX ADMIN — OXYCODONE HYDROCHLORIDE 2.5 MG: 5 TABLET ORAL at 19:26

## 2020-07-10 RX ADMIN — ENOXAPARIN SODIUM 40 MG: 40 INJECTION SUBCUTANEOUS at 08:15

## 2020-07-10 RX ADMIN — BUDESONIDE 0.25 MG: 0.25 INHALANT RESPIRATORY (INHALATION) at 07:08

## 2020-07-10 RX ADMIN — LEVALBUTEROL HYDROCHLORIDE 1.25 MG: 1.25 SOLUTION, CONCENTRATE RESPIRATORY (INHALATION) at 07:08

## 2020-07-10 RX ADMIN — DOCUSATE SODIUM 100 MG: 100 CAPSULE, LIQUID FILLED ORAL at 17:30

## 2020-07-10 RX ADMIN — CYANOCOBALAMIN TAB 500 MCG 1000 MCG: 500 TAB at 08:14

## 2020-07-10 RX ADMIN — OXYCODONE HYDROCHLORIDE 2.5 MG: 5 TABLET ORAL at 23:55

## 2020-07-10 RX ADMIN — SENNOSIDES 17.2 MG: 8.6 TABLET ORAL at 21:37

## 2020-07-10 RX ADMIN — LEVETIRACETAM 250 MG: 500 TABLET, FILM COATED ORAL at 21:38

## 2020-07-10 RX ADMIN — ACETAMINOPHEN 975 MG: 325 TABLET, FILM COATED ORAL at 13:11

## 2020-07-10 RX ADMIN — LEVETIRACETAM 250 MG: 500 TABLET, FILM COATED ORAL at 08:13

## 2020-07-10 RX ADMIN — IPRATROPIUM BROMIDE 0.5 MG: 0.5 SOLUTION RESPIRATORY (INHALATION) at 14:27

## 2020-07-10 RX ADMIN — ACETAMINOPHEN 975 MG: 325 TABLET, FILM COATED ORAL at 21:38

## 2020-07-10 RX ADMIN — LEVALBUTEROL HYDROCHLORIDE 1.25 MG: 1.25 SOLUTION, CONCENTRATE RESPIRATORY (INHALATION) at 19:33

## 2020-07-10 RX ADMIN — OXYCODONE HYDROCHLORIDE 5 MG: 5 TABLET ORAL at 08:12

## 2020-07-10 RX ADMIN — IPRATROPIUM BROMIDE 0.5 MG: 0.5 SOLUTION RESPIRATORY (INHALATION) at 07:08

## 2020-07-10 RX ADMIN — BUDESONIDE 0.25 MG: 0.25 INHALANT RESPIRATORY (INHALATION) at 19:33

## 2020-07-10 RX ADMIN — ACETAMINOPHEN 975 MG: 325 TABLET, FILM COATED ORAL at 06:27

## 2020-07-10 RX ADMIN — IPRATROPIUM BROMIDE 0.5 MG: 0.5 SOLUTION RESPIRATORY (INHALATION) at 19:33

## 2020-07-10 NOTE — PROGRESS NOTES
07/10/20 1300   Pain Assessment   Pain Assessment Tool 0-10   Pain Score 3   Pain Location/Orientation Orientation: Right;Location: Hip   Pain Onset/Description Onset: Gradual   Effect of Pain on Daily Activities No effects during session   Patient's Stated Pain Goal No pain   Hospital Pain Intervention(s) Rest   Restrictions/Precautions   Precautions Fall Risk;O2;Pain   Weight Bearing Restrictions Yes   RLE Weight Bearing Per Order WBAT   Putting On/Taking Off Footwear   Type of Assistance Needed Supervision   Amount of Physical Assistance Provided No physical assistance   Comment Seated EOB to don slip on shoes  Putting On/Taking Off Footwear CARE Score 4   Sit to Lying   Type of Assistance Needed Supervision   Amount of Physical Assistance Provided No physical assistance   Comment HOB slightly elevated   Sit to Lying CARE Score 4   Lying to Sitting on Side of Bed   Type of Assistance Needed Supervision   Amount of Physical Assistance Provided No physical assistance   Comment HOB slightly elevated   Lying to Sitting on Side of Bed CARE Score 4   Sit to Stand   Type of Assistance Needed Incidental touching   Amount of Physical Assistance Provided No physical assistance   Comment w/ RW   Sit to Stand CARE Score 4   Bed-Chair Transfer   Type of Assistance Needed Incidental touching   Amount of Physical Assistance Provided No physical assistance   Comment SPT w/ RW   Chair/Bed-to-Chair Transfer CARE Score 4   Toileting Hygiene   Type of Assistance Needed Physical assistance   Amount of Physical Assistance Provided Less than 25%   Comment Pt in stance at RW to perform CM up/down and ji hyg  Req A to retrieve pants from floor level once in stance     Toileting Hygiene CARE Score 3   Toilet Transfer   Type of Assistance Needed Incidental touching   Amount of Physical Assistance Provided No physical assistance   Comment w/ RW and grab bar   Toilet Transfer CARE Score 4   Cognition   Overall Cognitive Status [facility] Arousal/Participation Alert; Cooperative   Attention Within functional limits   Orientation Level Oriented X4   Memory Within functional limits   Following Commands Follows all commands and directions without difficulty   Additional Activities   Additional Activities Other (Comment)   Additional Activities Comments GRAMAJO introduced RW tray and RW basket, providing benefit of each item  After discussion w/ Pt, GRAMAJO recommend to trial RW tray  Pt reports freq making meals for elederly parents and transporting from kitchen to dining room area, as well as freq transporting cups of coffee  GRAMAJO simulated pt's routine, having pt retrieve plate and cup of water from kitchen area, placing items on RW tray, and transporting items to DR area, placing items on simulated dining room table  Pt completed task w/ CG and vc's for safe positioning of RW w/ tray donned  Reports feeling comfortable utilizing RW tray and is receptive to purchasing for D/C home  Activity Tolerance   Activity Tolerance Patient tolerated treatment well   Assessment   Treatment Assessment Pt engaged in 45 min skilled OT Tx session focusing on item retrieval utilizing RW tray  See above for further Tx details  Pt completed item retrieval, simulating home routine, w/ CG w/ RW and foldable RW tray  Pt receptive to purchasing for D/C home  Plan to engage pt in RW management w/ foldable RW tray donned, to practice safe positioning of RW while completing item retrieval  Pt would benefit from cont'd skilled OT to continue progressing towards OT goals and prepare for D/C home w/ support  Prognosis Good   Problem List Decreased strength;Decreased endurance; Impaired balance;Decreased mobility; Decreased range of motion;Pain   Barriers to Discharge Inaccessible home environment;Decreased caregiver support   Plan   Treatment/Interventions ADL retraining;Functional transfer training; Therapeutic exercise; Endurance training   Progress Progressing toward goals Recommendation   OT Discharge Recommendation Return to previous environment with social support   Equipment Recommended   (Foldable RW tray)   OT Therapy Minutes   OT Time In 1300   OT Time Out 1345   OT Total Time (minutes) 45   OT Mode of treatment - Individual (minutes) 45   OT Mode of treatment - Concurrent (minutes) 0   OT Mode of treatment - Group (minutes) 0   OT Mode of treatment - Co-treat (minutes) 0   OT Mode of Treatment - Total time(minutes) 45 minutes   OT Cumulative Minutes 450   Therapy Time missed   Time missed?  No

## 2020-07-10 NOTE — PROGRESS NOTES
PM&R Progress Note:    HPI: Pt is a 69 y/o who suffered a mechanical fall while walking in her garden landing on her right hip and suffering a fracture to the femoral neck  The patient underwent ORIF w/screws x 2 on 7/2  ASSESSMENT: Stable, progressing    PLAN:    Rehabilitation   Continue current rehabilitation plan of care to maximize function   Expected Discharge: TBD, RETEAM, likely will need another 10 days     Pain   Controlled  DVT prophylaxis   Lovenox  Bladder plan   Continent     Bowel plan   Continent   Last BM 7/5/2020 but disimpacted  * Hip fracture [facility])  Assessment & Plan  R femoral neck fx status post surgical repair via medullary nail by Dr Lul Nicholson on 7/2  POD#14 is 7/16  Weight-bearing as tolerated on affected limb       Constipation  Assessment & Plan  Continue scheduled bowel meds  Continue suppository if no BM today  Mal[facility] ([facility] )  Assessment & Plan  Severe protein calorie malnutrition findings  Continue nutritional consult and appreciate their recommendation    Acute blood loss as cause of postoperative anemia  Assessment & Plan  IM consulted, with overall monitoring, and management at their discretion during ARC course  Monitor H/H, vitals, signs/symptoms of acute bleeding        On home oxygen therapy  Assessment & Plan  Chronically on 2 L of oxygen at all times    Metastatic adenocarcinoma to brain [facility])  Assessment & Plan  Follows with Dr Tyson Francisco as outpatient     COPD (chronic obstructive pulmonary disease) ([facility] )  Assessment & Plan  IM consulted and with overall management at their discretion during ARC course  On home chronic 2L O2  Home nebs  Monitor vitals without and with activity   Incentive spirometry     Nicotine dependence  Assessment & Plan  Encourage cessation  Malignant neoplasm of right lung [facility])  Assessment & Plan  S/P chemo/radiation   Follow-up hem/onc after d/c     Appreciate IM consultants medical co-management    Labs, medications, and imaging personally reviewed  SUBJECTIVE:  Patient seen today eating lunch  She made a grilled cheese sandwich and occupational therapy which she is enjoying  She states no new issues overnight  Pain is controlled overall on current regimen    ROS:  A ten point review of systems was completed 7/10/2020 and pertinent positives are listed in subjective section  All other systems reviewed were negative  OBJECTIVE:   BP 98/52 (BP Location: Left arm)   Pulse 104   Temp 98 °F (36 7 °C) (Oral)   Resp 17   Ht 5' 5" (1 651 m)   Wt 48 3 kg (106 lb 7 7 oz)   SpO2 98%   BMI 17 72 kg/m²     Physical Exam   Constitutional: She is oriented to person, place, and time  She appears well-developed and well-nourished  No distress  HENT:   Head: Normocephalic  Nose: Nose normal    Eyes: Conjunctivae and EOM are normal    Neck: Neck supple  Cardiovascular: Normal rate and intact distal pulses  Pulmonary/Chest: Effort normal  She has no wheezes  Abdominal: Soft  She exhibits no distension  Musculoskeletal: She exhibits no edema  Neurological: She is alert and oriented to person, place, and time  Skin: Skin is warm  Incision clean dry and intact   Psychiatric: She has a normal mood and affect  Nursing note and vitals reviewed         Lab Results   Component Value Date    WBC 4 41 07/09/2020    HGB 9 8 (L) 07/09/2020    HCT 30 0 (L) 07/09/2020    MCV 99 (H) 07/09/2020     07/09/2020     Lab Results   Component Value Date    SODIUM 134 (L) 07/09/2020    K 4 2 07/09/2020    CL 97 (L) 07/09/2020    CO2 34 (H) 07/09/2020    BUN 12 07/09/2020    CREATININE 0 39 (L) 07/09/2020    GLUC 83 07/09/2020    CALCIUM 8 8 07/09/2020     Lab Results   Component Value Date    INR 0 95 07/01/2020    INR 0 91 10/18/2019    INR 1 03 03/07/2019    PROTIME 12 7 07/01/2020    PROTIME 11 9 10/18/2019    PROTIME 13 6 03/07/2019           Current Facility-Administered Medications:     acetaminophen (TYLENOL) tablet 975 mg, 975 mg, Oral, Q8H [address], Tara Christine MD, 975 mg at 07/10/20 1311    albuterol (PROVENTIL HFA,VENTOLIN HFA) inhaler 2 puff, 2 puff, Inhalation, Q4H PRN, Tara Christine MD    bisacodyl (DULCOLAX) rectal suppository 10 mg, 10 mg, Rectal, Daily PRN, Tara Christine MD    budesonide (PULMICORT) inhalation solution 0 25 mg, 0 25 mg, Nebulization, BID, Tara Christine MD, 0 25 mg at 07/10/20 0708    cyanocobalamin (VITAMIN B-12) tablet 1,000 mcg, 1,000 mcg, Oral, Daily, Tara Christine MD, 1,000 mcg at 07/10/20 1836    docusate sodium (COLACE) capsule 100 mg, 100 mg, Oral, BID, Tara Christine MD, 100 mg at 07/08/20 0803    enoxaparin (LOVENOX) subcutaneous injection 40 mg, 40 mg, Subcutaneous, Q24H CHANDLER, Claudia Mae PA-C, 40 mg at 07/10/20 0815    ipratropium (ATROVENT) 0 02 % inhalation solution 0 5 mg, 0 5 mg, Nebulization, TID, Tara Christine MD, 0 5 mg at 07/10/20 0708    levalbuterol [facility]) inhalation solution 1 25 mg, 1 25 mg, Nebulization, TID, Tara Christine MD, 1 25 mg at 07/10/20 0708    levETIRAcetam (KEPPRA) tablet 250 mg, 250 mg, Oral, Q12H [address], Tara Christine MD, 250 mg at 07/10/20 0813    ondansetron (ZOFRAN-ODT) dispersible tablet 4 mg, 4 mg, Oral, Q8H PRN, Tara Christine MD    oxyCODONE (ROXICODONE) IR tablet 5 mg, 5 mg, Oral, Q4H PRN, 5 mg at 07/10/20 1817 **OR** oxyCODONE (ROXICODONE) IR tablet 2 5 mg, 2 5 mg, Oral, Q4H PRN, Tara Christine MD, 2 5 mg at 07/09/20 2252    polyethylene glycol (MIRALAX) packet 17 g, 17 g, Oral, Daily, Claudia Mae PA-C, 17 g at 07/07/20 8622    senna (SENOKOT) tablet 17 2 mg, 2 tablet, Oral, HS, Claudia Mae PA-C, 17 2 mg at 07/07/20 2111    Past Medical History:   Diagnosis Date    Breast cancer ([facility] ) 1996    Cancer ([facility] )     [facility]     Lung disease     Requires supplemental oxygen     3L 24 hrs/day    Stage 4 lung cancer [facility])        Patient Active Problem List    Diagnosis Date Noted    Hip fracture ([facility] ) 07/01/2020     Priority: High    Malnutrition ([facility] ) 07/05/2020    Constipation 07/05/2020    Acute blood loss as cause of postoperative anemia 07/04/2020    Hyponatremia 07/01/2020    Palliative care patient 02/27/2020    Scab 01/02/2020    Xerostomia 11/11/2019    Caregiver stress 11/11/2019    Medical marijuana use 11/11/2019    On home oxygen therapy 10/24/2019    Metastatic adenocarcinoma to brain ([place] [address] ) 10/09/2019    Cramp of limb 09/20/2019    Episode of shaking 09/20/2019    Shortness of breath 03/13/2019    Chronic hypoxemic respiratory failure ([place] [address] ) 02/26/2019    Malignant neoplasm of right lung (HCC) 03/09/2018    Depressive disorder 11/14/2017    COPD (chronic obstructive pulmonary disease) ([place] [address] ) 10/14/2016    Hyperlipidemia 04/08/2016    Loss of appetite 01/23/2015    Malignant cachexia ([place] [address] ) 10/05/2012    Nicotine dependence 10/05/2012          Dolores Munoz MD    Total time spent:  30 minutes with more than 50% spent counseling/coordinating care  Counseling includes discussion with patient re: progress and discussion with patient of his/her current medical state/information  Coordination of patient's care was performed in conjunction with consulting services  Time invested included review of patient's labs, vitals, and management of their comorbidities with continued monitoring  The care of the patient was extensively discussed and appropriate treatment plan was formulated unique for this patient  ** Please Note:  voice to text software may have been used in the creation of this document   Although proof errors in transcription or interpretation are a potential of such software**

## 2020-07-10 NOTE — PROGRESS NOTES
Internal Medicine Progress Note  Patient: Sangeeta Mcintyre  Age/sex: 70 y o  female  Medical Record #: [id number]      ASSESSMENT/PLAN: (Interval History)  Sangeeta Mcintyre is seen and examined and management for following issues:    R femoral neck fracture; s/p ORIF 7/2/20  · F/u ortho as scheduled  · WBAT     Lung CA with mets to brain/right parietal craniotomy 10/2019  · Completed WBR 11/2019 and chemo for lung CA 2018  · Stable  · Continue Keppra 250mg BID as at home     Severe COPD  · Cont Pulmicort, Atrovent, Xopenex nebs   · At home on Atrovent, Brovana and Pulmicort  · Wears chronic oxygen 2L NC at home  · Maintain sats >88%     Chronic tobacco use  · On no nicotine patch     Anemia  · stable  · Transfuse if hgb <8     Hx right breast cancer/mastectomy/axillary LN dissection  · Limb alert right arm     Nutrition/severe malnutrition  · Dietary following  · Doesn't like Ensure and someone is bringing her BOOST in   · Uses MMJ as OP; intol to Remeron with nausea, Megace ineffective    The above assessment and plan was reviewed and updated as determined by my evaluation of the patient on 7/10/2020      Labs:   Results from last 7 days   Lab Units 07/09/20  0507 07/06/20  0559   WBC Thousand/uL 4 41 5 32   HEMOGLOBIN g/dL 9 8* 9 3*   HEMATOCRIT % 30 0* 29 5*   PLATELETS Thousands/uL 297 211     Results from last 7 days   Lab Units 07/09/20  0507 07/06/20  0559   SODIUM mmol/L 134* 136   POTASSIUM mmol/L 4 2 4 3   CHLORIDE mmol/L 97* 100   CO2 mmol/L 34* 32   BUN mg/dL 12 10   CREATININE mg/dL 0 39* 0 38*   CALCIUM mg/dL 8 8 8 6                   Review of Scheduled Meds:    Current Facility-Administered Medications:  acetaminophen 975 mg Oral [facility] Amanda Aguero MD   albuterol 2 puff Inhalation Q4H PRN Amanda Aguero MD   bisacodyl 10 mg Rectal Daily PRN Amanda Aguero MD   budesonide 0 25 mg Nebulization BID Amanda Aguero MD   vitamin B-12 1,000 mcg Oral Daily Amanda Aguero MD   docusate sodium 100 mg Oral BID Maite Gurrola MD   enoxaparin 40 mg Subcutaneous Q24H [address] Claudia Mae PA-C   ipratropium 0 5 mg Nebulization TID Maite Gurrola MD   levalbuterol 1 25 mg Nebulization TID Maite Gurrola MD   levETIRAcetam 250 mg Oral Q12H [address] Maite Gurrola MD   ondansetron 4 mg Oral Q8H PRN Maite Gurrola MD   oxyCODONE 5 mg Oral Q4H PRN Maite Gurrola MD   Or       oxyCODONE 2 5 mg Oral Q4H PRN Maite Gurrola MD   polyethylene glycol 17 g Oral Daily Claudia Mae PA-C   senna 2 tablet Oral HS Claudia Mae PA-C       Subjective/ HPI: Patients overnight issues or events were reviewed with nursing or staff during rounds or morning huddle session  No new or overnight issues  Offers no complaints  ROS:   A 10 point ROS was performed; negative except as noted above         Imaging:     No orders to display       *Labs /Radiology studies reviewed  *Medications reviewed and reconciled as needed  *Please refer to order section for additional ordered labs studies  *Case discussed with primary attending during morning huddle case rounds      Physical Examination:  Vitals:   Vitals:    07/09/20 1912 07/09/20 1946 07/10/20 0508 07/10/20 1348   BP:  90/51 100/53 98/52   BP Location:  Left arm Left arm Left arm   Pulse:  (!) 107 98 104   Resp:  20 18 17   Temp:  98 1 °F (36 7 °C) 98 1 °F (36 7 °C) 98 °F (36 7 °C)   TempSrc:  Oral Oral Oral   SpO2: 98% 96% 92% 98%   Weight:       Height:           General Appearance: no distress, conversive  HEENT: PERRLA, conjuctiva normal; oropharynx clear; mucous membranes moist   Neck:  Supple, normal ROM, no JVD  Lungs: CTA, normal respiratory effort, no retractions, expiratory effort normal  CV: regular rate and rhythm; no rubs/murmurs/gallops, PMI normal   ABD: soft; ND/NT; +BS  EXT: no edema  Skin: normal turgor, normal texture, no rashes  Psych: affect normal, mood normal  Neuro: AAO      The above physical exam was reviewed and updated as determined by my evaluation of the patient on 7/10/2020  Invasive Devices     None                    VTE Pharmacologic Prophylaxis: Enoxaparin  Code Status: Level 1 - Full Code  Current Length of Stay: 5 day(s)      Total time spent:  30 minutes with more than 50% spent counseling/coordinating care  Counseling includes discussion with patient re: progress  and discussion with patient of his/her current medical state/information  Coordination of patient's care was performed in conjunction with primary service  Time invested included review of patient's labs, vitals, and management of their comorbidities with continued monitoring  In addition, this patient was discussed with medical team including physician and advanced extenders  The care of the patient was extensively discussed and appropriate treatment plan was formulated unique for this patient  ** Please Note:  voice to text software may have been used in the creation of this document   Although proof errors in transcription or interpretation are a potential of such software**

## 2020-07-10 NOTE — PROGRESS NOTES
07/10/20 1045   Pain Assessment   Pain Assessment Tool 0-10   Pain Score 4   Pain Location/Orientation Orientation: Right;Location: Hip   Pain Onset/Description Onset: Gradual   Effect of Pain on Daily Activities No effects during OT session   Patient's Stated Pain Goal No pain   Hospital Pain Intervention(s) Rest   Restrictions/Precautions   Precautions Fall Risk;O2;Pain;Supervision on toilet/commode   Weight Bearing Restrictions Yes   RLE Weight Bearing Per Order WBAT   Lying to Sitting on Side of Bed   Type of Assistance Needed Physical assistance   Amount of Physical Assistance Provided Less than 25%   Comment HOB slightly elevated   Lying to Sitting on Side of Bed CARE Score 3   Sit to Stand   Type of Assistance Needed Physical assistance   Amount of Physical Assistance Provided Less than 25%   Comment w/ RW   Sit to Stand CARE Score 3   Bed-Chair Transfer   Type of Assistance Needed Physical assistance   Amount of Physical Assistance Provided Less than 25%   Comment SPT w/ RW   Chair/Bed-to-Chair Transfer CARE Score 3   Meal Prep   Meal Prep Level Walker   Meal Prep Level of Assistance Contact guard   Meal Preparation Pt engaged in light meal prep cooking a grilled cheese on the stove top  Pt able to perform dynamic reach w/ unilateral support on RW to retrieve cooking pan from lower kitchen cabinet, placing on stove top  Req A to operate stove top burner 2* to stove being different from home s/u  Pt remained in stance to butter each slice of bread and place in hot pan  GRAMAJO encouraged pt to sit at stove top for rest break and to still be able to safely supervise item cooking  Pt receptive to keeping chair in kitchen for rest breaks  Once item was finished cooking, pt turned off stove w/o vc and removed grilled cheese from hot pan  Pt reports freq cooking light meals at home for herself and her parents who reside in apartment below   GRAMAJO discussed utilizing crockpot to prepare meals for herself and her parents  Pt receptive to trialing upon D/C home  Overall, pt demo G safety while engaging in light meal prep  Cognition   Overall Cognitive Status WFL   Arousal/Participation Alert; Cooperative   Attention Within functional limits   Orientation Level Oriented X4   Memory Within functional limits   Following Commands Follows all commands and directions without difficulty   Activity Tolerance   Activity Tolerance Patient tolerated treatment well   Assessment   Treatment Assessment Pt engaged in 45 min skilled OT Tx session focusing on light meal prep  See above for further Tx details  Pt is making steady gains towards OT goals and is motivated to participate in sessions  Plan to introduce RW tray vs RW basket in upcoming Tx session in order for pt to safely manage RW while transporting items  Pt would benefit from cont'd skilled OT to increase activity tolerance, improve standing balance, and overa;; increase I w/ ADL/IADL performance in order to progress towards OT goals and prepare for D/C home w/ support  Prognosis Good   Problem List Decreased strength;Decreased endurance; Impaired balance;Decreased mobility; Decreased range of motion;Pain   Barriers to Discharge Inaccessible home environment;Decreased caregiver support   Plan   Treatment/Interventions ADL retraining;Functional transfer training; Therapeutic exercise; Endurance training   Progress Progressing toward goals   Recommendation   OT Discharge Recommendation Return to previous environment with social support   OT Therapy Minutes   OT Time In 1045   OT Time Out 1130   OT Total Time (minutes) 45   OT Mode of treatment - Individual (minutes) 45   OT Mode of treatment - Concurrent (minutes) 0   OT Mode of treatment - Group (minutes) 0   OT Mode of treatment - Co-treat (minutes) 0   OT Mode of Treatment - Total time(minutes) 45 minutes   OT Cumulative Minutes 405   Therapy Time missed   Time missed?  No

## 2020-07-10 NOTE — PROGRESS NOTES
07/10/20 0830   Pain Assessment   Pain Assessment Tool 0-10   Pain Score 8   Pain Location/Orientation Orientation: Right;Location: Hip   Cognition   Overall Cognitive Status WFL   Subjective   Subjective Pt supine in bed upon arrival   With complaints of R hip pain, just got a pain pill  Thinks she's getting better every day  Sit to Lying   Type of Assistance Needed Physical assistance   Amount of Physical Assistance Provided 25%-49%   Sit to Lying CARE Score 3   Lying to Sitting on Side of Bed   Type of Assistance Needed Physical assistance   Amount of Physical Assistance Provided 25%-49%   Lying to Sitting on Side of Bed CARE Score 3   Sit to Stand   Type of Assistance Needed Physical assistance   Amount of Physical Assistance Provided 25%-49%   Sit to Stand CARE Score 3   Bed-Chair Transfer   Type of Assistance Needed Physical assistance   Amount of Physical Assistance Provided 25%-49%   Chair/Bed-to-Chair Transfer CARE Score 3   Transfer Bed/Chair/Wheelchair   Limitations Noted In Balance; Endurance;LE Strength   Adaptive Equipment Roller Walker   Stand Pivot Minimal Assist;Assist x 1   Sit to Stand Minimal Assist;Assist x 1   Stand to Sit Minimal Assist;Assist x 1   Supine to Sit Minimal Assist;Assist x 1   Sit to Supine Minimal Assist;Assist x 1   Walk 10 Feet   Type of Assistance Needed Physical assistance   Amount of Physical Assistance Provided 25%-49%   Walk 10 Feet CARE Score 3   Walk 50 Feet with Two Turns   Type of Assistance Needed Physical assistance   Amount of Physical Assistance Provided 25%-49%   Walk 50 Feet with Two Turns CARE Score 3   Ambulation   Does the patient walk? 2  Yes   Primary Mode of Locomotion Prior to Admission Walk   Distance Walked (feet) 50 ft   Assist Device Roller Walker   Gait Pattern Slow Sharron;Decreased R stance;Narrow SCAR   Limitations Noted In Balance; Endurance; Heel Strike; Safety   Provided Assistance with: Balance;Trunk Support   Walk Assist Level Assist x 1;Minimum Assist   Findings 50', 72', 72'   Wheelchair mobility   Does the patient use a wheelchair? 0  No   Curb or Single Stair   Style negotiated Single stair   Type of Assistance Needed Physical assistance   Amount of Physical Assistance Provided 25%-49%   1 Step (Curb) CARE Score 3   4 Steps   Type of Assistance Needed Physical assistance   Amount of Physical Assistance Provided 25%-49%   4 Steps CARE Score 3   12 Steps   Reason if not Attempted Safety concerns   12 Steps CARE Score 88   Stairs   Type Stairs   # of Steps 4   Weight Bearing Precautions RLE;WBAT   Assist Devices Bilateral Rail;Single Rail   Findings 2 steps with B HRs, 2 steps with both hands on R rail   Toilet Transfer   Type of Assistance Needed Physical assistance   Amount of Physical Assistance Provided 25%-49%   Toilet Transfer CARE Score 3   Toilet Transfer   Surface Assessed Bedside Commode   Limitations Noted In LE Strength; Safety;Balance   Findings pt independent with hygiene and clothing management   Therapeutic Interventions   Neuromuscular Re-Education side-stepping & bwd walking // bars 3 laps ea; step-taps with LLE 4" 2x15 // bars   Equipment Use   NuStep L1 x10 min   Assessment   Treatment Assessment Pt with improved tolerance for session as seen by more resistance on NuStep and increased gait distance  Also required less assist with gait and transfers today showing improved strength and balance  Continues to demonstrate BLE weakness R>L as seen by knee buckling during standing activities  Continue to progress gait distance as pt able needed to address goals for safe d/c home  Problem List Decreased strength;Decreased endurance; Impaired balance;Decreased mobility;Pain;Decreased range of motion   Barriers to Discharge Inaccessible home environment;Decreased caregiver support   PT Barriers   Physical Impairment Decreased strength;Decreased range of motion;Decreased endurance; Impaired balance;Decreased mobility; Decreased coordination;Decreased cognition; Impaired judgement;Decreased safety awareness;Decreased skin integrity;Orthopedic restrictions;Pain   Functional Limitation Car transfers; Ramp negotiation;Stair negotiation;Standing;Transfers; Walking   Plan   Treatment/Interventions Functional transfer training;LE strengthening/ROM; Elevations; Therapeutic exercise; Endurance training;Patient/family training;Bed mobility;Gait training   Progress Progressing toward goals   PT Therapy Minutes   PT Time In 0830   PT Time Out 1000   PT Total Time (minutes) 90   PT Mode of treatment - Individual (minutes) 90   PT Mode of treatment - Concurrent (minutes) 0   PT Mode of treatment - Group (minutes) 0   PT Mode of treatment - Co-treat (minutes) 0   PT Mode of Treatment - Total time(minutes) 90 minutes   PT Cumulative Minutes 460   Therapy Time missed   Time missed?  No

## 2020-07-11 PROCEDURE — 94640 AIRWAY INHALATION TREATMENT: CPT

## 2020-07-11 PROCEDURE — 94760 N-INVAS EAR/PLS OXIMETRY 1: CPT

## 2020-07-11 RX ADMIN — ENOXAPARIN SODIUM 40 MG: 40 INJECTION SUBCUTANEOUS at 09:33

## 2020-07-11 RX ADMIN — ACETAMINOPHEN 975 MG: 325 TABLET, FILM COATED ORAL at 21:44

## 2020-07-11 RX ADMIN — IPRATROPIUM BROMIDE 0.5 MG: 0.5 SOLUTION RESPIRATORY (INHALATION) at 13:07

## 2020-07-11 RX ADMIN — ACETAMINOPHEN 975 MG: 325 TABLET, FILM COATED ORAL at 05:47

## 2020-07-11 RX ADMIN — OXYCODONE HYDROCHLORIDE 2.5 MG: 5 TABLET ORAL at 21:43

## 2020-07-11 RX ADMIN — IPRATROPIUM BROMIDE 0.5 MG: 0.5 SOLUTION RESPIRATORY (INHALATION) at 19:33

## 2020-07-11 RX ADMIN — LEVALBUTEROL HYDROCHLORIDE 1.25 MG: 1.25 SOLUTION, CONCENTRATE RESPIRATORY (INHALATION) at 19:33

## 2020-07-11 RX ADMIN — BUDESONIDE 0.25 MG: 0.25 INHALANT RESPIRATORY (INHALATION) at 08:11

## 2020-07-11 RX ADMIN — SENNOSIDES 17.2 MG: 8.6 TABLET ORAL at 21:43

## 2020-07-11 RX ADMIN — IPRATROPIUM BROMIDE 0.5 MG: 0.5 SOLUTION RESPIRATORY (INHALATION) at 08:11

## 2020-07-11 RX ADMIN — BUDESONIDE 0.25 MG: 0.25 INHALANT RESPIRATORY (INHALATION) at 19:33

## 2020-07-11 RX ADMIN — CYANOCOBALAMIN TAB 500 MCG 1000 MCG: 500 TAB at 09:33

## 2020-07-11 RX ADMIN — ACETAMINOPHEN 975 MG: 325 TABLET, FILM COATED ORAL at 13:20

## 2020-07-11 RX ADMIN — LEVALBUTEROL HYDROCHLORIDE 1.25 MG: 1.25 SOLUTION, CONCENTRATE RESPIRATORY (INHALATION) at 13:07

## 2020-07-11 RX ADMIN — LEVETIRACETAM 250 MG: 500 TABLET, FILM COATED ORAL at 21:44

## 2020-07-11 RX ADMIN — LEVALBUTEROL HYDROCHLORIDE 1.25 MG: 1.25 SOLUTION, CONCENTRATE RESPIRATORY (INHALATION) at 08:11

## 2020-07-11 RX ADMIN — LEVETIRACETAM 250 MG: 500 TABLET, FILM COATED ORAL at 09:32

## 2020-07-11 NOTE — PROGRESS NOTES
Internal Medicine Progress Note  Patient: Aviva Arita  Age/sex: 70 y o  female  Medical Record #: [id number]      ASSESSMENT/PLAN: (Interval History)  Aviva Arita is seen and examined and management for following issues:    R femoral neck fracture; s/p ORIF 7/2/20  · F/u ortho as scheduled  · WBAT     Lung CA with mets to brain/right parietal craniotomy 10/2019  · Completed WBR 11/2019 and chemo for lung CA 2018  · Stable  · Continue Keppra 250mg BID as at home     Severe COPD  · Cont Pulmicort, Atrovent, Xopenex nebs   · At home on Atrovent, Brovana and Pulmicort  · Wears chronic oxygen 2L NC at home  · Maintain sats >88%     Chronic tobacco use  · On no nicotine patch     Anemia  · stable  · Transfuse if hgb <8     Hx right breast cancer/mastectomy/axillary LN dissection  · Limb alert right arm     Nutrition/severe malnutrition  · Dietary following  · Doesn't like Ensure and someone is bringing her BOOST in   · Uses MMJ as OP; intol to Remeron with nausea, Megace ineffective  · She thinks appetite is better    The above assessment and plan was reviewed and updated as determined by my evaluation of the patient on 7/11/2020      Labs:   Results from last 7 days   Lab Units 07/09/20  0507 07/06/20  0559   WBC Thousand/uL 4 41 5 32   HEMOGLOBIN g/dL 9 8* 9 3*   HEMATOCRIT % 30 0* 29 5*   PLATELETS Thousands/uL 297 211     Results from last 7 days   Lab Units 07/09/20  0507 07/06/20  0559   SODIUM mmol/L 134* 136   POTASSIUM mmol/L 4 2 4 3   CHLORIDE mmol/L 97* 100   CO2 mmol/L 34* 32   BUN mg/dL 12 10   CREATININE mg/dL 0 39* 0 38*   CALCIUM mg/dL 8 8 8 6                   Review of Scheduled Meds:    Current Facility-Administered Medications:  acetaminophen 975 mg Oral [facility] Brannon Carrillo MD   albuterol 2 puff Inhalation Q4H PRN Brannon Carrillo MD   bisacodyl 10 mg Rectal Daily PRN Brannon Carrillo MD   budesonide 0 25 mg Nebulization BID Brannon Carrillo MD   vitamin B-12 1,000 mcg Oral Daily [facility] Michael Shaikh MD   docusate sodium 100 mg Oral BID Efra Groves MD   enoxaparin 40 mg Subcutaneous Q24H [address] Claudia Mae PA-C   ipratropium 0 5 mg Nebulization TID Efra Groves MD   levalbuterol 1 25 mg Nebulization TID Efar Groves MD   levETIRAcetam 250 mg Oral Q12H [address] Efra Groves MD   ondansetron 4 mg Oral Q8H PRN Efra Groves MD   oxyCODONE 5 mg Oral Q4H PRN Efra Groves MD   Or       oxyCODONE 2 5 mg Oral Q4H PRN Efra Groves MD   polyethylene glycol 17 g Oral Daily Claudia Mae PA-C   senna 2 tablet Oral HS Claudia Mae PA-C       Subjective/ HPI: Patients overnight issues or events were reviewed with nursing or staff during rounds or morning huddle session  No new or overnight issues  Offers no complaints  ROS:   A 10 point ROS was performed; negative except as noted above         Imaging:     No orders to display       *Labs /Radiology studies reviewed  *Medications reviewed and reconciled as needed  *Please refer to order section for additional ordered labs studies  *Case discussed with primary attending during morning huddle case rounds      Physical Examination:  Vitals:   Vitals:    07/10/20 1936 07/10/20 1948 07/11/20 0544 07/11/20 0810   BP:  (!) 97/49 106/60    BP Location:  Left arm Left arm    Pulse:  102 99    Resp:  18 16    Temp:  98 °F (36 7 °C) 98 2 °F (36 8 °C)    TempSrc:  Oral Oral    SpO2: 97% 96% 98% 100%   Weight:       Height:           General Appearance: no distress, conversive  HEENT: PERRLA, conjuctiva normal; oropharynx clear; mucous membranes moist   Neck:  Supple, normal ROM, no JVD  Lungs: CTA, normal respiratory effort, no retractions, expiratory effort normal  CV: regular rate and rhythm; no rubs/murmurs/gallops, PMI normal   ABD: soft; ND/NT; +BS  EXT: no edema  Skin: normal turgor, normal texture, no rashes  Psych: affect normal, mood normal  Neuro: AAO      The above physical exam was reviewed and updated as determined by my evaluation of the patient on 7/11/2020  Invasive Devices     None                    VTE Pharmacologic Prophylaxis: Enoxaparin  Code Status: Level 1 - Full Code  Current Length of Stay: 6 day(s)      Total time spent:  30 minutes with more than 50% spent counseling/coordinating care  Counseling includes discussion with patient re: progress  and discussion with patient of his/her current medical state/information  Coordination of patient's care was performed in conjunction with primary service  Time invested included review of patient's labs, vitals, and management of their comorbidities with continued monitoring  In addition, this patient was discussed with medical team including physician and advanced extenders  The care of the patient was extensively discussed and appropriate treatment plan was formulated unique for this patient  ** Please Note:  voice to text software may have been used in the creation of this document   Although proof errors in transcription or interpretation are a potential of such software**

## 2020-07-12 PROCEDURE — 94760 N-INVAS EAR/PLS OXIMETRY 1: CPT

## 2020-07-12 PROCEDURE — 94640 AIRWAY INHALATION TREATMENT: CPT

## 2020-07-12 PROCEDURE — 97535 SELF CARE MNGMENT TRAINING: CPT

## 2020-07-12 PROCEDURE — 97530 THERAPEUTIC ACTIVITIES: CPT

## 2020-07-12 PROCEDURE — 97110 THERAPEUTIC EXERCISES: CPT

## 2020-07-12 RX ADMIN — ACETAMINOPHEN 975 MG: 325 TABLET, FILM COATED ORAL at 13:50

## 2020-07-12 RX ADMIN — ACETAMINOPHEN 975 MG: 325 TABLET, FILM COATED ORAL at 05:52

## 2020-07-12 RX ADMIN — ENOXAPARIN SODIUM 40 MG: 40 INJECTION SUBCUTANEOUS at 08:03

## 2020-07-12 RX ADMIN — BUDESONIDE 0.25 MG: 0.25 INHALANT RESPIRATORY (INHALATION) at 19:36

## 2020-07-12 RX ADMIN — LEVETIRACETAM 250 MG: 500 TABLET, FILM COATED ORAL at 20:42

## 2020-07-12 RX ADMIN — OXYCODONE HYDROCHLORIDE 2.5 MG: 5 TABLET ORAL at 08:04

## 2020-07-12 RX ADMIN — OXYCODONE HYDROCHLORIDE 2.5 MG: 5 TABLET ORAL at 15:17

## 2020-07-12 RX ADMIN — LEVALBUTEROL HYDROCHLORIDE 1.25 MG: 1.25 SOLUTION, CONCENTRATE RESPIRATORY (INHALATION) at 19:36

## 2020-07-12 RX ADMIN — LEVETIRACETAM 250 MG: 500 TABLET, FILM COATED ORAL at 08:03

## 2020-07-12 RX ADMIN — IPRATROPIUM BROMIDE 0.5 MG: 0.5 SOLUTION RESPIRATORY (INHALATION) at 13:24

## 2020-07-12 RX ADMIN — CYANOCOBALAMIN TAB 500 MCG 1000 MCG: 500 TAB at 08:03

## 2020-07-12 RX ADMIN — IPRATROPIUM BROMIDE 0.5 MG: 0.5 SOLUTION RESPIRATORY (INHALATION) at 19:36

## 2020-07-12 RX ADMIN — BUDESONIDE 0.25 MG: 0.25 INHALANT RESPIRATORY (INHALATION) at 07:32

## 2020-07-12 RX ADMIN — LEVALBUTEROL HYDROCHLORIDE 1.25 MG: 1.25 SOLUTION, CONCENTRATE RESPIRATORY (INHALATION) at 07:32

## 2020-07-12 RX ADMIN — LEVALBUTEROL HYDROCHLORIDE 1.25 MG: 1.25 SOLUTION, CONCENTRATE RESPIRATORY (INHALATION) at 13:24

## 2020-07-12 RX ADMIN — IPRATROPIUM BROMIDE 0.5 MG: 0.5 SOLUTION RESPIRATORY (INHALATION) at 07:32

## 2020-07-12 RX ADMIN — ACETAMINOPHEN 975 MG: 325 TABLET, FILM COATED ORAL at 22:29

## 2020-07-12 RX ADMIN — OXYCODONE HYDROCHLORIDE 2.5 MG: 5 TABLET ORAL at 20:40

## 2020-07-12 NOTE — PROGRESS NOTES
07/12/20 1345   Pain Assessment   Pain Assessment Tool 0-10   Pain Score 9   Effect of Pain on Daily Activities patient with 5/10 pain at rest with an increase to 9/10 with stairs; offered ice- patient declining; pain meds administered prior to session    Patient's Stated Pain Goal No pain   Hospital Pain Intervention(s) Medication (See MAR)   Restrictions/Precautions   Precautions Fall Risk;O2;Pain;Supervision on toilet/commode   Weight Bearing Restrictions Yes   RLE Weight Bearing Per Order WBAT   Cognition   Overall Cognitive Status WFL   Arousal/Participation Cooperative   Attention Within functional limits   Orientation Level Oriented X4   Memory Decreased recall of recent events;Decreased short term memory   Following Commands Follows one step commands with increased time or repetition   Subjective   Subjective Patient agreeable to particiapte in PT session    Sit to Lying   Type of Assistance Needed Physical assistance   Amount of Physical Assistance Provided Less than 25%   Comment without use of bed railing or HOB elevated    Sit to Lying CARE Score 3   Lying to Sitting on Side of Bed   Type of Assistance Needed Supervision   Amount of Physical Assistance Provided No physical assistance   Comment increased time    Lying to Sitting on Side of Bed CARE Score 4   Sit to Stand   Type of Assistance Needed Incidental touching   Amount of Physical Assistance Provided No physical assistance   Sit to Stand CARE Score 4   Bed-Chair Transfer   Type of Assistance Needed Physical assistance   Amount of Physical Assistance Provided Less than 25%   Comment Lin with fatigue    Chair/Bed-to-Chair Transfer CARE Score 3   Transfer Bed/Chair/Wheelchair   Limitations Noted In Balance; Endurance;LE Strength   Adaptive Equipment Roller Walker   Car Transfer   Comment perform next session   Walk 10 Feet   Type of Assistance Needed Physical assistance   Amount of Physical Assistance Provided 25%-49%   Walk 10 Feet CARE Score 3 Walk 50 Feet with Two Turns   Type of Assistance Needed Physical assistance   Amount of Physical Assistance Provided 25%-49%   Walk 50 Feet with Two Turns CARE Score 3   Walk 150 Feet   Reason if not Attempted Safety concerns   Walk 150 Feet CARE Score 88   Walking 10 Feet on Uneven Surfaces   Reason if not Attempted Safety concerns   Walking 10 Feet on Uneven Surfaces CARE Score 88   Ambulation   Does the patient walk? 2  Yes   Primary Mode of Locomotion Prior to Admission Walk   Distance Walked (feet) 60 ft  (+10 )   Assist Device Roller Walker   Gait Pattern Inconsistant Sharron;R knee vicky;L knee vicky;Narrow SCAR; Improper weight shift   Limitations Noted In Balance;Speed;Strength;Swing;Endurance   Findings focus short distance ambulation and SPT    Wheelchair mobility   Does the patient use a wheelchair? 0  No   Findings patient reports family has a transport chair; discussed benefit of using for long distance mobility at time of discharge    Curb or Single Stair   Style negotiated Single stair   Type of Assistance Needed Physical assistance   Amount of Physical Assistance Provided 50%-74%   1 Step (Curb) CARE Score 2   4 Steps   Type of Assistance Needed Physical assistance   Amount of Physical Assistance Provided 50%-74%   Comment need to perform daily    4 Steps CARE Score 2   12 Steps   Reason if not Attempted Safety concerns   12 Steps CARE Score 88   Stairs   Type Stairs   # of Steps 4   Weight Bearing Precautions WBAT;RLE   Assist Devices Single Rail   Findings Patient has 4 NEIL with B railing however they cannot be reached at the same time  Following these stairs, she has 1 curb step to get in  Patient showing PT picture  Simulated this using L rail via sideways approach  Increased pain L groin reported with BLE slow buckling observed  Required appx mod A      Picking Up Object   Comment would recommend use of reacher    Toilet Transfer   Type of Assistance Needed Incidental touching   Amount of Physical Assistance Provided No physical assistance   Toilet Transfer CARE Score 4   Therapeutic Interventions   Strengthening RLE 15x AAROM heel slides, SLR, straight leg hip ABD, BLE hooklying hip ABD against yellow TB   Flexibility RLE heel cord, HS and gentle hip ADD stretch TERT 2 mintues    Equipment Use   NuStep L1, 10 minutes BUE/LE    Assessment   Treatment Assessment Patient engaged in PT treatment session focusing on improving overall functional mobility  Initiated session with discussion of discharge planning  Patient reports needing to do 4+1 NEIL, then walking and walkway and then having access to her apartment  She states her family has discussed having a hospital bed and BSC on the first floor where she will not need to access her 12 steps to bed and bath  She was also agreeable to utilize her parents shower, per OT recommendations, to avoid accessing her 2nd floor  Patient's sister will be living with the family at time of discharge to help patient and their parents with needs  It is the eventual goal to have her parents go to an [facility] however will not happen by the time of patient's  discharge  Also recommended to patient use of transport chair for long distance mobility as they have a previously owned chair  Patient's current barriers include her BLE weakness and pain impacting her safety and (I) with transfers and ambulation  Continue to focus strengthening, short distance ambulation and perform stairs daily  At this time, patient conitnues to benedfit from skilled PT intervention to maximize funciton and reduce caregiver burden  Problem List Decreased strength;Decreased endurance; Impaired balance;Decreased mobility;Pain;Orthopedic restrictions;Decreased skin integrity   Barriers to Discharge Inaccessible home environment   PT Barriers   Functional Limitation Car transfers; Ramp negotiation;Stair negotiation;Standing;Transfers; Walking   Plan   Treatment/Interventions Functional transfer training;LE strengthening/ROM; Elevations; Therapeutic exercise; Endurance training;Cognitive reorientation;Patient/family training;Equipment eval/education; Bed mobility;Gait training; Compensatory technique education   Progress Progressing toward goals   Recommendation   PT Discharge Recommendation Home with skilled therapy   Equipment Recommended Walker   PT Therapy Minutes   PT Time In 1345   PT Time Out 1500   PT Total Time (minutes) 75   PT Mode of treatment - Individual (minutes) 75   PT Mode of treatment - Concurrent (minutes) 0   PT Mode of treatment - Group (minutes) 0   PT Mode of treatment - Co-treat (minutes) 0   PT Mode of Treatment - Total time(minutes) 75 minutes   PT Cumulative Minutes 535   Therapy Time missed   Time missed?  No

## 2020-07-12 NOTE — PROGRESS NOTES
Internal Medicine Progress Note  Patient: Nico Zuniga  Age/sex: 70 y o  female  Medical Record #: [id number]      ASSESSMENT/PLAN: (Interval History)  Nico Zuniga is seen and examined and management for following issues:    R femoral neck fracture; s/p ORIF 7/2/20  · F/u ortho as scheduled  · WBAT     Lung CA with mets to brain/right parietal craniotomy 10/2019  · Completed WBR 11/2019 and chemo for lung CA 2018  · Stable  · Continue Keppra 250mg BID as at home     Severe COPD  · Cont Pulmicort, Atrovent, Xopenex nebs   · At home on Atrovent, Brovana and Pulmicort  · Wears chronic oxygen 2L NC at home  · Maintain sats >88%     Chronic tobacco use  · On no nicotine patch     Anemia  · stable  · Transfuse if hgb <8     Hx right breast cancer/mastectomy/axillary LN dissection  · Limb alert right arm     Nutrition/severe malnutrition  · Dietary following  · Doesn't like Ensure and someone is bringing her BOOST in   · Uses MMJ as OP; intol to Remeron with nausea, Megace ineffective  · She thinks appetite is better and seems to be eating more      The above assessment and plan was reviewed and updated as determined by my evaluation of the patient on 7/12/2020      Labs:   Results from last 7 days   Lab Units 07/09/20  0507 07/06/20  0559   WBC Thousand/uL 4 41 5 32   HEMOGLOBIN g/dL 9 8* 9 3*   HEMATOCRIT % 30 0* 29 5*   PLATELETS Thousands/uL 297 211     Results from last 7 days   Lab Units 07/09/20  0507 07/06/20  0559   SODIUM mmol/L 134* 136   POTASSIUM mmol/L 4 2 4 3   CHLORIDE mmol/L 97* 100   CO2 mmol/L 34* 32   BUN mg/dL 12 10   CREATININE mg/dL 0 39* 0 38*   CALCIUM mg/dL 8 8 8 6                   Review of Scheduled Meds:    Current Facility-Administered Medications:  acetaminophen 975 mg Oral [facility] Kali Arriola MD   albuterol 2 puff Inhalation Q4H PRN Kali Arriola MD   bisacodyl 10 mg Rectal Daily PRN Kali Arriola MD   budesonide 0 25 mg Nebulization BID Kali Arriola MD   vitamin B-12 1,000 mcg Oral Daily Von Araujo MD   docusate sodium 100 mg Oral BID Von Araujo MD   enoxaparin 40 mg Subcutaneous Q24H [address] Claudia Mae PA-C   ipratropium 0 5 mg Nebulization TID Von Araujo MD   levalbuterol 1 25 mg Nebulization TID Von Araujo MD   levETIRAcetam 250 mg Oral Q12H [address] Von Araujo MD   ondansetron 4 mg Oral Q8H PRN Von Araujo MD   oxyCODONE 5 mg Oral Q4H PRN Von Araujo MD   Or       oxyCODONE 2 5 mg Oral Q4H PRN Von Araujo MD   polyethylene glycol 17 g Oral Daily Claudia Mae PA-C   senna 2 tablet Oral HS Claudia Mae PA-C       Subjective/ HPI: Patients overnight issues or events were reviewed with nursing or staff during rounds or morning huddle session  No new or overnight issues  Offers no complaints  ROS:   A 10 point ROS was performed; negative except as noted above         Imaging:     No orders to display       *Labs /Radiology studies reviewed  *Medications reviewed and reconciled as needed  *Please refer to order section for additional ordered labs studies  *Case discussed with primary attending during morning huddle case rounds      Physical Examination:  Vitals:   Vitals:    07/11/20 1933 07/11/20 2029 07/12/20 0512 07/12/20 0734   BP:  98/50 110/52    BP Location:  Left arm Right arm    Pulse:  98 97    Resp:  18 18    Temp:  98 °F (36 7 °C) 98 6 °F (37 °C)    TempSrc:  Oral Oral    SpO2: 93% 96% 99% 98%   Weight:       Height:           General Appearance: no distress, conversive  HEENT: PERRLA, conjuctiva normal; oropharynx clear; mucous membranes moist   Neck:  Supple, normal ROM, no JVD  Lungs: CTA, normal respiratory effort, no retractions, expiratory effort normal  CV: regular rate and rhythm; no rubs/murmurs/gallops, PMI normal   ABD: soft; ND/NT; +BS  EXT: no edema  Skin: normal turgor, normal texture, no rashes  Psych: affect normal, mood normal  Neuro: AAO      The above physical exam was reviewed and updated as determined by my evaluation of the patient on 7/12/2020  Invasive Devices     None                    VTE Pharmacologic Prophylaxis: Enoxaparin  Code Status: Level 1 - Full Code  Current Length of Stay: 7 day(s)      Total time spent:  30 minutes with more than 50% spent counseling/coordinating care  Counseling includes discussion with patient re: progress  and discussion with patient of his/her current medical state/information  Coordination of patient's care was performed in conjunction with primary service  Time invested included review of patient's labs, vitals, and management of their comorbidities with continued monitoring  In addition, this patient was discussed with medical team including physician and advanced extenders  The care of the patient was extensively discussed and appropriate treatment plan was formulated unique for this patient  ** Please Note:  voice to text software may have been used in the creation of this document   Although proof errors in transcription or interpretation are a potential of such software**

## 2020-07-12 NOTE — PROGRESS NOTES
07/12/20 0830   Pain Assessment   Pain Assessment Tool 0-10   Pain Score 3   Restrictions/Precautions   Precautions Fall Risk;O2;Supervision on toilet/commode   Weight Bearing Restrictions Yes   RLE Weight Bearing Per Order WBAT   Oral Hygiene   Type of Assistance Needed Supervision   Amount of Physical Assistance Provided No physical assistance   Comment in stance at sink with use of RW for support  Oral Hygiene CARE Score 4   Grooming   Able To Brush/Clean Teeth;Wash/Dry Hands   Limitation Noted In Safety;Strength   Putting On/Taking Off Footwear   Type of Assistance Needed Supervision   Amount of Physical Assistance Provided No physical assistance   Comment EOB via dynamic reach below waist method   Putting On/Taking Off Footwear CARE Score 4   Dressing/Undressing Clothing   Remove LB Clothes Shoes   Don LB Clothes Shoes   Sit to Lying   Type of Assistance Needed Supervision   Amount of Physical Assistance Provided No physical assistance   Comment w/o use of bed rails   Sit to Lying CARE Score 4   Lying to Sitting on Side of Bed   Type of Assistance Needed Supervision   Amount of Physical Assistance Provided No physical assistance   Comment w/o use of bed rails   Lying to Sitting on Side of Bed CARE Score 4   Sit to Stand   Type of Assistance Needed Supervision; Adaptive equipment   Amount of Physical Assistance Provided No physical assistance   Comment RW   Sit to Stand CARE Score 4   Bed-Chair Transfer   Type of Assistance Needed Supervision; Adaptive equipment   Amount of Physical Assistance Provided No physical assistance   Comment bed>w/c with RW   Chair/Bed-to-Chair Transfer CARE Score 4   Toileting Hygiene   Type of Assistance Needed Supervision; Adaptive equipment   Amount of Physical Assistance Provided No physical assistance   Comment ji area hygiene while seated   Toileting Hygiene CARE Score 4   Toileting   Able to [place] down yes, up yes     Able to [address] Yes   Manage Hygiene Bladder   Limitations Noted In Balance;UE Strength;LE Strength   Adaptive Equipment   (RW)   Toilet Transfer   Type of Assistance Needed Supervision; Adaptive equipment   Amount of Physical Assistance Provided No physical assistance   Comment RW   Toilet Transfer CARE Score 4   Money Management   Money Management Level of Assistance Independent   Money Management Pt verbalizes that she takes care of all finances at home, to simulate home environment pt engaged in 'checkbook register' worksheet  Pt verbalizes that she is unsure if she did it correctly since she had some negative numbers, ensured pt that she is completing worksheet correctly so far  Overall, pt completes worksheet with 100% accuracy  Light Housekeeping   Light Housekeeping Level Walker   Light Housekeeping Level of Assistance   (CGA)   Light Housekeeping Pt engaged in laundry management, pt verbalizes that she will be getting a caregiver upon d/c who will complete all laundry tasks, however, she would like to help as she much as she can with retrieving/transporting clothing  To simulate home environment, using RW/RW tray pt retrieved clothing>fold while in seated at table and transported clothing to drawers in OT gym  Overall, pt tolerated well, req' frequent rest breaks  Pt completed at [facility] level  Cognition   Overall Cognitive Status WFL   Arousal/Participation Alert; Cooperative   Attention Within functional limits   Orientation Level Oriented X4   Memory Within functional limits   Following Commands Follows all commands and directions without difficulty   Additional Activities   Additional Activities Other (Comment)  (Med Management)   Additional Activities Comments Pt verbalizes that she takes care of all med management at home, to simulate home environment pt engaged in med management, completing Asprin worksheet'  Pt completes worksheet with 100% accuracy   Pt on very few oral medications at this time that appear to be long term medications and thus only completed worksheet and are not recommending any additional training on this task  Activity Tolerance   Activity Tolerance Patient tolerated treatment well   Assessment   Treatment Assessment Pt was seen in skilled OT with focus on: I/ADL tasks, fxl transfers and AD education  Pt reports that she and her family are planning on moving to a different state in a couple of months and her parents will be placed in a home to decrease pts caregiver burden, however, states that her d/c plan will remain the same  Pt ebony RADFORD carryover of previous RW tray education, ebony donning/doffing tray on RW  Pt verbalizes that it is a great AD to have upon d/c  Overall pt tolerated session well  Pt would benefit from skilled OT with focus on: I/ADL tasks, standing balance/tolerance and UE strengthening to increase independence  Prognosis Good   Problem List Decreased strength;Decreased range of motion;Decreased endurance; Impaired balance;Decreased mobility;Pain   Plan   Treatment/Interventions ADL retraining;Functional transfer training; Therapeutic exercise; Endurance training;Patient/family training;Equipment eval/education   Progress Progressing toward goals   Recommendation   OT Discharge Recommendation Return to previous environment with social support   OT Therapy Minutes   OT Time In 0830   OT Time Out 0945   OT Total Time (minutes) 75   OT Mode of treatment - Individual (minutes) 60   OT Mode of treatment - Concurrent (minutes) 15   OT Mode of treatment - Group (minutes) 0   OT Mode of treatment - Co-treat (minutes) 0   OT Mode of Treatment - Total time(minutes) 75 minutes   OT Cumulative Minutes 525   Therapy Time missed   Time missed?  No

## 2020-07-13 LAB
ANION GAP SERPL CALCULATED.3IONS-SCNC: 3 MMOL/L (ref 4–13)
BASOPHILS # BLD AUTO: 0.04 THOUSANDS/ΜL (ref 0–0.1)
BASOPHILS NFR BLD AUTO: 1 % (ref 0–1)
BUN SERPL-MCNC: 14 MG/DL (ref 5–25)
CALCIUM SERPL-MCNC: 8.7 MG/DL (ref 8.3–10.1)
CHLORIDE SERPL-SCNC: 99 MMOL/L (ref 100–108)
CO2 SERPL-SCNC: 32 MMOL/L (ref 21–32)
CREAT SERPL-MCNC: 0.41 MG/DL (ref 0.6–1.3)
EOSINOPHIL # BLD AUTO: 0.08 THOUSAND/ΜL (ref 0–0.61)
EOSINOPHIL NFR BLD AUTO: 2 % (ref 0–6)
ERYTHROCYTE [DISTWIDTH] IN BLOOD BY AUTOMATED COUNT: 13.2 % (ref 11.6–15.1)
GFR SERPL CREATININE-BSD FRML MDRD: 103 ML/MIN/1.73SQ M
GLUCOSE P FAST SERPL-MCNC: 86 MG/DL (ref 65–99)
GLUCOSE SERPL-MCNC: 86 MG/DL (ref 65–140)
HCT VFR BLD AUTO: 30.2 % (ref 34.8–46.1)
HGB BLD-MCNC: 9.6 G/DL (ref 11.5–15.4)
IMM GRANULOCYTES # BLD AUTO: 0.02 THOUSAND/UL (ref 0–0.2)
IMM GRANULOCYTES NFR BLD AUTO: 0 % (ref 0–2)
LYMPHOCYTES # BLD AUTO: 0.67 THOUSANDS/ΜL (ref 0.6–4.47)
LYMPHOCYTES NFR BLD AUTO: 14 % (ref 14–44)
MCH RBC QN AUTO: 32 PG (ref 26.8–34.3)
MCHC RBC AUTO-ENTMCNC: 31.8 G/DL (ref 31.4–37.4)
MCV RBC AUTO: 101 FL (ref 82–98)
MONOCYTES # BLD AUTO: 0.67 THOUSAND/ΜL (ref 0.17–1.22)
MONOCYTES NFR BLD AUTO: 14 % (ref 4–12)
NEUTROPHILS # BLD AUTO: 3.39 THOUSANDS/ΜL (ref 1.85–7.62)
NEUTS SEG NFR BLD AUTO: 69 % (ref 43–75)
NRBC BLD AUTO-RTO: 0 /100 WBCS
PLATELET # BLD AUTO: 341 THOUSANDS/UL (ref 149–390)
PMV BLD AUTO: 9 FL (ref 8.9–12.7)
POTASSIUM SERPL-SCNC: 4.4 MMOL/L (ref 3.5–5.3)
RBC # BLD AUTO: 3 MILLION/UL (ref 3.81–5.12)
SODIUM SERPL-SCNC: 134 MMOL/L (ref 136–145)
WBC # BLD AUTO: 4.87 THOUSAND/UL (ref 4.31–10.16)

## 2020-07-13 PROCEDURE — 94640 AIRWAY INHALATION TREATMENT: CPT

## 2020-07-13 PROCEDURE — 94760 N-INVAS EAR/PLS OXIMETRY 1: CPT

## 2020-07-13 PROCEDURE — 99233 SBSQ HOSP IP/OBS HIGH 50: CPT | Performed by: PHYSICAL MEDICINE & REHABILITATION

## 2020-07-13 PROCEDURE — 97530 THERAPEUTIC ACTIVITIES: CPT

## 2020-07-13 PROCEDURE — 80048 BASIC METABOLIC PNL TOTAL CA: CPT | Performed by: NURSE PRACTITIONER

## 2020-07-13 PROCEDURE — 97110 THERAPEUTIC EXERCISES: CPT

## 2020-07-13 PROCEDURE — 97116 GAIT TRAINING THERAPY: CPT

## 2020-07-13 PROCEDURE — 97535 SELF CARE MNGMENT TRAINING: CPT

## 2020-07-13 PROCEDURE — 85025 COMPLETE CBC W/AUTO DIFF WBC: CPT | Performed by: NURSE PRACTITIONER

## 2020-07-13 RX ADMIN — ACETAMINOPHEN 975 MG: 325 TABLET, FILM COATED ORAL at 05:53

## 2020-07-13 RX ADMIN — CYANOCOBALAMIN TAB 500 MCG 1000 MCG: 500 TAB at 08:43

## 2020-07-13 RX ADMIN — LEVALBUTEROL HYDROCHLORIDE 1.25 MG: 1.25 SOLUTION, CONCENTRATE RESPIRATORY (INHALATION) at 07:09

## 2020-07-13 RX ADMIN — LEVETIRACETAM 250 MG: 500 TABLET, FILM COATED ORAL at 08:43

## 2020-07-13 RX ADMIN — OXYCODONE HYDROCHLORIDE 2.5 MG: 5 TABLET ORAL at 00:53

## 2020-07-13 RX ADMIN — LEVALBUTEROL HYDROCHLORIDE 1.25 MG: 1.25 SOLUTION, CONCENTRATE RESPIRATORY (INHALATION) at 13:42

## 2020-07-13 RX ADMIN — BUDESONIDE 0.25 MG: 0.25 INHALANT RESPIRATORY (INHALATION) at 20:17

## 2020-07-13 RX ADMIN — ACETAMINOPHEN 975 MG: 325 TABLET, FILM COATED ORAL at 13:08

## 2020-07-13 RX ADMIN — ACETAMINOPHEN 975 MG: 325 TABLET, FILM COATED ORAL at 21:27

## 2020-07-13 RX ADMIN — OXYCODONE HYDROCHLORIDE 5 MG: 5 TABLET ORAL at 14:43

## 2020-07-13 RX ADMIN — LEVALBUTEROL HYDROCHLORIDE 1.25 MG: 1.25 SOLUTION, CONCENTRATE RESPIRATORY (INHALATION) at 20:17

## 2020-07-13 RX ADMIN — IPRATROPIUM BROMIDE 0.5 MG: 0.5 SOLUTION RESPIRATORY (INHALATION) at 13:42

## 2020-07-13 RX ADMIN — IPRATROPIUM BROMIDE 0.5 MG: 0.5 SOLUTION RESPIRATORY (INHALATION) at 07:09

## 2020-07-13 RX ADMIN — IPRATROPIUM BROMIDE 0.5 MG: 0.5 SOLUTION RESPIRATORY (INHALATION) at 20:17

## 2020-07-13 RX ADMIN — OXYCODONE HYDROCHLORIDE 5 MG: 5 TABLET ORAL at 09:44

## 2020-07-13 RX ADMIN — OXYCODONE HYDROCHLORIDE 2.5 MG: 5 TABLET ORAL at 22:37

## 2020-07-13 RX ADMIN — BUDESONIDE 0.25 MG: 0.25 INHALANT RESPIRATORY (INHALATION) at 07:09

## 2020-07-13 RX ADMIN — ENOXAPARIN SODIUM 40 MG: 40 INJECTION SUBCUTANEOUS at 08:43

## 2020-07-13 RX ADMIN — LEVETIRACETAM 250 MG: 500 TABLET, FILM COATED ORAL at 21:27

## 2020-07-13 NOTE — PROGRESS NOTES
07/13/20 1200   Pain Assessment   Pain Assessment Tool 0-10   Pain Score 3   Pain Location/Orientation Orientation: Right;Location: Hip   Hospital Pain Intervention(s) Emotional support;Relaxation technique; Rest  (Distraction: Conversation)   Restrictions/Precautions   Precautions Fall Risk;O2;Pain;Supervision on toilet/commode   Weight Bearing Restrictions Yes   RLE Weight Bearing Per Order WBAT   Lifestyle   Autonomy "I think I can take a shower and have enough energy "   Shower/Bathe Self   Type of Assistance Needed Physical assistance   Amount of Physical Assistance Provided Less than 25%   Comment Min A for LB bathing seated on shower chair with hand held shower head with CGA while in stance to wash perineal area and buttocks with unilateral support on grab bar  Shower/Bathe Self CARE Score 3   Bathing   Assessed Bath Style Shower   Anticipated D/C Bath Style Shower   Able to [place] No   Able to [employer] Temperature Yes   Able to Wash/Rinse/Dry (body part) Left Arm;Right Arm;L Upper Leg;R Upper Leg;L Lower Leg/Foot;R Lower Leg/Foot;Chest;Abdomen;Perineal Area; Buttocks   Limitations Noted in Balance; Endurance; Safety;Strength   Positioning Seated;Standing   Adaptive Equipment Shower Bars; Shower Seat;Hand [employer]   Limitations Noted In Balance; Endurance; Safety;LE Strength   Adaptive Equipment Grab Bars;Seat with Back   Assessed Shower   Upper Body Dressing   Type of Assistance Needed Set-up / clean-up   Amount of Physical Assistance Provided No physical assistance   Comment s/u seated supported in chair    Upper Body Dressing CARE Score 5   Lower Body Dressing   Type of Assistance Needed Physical assistance   Amount of Physical Assistance Provided Less than 25%   Comment Min A for threading pants and underwear on RLE in supported sit with need for assessment of carryiover with LHAE      Lower Body Dressing CARE Score 3   Putting On/Taking Off Footwear   Type of Assistance Needed Supervision   Amount of Physical Assistance Provided No physical assistance   Comment Sup seated for donning slip on shoes seated supported in chair  Putting On/Taking Off Footwear CARE Score 4   Dressing/Undressing Clothing   Remove UB Clothes Pullover Shirt   Don UB Clothes Pullover Shirt   Remove LB Clothes Pants; Undergarment;Socks   Don LB [employer]; Undergarment;Socks; Shoes   Limitations Noted In Balance; Endurance; Safety;Strength   Adaptive Equipment Reacher   Positioning Supported Sit;Standing   Sit to Lying   Type of Assistance Needed Supervision; Adaptive equipment   Amount of Physical Assistance Provided No physical assistance   Comment Sup with use of bedrails    Sit to Lying CARE Score 4   Sit to Stand   Type of Assistance Needed Supervision; Adaptive equipment   Amount of Physical Assistance Provided No physical assistance   Comment Sup with RW   Sit to Stand CARE Score 4   Bed-Chair Transfer   Type of Assistance Needed Supervision; Adaptive equipment   Amount of Physical Assistance Provided No physical assistance   Comment Sup with RW   Chair/Bed-to-Chair Transfer CARE Score 4   Cognition   Overall Cognitive Status WFL   Arousal/Participation Alert; Cooperative   Orientation Level Oriented X4   Assessment   Treatment Assessment Pt participated in 90 min OT session with focus on ADL training, therapeutic exercise and Therapeutic activities with fair activity tolerance  Pt seated in w/c at start of OT session with lunch meal completed  Pt agreeable to participate in showering routine  See above note for further details  Pt incision covered with Tegaderm and abdominal pad with incision remaining dry and intact throughout showering routine  Pt participated in UB dressing at Sup level, and LB dressing routine at [address] level with assistance needed with RLE clothing management with need for assessment with use of LHAE reacher to assist pt with being IND in ADL tasks   Following ADL routine pt participated in therapeutic activities with focus on dynamic standing balance activities with pt reaching cross midline, above shoulders, and side to side with fair standing balance needed with standing tolerance for up to 4 minutes prior to requiring seated rest break  Pt participated in therapeutic exercises with 3# weight bar with [id number] chest press, elbow flex/ext, and shoulder flex/ext with mild fatigue noted following exercises  Pt transferred from w/c to bed at Sup level with RW with min verbal cues for O2 tube management  Pt participated in sit to lying in bed at Sup level utilizing bedrails to assist  Pt seated supported in bed at end of therapy session with all needs within reach  Pt would benefit from continued OT services to progress pt with IND in ADL/IADL tasks, balance, and functional transfers for safe d/c to home with family support at least restrictive level  Prognosis Good   Problem List Decreased strength; Impaired balance;Decreased endurance;Decreased mobility; Decreased safety awareness   Barriers to Discharge Inaccessible home environment   Plan   Treatment/Interventions ADL retraining;Functional transfer training; Therapeutic exercise; Endurance training;Patient/family training; Compensatory technique education   OT Therapy Minutes   OT Time In 1200   OT Time Out 1330   OT Total Time (minutes) 90   OT Mode of treatment - Individual (minutes) 90   OT Mode of treatment - Concurrent (minutes) 0   OT Mode of treatment - Group (minutes) 0   OT Mode of treatment - Co-treat (minutes) 0   OT Mode of Treatment - Total time(minutes) 90 minutes   OT Cumulative Minutes 645   Therapy Time missed   Time missed?  No

## 2020-07-13 NOTE — PROGRESS NOTES
07/13/20 1030   Pain Assessment   Pain Assessment Tool 0-10   Pain Score 5   Pain Location/Orientation Orientation: Right;Location: Hip   Hospital Pain Intervention(s) Emotional support;Relaxation technique; Rest  (Distraction: Conversation)   Restrictions/Precautions   Precautions Fall Risk;O2;Pain;Supervision on toilet/commode   Weight Bearing Restrictions Yes   RLE Weight Bearing Per Order WBAT   Lifestyle   Autonomy "I am ready to brush my teeth "   Oral Hygiene   Type of Assistance Needed Supervision; Adaptive equipment   Amount of Physical Assistance Provided No physical assistance   Comment Sup for brushing teeth standing at sinkside with pt able to complete without difficulties  Oral Hygiene CARE Score 4   Grooming   Able To Brush/Clean Teeth   Limitation Noted In Safety;Strength;Timeliness   Sit to Stand   Type of Assistance Needed Supervision; Adaptive equipment   Amount of Physical Assistance Provided No physical assistance   Comment Sup with RW    Sit to Stand CARE Score 4   Bed-Chair Transfer   Type of Assistance Needed Supervision; Adaptive equipment   Amount of Physical Assistance Provided No physical assistance   Comment Sup with RW   Chair/Bed-to-Chair Transfer CARE Score 4   Cognition   Overall Cognitive Status WFL   Arousal/Participation Alert; Cooperative   Orientation Level Oriented X4   Assessment   Treatment Assessment Pt participated in 30 min OT session with focus on ADL training  Pt seated in w/c upon therapist entering room  Pt agreeable to participate in OT session  Pt requesting to brush teeth and agreeable to participate in activity during OT session  Pt participated in functional mobility to bathroom with RW at Sup level with min verbal cues for safety with O2 tubing management  Pt noted to attempt to step over O2 tubing and educated on importance of holding and moving with O2 tubing to prevent risk of falling   Pt able to carryover technique throughout rest of functional mobility in room/bathroom  Pt participated in brushing teeth standing a sinkside with fair+ standing balance, completing tasks without assistance at Sup level for dynamic standing balance/endurance  Pt participated in functional mobility from sink to w/c at Sup level with RW  Pt at end of session seated in w/c with tray table, and all needs within reach  Pt to be seen at noon for ADL routine at this time  Pt would benefit from continued OT services to progress pt through [address] to meet established OT goals  Prognosis Good   Problem List Decreased strength;Decreased endurance;Decreased mobility; Impaired balance; Impaired judgement;Decreased safety awareness   Barriers to Discharge Inaccessible home environment   Plan   Treatment/Interventions ADL retraining;Functional transfer training; Therapeutic exercise; Endurance training;Patient/family training; Compensatory technique education   OT Therapy Minutes   OT Time In 1030   OT Time Out 1100   OT Total Time (minutes) 30   OT Mode of treatment - Individual (minutes) 30   OT Mode of treatment - Concurrent (minutes) 0   OT Mode of treatment - Group (minutes) 0   OT Mode of treatment - Co-treat (minutes) 0   OT Mode of Treatment - Total time(minutes) 30 minutes   OT Cumulative Minutes 555   Therapy Time missed   Time missed?  No

## 2020-07-13 NOTE — PROGRESS NOTES
PM&R Progress Note:    HPI: Pt is a 69 y/o who suffered a mechanical fall while walking in her garden landing on her right hip and suffering a fracture to the femoral neck  The patient underwent ORIF w/screws x 2 on 7/2  ASSESSMENT: Stable, progressing    PLAN:    Rehabilitation   Continue current rehabilitation plan of care to maximize function   Expected Discharge: TBD, RETEAM, likely will need another 10 days     Pain   Controlled  DVT prophylaxis   Lovenox  Bladder plan   Continent     Bowel plan   Continent   Last BM 7/5/2020 but disimpacted  * Hip fracture [facility])  Assessment & Plan  R femoral neck fx status post surgical repair via medullary nail by Dr Milagros Crowley on 7/2  POD#14 is 7/16  Weight-bearing as tolerated on affected limb       Constipation  Assessment & Plan  Continue scheduled bowel meds  Continue suppository if no BM today  Malnutrition ([facility] )  Assessment & Plan  Severe protein calorie malnutrition findings  Continue nutritional consult and appreciate their recommendation    Acute blood loss as cause of postoperative anemia  Assessment & Plan  IM consulted, with overall monitoring, and management at their discretion during ARC course  Monitor H/H, vitals, signs/symptoms of acute bleeding        On home oxygen therapy  Assessment & Plan  Chronically on 2 L of oxygen at all times    Metastatic adenocarcinoma to brain [facility])  Assessment & Plan  Follows with Dr Anuradha Bae as outpatient     COPD (chronic obstructive pulmonary disease) ([facility] )  Assessment & Plan  IM consulted and with overall management at their discretion during ARC course  On home chronic 2L O2  Home nebs  Monitor vitals without and with activity   Incentive spirometry     Nicotine dependence  Assessment & Plan  Encourage cessation  Malignant neoplasm of right lung [facility])  Assessment & Plan  S/P chemo/radiation   Follow-up hem/onc after d/c     Appreciate IM consultants medical co-management    Labs, medications, and imaging personally reviewed  SUBJECTIVE:  Patient seen today after therapies  Progressing  Reports her appetite is improved  Pain is mild  ROS:  A ten point review of systems was completed 7/13/2020 and pertinent positives are listed in subjective section  All other systems reviewed were negative  OBJECTIVE:   /53 (BP Location: Left arm)   Pulse 97   Temp 98 4 °F (36 9 °C) (Oral)   Resp 18   Ht 5' 5" (1 651 m)   Wt 48 3 kg (106 lb 7 7 oz)   SpO2 98%   BMI 17 72 kg/m²     Physical Exam   Constitutional: She is oriented to person, place, and time  She appears well-developed and well-nourished  No distress  HENT:   Head: Normocephalic  Nose: Nose normal    Eyes: Conjunctivae and EOM are normal    Neck: Neck supple  Cardiovascular: Normal rate and intact distal pulses  Pulmonary/Chest: Effort normal  She has no wheezes  Abdominal: Soft  She exhibits no distension  Musculoskeletal: She exhibits no edema  Neurological: She is alert and oriented to person, place, and time  Skin:   +Staples on incision C/D/I   Psychiatric: She has a normal mood and affect  Nursing note and vitals reviewed         Lab Results   Component Value Date    WBC 4 87 07/13/2020    HGB 9 6 (L) 07/13/2020    HCT 30 2 (L) 07/13/2020     (H) 07/13/2020     07/13/2020     Lab Results   Component Value Date    SODIUM 134 (L) 07/13/2020    K 4 4 07/13/2020    CL 99 (L) 07/13/2020    CO2 32 07/13/2020    BUN 14 07/13/2020    CREATININE 0 41 (L) 07/13/2020    GLUC 86 07/13/2020    CALCIUM 8 7 07/13/2020     Lab Results   Component Value Date    INR 0 95 07/01/2020    INR 0 91 10/18/2019    INR 1 03 03/07/2019    PROTIME 12 7 07/01/2020    PROTIME 11 9 10/18/2019    PROTIME 13 6 03/07/2019           Current Facility-Administered Medications:     acetaminophen (TYLENOL) tablet 975 mg, 975 mg, Oral, Q8H [address], Tor MD Natacha, 975 mg at 07/13/20 1308    albuterol (PROVENTIL HFA,VENTOLIN HFA) inhaler 2 puff, 2 puff, Inhalation, Q4H PRN, Ministerio Whitaker MD    bisacodyl (DULCOLAX) rectal suppository 10 mg, 10 mg, Rectal, Daily PRN, Ministerio Whitaker MD    budesonide (PULMICORT) inhalation solution 0 25 mg, 0 25 mg, Nebulization, BID, Ministerio Whitaker MD, 0 25 mg at 07/13/20 0709    cyanocobalamin (VITAMIN B-12) tablet 1,000 mcg, 1,000 mcg, Oral, Daily, Ministerio Whitaker MD, 1,000 mcg at 07/13/20 0843    docusate sodium (COLACE) capsule 100 mg, 100 mg, Oral, BID, Ministerio Whitaker MD, 100 mg at 07/10/20 1730    enoxaparin (LOVENOX) subcutaneous injection 40 mg, 40 mg, Subcutaneous, Q24H CHANDLER, Claudia Mae PA-C, 40 mg at 07/13/20 0843    ipratropium (ATROVENT) 0 02 % inhalation solution 0 5 mg, 0 5 mg, Nebulization, TID, Ministerio Whitaker MD, 0 5 mg at 07/13/20 1342    levalbuterol (Payal Lush) inhalation solution 1 25 mg, 1 25 mg, Nebulization, TID, Ministerio Whitaker MD, 1 25 mg at 07/13/20 1342    levETIRAcetam (KEPPRA) tablet 250 mg, 250 mg, Oral, Q12H [address], Ministerio Whitaker MD, 250 mg at 07/13/20 0843    ondansetron (ZOFRAN-ODT) dispersible tablet 4 mg, 4 mg, Oral, Q8H PRN, Ministerio Whitaker MD    oxyCODONE (ROXICODONE) IR tablet 5 mg, 5 mg, Oral, Q4H PRN, 5 mg at 07/13/20 1443 **OR** oxyCODONE (ROXICODONE) IR tablet 2 5 mg, 2 5 mg, Oral, Q4H PRN, Ministerio Whitaker MD, 2 5 mg at 07/13/20 0053    polyethylene glycol (MIRALAX) packet 17 g, 17 g, Oral, Daily, Claudia Mae PA-C, 17 g at 07/07/20 6117    senna (SENOKOT) tablet 17 2 mg, 2 tablet, Oral, HS, Claudia Mae PA-C, 17 2 mg at 07/11/20 3655    Past Medical History:   Diagnosis Date    Breast cancer ([place] [address] ) 1996    Cancer ([place] [address] )     COPD, severe ([place] [address] )     Lung disease     Requires supplemental oxygen     3L 24 hrs/day    Stage 4 lung cancer [facility])        Patient Active Problem List    Diagnosis Date Noted    Hip fracture ([place] [address] ) 07/01/2020     Priority: High    Malnutrition ([place] [address] ) 07/05/2020    Constipation 07/05/2020    Acute blood loss as cause of postoperative anemia 07/04/2020    Hyponatremia 07/01/2020    Palliative care patient 02/27/2020   Bonny Nickels 01/02/2020    Xerostomia 11/11/2019    Caregiver stress 11/11/2019    Medical marijuana use 11/11/2019    On home oxygen therapy 10/24/2019    [facility] ([facility] ) 10/09/2019    Cramp of limb 09/20/2019    Episode of shaking 09/20/2019    Shortness of breath 03/13/2019    Chronic hypoxemic respiratory failure ([place] [address] ) 02/26/2019    Malignant neoplasm of right lung (HCC) 03/09/2018    Depressive disorder 11/14/2017    COPD (chronic obstructive pulmonary disease) ([place] [address] ) 10/14/2016    Hyperlipidemia 04/08/2016    Loss of appetite 01/23/2015    Malignant cachexia ([place] [address] ) 10/05/2012    Nicotine dependence 10/05/2012          Jaimee Seaman MD    Total time spent:  30 minutes with more than 50% spent counseling/coordinating care  Counseling includes discussion with patient re: progress and discussion with patient of his/her current medical state/information  Coordination of patient's care was performed in conjunction with consulting services  Time invested included review of patient's labs, vitals, and management of their comorbidities with continued monitoring  The care of the patient was extensively discussed and appropriate treatment plan was formulated unique for this patient  ** Please Note:  voice to text software may have been used in the creation of this document   Although proof errors in transcription or interpretation are a potential of such software**

## 2020-07-13 NOTE — PLAN OF CARE
Problem: Potential for Falls  Goal: Patient will remain free of falls  Description  INTERVENTIONS:  - Assess patient frequently for physical needs  -  Identify cognitive and physical deficits and behaviors that affect risk of falls    -  [place] fall precautions as indicated by assessment   - Educate patient/family on patient safety including physical limitations  - Instruct patient to call for assistance with activity based on assessment  - Modify environment to reduce risk of injury  - Consider OT/PT consult to assist with strengthening/mobility  Outcome: Progressing     Problem: Prexisting or High Potential for Compromised Skin Integrity  Goal: Skin integrity is maintained or improved  Description  INTERVENTIONS:  - Identify patients at risk for skin breakdown  - Assess and monitor skin integrity  - Assess and monitor nutrition and hydration status  - Monitor labs   - Assess for incontinence   - Turn and reposition patient  - Assist with mobility/ambulation  - Relieve pressure over bony prominences  - Avoid friction and shearing  - Provide appropriate hygiene as needed including keeping skin clean and dry  - Evaluate need for skin moisturizer/barrier cream  - Collaborate with interdisciplinary team   - Patient/family teaching  - Consider wound care consult   Outcome: Progressing     Problem: PAIN - ADULT  Goal: Verbalizes/displays adequate comfort level or baseline comfort level  Description  Interventions:  - Encourage patient to monitor pain and request assistance  - Assess pain using appropriate pain scale  - Administer analgesics based on type and severity of pain and evaluate response  - Implement non-pharmacological measures as appropriate and evaluate response  - Consider cultural and social influences on pain and pain management  - Notify physician/advanced practitioner if interventions unsuccessful or patient reports new pain  Outcome: Progressing     Problem: INFECTION - ADULT  Goal: Absence or prevention of progression during hospitalization  Description  INTERVENTIONS:  - Assess and monitor for signs and symptoms of infection  - Monitor lab/diagnostic results  - Monitor all insertion sites, i e  indwelling lines, tubes, and drains  - Monitor endotracheal if appropriate and nasal secretions for changes in amount and color  - [place] appropriate cooling/warming therapies per order  - Administer medications as ordered  - Instruct and encourage patient and family to use good hand hygiene technique  - Identify and instruct in appropriate isolation precautions for identified infection/condition  Outcome: Progressing  Goal: Absence of fever/infection during neutropenic period  Description  INTERVENTIONS:  - Monitor WBC    Outcome: Progressing     Problem: SAFETY ADULT  Goal: Maintain or return to baseline ADL function  Description  INTERVENTIONS:  -  Assess patient's ability to carry out ADLs; assess patient's baseline for ADL function and identify physical deficits which impact ability to perform ADLs (bathing, care of mouth/teeth, toileting, grooming, dressing, etc )  - Assess/evaluate cause of self-care deficits   - Assess range of motion  - Assess patient's mobility; develop plan if impaired  - Assess patient's need for assistive devices and provide as appropriate  - Encourage maximum independence but intervene and supervise when necessary  - Involve family in performance of ADLs  - Assess for home care needs following discharge   - Consider OT consult to assist with ADL evaluation and planning for discharge  - Provide patient education as appropriate  Outcome: Progressing  Goal: Maintain or return mobility status to optimal level  Description  INTERVENTIONS:  - Assess patient's baseline mobility status (ambulation, transfers, stairs, etc )    - Identify cognitive and physical deficits and behaviors that affect mobility  - Identify mobility aids required to assist with transfers and/or ambulation (gait belt, sit-to-stand, lift, walker, cane, etc )  - [place] fall precautions as indicated by assessment  - Record patient progress and toleration of activity level on Mobility SBAR; progress patient to next Phase/Stage  - Instruct patient to call for assistance with activity based on assessment  - Consider rehabilitation consult to assist with strengthening/weightbearing, etc   Outcome: Progressing     Problem: DISCHARGE PLANNING  Goal: Discharge to home or other facility with appropriate resources  Description  INTERVENTIONS:  - Identify barriers to discharge w/patient and caregiver  - Arrange for needed discharge resources and transportation as appropriate  - Identify discharge learning needs (meds, wound care, etc )  - Arrange for interpretive services to assist at discharge as needed  - Refer to Case Management Department for coordinating discharge planning if the patient needs post-hospital services based on physician/advanced practitioner order or complex needs related to functional status, cognitive ability, or social support system  Outcome: Progressing     Problem: Nutrition/Hydration-ADULT  Goal: Nutrient/Hydration intake appropriate for improving, restoring or maintaining nutritional needs  Description  Monitor and assess patient's nutrition/hydration status for malnutrition  Collaborate with interdisciplinary team and initiate plan and interventions as ordered  Monitor patient's weight and dietary intake as ordered or per policy  Utilize nutrition screening tool and intervene as necessary  Determine patient's food preferences and provide high-protein, high-caloric foods as appropriate       INTERVENTIONS:  - Monitor oral intake, urinary output, labs, and treatment plans  - Assess nutrition and hydration status and recommend course of action  - Evaluate amount of meals eaten  - Assist patient with eating if necessary   - Allow adequate time for meals  - Recommend/ encourage appropriate diets, oral nutritional supplements, and vitamin/mineral supplements  - Order, calculate, and assess calorie counts as needed  - Recommend, monitor, and adjust tube feedings and TPN/PPN based on assessed needs  - Assess need for intravenous fluids  - Provide specific nutrition/hydration education as appropriate  - Include patient/family/caregiver in decisions related to nutrition  Outcome: Progressing

## 2020-07-13 NOTE — PROGRESS NOTES
07/13/20 1430   Pain Assessment   Pain Score 5   Restrictions/Precautions   Precautions Fall Risk;O2;Pain;Supervision on toilet/commode  (2 L )   Weight Bearing Restrictions Yes   RLE Weight Bearing Per Order WBAT   Cognition   Arousal/Participation Alert; Cooperative   Attention Within functional limits   Following Commands Follows multistep commands with increased time or repetition   Subjective   Subjective No new complaints, stated that she would like to go outside    Sit to Lying   Type of Assistance Needed Supervision   Sit to Lying CARE Score 4   Sit to Stand   Type of Assistance Needed Supervision   Sit to Stand CARE Score 4   Bed-Chair Transfer   Type of Assistance Needed Supervision   Chair/Bed-to-Chair Transfer CARE Score 4   Transfer Bed/Chair/Wheelchair   Adaptive Equipment Roller Walker   Stand Pivot Supervision   Sit to Stand Supervision   Stand to Sit Supervision   Sit to Supine Supervision   Walk 10 Feet   Type of Assistance Needed Incidental touching   Walk 10 Feet CARE Score 4   Walk 50 Feet with Two Turns   Type of Assistance Needed Incidental touching   Walk 50 Feet with Two Turns CARE Score 4   Walk 150 Feet   Reason if not Attempted Safety concerns   Walk 150 Feet CARE Score 88   Walking 10 Feet on Uneven Surfaces   Type of Assistance Needed Incidental touching   Comment outside for approc 30 feet    Walking 10 Feet on Uneven Surfaces CARE Score 4   Ambulation   Does the patient walk? 2  Yes   Primary Mode of Locomotion Prior to Admission Walk   Distance Walked (feet) 70 ft   Assist Device Roller Walker   Gait Pattern Inconsistant Shar[place];Decreased R stance; Improper weight shift   Limitations Noted In Endurance   Walk Assist Level Contact Guard   Wheelchair mobility   Does the patient use a wheelchair? 0   No   Curb or Single Stair   Style negotiated Curb   Type of Assistance Needed Physical assistance   Amount of Physical Assistance Provided Less than 25%   1 Step (Curb) CARE Score 3 Stairs   Type Curb  (outside)   # of Steps 1   Weight Bearing Precautions Fall Risk   Assist Devices Roller Walker   Toilet Transfer   Type of Assistance Needed Supervision   Toilet Transfer CARE Score 4   Toilet Transfer   Surface Assessed Raised Toilet   Adaptive Equipment Grab Bar   Therapeutic Interventions   Strengthening LAQ, hip flex, gluteal squeezes    Flexibility B hamstring and gastroc stretching    Equipment Use   NuStep Level 1 X 10 mins   Other Comments   Comments had pt  walk from bathroom to bed while doing cord management, pt  needed max cueing to complete task and ran into her line X 1    Assessment   Treatment Assessment Pt  participated in 60 mins session and participated in above activities without complaints  Pt  practiced walking in uneven surface outside and no LOB was noted but needed CGA  Pt  SPo2 level  94- 98% -111 bpm  Pt  assisted back to bed at end of session  Pt  needs more practice for cord management with pt  not being safe for now  OT scheduled FT for tomorrow for pt's sister for both PT and OT in the PM  Cont with POC   Problem List Decreased strength;Decreased range of motion;Decreased endurance; Impaired balance;Decreased mobility;Pain   Barriers to Discharge Inaccessible home environment;Decreased caregiver support   PT Barriers   Physical Impairment Decreased strength;Decreased range of motion;Decreased endurance; Impaired balance;Decreased mobility;Pain   Functional Limitation Car transfers;Stair negotiation;Standing;Transfers; Walking   Plan   Treatment/Interventions Functional transfer training;LE strengthening/ROM; Elevations; Therapeutic exercise; Endurance training;Patient/family training;Equipment eval/education; Bed mobility;Gait training   Recommendation   PT Discharge Recommendation Return to previous environment with social support;Home with skilled therapy   Equipment Recommended Walker   PT Therapy Minutes   PT Time In 1430   PT Time Out 1530   PT Total Time (minutes) 60   PT Mode of treatment - Individual (minutes) 60   PT Mode of treatment - Concurrent (minutes) 0   PT Mode of treatment - Group (minutes) 0   PT Mode of treatment - Co-treat (minutes) 0   PT Mode of Treatment - Total time(minutes) 60 minutes   PT Cumulative Minutes 625   Therapy Time missed   Time missed?  No

## 2020-07-13 NOTE — PROGRESS NOTES
Internal Medicine Progress Note  Patient: Jennifer Nolan  Age/sex: 70 y o  female  Medical Record #: [id number]      ASSESSMENT/PLAN: (Interval History)  Jennifer Nolan is seen and examined and management for following issues:    R femoral neck fracture; s/p ORIF 7/2/20  · F/u ortho as scheduled  · WBAT     Lung CA with mets to brain/right parietal craniotomy 10/2019  · Completed WBR 11/2019 and chemo for lung CA 2018  · Stable  · Continue Keppra 250mg BID as at home     Severe COPD  · Cont Pulmicort, Atrovent, Xopenex nebs   · At home on Atrovent, Brovana and Pulmicort  · Wears chronic oxygen 2L NC at home  · Maintain sats >88%     Chronic tobacco use  · On no nicotine patch     Anemia  · stable  · Transfuse if hgb <8     Hx right breast cancer/mastectomy/axillary LN dissection  · Limb alert right arm     Nutrition/severe malnutrition  · Dietary following  · Doesn't like Ensure and someone is bringing her BOOST in   · Uses MMJ as OP; intol to Remeron with nausea, Megace ineffective  · She thinks appetite is better and seems to be eating more          The above assessment and plan was reviewed and updated as determined by my evaluation of the patient on 7/13/2020      Labs:   Results from last 7 days   Lab Units 07/13/20  0629 07/09/20  0507   WBC Thousand/uL 4 87 4 41   HEMOGLOBIN g/dL 9 6* 9 8*   HEMATOCRIT % 30 2* 30 0*   PLATELETS Thousands/uL 341 297     Results from last 7 days   Lab Units 07/13/20  0629 07/09/20  0507   SODIUM mmol/L 134* 134*   POTASSIUM mmol/L 4 4 4 2   CHLORIDE mmol/L 99* 97*   CO2 mmol/L 32 34*   BUN mg/dL 14 12   CREATININE mg/dL 0 41* 0 39*   CALCIUM mg/dL 8 7 8 8                   Review of Scheduled Meds:    Current Facility-Administered Medications:  acetaminophen 975 mg Oral [facility] Mj Harvey MD   albuterol 2 puff Inhalation Q4H PRN Mj Harvey MD   bisacodyl 10 mg Rectal Daily PRN Mj Harvey MD   budesonide 0 25 mg Nebulization BID Mj Harvey MD   vitamin B-12 1,000 mcg Oral Daily Kali Arriola MD   docusate sodium 100 mg Oral BID Kali Arriola MD   enoxaparin 40 mg Subcutaneous Q24H [address] Claudia Mae PA-C   ipratropium 0 5 mg Nebulization TID Kali Arriola MD   levalbuterol 1 25 mg Nebulization TID Kali Arriola MD   levETIRAcetam 250 mg Oral Q12H [address] Kali Arriola MD   ondansetron 4 mg Oral Q8H PRN Kali Arriola MD   oxyCODONE 5 mg Oral Q4H PRN Kali Arriola MD   Or       oxyCODONE 2 5 mg Oral Q4H PRN Kali Arriola MD   polyethylene glycol 17 g Oral Daily Claudia Mae PA-C   senna 2 tablet Oral HS Claudia Mae PA-C       Subjective/ HPI: Patients overnight issues or events were reviewed with nursing or staff during rounds or morning huddle session  No new or overnight issues  Offers no complaints  ROS:   A 10 point ROS was performed; negative except as noted above         Imaging:     No orders to display       *Labs /Radiology studies reviewed  *Medications reviewed and reconciled as needed  *Please refer to order section for additional ordered labs studies  *Case discussed with primary attending during morning huddle case rounds      Physical Examination:  Vitals:   Vitals:    07/12/20 1324 07/12/20 1936 07/12/20 2050 07/13/20 0624   BP:   (!) 98/49 101/53   BP Location:   Left arm Left arm   Pulse:   95 97   Resp:   18 18   Temp:   98 2 °F (36 8 °C) 98 4 °F (36 9 °C)   TempSrc:   Oral Oral   SpO2: 99% 99% 100% 99%   Weight:       Height:           General Appearance: no distress, conversive  HEENT: PERRLA, conjuctiva normal; oropharynx clear; mucous membranes moist   Neck:  Supple, normal ROM, no JVD  Lungs: CTA, normal respiratory effort, no retractions, expiratory effort normal  CV: regular rate and rhythm; no rubs/murmurs/gallops, PMI normal   ABD: soft; ND/NT; +BS  EXT: no edema  Skin: normal turgor, normal texture, no rashes  Psych: affect normal, mood normal  Neuro: AAO      The above physical exam was reviewed and updated as determined by my evaluation of the patient on 7/13/2020  Invasive Devices     None                    VTE Pharmacologic Prophylaxis: Enoxaparin  Code Status: Level 1 - Full Code  Current Length of Stay: 8 day(s)      Total time spent:  30 minutes with more than 50% spent counseling/coordinating care  Counseling includes discussion with patient re: progress  and discussion with patient of his/her current medical state/information  Coordination of patient's care was performed in conjunction with primary service  Time invested included review of patient's labs, vitals, and management of their comorbidities with continued monitoring  In addition, this patient was discussed with medical team including physician and advanced extenders  The care of the patient was extensively discussed and appropriate treatment plan was formulated unique for this patient  ** Please Note:  voice to text software may have been used in the creation of this document   Although proof errors in transcription or interpretation are a potential of such software**

## 2020-07-14 PROCEDURE — 97110 THERAPEUTIC EXERCISES: CPT

## 2020-07-14 PROCEDURE — 94760 N-INVAS EAR/PLS OXIMETRY 1: CPT

## 2020-07-14 PROCEDURE — 99233 SBSQ HOSP IP/OBS HIGH 50: CPT | Performed by: PHYSICAL MEDICINE & REHABILITATION

## 2020-07-14 PROCEDURE — 94640 AIRWAY INHALATION TREATMENT: CPT

## 2020-07-14 PROCEDURE — 97535 SELF CARE MNGMENT TRAINING: CPT

## 2020-07-14 PROCEDURE — 97530 THERAPEUTIC ACTIVITIES: CPT

## 2020-07-14 RX ADMIN — LEVALBUTEROL HYDROCHLORIDE 1.25 MG: 1.25 SOLUTION, CONCENTRATE RESPIRATORY (INHALATION) at 07:11

## 2020-07-14 RX ADMIN — LEVALBUTEROL HYDROCHLORIDE 1.25 MG: 1.25 SOLUTION, CONCENTRATE RESPIRATORY (INHALATION) at 19:20

## 2020-07-14 RX ADMIN — ACETAMINOPHEN 975 MG: 325 TABLET, FILM COATED ORAL at 14:36

## 2020-07-14 RX ADMIN — ACETAMINOPHEN 975 MG: 325 TABLET, FILM COATED ORAL at 21:23

## 2020-07-14 RX ADMIN — LEVETIRACETAM 250 MG: 500 TABLET, FILM COATED ORAL at 21:23

## 2020-07-14 RX ADMIN — BUDESONIDE 0.25 MG: 0.25 INHALANT RESPIRATORY (INHALATION) at 07:11

## 2020-07-14 RX ADMIN — ACETAMINOPHEN 975 MG: 325 TABLET, FILM COATED ORAL at 05:13

## 2020-07-14 RX ADMIN — OXYCODONE HYDROCHLORIDE 2.5 MG: 5 TABLET ORAL at 21:23

## 2020-07-14 RX ADMIN — BUDESONIDE 0.25 MG: 0.25 INHALANT RESPIRATORY (INHALATION) at 19:20

## 2020-07-14 RX ADMIN — LEVETIRACETAM 250 MG: 500 TABLET, FILM COATED ORAL at 09:08

## 2020-07-14 RX ADMIN — IPRATROPIUM BROMIDE 0.5 MG: 0.5 SOLUTION RESPIRATORY (INHALATION) at 19:20

## 2020-07-14 RX ADMIN — CYANOCOBALAMIN TAB 500 MCG 1000 MCG: 500 TAB at 09:08

## 2020-07-14 RX ADMIN — OXYCODONE HYDROCHLORIDE 2.5 MG: 5 TABLET ORAL at 09:49

## 2020-07-14 RX ADMIN — IPRATROPIUM BROMIDE 0.5 MG: 0.5 SOLUTION RESPIRATORY (INHALATION) at 14:47

## 2020-07-14 RX ADMIN — ENOXAPARIN SODIUM 40 MG: 40 INJECTION SUBCUTANEOUS at 09:08

## 2020-07-14 RX ADMIN — IPRATROPIUM BROMIDE 0.5 MG: 0.5 SOLUTION RESPIRATORY (INHALATION) at 07:11

## 2020-07-14 RX ADMIN — OXYCODONE HYDROCHLORIDE 5 MG: 5 TABLET ORAL at 14:33

## 2020-07-14 RX ADMIN — LEVALBUTEROL HYDROCHLORIDE 1.25 MG: 1.25 SOLUTION, CONCENTRATE RESPIRATORY (INHALATION) at 14:47

## 2020-07-14 NOTE — PLAN OF CARE
Problem: Potential for Falls  Goal: Patient will remain free of falls  Description  INTERVENTIONS:  - Assess patient frequently for physical needs  -  Identify cognitive and physical deficits and behaviors that affect risk of falls    -  [place] fall precautions as indicated by assessment   - Educate patient/family on patient safety including physical limitations  - Instruct patient to call for assistance with activity based on assessment  - Modify environment to reduce risk of injury  - Consider OT/PT consult to assist with strengthening/mobility  Outcome: Progressing     Problem: Prexisting or High Potential for Compromised Skin Integrity  Goal: Skin integrity is maintained or improved  Description  INTERVENTIONS:  - Identify patients at risk for skin breakdown  - Assess and monitor skin integrity  - Assess and monitor nutrition and hydration status  - Monitor labs   - Assess for incontinence   - Turn and reposition patient  - Assist with mobility/ambulation  - Relieve pressure over bony prominences  - Avoid friction and shearing  - Provide appropriate hygiene as needed including keeping skin clean and dry  - Evaluate need for skin moisturizer/barrier cream  - Collaborate with interdisciplinary team   - Patient/family teaching  - Consider wound care consult   Outcome: Progressing     Problem: PAIN - ADULT  Goal: Verbalizes/displays adequate comfort level or baseline comfort level  Description  Interventions:  - Encourage patient to monitor pain and request assistance  - Assess pain using appropriate pain scale  - Administer analgesics based on type and severity of pain and evaluate response  - Implement non-pharmacological measures as appropriate and evaluate response  - Consider cultural and social influences on pain and pain management  - Notify physician/advanced practitioner if interventions unsuccessful or patient reports new pain  Outcome: Progressing     Problem: INFECTION - ADULT  Goal: Absence or prevention of progression during hospitalization  Description  INTERVENTIONS:  - Assess and monitor for signs and symptoms of infection  - Monitor lab/diagnostic results  - Monitor all insertion sites, i e  indwelling lines, tubes, and drains  - Monitor endotracheal if appropriate and nasal secretions for changes in amount and color  - [place] appropriate cooling/warming therapies per order  - Administer medications as ordered  - Instruct and encourage patient and family to use good hand hygiene technique  - Identify and instruct in appropriate isolation precautions for identified infection/condition  Outcome: Progressing  Goal: Absence of fever/infection during neutropenic period  Description  INTERVENTIONS:  - Monitor WBC    Outcome: Progressing     Problem: SAFETY ADULT  Goal: Maintain or return to baseline ADL function  Description  INTERVENTIONS:  -  Assess patient's ability to carry out ADLs; assess patient's baseline for ADL function and identify physical deficits which impact ability to perform ADLs (bathing, care of mouth/teeth, toileting, grooming, dressing, etc )  - Assess/evaluate cause of self-care deficits   - Assess range of motion  - Assess patient's mobility; develop plan if impaired  - Assess patient's need for assistive devices and provide as appropriate  - Encourage maximum independence but intervene and supervise when necessary  - Involve family in performance of ADLs  - Assess for home care needs following discharge   - Consider OT consult to assist with ADL evaluation and planning for discharge  - Provide patient education as appropriate  Outcome: Progressing  Goal: Maintain or return mobility status to optimal level  Description  INTERVENTIONS:  - Assess patient's baseline mobility status (ambulation, transfers, stairs, etc )    - Identify cognitive and physical deficits and behaviors that affect mobility  - Identify mobility aids required to assist with transfers and/or ambulation (gait belt, sit-to-stand, lift, walker, cane, etc )  - [place] fall precautions as indicated by assessment  - Record patient progress and toleration of activity level on Mobility SBAR; progress patient to next Phase/Stage  - Instruct patient to call for assistance with activity based on assessment  - Consider rehabilitation consult to assist with strengthening/weightbearing, etc   Outcome: Progressing     Problem: DISCHARGE PLANNING  Goal: Discharge to home or other facility with appropriate resources  Description  INTERVENTIONS:  - Identify barriers to discharge w/patient and caregiver  - Arrange for needed discharge resources and transportation as appropriate  - Identify discharge learning needs (meds, wound care, etc )  - Arrange for interpretive services to assist at discharge as needed  - Refer to Case Management Department for coordinating discharge planning if the patient needs post-hospital services based on physician/advanced practitioner order or complex needs related to functional status, cognitive ability, or social support system  Outcome: Progressing     Problem: Nutrition/Hydration-ADULT  Goal: Nutrient/Hydration intake appropriate for improving, restoring or maintaining nutritional needs  Description  Monitor and assess patient's nutrition/hydration status for malnutrition  Collaborate with interdisciplinary team and initiate plan and interventions as ordered  Monitor patient's weight and dietary intake as ordered or per policy  Utilize nutrition screening tool and intervene as necessary  Determine patient's food preferences and provide high-protein, high-caloric foods as appropriate       INTERVENTIONS:  - Monitor oral intake, urinary output, labs, and treatment plans  - Assess nutrition and hydration status and recommend course of action  - Evaluate amount of meals eaten  - Assist patient with eating if necessary   - Allow adequate time for meals  - Recommend/ encourage appropriate diets, oral nutritional supplements, and vitamin/mineral supplements  - Order, calculate, and assess calorie counts as needed  - Recommend, monitor, and adjust tube feedings and TPN/PPN based on assessed needs  - Assess need for intravenous fluids  - Provide specific nutrition/hydration education as appropriate  - Include patient/family/caregiver in decisions related to nutrition  Outcome: Progressing

## 2020-07-14 NOTE — PROGRESS NOTES
07/14/20 1330   Pain Assessment   Pain Assessment Tool 0-10   Pain Score 8  (post treatment 8/10     Brivalerie Vitalevis Bri Pendletonkarla Dangvalerie Pendleton pre tx 4/10 )   Restrictions/Precautions   Precautions Fall Risk;Pain   RLE Weight Bearing Per Order WBAT   Subjective   Subjective pt states feeling okay and Ligia Smith sister  in for F/T    Sit to Stand   Type of Assistance Needed Supervision; Adaptive equipment   Sit to Stand CARE Score 4   Bed-Chair Transfer   Type of Assistance Needed Supervision; Adaptive equipment   Chair/Bed-to-Chair Transfer CARE Score 4   Transfer Bed/Chair/Wheelchair   Limitations Noted In Balance; Coordination; Endurance;LE Strength   Adaptive Equipment Roller Walker   Stand Pivot Supervision   Sit to Stand Supervision   Stand to Sit Supervision   Supine to Sit Minimal Assist   Sit to Supine Minimal Assist   Car Transfer   Type of Assistance Needed Incidental touching; Independent   Comment F/T with her sister  Derek Fowler  and instuction with hand and body placement    Car Transfer CARE Score -   Walk 10 Feet   Type of Assistance Needed Incidental touching; Adaptive equipment   Walk 10 Feet CARE Score 4   Ambulation   Does the patient walk? 2   Yes   Primary Mode of Locomotion Prior to Admission Walk   Distance Walked (feet) 25 ft  (x2)   Assist Device Roller Walker   Findings short distance for F/T car to front door perform stairs and one curb step or platform step with RW    Curb or Single Stair   Style negotiated Curb   Type of Assistance Needed Physical assistance   Amount of Physical Assistance Provided Less than 25%   Comment RW curb steps 6 inch and one handrail and HHA    1 Step (Curb) CARE Score 3   4 Steps   Type of Assistance Needed Physical assistance   Amount of Physical Assistance Provided Less than 25%   Comment HHA and hand rail with her sisiter    4 Steps CARE Score 3   Stairs   Type Curb   Assist Devices Roller Walker   Assessment   Treatment Assessment pt engaged in F/T with her sister Ligia Smith with hand on and instruction for stairs single HR and HHA with vc;'s for hand placement and body positioning for safety and dec fall risk   Pt also perform car xfers with instruction for guarding and dec fall risk, Pt sister Faisal Gregg have good understanding of all transfers  and demo/ safety and with good effort   Pt using bathroom before going back to bed with all needs in reach   Cont POC for dc goals    Barriers to Discharge Decreased caregiver support   Plan   Progress Progressing toward goals   PT Therapy Minutes   PT Time In 1330   PT Time Out 1430   PT Total Time (minutes) 60   PT Mode of treatment - Individual (minutes) 30   PT Mode of treatment - Concurrent (minutes) 30   PT Mode of treatment - Group (minutes) 0   PT Mode of treatment - Co-treat (minutes) 0   PT Mode of Treatment - Total time(minutes) 60 minutes   PT Cumulative Minutes 753   Therapy Time missed   Time missed?  No

## 2020-07-14 NOTE — PROGRESS NOTES
07/14/20 1000   Pain Assessment   Pain Assessment Tool 0-10   Pain Score 4   Pain Location/Orientation Orientation: Right;Location: Hip   Restrictions/Precautions   Precautions Fall Risk;O2;Supervision on toilet/commode   Weight Bearing Restrictions Yes   RLE Weight Bearing Per Order WBAT   Tub/Shower Transfer   Limitations Noted In Balance; Endurance;ROM;Safety;LE Strength   Adaptive Equipment Transfer Bench   Assessed Tub/shower combo   Findings To simulate home environment, pt completed dry tub transfer in ADL suite via deep squat onto transfer bench  Pt req min A for RLE when transferring in/out of tub  Min VC's req' for proper hand placement  Pt verbalizes that her sister, Rika Hill will be available to help for any assistance needed with transfer  Sit to Stand   Type of Assistance Needed Supervision; Adaptive equipment   Amount of Physical Assistance Provided No physical assistance   Comment RW   Sit to Stand CARE Score 4   Bed-Chair Transfer   Type of Assistance Needed Supervision; Adaptive equipment   Amount of Physical Assistance Provided No physical assistance   Comment RW   Chair/Bed-to-Chair Transfer CARE Score 4   Cognition   Overall Cognitive Status WFL   Arousal/Participation Alert; Cooperative   Attention Within functional limits   Orientation Level Oriented X4   Memory Within functional limits   Following Commands Follows all commands and directions without difficulty   Activity Tolerance   Activity Tolerance Patient tolerated treatment well   Assessment   Treatment Assessment Pt participated in skilled OT with focus on: fxl transfers, ADL re-education and AD education  Pt demos F safety with fxl transfers, req min VC's for proper hand placement and times  Pt verbalized that she has tub transfer chair and most LHAE at home, however is interested in [address] and RW tray in future sessions  Pt would benefit from cont'd OT with focus on I/ADL re-education, fxl transfers, and O2 line management  Prognosis Good   Problem List Decreased strength;Decreased range of motion;Decreased endurance; Impaired balance;Decreased mobility; Decreased safety awareness;Pain   Plan   Treatment/Interventions ADL retraining;Functional transfer training; Therapeutic exercise; Endurance training;Patient/family training;Equipment eval/education; Compensatory technique education   Progress Progressing toward goals   Recommendation   OT Discharge Recommendation Return to previous environment with social support   OT Therapy Minutes   OT Time In 1000   OT Time Out 1030   OT Total Time (minutes) 30   OT Mode of treatment - Individual (minutes) 30   OT Mode of treatment - Concurrent (minutes) 0   OT Mode of treatment - Group (minutes) 0   OT Mode of treatment - Co-treat (minutes) 0   OT Mode of Treatment - Total time(minutes) 30 minutes   OT Cumulative Minutes 675   Therapy Time missed   Time missed?  No

## 2020-07-14 NOTE — SOCIAL WORK
In preparation for dc on Friday, cm received an order for a hospital bed  Order placed with [facility] and pt and family are aware of oop expense  Referral made to [facility] for contd rn pt and ot services  Following for additional dc needs

## 2020-07-14 NOTE — PROGRESS NOTES
Internal Medicine Progress Note  Patient: Juancarlos Rosario  Age/sex: 70 y o  female  Medical Record #: [id number]      ASSESSMENT/PLAN: (Interval History)  Juancarlos Rosario is seen and examined and management for following issues:    R femoral neck fracture; s/p ORIF 7/2/20  · F/u ortho as scheduled  · WBAT     Lung CA with mets to brain/right parietal craniotomy 10/2019  · Completed WBR 11/2019 and chemo for lung CA 2018  · Stable  · Continue Keppra 250mg BID as at home     Severe COPD  · Cont Pulmicort, Atrovent, Xopenex nebs   · At home on Atrovent, Brovana and Pulmicort  · Wears chronic oxygen 2L NC at home  · Maintain sats >88%     Chronic tobacco use  · On no nicotine patch     Anemia  · stable  · Transfuse if hgb <8     Hx right breast cancer/mastectomy/axillary LN dissection  · Limb alert right arm     Nutrition/severe malnutrition  · Dietary following  · Uses MMJ as OP; intol to Remeron with nausea, Megace ineffective  · Appetite is improved here       The above assessment and plan was reviewed and updated as determined by my evaluation of the patient on 7/14/2020      Labs:   Results from last 7 days   Lab Units 07/13/20  0629 07/09/20  0507   WBC Thousand/uL 4 87 4 41   HEMOGLOBIN g/dL 9 6* 9 8*   HEMATOCRIT % 30 2* 30 0*   PLATELETS Thousands/uL 341 297     Results from last 7 days   Lab Units 07/13/20  0629 07/09/20  0507   SODIUM mmol/L 134* 134*   POTASSIUM mmol/L 4 4 4 2   CHLORIDE mmol/L 99* 97*   CO2 mmol/L 32 34*   BUN mg/dL 14 12   CREATININE mg/dL 0 41* 0 39*   CALCIUM mg/dL 8 7 8 8                   Review of Scheduled Meds:    Current Facility-Administered Medications:  acetaminophen 975 mg Oral [facility] Edgar Velasco MD   albuterol 2 puff Inhalation Q4H PRN Edgar Velasco MD   bisacodyl 10 mg Rectal Daily PRN Edgar Velasco MD   budesonide 0 25 mg Nebulization BID Edgar Velasco MD   vitamin B-12 1,000 mcg Oral Daily Edgar Velasco MD   docusate sodium 100 mg Oral BID Edgar Velasco MD enoxaparin 40 mg Subcutaneous Q24H [address] Claudia Mae PA-C   ipratropium 0 5 mg Nebulization TID Melo Manzano MD   levalbuterol 1 25 mg Nebulization TID Melo Manzano MD   levETIRAcetam 250 mg Oral Q12H [address] Melo Manzano MD   ondansetron 4 mg Oral Q8H PRN Melo Manzano MD   oxyCODONE 5 mg Oral Q4H PRN Melo Manzano MD   Or       oxyCODONE 2 5 mg Oral Q4H PRN Melo Manzano MD   polyethylene glycol 17 g Oral Daily Claudia Mae PA-C   senna 2 tablet Oral HS Claudia Mae PA-C       Subjective/ HPI: Patients overnight issues or events were reviewed with nursing or staff during rounds or morning huddle session  No new or overnight issues  Offers no complaints  ROS:   A 10 point ROS was performed; negative except as noted above  Imaging:     No orders to display       *Labs /Radiology studies reviewed  *Medications reviewed and reconciled as needed  *Please refer to order section for additional ordered labs studies  *Case discussed with primary attending during morning huddle case rounds      Physical Examination:  Vitals:   Vitals:    07/13/20 1910 07/13/20 2018 07/14/20 0500 07/14/20 0712   BP: (!) 99/46  110/53    BP Location: Left arm  Left arm    Pulse: 99  98    Resp: 18  18    Temp: 98 °F (36 7 °C)  98 8 °F (37 1 °C)    TempSrc: Oral  Oral    SpO2: 99% 98% 97% 97%   Weight:       Height:           General Appearance: no distress, conversive  HEENT: PERRLA, conjuctiva normal; oropharynx clear; mucous membranes moist   Neck:  Supple, normal ROM, no JVD  Lungs: CTA, normal respiratory effort, no retractions, expiratory effort normal  CV: regular rate and rhythm; no rubs/murmurs/gallops, PMI normal   ABD: soft; ND/NT; +BS  EXT: no edema  Skin: normal turgor, normal texture, no rashes  Psych: affect normal, mood normal  Neuro: AAO      The above physical exam was reviewed and updated as determined by my evaluation of the patient on 7/14/2020      Invasive Devices     None VTE Pharmacologic Prophylaxis: Enoxaparin  Code Status: Level 1 - Full Code  Current Length of Stay: 9 day(s)      Total time spent:  30 minutes with more than 50% spent counseling/coordinating care  Counseling includes discussion with patient re: progress  and discussion with patient of his/her current medical state/information  Coordination of patient's care was performed in conjunction with primary service  Time invested included review of patient's labs, vitals, and management of their comorbidities with continued monitoring  In addition, this patient was discussed with medical team including physician and advanced extenders  The care of the patient was extensively discussed and appropriate treatment plan was formulated unique for this patient  ** Please Note:  voice to text software may have been used in the creation of this document   Although proof errors in transcription or interpretation are a potential of such software**

## 2020-07-14 NOTE — PROGRESS NOTES
07/14/20 1030   Pain Assessment   Pain Assessment Tool 0-10   Pain Score 5   Pain Location/Orientation Orientation: Right;Location: Hip   Restrictions/Precautions   Precautions Fall Risk;O2;Pain;Supervision on toilet/commode  (02 2L)   RLE Weight Bearing Per Order WBAT   Subjective   Subjective pt ready for skilled PT   Sit to Stand   Type of Assistance Needed Supervision; Adaptive equipment   Sit to Stand CARE Score 4   Bed-Chair Transfer   Type of Assistance Needed Supervision; Adaptive equipment   Chair/Bed-to-Chair Transfer CARE Score 4   Transfer Bed/Chair/Wheelchair   Limitations Noted In Balance; Coordination; Endurance;LE Strength   Adaptive Equipment Roller Walker   All Transfer Supervision   Walk 10 Feet   Type of Assistance Needed Incidental touching; Adaptive equipment   Walk 10 Feet CARE Score 4   Walk 50 Feet with Two Turns   Type of Assistance Needed Incidental touching; Adaptive equipment   Walk 50 Feet with Two Turns CARE Score 4   Ambulation   Does the patient walk? 2  Yes   Primary Mode of Locomotion Prior to Admission Walk   Distance Walked (feet) 50 ft  (x2)   Assist Device Roller Walker   Gait Pattern Decreased foot clearance; Inconsistant Sharron; Improper weight shift   Limitations Noted In Endurance   Provided Assistance with: Balance   Walk Assist Level Contact Guard   Assessment   Treatment Assessment Pt perform skilled PT focus on inc functional mobility with RW ambulation and gait training with RW outside on different surfaces of ground  with no LOB noted , pt instucted on 02 line to management during walking and turns for safety , pt perform STS x5  and thex ex;s BL LE LAQ seated marching x20 AP x20   Shelvy Mingle Pt walking back to her room with RW and 02 2L   pt needs to use bathroom before lunch time , pt in bathroom and return to bed side chair with all needs   Pt will cont to work on [address] [place] [address] for [place] goals    Problem List Decreased endurance;Decreased strength; Impaired balance;Decreased mobility; Decreased coordination;Decreased safety awareness;Pain;Orthopedic restrictions   Barriers to Discharge Inaccessible home environment;Decreased caregiver support   Plan   Treatment/Interventions Functional transfer training;LE strengthening/ROM; Therapeutic exercise; Endurance training;Patient/family training;Gait training   Progress Progressing toward goals   PT Therapy Minutes   PT Time In 1030   PT Time Out 1138   PT Total Time (minutes) 68   PT Mode of treatment - Individual (minutes) 68   PT Mode of treatment - Concurrent (minutes) 0   PT Mode of treatment - Group (minutes) 0   PT Mode of treatment - Co-treat (minutes) 0   PT Mode of Treatment - Total time(minutes) 68 minutes   PT Cumulative Minutes 693   Therapy Time missed   Time missed?  No

## 2020-07-14 NOTE — PROGRESS NOTES
07/14/20 1230   Pain Assessment   Pain Assessment Tool 0-10   Pain Score 5   Pain Location/Orientation Orientation: Right;Location: Hip   Hospital Pain Intervention(s) Repositioned   Restrictions/Precautions   Precautions Fall Risk;Pain;Supervision on toilet/commode   Weight Bearing Restrictions Yes   RLE Weight Bearing Per Order WBAT   Tub/Shower Transfer   Limitations Noted In Balance; Endurance;ROM;Safety;LE Strength   Adaptive Equipment Transfer Bench   Assessed Tub/shower combo   Findings Demos G carryover form previous tub transfer, via deep squat onto transfer bench  Pt cont's to req min A for RLE when transferring in/out of tub  Sit to Stand   Type of Assistance Needed Supervision; Adaptive equipment   Amount of Physical Assistance Provided No physical assistance   Comment RW   Sit to Stand CARE Score 4   Bed-Chair Transfer   Type of Assistance Needed Supervision; Adaptive equipment   Amount of Physical Assistance Provided No physical assistance   Comment RW   Chair/Bed-to-Chair Transfer CARE Score 4   Toilet Transfer   Type of Assistance Needed Supervision; Adaptive equipment   Amount of Physical Assistance Provided No physical assistance   Comment RW, pt cont' to require use of grab bars  Educated pt on use of commode over toilet upon d/c, pt verbalizes agreement  Toilet Transfer CARE Score 4   Cognition   Overall Cognitive Status WFL   Arousal/Participation Alert; Cooperative   Attention Within functional limits   Orientation Level Oriented X4   Memory Within functional limits   Following Commands Follows all commands and directions without difficulty   Activity Tolerance   Activity Tolerance Patient tolerated treatment well   Assessment   Treatment Assessment Pt was seen in skilled OT with focus on: Family Training for preparation of d/c, fxl transfers, O2 line management and AD education  Dada Sethi, sister was present for entire session and in good understanding of pts future care upon d/c   Pt purchased reacher at this session, no payment received at this time, pt provided with receipt to retain for her records and understands she will be billed later  Upon further discussion pt would like to purchase RW tray upon d/c, provided pt with handout of information to purchase independently  Overall pt tolerated session well  Extensive education on O2 line management safety, recommendation for supervision with bathing, safety recommendations for toileting at HS and to have BSC near pt  Confirmed that pt has RW which her sister will bring in at time of d/c for us to adjust, has tub transfer bench, and BSC already  Requesting hospital bed as she will have to stay in parents apartment instead of her own  Pt will also benefit from home OT/PT services  Pts sister confirms she will be staying for ~2-3 weeks pending pt progress and could stay longer if needed  She was inquiring about community reintegration tasks and use of rollator, educated no concerns with both as pt continues to present with dec endurance, dec act aziza, and dec strength  Pt would benefit from cont'd OT with focus on: O2 management, energy conservation and I/ADL re-education and establishing a HEP  OT Family training done with: Norberto Rod, sister   Prognosis Good   Problem List Decreased strength;Decreased range of motion;Decreased endurance; Impaired balance;Decreased mobility; Decreased safety awareness;Pain   Barriers to Discharge Decreased caregiver support   Plan   Treatment/Interventions ADL retraining;Functional transfer training; Therapeutic exercise; Endurance training;Patient/family training;Equipment eval/education; Compensatory technique education   Progress Progressing toward goals   Recommendation   OT Discharge Recommendation Return to previous environment with social support   OT Equipment ordered [address] REACHER   Date ordered 07/14/20   OT Therapy Minutes   OT Time In 1230   OT Time Out 1330   OT Total Time (minutes) 60   OT Mode of treatment - Individual (minutes) 60   OT Mode of treatment - Concurrent (minutes) 0   OT Mode of treatment - Group (minutes) 0   OT Mode of treatment - Co-treat (minutes) 0   OT Mode of Treatment - Total time(minutes) 60 minutes   OT Cumulative Minutes 735   Therapy Time missed   Time missed?  No

## 2020-07-14 NOTE — PROGRESS NOTES
PM&R Progress Note:    HPI: Pt is a 71 y/o who suffered a mechanical fall while walking in her garden landing on her right hip and suffering a fracture to the femoral neck  The patient underwent ORIF w/screws x 2 on 7/2  ASSESSMENT: Stable, progressing    PLAN:    Rehabilitation   Continue current rehabilitation plan of care to maximize function   Rehab update: Leelee transfers, ambulation 25ft RW, toilet transfers   Expected Discharge: DC 7/17/2020 home  Pain   Controlled  DVT prophylaxis   Lovenox  Bladder plan   Continent     Bowel plan   Continent    * Hip fracture [facility])  Assessment & Plan  R femoral neck fx status post surgical repair via medullary nail by Dr Graciela Rangel on 7/2  POD#14 is 7/16 - call Ortho tomorrow re 2 week follow-up  Weight-bearing as tolerated on affected limb       Constipation  Assessment & Plan  Continue scheduled bowel meds  Last BM 7/14/2020    Malnutrition ([facility] [address] )  Assessment & Plan  Severe protein calorie malnutrition findings  Continue nutritional consult and appreciate their recommendation  Will add a coffee shake to be given at dinner meal     Acute blood loss as cause of postoperative anemia  Assessment & Plan  IM consulted, with overall monitoring, and management at their discretion during ARC course  Monitor H/H, vitals, signs/symptoms of acute bleeding        On home oxygen therapy  Assessment & Plan  Chronically on 2 L of oxygen at all times    Metastatic adenocarcinoma to brain [facility])  Assessment & Plan  Follows with Dr Johnathon Serna as outpatient     [facility]  Assessment & Plan  IM consulted and with overall management at their discretion during ARC course  On home chronic 2L O2  Home nebs  Monitor vitals without and with activity   Incentive spirometry     Nicotine dependence  Assessment & Plan  Encourage cessation      Malignant neoplasm of right lung [facility])  Assessment & Plan  S/P chemo/radiation   Follow-up hem/onc after d/c     Appreciate IM consultants medical co-management  Labs, medications, and imaging personally reviewed  SUBJECTIVE:  Patient seen today after therapies  Pt reports she is doing well  Her appetite is improving  She denies any current complaints  ROS:  A ten point review of systems was completed 7/14/2020 and pertinent positives are listed in subjective section  All other systems reviewed were negative  OBJECTIVE:   /63 (BP Location: Left arm)   Pulse 78   Temp 98 8 °F (37 1 °C) (Oral)   Resp 18   Ht 5' 5" (1 651 m)   Wt 48 3 kg (106 lb 7 7 oz)   SpO2 98%   BMI 17 72 kg/m²     Physical Exam   Constitutional: She is oriented to person, place, and time  She appears well-developed  Thin appearing   HENT:   Head: Normocephalic and atraumatic  Mouth/Throat: Oropharynx is clear and moist    Eyes: Pupils are equal, round, and reactive to light  EOM are normal    Neck: Normal range of motion  Neck supple  Cardiovascular: Normal rate  Pulmonary/Chest: Effort normal and breath sounds normal    Abdominal: Soft  Bowel sounds are normal    Musculoskeletal: Normal range of motion  Atrophy Rt thigh   Neurological: She is alert and oriented to person, place, and time  Skin: Skin is warm and dry  Psychiatric: She has a normal mood and affect  Vitals reviewed         Lab Results   Component Value Date    WBC 4 87 07/13/2020    HGB 9 6 (L) 07/13/2020    HCT 30 2 (L) 07/13/2020     (H) 07/13/2020     07/13/2020     Lab Results   Component Value Date    SODIUM 134 (L) 07/13/2020    K 4 4 07/13/2020    CL 99 (L) 07/13/2020    CO2 32 07/13/2020    BUN 14 07/13/2020    CREATININE 0 41 (L) 07/13/2020    GLUC 86 07/13/2020    CALCIUM 8 7 07/13/2020     Lab Results   Component Value Date    INR 0 95 07/01/2020    INR 0 91 10/18/2019    INR 1 03 03/07/2019    PROTIME 12 7 07/01/2020    PROTIME 11 9 10/18/2019    PROTIME 13 6 03/07/2019           Current Facility-Administered Medications:     acetaminophen (TYLENOL) tablet 975 mg, 975 mg, Oral, Q8H [facility], Lyn Salazar MD, 975 mg at 07/14/20 1436    albuterol (PROVENTIL HFA,VENTOLIN HFA) inhaler 2 puff, 2 puff, Inhalation, Q4H PRN, Lyn Salazar MD    bisacodyl (DULCOLAX) rectal suppository 10 mg, 10 mg, Rectal, Daily PRN, Lyn Salazar MD    budesonide (PULMICORT) inhalation solution 0 25 mg, 0 25 mg, Nebulization, BID, Lyn Salazar MD, 0 25 mg at 07/14/20 3225    cyanocobalamin (VITAMIN B-12) tablet 1,000 mcg, 1,000 mcg, Oral, Daily, Lyn Salazar MD, 1,000 mcg at 07/14/20 0908    docusate sodium (COLACE) capsule 100 mg, 100 mg, Oral, BID, Lyn Salazar MD, 100 mg at 07/10/20 1730    enoxaparin (LOVENOX) subcutaneous injection 40 mg, 40 mg, Subcutaneous, Q24H [facility], Claudia Mae PA-C, 40 mg at 07/14/20 0908    ipratropium (ATROVENT) 0 02 % inhalation solution 0 5 mg, 0 5 mg, Nebulization, TID, Lyn Salazar MD, 0 5 mg at 07/14/20 1447    levalbuterol (Primo Blazer) inhalation solution 1 25 mg, 1 25 mg, Nebulization, TID, Lyn Salazar MD, 1 25 mg at 07/14/20 1447    levETIRAcetam (KEPPRA) tablet 250 mg, 250 mg, Oral, Q12H [facility], Lyn Salazar MD, 250 mg at 07/14/20 0908    ondansetron (ZOFRAN-ODT) dispersible tablet 4 mg, 4 mg, Oral, Q8H PRN, Lyn Salazar MD    oxyCODONE (ROXICODONE) IR tablet 5 mg, 5 mg, Oral, Q4H PRN, 5 mg at 07/14/20 1433 **OR** oxyCODONE (ROXICODONE) IR tablet 2 5 mg, 2 5 mg, Oral, Q4H PRN, Lyn Salazar MD, 2 5 mg at 07/14/20 0949    polyethylene glycol (MIRALAX) packet 17 g, 17 g, Oral, Daily, Claudia Mae PA-C, 17 g at 07/07/20 2851    senna (SENOKOT) tablet 17 2 mg, 2 tablet, Oral, HS, Claudia Mae PA-C, 17 2 mg at 07/11/20 2147    Past Medical History:   Diagnosis Date    Breast cancer ([facility] ) 1996    [facility]     COPD, severe ([facility] )     Lung disease     Requires supplemental oxygen     3L 24 hrs/day    Stage 4 lung cancer [facility])        Patient Active Problem List Diagnosis Date Noted    Hip fracture ([place] [address] ) 07/01/2020     Priority: High    Malnutrition ([place] [address] ) 07/05/2020    Constipation 07/05/2020    Acute blood loss as cause of postoperative anemia 07/04/2020    Hyponatremia 07/01/2020    Palliative care patient 02/27/2020    Scab 01/02/2020    Xerostomia 11/11/2019    Caregiver stress 11/11/2019    Medical marijuana use 11/11/2019    On home oxygen therapy 10/24/2019    Metastatic adenocarcinoma to brain ([place] [address] ) 10/09/2019    Cramp of limb 09/20/2019    Episode of shaking 09/20/2019    Shortness of breath 03/13/2019    Chronic hypoxemic respiratory failure ([place] [address] ) 02/26/2019    Malignant neoplasm of right lung (HCC) 03/09/2018    Depressive disorder 11/14/2017    COPD (chronic obstructive pulmonary disease) ([place] [address] ) 10/14/2016    Hyperlipidemia 04/08/2016    Loss of appetite 01/23/2015    Malignant cachexia ([place] [address] ) 10/05/2012    Nicotine dependence 10/05/2012          Claudia Mae PA-C    Total time spent:  30 minutes with more than 50% spent counseling/coordinating care  Counseling includes discussion with patient re: progress and discussion with patient of his/her current medical state/information  Coordination of patient's care was performed in conjunction with consulting services  Time invested included review of patient's labs, vitals, and management of their comorbidities with continued monitoring  The care of the patient was extensively discussed and appropriate treatment plan was formulated unique for this patient  ** Please Note:  voice to text software may have been used in the creation of this document   Although proof errors in transcription or interpretation are a potential of such software**

## 2020-07-15 PROCEDURE — 94640 AIRWAY INHALATION TREATMENT: CPT

## 2020-07-15 PROCEDURE — 97530 THERAPEUTIC ACTIVITIES: CPT

## 2020-07-15 PROCEDURE — 97535 SELF CARE MNGMENT TRAINING: CPT

## 2020-07-15 PROCEDURE — 97110 THERAPEUTIC EXERCISES: CPT

## 2020-07-15 PROCEDURE — 94760 N-INVAS EAR/PLS OXIMETRY 1: CPT

## 2020-07-15 PROCEDURE — 99233 SBSQ HOSP IP/OBS HIGH 50: CPT | Performed by: PHYSICAL MEDICINE & REHABILITATION

## 2020-07-15 RX ADMIN — IPRATROPIUM BROMIDE 0.5 MG: 0.5 SOLUTION RESPIRATORY (INHALATION) at 13:03

## 2020-07-15 RX ADMIN — OXYCODONE HYDROCHLORIDE 5 MG: 5 TABLET ORAL at 12:24

## 2020-07-15 RX ADMIN — LEVALBUTEROL HYDROCHLORIDE 1.25 MG: 1.25 SOLUTION, CONCENTRATE RESPIRATORY (INHALATION) at 13:03

## 2020-07-15 RX ADMIN — IPRATROPIUM BROMIDE 0.5 MG: 0.5 SOLUTION RESPIRATORY (INHALATION) at 19:46

## 2020-07-15 RX ADMIN — LEVALBUTEROL HYDROCHLORIDE 1.25 MG: 1.25 SOLUTION, CONCENTRATE RESPIRATORY (INHALATION) at 07:34

## 2020-07-15 RX ADMIN — OXYCODONE HYDROCHLORIDE 5 MG: 5 TABLET ORAL at 22:36

## 2020-07-15 RX ADMIN — LEVETIRACETAM 250 MG: 500 TABLET, FILM COATED ORAL at 08:14

## 2020-07-15 RX ADMIN — BUDESONIDE 0.25 MG: 0.25 INHALANT RESPIRATORY (INHALATION) at 19:46

## 2020-07-15 RX ADMIN — ACETAMINOPHEN 975 MG: 325 TABLET, FILM COATED ORAL at 21:03

## 2020-07-15 RX ADMIN — ENOXAPARIN SODIUM 40 MG: 40 INJECTION SUBCUTANEOUS at 08:15

## 2020-07-15 RX ADMIN — ACETAMINOPHEN 975 MG: 325 TABLET, FILM COATED ORAL at 14:53

## 2020-07-15 RX ADMIN — ACETAMINOPHEN 975 MG: 325 TABLET, FILM COATED ORAL at 05:17

## 2020-07-15 RX ADMIN — LEVETIRACETAM 250 MG: 500 TABLET, FILM COATED ORAL at 21:02

## 2020-07-15 RX ADMIN — CYANOCOBALAMIN TAB 500 MCG 1000 MCG: 500 TAB at 08:14

## 2020-07-15 RX ADMIN — BUDESONIDE 0.25 MG: 0.25 INHALANT RESPIRATORY (INHALATION) at 07:34

## 2020-07-15 RX ADMIN — DOCUSATE SODIUM 100 MG: 100 CAPSULE, LIQUID FILLED ORAL at 17:02

## 2020-07-15 RX ADMIN — IPRATROPIUM BROMIDE 0.5 MG: 0.5 SOLUTION RESPIRATORY (INHALATION) at 07:34

## 2020-07-15 RX ADMIN — OXYCODONE HYDROCHLORIDE 2.5 MG: 5 TABLET ORAL at 08:13

## 2020-07-15 RX ADMIN — LEVALBUTEROL HYDROCHLORIDE 1.25 MG: 1.25 SOLUTION, CONCENTRATE RESPIRATORY (INHALATION) at 19:46

## 2020-07-15 NOTE — PROGRESS NOTES
07/15/20 0830   Pain Assessment   Pain Assessment Tool 0-10   Pain Score 5   Pain Location/Orientation Orientation: Right;Location: Leg   Restrictions/Precautions   Precautions Fall Risk;Pain   RLE Weight Bearing Per Order WBAT   Subjective   Subjective pt agreeable to perform skilled PT pain right LE    Lying to Sitting on Side of Bed   Type of Assistance Needed Set-up / clean-up   Lying to Sitting on Side of Bed CARE Score 5   Sit to Stand   Type of Assistance Needed Supervision   Sit to Stand CARE Score 4   Bed-Chair Transfer   Type of Assistance Needed Supervision; Adaptive equipment   Chair/Bed-to-Chair Transfer CARE Score 4   Transfer Bed/Chair/Wheelchair   Limitations Noted In Balance; Coordination; Endurance;LE Strength   Adaptive Equipment Roller Walker   All Transfer Supervision   Walk 10 Feet   Type of Assistance Needed Supervision; Adaptive equipment   Walk 10 Feet CARE Score 4   Walk 50 Feet with Two Turns   Type of Assistance Needed Supervision; Adaptive equipment   Walk 50 Feet with Two Turns CARE Score 4   Ambulation   Does the patient walk? 2  Yes   Primary Mode of Locomotion Prior to Admission Walk   Distance Walked (feet) 50 ft   Assist Device Roller [employer] Assist Level Supervision   Curb or Single Stair   Style negotiated Single stair  (curb )   Type of Assistance Needed Incidental touching; Adaptive equipment   1 Step (Curb) CARE Score 4   4 Steps   Type of Assistance Needed Incidental touching   4 Steps CARE Score 4   Stairs   Type Curb;Stairs   # of Steps 4  (and curb step)   Weight Bearing Precautions Fall Risk   Assist Devices Single Rail;Hand Hold;Roller Walker   Therapeutic Interventions   Flexibility BL LE PROM    Balance SPT with RW    Equipment Use   NuStep lvl 10 min L2    Assessment   Treatment Assessment pt engaged in skilled PT focus on inc functional mobility , up and down stairs and curb step w/ with RW and instructed and vc's for stairs , pt cont to be on 2L02 and going home within , pt recv'd HEP with instructed next session , pt will cont toward DC goals and working on car transfers later this AM     Barriers to Discharge Decreased caregiver support   Plan   Progress Progressing toward goals   PT Therapy Minutes   PT Time In 0830   PT Time Out 0910   PT Total Time (minutes) 40   PT Mode of treatment - Individual (minutes) 30   PT Mode of treatment - Concurrent (minutes) 10   PT Mode of treatment - Group (minutes) 0   PT Mode of treatment - Co-treat (minutes) 0   PT Mode of Treatment - Total time(minutes) 40 minutes   PT Cumulative Minutes 793   Therapy Time missed   Time missed?  No

## 2020-07-15 NOTE — PROGRESS NOTES
Internal Medicine Progress Note  Patient: Nico Zuniga  Age/sex: 70 y o  female  Medical Record #: [id number]      ASSESSMENT/PLAN: (Interval History)  Nico Zuniga is seen and examined and management for following issues:    R femoral neck fracture; s/p ORIF 7/2/20  · F/u ortho as scheduled  · WBAT     Lung CA with mets to brain/right parietal craniotomy 10/2019  · Completed WBR 11/2019 and chemo for lung CA 2018  · Stable  · Continue Keppra 250mg BID as at home     Severe COPD  · Cont Pulmicort, Atrovent, Xopenex nebs   · At home on Atrovent, Brovana and Pulmicort  · Wears chronic oxygen 2L NC at home  · Maintain sats >88%     Chronic tobacco use  · On no nicotine patch     Anemia  · stable  · Transfuse if hgb <8     Hx right breast cancer/mastectomy/axillary LN dissection  · Limb alert right arm     Nutrition/severe malnutrition  · Dietary following  · Uses MMJ as OP; intol to Remeron with nausea, Megace ineffective  · Appetite has improved here       The above assessment and plan was reviewed and updated as determined by my evaluation of the patient on 7/15/2020      Labs:   Results from last 7 days   Lab Units 07/13/20  0629 07/09/20  0507   WBC Thousand/uL 4 87 4 41   HEMOGLOBIN g/dL 9 6* 9 8*   HEMATOCRIT % 30 2* 30 0*   PLATELETS Thousands/uL 341 297     Results from last 7 days   Lab Units 07/13/20  0629 07/09/20  0507   SODIUM mmol/L 134* 134*   POTASSIUM mmol/L 4 4 4 2   CHLORIDE mmol/L 99* 97*   CO2 mmol/L 32 34*   BUN mg/dL 14 12   CREATININE mg/dL 0 41* 0 39*   CALCIUM mg/dL 8 7 8 8                   Review of Scheduled Meds:    Current Facility-Administered Medications:  acetaminophen 975 mg Oral [facility] Kali Arriola MD   albuterol 2 puff Inhalation Q4H PRN Kali Arriola MD   bisacodyl 10 mg Rectal Daily PRN Kali Arriola MD   budesonide 0 25 mg Nebulization BID Kali Arriola MD   vitamin B-12 1,000 mcg Oral Daily Kali Arriola MD   docusate sodium 100 mg Oral BID Kali Arriola MD   enoxaparin 40 mg Subcutaneous Q24H [facility] Claudia Mae PA-C   ipratropium 0 5 mg Nebulization TID Ever Harris MD   levalbuterol 1 25 mg Nebulization TID Ever Harris MD   levETIRAcetam 250 mg Oral Q12H [facility] Ever Harris MD   ondansetron 4 mg Oral Q8H PRN Ever Harris MD   oxyCODONE 5 mg Oral Q4H PRN Ever Harris MD   Or       oxyCODONE 2 5 mg Oral Q4H PRN Ever Harris MD   polyethylene glycol 17 g Oral Daily Claudia Mae PA-C   senna 2 tablet Oral HS Claudia Mae PA-C       Subjective/ HPI: Patients overnight issues or events were reviewed with nursing or staff during rounds or morning huddle session  No new or overnight issues  Offers no complaints  ROS:   A 10 point ROS was performed; negative except as noted above  Imaging:     No orders to display       *Labs /Radiology studies reviewed  *Medications reviewed and reconciled as needed  *Please refer to order section for additional ordered labs studies  *Case discussed with primary attending during morning huddle case rounds      Physical Examination:  Vitals:   Vitals:    07/14/20 1921 07/14/20 1952 07/15/20 0511 07/15/20 0734   BP:  116/74 104/52    BP Location:  Left arm Left arm    Pulse:  82 95    Resp:  18 18    Temp:  98 6 °F (37 °C) 98 7 °F (37 1 °C)    TempSrc:  Oral Oral    SpO2: 97% 98% 96% 95%   Weight:   38 6 kg (85 lb 2 oz)    Height:           General Appearance: no distress, conversive  HEENT: PERRLA, conjuctiva normal; oropharynx clear; mucous membranes moist   Neck:  Supple, normal ROM, no JVD  Lungs: CTA, normal respiratory effort, no retractions, expiratory effort normal  CV: regular rate and rhythm; no rubs/murmurs/gallops, PMI normal   ABD: soft; ND/NT; +BS  EXT: no edema  Skin: normal turgor, normal texture, no rashes  Psych: affect normal, mood normal  Neuro: AAO      The above physical exam was reviewed and updated as determined by my evaluation of the patient on 7/15/2020      Invasive Devices     None                    VTE Pharmacologic Prophylaxis: Enoxaparin  Code Status: Level 1 - Full Code  Current Length of Stay: 10 day(s)      Total time spent:  30 minutes with more than 50% spent counseling/coordinating care  Counseling includes discussion with patient re: progress  and discussion with patient of his/her current medical state/information  Coordination of patient's care was performed in conjunction with primary service  Time invested included review of patient's labs, vitals, and management of their comorbidities with continued monitoring  In addition, this patient was discussed with medical team including physician and advanced extenders  The care of the patient was extensively discussed and appropriate treatment plan was formulated unique for this patient  ** Please Note:  voice to text software may have been used in the creation of this document   Although proof errors in transcription or interpretation are a potential of such software**

## 2020-07-15 NOTE — PROGRESS NOTES
PM&R Progress Note:    HPI: Pt is a 69 y/o who suffered a mechanical fall while walking in her garden landing on her right hip and suffering a fracture to the femoral neck  The patient underwent ORIF w/screws x 2 on 7/2  ASSESSMENT: Stable, progressing    PLAN:    Rehabilitation   Continue current rehabilitation plan of care to maximize function   Rehab update: Leelee transfers, ambulation 25ft RW, toilet transfers   Expected Discharge: DC 7/17/2020 home  Pain   Controlled  DVT prophylaxis   Lovenox -cousin to inject  Bladder plan   Continent     Bowel plan   Continent    * Hip fracture [facility])  Assessment & Plan  R femoral neck fx status post surgical repair via medullary nail by Dr Mariana Carter on 7/2  POD#14 is 7/16 - ortho aware  Weight-bearing as tolerated on affected limb        Constipation  Assessment & Plan  Continue scheduled bowel meds  Last BM 7/14/2020    Malnutrition ([facility] [address] )  Assessment & Plan  Severe protein calorie malnutrition findings  Continue nutritional consult and appreciate their recommendation  Coffee shake to be given at dinner meal     Acute blood loss as cause of postoperative anemia  Assessment & Plan  IM consulted, with overall monitoring, and management at their discretion during ARC course  Monitor H/H, vitals, signs/symptoms of acute bleeding        On home oxygen therapy  Assessment & Plan  Chronically on 2 L of oxygen at all times    Metastatic adenocarcinoma to brain [facility])  Assessment & Plan  Follows with Dr Sangeeta Edward as outpatient     COPD (chronic obstructive pulmonary disease) ([facility] [address] )  Assessment & Plan  IM consulted and with overall management at their discretion during ARC course  On home chronic 2L O2  Home nebs  Monitor vitals without and with activity   Incentive spirometry     Nicotine dependence  Assessment & Plan  Encourage cessation      Malignant neoplasm of right lung [facility])  Assessment & Plan  S/P chemo/radiation   Follow-up hem/onc after d/c     Appreciate IM consultants medical co-management  Labs, medications, and imaging personally reviewed  SUBJECTIVE:  Patient seen today after therapies  Patient states she has somewhat increased pain in the right hip today  The patient is having trouble injecting Lovenox due to her eyesight but her cousin will be able to inject at home  She denies any current complaints  ROS:  A ten point review of systems was completed 7/15/2020 and pertinent positives are listed in subjective section  All other systems reviewed were negative  OBJECTIVE:   /58 (BP Location: Right arm)   Pulse 62   Temp 97 8 °F (36 6 °C) (Oral)   Resp 18   Ht 5' 5" (1 651 m)   Wt 38 6 kg (85 lb 2 oz)   SpO2 95%   BMI 14 17 kg/m²     Physical Exam   Constitutional: She is oriented to person, place, and time  She appears well-developed and well-nourished  Thin appearing  HENT:   Head: Normocephalic and atraumatic  Mouth/Throat: Oropharynx is clear and moist    Eyes: Pupils are equal, round, and reactive to light  EOM are normal    Neck: Normal range of motion  Neck supple  Cardiovascular: Normal rate  Pulmonary/Chest: Effort normal and breath sounds normal    Abdominal: Soft  Bowel sounds are normal    Musculoskeletal: Normal range of motion  Neurological: She is alert and oriented to person, place, and time  Skin: Skin is warm and dry  Psychiatric: She has a normal mood and affect  Vitals reviewed         Lab Results   Component Value Date    WBC 4 87 07/13/2020    HGB 9 6 (L) 07/13/2020    HCT 30 2 (L) 07/13/2020     (H) 07/13/2020     07/13/2020     Lab Results   Component Value Date    SODIUM 134 (L) 07/13/2020    K 4 4 07/13/2020    CL 99 (L) 07/13/2020    CO2 32 07/13/2020    BUN 14 07/13/2020    CREATININE 0 41 (L) 07/13/2020    GLUC 86 07/13/2020    CALCIUM 8 7 07/13/2020     Lab Results   Component Value Date    INR 0 95 07/01/2020    INR 0 91 10/18/2019    INR 1 03 03/07/2019    PROTIME 12 7 07/01/2020    PROTIME 11 9 10/18/2019    PROTIME 13 6 03/07/2019           Current Facility-Administered Medications:     acetaminophen (TYLENOL) tablet 975 mg, 975 mg, Oral, Q8H [address], Thais Jarvis MD, 975 mg at 07/15/20 1453    albuterol (PROVENTIL HFA,VENTOLIN HFA) inhaler 2 puff, 2 puff, Inhalation, Q4H PRN, Thais Jarvis MD    bisacodyl (DULCOLAX) rectal suppository 10 mg, 10 mg, Rectal, Daily PRN, Thais Jarvis MD    budesonide (PULMICORT) inhalation solution 0 25 mg, 0 25 mg, Nebulization, BID, Thais Jarvis MD, 0 25 mg at 07/15/20 1218    cyanocobalamin (VITAMIN B-12) tablet 1,000 mcg, 1,000 mcg, Oral, Daily, Thais Jarvis MD, 1,000 mcg at 07/15/20 2854    docusate sodium (COLACE) capsule 100 mg, 100 mg, Oral, BID, Thais Jarvis MD, 100 mg at 07/10/20 1730    enoxaparin (LOVENOX) subcutaneous injection 40 mg, 40 mg, Subcutaneous, Q24H [address], Claudia Mae PA-C, 40 mg at 07/15/20 0815    ipratropium (ATROVENT) 0 02 % inhalation solution 0 5 mg, 0 5 mg, Nebulization, TID, Thais Jarvis MD, 0 5 mg at 07/15/20 1303    levalbuterol (Clearnce Sartorius) inhalation solution 1 25 mg, 1 25 mg, Nebulization, TID, Thais Jarvis MD, 1 25 mg at 07/15/20 1303    levETIRAcetam (KEPPRA) tablet 250 mg, 250 mg, Oral, Q12H [address], Thais Jarvis MD, 250 mg at 07/15/20 0814    ondansetron (ZOFRAN-ODT) dispersible tablet 4 mg, 4 mg, Oral, Q8H PRN, Thais Jarvis MD    oxyCODONE (ROXICODONE) IR tablet 5 mg, 5 mg, Oral, Q4H PRN, 5 mg at 07/15/20 1224 **OR** oxyCODONE (ROXICODONE) IR tablet 2 5 mg, 2 5 mg, Oral, Q4H PRN, Thais Javris MD, 2 5 mg at 07/15/20 0813    polyethylene glycol (MIRALAX) packet 17 g, 17 g, Oral, Daily, Claudia Mae PA-C, 17 g at 07/07/20 7999    senna (SENOKOT) tablet 17 2 mg, 2 tablet, Oral, HS, Claudia Mae PA-C, 17 2 mg at 07/11/20 6305    Past Medical History:   Diagnosis Date    Breast cancer ([facility] ) 1996    Cancer ([facility] )     COPD, severe ([facility] )     Lung disease     Requires supplemental oxygen     3L 24 hrs/day    Stage 4 lung cancer [facility])        Patient Active Problem List    Diagnosis Date Noted    Hip fracture ([place] [address] ) 07/01/2020     Priority: High    Malnutrition ([place] [address] ) 07/05/2020    Constipation 07/05/2020    Acute blood loss as cause of postoperative anemia 07/04/2020    Hyponatremia 07/01/2020    Palliative care patient 02/27/2020    Scab 01/02/2020    Xerostomia 11/11/2019    Caregiver stress 11/11/2019    Medical marijuana use 11/11/2019    On home oxygen therapy 10/24/2019    Metastatic adenocarcinoma to brain ([place] [address] ) 10/09/2019    Cramp of limb 09/20/2019    Episode of shaking 09/20/2019    Shortness of breath 03/13/2019    Chronic hypoxemic respiratory failure ([place] [address] ) 02/26/2019    Malignant neoplasm of right lung (HCC) 03/09/2018    Depressive disorder 11/14/2017    COPD (chronic obstructive pulmonary disease) ([place] [address] ) 10/14/2016    Hyperlipidemia 04/08/2016    Loss of appetite 01/23/2015    Malignant cachexia ([place] [address] ) 10/05/2012    Nicotine dependence 10/05/2012          Claudia Mae PA-C    Total time spent:  30 minutes with more than 50% spent counseling/coordinating care  Counseling includes discussion with patient re: progress and discussion with patient of his/her current medical state/information  Coordination of patient's care was performed in conjunction with consulting services  Time invested included review of patient's labs, vitals, and management of their comorbidities with continued monitoring  The care of the patient was extensively discussed and appropriate treatment plan was formulated unique for this patient  ** Please Note:  voice to text software may have been used in the creation of this document   Although proof errors in transcription or interpretation are a potential of such software**

## 2020-07-15 NOTE — PROGRESS NOTES
07/15/20 1030   Pain Assessment   Pain Assessment Tool 0-10   Pain Score 6   Pain Location/Orientation Orientation: Right;Location: Leg   Restrictions/Precautions   Precautions Fall Risk;Pain   RLE Weight Bearing Per Order WBAT   Subjective   Subjective pt agreeable to perform skilled PT    Sit to Stand   Type of Assistance Needed Supervision; Adaptive equipment   Sit to Stand CARE Score 4   Bed-Chair Transfer   Type of Assistance Needed Supervision; Adaptive equipment   Chair/Bed-to-Chair Transfer CARE Score 4   Transfer Bed/Chair/Wheelchair   Limitations Noted In Balance; Coordination;UE Strength;LE Strength   Adaptive Equipment Roller Walker   Car Transfer Supervision   Car Transfer   Type of Assistance Needed Supervision   Car Transfer CARE Score 4   Walk 10 Feet   Type of Assistance Needed Supervision;Verbal cues   Walk 10 Feet CARE Score 4   Ambulation   Does the patient walk? 2  Yes   Primary Mode of Locomotion Prior to Admission Walk   Distance Walked (feet) 25 ft  (x2)   Assist Device DxO Labs Assist Level Supervision   Picking Up Object   Type of Assistance Needed Supervision   Comment with reacher and using AD for support   Picking Up Object CARE Score 4   Therapeutic Interventions   Strengthening HEP handout for RLE with instructions    Flexibility PROM BL LE    Balance sitting and standing balance    Other Comments   Comments pt overall progressing for DC goals and pt cont to be challenge with inc walking distance with RW , pt at 2L02 95% during activities  F/T yesterday was very good with her sister Drea Paris   Assessment   Treatment Assessment pt perform skilled PT focus on BL LE strengthening and ROM ronni RLE , pt instructed with RLE handout for home thex ex's to his tolerance   Pt perform car xfers at S lvl pt c/o pain with RLE with nsging aware  Pt overall perform STS x5 and improving for DC this week   Pts sister confirms she will be staying for ~2-3 weeks pending pt progress and could stay longer if needed  Pt has all DME for home D/C  Barriers to Discharge Decreased caregiver support   Plan   Treatment/Interventions Functional transfer training;LE strengthening/ROM; Therapeutic exercise; Endurance training;Gait training;Patient/family training   Progress Progressing toward goals   PT Therapy Minutes   PT Time In 1030   PT Time Out 1130   PT Total Time (minutes) 60   PT Mode of treatment - Individual (minutes) 60   PT Mode of treatment - Concurrent (minutes) 0   PT Mode of treatment - Group (minutes) 0   PT Mode of treatment - Co-treat (minutes) 0   PT Mode of Treatment - Total time(minutes) 60 minutes   PT Cumulative Minutes 853   Therapy Time missed   Time missed?  No

## 2020-07-16 ENCOUNTER — APPOINTMENT (INPATIENT)
Dept: RADIOLOGY | Facility: HOSPITAL | Age: 75
DRG: 559 | End: 2020-07-16
Payer: MEDICARE

## 2020-07-16 LAB
ANION GAP SERPL CALCULATED.3IONS-SCNC: 3 MMOL/L (ref 4–13)
BASOPHILS # BLD AUTO: 0.04 THOUSANDS/ΜL (ref 0–0.1)
BASOPHILS NFR BLD AUTO: 1 % (ref 0–1)
BUN SERPL-MCNC: 14 MG/DL (ref 5–25)
CALCIUM SERPL-MCNC: 9.3 MG/DL (ref 8.3–10.1)
CHLORIDE SERPL-SCNC: 99 MMOL/L (ref 100–108)
CO2 SERPL-SCNC: 34 MMOL/L (ref 21–32)
CREAT SERPL-MCNC: 0.34 MG/DL (ref 0.6–1.3)
EOSINOPHIL # BLD AUTO: 0.07 THOUSAND/ΜL (ref 0–0.61)
EOSINOPHIL NFR BLD AUTO: 1 % (ref 0–6)
ERYTHROCYTE [DISTWIDTH] IN BLOOD BY AUTOMATED COUNT: 13.2 % (ref 11.6–15.1)
GFR SERPL CREATININE-BSD FRML MDRD: 109 ML/MIN/1.73SQ M
GLUCOSE SERPL-MCNC: 78 MG/DL (ref 65–140)
HCT VFR BLD AUTO: 27.9 % (ref 34.8–46.1)
HGB BLD-MCNC: 9.1 G/DL (ref 11.5–15.4)
IMM GRANULOCYTES # BLD AUTO: 0.01 THOUSAND/UL (ref 0–0.2)
IMM GRANULOCYTES NFR BLD AUTO: 0 % (ref 0–2)
LYMPHOCYTES # BLD AUTO: 0.68 THOUSANDS/ΜL (ref 0.6–4.47)
LYMPHOCYTES NFR BLD AUTO: 14 % (ref 14–44)
MCH RBC QN AUTO: 32.2 PG (ref 26.8–34.3)
MCHC RBC AUTO-ENTMCNC: 32.6 G/DL (ref 31.4–37.4)
MCV RBC AUTO: 99 FL (ref 82–98)
MONOCYTES # BLD AUTO: 0.58 THOUSAND/ΜL (ref 0.17–1.22)
MONOCYTES NFR BLD AUTO: 12 % (ref 4–12)
NEUTROPHILS # BLD AUTO: 3.55 THOUSANDS/ΜL (ref 1.85–7.62)
NEUTS SEG NFR BLD AUTO: 72 % (ref 43–75)
NRBC BLD AUTO-RTO: 0 /100 WBCS
PLATELET # BLD AUTO: 391 THOUSANDS/UL (ref 149–390)
PMV BLD AUTO: 9.8 FL (ref 8.9–12.7)
POTASSIUM SERPL-SCNC: 4.2 MMOL/L (ref 3.5–5.3)
RBC # BLD AUTO: 2.83 MILLION/UL (ref 3.81–5.12)
SODIUM SERPL-SCNC: 136 MMOL/L (ref 136–145)
WBC # BLD AUTO: 4.93 THOUSAND/UL (ref 4.31–10.16)

## 2020-07-16 PROCEDURE — 99233 SBSQ HOSP IP/OBS HIGH 50: CPT | Performed by: PHYSICAL MEDICINE & REHABILITATION

## 2020-07-16 PROCEDURE — 97530 THERAPEUTIC ACTIVITIES: CPT

## 2020-07-16 PROCEDURE — 97110 THERAPEUTIC EXERCISES: CPT

## 2020-07-16 PROCEDURE — 85025 COMPLETE CBC W/AUTO DIFF WBC: CPT | Performed by: NURSE PRACTITIONER

## 2020-07-16 PROCEDURE — 94760 N-INVAS EAR/PLS OXIMETRY 1: CPT

## 2020-07-16 PROCEDURE — 73502 X-RAY EXAM HIP UNI 2-3 VIEWS: CPT

## 2020-07-16 PROCEDURE — NC001 PR NO CHARGE: Performed by: ORTHOPAEDIC SURGERY

## 2020-07-16 PROCEDURE — 80048 BASIC METABOLIC PNL TOTAL CA: CPT | Performed by: NURSE PRACTITIONER

## 2020-07-16 PROCEDURE — 97535 SELF CARE MNGMENT TRAINING: CPT

## 2020-07-16 PROCEDURE — 94640 AIRWAY INHALATION TREATMENT: CPT

## 2020-07-16 RX ADMIN — OXYCODONE HYDROCHLORIDE 2.5 MG: 5 TABLET ORAL at 13:32

## 2020-07-16 RX ADMIN — IPRATROPIUM BROMIDE 0.5 MG: 0.5 SOLUTION RESPIRATORY (INHALATION) at 07:23

## 2020-07-16 RX ADMIN — ACETAMINOPHEN 975 MG: 325 TABLET, FILM COATED ORAL at 21:11

## 2020-07-16 RX ADMIN — OXYCODONE HYDROCHLORIDE 5 MG: 5 TABLET ORAL at 21:12

## 2020-07-16 RX ADMIN — ACETAMINOPHEN 975 MG: 325 TABLET, FILM COATED ORAL at 05:11

## 2020-07-16 RX ADMIN — ACETAMINOPHEN 975 MG: 325 TABLET, FILM COATED ORAL at 15:00

## 2020-07-16 RX ADMIN — DOCUSATE SODIUM 100 MG: 100 CAPSULE, LIQUID FILLED ORAL at 17:53

## 2020-07-16 RX ADMIN — LEVETIRACETAM 250 MG: 500 TABLET, FILM COATED ORAL at 21:12

## 2020-07-16 RX ADMIN — SENNOSIDES 8.6 MG: 8.6 TABLET ORAL at 21:11

## 2020-07-16 RX ADMIN — CYANOCOBALAMIN TAB 500 MCG 1000 MCG: 500 TAB at 09:32

## 2020-07-16 RX ADMIN — BUDESONIDE 0.25 MG: 0.25 INHALANT RESPIRATORY (INHALATION) at 19:20

## 2020-07-16 RX ADMIN — LEVALBUTEROL HYDROCHLORIDE 1.25 MG: 1.25 SOLUTION, CONCENTRATE RESPIRATORY (INHALATION) at 19:20

## 2020-07-16 RX ADMIN — DOCUSATE SODIUM 100 MG: 100 CAPSULE, LIQUID FILLED ORAL at 09:32

## 2020-07-16 RX ADMIN — BUDESONIDE 0.25 MG: 0.25 INHALANT RESPIRATORY (INHALATION) at 07:23

## 2020-07-16 RX ADMIN — LEVETIRACETAM 250 MG: 500 TABLET, FILM COATED ORAL at 09:32

## 2020-07-16 RX ADMIN — ENOXAPARIN SODIUM 40 MG: 40 INJECTION SUBCUTANEOUS at 09:31

## 2020-07-16 RX ADMIN — OXYCODONE HYDROCHLORIDE 2.5 MG: 5 TABLET ORAL at 09:32

## 2020-07-16 RX ADMIN — IPRATROPIUM BROMIDE 0.5 MG: 0.5 SOLUTION RESPIRATORY (INHALATION) at 19:20

## 2020-07-16 RX ADMIN — LEVALBUTEROL HYDROCHLORIDE 1.25 MG: 1.25 SOLUTION, CONCENTRATE RESPIRATORY (INHALATION) at 07:22

## 2020-07-16 RX ADMIN — LEVALBUTEROL HYDROCHLORIDE 1.25 MG: 1.25 SOLUTION, CONCENTRATE RESPIRATORY (INHALATION) at 14:08

## 2020-07-16 RX ADMIN — IPRATROPIUM BROMIDE 0.5 MG: 0.5 SOLUTION RESPIRATORY (INHALATION) at 14:08

## 2020-07-16 NOTE — PROGRESS NOTES
07/15/20 1310   Pain Assessment   Pain Assessment Tool 0-10   Pain Score 6   Pain Location/Orientation Orientation: Right;Location: Hip   Hospital Pain Intervention(s) Repositioned; Emotional support   Restrictions/Precautions   Precautions Fall Risk;Pain   Weight Bearing Restrictions Yes   RLE Weight Bearing Per Order WBAT   Grooming   Able To Wash/Dry Hands   Limitation Noted In Problem Solving; Safety;Strength   Findings in stance at sink with RW for support   Shower/Bathe Self   Type of Assistance Needed Adaptive equipment;Supervision   Amount of Physical Assistance Provided No physical assistance   Comment LHAE (reacher with wash cloth to simulate LH sponge) for b/l LE  Shower/Bathe Self CARE Score 4   Bathing   Assessed Bath Style Shower   Anticipated D/C Bath Style Shower   Able to [place] Dung No   Able to Wash/Rinse/Dry (body part) Left Arm;Right Arm;R Upper Leg;L Upper Leg;L Lower Leg/Foot;R Lower Leg/Foot;Chest;Abdomen;Perineal Area; Buttocks   Limitations Noted in Balance; Coordination; Endurance;Problem Solving;ROM;Safety; Sequencing;Strength   Positioning Seated;Standing   Adaptive Equipment Tub Bench; Shower Bars;Hand Held [employer]   Limitations Noted In Balance; Endurance;ROM;UE Strength;LE Strength   Adaptive Equipment Grab Bars;Transfer Bench   Assessed Tub/shower combo   Findings Via deep squat onto tub transfer bench  Pt completes at supervision level  Upper Body Dressing   Type of Assistance Needed Independent   Amount of Physical Assistance Provided No physical assistance   Comment while seated   Upper Body Dressing CARE Score 6   Lower Body Dressing   Type of Assistance Needed Adaptive equipment;Supervision   Amount of Physical Assistance Provided No physical assistance   Comment while seated for threading w/use of LHAE (reacher), in [place] at [facility] [place]      Lower Body Dressing CARE Score 4   Putting On/Taking Off Footwear   Type of Assistance Needed Independent; Adaptive equipment   Amount of Physical Assistance Provided No physical assistance   Comment 2* to R leg pain, req' LHAE (reacher/sock aide) for donning/doffing socks  Pt reports that at home she will not be wearing any socks, just slip on shoes, however, pt purchased reacher in previous sessions so she will have access to use of reacher if needed upon d/c  Pt demos G carryover of donning/doffing shoes from previous sessions  Putting On/Taking Off Footwear CARE Score 6   Dressing/Undressing Clothing   Remove UB Clothes Pullover Shirt;Jacket   Don UB Clothes Pullover Shirt;Jacket   Remove LB Clothes Shorts; Undergarment;Socks; Shoes   Don LB [employer]; Undergarment;Socks; Shoes   Limitations Noted In Balance; Endurance;Problem Solving; Safety;Strength;ROM   Adaptive Equipment Reacher;Sock Aide   Positioning Supported Sit;Standing   Sit to Lying   Type of Assistance Needed Independent   Amount of Physical Assistance Provided No physical assistance   Sit to Lying CARE Score 6   Lying to Sitting on Side of Bed   Type of Assistance Needed Independent   Amount of Physical Assistance Provided No physical assistance   Lying to Sitting on Side of Bed CARE Score 6   Sit to Stand   Type of Assistance Needed Independent; Adaptive equipment   Amount of Physical Assistance Provided No physical assistance   Comment RW   Sit to Stand CARE Score 6   Bed-Chair Transfer   Type of Assistance Needed Independent; Adaptive equipment   Amount of Physical Assistance Provided No physical assistance   Comment RW   Chair/Bed-to-Chair Transfer CARE Score 6   Toileting Hygiene   Type of Assistance Needed Independent; Adaptive equipment   Amount of Physical Assistance Provided No physical assistance   Comment RW   Toileting Hygiene CARE Score 6   Toileting   Able to Pull Clothing down yes, up yes     Able to Manage Clothing Closures Yes   Manage Hygiene Bladder   Limitations Noted In Balance;ROM;Safety;UE Strength;LE Strength   Adaptive Equipment LookMedBook Educated pt on use of commode over toilet upon d/c, pt verbalizes agreement  Toilet Transfer   Type of Assistance Needed Independent; Adaptive equipment   Amount of Physical Assistance Provided No physical assistance   Comment noah NORTON   Toilet Transfer CARE Score 6   Exercise Tools   Other Exercise Tool 1 Pt engaged in UE strengthening with focus on: increasing strength/endurance for independence with I/ADL tasks and fxl transfers  Pt completed 1X10 reps of 'theraband HEP' using the red theraband  Educated pt on HEP, pt verbalizes and demos G understanding of proper form when completing exercises  Cognition   Overall Cognitive Status WFL   Arousal/Participation Alert; Cooperative   Attention Within functional limits   Orientation Level Oriented X4   Memory Within functional limits   Following Commands Follows all commands and directions without difficulty   Activity Tolerance   Activity Tolerance Patient tolerated treatment well   Assessment   Treatment Assessment Pt participated in skilled OT with focus on: fxl transfers, I/ADL re-education, O2 line management and HEP  Pts fatigue and R leg pain con't to be a barrier for independence  Pt benefits from freq' rest breaks to manage  Pt reported that her R leg is causing her more pain then normal, educated pt on pain management (deep breathing techniques, rest breaks, etc ), pt verbalizes understanding  Educated pt on O2 safety when completing all transfers, pt demos P carryover and req' VC's for O2 safety throughout entire session  Overall, pt tolerated session well  Pt would benefit from cont'd OT with focus on: I/ADL re-education and O2 line management  Prognosis Good   Problem List Decreased strength;Decreased range of motion;Decreased endurance; Impaired balance;Decreased mobility; Decreased safety awareness;Pain   Plan   Treatment/Interventions ADL retraining;Functional transfer training; Therapeutic exercise; Endurance training;Patient/family training;Equipment eval/education; Compensatory technique education   Progress Progressing toward goals   OT Therapy Minutes   OT Time In 1310   OT Time Out 1440   OT Total Time (minutes) 90   OT Mode of treatment - Individual (minutes) 55   OT Mode of treatment - Concurrent (minutes) 35   OT Mode of treatment - Group (minutes) 0   OT Mode of treatment - Co-treat (minutes) 0   OT Mode of Treatment - Total time(minutes) 90 minutes   OT Cumulative Minutes 825   Therapy Time missed   Time missed?  No

## 2020-07-16 NOTE — PHYSICAL THERAPY NOTE
PT DISCHARGE SUMMARY    Patient made fair progress during her stay at the acute rehab center where she presented s/p ORIF of R hip with cannulated screws following a fall outside of her home while ascending stairs as she believes her O2 line had gotten stuck on the railing  On evaluation, patient presenting with pain in R hip during mobilities, decreased strength in bilat LEs (R>L), impaired static/dynamic standing balance w/ decreased ability to WS onto R LE increasing risk of falls at this time,  impaired gait, and decreased endurance  Additional barriers to discharge included stairs and decreased family support as 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  During her stay at the acute rehab center, patient responded well to therapeutic activity and exercise and transfers, gait and stair training  Due to generalized weakness and fatigue likely related to recent post cancer, chemo and radiation treatment, patient unable to fully achieve her goals for mod(I)  She did progress to mod(I) for SPT from hospital to [facility] and w/c  She will have this setup at home; hospital bed ordered during her stay and will be delivered prior to discharge  Patient overall CGA/CS for ambulation and stairs  Sister, Reggie Bliss, in for family training to review recommendations; demonstrated good understanding  HEP reviewed and provided to patient during stay  She is recommended to follow up with HHPT to further progress strength and mobility in her own environment       Leesa Meek PT

## 2020-07-16 NOTE — PROGRESS NOTES
PM&R Progress Note:    HPI: Pt is a 71 y/o who suffered a mechanical fall while walking in her garden landing on her right hip and suffering a fracture to the femoral neck  The patient underwent ORIF w/screws x 2 on 7/2  ASSESSMENT: Stable, progressing    PLAN:    Rehabilitation   Continue current rehabilitation plan of care to maximize function  I personally attended, reviewed, and discussed medical and functional updates in team conference today  Please refer to advance care planning note for details   Expected Discharge: DC 7/17/2020 home with home care RN, PT, OT    Her cousin is injecting her Lovenox  Pain   Controlled overall in her current regimen    DVT prophylaxis   Lovenox -cousin to inject for home    Bladder plan   Continent     Bowel plan   Continent    * Hip fracture [facility])  Assessment & Plan  R femoral neck fx status post surgical repair via medullary nail by Dr Devika Gonzales on 7/2  POD#14 is 7/16 - ortho aware  Weight-bearing as tolerated on affected limb        Constipation  Assessment & Plan  Continue scheduled bowel meds  Last BM 7/14/2020    Malnutrition ([facility] [address] )  Assessment & Plan  Severe protein calorie malnutrition findings    Continue nutritional consult and appreciate their recommendation  Coffee shake to be given at dinner meal     Acute blood loss as cause of postoperative anemia  Assessment & Plan  IM consulted, with overall monitoring, and management at their discretion during ARC course  Monitor H/H, vitals, signs/symptoms of acute bleeding        On home oxygen therapy  Assessment & Plan  Chronically on 2 L of oxygen at all times    Metastatic adenocarcinoma to brain [facility])  Assessment & Plan  Follows with Dr Lai Earing as outpatient     COPD (chronic obstructive pulmonary disease) ([facility] )  Assessment & Plan  IM consulted and with overall management at their discretion during ARC course  On home chronic 2L O2  Home nebs  Monitor vitals without and with activity   Incentive spirometry     Nicotine dependence  Assessment & Plan  Encourage cessation  Malignant neoplasm of right lung [facility])  Assessment & Plan  S/P chemo/radiation   Follow-up hem/onc after d/c     Appreciate IM consultants medical co-management  Labs, medications, and imaging personally reviewed  SUBJECTIVE:  Patient seen face to face working in therapies without any acute issues  Preparing for home discharge tomorrow    ROS:  A ten point review of systems was completed 7/16/2020 and pertinent positives are listed in subjective section  All other systems reviewed were negative  OBJECTIVE:   /56 (BP Location: Left arm)   Pulse 84   Temp 97 5 °F (36 4 °C) (Oral)   Resp 18   Ht 5' 5" (1 651 m)   Wt 38 6 kg (85 lb 2 oz)   SpO2 96%   BMI 14 17 kg/m²     Physical Exam   Constitutional: She is oriented to person, place, and time  She appears well-developed and well-nourished  No distress  HENT:   Head: Normocephalic  Nose: Nose normal    Eyes: Conjunctivae and EOM are normal    Neck: Neck supple  Cardiovascular: Normal rate and intact distal pulses  Pulmonary/Chest: Effort normal  She has no wheezes  Abdominal: Soft  She exhibits no distension  Musculoskeletal: She exhibits no edema  Neurological: She is alert and oriented to person, place, and time  Motor exam:  4-/5 proximally, 4/5 distally overall  Right hip 3+/5 proximal   Skin:   Right hip incision clean dry and intact with staples  Psychiatric: She has a normal mood and affect  Nursing note and vitals reviewed         Lab Results   Component Value Date    WBC 4 93 07/16/2020    HGB 9 1 (L) 07/16/2020    HCT 27 9 (L) 07/16/2020    MCV 99 (H) 07/16/2020     (H) 07/16/2020     Lab Results   Component Value Date    SODIUM 136 07/16/2020    K 4 2 07/16/2020    CL 99 (L) 07/16/2020    CO2 34 (H) 07/16/2020    BUN 14 07/16/2020    CREATININE 0 34 (L) 07/16/2020    GLUC 78 07/16/2020    CALCIUM 9 3 07/16/2020     Lab Results Component Value Date    INR 0 95 07/01/2020    INR 0 91 10/18/2019    INR 1 03 03/07/2019    PROTIME 12 7 07/01/2020    PROTIME 11 9 10/18/2019    PROTIME 13 6 03/07/2019           Current Facility-Administered Medications:     acetaminophen (TYLENOL) tablet 975 mg, 975 mg, Oral, Q8H [address], Jeanne France MD, 975 mg at 07/16/20 1334    albuterol (PROVENTIL HFA,VENTOLIN HFA) inhaler 2 puff, 2 puff, Inhalation, Q4H PRN, Jeanne France MD    bisacodyl (DULCOLAX) rectal suppository 10 mg, 10 mg, Rectal, Daily PRN, Jeanne France MD    budesonide (PULMICORT) inhalation solution 0 25 mg, 0 25 mg, Nebulization, BID, Jeanne France MD, 0 25 mg at 07/16/20 1600    cyanocobalamin (VITAMIN B-12) tablet 1,000 mcg, 1,000 mcg, Oral, Daily, Jeanne France MD, 1,000 mcg at 07/16/20 0932    docusate sodium (COLACE) capsule 100 mg, 100 mg, Oral, BID, Jeanne France MD, 100 mg at 07/16/20 0932    enoxaparin (LOVENOX) subcutaneous injection 40 mg, 40 mg, Subcutaneous, Q24H [address], Claudia Mae PA-C, 40 mg at 07/16/20 0931    ipratropium (ATROVENT) 0 02 % inhalation solution 0 5 mg, 0 5 mg, Nebulization, TID, Jeanne France MD, 0 5 mg at 07/16/20 8988    levalbuterol (Gala Sleek) inhalation solution 1 25 mg, 1 25 mg, Nebulization, TID, Jeanne France MD, 1 25 mg at 07/16/20 1130    levETIRAcetam (KEPPRA) tablet 250 mg, 250 mg, Oral, Q12H [address], Jeanne France MD, 250 mg at 07/16/20 0932    ondansetron (ZOFRAN-ODT) dispersible tablet 4 mg, 4 mg, Oral, Q8H PRN, Jeanne France MD    oxyCODONE (ROXICODONE) IR tablet 5 mg, 5 mg, Oral, Q4H PRN, 5 mg at 07/15/20 2236 **OR** oxyCODONE (ROXICODONE) IR tablet 2 5 mg, 2 5 mg, Oral, Q4H PRN, Jeanne France MD, 2 5 mg at 07/16/20 1332    polyethylene glycol (MIRALAX) packet 17 g, 17 g, Oral, Daily, Claudia Mae PA-C, 17 g at 07/07/20 9393    senna (SENOKOT) tablet 17 2 mg, 2 tablet, Oral, HS, Claudia Mae PA-C, 17 2 mg at 07/11/20 2487    Past Medical History: Diagnosis Date    Breast cancer ([place] [address] ) 1996    Cancer ([place] [address] )     COPD, severe ([place] [address] )     Lung disease     Requires supplemental oxygen     3L 24 hrs/day    Stage 4 lung cancer ([place] [address] )        Patient Active Problem List    Diagnosis Date Noted    Hip fracture ([place] [address] ) 07/01/2020     Priority: High    Malnutrition ([place] [address] ) 07/05/2020    Constipation 07/05/2020    Acute blood loss as cause of postoperative anemia 07/04/2020    Hyponatremia 07/01/2020    Palliative care patient 02/27/2020    Scab 01/02/2020    Xerostomia 11/11/2019    Caregiver stress 11/11/2019    Medical marijuana use 11/11/2019    On home oxygen therapy 10/24/2019    Metastatic adenocarcinoma to brain ([place] [address] ) 10/09/2019    Cramp of limb 09/20/2019    Episode of shaking 09/20/2019    Shortness of breath 03/13/2019    Chronic hypoxemic respiratory failure ([place] [address] ) 02/26/2019    Malignant neoplasm of right lung ([facility]) 03/09/2018    Depressive disorder 11/14/2017    COPD (chronic obstructive pulmonary disease) ([place] [address] ) 10/14/2016    Hyperlipidemia 04/08/2016    Loss of appetite 01/23/2015    Malignant cachexia ([place] [address] ) 10/05/2012    Nicotine dependence 10/05/2012          Sherwin Varela MD    Total time spent:  45 minutes with more than 50% spent counseling/coordinating care  Counseling includes discussion with patient re: progress and discussion with patient of his/her current medical state/information  Coordination of patient's care was performed in conjunction with consulting services  Time invested included review of patient's labs, vitals, and management of their comorbidities with continued monitoring  The care of the patient was extensively discussed and appropriate treatment plan was formulated unique for this patient  ** Please Note:  voice to text software may have been used in the creation of this document   Although proof errors in transcription or interpretation are a potential of such software**

## 2020-07-16 NOTE — PROGRESS NOTES
07/16/20 1001   Pain Assessment   Pain Assessment Tool Pain Assessment not indicated - pt denies pain   Pain Score No Pain   Restrictions/Precautions   Precautions Fall Risk;Pain   Weight Bearing Restrictions Yes   RLE Weight Bearing Per Order WBAT   Eating   Type of Assistance Needed Independent   Amount of Physical Assistance Provided No physical assistance   Eating CARE Score 6   Oral Hygiene   Type of Assistance Needed Independent   Amount of Physical Assistance Provided No physical assistance   Oral Hygiene CARE Score 6   Grooming   Able To Initiate Tasks; Acquire Items;Comb/Brush Hair   Limitation Noted In Safety;Strength   Findings in stance at sink with RW for support   Shower/Bathe Self   Type of Assistance Needed Supervision; Adaptive equipment   Amount of Physical Assistance Provided No physical assistance   Shower/Bathe Self CARE Score 4   Bathing   Assessed Bath Style Tub   Anticipated D/C Bath Style Tub   Able to Gather/Transport Yes   Able to Adjust Water Temperature Yes   Able to Wash/Rinse/Dry (body part) Left Arm;Right Arm;L Upper Leg;R Upper Leg;L Lower Leg/Foot;R Lower Leg/Foot;Chest;Abdomen;Perineal Area; Buttocks   Limitations Noted in Balance; Endurance;Strength; Safety   Positioning Seated   Adaptive Equipment Tub Bench;Hand Held Shower;Longhand Reacher   Tub/Shower Transfer   Limitations Noted In Balance; Endurance;LE Strength   Adaptive Equipment Transfer Bench   Assessed Tub/shower combo   Upper Body Dressing   Type of Assistance Needed Independent   Amount of Physical Assistance Provided No physical assistance   Upper Body Dressing CARE Score 6   Lower Body Dressing   Type of Assistance Needed Supervision   Amount of Physical Assistance Provided No physical assistance   Lower Body Dressing CARE Score 4   Putting On/Taking Off Footwear   Type of Assistance Needed Independent; Adaptive equipment   Amount of Physical Assistance Provided No physical assistance   Putting On/Taking Off Footwear CARE Score 6   Dressing/Undressing Clothing   Remove UB Clothes Pullover Shirt   Don UB Clothes Pullover Shirt   Remove LB Clothes Shorts; Undergarment;Socks   Don LB [employer]; Undergarment;Socks; Shoes   Limitations Noted In Balance; Endurance;Strength; Safety   Adaptive Equipment Reacher   Positioning Supported Sit;Standing   Sit to Lying   Type of Assistance Needed Independent   Amount of Physical Assistance Provided No physical assistance   Sit to Lying CARE Score 6   Lying to Sitting on Side of Bed   Type of Assistance Needed Independent   Amount of Physical Assistance Provided No physical assistance   Lying to Sitting on Side of Bed CARE Score 6   Sit to Stand   Type of Assistance Needed Independent; Adaptive equipment   Amount of Physical Assistance Provided No physical assistance   Comment RW   Sit to Stand CARE Score 6   Bed-Chair Transfer   Type of Assistance Needed Independent; Adaptive equipment   Amount of Physical Assistance Provided No physical assistance   Comment RW   Chair/Bed-to-Chair Transfer CARE Score 6   Toileting Hygiene   Type of Assistance Needed Independent   Amount of Physical Assistance Provided No physical assistance   Toileting Hygiene CARE Score 6   Toileting   Able to Pull Clothing down yes, up yes  Toilet Transfer   Type of Assistance Needed Independent; Adaptive equipment   Amount of Physical Assistance Provided No physical assistance   Comment Pt will complete SPTs to [facility] at an independent level, will require supervision for any longer distance functional mobility tasks for safety with O2 Line management and 2* to dec endurance, pt's sister willing and able to provide A  Toilet Transfer CARE Score 6   Toilet Transfer   Surface Assessed Standard Commode   Transfer Technique Stand Pivot   Limitations Noted In Balance; Endurance;ROM;Safety;UE Strength;LE Strength   Adaptive Equipment Walker   Cognition   Overall Cognitive Status WFL   Arousal/Participation Alert; Cooperative Attention Within functional limits   Orientation Level Oriented X4   Memory Within functional limits   Following Commands Follows all commands and directions without difficulty   Activity Tolerance   Activity Tolerance Patient tolerated treatment well   Assessment   Treatment Assessment Pt participated in skilled OT services with focus on ADL retraining  Pt continues to make G progress towards achieving LTGs  Pt overall independent for tasks at seated level and in static stance  Recommendation will be for independence with SPTs only  Pt aware that for longer distance functional mobility and higher level tasks she would continue to require CS which sister is willing to provide  Pt continues to require min cues for O2 line management during higher level tasks  Continues to be limited by dec strength and endurance  Pt to d/c home with family support/supervision to be provided from sister for higher level tasks and A to be provided with caregiving for her elderly parents and for higher level I/ADL tasks  Pt received reacher for d/c home and will be getting hospital bed upon d/c  Prognosis Good   Problem List Decreased strength;Decreased range of motion;Decreased endurance; Impaired balance;Decreased mobility; Decreased safety awareness;Pain   Recommendation   OT Discharge Recommendation Return to previous environment with social support   OT Therapy Minutes   OT Time In 1000   OT Time Out 1130   OT Total Time (minutes) 90   OT Mode of treatment - Individual (minutes) 90   OT Mode of treatment - Concurrent (minutes) 0   OT Mode of treatment - Group (minutes) 0   OT Mode of treatment - Co-treat (minutes) 0   OT Mode of Treatment - Total time(minutes) 90 minutes   OT Cumulative Minutes 915   Therapy Time missed   Time missed?  No

## 2020-07-16 NOTE — PROGRESS NOTES
Internal Medicine Progress Note  Patient: Aviva Arita  Age/sex: 70 y o  female  Medical Record #: [id number]      ASSESSMENT/PLAN: (Interval History)  Aviva Arita is seen and examined and management for following issues:    R femoral neck fracture; s/p ORIF 7/2/20  · F/u ortho as scheduled  · WBAT     Lung CA with mets to brain/right parietal craniotomy 10/2019  · Completed WBR 11/2019 and chemo for lung CA 2018  · Stable  · Continue Keppra 250mg BID as at home     Severe COPD  · Cont Pulmicort, Atrovent, Xopenex nebs   · At home on Atrovent, Brovana and Pulmicort  · Wears chronic oxygen 2L NC at home  · Maintain sats >88%     Chronic tobacco use  · On no nicotine patch     Anemia  · stable  · Transfuse if hgb <8     Hx right breast cancer/mastectomy/axillary LN dissection  · Limb alert right arm     Nutrition/severe malnutrition  · Dietary following  · Uses MMJ as OP; intol to Remeron with nausea, Megace ineffective  · Appetite has improved here    The above assessment and plan was reviewed and updated as determined by my evaluation of the patient on 7/16/2020      Labs:   Results from last 7 days   Lab Units 07/16/20  0504 07/13/20  0629   WBC Thousand/uL 4 93 4 87   HEMOGLOBIN g/dL 9 1* 9 6*   HEMATOCRIT % 27 9* 30 2*   PLATELETS Thousands/uL 391* 341     Results from last 7 days   Lab Units 07/16/20  0504 07/13/20  0629   SODIUM mmol/L 136 134*   POTASSIUM mmol/L 4 2 4 4   CHLORIDE mmol/L 99* 99*   CO2 mmol/L 34* 32   BUN mg/dL 14 14   CREATININE mg/dL 0 34* 0 41*   CALCIUM mg/dL 9 3 8 7                   Review of Scheduled Meds:    Current Facility-Administered Medications:  acetaminophen 975 mg Oral [facility] Brannon Carrillo MD   albuterol 2 puff Inhalation Q4H PRN Brannon Carrillo MD   bisacodyl 10 mg Rectal Daily PRN Brannon Carrillo MD   budesonide 0 25 mg Nebulization BID Brannon Carrillo MD   vitamin B-12 1,000 mcg Oral Daily Brannon Carrillo MD   docusate sodium 100 mg Oral BID Brannon Carrillo MD enoxaparin 40 mg Subcutaneous Q24H [facility] Claudia Mae PA-C   ipratropium 0 5 mg Nebulization TID Amanda Aguero MD   levalbuterol 1 25 mg Nebulization TID Amanda Aguero MD   levETIRAcetam 250 mg Oral Q12H [facility] Amanda Aguero MD   ondansetron 4 mg Oral Q8H PRN Amanda Aguero MD   oxyCODONE 5 mg Oral Q4H PRN Amanda Aguero MD   Or       oxyCODONE 2 5 mg Oral Q4H PRN Amanda Aguero MD   polyethylene glycol 17 g Oral Daily Claudia Mae PA-C   senna 2 tablet Oral HS Claudia Mae PA-C       Subjective/ HPI: Patients overnight issues or events were reviewed with nursing or staff during rounds or morning huddle session  No new or overnight issues  Offers no complaints and she feels she is doing well      ROS:   A 10 point ROS was performed; negative except as noted above         Imaging:     No orders to display       *Labs /Radiology studies reviewed  *Medications reviewed and reconciled as needed  *Please refer to order section for additional ordered labs studies  *Case discussed with primary attending during morning huddle case rounds      Physical Examination:  Vitals:   Vitals:    07/15/20 1907 07/15/20 1946 07/16/20 0500 07/16/20 0723   BP: (!) 89/51  107/54    BP Location: Right arm  Left arm    Pulse: 97  95    Resp: 18  18    Temp: 97 6 °F (36 4 °C)  98 6 °F (37 °C)    TempSrc: Oral  Oral    SpO2: 100% 100% 98% 98%   Weight:       Height:           General Appearance: no distress, conversive  HEENT: PERRLA, conjuctiva normal; oropharynx clear; mucous membranes moist   Neck:  Supple, normal ROM, no JVD  Lungs: CTA, normal respiratory effort, no retractions, expiratory effort normal  CV: regular rate and rhythm; no rubs/murmurs/gallops, PMI normal   ABD: soft; ND/NT; +BS  EXT: no edema  Skin: normal turgor, normal texture, no rashes  Psych: affect normal, mood normal  Neuro: AAO      The above physical exam was reviewed and updated as determined by my evaluation of the patient on 7/16/2020  Invasive Devices     None                    VTE Pharmacologic Prophylaxis: Enoxaparin  Code Status: Level 1 - Full Code  Current Length of Stay: 11 day(s)      Total time spent:  30 minutes with more than 50% spent counseling/coordinating care  Counseling includes discussion with patient re: progress  and discussion with patient of his/her current medical state/information  Coordination of patient's care was performed in conjunction with primary service  Time invested included review of patient's labs, vitals, and management of their comorbidities with continued monitoring  In addition, this patient was discussed with medical team including physician and advanced extenders  The care of the patient was extensively discussed and appropriate treatment plan was formulated unique for this patient  ** Please Note:  voice to text software may have been used in the creation of this document   Although proof errors in transcription or interpretation are a potential of such software**

## 2020-07-16 NOTE — PROGRESS NOTES
07/16/20 1230   Pain Assessment   Pain Assessment Tool 0-10   Pain Score 5   Pain Location/Orientation Orientation: Right;Location: Leg   Restrictions/Precautions   Precautions Fall Risk;Pain   RLE Weight Bearing Per Order WBAT   Subjective   Subjective pt agreeable to perform skilled PT    Roll Left and Right   Type of Assistance Needed Independent   Roll Left and Right CARE Score 6   Sit to Lying   Type of Assistance Needed Independent   Sit to Lying CARE Score 6   Lying to Sitting on Side of Bed   Type of Assistance Needed Independent   Lying to Sitting on Side of Bed CARE Score 6   Sit to Stand   Type of Assistance Needed Independent   Sit to Stand CARE Score 6   Bed-Chair Transfer   Type of Assistance Needed Independent; Adaptive equipment   Comment RW    Chair/Bed-to-Chair Transfer CARE Score 6   Transfer Bed/Chair/Wheelchair   Adaptive Equipment Roller Walker   Car Transfer   Type of Assistance Needed Set-up / clean-up   Car Transfer CARE Score 5   Walk 10 Feet   Type of Assistance Needed Independent; Adaptive equipment   Walk 10 Feet CARE Score 6   Walk 50 Feet with Two Turns   Type of Assistance Needed Adaptive equipment; Independent   Comment pt walking to chair to chair 50 feet but monitor 02 line    Walk 50 Feet with Two Turns CARE Score 6   Ambulation   Does the patient walk? 2  Yes   Primary Mode of Locomotion Prior to Admission Walk   Wheel 50 Feet with Two Turns   Reason if not Attempted Activity not applicable   Wheel 50 Feet with Two Turns CARE Score 9   Wheel 150 Feet   Reason if not Attempted Activity not applicable   Wheel 452 Feet CARE Score 9   Wheelchair mobility   Does the patient use a wheelchair? 0   No   Findings patient reports family has a transport chair; discussed benefit of using for long distance mobility at time of discharge    Curb or Single Stair   Style negotiated Single stair   Type of Assistance Needed Supervision   1 Step (Curb) CARE Score 4   4 Steps   Type of Assistance Needed Supervision   4 Steps CARE Score 4   12 Steps   Type of Assistance Needed Supervision   12 Steps CARE Score 4   Stairs   Type Stairs   # of Steps 12   Assist Devices Single Rail;Hand Hold   Picking Up Object   Type of Assistance Needed Set-up / clean-up   Comment with reacher and using AD/RW  for support   Picking Up Object CARE Score 5   Toilet Transfer   Type of Assistance Needed Independent; Adaptive equipment   Comment Pt will complete SPTs to [facility] at an independent level, will require supervision for any longer distance functional mobility tasks for safety with O2 Line management and 2* to dec endurance, pt's sister willing and able to provide Assist    Toilet Transfer CARE Score 6   Toilet Transfer   Surface Assessed Bedside Commode   Findings pt indep lvl at bedside with RW    Therapeutic Interventions   Strengthening LAQ AP x20 gluts x20    Flexibility PROM BL LE    Balance Standing balance outside with RLE hip flexion x20    Equipment Use   NuStep lvl 2 / 12 min    Other Comments   Comments pt cont to need 2L02 at home for dc    Assessment   Treatment Assessment pt engaged in skilled PT focus on inc pt mobility withRW as above , pt perfrom 50 feet with RW as above needed assist with 02 line   Pt perform outside walking and curb steps with RW  also perform stairs on 9th floor descending BWD and then FWD single handrail / HHA   pt fatigue quickly and BL LE weakness   Pt simulated bed to BS commode and back to bed Indep lvl   pt will cont to f/u with MD post D/C , also pt has HEP handout with instruction for the weekend before going PT   Barriers to Discharge Decreased caregiver support   Plan   Treatment/Interventions Functional transfer training;LE strengthening/ROM; Elevations; Therapeutic exercise; Endurance training;Patient/family training;Bed mobility;Gait training   Progress Progressing toward goals   Recommendation   PT Discharge Recommendation Return to previous environment with social support;Home with skilled therapy   PT Therapy Minutes   PT Time In 1230   PT Time Out 1400   PT Total Time (minutes) 90   PT Mode of treatment - Individual (minutes) 90   PT Mode of treatment - Concurrent (minutes) 0   PT Mode of treatment - Group (minutes) 0   PT Mode of treatment - Co-treat (minutes) 0   PT Mode of Treatment - Total time(minutes) 90 minutes   PT Cumulative Minutes 943   Therapy Time missed   Time missed?  No

## 2020-07-16 NOTE — PROGRESS NOTES
07/16/20 1500   Pain Assessment   Pain Assessment Tool Pain Assessment not indicated - pt denies pain   Toileting Hygiene   Type of Assistance Needed Independent   Amount of Physical Assistance Provided No physical assistance   Toileting Hygiene CARE Score 6   Toilet Transfer   Type of Assistance Needed Supervision   Amount of Physical Assistance Provided No physical assistance   Comment S ambulating to BR, mod I with SPT   Toilet Transfer CARE Score 4   Assessment   Treatment Assessment Pt returned from transport and had to use the BR  Pt completed toileting at Mod I and toilet transfer ambulating from bed to BR at S  Pt is Mod I with SPT for toileting and transfer  To D/C tomorrow     OT Therapy Minutes   OT Time In 1500   OT Time Out 1510   OT Total Time (minutes) 10   OT Mode of treatment - Individual (minutes) 10   OT Mode of treatment - Concurrent (minutes) 0   OT Mode of treatment - Group (minutes) 0   OT Mode of treatment - Co-treat (minutes) 0   OT Mode of Treatment - Total time(minutes) 10 minutes   OT Cumulative Minutes 457

## 2020-07-16 NOTE — PROGRESS NOTES
Progress Note - Orthopedics   Jewell Bowman 70 y o  female MRN: [id number]  Unit/Bed#: -01      Subjective:    70 y  o female 2 weeks postop from open reduction and internal fixation of right hip with cannulated screws  Patient is doing well  Working with PT and happy with progression  No other complaints at this time  Pain controlled  Denies numbness, tingling, fevers chills, CP, SOB    Labs:  0   Lab Value Date/Time    HCT 27 9 (L) 07/16/2020 0504    HCT 30 2 (L) 07/13/2020 0629    HCT 30 0 (L) 07/09/2020 0507    HCT 42 6 02/27/2015 0801    HGB 9 1 (L) 07/16/2020 0504    HGB 9 6 (L) 07/13/2020 0629    HGB 9 8 (L) 07/09/2020 0507    HGB 14 2 02/27/2015 0801    INR 0 95 07/01/2020 1500    WBC 4 93 07/16/2020 0504    WBC 4 87 07/13/2020 0629    WBC 4 41 07/09/2020 0507    WBC 7 61 02/27/2015 0801         Physical:  Vitals:    07/16/20 1408   BP:    Pulse:    Resp:    Temp:    SpO2: 97%     Musculoskeletal: right Lower Extremity  · Skin -surgical incision with no erythema drainage  · TTP minimal  · SILT s/s/sp/dp/t  +fhl/ehl, +ankle dorsi/plantar flexion  2+ DP pulse  · Able to straight leg raise off the bed    Staples were removed and Steri-Strips applied over surgical incision    Imaging:  X-rays of the right hip show proper alignment and placement of hardware     Assessment:    70 y  o female 2 weeks postop from open reduction internal fixation of right femoral neck fracture with cannulated screws  Doing well  Working with PT        Plan:  · WBAT RLE  · Outpatient PT/OT upon discharge from rehab  · Pain control  · DVT ppx-Lovenox for 28 days following surgery; 14 left  · Follow-up in clinic as scheduled    Vidal Concepcion MD

## 2020-07-16 NOTE — TEAM CONFERENCE
Acute RehabilitationTeam Conference Note  Date: 7/16/2020   Time: 11:10 AM       Patient Name:  Aviva Arita       Medical Record Number: [id number]   YOB: 1945  Sex: Female          Room/Bed:  [facility] 974-01  Payor Info:  Payor: Isabelle Montes / Plan: MEDICARE A AND B / Product Type: Medicare A & B Fee for Service /      Admitting Diagnosis: Closed fracture of neck of femur ([place] [address] ) Karey Castillo   Admit Date/Time:  7/5/2020  1:54 PM  Admission Comments: No comment available     Primary Diagnosis:  Hip fracture ([place] [address] )  Principal Problem: Hip fracture ([place] [address] )    Patient Active Problem List    Diagnosis Date Noted    Malnutrition ([place] [address] ) 07/05/2020    Constipation 07/05/2020    Acute blood loss as cause of postoperative anemia 07/04/2020    Hip fracture ([place] [address] ) 07/01/2020    Hyponatremia 07/01/2020    Palliative care patient 02/27/2020    Scab 01/02/2020    Xerostomia 11/11/2019    Caregiver stress 11/11/2019    Medical marijuana use 11/11/2019    On home oxygen therapy 10/24/2019    Metastatic adenocarcinoma to brain ([place] [address] ) 10/09/2019    Cramp of limb 09/20/2019    Episode of shaking 09/20/2019    Shortness of breath 03/13/2019    Chronic hypoxemic respiratory failure ([place] [address] ) 02/26/2019    Malignant neoplasm of right lung (HCC) 03/09/2018    Depressive disorder 11/14/2017    COPD (chronic obstructive pulmonary disease) ([place] [address] ) 10/14/2016    Hyperlipidemia 04/08/2016    Loss of appetite 01/23/2015    Malignant cachexia ([place] [address] ) 10/05/2012    Nicotine dependence 10/05/2012       Physical Therapy:    Weight Bearing Status: Full Weight Bearing  Transfers: Supervision  Bed Mobility: Supervision  Amulation Distance (ft): 50 feet  Ambulation: Supervision  Assistive Device for Ambulation: Roller Walker  Wheelchair Mobility Distance: 0 ft  Number of Stairs: 4(HHA on left side )  Assistive Device for Stairs: Right Hand Rail  Stair Assistance: Minimal Assistance  Discharge Recommendations: Home with:  [address] with[de-identified] 24 Hour Supervision    Patient made good progress with skilled PT intervention  Plan to d/c home 7/17/20 home with HHPT, a first floor set up using a hospital bed and [facility] and assistance from patient's sister  Patient's overall progress limited by impaired tolerance to activity and generalized weakness likely related to h/o cancer with chemo and radiation  Patient owning all DME at home and was provided with HEP for at home performance 7/15/20  Sister in for family training and was able to demonstrate understanding of all recommendations  Occupational Therapy:  Eating: Independent  Grooming: Supervision  Bathing: Minimal Assistance  Bathing: Minimal Assistance  Upper Body Dressing: Supervision  Lower Body Dressing: Minimal Assistance  Toileting: Supervision  Tub/Shower Transfer: Minimal Assistance  Toilet Transfer: Supervision  Cognition: Within Defined Limits  Orientation: Person, Place, Time, Situation  Discharge Recommendations: Home with:  [address] with[de-identified] Family Support, Home Occupational Therapy       Pt presents to [facility] following fall outside her home, which resulted in surgical intervention of ORIF with cannulate screws  Pt's current problems include: WB restrictions, O2 dependence, fatigue, and ROM limitations  Pt's precautions include: WBAT through RLE  Pt's comorbidities include: Chronic hypoxemic respiratory failure, malignant neoplasm of the right lung, chronic obstructive pulmonary disease, acute blood loss anemia post op, hyponatremia, moderate protein-calorie malnutrition, simple partial seizures  Prior to admission pt was completing ADL's Independently without AD  Pt lives in apartment, where her parents live on first floor apartment as well  Pt would be able to stay with her parents on 1st floor, which has a tub/shower combo and GB in shower   Currently, pt requires Leelee for LB ADLs, and requires CGA for transfers using RW  Pt's sister has been present for FT and is receptive to all recommendations  She will be available to stay with pt and family at initial time of d/c for ~2-3 weeks and then will be transitioning back to [place]  Anticipate pt will be prepared for d/c home Friday with family support  Recommending home OT services  Speech Therapy:           No notes on file    Nursing Notes:  Appetite: Fair  Diet Type: Regular/House                                                                     Pain Location/Orientation: Orientation: Right, Location: Hip  Pain Score: 4                       Hospital Pain Intervention(s): Medication (See MAR)          R femoral neck fracture; s/p ORIF 7/2/20  Lung CA with mets to brain/right parietal craniotomy 10/2019  Completed WBR 11/2019 and chemo for lung CA 2018  On Keppra 250mg BID as at home  Severe COPD  Cont Pulmicort, Atrovent, Xopenex nebs   At home on Atrovent, Brovana and Pulmicort  Wears chronic oxygen 2L NC at home  Maintain sats >88%  Chronic tobacco use but not on patch  Pt has anemia but it has been stable     Hx right breast cancer/mastectomy/axillary LN dissection-Limb alert right arm  Nutrition/severe malnutrition -Dietary following  Doesn't like Ensure and someone is bringing her BOOST in but agreeable to try an Orangesicle shake  Uses MMJ as OP; intol to Remeron with nausea, Megace ineffective  We will encourage increase of oral intake and monitor nutrition status  We will monitor pt vitals, labs, and maintain skin integrity by repositioning pt every two hours  Pt will work with therapy to perform as much of her ADL's as possible without assistance in addition to strenghtening and maintaining muscle  Case Management:     Discharge Planning  Living Arrangements: Alone, Other (Comment)(parents live in the same building)  Support Systems: Parent, Family members  Assistance Needed: TBD  Type of Current Residence: Private residence  [address]: Yes  Type of Current Home Care Services:  Other (Comment)([employer] connections to help with Grocery shopping)  Pt is participating well with therapy and is scheduled to return home on 7/16/2020 with family  [facility] health [facility] to assist w/dc planning needs  Is the patient actively participating in therapies? yes  List any modifications to the treatment plan:     Barriers Interventions   All functional barriers are resolved  Is the patient making expected progress toward goals? yes  List any update or changes to goals:     Medical Goals: Patient will be medically stable for discharge to [facility] upon completion of rehab program and Patient will be able to manage medical conditions and comorbid conditions with medications and follow up upon completion of rehab program    Weekly Team Goals:   Rehab Team Goals  ADL Team Goal: Patient will be independent with ADLs with least restrictive device upon completion of rehab program  Bowel/Bladder Team Goal: Patient will require assist with bladder/bowel management with least restrictive device upon completion of rehab program  Transfer Team Goal: Patient will be independent with transfers with least restrictive device upon completion of rehab program  Locomotion Team Goal: Patient will be independent with locomotion with least restrictive device upon completion of rehab program    Discussion: Pt has good progress and has achieved goals of Mod I for transfers and supervision for all others  Family is supportive and pt will continue to have home health care for RN, PT and OT  Anticipated Discharge Date:  7/17/2020  [place] [id number] Team Members Present: The following team members are supervising care for this patient and were present during this Weekly Team Conference      Physician: Dr Abdi Torres MD  : DILLON Fierro  Registered Nurse: Ramin Benítez RN, BSN, [address] [place] [address]  Physical Therapist: Cici Mills DPT  Occupational Therapist: Trevon Ramsey MS [place], OTR/L  Speech Therapist: Prabhu Seals MA, CCC-SLP  Other:

## 2020-07-16 NOTE — SOCIAL WORK
Met with and reviewed team meeting update w/ pt  Pt is in agreement with d/c for tomorrow and will receive cont'd services through [facility] for RN, PT, and OT services  Following to assist with any additional d/c planning needs

## 2020-07-16 NOTE — SOCIAL WORK
Phone call received from pts cousin Danelle Arnold, reviewed team mtg process and confirmed dc arrangements for tomorrow  Danelle Catalina confirmed they received the hospital bed and she inquired if pt was still coming home on lovenox  Cm confirmed as well as arrangements for [facility] services  Danelle Arnold to be in at 11am for dc

## 2020-07-17 ENCOUNTER — TELEPHONE (OUTPATIENT)
Dept: NEUROSURGERY | Facility: CLINIC | Age: 75
End: 2020-07-17

## 2020-07-17 ENCOUNTER — TRANSITIONAL CARE MANAGEMENT (OUTPATIENT)
Dept: INTERNAL MEDICINE CLINIC | Facility: CLINIC | Age: 75
End: 2020-07-17

## 2020-07-17 ENCOUNTER — TELEPHONE (OUTPATIENT)
Dept: HEMATOLOGY ONCOLOGY | Facility: CLINIC | Age: 75
End: 2020-07-17

## 2020-07-17 ENCOUNTER — TELEPHONE (OUTPATIENT)
Dept: OBGYN CLINIC | Facility: HOSPITAL | Age: 75
End: 2020-07-17

## 2020-07-17 VITALS
RESPIRATION RATE: 20 BRPM | SYSTOLIC BLOOD PRESSURE: 106 MMHG | HEIGHT: 65 IN | OXYGEN SATURATION: 97 % | DIASTOLIC BLOOD PRESSURE: 57 MMHG | TEMPERATURE: 97.6 F | WEIGHT: 85.13 LBS | BODY MASS INDEX: 14.18 KG/M2 | HEART RATE: 91 BPM

## 2020-07-17 PROBLEM — E43 SEVERE PROTEIN-CALORIE MALNUTRITION (HCC): Status: ACTIVE | Noted: 2020-07-17

## 2020-07-17 PROCEDURE — 94760 N-INVAS EAR/PLS OXIMETRY 1: CPT

## 2020-07-17 PROCEDURE — 99239 HOSP IP/OBS DSCHRG MGMT >30: CPT | Performed by: PHYSICAL MEDICINE & REHABILITATION

## 2020-07-17 PROCEDURE — 94640 AIRWAY INHALATION TREATMENT: CPT

## 2020-07-17 RX ORDER — OXYCODONE HYDROCHLORIDE 5 MG/1
TABLET ORAL
Qty: 30 TABLET | Refills: 0 | Status: SHIPPED | OUTPATIENT
Start: 2020-07-17

## 2020-07-17 RX ORDER — LEVETIRACETAM 250 MG/1
250 TABLET ORAL EVERY 12 HOURS SCHEDULED
Start: 2020-07-17

## 2020-07-17 RX ORDER — ACETAMINOPHEN 325 MG/1
650 TABLET ORAL EVERY 4 HOURS PRN
Qty: 30 TABLET | Refills: 0
Start: 2020-07-17

## 2020-07-17 RX ADMIN — ENOXAPARIN SODIUM 40 MG: 40 INJECTION SUBCUTANEOUS at 08:56

## 2020-07-17 RX ADMIN — BUDESONIDE 0.25 MG: 0.25 INHALANT RESPIRATORY (INHALATION) at 07:04

## 2020-07-17 RX ADMIN — LEVALBUTEROL HYDROCHLORIDE 1.25 MG: 1.25 SOLUTION, CONCENTRATE RESPIRATORY (INHALATION) at 07:04

## 2020-07-17 RX ADMIN — ACETAMINOPHEN 975 MG: 325 TABLET, FILM COATED ORAL at 06:17

## 2020-07-17 RX ADMIN — LEVETIRACETAM 250 MG: 500 TABLET, FILM COATED ORAL at 08:55

## 2020-07-17 RX ADMIN — IPRATROPIUM BROMIDE 0.5 MG: 0.5 SOLUTION RESPIRATORY (INHALATION) at 07:04

## 2020-07-17 RX ADMIN — CYANOCOBALAMIN TAB 500 MCG 1000 MCG: 500 TAB at 08:54

## 2020-07-17 RX ADMIN — OXYCODONE HYDROCHLORIDE 2.5 MG: 5 TABLET ORAL at 11:15

## 2020-07-17 NOTE — DISCHARGE INSTRUCTIONS
DISCHARGE INSTRUCTIONS FOR RIGHT HIP INCISION:  · You are Weight bearing as tolerated on the right lower extremity  · Please avoid submersion of incision in water - avoid tub baths, pools, hot tubs until cleared by your surgeon  · You are cleared to clean your incision with soap and water, pat dry, and if needed can cover with a dressing  · Please follow up with your surgeon as instructed  · Avoid NSAIDs (such as ibuprofen, Aleve, Motrin) until cleared by your surgeon  · Please continue Lovenox for blood clot prevention for an additional 14 days then can discontinue

## 2020-07-17 NOTE — PROGRESS NOTES
Internal Medicine Progress Note  Patient: Elmer Gonzales  Age/sex: 70 y o  female  Medical Record #: [id number]      ASSESSMENT/PLAN: (Interval History)  Elmer Gonzales is seen and examined and management for following issues:    R femoral neck fracture; s/p ORIF 7/2/20  · F/u ortho as scheduled  · WBAT     Lung CA with mets to brain/right parietal craniotomy 10/2019  · Completed WBR 11/2019 and chemo for lung CA 2018  · Stable  · Continue Keppra 250mg BID as at home     Severe COPD  · Cont Pulmicort, Atrovent, Xopenex nebs   · At home on Atrovent, Brovana and Pulmicort  · Wears chronic oxygen 2L NC at home  · Maintain sats >88%     Chronic tobacco use  · On no nicotine patch     Anemia  · stable  · Transfuse if hgb <8     Hx right breast cancer/mastectomy/axillary LN dissection  · Limb alert right arm     Nutrition/severe malnutrition  · Dietary following  · Uses MMJ as OP; intol to Remeron with nausea, Megace ineffective  · Appetite has improved here    OK for dc from medicine standpoint    The above assessment and plan was reviewed and updated as determined by my evaluation of the patient on 7/17/2020      Labs:   Results from last 7 days   Lab Units 07/16/20  0504 07/13/20  0629   WBC Thousand/uL 4 93 4 87   HEMOGLOBIN g/dL 9 1* 9 6*   HEMATOCRIT % 27 9* 30 2*   PLATELETS Thousands/uL 391* 341     Results from last 7 days   Lab Units 07/16/20  0504 07/13/20  0629   SODIUM mmol/L 136 134*   POTASSIUM mmol/L 4 2 4 4   CHLORIDE mmol/L 99* 99*   CO2 mmol/L 34* 32   BUN mg/dL 14 14   CREATININE mg/dL 0 34* 0 41*   CALCIUM mg/dL 9 3 8 7                   Review of Scheduled Meds:    Current Facility-Administered Medications:  acetaminophen 975 mg Oral [facility] Lyn Salazar MD   albuterol 2 puff Inhalation Q4H PRN Lyn Salazar MD   bisacodyl 10 mg Rectal Daily PRN Lyn Salazar MD   budesonide 0 25 mg Nebulization BID Lyn Salazar MD   vitamin B-12 1,000 mcg Oral Daily Lyn Salazar MD   docusate sodium 100 mg Oral BID German Goff MD   enoxaparin 40 mg Subcutaneous Q24H [address] Claudia Mae PA-C   ipratropium 0 5 mg Nebulization TID German Goff MD   levalbuterol 1 25 mg Nebulization TID German Goff MD   levETIRAcetam 250 mg Oral Q12H [address] German Goff MD   ondansetron 4 mg Oral Q8H PRN German Goff MD   oxyCODONE 5 mg Oral Q4H PRN German Goff MD   Or       oxyCODONE 2 5 mg Oral Q4H PRN German Goff MD   polyethylene glycol 17 g Oral Daily Claudia Mae PA-C   senna 2 tablet Oral HS Claudia Mae PA-C       Subjective/ HPI: Patients overnight issues or events were reviewed with nursing or staff during rounds or morning huddle session  No new or overnight issues  Offers no complaints and she feels she is doing well      ROS:   A 10 point ROS was performed; negative except as noted above  Imaging:     XR hip/pelv 2-3 vws right if performed   Final Result by Kerri Jimenez MD (07/16 1502)      Status post ORIF of the right hip  Alignment is anatomic        Workstation performed: [id number]             *Labs /Radiology studies reviewed  *Medications reviewed and reconciled as needed  *Please refer to order section for additional ordered labs studies  *Case discussed with primary attending during morning huddle case rounds      Physical Examination:  Vitals:   Vitals:    07/16/20 1920 07/16/20 2031 07/17/20 0621 07/17/20 0704   BP:  107/54 106/57    BP Location:  Left arm Left arm    Pulse:  101 91    Resp:  18 20    Temp:  97 6 °F (36 4 °C) 97 6 °F (36 4 °C)    TempSrc:  Oral Oral    SpO2: 98% 99% 100% 97%   Weight:       Height:           GEN: No apparent distress, interactive  NEURO: Alert and oriented x3  HEENT: Pupils are equal and reactive, EOMI, mucous membranes are moist, face symmetrical  CV: S1 S2 regular, no MRG, no peripheral edema noted  RESP: Lungs are clear bilaterally decreased throughout, no wheezes, rales or rhonchi noted, on 2L NC, respirations easy and non labored  GI: Flat, soft non tender, non distended; +BS x4  : Voiding without difficulty  MUSC: Moves all extremities, except RLE  SKIN: pink, warm and dry, normal turgor, no rashes, lesions        The above physical exam was reviewed and updated as determined by my evaluation of the patient on 7/17/2020  Invasive Devices     None                    VTE Pharmacologic Prophylaxis: Enoxaparin  Code Status: Level 1 - Full Code  Current Length of Stay: 12 day(s)      Total time spent:  30 minutes    with more than 50% spent counseling/coordinating care  Counseling includes discussion with patient re: progress  and discussion with patient of his/her current medical state/information  Coordination of patient's care was performed in conjunction with primary service  Time invested included review of patient's labs, vitals, and management of their comorbidities with continued monitoring  In addition, this patient was discussed with medical team including physician and advanced extenders  The care of the patient was extensively discussed and appropriate treatment plan was formulated unique for this patient  ** Please Note:  voice to text software may have been used in the creation of this document   Although proof errors in transcription or interpretation are a potential of such software**

## 2020-07-17 NOTE — TELEPHONE ENCOUNTER
Patient cousin Negrita Foots called to schedule a follow up with Olaf Lira post 8/3 ct scan, scheduled 8/21 at the [facility]

## 2020-07-17 NOTE — NURSING NOTE
Pt d/c home with written and verbal instructions  Verbalized understanding  O2 at 2 lts  D/c via w/c in company of RN and pt's cousin   No c/o SOB, pain or discomfort at this time

## 2020-07-17 NOTE — TELEPHONE ENCOUNTER
Patient called to schedule post op on 7/27  Email sent to [facility] for patient to be placed on schedule

## 2020-07-17 NOTE — CASE MANAGEMENT
Team dc summary - pt made good progress and returned home with family support and contd [facility] services through [facility] for rn pt and ot and the resumption of the PALS program  Pt received a hospital bed through [facility]  pts sister attended therapy training and pts cousin would be assisting with lovenox injections

## 2020-07-17 NOTE — TELEPHONE ENCOUNTER
Syed Baez called reporting the patient is being discharged from [facility] today to home  She inquired if the patient needs blood work prior to the MRI on 7/22/20  Informed Syed Baez the patient does not need new blood work since she just had a basic metabolic panel yesterday on 7/16/20  Syed Baez was appreciative  Syed Baez will call me on 7/28/20 to see if the appointment on 7/29/20 can be a virtual appointment since patient recently had hip surgery and has a hard time getting around  She is aware it depends on the MRI results of the brain if we can make the appointment a virtual or if it has to be in office  She was appreciative

## 2020-07-17 NOTE — DISCHARGE SUMMARY
Discharge Summary - PMR   Francine Yeboah 70 y o  female MRN: [id number]  Unit/Bed#: [facility] 974-01 Encounter: [id number]    Admission Date: 7/5/2020     Discharge Date: 7/17/20     Rehab Diagnosis: Orthopedic Disorders:  08 51  Unilateral Hip Replacement  Etiologic Diagnosis:  R Hip fracture    HPI: 71 y/o female with past medical history significant for metastatic lung adenocarcinoma, COPD on chronic oxygen,  who suffered a mechanical fall while walking in her garden landing on her right hip and suffering a fracture to the right femoral neck   The patient underwent ORIF w/screws x 2 on 7/2/20 by Dr Sandro Daley  Admitted to [facility] for acute inpatient rehabilitation due to functional deficits from her baseline  Acute Rehabilitation Center Course: Patient participated in a comprehensive interdisciplinary inpatient rehabilitation program which included involvment of MD, therapies (PT, OT, and/or SLP), RN, CM, SW, dietary, and psychology services  She was able to be advanced to an overall supervision level - minimal assist level with mobility and self-care and considered safe for discharge home with family  Please see below for patient's day to day management of medical needs  Her sister and cousin will be assisting her post discharge  Family training was completed      Rehabilitation  · Patient will continue her rehabilitative course with home care RN, PT, OT   · Lovenox injections will continue for 14 days  · See below for discharge function:  Physical Therapy Occupational Therapy Speech Therapy   Weight Bearing Status: Full Weight Bearing  Transfers: Supervision  Bed Mobility: Supervision  Amulation Distance (ft): 50 feet  Ambulation: Supervision  Assistive Device for Ambulation: Roller Walker  Wheelchair Mobility Distance: 0 ft  Number of Stairs: 4(HHA on left side )  Assistive Device for Stairs: Right Hand Rail  Stair Assistance: Minimal Assistance  Discharge Recommendations: Home with:  [address] with[de-identified] 24 Hour Supervision   Eating: Independent  Grooming: Supervision  Bathing: Minimal Assistance  Bathing: Minimal Assistance  Upper Body Dressing: Supervision  Lower Body Dressing: Minimal Assistance  Toileting: Supervision  Tub/Shower Transfer: Minimal Assistance  Toilet Transfer: Supervision  Cognition: Within Defined Limits  Orientation: Person, Place, Time, Situation                  Pain  · Controlled overall in her current regimen     DVT prophylaxis  · Continue Lovenox 14/28 days completed-cousin to inject for home     Bladder plan  · Continent     Bowel plan  · Continent    * Hip fracture ([facility] )  Assessment & Plan  R femoral neck fx status post surgical repair via medullary nail by Dr Elba Mazariegos on 7/2/20  Postoperative x-rays completed on 7/16/20:  Stable operative changes  WBAT RLE    Constipation  Assessment & Plan  Resolved continue p r n  bowel regimen    Malnutrition ([facility] )  Assessment & Plan  Severe protein calorie malnutrition findings  Continue nutritional consult and appreciate their recommendation  Patient should continue to follow with a nutritionist as an outpatient    Acute blood loss as cause of postoperative anemia  Assessment & Plan  Stable, follow-up with PCP    On home oxygen therapy  Assessment & Plan  Chronically on 2 L of oxygen at all times    Metastatic adenocarcinoma to brain [facility])  Assessment & Plan  Follows with Dr Carlos Alberto Lance as outpatient with MRI scheduled for next week    COPD (chronic obstructive pulmonary disease) ([place] [address] )  Assessment & Plan  On home chronic 2L O2  Continue Home inhalers and nebulizers  Incentive spirometry     Nicotine dependence  Assessment & Plan  Encourage cessation  Malignant neoplasm of right lung [facility])  Assessment & Plan  S/P chemo/radiation   Follow-up hem/onc after d/c as scheduled      Discharge Medications:   See after visit summary for reconciled discharge medications provided to patient and family        Condition at Discharge: good     Discharge instructions/Information to patient and family:   See after visit summary for information provided to patient and family  Provisions for Follow-Up Care:  See after visit summary for information related to follow-up care and any pertinent home health orders  Future Appointments   Date Time Provider Anneliese Paradai   7/22/2020 10:00 AM BE MRI SLN 1 BE SLN MRI BE [place]   7/29/2020 10:30 AM Traci Clark MD AN Rad Onc AN H[facility]   7/29/2020 10:30 AM Fabio Lozano MD NSURG [facility]   8/3/2020 11:00 AM BE CT SLN 1 BE SLN CT BE [place]   12/9/2020  9:55 AM Dylan Gilbert MD NEURO [facility]       Disposition: Home      Planned Readmission: No    Discharge Statement   I spent more than 30 minutes discharging the patient  This time was spent on the day of discharge  I had direct contact with the patient on the day of discharge  Greater than 50% of the total time was spent examining patient, answering all patient questions, arranging and discussing plan of care with patient as well as directly providing post-discharge instructions  Additional time then spent on discharge activities  Discharge Medications:  See after visit summary for reconciled discharge medications provided to patient and family

## 2020-07-19 DIAGNOSIS — J44.9 CHRONIC OBSTRUCTIVE PULMONARY DISEASE, UNSPECIFIED COPD TYPE (HCC): ICD-10-CM

## 2020-07-20 ENCOUNTER — OFFICE VISIT (OUTPATIENT)
Dept: INTERNAL MEDICINE CLINIC | Facility: CLINIC | Age: 75
End: 2020-07-20
Payer: MEDICARE

## 2020-07-20 ENCOUNTER — TRANSITIONAL CARE MANAGEMENT (OUTPATIENT)
Dept: INTERNAL MEDICINE CLINIC | Facility: CLINIC | Age: 75
End: 2020-07-20

## 2020-07-20 VITALS
SYSTOLIC BLOOD PRESSURE: 100 MMHG | OXYGEN SATURATION: 97 % | HEART RATE: 114 BPM | TEMPERATURE: 99.8 F | WEIGHT: 82.6 LBS | HEIGHT: 65 IN | BODY MASS INDEX: 13.76 KG/M2 | DIASTOLIC BLOOD PRESSURE: 54 MMHG

## 2020-07-20 DIAGNOSIS — R64 MALIGNANT CACHEXIA (HCC): ICD-10-CM

## 2020-07-20 DIAGNOSIS — C34.01 MALIGNANT NEOPLASM OF HILUS OF RIGHT LUNG (HCC): ICD-10-CM

## 2020-07-20 DIAGNOSIS — J96.11 CHRONIC HYPOXEMIC RESPIRATORY FAILURE (HCC): ICD-10-CM

## 2020-07-20 DIAGNOSIS — F17.219 CIGARETTE NICOTINE DEPENDENCE WITH NICOTINE-INDUCED DISORDER: ICD-10-CM

## 2020-07-20 DIAGNOSIS — S72.001A CLOSED FRACTURE OF RIGHT HIP, INITIAL ENCOUNTER (HCC): Primary | ICD-10-CM

## 2020-07-20 DIAGNOSIS — Z51.5 PALLIATIVE CARE PATIENT: ICD-10-CM

## 2020-07-20 PROCEDURE — 99496 TRANSJ CARE MGMT HIGH F2F 7D: CPT | Performed by: INTERNAL MEDICINE

## 2020-07-20 RX ORDER — ARFORMOTEROL TARTRATE 15 UG/2ML
SOLUTION RESPIRATORY (INHALATION)
Qty: 120 ML | Refills: 5 | Status: SHIPPED | OUTPATIENT
Start: 2020-07-20 | End: 2020-08-24 | Stop reason: SDUPTHER

## 2020-07-20 NOTE — ASSESSMENT & PLAN NOTE
History of lung cancer on top of her chronic obstructive pulmonary disease and chronic hypoxia  Patient is receive radiation treatment, chemotherapy  Patient is due to be seen by her oncologist in the near future, repeat MRI of the chest   She states that she is having no increasing compromise with her breathing  O2 dependent  Patient is following her O2 saturations at home with her patient denies any hemoptysis

## 2020-07-20 NOTE — PROGRESS NOTES
Assessment/Plan:  TCM Call (since 6/19/2020)     Date and time call was made  7/20/2020  8:35 AM    Hospital care reviewed  Records reviewed    Patient was hospitialized at  [facility]    Date of Admission  07/05/20    Date of discharge  07/17/20    Diagnosis  hip fracture    Disposition  Home    Current Symptoms  None      TCM Call (since 6/19/2020)     Scheduled for follow up? Yes <img src='C:FILES (X86)    I have advised the patient to call PCP with any new or worsening symptoms  maribeth sweet MA          Hip fracture [facility])  Patient is here today for transition of care visit  Accidental fall at home  She feels she may have tripped over her oxygen hose  Fracture to her right hip status post ORIF  No complication pre or post surgery  Patient was then transferred to rehab where she has completed the program and has a been discharged home to continue PT OT  She is accompanied to the visit today by her sister  She states in general she is doing better day by day  She further relates that she is having decreasing pain  Occasionally but not daily taking the oxycodone  There were no major changes made to her medications other than pain placing the patient on subcutaneous heparin  During her hospitalization patient did completely quit smoking cigarettes but has gone back to smoking once return to home  She does have a follow-up with her orthopedic physician    Malignant neoplasm of right lung ([address] )  History of lung cancer on top of her chronic obstructive pulmonary disease and chronic hypoxia  Patient is receive radiation treatment, chemotherapy  Patient is due to be seen by her oncologist in the near future, repeat MRI of the chest   She states that she is having no increasing compromise with her breathing  O2 dependent  Patient is following her O2 saturations at home with her patient denies any hemoptysis      Chronic hypoxemic respiratory failure (HCC)  Underlying COPD complicated by lung cancer and treatment  Patient is O2 dependent  A disappointment with her recent hospitalization where she was not allowed to smoke she has returned to smoking and is already smoked to half a pack of cigarettes today  Patient will continue to follow-up with pulmonary  Call with any acute exacerbations or return to the emergency room    Malignant cachexia [facility])  Malnutrition Findings:        patient is had a diagnosis of malignant cachexia  Patient already prior to her diagnosis of O2 dependent COPD and lung cancer had been extremely thin  Patient states that she she has adequate p o  indicate episode or he lost 2 lb since return to home  Patient is instructed to continue to work to supplement her diet, drinking a can or to ensure or boost on a daily basis  BMI Findings: Body mass index is 13 75 kg/m²  Nicotine dependence  Despite not smoking while in the hospital patient is return to a pack tobacco abuse  Patient was told the importance with her underlying lung disease to quit smoking  Offered prescription for nicotine patch to wear but she is refusing  States this is are only vice    Palliative care patient  Patient is followed by palliative Care  Again were awaiting the results of studies to be performed in the near future looking at any possible progression of her lung cancer       Diagnoses and all orders for this visit:    Closed fracture of right hip, initial encounter ([facility] )    Malignant neoplasm of hilus of right lung ([facility] [address] )    Chronic hypoxemic respiratory failure (HCC)    Malignant cachexia ([facility] [address] )    Cigarette nicotine dependence with nicotine-induced disorder    Palliative care patient          Subjective:      Patient ID: Nico Zuniga is a 70 y o  female  A 57-year-old female history of chronic obstructive pulmonary disease, O2 dependent, lung cancer status post chemotherapy radiation treatment, chronic hypoxia    Patient had accidental fall at home fracture to the right hip status post ORIF  Patient was then in rehab  Patient is here today for follow-up  No major change in medications other than a narcotic analgesics for her pain which she states is decreasing on a daily basis, subcutaneous heparin  She is accompanied to the appointment today by her sister  States that her pain level is approximately 3 the for the worst   Worse with ambulation  To start physical therapy tomorrow at home      The following portions of the patient's history were reviewed and updated as appropriate: She  has a past medical history of Breast cancer ([facility] ) (1996), Cancer ([place] [address] ), COPD, severe ([place] [address] ), Lung disease, Requires supplemental oxygen, and Stage 4 lung cancer ([place] [address] )  She   Patient Active Problem List    Diagnosis Date Noted    Severe protein-calorie malnutrition ([facility] ) 07/17/2020    Malnutrition ([place] [address] ) 07/05/2020    Constipation 07/05/2020    Acute blood loss as cause of postoperative anemia 07/04/2020    Hip fracture ([place] [address] ) 07/01/2020    Hyponatremia 07/01/2020    Palliative care patient 02/27/2020    Scab 01/02/2020    Xerostomia 11/11/2019    Caregiver stress 11/11/2019    Medical marijuana use 11/11/2019    On home oxygen therapy 10/24/2019    Metastatic adenocarcinoma to brain ([facility] ) 10/09/2019    Cramp of limb 09/20/2019    Episode of shaking 09/20/2019    Shortness of breath 03/13/2019    Chronic hypoxemic respiratory failure ([place] [address] ) 02/26/2019    Malignant neoplasm of right lung (HCC) 03/09/2018    Depressive disorder 11/14/2017    COPD (chronic obstructive pulmonary disease) ([facility] ) 10/14/2016    Hyperlipidemia 04/08/2016    Loss of appetite 01/23/2015    Malignant cachexia ([place] [address] ) 10/05/2012    Nicotine dependence 10/05/2012     She  has a past surgical history that includes Appendectomy; Tonsillectomy; Mastectomy, radical (Right, 1996); Colonoscopy (N/A, 4/18/2016); pr bronchoscopy,diagnostic (N/A, 10/17/2017);  Lung cancer surgery; pr excis supratent brain tumor (Right, 10/24/2019); and ORIF hip fracture (Right, 7/2/2020)  Her family history includes Dementia in her mother; Heart disease in her maternal grandfather, maternal grandmother, and mother; Heart failure in her father  She  reports that she has been smoking cigarettes  She started smoking about 58 years ago  She has been smoking about 0 00 packs per day for the past 55 00 years  She has never used smokeless tobacco  She reports that she drank alcohol  She reports that she has current or past drug history  Drug: Marijuana  Current Outpatient Medications   Medication Sig Dispense Refill    acetaminophen (TYLENOL) 325 mg tablet Take 2 tablets (650 mg total) by mouth every 4 (four) hours as needed for mild pain 30 tablet 0    albuterol (VENTOLIN HFA) 90 mcg/act inhaler Inhale 2 puffs every 4 (four) hours as needed for wheezing 1 Inhaler 0    budesonide (PULMICORT) 0 25 mg/2 mL nebulizer solution TAKE 1 VIAL (0 25 MG TOTAL) BY NEBULIZATION 2 (TWO) TIMES A  mL 1    enoxaparin (LOVENOX) 40 mg/0 4 mL Inject 0 4 mL (40 mg total) under the skin every 24 hours For 14 days then discontinue 14 Syringe 0    ipratropium (ATROVENT) 0 02 % nebulizer solution Take 1 vial (0 5 mg total) by nebulization 3 (three) times a day 90 vial 5    levETIRAcetam (KEPPRA) 250 mg tablet Take 1 tablet (250 mg total) by mouth every 12 (twelve) hours      mirtazapine (REMERON) 15 mg tablet 1/2 to 1 tab PO at bedtime      oxyCODONE (ROXICODONE) 5 mg immediate release tablet Take 1/2 tab (2 5mg) or 1 tab (5mg) twice a day for moderate - severe pain AS NEEDED 30 tablet 0    vitamin B-12 (VITAMIN B-12) 1,000 mcg tablet Take 1,000 mcg by mouth daily      BROVANA 15 MCG/2ML nebulizer solution TAKE 1 VIAL (15 MCG TOTAL) BY NEBULIZATION 2 (TWO) TIMES A  mL 5     No current facility-administered medications for this visit        Current Outpatient Medications on File Prior to Visit   Medication Sig    acetaminophen (TYLENOL) 325 mg tablet Take 2 tablets (650 mg total) by mouth every 4 (four) hours as needed for mild pain    albuterol (VENTOLIN HFA) 90 mcg/act inhaler Inhale 2 puffs every 4 (four) hours as needed for wheezing    budesonide (PULMICORT) 0 25 mg/2 mL nebulizer solution TAKE 1 VIAL (0 25 MG TOTAL) BY NEBULIZATION 2 (TWO) TIMES A DAY    enoxaparin (LOVENOX) 40 mg/0 4 mL Inject 0 4 mL (40 mg total) under the skin every 24 hours For 14 days then discontinue    ipratropium (ATROVENT) 0 02 % nebulizer solution Take 1 vial (0 5 mg total) by nebulization 3 (three) times a day    levETIRAcetam (KEPPRA) 250 mg tablet Take 1 tablet (250 mg total) by mouth every 12 (twelve) hours    mirtazapine (REMERON) 15 mg tablet 1/2 to 1 tab PO at bedtime    oxyCODONE (ROXICODONE) 5 mg immediate release tablet Take 1/2 tab (2 5mg) or 1 tab (5mg) twice a day for moderate - severe pain AS NEEDED    vitamin B-12 (VITAMIN B-12) 1,000 mcg tablet Take 1,000 mcg by mouth daily    [DISCONTINUED] arformoterol (BROVANA) 15 mcg/2 mL nebulizer solution Take 1 vial (15 mcg total) by nebulization 2 (two) times a day    BROVANA 15 MCG/2ML nebulizer solution TAKE 1 VIAL (15 MCG TOTAL) BY NEBULIZATION 2 (TWO) TIMES A DAY     No current facility-administered medications on file prior to visit  She is allergic to megace [megestrol] and other       Review of Systems   Constitutional: Positive for activity change (Patient is had a dramatic decrease in activity level secondary to hip fracture  Will start with PT an OT tomorrow)  Negative for appetite change, chills, diaphoresis, fatigue, fever and unexpected weight change  HENT: Negative  Eyes: Negative  Respiratory: Positive for shortness of breath ( chronic hypoxia on O2, shortness of breath with any brisk exertion  )  Negative for apnea, cough, choking, chest tightness, wheezing and stridor  Cardiovascular: Positive for leg swelling ( patient is reporting some slight swelling to both ankles bilaterally  States a goes will way with foot elevation)  Negative for chest pain and palpitations  Gastrointestinal: Positive for constipation ( did have a problem with some constipation after discharge from hospital   States now bowels or well regulated)  Negative for abdominal distention, abdominal pain, anal bleeding, blood in stool, diarrhea, nausea, rectal pain and vomiting  Endocrine: Negative  Genitourinary: Negative  Musculoskeletal: Positive for arthralgias and gait problem  Negative for back pain, joint swelling, myalgias, neck pain and neck stiffness  Skin: Negative  Allergic/Immunologic: Negative  Hematological: Negative  Psychiatric/Behavioral: Negative  Objective:      /54   Pulse (!) 114   Temp 99 8 °F (37 7 °C)   Ht 5' 5" (1 651 m)   Wt 37 5 kg (82 lb 9 6 oz)   SpO2 97%   BMI 13 75 kg/m²          Physical Exam   Constitutional: She is oriented to person, place, and time  She appears well-developed  No distress  Extremely thin, cachectic appearing 59-year-old female who is awake alert in no acute distress, walking with a walker, accompanied by her sister   HENT:   Head: Normocephalic and atraumatic  Right Ear: External ear normal    Left Ear: External ear normal    Nose: Nose normal    Mouth/Throat: Oropharynx is clear and moist  No oropharyngeal exudate  O2 in place   Eyes: Pupils are equal, round, and reactive to light  Conjunctivae and EOM are normal  Right eye exhibits no discharge  Left eye exhibits no discharge  No scleral icterus  Neck: Normal range of motion  Neck supple  No JVD present  No tracheal deviation present  No thyromegaly present  Cardiovascular: Regular rhythm, normal heart sounds and intact distal pulses  Exam reveals no gallop and no friction rub  No murmur heard  Pulmonary/Chest: Effort normal  No stridor  No respiratory distress  She has no wheezes  She has no rales  She exhibits no tenderness     O2 dependent, decreased breath sounds bilaterally  No respiratory distress examination today   Abdominal: Soft  Bowel sounds are normal  She exhibits no distension and no mass  There is no tenderness  There is no rebound and no guarding  No hernia  Scaphoid   Musculoskeletal: She exhibits edema and deformity  She exhibits no tenderness  Patient has profound muscle wasting, decreased subcutaneous fat to muscle tissue, cachectic, diffuse degenerative changes, slightly hunched over gait, trace edema pedal bilaterally   Lymphadenopathy:     She has no cervical adenopathy  Neurological: She is alert and oriented to person, place, and time  She displays normal reflexes  No cranial nerve deficit or sensory deficit  She exhibits abnormal muscle tone ( decreased muscle tone and strength bilateral upper lower extremities)  Coordination (Difficulty with coordination, ambulation secondary to pain her right hip, status post fracture with ORIF) abnormal    Skin: Skin is warm and dry  Capillary refill takes less than 2 seconds  No rash noted  She is not diaphoretic  No erythema  No pallor  Psychiatric: She has a normal mood and affect  Her behavior is normal  Judgment and thought content normal    Questionable judgment with the patient returning to smoking cigarettes will when returning home   Nursing note and vitals reviewed

## 2020-07-20 NOTE — ASSESSMENT & PLAN NOTE
Patient is here today for transition of care visit  Accidental fall at home  She feels she may have tripped over her oxygen hose  Fracture to her right hip status post ORIF  No complication pre or post surgery  Patient was then transferred to rehab where she has completed the program and has a been discharged home to continue PT OT  She is accompanied to the visit today by her sister  She states in general she is doing better day by day  She further relates that she is having decreasing pain  Occasionally but not daily taking the oxycodone  There were no major changes made to her medications other than pain placing the patient on subcutaneous heparin  During her hospitalization patient did completely quit smoking cigarettes but has gone back to smoking once return to home    She does have a follow-up with her orthopedic physician

## 2020-07-20 NOTE — ASSESSMENT & PLAN NOTE
Despite not smoking while in the hospital patient is return to a pack tobacco abuse  Patient was told the importance with her underlying lung disease to quit smoking  Offered prescription for nicotine patch to wear but she is refusing    States this is are only vice

## 2020-07-20 NOTE — ASSESSMENT & PLAN NOTE
Patient is followed by palliative Care    Again were awaiting the results of studies to be performed in the near future looking at any possible progression of her lung cancer

## 2020-07-20 NOTE — ASSESSMENT & PLAN NOTE
Underlying COPD complicated by lung cancer and treatment  Patient is O2 dependent  A disappointment with her recent hospitalization where she was not allowed to smoke she has returned to smoking and is already smoked to half a pack of cigarettes today  Patient will continue to follow-up with pulmonary    Call with any acute exacerbations or return to the emergency room

## 2020-07-22 ENCOUNTER — HOSPITAL ENCOUNTER (OUTPATIENT)
Dept: RADIOLOGY | Age: 75
Discharge: HOME | End: 2020-07-22
Payer: MEDICARE

## 2020-07-22 DIAGNOSIS — C79.31 METASTATIC ADENOCARCINOMA TO BRAIN (HCC): ICD-10-CM

## 2020-07-22 PROCEDURE — 70553 MRI BRAIN STEM W/O & W/DYE: CPT

## 2020-07-22 PROCEDURE — A9585 GADOBUTROL INJECTION: HCPCS | Performed by: RADIOLOGY

## 2020-07-22 RX ADMIN — GADOBUTROL 3 ML: 604.72 INJECTION INTRAVENOUS at 10:44

## 2020-07-22 NOTE — OCCUPATIONAL THERAPY NOTE
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

## 2020-07-27 ENCOUNTER — OFFICE VISIT (OUTPATIENT)
Dept: OBGYN CLINIC | Facility: HOSPITAL | Age: 75
End: 2020-07-27

## 2020-07-27 ENCOUNTER — TELEPHONE (OUTPATIENT)
Dept: NEUROSURGERY | Facility: CLINIC | Age: 75
End: 2020-07-27

## 2020-07-27 ENCOUNTER — HOSPITAL ENCOUNTER (OUTPATIENT)
Dept: RADIOLOGY | Facility: HOSPITAL | Age: 75
Discharge: HOME | End: 2020-07-27
Attending: ORTHOPAEDIC SURGERY
Payer: MEDICARE

## 2020-07-27 VITALS
DIASTOLIC BLOOD PRESSURE: 66 MMHG | WEIGHT: 82 LBS | BODY MASS INDEX: 13.66 KG/M2 | HEIGHT: 65 IN | SYSTOLIC BLOOD PRESSURE: 100 MMHG | HEART RATE: 111 BPM

## 2020-07-27 DIAGNOSIS — M25.551 PAIN IN RIGHT HIP: Primary | ICD-10-CM

## 2020-07-27 DIAGNOSIS — Z87.81 S/P ORIF (OPEN REDUCTION INTERNAL FIXATION) FRACTURE: ICD-10-CM

## 2020-07-27 DIAGNOSIS — M25.551 PAIN IN RIGHT HIP: ICD-10-CM

## 2020-07-27 DIAGNOSIS — Z98.890 S/P ORIF (OPEN REDUCTION INTERNAL FIXATION) FRACTURE: ICD-10-CM

## 2020-07-27 PROCEDURE — 4040F PNEUMOC VAC/ADMIN/RCVD: CPT | Performed by: ORTHOPAEDIC SURGERY

## 2020-07-27 PROCEDURE — 3008F BODY MASS INDEX DOCD: CPT | Performed by: ORTHOPAEDIC SURGERY

## 2020-07-27 PROCEDURE — 1111F DSCHRG MED/CURRENT MED MERGE: CPT | Performed by: ORTHOPAEDIC SURGERY

## 2020-07-27 PROCEDURE — 1160F RVW MEDS BY RX/DR IN RCRD: CPT | Performed by: ORTHOPAEDIC SURGERY

## 2020-07-27 PROCEDURE — 99024 POSTOP FOLLOW-UP VISIT: CPT | Performed by: ORTHOPAEDIC SURGERY

## 2020-07-27 PROCEDURE — 73502 X-RAY EXAM HIP UNI 2-3 VIEWS: CPT

## 2020-07-27 NOTE — TELEPHONE ENCOUNTER
Patient called requesting for the [facility] appointment on 7/29/20 to be changed to virtual due to risk of COVID and recent hip surgery  Informed patient a virtual appointment should be fine  She will use [employer]  Email and phone number is on file  Patient was appreciative  Notified  and Rad Onc

## 2020-07-27 NOTE — PROGRESS NOTES
Assessment:   Diagnosis ICD-10-CM Associated Orders   1  Pain in right hip M25 551 XR hip/pelv 2-3 vws right if performed   2  S/P ORIF (open reduction internal fixation) fracture Z98 890     Z87 81        Plan:    S/p ORIF right hip 3 cannulated screws, imaging stable hardware hardware  She is to continue with walker and in home therapy  No pain on exam, able to do modified SLR and knee extension with no pain  She is moving to [place] in 8 weeks, see in 6 weeks for last visit  To do next visit:  Return in about 6 weeks (around 9/7/2020) for Recheck  The above stated was discussed in layman's terms and the patient expressed understanding  All questions were answered to the patient's satisfaction  Scribe Attestation    I,:   Tena Jhaveri am acting as a scribe while in the presence of the attending physician :        I,:   Parth Church MD personally performed the services described in this documentation    as scribed in my presence :              Subjective:   Sangeeta Mcintyre is a 70 y o  female who presents s/p right hip ORIF with cannulated screws 7/2/20  First post op visit  She presents using walker  Pain is well controlled, only using tylenol  Yesterday was her first pain free day  She will note some groin pain with certain motions  She is getting in home therapy twice a week  Denies any fever,chills, numbness or tingling in leg  Review of systems negative unless otherwise specified in HPI    Past Medical History:   Diagnosis Date    Breast cancer ([facility] [address] ) 1996    Cancer ([facility] )     COPD, severe ([facility] )     Lung disease     Requires supplemental oxygen     3L 24 hrs/day    Stage 4 lung cancer ([facility] )        Past Surgical History:   Procedure Laterality Date    APPENDECTOMY      COLONOSCOPY N/A 4/18/2016    Procedure: COLONOSCOPY;  Surgeon: Robe Lerner MD;  [facility]: [facility];   Service:     LUNG CANCER SURGERY      MASTECTOMY, RADICAL Right 1996    ORIF HIP FRACTURE Right 7/2/2020    Procedure: OPEN REDUCTION W/ INTERNAL FIXATION (ORIF) HIP WITH CANNUALATED SCREWS;  Surgeon: Yasmeen Barragan MD;  [facility];  Service: Orthopedics    CA [address] N/A 10/17/2017    Procedure: Melissa Nascimento;  Surgeon: Ron Paulino MD;  [facility]: [facility];   Service: Pulmonary    CA EXCIS SUPRATENT BRAIN TUMOR Right 10/24/2019    Procedure: IMAGE-GUIDED RIGHT FRONTAL CRANIOTOMY FOR TUMOR;  Surgeon: Anne-Marie Adhikari MD;  [facility];  Service: Neurosurgery    TONSILLECTOMY         Family History   Problem Relation Age of Onset    Heart disease Mother         cardiac disorder    Dementia Mother     Heart failure Father     Heart disease Maternal Grandmother     Heart disease Maternal Grandfather        Social History     Occupational History    Occupation: retired   Tobacco Use    Smoking status: Current Some Day Smoker     Packs/day: 0 00     Years: 55 00     Pack years: 0 00     Types: Cigarettes     Start date: 1962    Smokeless tobacco: Never Used    Tobacco comment: NOW DOWN TO 2 A DAY   Substance and Sexual Activity    Alcohol use: Not Currently     Alcohol/week: 0 0 standard drinks     Frequency: Never     Binge frequency: Never    Drug use: Yes     Types: Marijuana     Comment: MEDICAL MARIJUANA    Sexual activity: Not Currently         Current Outpatient Medications:     acetaminophen (TYLENOL) 325 mg tablet, Take 2 tablets (650 mg total) by mouth every 4 (four) hours as needed for mild pain, Disp: 30 tablet, Rfl: 0    albuterol (VENTOLIN HFA) 90 mcg/act inhaler, Inhale 2 puffs every 4 (four) hours as needed for wheezing, Disp: 1 Inhaler, Rfl: 0    BROVANA 15 MCG/2ML nebulizer solution, TAKE 1 VIAL (15 MCG TOTAL) BY NEBULIZATION 2 (TWO) TIMES A DAY, Disp: 120 mL, Rfl: 5    budesonide (PULMICORT) 0 25 mg/2 mL nebulizer solution, TAKE 1 VIAL (0 25 MG TOTAL) BY NEBULIZATION 2 (TWO) TIMES A DAY, Disp: 360 mL, Rfl: 1    enoxaparin (LOVENOX) 40 mg/0 4 mL, Inject 0 4 mL (40 mg total) under the skin every 24 hours For 14 days then discontinue, Disp: 14 Syringe, Rfl: 0    ipratropium (ATROVENT) 0 02 % nebulizer solution, Take 1 vial (0 5 mg total) by nebulization 3 (three) times a day, Disp: 90 vial, Rfl: 5    levETIRAcetam (KEPPRA) 250 mg tablet, Take 1 tablet (250 mg total) by mouth every 12 (twelve) hours, Disp: , Rfl:     mirtazapine (REMERON) 15 mg tablet, 1/2 to 1 tab PO at bedtime, Disp: , Rfl:     oxyCODONE (ROXICODONE) 5 mg immediate release tablet, Take 1/2 tab (2 5mg) or 1 tab (5mg) twice a day for moderate - severe pain AS NEEDED, Disp: 30 tablet, Rfl: 0    vitamin B-12 (VITAMIN B-12) 1,000 mcg tablet, Take 1,000 mcg by mouth daily, Disp: , Rfl:     Allergies   Allergen Reactions    Megace [Megestrol] Nausea Only    Other Vomiting     Oysters, clams and mussels            Vitals:    07/27/20 1141   BP: 100/66   Pulse: (!) 111       Objective:                    Right Hip Exam     Comments:  Right hip  Incision clean, dry, intact  No tenderness to palpation   Appropriate range of motion of hip, mild pain internal rotation  She is able to perform modified straight leg raise with no pain, no pain with knee extension  Sensation intact            Diagnostics, reviewed and taken today if performed as documented: The attending physician has personally reviewed the pertinent films in PACS and interpretation is as follows: s/p right hip ORIF with stable intact cannulated screw fixation       Procedures, if performed today:    Procedures    None performed      Portions of the record may have been created with voice recognition software  Occasional wrong word or "sound a like" substitutions may have occurred due to the inherent limitations of voice recognition software  Read the chart carefully and recognize, using context, where substitutions have occurred

## 2020-07-29 ENCOUNTER — TELEMEDICINE (OUTPATIENT)
Dept: NEUROSURGERY | Facility: CLINIC | Age: 75
End: 2020-07-29
Payer: MEDICARE

## 2020-07-29 VITALS — HEIGHT: 65 IN | BODY MASS INDEX: 13.99 KG/M2 | WEIGHT: 84 LBS

## 2020-07-29 DIAGNOSIS — C34.01 MALIGNANT NEOPLASM OF HILUS OF RIGHT LUNG (HCC): ICD-10-CM

## 2020-07-29 DIAGNOSIS — C79.31 METASTATIC ADENOCARCINOMA TO BRAIN (HCC): Primary | ICD-10-CM

## 2020-07-29 PROCEDURE — 99441 PR PHYS/QHP TELEPHONE EVALUATION 5-10 MIN: CPT | Performed by: NEUROLOGICAL SURGERY

## 2020-07-29 NOTE — PROGRESS NOTES
Virtual Regular Visit      Assessment/Plan:    Problem List Items Addressed This Visit        Respiratory    Malignant neoplasm of right lung ([address] )       Nervous and Auditory    Metastatic adenocarcinoma to brain [facility]) - Primary          Assessment/Plan:    72-year-old woman known history of metastatic  adenocarcinoma, lung origin        S/p Right parietal craniotomy for resection 10/24/19  Pathology metastatic adenocarcinoma      Tolerated surgery well, discharged POD#2  No hand cramping episodes since surgery  Maintained on Keppra  Was seen by Dr Emily Bhatia of Epilepsy Neurology to manage AED, etc       Postop MRI scan performed 11/4/19, showed resection of right frontoparietal met, great improvement in associated cerebral edema, and previously known to occipital smaller metastases   However also seen on this new study were 2-3 additional small new brain lesions consistent with metastases      S/p WBRT 11/2019    MRI brain 1/2020 & 4/2020 & 7/2020 show improvement in all areas and no new lesions      I have recommended a f/u MRI brain in 3 months, c/w NCCN guidelines  However the patient is moving to [place] in the next 6 weeks  We will happily provide records to her new providers there, once she is established  Reason for visit is   Chief Complaint   Patient presents with    Virtual Regular Visit     2 month f/u with MRI        Encounter provider Tiffany Roach MD    Provider located at [address] [place] [address] [address] [place] [id number]      Recent Visits  No visits were found meeting these conditions     Showing recent visits within past 7 days and meeting all other requirements     Today's Visits  Date Type Provider Dept   07/29/20 Telemedicine Tiffany Roach MD [facility] [place] [facility] today's visits and meeting all other requirements     Future Appointments  Date Type Provider Dept   07/29/20 Telemedicine Tiffany Roach MD [facility]   Showing future appointments within next 150 days and meeting all other requirements        The patient was identified by name and date of birth  Quyen Baker was informed that this is a telemedicine visit and that the visit is being conducted through telephone  My office door was closed  No one else was in the room  She acknowledged consent and understanding of privacy and security of the video platform  The patient has agreed to participate and understands they can discontinue the visit at any time  It was my intent to perform this visit via video technology but the patient was not able to completed the audio portion of the video connection, so the visit was completed via audio telephone only  Patient is aware this is a billable service  Subjective  Quyen Baker is a 70 y o  female   See A/P        HPI     Past Medical History:   Diagnosis Date    Breast cancer ([facility] ) 1996    Cancer ([facility] )     COPD, severe ([facility] )     Lung disease     Requires supplemental oxygen     3L 24 hrs/day    Stage 4 lung cancer ([facility] )        Past Surgical History:   Procedure Laterality Date    APPENDECTOMY      COLONOSCOPY N/A 4/18/2016    Procedure: COLONOSCOPY;  Surgeon: Kim Reed MD;  [facility]; Service:     LUNG CANCER SURGERY      MASTECTOMY, RADICAL Right 1996    ORIF HIP FRACTURE Right 7/2/2020    Procedure: OPEN REDUCTION W/ INTERNAL FIXATION (ORIF) HIP WITH CANNUALATED SCREWS;  Surgeon: Laura Diaz MD;  [facility];  Service: Orthopedics    ND [address] N/A 10/17/2017    Procedure: Benny Spaniel;  Surgeon: Mary Jo Torres MD;  [facility];   Service: Pulmonary    ND EXCIS [address] TUMOR Right 10/24/2019    Procedure: IMAGE-GUIDED RIGHT FRONTAL CRANIOTOMY FOR TUMOR;  Surgeon: Rosaura Allen MD;  [facility];  Service: Neurosurgery    TONSILLECTOMY         Current Outpatient Medications   Medication Sig Dispense Refill    acetaminophen (TYLENOL) 325 mg tablet Take 2 tablets (650 mg total) by mouth every 4 (four) hours as needed for mild pain 30 tablet 0    albuterol (VENTOLIN HFA) 90 mcg/act inhaler Inhale 2 puffs every 4 (four) hours as needed for wheezing 1 Inhaler 0    BROVANA 15 MCG/2ML nebulizer solution TAKE 1 VIAL (15 MCG TOTAL) BY NEBULIZATION 2 (TWO) TIMES A  mL 5    budesonide (PULMICORT) 0 25 mg/2 mL nebulizer solution TAKE 1 VIAL (0 25 MG TOTAL) BY NEBULIZATION 2 (TWO) TIMES A  mL 1    enoxaparin (LOVENOX) 40 mg/0 4 mL Inject 0 4 mL (40 mg total) under the skin every 24 hours For 14 days then discontinue 14 Syringe 0    ipratropium (ATROVENT) 0 02 % nebulizer solution Take 1 vial (0 5 mg total) by nebulization 3 (three) times a day 90 vial 5    levETIRAcetam (KEPPRA) 250 mg tablet Take 1 tablet (250 mg total) by mouth every 12 (twelve) hours      mirtazapine (REMERON) 15 mg tablet 1/2 to 1 tab PO at bedtime      oxyCODONE (ROXICODONE) 5 mg immediate release tablet Take 1/2 tab (2 5mg) or 1 tab (5mg) twice a day for moderate - severe pain AS NEEDED 30 tablet 0    vitamin B-12 (VITAMIN B-12) 1,000 mcg tablet Take 1,000 mcg by mouth daily       No current facility-administered medications for this visit  Allergies   Allergen Reactions    Megace [Megestrol] Nausea Only    Other Vomiting     Oysters, clams and mussels       Review of Systems   Constitutional: Positive for appetite change (improving little by little but not great) and fatigue (somewhat improved; she is not napping every day like she was)  HENT: Negative  Eyes: Negative  Respiratory: Positive for cough and shortness of breath (with exertion)  Hx of COPD   Cardiovascular: Negative  Gastrointestinal: Negative  Endocrine: Positive for cold intolerance  Genitourinary: Negative  Musculoskeletal: Negative  Skin: Negative      Allergic/Immunologic: Positive for food allergies (clams, oysters, mussels)  Neurological: Positive for numbness (right ring finger)  Negative for seizures (none since being on Keppra)  Hematological: Bruises/bleeds easily (old age bruising)  Psychiatric/Behavioral: Negative  All other systems reviewed and are negative  Video Exam    Vitals:    07/29/20 1203   Weight: 38 1 kg (84 lb)   Height: 5' 5" (1 651 m)       Physical Exam   Constitutional: She appears well-developed and well-nourished  HENT:   Head: Normocephalic and atraumatic  Eyes: No scleral icterus  Pulmonary/Chest:   Nasal Cannula oxygen   Neurological: She is alert  Psychiatric: Her speech is normal       Neurologic Exam     Mental Status   Attention: normal  Concentration: normal    Speech: speech is normal     Cranial Nerves     CN VII   Facial expression full, symmetric  I spent 5 minutes directly with the patient during this visit      VIRTUAL VISIT Jeff Mares acknowledges that she has consented to an online visit or consultation  She understands that the online visit is based solely on information provided by her, and that, in the absence of a face-to-face physical evaluation by the physician, the diagnosis she receives is both limited and provisional in terms of accuracy and completeness  This is not intended to replace a full medical face-to-face evaluation by the physician  Shadi Soriano understands and accepts these terms

## 2020-08-03 ENCOUNTER — HOSPITAL ENCOUNTER (OUTPATIENT)
Dept: RADIOLOGY | Age: 75
Discharge: HOME | End: 2020-08-03
Payer: MEDICARE

## 2020-08-03 DIAGNOSIS — C34.01 MALIGNANT NEOPLASM OF HILUS OF RIGHT LUNG (HCC): ICD-10-CM

## 2020-08-03 DIAGNOSIS — C79.31 METASTATIC ADENOCARCINOMA TO BRAIN (HCC): ICD-10-CM

## 2020-08-03 PROCEDURE — 71250 CT THORAX DX C-: CPT

## 2020-08-10 ENCOUNTER — TELEPHONE (OUTPATIENT)
Dept: INTERNAL MEDICINE CLINIC | Facility: CLINIC | Age: 75
End: 2020-08-10

## 2020-08-10 ENCOUNTER — TELEPHONE (OUTPATIENT)
Dept: HEMATOLOGY ONCOLOGY | Facility: CLINIC | Age: 75
End: 2020-08-10

## 2020-08-10 NOTE — TELEPHONE ENCOUNTER
Pt would appreciate a call back from [facility] regarding her appointment for tomorrow at [phone]      Thank you

## 2020-08-11 ENCOUNTER — OFFICE VISIT (OUTPATIENT)
Dept: INTERNAL MEDICINE CLINIC | Facility: CLINIC | Age: 75
End: 2020-08-11
Payer: MEDICARE

## 2020-08-11 VITALS
OXYGEN SATURATION: 98 % | BODY MASS INDEX: 13.16 KG/M2 | WEIGHT: 79 LBS | DIASTOLIC BLOOD PRESSURE: 58 MMHG | TEMPERATURE: 99.2 F | SYSTOLIC BLOOD PRESSURE: 102 MMHG | HEART RATE: 109 BPM | HEIGHT: 65 IN

## 2020-08-11 DIAGNOSIS — E78.01 FAMILIAL HYPERCHOLESTEROLEMIA: ICD-10-CM

## 2020-08-11 DIAGNOSIS — J44.1 CHRONIC OBSTRUCTIVE PULMONARY DISEASE WITH ACUTE EXACERBATION (HCC): Primary | ICD-10-CM

## 2020-08-11 DIAGNOSIS — F17.219 CIGARETTE NICOTINE DEPENDENCE WITH NICOTINE-INDUCED DISORDER: ICD-10-CM

## 2020-08-11 DIAGNOSIS — R64 MALIGNANT CACHEXIA (HCC): ICD-10-CM

## 2020-08-11 DIAGNOSIS — C34.01 MALIGNANT NEOPLASM OF HILUS OF RIGHT LUNG (HCC): ICD-10-CM

## 2020-08-11 PROCEDURE — 99214 OFFICE O/P EST MOD 30 MIN: CPT | Performed by: INTERNAL MEDICINE

## 2020-08-11 PROCEDURE — 4040F PNEUMOC VAC/ADMIN/RCVD: CPT | Performed by: INTERNAL MEDICINE

## 2020-08-11 PROCEDURE — 1111F DSCHRG MED/CURRENT MED MERGE: CPT | Performed by: INTERNAL MEDICINE

## 2020-08-11 PROCEDURE — 1160F RVW MEDS BY RX/DR IN RCRD: CPT | Performed by: INTERNAL MEDICINE

## 2020-08-11 PROCEDURE — 4004F PT TOBACCO SCREEN RCVD TLK: CPT | Performed by: INTERNAL MEDICINE

## 2020-08-11 PROCEDURE — 3008F BODY MASS INDEX DOCD: CPT | Performed by: INTERNAL MEDICINE

## 2020-08-11 RX ORDER — AZITHROMYCIN 250 MG/1
TABLET, FILM COATED ORAL
Qty: 6 TABLET | Refills: 0 | Status: SHIPPED | OUTPATIENT
Start: 2020-08-11 | End: 2020-08-15

## 2020-08-11 NOTE — PROGRESS NOTES
Assessment/Plan:    Malignant neoplasm of right lung [facility])  Patient is still under the care by Oncology the she recently had a CT scan performed which does show a questionable lesion in the bronchus possible progression of disease  She does admit to some increasing shortness of breath  Patient is had approximately 5 lb weight loss since her last visit  She does have appointment to be seen by her oncologist in near future  In approximately 1 month she will be moving to [place] to live with her sister and parents  Arrangements are being made to establish care [place] oncologist [place] her on a will continue supervision care  I do have concerns with her dwindling as far as her weight is concerned discussed again the importance of increasing her caloric intake, increased protein possible protein supplements    COPD (chronic obstructive pulmonary disease) ([address] )  History of severe chronic obstructive pulmonary disease chronic hypoxia  She remains on O2 chronically  Again she admits that she has had some increasing shortness of breath  O2 sat at rest today in the office is acceptable  Patient is using her inhalers as needed  She has a low-grade temperature, decreased breath sounds significantly throughout both lung fields period and worried about an acute exacerbation of her COPD and she was placed on a Zithromax Z-Sal to take as directed    Metastatic adenocarcinoma to brain [facility])  Metastatic disease to the brain  She has undergone radiation treatment  No new neurologic changes  She remains on Keppra to prevent seizure activity    Hyperlipidemia  History of hyperlipidemia  Patient has had a decrease in weight, decreased appetite this is concurrent with her hip fracture  The along with increasing her caloric intake we stressed to the patient the importance of watching fats and cholesterol with her diet  Malignant cachexia ([facility] )  Malnutrition Findings:           BMI Findings:         Body mass index is 13 15 kg/m²  Again patient continues to lose weight  We did discuss with the patient as in the past increasing her intake of protein for her malnutrition  Nicotine dependence  Despite underlying disease process patient continues to smoke  We did discuss the the importance for her health and well for to quit smoking now  Diagnoses and all orders for this visit:    Chronic obstructive pulmonary disease with acute exacerbation (HCC)  -     azithromycin (ZITHROMAX) 250 mg tablet; Take 2 tablets today then 1 tablet daily x 4 days    Malignant neoplasm of hilus of right lung (HCC)    Familial hypercholesterolemia    Malignant cachexia (HCC)    Cigarette nicotine dependence with nicotine-induced disorder          Subjective:      Patient ID: Nico Zuniga is a 70 y o  female  The patient is 81-year-old female history of lung cancer status post chemotherapy, radiation treatment, metastatic disease to the brain presenting a seizure disorder  Chronic hypoxia, COPD, malnutrition  Patient is here today for routine follow-up  Patient relates that she and her family will be moving down to [facility] to live with her sister in the near future  They have made arrangements for her to follow-up with an oncologist in their area  Patient continues to dwindle, continued weight loss, admits that she has decreased appetite, some mild increased shortness of breath with exertion  Recent CT scan does show possible recurrence, progression of disease in the chest   Followed by palliative care      The following portions of the patient's history were reviewed and updated as appropriate: She  has a past medical history of Breast cancer ([facility] ) (1996), Cancer ([facility] ), COPD, severe ([address] ), Lung disease, Requires supplemental oxygen, and Stage 4 lung cancer ([facility] )    She   Patient Active Problem List    Diagnosis Date Noted    Severe protein-calorie malnutrition ([address] ) 07/17/2020    Malnutrition ([facility] ) 07/05/2020    Constipation 07/05/2020    Acute blood loss as cause of postoperative anemia 07/04/2020    Hip fracture ([facility] ) 07/01/2020    Hyponatremia 07/01/2020    Palliative care patient 02/27/2020    Scab 01/02/2020    Xerostomia 11/11/2019    Caregiver stress 11/11/2019    Medical marijuana use 11/11/2019    On home oxygen therapy 10/24/2019    Metastatic adenocarcinoma to brain ([place] [address] ) 10/09/2019    Cramp of limb 09/20/2019    Episode of shaking 09/20/2019    Shortness of breath 03/13/2019    Chronic hypoxemic respiratory failure ([place] [address] ) 02/26/2019    Malignant neoplasm of right lung (HCC) 03/09/2018    Depressive disorder 11/14/2017    COPD (chronic obstructive pulmonary disease) ([place] [address] ) 10/14/2016    Hyperlipidemia 04/08/2016    Loss of appetite 01/23/2015    Malignant cachexia ([place] [address] ) 10/05/2012    Nicotine dependence 10/05/2012     She  has a past surgical history that includes Appendectomy; Tonsillectomy; Mastectomy, radical (Right, 1996); Colonoscopy (N/A, 4/18/2016); pr bronchoscopy,diagnostic (N/A, 10/17/2017); Lung cancer surgery; pr excis supratent brain tumor (Right, 10/24/2019); and ORIF hip fracture (Right, 7/2/2020)  Her family history includes Dementia in her mother; Heart disease in her maternal grandfather, maternal grandmother, and mother; Heart failure in her father  She  reports that she has been smoking cigarettes  She started smoking about 58 years ago  She has been smoking about 0 00 packs per day for the past 55 00 years  She has never used smokeless tobacco  She reports previous alcohol use  She reports current drug use  Drug: Marijuana    Current Outpatient Medications   Medication Sig Dispense Refill    acetaminophen (TYLENOL) 325 mg tablet Take 2 tablets (650 mg total) by mouth every 4 (four) hours as needed for mild pain 30 tablet 0    albuterol (VENTOLIN HFA) 90 mcg/act inhaler Inhale 2 puffs every 4 (four) hours as needed for wheezing 1 Inhaler 0    BROVANA 15 MCG/2ML nebulizer solution TAKE 1 VIAL (15 MCG TOTAL) BY NEBULIZATION 2 (TWO) TIMES A  mL 5    budesonide (PULMICORT) 0 25 mg/2 mL nebulizer solution TAKE 1 VIAL (0 25 MG TOTAL) BY NEBULIZATION 2 (TWO) TIMES A  mL 1    enoxaparin (LOVENOX) 40 mg/0 4 mL Inject 0 4 mL (40 mg total) under the skin every 24 hours For 14 days then discontinue 14 Syringe 0    ipratropium (ATROVENT) 0 02 % nebulizer solution Take 1 vial (0 5 mg total) by nebulization 3 (three) times a day 90 vial 5    levETIRAcetam (KEPPRA) 250 mg tablet Take 1 tablet (250 mg total) by mouth every 12 (twelve) hours      vitamin B-12 (VITAMIN B-12) 1,000 mcg tablet Take 1,000 mcg by mouth daily      azithromycin (ZITHROMAX) 250 mg tablet Take 2 tablets today then 1 tablet daily x 4 days 6 tablet 0    mirtazapine (REMERON) 15 mg tablet 1/2 to 1 tab PO at bedtime (Patient not taking: Reported on 8/11/2020)      oxyCODONE (ROXICODONE) 5 mg immediate release tablet Take 1/2 tab (2 5mg) or 1 tab (5mg) twice a day for moderate - severe pain AS NEEDED (Patient not taking: Reported on 8/11/2020) 30 tablet 0     No current facility-administered medications for this visit        Current Outpatient Medications on File Prior to Visit   Medication Sig    acetaminophen (TYLENOL) 325 mg tablet Take 2 tablets (650 mg total) by mouth every 4 (four) hours as needed for mild pain    albuterol (VENTOLIN HFA) 90 mcg/act inhaler Inhale 2 puffs every 4 (four) hours as needed for wheezing    BROVANA 15 MCG/2ML nebulizer solution TAKE 1 VIAL (15 MCG TOTAL) BY NEBULIZATION 2 (TWO) TIMES A DAY    budesonide (PULMICORT) 0 25 mg/2 mL nebulizer solution TAKE 1 VIAL (0 25 MG TOTAL) BY NEBULIZATION 2 (TWO) TIMES A DAY    enoxaparin (LOVENOX) 40 mg/0 4 mL Inject 0 4 mL (40 mg total) under the skin every 24 hours For 14 days then discontinue    ipratropium (ATROVENT) 0 02 % nebulizer solution Take 1 vial (0 5 mg total) by nebulization 3 (three) times a day    levETIRAcetam (KEPPRA) 250 mg tablet Take 1 tablet (250 mg total) by mouth every 12 (twelve) hours    vitamin B-12 (VITAMIN B-12) 1,000 mcg tablet Take 1,000 mcg by mouth daily    mirtazapine (REMERON) 15 mg tablet 1/2 to 1 tab PO at bedtime (Patient not taking: Reported on 8/11/2020)    oxyCODONE (ROXICODONE) 5 mg immediate release tablet Take 1/2 tab (2 5mg) or 1 tab (5mg) twice a day for moderate - severe pain AS NEEDED (Patient not taking: Reported on 8/11/2020)     No current facility-administered medications on file prior to visit  She is allergic to megace [megestrol] and other       Review of Systems   Constitutional: Positive for appetite change (Decreased appetite), fatigue and unexpected weight change  Negative for activity change, chills, diaphoresis and fever  Eyes: Negative  Respiratory: Positive for shortness of breath ( chronic shortness of breath with exertion, O2 dependent  States some increasing shortness of breath recently)  Negative for apnea, cough, choking, chest tightness, wheezing and stridor  Cardiovascular: Negative  Gastrointestinal: Positive for constipation ( some episodic difficulties with constipation)  Negative for abdominal distention, abdominal pain, anal bleeding, blood in stool, diarrhea, nausea, rectal pain and vomiting  Endocrine: Negative  Genitourinary: Negative  Musculoskeletal: Positive for arthralgias ( some diffuse arthritic aches and pains nothing new  Walking with a walker) and gait problem  Negative for back pain, joint swelling, myalgias, neck pain and neck stiffness  Skin: Negative  Allergic/Immunologic: Negative  Neurological: Positive for weakness ( generalized weakness decreased muscle tone and strength)  Negative for dizziness, tremors, seizures, syncope, facial asymmetry, speech difficulty, light-headedness, numbness and headaches  Hematological: Negative  Psychiatric/Behavioral: Negative            Objective:      /58   Pulse (!) 109   Temp 99 2 °F (37 3 °C)   Ht 5' 5" (1 651 m)   Wt 35 8 kg (79 lb)   SpO2 98%   BMI 13 15 kg/m²          Physical Exam  Vitals signs and nursing note reviewed  Constitutional:       General: She is not in acute distress  Appearance: She is ill-appearing (Cachectic)  She is not toxic-appearing or diaphoretic  Comments: Frail, thin, articulate 79-year-old female who is awake alert, accompanied by her sister today for appointment a, walking with a walker   HENT:      Head: Normocephalic and atraumatic  Right Ear: Tympanic membrane, ear canal and external ear normal  There is no impacted cerumen  Left Ear: Tympanic membrane, ear canal and external ear normal  There is no impacted cerumen  Nose: Nose normal  No congestion or rhinorrhea  Mouth/Throat:      Mouth: Mucous membranes are moist       Pharynx: Oropharynx is clear  No oropharyngeal exudate or posterior oropharyngeal erythema  Eyes:      General: No scleral icterus  Right eye: No discharge  Left eye: No discharge  Extraocular Movements: Extraocular movements intact  Conjunctiva/sclera: Conjunctivae normal       Pupils: Pupils are equal, round, and reactive to light  Neck:      Musculoskeletal: Normal range of motion and neck supple  No neck rigidity or muscular tenderness  Vascular: No carotid bruit  Cardiovascular:      Rate and Rhythm: Regular rhythm  Tachycardia present  Pulses: Normal pulses  Heart sounds: No murmur  No friction rub  No gallop  Pulmonary:      Effort: Pulmonary effort is normal  No respiratory distress  Breath sounds: No stridor  Rhonchi present  No wheezing or rales  Comments: Significantly decreased breath sounds anteriorly posteriorly with faint rhonchi heard through all lung fields which did decreased somewhat cough  Chest:      Chest wall: No tenderness  Abdominal:      General: There is no distension        Palpations: Abdomen is soft  There is no mass  Tenderness: There is no abdominal tenderness  There is no right CVA tenderness, left CVA tenderness, guarding or rebound  Hernia: No hernia is present  Comments: Scaphoid   Musculoskeletal:         General: Deformity present  No swelling, tenderness or signs of injury  Right lower leg: No edema  Left lower leg: No edema  Comments: Bent over gait, diffuse arthritic changes nothing new or acute  Generalized decreased muscle mass tone to both upper lower extremities  Continue wasting   Lymphadenopathy:      Cervical: No cervical adenopathy  Skin:     General: Skin is warm and dry  Capillary Refill: Capillary refill takes less than 2 seconds  Coloration: Skin is not jaundiced or pale  Findings: No bruising, erythema, lesion or rash  Neurological:      General: No focal deficit present  Mental Status: She is alert and oriented to person, place, and time  Cranial Nerves: No cranial nerve deficit  Motor: Weakness ( generalized weakness upper lower extremities decreased muscle tone and strength) present  Gait: Gait abnormal    Psychiatric:         Mood and Affect: Mood normal          Behavior: Behavior normal          Thought Content:  Thought content normal          Judgment: Judgment normal

## 2020-08-11 NOTE — ASSESSMENT & PLAN NOTE
History of severe chronic obstructive pulmonary disease chronic hypoxia  She remains on O2 chronically  Again she admits that she has had some increasing shortness of breath  O2 sat at rest today in the office is acceptable  Patient is using her inhalers as needed    She has a low-grade temperature, decreased breath sounds significantly throughout both lung fields period and worried about an acute exacerbation of her COPD and she was placed on a Zithromax Z-Sal to take as directed

## 2020-08-11 NOTE — ASSESSMENT & PLAN NOTE
History of hyperlipidemia  Patient has had a decrease in weight, decreased appetite this is concurrent with her hip fracture  The along with increasing her caloric intake we stressed to the patient the importance of watching fats and cholesterol with her diet

## 2020-08-11 NOTE — ASSESSMENT & PLAN NOTE
Patient is still under the care by Oncology the she recently had a CT scan performed which does show a questionable lesion in the bronchus possible progression of disease  She does admit to some increasing shortness of breath  Patient is had approximately 5 lb weight loss since her last visit  She does have appointment to be seen by her oncologist in near future  In approximately 1 month she will be moving to [place] to live with her sister and parents  Arrangements are being made to establish care [place] oncologist [place] her on a will continue supervision care    I do have concerns with her dwindling as far as her weight is concerned discussed again the importance of increasing her caloric intake, increased protein possible protein supplements

## 2020-08-11 NOTE — ASSESSMENT & PLAN NOTE
Metastatic disease to the brain  She has undergone radiation treatment  No new neurologic changes    She remains on Keppra to prevent seizure activity

## 2020-08-11 NOTE — ASSESSMENT & PLAN NOTE
Despite underlying disease process patient continues to smoke  We did discuss the the importance for her health and well for to quit smoking now

## 2020-08-11 NOTE — ASSESSMENT & PLAN NOTE
Malnutrition Findings:           BMI Findings: Body mass index is 13 15 kg/m²  Again patient continues to lose weight  We did discuss with the patient as in the past increasing her intake of protein for her malnutrition

## 2020-08-11 NOTE — TELEPHONE ENCOUNTER
Patient called stating that she viewed the results of her ct scan and would like to go over the results due to her moving soon and may need to establish care  Best call back [phone]

## 2020-08-18 DIAGNOSIS — J44.9 CHRONIC OBSTRUCTIVE PULMONARY DISEASE, UNSPECIFIED COPD TYPE (HCC): ICD-10-CM

## 2020-08-20 ENCOUNTER — TELEPHONE (OUTPATIENT)
Dept: OBGYN CLINIC | Facility: HOSPITAL | Age: 75
End: 2020-08-20

## 2020-08-20 NOTE — TELEPHONE ENCOUNTER
Patient sees Dr Alphonse Brothers from [facility] called to informed, patient was discharged from physical therapy today, patient is moving to [place] in Sept 8  Patient is asking for a referral or a letter in order to continue outpatient PT in [place]

## 2020-08-21 ENCOUNTER — OFFICE VISIT (OUTPATIENT)
Dept: HEMATOLOGY ONCOLOGY | Facility: CLINIC | Age: 75
End: 2020-08-21
Payer: MEDICARE

## 2020-08-21 VITALS
HEIGHT: 65 IN | DIASTOLIC BLOOD PRESSURE: 60 MMHG | TEMPERATURE: 97.2 F | RESPIRATION RATE: 17 BRPM | WEIGHT: 78.8 LBS | SYSTOLIC BLOOD PRESSURE: 105 MMHG | HEART RATE: 101 BPM | BODY MASS INDEX: 13.13 KG/M2

## 2020-08-21 DIAGNOSIS — C34.01 MALIGNANT NEOPLASM OF HILUS OF RIGHT LUNG (HCC): Primary | ICD-10-CM

## 2020-08-21 DIAGNOSIS — S72.001A CLOSED FRACTURE OF RIGHT HIP, INITIAL ENCOUNTER (HCC): Primary | ICD-10-CM

## 2020-08-21 DIAGNOSIS — Z87.81 S/P ORIF (OPEN REDUCTION INTERNAL FIXATION) FRACTURE: ICD-10-CM

## 2020-08-21 DIAGNOSIS — Z98.890 S/P ORIF (OPEN REDUCTION INTERNAL FIXATION) FRACTURE: ICD-10-CM

## 2020-08-21 DIAGNOSIS — C79.31 METASTATIC ADENOCARCINOMA TO BRAIN (HCC): ICD-10-CM

## 2020-08-21 PROCEDURE — 4004F PT TOBACCO SCREEN RCVD TLK: CPT | Performed by: PHYSICIAN ASSISTANT

## 2020-08-21 PROCEDURE — 3008F BODY MASS INDEX DOCD: CPT | Performed by: PHYSICIAN ASSISTANT

## 2020-08-21 PROCEDURE — 99213 OFFICE O/P EST LOW 20 MIN: CPT | Performed by: PHYSICIAN ASSISTANT

## 2020-08-21 PROCEDURE — 1111F DSCHRG MED/CURRENT MED MERGE: CPT | Performed by: PHYSICIAN ASSISTANT

## 2020-08-21 PROCEDURE — 4040F PNEUMOC VAC/ADMIN/RCVD: CPT | Performed by: PHYSICIAN ASSISTANT

## 2020-08-21 PROCEDURE — 1160F RVW MEDS BY RX/DR IN RCRD: CPT | Performed by: PHYSICIAN ASSISTANT

## 2020-08-21 NOTE — PROGRESS NOTES
Hematology/Oncology Outpatient Follow- up Note  Eleuterio Peñaloza 70 y o  female MRN: @ Encounter: [id number]        Date:  8/21/2020      Assessment / Plan:    68-year-old  male with adenocarcinoma of the lung diagnosed initially in October 2017 stage IV involving right hilum, mediastinum, precarinal, subcarinal and and tumor extension into the right main bronchus intermedius and right middle lobe right cervical area proven by biopsy, molecular tests came back negative for actionable mutation, PDL1 - 0%  It was positive for TP 53 and K-tanya     Treated with radiation therapy and concurrent weekly Taxol/carboplatin completed in February 2018  2   Relapse in September 2019 with brain metastases in the right parietal lobe 2 8 centimeter status post resection of the large right brain lesion and WBRT 11/2019  Brain MRI 7/2020: stable  3   Nodular soft tissue lesion posterior to the bronchus intermedius, slowly enlarging since July 2019, with progressive narrowing of the bronchus intermedius, question enlarging metastatic node noted on CT chest 8/3/2020  Reviewed this finding with the patient and her sister  Discussed that this may be indicative of progressive disease  4   Severe COPD on oxygen  Radiation fibrosis  Patient will be moving to [facility] in [place], [place] next few weeks  Reviewed that our management recommendations would be to obtain a PET-CT in approximately 2 months see if this is glucose avid  It is possible it may be amenable to SBRT  We did discuss possibility of immunotherapy as systemic therapy  Certainly due to her multiple comorbid conditions, she is at high risk for side effects from additional chemotherapy  She will be transferring care to Dr David Mcguire MD with [facility] in [facility]  We will send records and imaging on disc requested for patient to take with her                    HPI:  Eleuterio Peñaloza is a 69 y o  female  seen initially 11/6/2017 regarding moderately differentiated adenocarcinoma of the lung positive for TTF 1 and napsin and negative for P 40 diagnosed via right mainstem bronchus biopsy on October 17, 2017 by Dr Sherri Villanueva  CT scan chest 9/26/2017 ordered by Dr Sherri Villanueva performed as lung cancer screening study showed subcarinal adenopathy measuring 1 7 cm  Precarinal adenopathy measures 1 9 cm  The right lower lobe endobronchial soft tissue appears mass like  Lung-RADS 4A, suspicious  PET scan 11/2/2017 showed confluent of hypermetabolic right hilar and mediastinal, precarinal, subcarinal lymphadenopathy compatible with malignancy, SUV measures 17 4  right hilar and precarinal adenopathy measures approximately 4 2 x 2 2 cm  Subcarinal adenopathy measures approximately 2 5 x 1 5 cm  Right hilar devan mass image 100 measures 2 7 x 1 47 m  Tumor extends into the right mainstem bronchus, bronchus intermedius, right middle lobe and right lower lobe bronchi, as seen previously  There is also a small right thoracic paraspinal node with mild FDG activity, measuring 1 3 x 0 4 cm, SUV 2 5  is an irregular right upper lung density along the major fissure measuring 1 6 x 1 3 cm, with mild FDG activity, SUV 1 1  This may be inflammatory, versus malignancy  There is an ill-defined hypermetabolic nodule in the right parotid gland region, measuring approximately 2 2 x 1 7 cm SUV measures 11 3  This may represent a metastasis versus primary parotid gland lesions such as a Warthin's tumor or amorphic adenoma  mm left lower lung nodular density too small for PET characterization  Subtle hypermetabolic focus anterior to the right diaphragmatic larisa, SUV 2 6  She has chronic COPD secondary to active smoking, progressive dyspnea on exertion  She has had a chronic cough for many years  Used to smoke 2 packs of cigarettes daily for many years, she does not drink alcohol    Both parents still living has 1 sister in [place]  FNA right glucose avid neck mass 12/2017   Pathology: Conclusive evidence of malignancy  small cell carcinoma consistent with metastasis from the patient's know adenocarcinoma of the lung  On immunostaining, the tumor cells are positive for TTF-1 and Napsin A but negative for p40 helping support the above diagnosis  RT to this area  She finished concurrent radiation therapy with weekly Taxol /carboplatin to the chest area as well as to the right neck area  PET scan 10/14/2019  showed small changes in the right infrahilar area as well as the lymph node in the superior abdomen area close to the diaphragm might be metastatic disease  2   Seizure activity in September 2019  MRI of the brain in October 2019 showed large lesion in the right parietal frontal area measuring 2 8 cm with vasogenic edema, small 2 additional foci of metastases  S/p Right parietal craniotomy for resection 10/24/19  Pathology metastatic adenocarcinoma  S/p WBRT 11/2019    MRI brain 1/2020 & 4/2020 & 7/2020 show improvement in all areas and no new lesions  Interval History:  8/3/20:  Nodular soft tissue lesion posterior to the bronchus intermedius, slowly enlarging since July 2019, with progressive narrowing of the bronchus intermedius, question enlarging metastatic node  Radiation fibrosis in the paraspinal right lung  No change in 1 6 x 0 9 cm right paraspinal nodule since 4/22/2020 which was FDG avid on the PET/CT      7/22/20 Brain MRI :No new metastatic lesion identified  Stable postoperative appearance of right frontoparietal craniotomy and metastatic lesion resection without residual enhancement  Patient has plans to move to [place], [place] in the next few weeks to live closer to her sister, Kelsie Rao who accompanied her today  Chronic dyspnea  Weight loss of 5 lbs over past few months  7/1/2020Crileeleetrini Tracymandeep  Experienced right hip pain    No evidence of lytic or blastic lesion on x-ray  Underwent ORIF right hip  There was difficulty extubating her and she was informed she is would be extremely high risk for surgery  Test Results:        Labs:   Lab Results   Component Value Date    HGB 9 1 (L) 07/16/2020    HCT 27 9 (L) 07/16/2020    MCV 99 (H) 07/16/2020     (H) 07/16/2020    WBC 4 93 07/16/2020    NRBC 0 07/16/2020     Lab Results   Component Value Date     02/27/2015    K 4 2 07/16/2020    CL 99 (L) 07/16/2020    CO2 34 (H) 07/16/2020    ANIONGAP 10 02/27/2015    BUN 14 07/16/2020    CREATININE 0 34 (L) 07/16/2020    GLUCOSE 94 02/27/2015    GLUF 86 07/13/2020    CALCIUM 9 3 07/16/2020    AST 17 04/22/2020    ALT 21 04/22/2020    ALKPHOS 87 04/22/2020    PROT 7 5 02/27/2015    BILITOT 0 53 02/27/2015    EGFR 109 07/16/2020       Imaging: Xr Hip/pelv 2-3 Vws Right If Performed    Result Date: 7/31/2020  Narrative: RIGHT HIP INDICATION:   M25 551: Pain in right hip  COMPARISON:  07/16/2020 VIEWS:  XR HIP/PELV 2-3 VWS RIGHT W PELVIS IF PERFORMED Images: 3 FINDINGS: 3 cannulated screws identified in the right hip  The hardware is intact  No significant hip degenerative changes  No lytic or blastic osseous lesion  Soft tissues are unremarkable  The visualized lumbar spine is unremarkable  Impression: Postoperative change  Workstation performed: [id number]     Ct Chest Wo Contrast    Result Date: 8/6/2020  Narrative: CT CHEST WITHOUT IV CONTRAST INDICATION:   C79 31: Secondary malignant neoplasm of brain C34 01: Malignant neoplasm of right main bronchus  Adenocarcinoma of the lung diagnosed October 2017  Completed chemotherapy and radiation in February 2018  Brain metastasis in September 2019  COMPARISON:  Chest CT from 4/22/2020, 1/17/2020, and 7/10/2019  PET CT from 10/14/2019   TECHNIQUE: CT examination of the chest was performed without intravenous contrast   Axial, sagittal, and coronal 2D reformatted images were created from the source data and submitted for interpretation  Radiation dose length product (DLP) for this visit:  146 mGy-cm   This examination, like all CT scans performed in the [facility], was performed utilizing techniques to minimize radiation dose exposure, including the use of iterative reconstruction and automated exposure control  FINDINGS: LUNGS: Chronic narrowing of the right middle lobe bronchus due to radiation fibrosis with new partial right middle lobe atelectasis  Enlarging soft tissue nodule posterior to the bronchus intermedius (3/46-48) since July 2019 with progressive narrowing of the bronchus intermedius  Radiation fibrosis in the paraspinal right lung  Extensive biapical scarring  Moderate emphysema  PLEURA:  New small right pleural effusion  HEART/GREAT VESSELS:  Moderate coronary artery calcification indicating atherosclerotic heart disease  MEDIASTINUM AND JENNIFER:  No change in 1 6 x 0 9 cm right paraspinal nodule since 4/22/2020 which was FDG avid on the PET/CT  Previous necrotic right hilar nodes not conspicuous without IV contrast  CHEST WALL AND LOWER NECK:   Clips in the right axilla  VISUALIZED STRUCTURES IN THE UPPER ABDOMEN:  Unremarkable  OSSEOUS STRUCTURES:  No acute fracture or destructive osseous lesion  Impression: Right middle lobe bronchus chronically narrowed by radiation fibrosis, with new partial right middle lobe atelectasis, of uncertain etiology  No change in FDG avid right paraspinal metastasis since April 2020  Nodular soft tissue lesion posterior to the bronchus intermedius, slowly enlarging since July 2019, with progressive narrowing of the bronchus intermedius, question enlarging metastatic node  Previous necrotic right hilar nodes not visible without IV contrast  This study was marked for significant notification and follow-up  Workstation performed: [id number]           ROS:  As mentioned in HPI & Interval History otherwise 14 point ROS negative          Allergies: Allergies   Allergen Reactions    Megace [Megestrol] Nausea Only    Other Vomiting     Oysters, clams and mussels     Current Medications: Reviewed  PMH/FH/SH:  Reviewed      Physical Exam:    There is no height or weight on file to calculate BSA  Ht Readings from Last 3 Encounters:   20 5' 5" (1 651 m)   20 5' 5" (1 651 m)   20 5' 5" (1 651 m)        Wt Readings from Last 3 Encounters:   20 35 8 kg (79 lb)   20 38 1 kg (84 lb)   20 37 2 kg (82 lb)        Temp Readings from Last 3 Encounters:   20 99 2 °F (37 3 °C)   20 99 8 °F (37 7 °C)   20 97 6 °F (36 4 °C) (Oral)        BP Readings from Last 3 Encounters:   20 102/58   20 100/66   20 100/54           Physical Exam  Constitutional:       Appearance: She is cachectic  HENT:      Head: Normocephalic and atraumatic  Cardiovascular:      Rate and Rhythm: Normal rate and regular rhythm  Pulmonary:      Breath sounds: Rhonchi present  Comments: On nasal 02  Musculoskeletal:      Comments: walker   Skin:     General: Skin is warm and dry  Neurological:      General: No focal deficit present  Mental Status: She is alert     Psychiatric:         Behavior: Behavior normal          [employer]      Emergency Contacts:    [address], [phone],

## 2020-08-21 NOTE — TELEPHONE ENCOUNTER
Called and left message notifying Win Sender and also called and left message notifying pt  Script  In chart

## 2020-08-24 ENCOUNTER — OFFICE VISIT (OUTPATIENT)
Dept: PULMONOLOGY | Facility: CLINIC | Age: 75
End: 2020-08-24
Payer: MEDICARE

## 2020-08-24 VITALS
SYSTOLIC BLOOD PRESSURE: 110 MMHG | WEIGHT: 80 LBS | HEIGHT: 65 IN | HEART RATE: 117 BPM | TEMPERATURE: 98.4 F | OXYGEN SATURATION: 98 % | BODY MASS INDEX: 13.33 KG/M2 | DIASTOLIC BLOOD PRESSURE: 76 MMHG

## 2020-08-24 DIAGNOSIS — C79.31 LUNG CANCER METASTATIC TO BRAIN (HCC): ICD-10-CM

## 2020-08-24 DIAGNOSIS — J44.1 COPD EXACERBATION (HCC): ICD-10-CM

## 2020-08-24 DIAGNOSIS — R06.00 DOE (DYSPNEA ON EXERTION): ICD-10-CM

## 2020-08-24 DIAGNOSIS — J30.89 NON-SEASONAL ALLERGIC RHINITIS, UNSPECIFIED TRIGGER: ICD-10-CM

## 2020-08-24 DIAGNOSIS — J44.9 CHRONIC OBSTRUCTIVE PULMONARY DISEASE, UNSPECIFIED COPD TYPE (HCC): Primary | ICD-10-CM

## 2020-08-24 DIAGNOSIS — C34.90 LUNG CANCER METASTATIC TO BRAIN (HCC): ICD-10-CM

## 2020-08-24 DIAGNOSIS — J96.11 CHRONIC HYPOXEMIC RESPIRATORY FAILURE (HCC): ICD-10-CM

## 2020-08-24 PROCEDURE — 4040F PNEUMOC VAC/ADMIN/RCVD: CPT | Performed by: INTERNAL MEDICINE

## 2020-08-24 PROCEDURE — 3008F BODY MASS INDEX DOCD: CPT | Performed by: INTERNAL MEDICINE

## 2020-08-24 PROCEDURE — 1111F DSCHRG MED/CURRENT MED MERGE: CPT | Performed by: INTERNAL MEDICINE

## 2020-08-24 PROCEDURE — 99214 OFFICE O/P EST MOD 30 MIN: CPT | Performed by: INTERNAL MEDICINE

## 2020-08-24 PROCEDURE — 1160F RVW MEDS BY RX/DR IN RCRD: CPT | Performed by: INTERNAL MEDICINE

## 2020-08-24 PROCEDURE — 4004F PT TOBACCO SCREEN RCVD TLK: CPT | Performed by: INTERNAL MEDICINE

## 2020-08-24 RX ORDER — BUDESONIDE 0.25 MG/2ML
0.25 INHALANT ORAL 2 TIMES DAILY
Qty: 180 VIAL | Refills: 1 | Status: SHIPPED | OUTPATIENT
Start: 2020-08-24 | End: 2020-09-25 | Stop reason: SDUPTHER

## 2020-08-24 RX ORDER — ARFORMOTEROL TARTRATE 15 UG/2ML
15 SOLUTION RESPIRATORY (INHALATION) 2 TIMES DAILY
Qty: 180 ML | Refills: 1 | Status: SHIPPED | OUTPATIENT
Start: 2020-08-24 | End: 2020-09-25 | Stop reason: SDUPTHER

## 2020-08-24 NOTE — PROGRESS NOTES
Office Progress Note - Pulmonary    Sangeeta No 70 y o  female MRN: [id number]    Encounter: [id number]      Assessment:   Chronic obstructive pulmonary disease   Chronic hypoxemic respiratory failure   Lung cancer   Allergic rhinitis   Dyspnea on exertion   Recent hip fracture status post surgery   Cachexia  Plan:     Budesonide 0 5 mg nebulized twice a day   Brovana 15 mcg nebulized twice a day   Ipratropium nebulized 3 times a day   Oxygen supplement 24 hours a day   Fluticasone nasal spray 2 sprays to each nostril once a day   Nutritional supplement   Physical therapy  Increase activities as tolerated   Follow-up as needed  Discussion:   The patient's COPD is in remission  She still has significant dyspnea on exertion mainly due to deconditioning after she had the hip fracture  I have maintained her on the budesonide and Brovana nebulized twice a day and ipratropium nebulized 3 times a day  She will use the oxygen 24 hours a day  I have noticed that she has lost a significant muscle mass  Suggested nutritional supplement  The patient is relocating to [place] in 3 weeks  We will be happy to send her medical records to her doctor down [place] once she is established is 1  Subjective: The patient is here for follow-up visit  She still has shortness of breath on exertion  She has occasional cough  No significant sputum production  Denies chest pain or wheezing  She is using budesonide and Brovana nebulized twice a day and ipratropium nebulized 3 times a day  She has no nocturnal symptoms  She has poor appetite and she has lost weight  In the beginning of July as she fell and broke her hip  She had surgery  She is recovering and doing physical therapy  In 3 weeks she is planning on relocating to [place]      Review of systems:  A 12 point system review is done and aside from what is stated above the rest of the review of systems is negative  Family history and social history are reviewed  Medications list is reviewed  Vitals: Blood pressure 110/76, pulse (!) 117, temperature 98 4 °F (36 9 °C), height 5' 5" (1 651 m), weight 36 3 kg (80 lb), SpO2 98 %  ,     Physical Exam  Gen: Awake, alert, oriented x 3, no acute distress  HEENT: Mucous membranes moist, no oral lesions, no thrush  NECK: No accessory muscle use, JVP not elevated  Cardiac: Regular, single S1, single S2, no murmurs, no rubs, no gallops  Lungs:  Decreased breath sounds  No wheezing  Abdomen: normoactive bowel sounds, soft nontender, nondistended, no rebound or rigidity, no guarding  Extremities: no cyanosis, no clubbing, no edema  Neuro:  Grossly nonfocal   Skin:  No rash  Lab Results   Component Value Date    WBC 4 93 07/16/2020    HGB 9 1 (L) 07/16/2020    HCT 27 9 (L) 07/16/2020    MCV 99 (H) 07/16/2020     (H) 07/16/2020     Lab Results   Component Value Date    SODIUM 136 07/16/2020    K 4 2 07/16/2020    CL 99 (L) 07/16/2020    CO2 34 (H) 07/16/2020    BUN 14 07/16/2020    CREATININE 0 34 (L) 07/16/2020    GLUC 78 07/16/2020    CALCIUM 9 3 07/16/2020     CT scan of the chest is reviewed on the [facility] system and it showed progressive narrowing of the right middle lobe bronchus with right middle lobe atelectasis

## 2020-08-31 ENCOUNTER — OFFICE VISIT (OUTPATIENT)
Dept: OBGYN CLINIC | Facility: HOSPITAL | Age: 75
End: 2020-08-31

## 2020-08-31 ENCOUNTER — HOSPITAL ENCOUNTER (OUTPATIENT)
Dept: RADIOLOGY | Facility: HOSPITAL | Age: 75
Discharge: HOME | End: 2020-08-31
Attending: ORTHOPAEDIC SURGERY
Payer: MEDICARE

## 2020-08-31 VITALS
HEIGHT: 65 IN | SYSTOLIC BLOOD PRESSURE: 103 MMHG | DIASTOLIC BLOOD PRESSURE: 78 MMHG | WEIGHT: 80 LBS | HEART RATE: 58 BPM | BODY MASS INDEX: 13.33 KG/M2

## 2020-08-31 DIAGNOSIS — S72.001A CLOSED FRACTURE OF RIGHT HIP, INITIAL ENCOUNTER (HCC): ICD-10-CM

## 2020-08-31 DIAGNOSIS — S72.001A CLOSED FRACTURE OF RIGHT HIP, INITIAL ENCOUNTER (HCC): Primary | ICD-10-CM

## 2020-08-31 PROCEDURE — 1160F RVW MEDS BY RX/DR IN RCRD: CPT | Performed by: ORTHOPAEDIC SURGERY

## 2020-08-31 PROCEDURE — 3008F BODY MASS INDEX DOCD: CPT | Performed by: ORTHOPAEDIC SURGERY

## 2020-08-31 PROCEDURE — 1111F DSCHRG MED/CURRENT MED MERGE: CPT | Performed by: ORTHOPAEDIC SURGERY

## 2020-08-31 PROCEDURE — 99024 POSTOP FOLLOW-UP VISIT: CPT | Performed by: ORTHOPAEDIC SURGERY

## 2020-08-31 PROCEDURE — 73502 X-RAY EXAM HIP UNI 2-3 VIEWS: CPT

## 2020-08-31 PROCEDURE — 4040F PNEUMOC VAC/ADMIN/RCVD: CPT | Performed by: ORTHOPAEDIC SURGERY

## 2020-08-31 NOTE — PROGRESS NOTES
Assessment:    2 months status post right hip ORIF with cannulated screws done on 7/2/2020  Overall doing well with minimal pain in the right hip  X-rays stable  Right hip incision is healed  Plan:    Weight bearing as tolerated right lower extremity  Continue ambulation with assistance  Outpatient physical therapy for gait training, right lower extremity stretching and strengthening exercises  [place] in the coming weeks for which she was advised to follow up in Orthopedics office in the future  Otherwise follow-up PRN  Problem List Items Addressed This Visit        Musculoskeletal and Integument    Hip fracture ([facility] [address] ) - Primary                   Subjective:     Patient ID:  Sangeeta Mcintyre is a 70 y o  female    HPI    Two months status post right hip ORIF with cannulated screws done on 7/2/2020  Today the patient states that she is doing well with minimal pain in the right hip  She ambulates with the assistance of a walker  No new acute issues or complaints since her last visit  The following portions of the patient's history were reviewed and updated as appropriate: allergies, current medications, past family history, past medical history, past social history, past surgical history and problem list     Review of Systems     Objective:    Imaging:  Right Hip x-rays today demonstrate three cannulated screws in good alignment, unchanged from prior  Vitals:    08/31/20 0958   BP: 103/78   Pulse: 58           Physical Exam     No acute distress  Gait is assisted with a walker  Right lower extremity:  Right hip incision is healed  Right hip nontender to palpation  Negative modified straight leg raise bilaterally  She is grossly motor and sensory stable L2-S1 right side  No calf tenderness, no pitting edema

## 2020-09-24 ENCOUNTER — TELEPHONE (OUTPATIENT)
Dept: OBGYN CLINIC | Facility: HOSPITAL | Age: 75
End: 2020-09-24

## 2020-09-25 DIAGNOSIS — J44.9 CHRONIC OBSTRUCTIVE PULMONARY DISEASE, UNSPECIFIED COPD TYPE (HCC): ICD-10-CM

## 2020-09-28 RX ORDER — ARFORMOTEROL TARTRATE 15 UG/2ML
15 SOLUTION RESPIRATORY (INHALATION) 2 TIMES DAILY
Qty: 120 ML | Refills: 0 | Status: SHIPPED | OUTPATIENT
Start: 2020-09-28 | End: 2020-11-17

## 2020-09-28 RX ORDER — BUDESONIDE 0.25 MG/2ML
0.25 INHALANT ORAL 2 TIMES DAILY
Qty: 120 ML | Refills: 0 | Status: SHIPPED | OUTPATIENT
Start: 2020-09-28

## 2020-10-15 ENCOUNTER — TELEPHONE (OUTPATIENT)
Dept: NEUROSURGERY | Facility: CLINIC | Age: 75
End: 2020-10-15

## 2020-10-15 ENCOUNTER — TELEPHONE (OUTPATIENT)
Dept: HEMATOLOGY ONCOLOGY | Facility: CLINIC | Age: 75
End: 2020-10-15

## 2020-10-27 ENCOUNTER — TELEPHONE (OUTPATIENT)
Dept: NEUROLOGY | Facility: CLINIC | Age: 75
End: 2020-10-27

## 2020-11-17 DIAGNOSIS — J44.9 CHRONIC OBSTRUCTIVE PULMONARY DISEASE, UNSPECIFIED COPD TYPE (HCC): ICD-10-CM

## 2020-11-17 RX ORDER — ARFORMOTEROL TARTRATE 15 UG/2ML
15 SOLUTION RESPIRATORY (INHALATION) 2 TIMES DAILY
Qty: 180 ML | Refills: 5 | Status: SHIPPED | OUTPATIENT
Start: 2020-11-17 | End: 2020-11-19

## 2020-11-19 RX ORDER — ARFORMOTEROL TARTRATE 15 UG/2ML
15 SOLUTION RESPIRATORY (INHALATION) 2 TIMES DAILY
Qty: 120 ML | Refills: 5 | Status: SHIPPED | OUTPATIENT
Start: 2020-11-19

## 2022-09-22 NOTE — PROGRESS NOTES
Progress Note - Pulmonary   Tori Sitter 72 y o  female MRN: [id number]  Unit/Bed#: [facility] 916-01 Encounter: [id number]    Assessment/Plan:    1  Acute on chronic hypoxic respiratory failure (improving) likely secondary COPD exacerbation in the setting of viral vs bacterial bronchitis vs CAP      *  patient continues to remain 6 L- between 91 and 95%      *  home O2 requirements are 2 L, will continue titrate keeping sats greater than 88%      *  continue to encourage ambulation on monitoring O2 sat, deep breathing cough, flutter, OOB as tolerated    2  Severe COPD with exacerbation likely secondary to acute infection      *  patient still has bilateral wheezing throughout all lung fields      *  will continue Solu-Medrol 40 mg q 8h with a taper tomorrow        *  inpatient continue budesonide b i d , Perforomist b i d , Xopenex/Atrovent t i d         *   home regimenBrovana 15 mcg nebulizer b i d , budesonide 0 5 mg nebulizer b i d , ipratropium nebulizer q i d          *  palliative consult- p r n  Oxycodone    3  Abnormal CT of the chest- likely CAP      *  bilateral lower lobe bronchial wall thickening      *  continue azithromycin day # 4/5, Rocephin day # 4/7       *  patient is overall improving clinically    4  History of stage IV adenocarcinoma (2017) (chemo therapy and radiation completed in February 2018)      *  recent CTA shows more prominent RLL opacities      *  patient follows with Dr Erika Ribeiro      *  will need repeat CT in 3 months    Chief Complaint:    "I was able to get a really good night sleep last night"    Subjective:    Marimar Centeno was comfortably sitting in her chair  She reports that she would like to get up and ambulate today to assess her strength  She reports that she is continuing her flutter valve and overall is improving from a respiratory standpoint  No significant overnight events reported    She currently denies fever, chills, hemoptysis, pleuritic chest pain, night sweats, or headache  Objective:    Vitals: Blood pressure 134/79, pulse 103, temperature 98 4 °F (36 9 °C), temperature source Oral, resp  rate 18, height 5' 4" (1 626 m), weight 39 7 kg (87 lb 9 6 oz), SpO2 92 %  6L,Body mass index is 15 04 kg/m²  Intake/Output Summary (Last 24 hours) at 3/11/2019 1004  Last data filed at 3/11/2019 0908  Gross per 24 hour   Intake 795 ml   Output 525 ml   Net 270 ml       Invasive Devices     Peripheral Intravenous Line            Peripheral IV 03/08/19 Left;Upper Arm 2 days                Physical Exam:   Physical Exam   Constitutional: She is oriented to person, place, and time  She appears well-developed and well-nourished  HENT:   Head: Normocephalic and atraumatic  Neck: Normal range of motion  Neck supple  No JVD present  No tracheal deviation present  Cardiovascular: Normal rate, regular rhythm and normal heart sounds  Exam reveals no gallop and no friction rub  No murmur heard  Pulmonary/Chest: No accessory muscle usage or stridor  No tachypnea and no bradypnea  She is in respiratory distress  She has decreased breath sounds in the right upper field and the right middle field  She has wheezes in the right upper field, the right middle field, the right lower field, the left middle field and the left lower field  She has no rhonchi  She has no rales  She exhibits no tenderness  Abdominal: Soft  Bowel sounds are normal  She exhibits no distension  There is no tenderness  Musculoskeletal: Normal range of motion  She exhibits no edema or deformity  Neurological: She is alert and oriented to person, place, and time  Skin: Skin is warm and dry  Psychiatric: She has a normal mood and affect  Her behavior is normal        Labs:  I have personally reviewed pertinent lab results 3/10/19    Imaging and other studies: I have personally reviewed pertinent films in PACS     CTA chest 03/07/2019- bilateral lower lobe bronchial wall thickening with stable emphysematous changes, more prominent right lower lobe opacity [Follow-Up Visit] : 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

## 2023-02-10 NOTE — H&P
PHYSICAL MEDICINE AND REHABILITATION H&P/ADMISSION NOTE  Elmer Gonzales 70 y o  female MRN: [id number]  Unit/Bed#: [facility] 974-01 Encounter: [id number]     Rehab Diagnosis: Orthopedic Disorders:    Unilateral Hip Replacement  Etiologic Diagnosis:  R Hip fracture    CC:  Hip pain     Subjective:    Patient reports minimal hip pain at rest but more significant pain with movements  Patient feels pain medication is helpful  Patient notes some chronic back pain not too bad currently  Patient feels little bit constipated but is having gas  Patient notes possible slight increase in imbalance recently prior to surgery but no focal changes in strength, sensation, or vision  She denies fever, chills, sweats, lightheadedness, calf pain, shortness of breath, cough, or other new complaints  Review of Systems: A 10-point review of systems was performed  Negative except as listed above  Plan:     * Hip fracture [facility])  Assessment & Plan  R femoral neck fx status post surgical repair via medullary nail by Dr Latrice Mccray on 72  POD#14 is 7/16  Weight-bearing as tolerated on affected limb   Monitor incision/area for infection or dehiscence/impaired healing   Monitor for signs/symptoms of VTE, atelectasis, acute blood loss anemia, or other infection   Recommend acute comprehensive interdisciplinary inpatient rehabilitation to include intensive skilled therapies (PT, OT) as outlined with oversight and management by rehabilitation physician as well as inpatient rehab level nursing, case management and weekly interdisciplinary team meetings     Follow-up with Ortho Sx after d/c and ortho if needed during ARC course       Pain  Assessment & Plan  APAP TID  Oxy 2 5-5mg Q4H prn  Counseled on and continue to encourage deep breathing/relaxation/behavioral pain management techniques:     Deep breathin seconds in, 5 seconds out, 5 times per hour when awake and PRN when in pain or anticipate pain; avoid holding breath and tightening muscles and instead breathe slowly and deeply  Monitor for oversedation, AMS/delirium, and respiratory depression   If being administered - hold opiates, muscle relaxants, benzodiazepines, and gabapentin for oversedation, AMS, or RR<12        Constipation  Assessment & Plan  Colace BID  PRN bowel regimen     Acute blood loss as cause of postoperative anemia  Assessment & Plan  IM consulted, with overall monitoring, and management at their discretion during ARC course  Monitor H/H, vitals, signs/symptoms of acute bleeding        Brain metastases (HCC)  Assessment & Plan  Hx of brain mets with plan for surveillance imaging   Follow-up with OP NSx and hem-onc  Monitor neuro exam closely    COPD (chronic obstructive pulmonary disease) ([facility] [address] )  Assessment & Plan  IM consulted and with overall management at their discretion during ARC course  On home chronic 2L O2  Home nebs  Monitor vitals without and with activity   Incentive spirometry     Nicotine dependence  Assessment & Plan   on abstinence  Only smokes 2 cigarettes per week without O2 > counseled on dangers  D/C ND patch    Malignant neoplasm of right lung (HCC)  Assessment & Plan  S/P chemo/radiation   Follow-up hem/onc after d/c        Drug regimen reviewed, all potential adverse effects identified and addressed:    Scheduled Meds:  Current Facility-Administered Medications:  acetaminophen 975 mg Oral Q8H [facility] Bird Velazco MD   albuterol 2 puff Inhalation Q4H PRN Bird Velazco MD   bisacodyl 10 mg Rectal Daily PRN Bird Velazco MD   budesonide 0 25 mg Nebulization BID Bird Velazco MD   [START ON 7/6/2020] vitamin B-12 1,000 mcg Oral Daily Bird Velazco MD   docusate sodium 100 mg Oral BID Bird Velazco MD   ipratropium 0 5 mg Nebulization TID Bird Velazco MD   levalbuterol 1 25 mg Nebulization TID Bird Velazco MD   levETIRAcetam 250 mg Oral Q12H [facility] Bird Velazco MD   ondansetron 4 mg Oral Q8H PRN Bird Velazco MD   oxyCODONE 5 mg Oral Q4H PRN Pat Reardon MD   Or       oxyCODONE 2 5 mg Oral Q4H PRN Pat Reardon MD   polyethylene glycol 17 g Oral Daily PRN aPt Reardon MD        Other Medical Issues:       Disposition   Home with [facility] 2-3 weeks from admission     Follow-up providers and other issues to be followed up after discharge   PCP   OrthoSx   Hem-onc   NSx    CODE: Level 1: Full Code   (Note this is a change from recent status > extended time spent with patient to discuss code status and she elects to be [facility] CPR/intubation both OK at this time but she states she would not want prolonged intubation if not likely to recovery adequately)    Restrictions include: Fall precautions    Social History per encounter and chart review:  Rai Spencer lives in home without steps to enter with bathroom and bedroom on 2nd floor and attached apartment where parents stay and she helps them with iADLs mostly  Has sister from out of state that is helping parents while she is in hospital   Smokes 2 cigarrettes per week       Patient/family's goals: Return to prior living situation at time of discharge from acute rehab      Prior Level of Function:  Independent with ADLs, ambulation    Current Level of Function:    Transfers, ambulation 20 ft, toileting, LB dressing mod assist    Potential Barriers to Discharge:  Co-morbidities (see above), new functional deficits, pain, deconditioning, stairs, limited support for her at home      Physical Exam:  Temp:  [97 8 °F (36 6 °C)-99 °F (37 2 °C)] 98 5 °F (36 9 °C)  HR:  [] 102  Resp:  [18] 18  BP: ()/(51-79) 108/51  SpO2:  [95 %-100 %] 98 %    GEN:  Lying in bed in NAD   HEENT/NECK: Normocephalic, atraumatic, moist mucous membranes   CARDIAC: Regular rate rhythm, no murmers, no rubs, no gallops  LUNGS:  Decreased lung expansion throughout without wheeze, or rhonchi  ABDOMEN: Soft, non-tender, non-distended, normal active bowel sounds  EXTREMITIES/SKIN:  Mild R leg swelling without calf tenderness  NEURO:   MENTAL STATUS: awake, oriented to person, place, time, and situation, CN II-XII: grossly intact , DTR/Tone/Upper motor neuron signs: normoreflexive without increased tone and Strength near full throughout without testing R prox LE; finger to nose intact bilaterally  PSYCH:  Affect:  Euthymic     Laboratory:    Results from last 7 days   Lab Units 07/05/20  0447 07/04/20  0451 07/03/20  0534   HEMOGLOBIN g/dL 9 3* 8 9* 10 6*   HEMATOCRIT % 29 1* 27 8* 34 9   WBC Thousand/uL 5 28 5 25 5 97     Results from last 7 days   Lab Units 07/05/20  0447 07/04/20  0451 07/03/20  1436   BUN mg/dL 8 8 24   SODIUM mmol/L 135* 137 136   POTASSIUM mmol/L 4 2 4 2 4 3   CHLORIDE mmol/L 100 102 104   CREATININE mg/dL 0 37* 0 39* 0 78     Results from last 7 days   Lab Units 07/01/20  1500   PROTIME seconds 12 7   INR  0 95        Wt Readings from Last 1 Encounters:   07/05/20 39 6 kg (87 lb 3 2 oz)     Estimated body mass index is 14 51 kg/m² as calculated from the following:    Height as of this encounter: 5' 5" (1 651 m)  Weight as of this encounter: 39 6 kg (87 lb 3 2 oz)  Imaging: reviewed    Tolerance for three hours of therapy per therapy day: adequate     Rehabilitation Prognosis: good       Rehabilitation Plan/Therapy Interventions: This patient will have close medical and rehabilitation oversight from a physical medicine and rehabilitation physician and if needed an internal medicine physician to manage the complexity of medical issues to optimize health, ability to participate in therapy, quality of life, and functional outcomes  This patient requires 24 hour rehabilitation nursing to address bowel and bladder management, (pain management if listed below), medication administration, positioning/skin monitoring, fall/injury prevention, and VTE prophylaxis        Physical and occupational therapy: Patient requires PT, OT to improve functional mobility, transfers, upper and lower body strengthening, conditioning, balance, and gait training with appropriate assistive device  PT and OT will be provided approximately 5 times per week for 90 minutes per day  Skilled therapists and nursing will also provide patient/family safety education and training  / will work to ensure proper communication between patient (+/- family) and staff regarding the overall rehabilitation and medical process while patient is in the acute rehabilitation center  / will work with patient (and if applicable family and community resources) to optimize safe discharge  Discharge Planning:    Estimated length of stay:   16 days  Family/Patient Goals:  Patient/family's goals: Return to previous home/apartment  Mobility Goals: Mod indep    Activities of Daily Living (ADLs) Goals: Mod indep    Rehabilitation and discharge goals discussed with the patient and/or family  Case Managment and Social Work to review patient/family resources and to coordinate Discharge Planning  Patient and Family Education and Training:  Rehabilitation and discharge goals discussed with the patient and/or family  Patient/family education/training needs to be discussed in weekly team meeting  Other equipment: To be determined    Past Medical History:   Past Surgical History:   Family History:   Social history:   Past Medical History:   Diagnosis Date    Breast cancer ([facility] [address] ) 1996    Cancer ([facility] [address] )     COPD, severe ([facility] [address] )     Lung disease     Requires supplemental oxygen     3L 24 hrs/day    Stage 4 lung cancer ([facility] [address] )     Past Surgical History:   Procedure Laterality Date    APPENDECTOMY      COLONOSCOPY N/A 4/18/2016    Procedure: COLONOSCOPY;  Surgeon: Marni Valdez MD;  [facility];   Service:     LUNG CANCER SURGERY      MASTECTOMY, RADICAL Right 1996    [place] [address] N/A 10/17/2017    Procedure: Magalis Aguillon;  Surgeon: Negrita Hill MD;  [facility];  Service: Pulmonary    DC EXCIS [address] TUMOR Right 10/24/2019    Procedure: IMAGE-GUIDED RIGHT FRONTAL CRANIOTOMY FOR TUMOR;  Surgeon: Arpan Joseph MD;  [facility];  Service: Neurosurgery    TONSILLECTOMY       Family History   Problem Relation Age of Onset    Heart disease Mother         cardiac disorder    Dementia Mother     Heart failure Father     Heart disease Maternal Grandmother     Heart disease Maternal Grandfather       Social History     Socioeconomic History    Marital status:      Spouse name: None    Number of children: 1    Years of education: 15    Highest education level: None   Occupational History    Occupation: retired   Social Needs    Financial resource strain: None    Food insecurity:     Worry: None     Inability: None    Transportation needs:     Medical: None     Non-medical: None   Tobacco Use    Smoking status: Current Some Day Smoker     Packs/day: 0 00     Years: 55 00     Pack years: 0 00     Types: Cigarettes     Start date: 1962    Smokeless tobacco: Never Used    Tobacco comment: NOW DOWN TO 2 A DAY   Substance and Sexual Activity    Alcohol use: Not Currently     Alcohol/week: 0 0 standard drinks     Frequency: Never     Binge frequency: Never    Drug use: Yes     Types: Marijuana     Comment: MEDICAL MARIJUANA    Sexual activity: Not Currently   Lifestyle    Physical activity:     Days per week: None     Minutes per session: None    Stress: None   Relationships    Social connections:     Talks on phone: None     Gets together: None     Attends Mosque service: None     Active member of club or organization: None     Attends meetings of clubs or organizations: None     Relationship status: None    Intimate partner violence:     Fear of current or ex partner: None     Emotionally abused: None     Physically abused: None     Forced sexual activity: None   Other Topics Concern    None   Social History Narrative    Lives in an apartment  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  Current Medical Diagnosis Allergies   Patient Active Problem List   Diagnosis    Malignant neoplasm of right lung (HCC)    Malignant cachexia (HCC)    Loss of appetite    Hyperlipidemia    Chronic hypoxemic respiratory failure (HCC)    Nicotine dependence    COPD (chronic obstructive pulmonary disease) (HCC)    Depressive disorder    Shortness of breath    Cramp of limb    Episode of shaking    Metastatic adenocarcinoma [facility] brain ([facility] )    On home oxygen therapy    Xerostomia    Caregiver stress    Medical marijuana use    Scab    Palliative care patient    Brain metastases ([facility] )    Hip fracture ([facility] )    Hyponatremia    Acute blood loss as cause of postoperative anemia    Mild protein-calorie malnutrition (HCC)    Constipation    Pain    Allergies   Allergen Reactions    Megace [Megestrol] Nausea Only    Other Vomiting     Oysters, clams and mussels           Medical Necessity Criteria for [facility] Admission: See below  In addition, the preadmission screen, post-admission physical evaluation, overall plan of care and admissions order demonstrate a reasonable expectation that the following criteria were met at the time of admission to the [facility]  1  The patient requires active and ongoing therapeutic intervention of multiple therapy disciplines (physical therapy, occupational therapy, speech-language pathology, or prosthetics/orthotics), one of which is physical or occupational therapy  2  Patient requires an intensive rehabilitation therapy program, as defined in Chapter 1, section 110 2 2 of the CMS Medicare Policy Manual  This intensive rehabilitation therapy program will consist of at least 3 hours of therapy per day at least 5 days per week or at least 15 hours of intensive rehabilitation therapy within a 7 consecutive day period, beginning with the date of admission to the [facility]      3  The patient is reasonably expected to actively participate in, and benefit significantly from, the intensive rehabilitation therapy program as defined in Chapter 1, section 110 2 2 of the CMS Medicare Policy Manual at this time of admission to the [facility]  She can reasonably be expected to make measurable improvement (that will be of practical value to improve the patients functional capacity or adaptation to impairments) as a result of the rehabilitation treatment, as defined in section 110 3, and such improvement can be expected to be made within the prescribed period of time  As noted in the CMS Medicare Policy Manual, the patient need not be expected to achieve complete independence in the domain of self-care nor be expected to return to his or her prior level of functioning in order to meet this standard  4  The patient must require physician supervision by a rehabilitation physician  As such, a rehabilitation physician will conduct face-to-face visits with the patient at least 3 days per week throughout the patients stay in the [facility] to assess the patient both medically and functionally, as well as to modify the course of treatment as needed to maximize the patients capacity to benefit from the rehabilitation process  5  The patient requires an intensive and coordinated interdisciplinary approach to providing rehabilitation, as defined in Chapter 1, section 110 2 5 of the CMS Medicare Policy Manual  This will be achieved through periodic team conferences, conducted at least once in a 7-day period, and comprising of an interdisciplinary team of medical professionals consisting of: a rehabilitation physician, registered nurse,  and/or , and a licensed/certified therapist from each therapy discipline involved in treating the patient  Changes Since Pre-admission Assessment: None -This patient's participation in rehab continues to be reasonable, necessary and appropriate      CMS Required Post-Admission Physician Evaluation Elements  History and Physical, including medical history, functional history and active comorbidities as in above text  PostAdmission Physician Evaluation:  The patient has the potential to make improvement and is in need of physical, occupational, and/or therapy services  The patient may also need nutritional services  Given the patient's complex medical condition and risk of further medical complications, rehabilitative services cannot be safely provided at a lower level of care, such as a skilled nursing facility  I have reviewed the patient's functional and medical status at the time of the preadmission screening and they are the same as on the day of this admission  I acknowledge that I have personally performed a full physical examination on this patient within 24 hours of admission  The patient and/or family demonstrated understanding the rehabilitation program and the discharge process after we discussed them  Agree in entirety: yes  Minor adaptions: none    Major changes: none    Specific areas of management and oversight in ARC setting:  Cardiopulmonary function/Anemia: Ensure cardiopulmonary stability and optimize cardiopulmonary function not only at rest but with activity as patient's activity level significantly increases in acute rehab compared with prior to transfer in preparation for safe discharge from [facility]  Must closely and frequently monitor blood pressure, HR, anemia to ensure adequate cardiac output during ADLs and ambulation as patient is at increased risk for orthostatic hypotension/syncope and potential injury if not monitored for and managed adequately  Blood pressure management:    Frequent monitoring of blood pressure with appropriate adjustments in blood pressure medication management to optimize blood pressure control and prevent/limit renal complications     Monitoring impact of blood pressure and side-effects of blood pressure medications at rest and with activity  Hypoxia prevention: Ensure appropriate level of oxygenation at rest and with activity to avoid symptomatic hypoxia, maximize functional performance, and decrease risk of atelectasis/pneumonia through close and frequent monitoring, providing appropriate respiratory treatments (such as incentive spirometry), and when necessary provide/adjust respiratory medications  Pain management:  Pain will improve with frequent evaluation of pain, careful adjustments in medications, frequent re-evaluation of patient's pain and medical/neurologic status to ensure optimal pain control, avoidance of potential serious and even life-threatening side-effects and drug interactions, as well as weaning pain medications as soon as possible to decrease risk of short and long-term use  Orthopedic Disorder: Hip fx causing impaired mobility, ADLs, and gait:  intensive skilled therapies with physical therapy and occupational therapy with close oversight and management by rehab specialized physician in acute rehabilitation setting to most expeditiously and effectively improve functional mobility, transfers, upper and lower body strengthening, conditioning, balance, and gait training with appropriate assistive device  Patient will have optimal supervision and management of patient's underlying orthopedic disorder with specialized rehabilitation physician during this period of recovery to ensure most expeditious and optimal recovery with decreased risks of fall/injury and other complications including acute worsening of ortho disorder, decrease risk of VTE, PNA, and skin ulceration  Inpatient rehabilitation education/teaching:   To be provided to patient and typically family/caregiver (if able to be identified) by all skilled therapists, rehab nursing, case management, and rehab specialized physician to ensure optimal recovery and decrease risks of complications in both acute rehabilitation setting as well as after discharge  Protein-calorie malnutrition with failure to thrive:  Close monitoring of nutrition status, caloric intake, nutrition specialist with adjustments in diet, supplements, and at times medications to optimize nutritional status for both short-term and long-term functional recovery and prevention of complications associated with malnutrition  Cecy Cope MD, [address] [place] [address]  Physical Medicine and Rehabilitation  Brain Injury Medicine    ** Please Note: Fluency Direct voice to text software may have been used in the creation of this document   ** ESRD on HD
